# Patient Record
Sex: MALE | Race: AMERICAN INDIAN OR ALASKA NATIVE | HISPANIC OR LATINO | Employment: UNEMPLOYED | URBAN - METROPOLITAN AREA
[De-identification: names, ages, dates, MRNs, and addresses within clinical notes are randomized per-mention and may not be internally consistent; named-entity substitution may affect disease eponyms.]

---

## 2018-01-30 ENCOUNTER — APPOINTMENT (OUTPATIENT)
Dept: RADIOLOGY | Facility: MEDICAL CENTER | Age: 58
End: 2018-01-30
Attending: GENERAL PRACTICE
Payer: MEDICARE

## 2018-02-03 ENCOUNTER — HOSPITAL ENCOUNTER (OUTPATIENT)
Dept: RADIOLOGY | Facility: MEDICAL CENTER | Age: 58
End: 2018-02-03
Attending: GENERAL PRACTICE
Payer: MEDICARE

## 2018-02-03 DIAGNOSIS — M17.9 OSTEOARTHRITIS OF KNEE, UNSPECIFIED (CODE): ICD-10-CM

## 2018-02-03 PROCEDURE — 73721 MRI JNT OF LWR EXTRE W/O DYE: CPT | Mod: RT

## 2018-09-25 ENCOUNTER — HOSPITAL ENCOUNTER (INPATIENT)
Facility: MEDICAL CENTER | Age: 58
LOS: 5 days | DRG: 205 | End: 2018-10-01
Attending: EMERGENCY MEDICINE | Admitting: HOSPITALIST
Payer: MEDICARE

## 2018-09-25 DIAGNOSIS — R60.9 PERIPHERAL EDEMA: ICD-10-CM

## 2018-09-25 DIAGNOSIS — G47.33 OSA (OBSTRUCTIVE SLEEP APNEA): ICD-10-CM

## 2018-09-25 DIAGNOSIS — J96.01 ACUTE HYPOXEMIC RESPIRATORY FAILURE (HCC): ICD-10-CM

## 2018-09-25 DIAGNOSIS — R06.02 SHORTNESS OF BREATH: ICD-10-CM

## 2018-09-25 DIAGNOSIS — E66.01 MORBID OBESITY (HCC): ICD-10-CM

## 2018-09-25 PROCEDURE — 99291 CRITICAL CARE FIRST HOUR: CPT

## 2018-09-25 PROCEDURE — 94760 N-INVAS EAR/PLS OXIMETRY 1: CPT

## 2018-09-25 ASSESSMENT — PAIN DESCRIPTION - DESCRIPTORS: DESCRIPTORS: PRESSURE

## 2018-09-26 ENCOUNTER — APPOINTMENT (OUTPATIENT)
Dept: RADIOLOGY | Facility: MEDICAL CENTER | Age: 58
DRG: 205 | End: 2018-09-26
Attending: EMERGENCY MEDICINE
Payer: MEDICARE

## 2018-09-26 PROBLEM — J96.01 ACUTE HYPOXEMIC RESPIRATORY FAILURE (HCC): Status: ACTIVE | Noted: 2018-09-26

## 2018-09-26 PROBLEM — R60.9 PERIPHERAL EDEMA: Status: ACTIVE | Noted: 2018-09-26

## 2018-09-26 PROBLEM — E66.01 MORBID OBESITY (HCC): Status: ACTIVE | Noted: 2018-09-26

## 2018-09-26 PROBLEM — R06.02 SHORTNESS OF BREATH: Status: ACTIVE | Noted: 2018-09-26

## 2018-09-26 PROBLEM — E78.5 HLD (HYPERLIPIDEMIA): Status: ACTIVE | Noted: 2018-09-26

## 2018-09-26 PROBLEM — D64.9 ANEMIA: Status: ACTIVE | Noted: 2018-09-26

## 2018-09-26 LAB
ALBUMIN SERPL BCP-MCNC: 3.7 G/DL (ref 3.2–4.9)
ALBUMIN/GLOB SERPL: 0.9 G/DL
ALP SERPL-CCNC: 74 U/L (ref 30–99)
ALT SERPL-CCNC: 6 U/L (ref 2–50)
ANION GAP SERPL CALC-SCNC: 6 MMOL/L (ref 0–11.9)
AST SERPL-CCNC: 14 U/L (ref 12–45)
BASOPHILS # BLD AUTO: 0.9 % (ref 0–1.8)
BASOPHILS # BLD: 0.05 K/UL (ref 0–0.12)
BILIRUB SERPL-MCNC: 0.8 MG/DL (ref 0.1–1.5)
BNP SERPL-MCNC: 144 PG/ML (ref 0–100)
BUN SERPL-MCNC: 12 MG/DL (ref 8–22)
CALCIUM SERPL-MCNC: 9.2 MG/DL (ref 8.5–10.5)
CHLORIDE SERPL-SCNC: 99 MMOL/L (ref 96–112)
CO2 SERPL-SCNC: 32 MMOL/L (ref 20–33)
COMMENT 1642: NORMAL
CREAT SERPL-MCNC: 0.76 MG/DL (ref 0.5–1.4)
EOSINOPHIL # BLD AUTO: 0.36 K/UL (ref 0–0.51)
EOSINOPHIL NFR BLD: 6.2 % (ref 0–6.9)
ERYTHROCYTE [DISTWIDTH] IN BLOOD BY AUTOMATED COUNT: 52.9 FL (ref 35.9–50)
FERRITIN SERPL-MCNC: 4.2 NG/ML (ref 22–322)
FOLATE SERPL-MCNC: 9.4 NG/ML
GLOBULIN SER CALC-MCNC: 4 G/DL (ref 1.9–3.5)
GLUCOSE SERPL-MCNC: 116 MG/DL (ref 65–99)
HCT VFR BLD AUTO: 38.8 % (ref 42–52)
HGB BLD-MCNC: 10.2 G/DL (ref 14–18)
HGB RETIC QN AUTO: 17.2 PG/CELL (ref 29–35)
IMM GRANULOCYTES # BLD AUTO: 0.02 K/UL (ref 0–0.11)
IMM GRANULOCYTES NFR BLD AUTO: 0.3 % (ref 0–0.9)
IMM RETICS NFR: 33.2 % (ref 9.3–17.4)
IRON SATN MFR SERPL: 3 % (ref 15–55)
IRON SERPL-MCNC: 12 UG/DL (ref 50–180)
LIPASE SERPL-CCNC: 67 U/L (ref 11–82)
LYMPHOCYTES # BLD AUTO: 1.04 K/UL (ref 1–4.8)
LYMPHOCYTES NFR BLD: 17.9 % (ref 22–41)
MCH RBC QN AUTO: 19.1 PG (ref 27–33)
MCHC RBC AUTO-ENTMCNC: 26.3 G/DL (ref 33.7–35.3)
MCV RBC AUTO: 72.5 FL (ref 81.4–97.8)
MONOCYTES # BLD AUTO: 0.64 K/UL (ref 0–0.85)
MONOCYTES NFR BLD AUTO: 11 % (ref 0–13.4)
MORPHOLOGY BLD-IMP: NORMAL
NEUTROPHILS # BLD AUTO: 3.69 K/UL (ref 1.82–7.42)
NEUTROPHILS NFR BLD: 63.7 % (ref 44–72)
NRBC # BLD AUTO: 0.05 K/UL
NRBC BLD-RTO: 0.9 /100 WBC
PLATELET # BLD AUTO: 224 K/UL (ref 164–446)
PMV BLD AUTO: 10.1 FL (ref 9–12.9)
POTASSIUM SERPL-SCNC: 4.1 MMOL/L (ref 3.6–5.5)
PROT SERPL-MCNC: 7.7 G/DL (ref 6–8.2)
RBC # BLD AUTO: 5.35 M/UL (ref 4.7–6.1)
RETICS # AUTO: 0.09 M/UL (ref 0.04–0.06)
RETICS/RBC NFR: 1.9 % (ref 0.8–2.1)
SODIUM SERPL-SCNC: 137 MMOL/L (ref 135–145)
TIBC SERPL-MCNC: 406 UG/DL (ref 250–450)
TROPONIN I SERPL-MCNC: 0.02 NG/ML (ref 0–0.04)
TSH SERPL DL<=0.005 MIU/L-ACNC: 5.39 UIU/ML (ref 0.38–5.33)
VIT B12 SERPL-MCNC: 315 PG/ML (ref 211–911)
WBC # BLD AUTO: 5.8 K/UL (ref 4.8–10.8)

## 2018-09-26 PROCEDURE — 770020 HCHG ROOM/CARE - TELE (206)

## 2018-09-26 PROCEDURE — 80053 COMPREHEN METABOLIC PANEL: CPT

## 2018-09-26 PROCEDURE — 36415 COLL VENOUS BLD VENIPUNCTURE: CPT

## 2018-09-26 PROCEDURE — 700111 HCHG RX REV CODE 636 W/ 250 OVERRIDE (IP): Performed by: HOSPITALIST

## 2018-09-26 PROCEDURE — 84443 ASSAY THYROID STIM HORMONE: CPT

## 2018-09-26 PROCEDURE — 85025 COMPLETE CBC W/AUTO DIFF WBC: CPT

## 2018-09-26 PROCEDURE — 82746 ASSAY OF FOLIC ACID SERUM: CPT

## 2018-09-26 PROCEDURE — A9270 NON-COVERED ITEM OR SERVICE: HCPCS | Performed by: HOSPITALIST

## 2018-09-26 PROCEDURE — G8978 MOBILITY CURRENT STATUS: HCPCS | Mod: CJ

## 2018-09-26 PROCEDURE — 84484 ASSAY OF TROPONIN QUANT: CPT

## 2018-09-26 PROCEDURE — 83690 ASSAY OF LIPASE: CPT

## 2018-09-26 PROCEDURE — 99223 1ST HOSP IP/OBS HIGH 75: CPT | Mod: AI | Performed by: HOSPITALIST

## 2018-09-26 PROCEDURE — G8979 MOBILITY GOAL STATUS: HCPCS | Mod: CI

## 2018-09-26 PROCEDURE — 700102 HCHG RX REV CODE 250 W/ 637 OVERRIDE(OP): Performed by: HOSPITALIST

## 2018-09-26 PROCEDURE — 83550 IRON BINDING TEST: CPT

## 2018-09-26 PROCEDURE — 71046 X-RAY EXAM CHEST 2 VIEWS: CPT

## 2018-09-26 PROCEDURE — 82607 VITAMIN B-12: CPT

## 2018-09-26 PROCEDURE — 97161 PT EVAL LOW COMPLEX 20 MIN: CPT

## 2018-09-26 PROCEDURE — 83880 ASSAY OF NATRIURETIC PEPTIDE: CPT

## 2018-09-26 PROCEDURE — 83036 HEMOGLOBIN GLYCOSYLATED A1C: CPT

## 2018-09-26 PROCEDURE — 93005 ELECTROCARDIOGRAM TRACING: CPT | Performed by: EMERGENCY MEDICINE

## 2018-09-26 PROCEDURE — 94660 CPAP INITIATION&MGMT: CPT

## 2018-09-26 PROCEDURE — 83540 ASSAY OF IRON: CPT

## 2018-09-26 PROCEDURE — 85046 RETICYTE/HGB CONCENTRATE: CPT

## 2018-09-26 PROCEDURE — 82728 ASSAY OF FERRITIN: CPT

## 2018-09-26 RX ORDER — OXYCODONE HYDROCHLORIDE 10 MG/1
10 TABLET ORAL
Status: DISCONTINUED | OUTPATIENT
Start: 2018-09-26 | End: 2018-10-01 | Stop reason: HOSPADM

## 2018-09-26 RX ORDER — ATORVASTATIN CALCIUM 10 MG/1
10 TABLET, FILM COATED ORAL DAILY
Status: ON HOLD | COMMUNITY
End: 2019-04-24

## 2018-09-26 RX ORDER — FUROSEMIDE 10 MG/ML
20 INJECTION INTRAMUSCULAR; INTRAVENOUS
Status: DISCONTINUED | OUTPATIENT
Start: 2018-09-26 | End: 2018-09-30

## 2018-09-26 RX ORDER — PROMETHAZINE HYDROCHLORIDE 25 MG/1
12.5-25 TABLET ORAL EVERY 4 HOURS PRN
Status: DISCONTINUED | OUTPATIENT
Start: 2018-09-26 | End: 2018-10-01 | Stop reason: HOSPADM

## 2018-09-26 RX ORDER — OXYCODONE HYDROCHLORIDE 5 MG/1
5 TABLET ORAL
Status: DISCONTINUED | OUTPATIENT
Start: 2018-09-26 | End: 2018-10-01 | Stop reason: HOSPADM

## 2018-09-26 RX ORDER — PROMETHAZINE HYDROCHLORIDE 25 MG/1
12.5-25 SUPPOSITORY RECTAL EVERY 4 HOURS PRN
Status: DISCONTINUED | OUTPATIENT
Start: 2018-09-26 | End: 2018-10-01 | Stop reason: HOSPADM

## 2018-09-26 RX ORDER — ATORVASTATIN CALCIUM 10 MG/1
10 TABLET, FILM COATED ORAL NIGHTLY
Status: DISCONTINUED | OUTPATIENT
Start: 2018-09-26 | End: 2018-10-01 | Stop reason: HOSPADM

## 2018-09-26 RX ORDER — POLYETHYLENE GLYCOL 3350 17 G/17G
1 POWDER, FOR SOLUTION ORAL
Status: DISCONTINUED | OUTPATIENT
Start: 2018-09-26 | End: 2018-10-01 | Stop reason: HOSPADM

## 2018-09-26 RX ORDER — ONDANSETRON 4 MG/1
4 TABLET, ORALLY DISINTEGRATING ORAL EVERY 4 HOURS PRN
Status: DISCONTINUED | OUTPATIENT
Start: 2018-09-26 | End: 2018-10-01 | Stop reason: HOSPADM

## 2018-09-26 RX ORDER — HEPARIN SODIUM 5000 [USP'U]/ML
5000 INJECTION, SOLUTION INTRAVENOUS; SUBCUTANEOUS EVERY 8 HOURS
Status: DISCONTINUED | OUTPATIENT
Start: 2018-09-26 | End: 2018-10-01 | Stop reason: HOSPADM

## 2018-09-26 RX ORDER — ASPIRIN 81 MG/1
81 TABLET, CHEWABLE ORAL DAILY
Status: ON HOLD | COMMUNITY
End: 2019-04-24

## 2018-09-26 RX ORDER — AMOXICILLIN 250 MG
2 CAPSULE ORAL 2 TIMES DAILY
Status: DISCONTINUED | OUTPATIENT
Start: 2018-09-26 | End: 2018-10-01 | Stop reason: HOSPADM

## 2018-09-26 RX ORDER — BISACODYL 10 MG
10 SUPPOSITORY, RECTAL RECTAL
Status: DISCONTINUED | OUTPATIENT
Start: 2018-09-26 | End: 2018-10-01 | Stop reason: HOSPADM

## 2018-09-26 RX ORDER — ONDANSETRON 2 MG/ML
4 INJECTION INTRAMUSCULAR; INTRAVENOUS EVERY 4 HOURS PRN
Status: DISCONTINUED | OUTPATIENT
Start: 2018-09-26 | End: 2018-10-01 | Stop reason: HOSPADM

## 2018-09-26 RX ORDER — ASPIRIN 81 MG/1
81 TABLET, CHEWABLE ORAL DAILY
Status: DISCONTINUED | OUTPATIENT
Start: 2018-09-26 | End: 2018-10-01 | Stop reason: HOSPADM

## 2018-09-26 RX ADMIN — OXYCODONE HYDROCHLORIDE 5 MG: 5 TABLET ORAL at 05:08

## 2018-09-26 RX ADMIN — HEPARIN SODIUM 5000 UNITS: 5000 INJECTION, SOLUTION INTRAVENOUS; SUBCUTANEOUS at 14:15

## 2018-09-26 RX ADMIN — HEPARIN SODIUM 5000 UNITS: 5000 INJECTION, SOLUTION INTRAVENOUS; SUBCUTANEOUS at 05:06

## 2018-09-26 RX ADMIN — FUROSEMIDE 20 MG: 10 INJECTION, SOLUTION INTRAMUSCULAR; INTRAVENOUS at 05:07

## 2018-09-26 RX ADMIN — FUROSEMIDE 20 MG: 10 INJECTION, SOLUTION INTRAMUSCULAR; INTRAVENOUS at 16:32

## 2018-09-26 RX ADMIN — ATORVASTATIN CALCIUM 10 MG: 10 TABLET, FILM COATED ORAL at 21:36

## 2018-09-26 RX ADMIN — ATORVASTATIN CALCIUM 10 MG: 10 TABLET, FILM COATED ORAL at 05:06

## 2018-09-26 RX ADMIN — ASPIRIN 81 MG: 81 TABLET, CHEWABLE ORAL at 05:06

## 2018-09-26 RX ADMIN — HEPARIN SODIUM 5000 UNITS: 5000 INJECTION, SOLUTION INTRAVENOUS; SUBCUTANEOUS at 21:36

## 2018-09-26 ASSESSMENT — ENCOUNTER SYMPTOMS
DEPRESSION: 0
WEAKNESS: 0
SENSORY CHANGE: 0
FLANK PAIN: 0
DIZZINESS: 0
HEMOPTYSIS: 0
BLURRED VISION: 0
FOCAL WEAKNESS: 0
HEARTBURN: 0
SPEECH CHANGE: 0
PALPITATIONS: 0
HEADACHES: 0
DOUBLE VISION: 0
ABDOMINAL PAIN: 0
EYE DISCHARGE: 0
CHILLS: 0
VOMITING: 0
SHORTNESS OF BREATH: 1
SHORTNESS OF BREATH: 0
BRUISES/BLEEDS EASILY: 0
COUGH: 0
SORE THROAT: 0
FEVER: 0
DIARRHEA: 0
HALLUCINATIONS: 0
NAUSEA: 0
MYALGIAS: 0

## 2018-09-26 ASSESSMENT — PATIENT HEALTH QUESTIONNAIRE - PHQ9
SUM OF ALL RESPONSES TO PHQ9 QUESTIONS 1 AND 2: 2
7. TROUBLE CONCENTRATING ON THINGS, SUCH AS READING THE NEWSPAPER OR WATCHING TELEVISION: NOT AT ALL
5. POOR APPETITE OR OVEREATING: NOT AT ALL
1. LITTLE INTEREST OR PLEASURE IN DOING THINGS: MORE THAN HALF THE DAYS
9. THOUGHTS THAT YOU WOULD BE BETTER OFF DEAD, OR OF HURTING YOURSELF: NOT AT ALL
SUM OF ALL RESPONSES TO PHQ QUESTIONS 1-9: 10
6. FEELING BAD ABOUT YOURSELF - OR THAT YOU ARE A FAILURE OR HAVE LET YOURSELF OR YOUR FAMILY DOWN: NOT AL ALL
2. FEELING DOWN, DEPRESSED, IRRITABLE, OR HOPELESS: NOT AT ALL
4. FEELING TIRED OR HAVING LITTLE ENERGY: NEARLY EVERY DAY
8. MOVING OR SPEAKING SO SLOWLY THAT OTHER PEOPLE COULD HAVE NOTICED. OR THE OPPOSITE, BEING SO FIGETY OR RESTLESS THAT YOU HAVE BEEN MOVING AROUND A LOT MORE THAN USUAL: MORE THAN HALF THE DAYS
3. TROUBLE FALLING OR STAYING ASLEEP OR SLEEPING TOO MUCH: NEARLY EVERY DAY

## 2018-09-26 ASSESSMENT — COGNITIVE AND FUNCTIONAL STATUS - GENERAL
SUGGESTED CMS G CODE MODIFIER MOBILITY: CH
MOBILITY SCORE: 24

## 2018-09-26 ASSESSMENT — LIFESTYLE VARIABLES
EVER_SMOKED: NEVER
CONSUMPTION TOTAL: POSITIVE
ON A TYPICAL DAY WHEN YOU DRINK ALCOHOL HOW MANY DRINKS DO YOU HAVE: 2
TOTAL SCORE: 1
AVERAGE NUMBER OF DAYS PER WEEK YOU HAVE A DRINK CONTAINING ALCOHOL: 2
HAVE YOU EVER FELT YOU SHOULD CUT DOWN ON YOUR DRINKING: YES
ALCOHOL_USE: YES
HOW MANY TIMES IN THE PAST YEAR HAVE YOU HAD 5 OR MORE DRINKS IN A DAY: 6
SUBSTANCE_ABUSE: 0
HAVE PEOPLE ANNOYED YOU BY CRITICIZING YOUR DRINKING: NO
TOTAL SCORE: 1
EVER FELT BAD OR GUILTY ABOUT YOUR DRINKING: NO
TOTAL SCORE: 1
EVER HAD A DRINK FIRST THING IN THE MORNING TO STEADY YOUR NERVES TO GET RID OF A HANGOVER: NO
EVER_SMOKED: NEVER

## 2018-09-26 ASSESSMENT — COPD QUESTIONNAIRES
COPD SCREENING SCORE: 3
IN THE PAST 12 MONTHS DO YOU DO LESS THAN YOU USED TO BECAUSE OF YOUR BREATHING PROBLEMS: DISAGREE/UNSURE
DURING THE PAST 4 WEEKS HOW MUCH DID YOU FEEL SHORT OF BREATH: SOME OF THE TIME
COPD SCREENING SCORE: 3
DO YOU EVER COUGH UP ANY MUCUS OR PHLEGM?: NO/ONLY WITH OCCASIONAL COLDS OR INFECTIONS
DURING THE PAST 4 WEEKS HOW MUCH DID YOU FEEL SHORT OF BREATH: MOST  OR ALL OF THE TIME
DO YOU EVER COUGH UP ANY MUCUS OR PHLEGM?: YES, A FEW DAYS A WEEK OR MONTH
HAVE YOU SMOKED AT LEAST 100 CIGARETTES IN YOUR ENTIRE LIFE: NO/DON'T KNOW
HAVE YOU SMOKED AT LEAST 100 CIGARETTES IN YOUR ENTIRE LIFE: NO/DON'T KNOW

## 2018-09-26 ASSESSMENT — PAIN SCALES - GENERAL
PAINLEVEL_OUTOF10: 0
PAINLEVEL_OUTOF10: 7
PAINLEVEL_OUTOF10: 0
PAINLEVEL_OUTOF10: 7
PAINLEVEL_OUTOF10: 5
PAINLEVEL_OUTOF10: 2

## 2018-09-26 ASSESSMENT — GAIT ASSESSMENTS
DISTANCE (FEET): 200
GAIT LEVEL OF ASSIST: SUPERVISED

## 2018-09-26 NOTE — ED TRIAGE NOTES
Pt ambulatory from triage, steady gait. Pt states that he was at [facility] yesterday and had a CT completed at that time. Pt states that they sent him home with instructions for follow up appointment, but that he noted his symptoms worsened tonight so he decided to come back in.

## 2018-09-26 NOTE — ED NOTES
"Chief Complaint   Patient presents with   • Abdominal Swelling     Had become swollen and hard since Wednesday, as well as slightly painful. Swelling and hardness has moved down to scrotal region on Sunday.        /62   Pulse 76   Temp 36.4 °C (97.5 °F)   Resp 16   Ht 1.778 m (5' 10\")   Wt (!) 180.5 kg (397 lb 14.9 oz)   SpO2 95%   BMI 57.10 kg/m²     Starting Wednesday pt noticed abdomen getting swollen and harder, no changes in GI. On Sunday scrotum started becoming hard and swollen as well. On palpation lower abdomin is harder than upper, pt states that his legs feel edematous compared to normal.  Denies N/V/D.   "

## 2018-09-26 NOTE — ED PROVIDER NOTES
ED Provider Note    Scribed for Elizabeth Hawkins M.D. by Bruno Post. 9/26/2018, 12:36 AM.    Primary care provider: Elizabeth Faust M.D.  Means of arrival: Walk in  History obtained from: Patient  History limited by: None    CHIEF COMPLAINT  Chief Complaint   Patient presents with   • Abdominal Swelling     Had become swollen and hard since Wednesday, as well as slightly painful. Swelling and hardness has moved down to scrotal region on Sunday.        HPI  Rashi Shirley is a 58 y.o. male with a history of hyperlipidemia and borderline diabetes who presents to the Emergency Department for evaluation of abdominal swelling and hardness extending to the testes onset 1 day ago. The patient confirms leg swelling onset 1 week ago and shortness of breath upon walking. He reports that he is using oxygen while sleeping, but has not been restarted on a CPAP. He is able to urinate. The patient was seen at [facility] for abdominal swelling and he underwent a CT scan, which was found to be normal. He was advised to follow up with his PCP. He denies taking Lasix. He denies any cigarette use or history of asthma.    REVIEW OF SYSTEMS  Positives as above. Pertinent negatives include difficulty urinating   All other review of systems are negative     PAST MEDICAL HISTORY   has a past medical history of Diabetes (HCC); Hyperlipidemia; and Obesity.    SURGICAL HISTORY   has a past surgical history that includes other orthopedic surgery.    SOCIAL HISTORY  Social History   Substance Use Topics   • Smoking status: Never Smoker   • Smokeless tobacco: Never Used   • Alcohol use Yes      Comment: occasional      History   Drug Use No       FAMILY HISTORY  History reviewed. No pertinent family history.    CURRENT MEDICATIONS  Reviewed. See Encounter Summary.     ALLERGIES  No Known Allergies    PHYSICAL EXAM  VITAL SIGNS: /55   Pulse 84   Temp 36.4 °C (97.5 °F)   Resp 20   Ht 1.778 m (5'  "10\")   Wt (!) 180.5 kg (397 lb 14.9 oz)   SpO2 95%   BMI 57.10 kg/m²    Pulse ox interpretation: I interpret this pulse ox as normal.  Constitutional: Alert in no apparent distress.  HENT: Normocephalic, Atraumatic, MMM  Eyes: PERR. Conjunctiva normal, non-icteric.   Heart: Regular rate and rythm, no murmurs.    Lungs: Clear to auscultation bilaterally, shortness of breath from lying to sitting, diminished breath sounds at the bases and wheezes at the apices bilaterally, Patient should be on CPAP but has not been restarted on it in months  Abdomen: Non-tender, non-distended, normal bowel sounds, very obese male  EXT: 3+ pitting edema up to the umbilicus  Skin: Warm, Dry, No erythema, No rash.   Neurologic: Alert and oriented, Grossly non-focal.     DIFFERENTIAL DIAGNOSIS AND WORK UP PLAN    12:36 AM Patient seen and examined at bedside. The patient presents with abdominal swelling and the differential diagnosis includes but is not limited to CHF, acute renal abnormality, liver failure, fluid overload, hypoxia. Ordered for DX Chest, CBC, Lipase, Troponin, CMP, BNP, and EKG to evaluate.      DIAGNOSTIC STUDIES / PROCEDURES       LABS  CBC with an anemia without elevated white blood cell count, CMP within normal limits BNP elevated 144 thyroid function mildly elevated lipase troponin both negative  All labs were reviewed by me.    EKG  12- Lead EKG; interpreted by myself - Ross  Normal sinus rhythm with a rate of 78 bpm.   Normal axis.  Normal intervals.   No ST elevation or depression, T wave inversion int eh anterior leads, but no T waves consistent with Wellins  No widening of QRS complex   Good R wave progression   No diagnostic Q waves.   Clinical Impression: Sinus with T wave inversions consistent with anterior cardiac dysfunction.       RADIOLOGY  DX-CHEST-2 VIEWS   Final Result      Enlarged cardiac silhouette without evidence of acute disease.      EC-ECHOCARDIOGRAM COMPLETE W/O CONT    (Results Pending) " "    The radiologist's interpretation of all radiological studies have been reviewed by me.    COURSE & MEDICAL DECISION MAKING  Pertinent Labs & Imaging studies reviewed. (See chart for details)    1:16 AM The patient ambulated to the bathroom and stated 80-85% and was put on 3 L O2 when he got back.    2:14 AM Dr. Prieto, Hospitalist, consulted and agrees to admit the patient.    2:26 AM I reevaluated the patient at the bedside informed that he will be admitted likely secondary to his hypoxia with ambulation and increased worsening edema up to the abdomen at this time.  I highly suspect this is secondary to congestive heart failure in the setting of an enlarged cardiac silhouette and obstructive sleep apnea while not using CPAP as well as an obese male.  Likely has some fluid overload and will be requiring echocardiogram evaluation of his heart and medication modification including some Lasix.    /55   Pulse 73   Temp 36.4 °C (97.5 °F)   Resp (!) 46   Ht 1.778 m (5' 10\")   Wt (!) 180.5 kg (397 lb 14.9 oz)   SpO2 99%   BMI 57.10 kg/m²     DISPOSITION:  Patient will be admitted to Dr. Prieto, Hospitalist, in guarded condition.      FINAL IMPRESSION  1. Hypoxia  2. Bilateral lower extremity edema  3. Cardiomegaly      Bruno ALSTON (Scribe), am scribing for, and in the presence of, Elizabeth Hawkins M.D..    Electronically signed by: Bruno Post (Scribe), 9/26/2018    Elizabeth ALSTON M.D. personally performed the services described in this documentation, as scribed by Bruno Post in my presence, and it is both accurate and complete. C    The note accurately reflects work and decisions made by me.  Elizabeth Hawkins  9/26/2018  4:27 AM      This dictation has been created using voice recognition software and/or scribes. The accuracy of the dictation is limited by the abilities of the software and the expertise of the scribes. I expect there may be some errors of grammar and " possibly content. I made every attempt to manually correct the errors within my dictation. However, errors related to voice recognition software and/or scribes may still exist and should be interpreted within the appropriate context.

## 2018-09-26 NOTE — ASSESSMENT & PLAN NOTE
Suspect lymphedema but more likely with right heart failure with scrotal edema   Albumin 3.7  Monitor I/O's  Diuresis .

## 2018-09-26 NOTE — THERAPY
"Physical Therapy Evaluation completed.   Bed Mobility:  Supine to Sit: Supervised (HOB up and used trapeze)  Transfers: Sit to Stand: Supervised  Gait: Level Of Assist: Supervised with No Equipment Needed       Plan of Care: Will benefit from Physical Therapy 1 times per week  Discharge Recommendations: Equipment: No Equipment Needed   Pt is a 57 yo male, presenting with progressively worsening shortness of breath over the last several days and abdominal, scrotal and LE edema. Today, pt was able to tolerate ambulation x 200 feet using no AD, no balance or strength issues. Testing still underway for cardiac issues. PT will follow pt 1x per week once plan is clear. Pt should continue to walk in halls 2-3x per day with nsg supervision, pt does not need an assistive device. Pt has family assist at home as needed.     See \"Rehab Therapy-Acute\" Patient Summary Report for complete documentation.     "

## 2018-09-26 NOTE — PROGRESS NOTES
[facility] Hospitalist Progress Note    Date of Service: 2018    Chief Complaint  58 y.o. male admitted 2018 with abdominal pain    Interval Problem Update  Itchy abdomen  Leg, scrotal edema  NSR 70-80's  No drips.  Lasix  Afebrile  Regular diet  Standby assist  On 1L NC    We discussed weight loss need and need for treating his ABELARDO. He states he can obtain a new CPAP at the [facility].        Consultants/Specialty  None    Disposition  Tele overflow        Review of Systems   Constitutional: Negative for chills and fever.   HENT: Negative for congestion and sore throat.    Respiratory: Negative for cough and shortness of breath.    Cardiovascular: Positive for leg swelling. Negative for chest pain and palpitations.   Gastrointestinal: Negative for abdominal pain, diarrhea and nausea.   Genitourinary: Negative for dysuria.   Musculoskeletal: Positive for joint pain (chronic knee pain bilaterally).   Skin: Positive for itching. Negative for rash.   Neurological: Negative for dizziness and headaches.   Psychiatric/Behavioral: Negative for depression.      Physical Exam  Laboratory/Imaging   Hemodynamics  Temp (24hrs), Av.9 °C (98.4 °F), Min:36.4 °C (97.5 °F), Max:37.4 °C (99.4 °F)   Temperature: 37 °C (98.6 °F)  Pulse  Av.8  Min: 73  Max: 91 Heart Rate (Monitored): 74  Blood Pressure: (!) 167/65 (supine before OOB. Nsg updated, ok to get up. ), NIBP: 126/63      Respiratory      Respiration: 18, Pulse Oximetry: 93 %, O2 Daily Delivery Respiratory : Silicone Nasal Cannula     Work Of Breathing / Effort: Mild  RUL Breath Sounds: Clear, RML Breath Sounds: Diminished, RLL Breath Sounds: Diminished, ENRRIQUE Breath Sounds: Clear, LLL Breath Sounds: Diminished    Fluids    Intake/Output Summary (Last 24 hours) at 18 3248  Last data filed at 18 1200   Gross per 24 hour   Intake              700 ml   Output              100 ml   Net              600 ml       Nutrition  Orders Placed This Encounter    Procedures   • Diet Order Regular     Standing Status:   Standing     Number of Occurrences:   1     Order Specific Question:   Diet:     Answer:   Regular [1]     Physical Exam   Constitutional: He is oriented to person, place, and time. He appears well-nourished. No distress.   Morbidly obese   HENT:   Head: Normocephalic and atraumatic.   Nose: Nose normal.   Mouth/Throat: Oropharynx is clear and moist.   Eyes: Conjunctivae and EOM are normal. Right eye exhibits no discharge. Left eye exhibits no discharge.   Neck: No tracheal deviation present.   Cardiovascular: Normal rate, regular rhythm and intact distal pulses.    No murmur heard.  Pulses:       Radial pulses are 2+ on the right side, and 2+ on the left side.        Dorsalis pedis pulses are 2+ on the right side, and 2+ on the left side.   Pulmonary/Chest: No stridor. No respiratory distress.   Distant breath sounds   Abdominal: Soft. He exhibits no distension. There is no tenderness.   Genitourinary:   Genitourinary Comments: Scrotal edema   Musculoskeletal: He exhibits edema.   Neurological: He is alert and oriented to person, place, and time. No cranial nerve deficit.   Skin: Skin is warm. He is not diaphoretic.   Psychiatric: He has a normal mood and affect. His behavior is normal.   Vitals reviewed.      Recent Labs      09/26/18   0120   WBC  5.8   RBC  5.35   HEMOGLOBIN  10.2*   HEMATOCRIT  38.8*   MCV  72.5*   MCH  19.1*   MCHC  26.3*   RDW  52.9*   PLATELETCT  224   MPV  10.1     Recent Labs      09/26/18   0120   SODIUM  137   POTASSIUM  4.1   CHLORIDE  99   CO2  32   GLUCOSE  116*   BUN  12   CREATININE  0.76   CALCIUM  9.2         Recent Labs      09/26/18   0120   BNPBTYPENAT  144*              Assessment/Plan     * Shortness of breath   Assessment & Plan    Multifactorial etiology  Probable ABELARDO, right heart strain  Minimally elevated BNP  Discussed w/ patient the need for compliance with CPAP.  IV lasix and measure strict I/O's  check  echocardiogram         Morbid obesity (HCC)   Assessment & Plan    Discussed weight loss needed        Peripheral edema   Assessment & Plan    Could be component of lymphedema but more likely with right heart failure.  Await echocardiogram.  Albumin 3.7  Monitor I/O's          Anemia   Assessment & Plan    No signs of acute bleeding  Lab workup ordered        Acute hypoxemic respiratory failure (HCC)   Assessment & Plan    Supplemental oxygen  Wean as tolerates  Await echocardiogram results  IV Lasix        HLD (hyperlipidemia)   Assessment & Plan    Check lipid panel   On atorvastatin for ongoing active treatment          Quality-Core Measures   Reviewed items::  Radiology images reviewed, Labs reviewed and Medications reviewed  Briseno catheter::  No Briseno  DVT prophylaxis pharmacological::  Heparin

## 2018-09-26 NOTE — PROGRESS NOTES
Unable to attain accurate I/Os due to patient anatomy preventing his use of a urinal, and being unable to get urine into the toilet with consistency.

## 2018-09-26 NOTE — ASSESSMENT & PLAN NOTE
Due to OHS, cor pulmonale , ABELARDO  Diurese w lasix  Arrange for home o2 , unfortunately - [facility] home o2 no open on weekends. - Home o2 orders placed.

## 2018-09-26 NOTE — ASSESSMENT & PLAN NOTE
Multifactorial etiology- ABELARDO, obesity hypoventilation , cor pulmonale .   Echo with mildly elevated right sided pressure and LVH.  Minimally elevated BNP-- improved edema and dyspnea.   Change to oral lasix.   He will fu with [facility] to arrange for home CPAP  Continue o2 support. -- arrange portable o2.

## 2018-09-26 NOTE — PROGRESS NOTES
2 RN Skin Check Complete     Patient has significant edema across his body. However, his skin is intact

## 2018-09-26 NOTE — PROGRESS NOTES
Pt resting in bed watching TV. A+Ox4, denies pain other than slight itchiness on lower abdomen. Lotion provided.

## 2018-09-26 NOTE — PROGRESS NOTES
2 RN skin assessment:   Skin intact. Sacrum has no abnormalities. Lower abdomen pink and swollen but intact. Pt reports it is slightly itchy. Pannus folds pink but dry and intact. Lower legs swollen, right leg slightly dusky and more swollen than left. SCDs in place. Ears intact with no reddness noted. Missing multiple teeth but no discomfort or sores noted.

## 2018-09-26 NOTE — DIETARY
"Nutrition Services: High BMI    Pt is a 58 y.o. Male with Dx: Acute hypoxemic respiratory failure (HCC)    Ht: 70\", Wt: 175.7 kg, BMI= 55.58  Pt with BMI >40; formal weight loss counseling not appropriate in acute care setting.  Recommend outpatient services for weight management after D/c.     "

## 2018-09-26 NOTE — ED NOTES
Pt ambulated to restroom, steady gait. SPO2 81%-85% on RA through ambulation trial. Pt returned to bed, 3L O2 NC, 98%.

## 2018-09-26 NOTE — ASSESSMENT & PLAN NOTE
Iron deficient - No symptoms of bleed  Iron and vitamin C supplements.   Fu stool obt  Fu outpatient for evaluation of  Colonoscopy- discussed with patient plan of care.

## 2018-09-26 NOTE — H&P
Hospital Medicine History & Physical Note    Date of Service  9/26/2018    Primary Care Physician  Elizabeth Faust M.D.    Consultants  None    Code Status  full    Chief Complaint  Shortness of breath    History of Presenting Illness  58 y.o. male who presented 9/25/2018 with progressively worsening shortness of breath over the last several days.  Patient also has noticed abdominal swelling down to the scrotal region and down to his lower extremities.  His swelling started about a week ago and shortness of breath has worsened upon walking.  He has been using oxygen while sleeping not restarted his CPAP.  He has normal urination.  He has never taken Lasix in the past.  Denies any cigarette or history of asthma or COPD.  Symptoms are progressively worsening.  He is able to lie flat with but not sleep without oxygen.  Denies any chest pain any cough.    Review of Systems  Review of Systems   Constitutional: Negative for chills and fever.   HENT: Negative for congestion, hearing loss and tinnitus.    Eyes: Negative for blurred vision, double vision and discharge.   Respiratory: Positive for shortness of breath. Negative for cough and hemoptysis.    Cardiovascular: Positive for leg swelling. Negative for chest pain and palpitations.   Gastrointestinal: Negative for abdominal pain, heartburn, nausea and vomiting.   Genitourinary: Negative for dysuria and flank pain.   Musculoskeletal: Negative for joint pain and myalgias.   Skin: Negative for rash.   Neurological: Negative for dizziness, sensory change, speech change, focal weakness and weakness.   Endo/Heme/Allergies: Negative for environmental allergies. Does not bruise/bleed easily.   Psychiatric/Behavioral: Negative for depression, hallucinations and substance abuse.       Past Medical History   has a past medical history of Diabetes (HCC); Hyperlipidemia; and Obesity.    Surgical History   has a past surgical history that includes other orthopedic surgery.      Family History  Reviewed and noncontributory    Social History   reports that he has never smoked. He has never used smokeless tobacco. He reports that he drinks alcohol. He reports that he does not use drugs.    Allergies  No Known Allergies    Medications  Prior to Admission Medications   Prescriptions Last Dose Informant Patient Reported? Taking?   aspirin (ASA) 81 MG Chew Tab chewable tablet   Yes Yes   Sig: Take 81 mg by mouth every day.   atorvastatin (LIPITOR) 10 MG Tab   Yes Yes   Sig: Take 10 mg by mouth every evening.   ibuprofen (MOTRIN) 600 MG Tab   No No   Sig: Take 1 Tab by mouth every 6 hours as needed.   metFORMIN (GLUCOPHAGE) 500 MG Tab   Yes Yes   Sig: Take 500 mg by mouth 2 times a day, with meals.      Facility-Administered Medications: None       Physical Exam  Temp:  [36.4 °C (97.5 °F)] 36.4 °C (97.5 °F)  Pulse:  [76-84] 84  Resp:  [16-20] 20  BP: (126-148)/(55-62) 126/55    Physical Exam   Constitutional: He is oriented to person, place, and time. He appears well-developed and well-nourished.   HENT:   Head: Normocephalic and atraumatic.   Eyes: Pupils are equal, round, and reactive to light. Conjunctivae and EOM are normal.   Neck: Normal range of motion. Neck supple. No JVD present.   Cardiovascular: Normal rate, regular rhythm, normal heart sounds and intact distal pulses.    No murmur heard.  Pulmonary/Chest: Effort normal and breath sounds normal. No respiratory distress. He exhibits no tenderness.   Abdominal: Soft. Bowel sounds are normal. He exhibits distension. There is no tenderness.   Musculoskeletal: Normal range of motion. He exhibits edema.   Neurological: He is alert and oriented to person, place, and time. No cranial nerve deficit. He exhibits normal muscle tone.   Skin: Skin is warm and dry. No erythema.   Psychiatric: He has a normal mood and affect. His behavior is normal. Judgment and thought content normal.   Nursing note and vitals reviewed.      Laboratory:  Recent  Labs      09/26/18   0120   WBC  5.8   RBC  5.35   HEMOGLOBIN  10.2*   HEMATOCRIT  38.8*   MCV  72.5*   MCH  19.1*   MCHC  26.3*   RDW  52.9*   PLATELETCT  224   MPV  10.1     Recent Labs      09/26/18   0120   SODIUM  137   POTASSIUM  4.1   CHLORIDE  99   CO2  32   GLUCOSE  116*   BUN  12   CREATININE  0.76   CALCIUM  9.2     Recent Labs      09/26/18   0120   ALTSGPT  6   ASTSGOT  14   ALKPHOSPHAT  74   TBILIRUBIN  0.8   LIPASE  67   GLUCOSE  116*                 Recent Labs      09/26/18   0120   TROPONINI  0.02       Urinalysis:    No results found     Imaging:  DX-CHEST-2 VIEWS   Final Result      Enlarged cardiac silhouette without evidence of acute disease.      EC-ECHOCARDIOGRAM COMPLETE W/O CONT    (Results Pending)         Assessment/Plan:  I anticipate this patient will require at least two midnights for appropriate medical management, necessitating inpatient admission.    * Shortness of breath   Assessment & Plan    Needs compliance with CPAP   Will also diurese with IV lasix  Suspected heartfailure; check echocardiogram   BNP ordered         HLD (hyperlipidemia)   Assessment & Plan    Check lipid panel   Resume statin        Anemia   Assessment & Plan    No signs of acute bleeding  Lab workup ordered        Morbid obesity (HCC)   Assessment & Plan    Encouraged weight loss        Peripheral edema   Assessment & Plan    diffcult to assess clinically as patient morbid obesity   Starting on lasix for now   Monitor I/o   Check echocardiogram for heart failure         Acute hypoxemic respiratory failure (HCC)   Assessment & Plan    Failed 02 eval in the ED, desat with ambulation               VTE prophylaxis: heparin

## 2018-09-27 ENCOUNTER — APPOINTMENT (OUTPATIENT)
Dept: CARDIOLOGY | Facility: MEDICAL CENTER | Age: 58
DRG: 205 | End: 2018-09-27
Attending: HOSPITALIST
Payer: MEDICARE

## 2018-09-27 LAB
ALBUMIN SERPL BCP-MCNC: 3.3 G/DL (ref 3.2–4.9)
ALBUMIN/GLOB SERPL: 0.9 G/DL
ALP SERPL-CCNC: 62 U/L (ref 30–99)
ALT SERPL-CCNC: 6 U/L (ref 2–50)
ANION GAP SERPL CALC-SCNC: 4 MMOL/L (ref 0–11.9)
ANISOCYTOSIS BLD QL SMEAR: ABNORMAL
AST SERPL-CCNC: 11 U/L (ref 12–45)
BASOPHILS # BLD AUTO: 0.7 % (ref 0–1.8)
BASOPHILS # BLD: 0.04 K/UL (ref 0–0.12)
BILIRUB SERPL-MCNC: 0.7 MG/DL (ref 0.1–1.5)
BUN SERPL-MCNC: 12 MG/DL (ref 8–22)
CALCIUM SERPL-MCNC: 9 MG/DL (ref 8.5–10.5)
CHLORIDE SERPL-SCNC: 97 MMOL/L (ref 96–112)
CHOLEST SERPL-MCNC: 66 MG/DL (ref 100–199)
CO2 SERPL-SCNC: 39 MMOL/L (ref 20–33)
COMMENT 1642: NORMAL
CREAT SERPL-MCNC: 0.72 MG/DL (ref 0.5–1.4)
EOSINOPHIL # BLD AUTO: 0.37 K/UL (ref 0–0.51)
EOSINOPHIL NFR BLD: 6.3 % (ref 0–6.9)
ERYTHROCYTE [DISTWIDTH] IN BLOOD BY AUTOMATED COUNT: 52.9 FL (ref 35.9–50)
GLOBULIN SER CALC-MCNC: 3.7 G/DL (ref 1.9–3.5)
GLUCOSE SERPL-MCNC: 121 MG/DL (ref 65–99)
HCT VFR BLD AUTO: 36.2 % (ref 42–52)
HDLC SERPL-MCNC: 25 MG/DL
HGB BLD-MCNC: 9.5 G/DL (ref 14–18)
HYPOCHROMIA BLD QL SMEAR: ABNORMAL
IMM GRANULOCYTES # BLD AUTO: 0.01 K/UL (ref 0–0.11)
IMM GRANULOCYTES NFR BLD AUTO: 0.2 % (ref 0–0.9)
LDLC SERPL CALC-MCNC: 17 MG/DL
LV EJECT FRACT  99904: 65
LV EJECT FRACT MOD 2C 99903: 69.93
LV EJECT FRACT MOD 4C 99902: 76.93
LV EJECT FRACT MOD BP 99901: 76.11
LYMPHOCYTES # BLD AUTO: 0.85 K/UL (ref 1–4.8)
LYMPHOCYTES NFR BLD: 14.6 % (ref 22–41)
MCH RBC QN AUTO: 19.2 PG (ref 27–33)
MCHC RBC AUTO-ENTMCNC: 26.2 G/DL (ref 33.7–35.3)
MCV RBC AUTO: 73.3 FL (ref 81.4–97.8)
MICROCYTES BLD QL SMEAR: ABNORMAL
MONOCYTES # BLD AUTO: 0.65 K/UL (ref 0–0.85)
MONOCYTES NFR BLD AUTO: 11.1 % (ref 0–13.4)
MORPHOLOGY BLD-IMP: NORMAL
NEUTROPHILS # BLD AUTO: 3.91 K/UL (ref 1.82–7.42)
NEUTROPHILS NFR BLD: 67.1 % (ref 44–72)
NRBC # BLD AUTO: 0 K/UL
NRBC BLD-RTO: 0 /100 WBC
OVALOCYTES BLD QL SMEAR: NORMAL
PLATELET # BLD AUTO: 225 K/UL (ref 164–446)
PLATELET BLD QL SMEAR: NORMAL
PMV BLD AUTO: 10 FL (ref 9–12.9)
POIKILOCYTOSIS BLD QL SMEAR: NORMAL
POLYCHROMASIA BLD QL SMEAR: NORMAL
POTASSIUM SERPL-SCNC: 4.4 MMOL/L (ref 3.6–5.5)
PROT SERPL-MCNC: 7 G/DL (ref 6–8.2)
RBC # BLD AUTO: 4.94 M/UL (ref 4.7–6.1)
RBC BLD AUTO: PRESENT
SODIUM SERPL-SCNC: 140 MMOL/L (ref 135–145)
TARGETS BLD QL SMEAR: NORMAL
TRIGL SERPL-MCNC: 118 MG/DL (ref 0–149)
WBC # BLD AUTO: 5.8 K/UL (ref 4.8–10.8)

## 2018-09-27 PROCEDURE — 97165 OT EVAL LOW COMPLEX 30 MIN: CPT

## 2018-09-27 PROCEDURE — 700117 HCHG RX CONTRAST REV CODE 255: Performed by: HOSPITALIST

## 2018-09-27 PROCEDURE — G8987 SELF CARE CURRENT STATUS: HCPCS | Mod: CI

## 2018-09-27 PROCEDURE — 80053 COMPREHEN METABOLIC PANEL: CPT

## 2018-09-27 PROCEDURE — 85025 COMPLETE CBC W/AUTO DIFF WBC: CPT

## 2018-09-27 PROCEDURE — 93306 TTE W/DOPPLER COMPLETE: CPT

## 2018-09-27 PROCEDURE — 700111 HCHG RX REV CODE 636 W/ 250 OVERRIDE (IP): Performed by: HOSPITALIST

## 2018-09-27 PROCEDURE — G8988 SELF CARE GOAL STATUS: HCPCS | Mod: CI

## 2018-09-27 PROCEDURE — 700102 HCHG RX REV CODE 250 W/ 637 OVERRIDE(OP): Performed by: HOSPITALIST

## 2018-09-27 PROCEDURE — 770020 HCHG ROOM/CARE - TELE (206)

## 2018-09-27 PROCEDURE — 93306 TTE W/DOPPLER COMPLETE: CPT | Mod: 26 | Performed by: INTERNAL MEDICINE

## 2018-09-27 PROCEDURE — A9270 NON-COVERED ITEM OR SERVICE: HCPCS | Performed by: HOSPITALIST

## 2018-09-27 PROCEDURE — 94660 CPAP INITIATION&MGMT: CPT

## 2018-09-27 PROCEDURE — 80061 LIPID PANEL: CPT

## 2018-09-27 PROCEDURE — 99232 SBSQ HOSP IP/OBS MODERATE 35: CPT | Performed by: HOSPITALIST

## 2018-09-27 RX ADMIN — HEPARIN SODIUM 5000 UNITS: 5000 INJECTION, SOLUTION INTRAVENOUS; SUBCUTANEOUS at 16:02

## 2018-09-27 RX ADMIN — HUMAN ALBUMIN MICROSPHERES AND PERFLUTREN 3 ML: 10; .22 INJECTION, SOLUTION INTRAVENOUS at 12:10

## 2018-09-27 RX ADMIN — ASPIRIN 81 MG: 81 TABLET, CHEWABLE ORAL at 06:15

## 2018-09-27 RX ADMIN — HEPARIN SODIUM 5000 UNITS: 5000 INJECTION, SOLUTION INTRAVENOUS; SUBCUTANEOUS at 21:35

## 2018-09-27 RX ADMIN — FUROSEMIDE 20 MG: 10 INJECTION, SOLUTION INTRAMUSCULAR; INTRAVENOUS at 06:15

## 2018-09-27 RX ADMIN — Medication 1 TABLET: at 08:23

## 2018-09-27 RX ADMIN — FUROSEMIDE 20 MG: 10 INJECTION, SOLUTION INTRAMUSCULAR; INTRAVENOUS at 16:02

## 2018-09-27 RX ADMIN — ATORVASTATIN CALCIUM 10 MG: 10 TABLET, FILM COATED ORAL at 21:35

## 2018-09-27 RX ADMIN — HEPARIN SODIUM 5000 UNITS: 5000 INJECTION, SOLUTION INTRAVENOUS; SUBCUTANEOUS at 06:15

## 2018-09-27 ASSESSMENT — PAIN SCALES - GENERAL
PAINLEVEL_OUTOF10: 0

## 2018-09-27 ASSESSMENT — ENCOUNTER SYMPTOMS
HEADACHES: 1
ABDOMINAL PAIN: 0
CHILLS: 0
FEVER: 0
NAUSEA: 0
PALPITATIONS: 0
DEPRESSION: 0
DIZZINESS: 0
SHORTNESS OF BREATH: 0
COUGH: 0

## 2018-09-27 ASSESSMENT — COGNITIVE AND FUNCTIONAL STATUS - GENERAL
DAILY ACTIVITIY SCORE: 24
SUGGESTED CMS G CODE MODIFIER DAILY ACTIVITY: CH

## 2018-09-27 ASSESSMENT — ACTIVITIES OF DAILY LIVING (ADL): TOILETING: INDEPENDENT

## 2018-09-27 ASSESSMENT — PATIENT HEALTH QUESTIONNAIRE - PHQ9
SUM OF ALL RESPONSES TO PHQ9 QUESTIONS 1 AND 2: 0
1. LITTLE INTEREST OR PLEASURE IN DOING THINGS: NOT AT ALL
2. FEELING DOWN, DEPRESSED, IRRITABLE, OR HOPELESS: NOT AT ALL

## 2018-09-27 NOTE — CARE PLAN
Problem: Oxygenation:  Goal: Maintain adequate oxygenation dependent on patient condition  Pt was able to wear CPAP through the night comfortably without complications   AutoCPAP 5-20 pressures with 3 LPM inline

## 2018-09-27 NOTE — CARE PLAN
Problem: Safety  Goal: Free from accidental injury  Calls for assistance, ambulates safely independently, call light in reach. Bed locked and in low position    Problem: Respiratory:  Goal: Respiratory status will improve  One liter oxygen via nasal cannula in place. 97% spo2 but desats when napping. Appears to have sleep apnea. desats to 70's then back up to 90's while asleep.

## 2018-09-27 NOTE — PROGRESS NOTES
12 hour chart check    MS: SR 75-91, no ectopy noted.     No changes in skin prior to earlier shift skin assessment with NIKOLAY Boyd

## 2018-09-27 NOTE — THERAPY
"Occupational Therapy Evaluation completed.   Functional Status:  SPV level for BADLs in this setting   Plan of Care: Will remain available for DC needs only  Discharge Recommendations:  Equipment: No Equipment Needed. Post-acute therapy Currently anticipate no further skilled therapy needs once patient is discharged from the inpatient setting.    See \"Rehab Therapy-Acute\" Patient Summary Report for complete documentation.    Pt is a 57 y/o male who presents to acute 2/2 worsening SOB, abnormal swelling in scrotal region and down B LE's. PMH includes DM and obesity. Pt reports he lives in single story home w/ his daughter and grandson. Pt appears to be at functional baseline. Will remain available for DC needs only.     "

## 2018-09-27 NOTE — CARE PLAN
Problem: Knowledge Deficit  Goal: Patient/Significant other demonstrates understanding of disease process, treatment plan, medications and discharge instructions  Outcome: PROGRESSING AS EXPECTED  Pt educated on disease process, plan of care, medications, and CPAP.     Problem: Oxygenation/Respiratory Function  Goal: Patient will Achieve/Maintain Optimum Respiratory Rate/Effort  Outcome: PROGRESSING AS EXPECTED  O2 saturation monitored. Oxygen therapy in use. CPAP ordered for night time use.

## 2018-09-27 NOTE — PROGRESS NOTES
[facility]ist Progress Note    Date of Service: 2018    Chief Complaint  58 y.o. male admitted 2018 with abdominal pain    Interval Problem Update  Up by self  Mobilizes well  CPAP at night  Very little urinary output this a.m.  Patient alert with clear speech    Consultants/Specialty  None    Disposition  Tele overflow        Review of Systems   Constitutional: Negative for chills and fever.   HENT: Negative for congestion.    Respiratory: Negative for cough and shortness of breath.    Cardiovascular: Positive for leg swelling. Negative for chest pain and palpitations.   Gastrointestinal: Negative for abdominal pain and nausea.   Genitourinary: Negative for dysuria.   Musculoskeletal: Positive for joint pain (chronic knee pain bilaterally).   Neurological: Positive for headaches. Negative for dizziness.   Psychiatric/Behavioral: Negative for depression.      Physical Exam  Laboratory/Imaging   Hemodynamics  Temp (24hrs), Av.8 °C (98.2 °F), Min:36.3 °C (97.3 °F), Max:37.1 °C (98.8 °F)   Temperature: 37.1 °C (98.8 °F)  Pulse  Av.2  Min: 73  Max: 91 Heart Rate (Monitored): 75  Blood Pressure: 143/69, NIBP: 103/48      Respiratory      Respiration: 18, Pulse Oximetry: 95 %     Work Of Breathing / Effort: Mild  RUL Breath Sounds: Diminished, RML Breath Sounds: Diminished, RLL Breath Sounds: Diminished, ENRRIQUE Breath Sounds: Diminished, LLL Breath Sounds: Diminished    Fluids    Intake/Output Summary (Last 24 hours) at 18 1513  Last data filed at 18 1200   Gross per 24 hour   Intake              800 ml   Output                0 ml   Net              800 ml       Nutrition  Orders Placed This Encounter   Procedures   • Diet Order Regular     Standing Status:   Standing     Number of Occurrences:   1     Order Specific Question:   Diet:     Answer:   Regular [1]     Physical Exam   Constitutional: He is oriented to person, place, and time. He appears well-nourished. No distress.   Morbidly  obese   HENT:   Head: Normocephalic and atraumatic.   Nose: Nose normal.   Mouth/Throat: Oropharynx is clear and moist.   Eyes: Conjunctivae and EOM are normal. Right eye exhibits no discharge. Left eye exhibits no discharge.   Neck: No tracheal deviation present.   Cardiovascular: Normal rate, regular rhythm and intact distal pulses.    No murmur heard.  Pulses:       Radial pulses are 2+ on the right side, and 2+ on the left side.        Dorsalis pedis pulses are 2+ on the right side, and 2+ on the left side.   Pulmonary/Chest: No stridor. No respiratory distress.   Distant breath sounds   Abdominal: Soft. He exhibits no distension. There is no tenderness.   Genitourinary:   Genitourinary Comments: Scrotal edema   Musculoskeletal: He exhibits edema.   Neurological: He is alert and oriented to person, place, and time. No cranial nerve deficit.   Skin: Skin is warm. He is not diaphoretic.   Psychiatric: He has a normal mood and affect. His behavior is normal.   Vitals reviewed.      Recent Labs      09/26/18   0120  09/27/18   0630   WBC  5.8  5.8   RBC  5.35  4.94   HEMOGLOBIN  10.2*  9.5*   HEMATOCRIT  38.8*  36.2*   MCV  72.5*  73.3*   MCH  19.1*  19.2*   MCHC  26.3*  26.2*   RDW  52.9*  52.9*   PLATELETCT  224  225   MPV  10.1  10.0     Recent Labs      09/26/18   0120  09/27/18   0630   SODIUM  137  140   POTASSIUM  4.1  4.4   CHLORIDE  99  97   CO2  32  39*   GLUCOSE  116*  121*   BUN  12  12   CREATININE  0.76  0.72   CALCIUM  9.2  9.0         Recent Labs      09/26/18   0120   BNPBTYPENAT  144*     Recent Labs      09/27/18   0630   TRIGLYCERIDE  118   HDL  25*   LDL  17          Assessment/Plan     * Shortness of breath   Assessment & Plan    Multifactorial etiology  Probable ABELARDO, right heart strain  Minimally elevated BNP  Discussed w/ patient the need for compliance with CPAP.  IV lasix and measure strict I/O's  check echocardiogram         Morbid obesity (HCC)   Assessment & Plan    Discussed weight loss  needed        Peripheral edema   Assessment & Plan    Could be component of lymphedema but more likely with right heart failure.  Await echocardiogram.  Albumin 3.7  Monitor I/O's          Anemia   Assessment & Plan    No signs of acute bleeding  Iron deficiency  Iron and vitamin C ordered  Guaiac stools.  Needs outpatient colonoscopy        Acute hypoxemic respiratory failure (HCC)   Assessment & Plan    Supplemental oxygen  Wean as tolerates  Await echocardiogram results  IV Lasix        HLD (hyperlipidemia)   Assessment & Plan    Check lipid panel   On atorvastatin for ongoing active treatment          Quality-Core Measures   Reviewed items::  Radiology images reviewed, Labs reviewed and Medications reviewed  Briseno catheter::  No Briseno  DVT prophylaxis pharmacological::  Heparin

## 2018-09-28 LAB
ALBUMIN SERPL BCP-MCNC: 3.4 G/DL (ref 3.2–4.9)
ALBUMIN/GLOB SERPL: 0.9 G/DL
ALP SERPL-CCNC: 59 U/L (ref 30–99)
ALT SERPL-CCNC: 6 U/L (ref 2–50)
ANION GAP SERPL CALC-SCNC: 7 MMOL/L (ref 0–11.9)
AST SERPL-CCNC: 13 U/L (ref 12–45)
BILIRUB SERPL-MCNC: 0.7 MG/DL (ref 0.1–1.5)
BUN SERPL-MCNC: 10 MG/DL (ref 8–22)
CALCIUM SERPL-MCNC: 9 MG/DL (ref 8.5–10.5)
CHLORIDE SERPL-SCNC: 97 MMOL/L (ref 96–112)
CO2 SERPL-SCNC: 35 MMOL/L (ref 20–33)
CREAT SERPL-MCNC: 0.63 MG/DL (ref 0.5–1.4)
ERYTHROCYTE [DISTWIDTH] IN BLOOD BY AUTOMATED COUNT: 50.2 FL (ref 35.9–50)
EST. AVERAGE GLUCOSE BLD GHB EST-MCNC: 140 MG/DL
GLOBULIN SER CALC-MCNC: 3.6 G/DL (ref 1.9–3.5)
GLUCOSE SERPL-MCNC: 115 MG/DL (ref 65–99)
HBA1C MFR BLD: 6.5 % (ref 0–5.6)
HCT VFR BLD AUTO: 36.2 % (ref 42–52)
HGB BLD-MCNC: 9.7 G/DL (ref 14–18)
MCH RBC QN AUTO: 19.1 PG (ref 27–33)
MCHC RBC AUTO-ENTMCNC: 26.8 G/DL (ref 33.7–35.3)
MCV RBC AUTO: 71.4 FL (ref 81.4–97.8)
PLATELET # BLD AUTO: 227 K/UL (ref 164–446)
PMV BLD AUTO: 9.8 FL (ref 9–12.9)
POTASSIUM SERPL-SCNC: 3.8 MMOL/L (ref 3.6–5.5)
PROT SERPL-MCNC: 7 G/DL (ref 6–8.2)
RBC # BLD AUTO: 5.07 M/UL (ref 4.7–6.1)
SODIUM SERPL-SCNC: 139 MMOL/L (ref 135–145)
WBC # BLD AUTO: 5.9 K/UL (ref 4.8–10.8)

## 2018-09-28 PROCEDURE — 700102 HCHG RX REV CODE 250 W/ 637 OVERRIDE(OP): Performed by: HOSPITALIST

## 2018-09-28 PROCEDURE — A9270 NON-COVERED ITEM OR SERVICE: HCPCS | Performed by: HOSPITALIST

## 2018-09-28 PROCEDURE — 700111 HCHG RX REV CODE 636 W/ 250 OVERRIDE (IP): Performed by: HOSPITALIST

## 2018-09-28 PROCEDURE — 94660 CPAP INITIATION&MGMT: CPT

## 2018-09-28 PROCEDURE — 85027 COMPLETE CBC AUTOMATED: CPT

## 2018-09-28 PROCEDURE — 770006 HCHG ROOM/CARE - MED/SURG/GYN SEMI*

## 2018-09-28 PROCEDURE — 80053 COMPREHEN METABOLIC PANEL: CPT

## 2018-09-28 RX ADMIN — ASPIRIN 81 MG: 81 TABLET, CHEWABLE ORAL at 05:56

## 2018-09-28 RX ADMIN — FUROSEMIDE 20 MG: 10 INJECTION, SOLUTION INTRAMUSCULAR; INTRAVENOUS at 16:48

## 2018-09-28 RX ADMIN — SENNOSIDES AND DOCUSATE SODIUM 2 TABLET: 8.6; 5 TABLET ORAL at 05:56

## 2018-09-28 RX ADMIN — HEPARIN SODIUM 5000 UNITS: 5000 INJECTION, SOLUTION INTRAVENOUS; SUBCUTANEOUS at 20:08

## 2018-09-28 RX ADMIN — HEPARIN SODIUM 5000 UNITS: 5000 INJECTION, SOLUTION INTRAVENOUS; SUBCUTANEOUS at 14:50

## 2018-09-28 RX ADMIN — HEPARIN SODIUM 5000 UNITS: 5000 INJECTION, SOLUTION INTRAVENOUS; SUBCUTANEOUS at 05:57

## 2018-09-28 RX ADMIN — ATORVASTATIN CALCIUM 10 MG: 10 TABLET, FILM COATED ORAL at 20:08

## 2018-09-28 RX ADMIN — Medication 1 TABLET: at 05:56

## 2018-09-28 RX ADMIN — FUROSEMIDE 20 MG: 10 INJECTION, SOLUTION INTRAMUSCULAR; INTRAVENOUS at 05:57

## 2018-09-28 ASSESSMENT — ENCOUNTER SYMPTOMS
SHORTNESS OF BREATH: 0
FEVER: 0
NAUSEA: 0
DIZZINESS: 0
DEPRESSION: 0
CHILLS: 0
PALPITATIONS: 0
ABDOMINAL PAIN: 0
HEADACHES: 1
COUGH: 0

## 2018-09-28 ASSESSMENT — PAIN SCALES - GENERAL
PAINLEVEL_OUTOF10: 0

## 2018-09-28 NOTE — CARE PLAN
Problem: Respiratory:  Goal: Respiratory status will improve  Outcome: PROGRESSING AS EXPECTED  Patient SPO2 95% on 2L NC. No shortness of breath, clear lung sounds, maintains SPO2 with mobility. CPAP 3L at night, tolerated well.     Problem: Safety  Goal: Will remain free from injury  Outcome: PROGRESSING AS EXPECTED  Fall precautions in place, steady gait, up x self, no assistive devices, no falls this visit.

## 2018-09-28 NOTE — PROGRESS NOTES
[facility]ist Progress Note    Date of Service: 2018    Chief Complaint  58 y.o. male admitted 2018 with abdominal pain    Interval Problem Update  A+Ox4  Afebrile  Up to bathroom by self      Consultants/Specialty  None    Disposition  Tele overflow        Review of Systems   Constitutional: Negative for chills and fever.   HENT: Negative for congestion.    Respiratory: Negative for cough and shortness of breath.    Cardiovascular: Positive for leg swelling. Negative for chest pain and palpitations.   Gastrointestinal: Negative for abdominal pain and nausea.   Genitourinary: Negative for dysuria.   Musculoskeletal: Positive for joint pain (chronic knee pain bilaterally).   Neurological: Positive for headaches. Negative for dizziness.   Psychiatric/Behavioral: Negative for depression.      Physical Exam  Laboratory/Imaging   Hemodynamics  Temp (24hrs), Av.9 °C (98.5 °F), Min:36.7 °C (98 °F), Max:37.2 °C (98.9 °F)   Temperature: 36.7 °C (98 °F)  Pulse  Av.7  Min: 69  Max: 91 Heart Rate (Monitored): 75  Blood Pressure: 126/65, NIBP: 122/72      Respiratory      Respiration: 18, Pulse Oximetry: 91 %     Work Of Breathing / Effort: Mild  RUL Breath Sounds: Diminished, RML Breath Sounds: Diminished, RLL Breath Sounds: Diminished, ENRRIQUE Breath Sounds: Diminished, LLL Breath Sounds: Diminished    Fluids    Intake/Output Summary (Last 24 hours) at 18 0616  Last data filed at 18 0400   Gross per 24 hour   Intake             2030 ml   Output                0 ml   Net             2030 ml       Nutrition  Orders Placed This Encounter   Procedures   • Diet Order Regular     Standing Status:   Standing     Number of Occurrences:   1     Order Specific Question:   Diet:     Answer:   Regular [1]     Physical Exam   Constitutional: He is oriented to person, place, and time. He appears well-nourished. No distress.   Morbidly obese   HENT:   Head: Normocephalic and atraumatic.   Nose: Nose normal.    Mouth/Throat: Oropharynx is clear and moist.   Eyes: Conjunctivae and EOM are normal. Right eye exhibits no discharge. Left eye exhibits no discharge.   Neck: No tracheal deviation present.   Cardiovascular: Normal rate, regular rhythm and intact distal pulses.    No murmur heard.  Pulses:       Radial pulses are 2+ on the right side, and 2+ on the left side.        Dorsalis pedis pulses are 2+ on the right side, and 2+ on the left side.   Pulmonary/Chest: No stridor. No respiratory distress.   Distant breath sounds   Abdominal: Soft. He exhibits no distension. There is no tenderness.   Genitourinary:   Genitourinary Comments: Scrotal edema   Musculoskeletal: He exhibits edema.   Neurological: He is alert and oriented to person, place, and time. No cranial nerve deficit.   Skin: Skin is warm. He is not diaphoretic.   Psychiatric: He has a normal mood and affect. His behavior is normal.   Vitals reviewed.      Recent Labs      09/26/18   0120 09/27/18 0630 09/28/18   0340   WBC  5.8  5.8  5.9   RBC  5.35  4.94  5.07   HEMOGLOBIN  10.2*  9.5*  9.7*   HEMATOCRIT  38.8*  36.2*  36.2*   MCV  72.5*  73.3*  71.4*   MCH  19.1*  19.2*  19.1*   MCHC  26.3*  26.2*  26.8*   RDW  52.9*  52.9*  50.2*   PLATELETCT  224  225  227   MPV  10.1  10.0  9.8     Recent Labs      09/26/18   0120  09/27/18   0630  09/28/18   0340   SODIUM  137  140  139   POTASSIUM  4.1  4.4  3.8   CHLORIDE  99  97  97   CO2  32  39*  35*   GLUCOSE  116*  121*  115*   BUN  12  12  10   CREATININE  0.76  0.72  0.63   CALCIUM  9.2  9.0  9.0         Recent Labs      09/26/18   0120   BNPBTYPENAT  144*     Recent Labs      09/27/18   0630   TRIGLYCERIDE  118   HDL  25*   LDL  17          Assessment/Plan     * Shortness of breath   Assessment & Plan    Multifactorial etiology  Probable ABELARDO, right heart strain  Minimally elevated BNP  Discussed w/ patient the need for compliance with CPAP.  IV lasix and measure strict I/O's  check echocardiogram          Morbid obesity (HCC)   Assessment & Plan    Discussed weight loss needed        Peripheral edema   Assessment & Plan    Could be component of lymphedema but more likely with right heart failure.  Await echocardiogram.  Albumin 3.7  Monitor I/O's          Anemia   Assessment & Plan    No signs of acute bleeding  Iron deficiency  Iron and vitamin C ordered  Guaiac stools.  Needs outpatient colonoscopy        Acute hypoxemic respiratory failure (HCC)   Assessment & Plan    Supplemental oxygen  Wean as tolerates  Await echocardiogram results  IV Lasix        HLD (hyperlipidemia)   Assessment & Plan    Check lipid panel   On atorvastatin for ongoing active treatment          Quality-Core Measures   Reviewed items::  Radiology images reviewed, Labs reviewed and Medications reviewed  Briseno catheter::  No Briseno  DVT prophylaxis pharmacological::  Heparin

## 2018-09-28 NOTE — CARE PLAN
Problem: Skin Integrity  Goal: Risk for impaired skin integrity will decrease  Outcome: PROGRESSING SLOWER THAN EXPECTED  Orders received from MD for shower privileges. Skin intact    Problem: Safety  Goal: Free from accidental injury  Outcome: PROGRESSING AS EXPECTED  Patient educated on need to call for assistance. Patient calls appropriately and has remained free of injury

## 2018-09-28 NOTE — RESPIRATORY CARE
COPD EDUCATION by COPD CLINICAL EDUCATOR  9/28/2018 at 7:59 AM by Adelaide Gomez     Patient reviewed by COPD education team. Patient does not qualify for COPD program.

## 2018-09-29 PROCEDURE — 94660 CPAP INITIATION&MGMT: CPT

## 2018-09-29 PROCEDURE — 700111 HCHG RX REV CODE 636 W/ 250 OVERRIDE (IP): Performed by: HOSPITALIST

## 2018-09-29 PROCEDURE — 770006 HCHG ROOM/CARE - MED/SURG/GYN SEMI*

## 2018-09-29 PROCEDURE — 700102 HCHG RX REV CODE 250 W/ 637 OVERRIDE(OP): Performed by: HOSPITALIST

## 2018-09-29 PROCEDURE — 99232 SBSQ HOSP IP/OBS MODERATE 35: CPT | Performed by: HOSPITALIST

## 2018-09-29 PROCEDURE — A9270 NON-COVERED ITEM OR SERVICE: HCPCS | Performed by: HOSPITALIST

## 2018-09-29 RX ORDER — ECHINACEA PURPUREA EXTRACT 125 MG
2 TABLET ORAL
Status: DISCONTINUED | OUTPATIENT
Start: 2018-09-28 | End: 2018-10-01 | Stop reason: HOSPADM

## 2018-09-29 RX ADMIN — FUROSEMIDE 20 MG: 10 INJECTION, SOLUTION INTRAMUSCULAR; INTRAVENOUS at 15:48

## 2018-09-29 RX ADMIN — FUROSEMIDE 20 MG: 10 INJECTION, SOLUTION INTRAMUSCULAR; INTRAVENOUS at 04:53

## 2018-09-29 RX ADMIN — HEPARIN SODIUM 5000 UNITS: 5000 INJECTION, SOLUTION INTRAVENOUS; SUBCUTANEOUS at 04:53

## 2018-09-29 RX ADMIN — ASPIRIN 81 MG: 81 TABLET, CHEWABLE ORAL at 04:53

## 2018-09-29 RX ADMIN — HEPARIN SODIUM 5000 UNITS: 5000 INJECTION, SOLUTION INTRAVENOUS; SUBCUTANEOUS at 14:40

## 2018-09-29 RX ADMIN — ATORVASTATIN CALCIUM 10 MG: 10 TABLET, FILM COATED ORAL at 20:55

## 2018-09-29 RX ADMIN — Medication 1 TABLET: at 04:53

## 2018-09-29 RX ADMIN — SALINE NASAL SPRAY 2 SPRAY: 1.5 SOLUTION NASAL at 04:54

## 2018-09-29 RX ADMIN — HEPARIN SODIUM 5000 UNITS: 5000 INJECTION, SOLUTION INTRAVENOUS; SUBCUTANEOUS at 20:55

## 2018-09-29 ASSESSMENT — ENCOUNTER SYMPTOMS
TREMORS: 0
WHEEZING: 0
DIAPHORESIS: 0
SPEECH CHANGE: 0
FLANK PAIN: 0
NECK PAIN: 0
ABDOMINAL PAIN: 0
EYE DISCHARGE: 0
WEAKNESS: 0
HALLUCINATIONS: 0
FOCAL WEAKNESS: 0
FEVER: 0
VOMITING: 0
CHILLS: 0
PALPITATIONS: 0
COUGH: 0
DIARRHEA: 0
SENSORY CHANGE: 0
STRIDOR: 0
SHORTNESS OF BREATH: 1
BACK PAIN: 0
EYE REDNESS: 0

## 2018-09-29 ASSESSMENT — PAIN SCALES - GENERAL
PAINLEVEL_OUTOF10: 0
PAINLEVEL_OUTOF10: 0

## 2018-09-29 ASSESSMENT — LIFESTYLE VARIABLES: SUBSTANCE_ABUSE: 0

## 2018-09-29 NOTE — PROGRESS NOTES
2 RN skin check completed. Ears are red but blanching. BLE and scrotum is edematous. Skin is intact. All bony prominences checked.

## 2018-09-29 NOTE — DISCHARGE PLANNING
Anticipated Discharge Disposition: Home     Action: Discussed patient's needs during IDT rounds. Patient is currently on night/time O2 only.  At this time he is on 2 liters of O2 and may need it when d/c, will try to wean.  Talked to patient to find out who is his O2 provider.  Patient stated he doesn't know the name of provider, only that it is provided by [facility].    Barriers to Discharge: possible O2, and provider    Plan: Have weekend  follow up

## 2018-09-29 NOTE — CARE PLAN
Problem: Communication  Goal: The ability to communicate needs accurately and effectively will improve  Outcome: PROGRESSING AS EXPECTED  Educated patient on the importance of communicating needs and wants. Patient expressed understanding.    Problem: Safety  Goal: Will remain free from falls  Outcome: PROGRESSING AS EXPECTED  Educated patient on fall risk, patient expressed understanding.

## 2018-09-29 NOTE — CARE PLAN
Problem: Safety  Goal: Will remain free from injury  Outcome: PROGRESSING AS EXPECTED  Bed on lowest position, call light within reach, patient educated about use of call light and safety precautions.     Problem: Knowledge Deficit  Goal: Knowledge of disease process/condition, treatment plan, diagnostic tests, and medications will improve  Outcome: PROGRESSING AS EXPECTED  Educated patient on importance on wearing CPAP at night and will continue to attempt to wean patient off of oxygen.

## 2018-09-29 NOTE — PROGRESS NOTES
"Assumed care at 1900. Received report from RN. Patient is AOx4. Patient is sitting up in bed and appears calm and in no distress. Patient is on 3L NC O2. Baseline is 0L. Will continue to wean. Patient has CPAP at night. RT following. Patient is receiving IV lasix. Patient is upself to the bathroom. Swelling noted on BLE and scrotum. Assessment complete. Labs reviewed. Patient and RN discussed plan of care. Patient questions answered. Patient needs are met at this time. Bed in lowest and locked position. Call light is within reach. Hourly rounding in place. /41   Pulse 83   Temp 36.5 °C (97.7 °F)   Resp 20   Ht 1.778 m (5' 10\")   Wt (!) 175.7 kg (387 lb 5.6 oz)   SpO2 99%   BMI 55.58 kg/m²       "

## 2018-09-29 NOTE — PROGRESS NOTES
12 Hour Chart Check    MS: .16/.08/.34  Sinus Rhythm 70s to 80s.    Report called to Ping Boland RN.

## 2018-09-29 NOTE — PROGRESS NOTES
Rec'd report & assumed care of pt at 0700. Pt is A&Ox4, up self, resting comfortably in bed. R PIV patent, SL. Pt is on 2L of O2 nasal cannula.POC discussed & questions answered. Bed locked & in lowest position, non-skid socks in place, call light within reach, hourly rounding in place. Pt reports no further needs at this time, will continue to monitor.

## 2018-09-29 NOTE — PROGRESS NOTES
[facility] Hospitalist Progress Note    Date of Service: 2018    Chief Complaint  58 y.o. Male hx of morbid obesity , ABELARDO- off Cpap approx 5 years after he moved,  DM admitted 2018 with increased shortness of breath.     Interval Problem Update    Downgraded from ICU - using CPAP at night.  Diuresing with IV lasix.  Reports dyspnea slowly feeling better.  Generalized anasarca w scrotal edema.     Consultants/Specialty  None     Disposition  TBD        Review of Systems   Constitutional: Positive for malaise/fatigue. Negative for chills, diaphoresis and fever.   HENT: Negative for congestion.    Eyes: Negative for discharge and redness.   Respiratory: Positive for shortness of breath. Negative for cough, wheezing and stridor.    Cardiovascular: Negative for chest pain, palpitations and leg swelling.   Gastrointestinal: Negative for abdominal pain, diarrhea and vomiting.   Genitourinary: Negative for flank pain and hematuria.   Musculoskeletal: Negative for back pain, joint pain and neck pain.   Neurological: Negative for tremors, sensory change, speech change, focal weakness and weakness.   Psychiatric/Behavioral: Negative for hallucinations and substance abuse.      Physical Exam  Laboratory/Imaging   Hemodynamics  Temp (24hrs), Av.4 °C (97.6 °F), Min:36.4 °C (97.5 °F), Max:36.5 °C (97.7 °F)   Temperature: 36.5 °C (97.7 °F)  Pulse  Av.8  Min: 69  Max: 91 Heart Rate (Monitored): 83  Blood Pressure: 113/40, NIBP: 131/56      Respiratory      Respiration: 17, Pulse Oximetry: 97 %     Work Of Breathing / Effort: Mild  RUL Breath Sounds: Diminished, RML Breath Sounds: Diminished, RLL Breath Sounds: Diminished, ENRRIQUE Breath Sounds: Diminished, LLL Breath Sounds: Diminished    Fluids    Intake/Output Summary (Last 24 hours) at 18 1411  Last data filed at 18 0900   Gross per 24 hour   Intake              720 ml   Output                0 ml   Net              720 ml       Nutrition  Orders Placed  This Encounter   Procedures   • Diet Order Regular     Standing Status:   Standing     Number of Occurrences:   1     Order Specific Question:   Diet:     Answer:   Regular [1]     Physical Exam   Constitutional: He is oriented to person, place, and time. No distress.   Morbidly obese.    HENT:   Head: Normocephalic and atraumatic.   Nose: Nose normal.   Eyes: Conjunctivae and EOM are normal. No scleral icterus.   Neck: No JVD present.   Cardiovascular: Normal rate and regular rhythm.    No murmur heard.  Pulmonary/Chest: Effort normal. No stridor. No respiratory distress. He has no wheezes. He has no rales.   Abdominal: Soft. Bowel sounds are normal. He exhibits no distension. There is no tenderness.   Genitourinary:   Genitourinary Comments: 3+ scrotal edema.    Musculoskeletal: He exhibits edema (trace ankle edema ). He exhibits no tenderness.   Neurological: He is alert and oriented to person, place, and time. No cranial nerve deficit.   Skin: Skin is warm and dry. He is not diaphoretic. No pallor.   Psychiatric: He has a normal mood and affect. His behavior is normal.   Vitals reviewed.      Recent Labs      09/27/18   0630  09/28/18   0340   WBC  5.8  5.9   RBC  4.94  5.07   HEMOGLOBIN  9.5*  9.7*   HEMATOCRIT  36.2*  36.2*   MCV  73.3*  71.4*   MCH  19.2*  19.1*   MCHC  26.2*  26.8*   RDW  52.9*  50.2*   PLATELETCT  225  227   MPV  10.0  9.8     Recent Labs      09/27/18   0630  09/28/18   0340   SODIUM  140  139   POTASSIUM  4.4  3.8   CHLORIDE  97  97   CO2  39*  35*   GLUCOSE  121*  115*   BUN  12  10   CREATININE  0.72  0.63   CALCIUM  9.0  9.0             Recent Labs      09/27/18   0630   TRIGLYCERIDE  118   HDL  25*   LDL  17          Assessment/Plan     * Acute hypoxemic respiratory failure (HCC)   Assessment & Plan    Due to OHS, cor pulmonale , ABELARDO  Supplemental oxygen- wean o2 as abdi  IV lasix diuresis .        Shortness of breath   Assessment & Plan    Multifactorial etiology- ABELARDO, obesity  hypoventilation , cor pulmonale .   Echo with mildly elevated right sided pressure and LVH.  Minimally elevated BNP  IV lasix diuresis   Reports he will [facility] with [facility] to arrange for home CPAP  Continue o2 support.             Morbid obesity (HCC)   Assessment & Plan    Discussed weight loss needed        Peripheral edema   Assessment & Plan    Suspect lymphedema but more likely with right heart failure with scrotal edema   Albumin 3.7  Monitor I/O's  Diuresis .          Anemia   Assessment & Plan    Iron deficient - No symptoms of bleed  Iron and vitamin C supplements.   Fu stool obt  Fu outpatient for evaluation of  colonoscopy        HLD (hyperlipidemia)   Assessment & Plan    Check lipid panel   On atorvastatin for ongoing active treatment          Quality-Core Measures   Reviewed items::  Medications reviewed, Labs reviewed and Radiology images reviewed  Briseno catheter::  No Briseno  DVT prophylaxis pharmacological::  Heparin      Discussed with  plan of care-- will look into home o2 arrangements.

## 2018-09-30 PROBLEM — G47.33 OSA (OBSTRUCTIVE SLEEP APNEA): Status: ACTIVE | Noted: 2018-09-30

## 2018-09-30 LAB
ANION GAP SERPL CALC-SCNC: 8 MMOL/L (ref 0–11.9)
BUN SERPL-MCNC: 10 MG/DL (ref 8–22)
CALCIUM SERPL-MCNC: 8.8 MG/DL (ref 8.5–10.5)
CHLORIDE SERPL-SCNC: 97 MMOL/L (ref 96–112)
CO2 SERPL-SCNC: 32 MMOL/L (ref 20–33)
CREAT SERPL-MCNC: 0.73 MG/DL (ref 0.5–1.4)
GLUCOSE SERPL-MCNC: 115 MG/DL (ref 65–99)
POTASSIUM SERPL-SCNC: 3.6 MMOL/L (ref 3.6–5.5)
SODIUM SERPL-SCNC: 137 MMOL/L (ref 135–145)

## 2018-09-30 PROCEDURE — 770006 HCHG ROOM/CARE - MED/SURG/GYN SEMI*

## 2018-09-30 PROCEDURE — 700102 HCHG RX REV CODE 250 W/ 637 OVERRIDE(OP): Performed by: HOSPITALIST

## 2018-09-30 PROCEDURE — 99232 SBSQ HOSP IP/OBS MODERATE 35: CPT | Performed by: HOSPITALIST

## 2018-09-30 PROCEDURE — 36415 COLL VENOUS BLD VENIPUNCTURE: CPT

## 2018-09-30 PROCEDURE — A9270 NON-COVERED ITEM OR SERVICE: HCPCS | Performed by: HOSPITALIST

## 2018-09-30 PROCEDURE — 700111 HCHG RX REV CODE 636 W/ 250 OVERRIDE (IP): Performed by: HOSPITALIST

## 2018-09-30 PROCEDURE — 80048 BASIC METABOLIC PNL TOTAL CA: CPT

## 2018-09-30 RX ORDER — FUROSEMIDE 40 MG/1
40 TABLET ORAL
Status: DISCONTINUED | OUTPATIENT
Start: 2018-09-30 | End: 2018-10-01 | Stop reason: HOSPADM

## 2018-09-30 RX ORDER — ASCORBIC ACID 500 MG
500 TABLET ORAL DAILY
Qty: 30 TAB | Refills: 2 | Status: SHIPPED | OUTPATIENT
Start: 2018-09-30 | End: 2020-08-27

## 2018-09-30 RX ORDER — FUROSEMIDE 40 MG/1
40 TABLET ORAL DAILY
Qty: 14 TAB | Refills: 0 | Status: ON HOLD | OUTPATIENT
Start: 2018-09-30 | End: 2019-04-24

## 2018-09-30 RX ADMIN — HEPARIN SODIUM 5000 UNITS: 5000 INJECTION, SOLUTION INTRAVENOUS; SUBCUTANEOUS at 21:01

## 2018-09-30 RX ADMIN — HEPARIN SODIUM 5000 UNITS: 5000 INJECTION, SOLUTION INTRAVENOUS; SUBCUTANEOUS at 05:31

## 2018-09-30 RX ADMIN — SALINE NASAL SPRAY 2 SPRAY: 1.5 SOLUTION NASAL at 05:39

## 2018-09-30 RX ADMIN — FUROSEMIDE 20 MG: 10 INJECTION, SOLUTION INTRAMUSCULAR; INTRAVENOUS at 05:31

## 2018-09-30 RX ADMIN — Medication 1 TABLET: at 05:31

## 2018-09-30 RX ADMIN — SENNOSIDES AND DOCUSATE SODIUM 2 TABLET: 8.6; 5 TABLET ORAL at 17:29

## 2018-09-30 RX ADMIN — FUROSEMIDE 40 MG: 40 TABLET ORAL at 15:28

## 2018-09-30 RX ADMIN — HEPARIN SODIUM 5000 UNITS: 5000 INJECTION, SOLUTION INTRAVENOUS; SUBCUTANEOUS at 14:35

## 2018-09-30 RX ADMIN — ASPIRIN 81 MG: 81 TABLET, CHEWABLE ORAL at 05:31

## 2018-09-30 RX ADMIN — ATORVASTATIN CALCIUM 10 MG: 10 TABLET, FILM COATED ORAL at 21:01

## 2018-09-30 ASSESSMENT — ENCOUNTER SYMPTOMS
DIAPHORESIS: 0
DIARRHEA: 0
FEVER: 0
SHORTNESS OF BREATH: 1
VOMITING: 0
NAUSEA: 0
SPEECH CHANGE: 0
CHILLS: 0
FOCAL WEAKNESS: 0
NECK PAIN: 0
WHEEZING: 0
STRIDOR: 0
ABDOMINAL PAIN: 0
WEAKNESS: 0
COUGH: 0
BACK PAIN: 0

## 2018-09-30 ASSESSMENT — PAIN SCALES - GENERAL
PAINLEVEL_OUTOF10: 0

## 2018-09-30 NOTE — FACE TO FACE
Face to Face Note  -  Durable Medical Equipment    Abdiel Rees M.D. - NPI: [id number]  I certify that this patient is under my care and that they had a durable medical equipment(DME)face to face encounter by myself that meets the physician DME face-to-face encounter requirements with this patient on:    Date of encounter:   Patient:                    MRN:                       YOB: 2016  Rashi Shirley  [id number]  1960     The encounter with the patient was in whole, or in part, for the following medical condition, which is the primary reason for durable medical equipment:  Other - ABELARDO, COPD ( per previous  records )     I certify that, based on my findings, the following durable medical equipment is medically necessary:  Oxygen.    HOME O2 Saturation Measurements:(Values must be present for Home Oxygen orders)        With liters of O2: 2, O2 sat at rest with O2: 95  With Liters of O2: 2, O2 sat with amb with O2 : 91  Is the patient mobile?: Yes    My Clinical findings support the need for the above equipment due to:  Hypoxia    Supporting Symptoms: hypoxia, ABELARDO

## 2018-09-30 NOTE — DISCHARGE PLANNING
Anticipated Discharge Disposition: Home with oxygen     Action: LSW met with pt at bedside about increased oxygen need for daytime oxygen. Pt receives oxygen through [employer] #[phone] per the sticker on his concentrator. LSW ask Deirdre GARZA to check if this company is open today to deliver portable oxygen tanks.     Barriers to Discharge: Portable oxygen     Plan: LSW to monitor for acceptance of new order with [employer]

## 2018-09-30 NOTE — DISCHARGE PLANNING
Anticipated Discharge Disposition: Home with 24 hour oxygen    Action: LSW received a fax from [employer] requesting the MD complete their order form. MD signed and LSW faxed back to Deirdre GARZA.     Barriers to Discharge: None    Plan: LSW to monitor for response from [employer].

## 2018-09-30 NOTE — DISCHARGE PLANNING
Agency/Facility Name: [facility]  Spoke To: Kelvin  Outcome: Received call from on call provider Kelvin, states there office is closed today and they can not provide transport tanks.  He will have patient reviewed for 24-7 service on Monday.  Referral has been faxed to [facility]  at [phone] as per request.

## 2018-09-30 NOTE — PROGRESS NOTES
Assumed care of pt, received report from day shift RN, pt assessed.  Pt has no complaints of pain at this time.  Pt is A&O x4.  Pt not a fall risk, wearing treaded socks, bed locked and in lowest position, bed alarm not indicated.  Pt instructed to call for assistance as needed, pt verbalized understanding.  Call light, phone, and personal belongings within reach.

## 2018-09-30 NOTE — PROGRESS NOTES
[facility] Hospitalist Progress Note    Date of Service: 2018    Chief Complaint  58 y.o. Male hx of morbid obesity , ABELARDO- off Cpap approx 5 years after he moved,  DM admitted 2018 with increased shortness of breath.     Interval Problem Update    Breathing better, diuresing w IV lasix.  Hypoxic, desaturates on RA.  Sw assisting with home o2 arrangements.     Consultants/Specialty  None     Disposition  TBD        Review of Systems   Constitutional: Negative for chills, diaphoresis and fever.   HENT: Negative for congestion.    Respiratory: Positive for shortness of breath (mild w exertion ). Negative for cough, wheezing and stridor.    Cardiovascular: Negative for chest pain.   Gastrointestinal: Negative for abdominal pain, diarrhea, nausea and vomiting.   Musculoskeletal: Negative for back pain, joint pain and neck pain.   Neurological: Negative for speech change, focal weakness and weakness.      Physical Exam  Laboratory/Imaging   Hemodynamics  Temp (24hrs), Av.6 °C (97.9 °F), Min:36.3 °C (97.3 °F), Max:36.8 °C (98.3 °F)   Temperature: 36.7 °C (98.1 °F)  Pulse  Av.6  Min: 69  Max: 91    Blood Pressure: 101/56      Respiratory      Respiration: 18, Pulse Oximetry: 94 %     Work Of Breathing / Effort: Mild  RUL Breath Sounds: Diminished, RML Breath Sounds: Diminished, RLL Breath Sounds: Diminished, ENRRIQUE Breath Sounds: Diminished, LLL Breath Sounds: Diminished    Fluids    Intake/Output Summary (Last 24 hours) at 18 1451  Last data filed at 18 1439   Gross per 24 hour   Intake              480 ml   Output                0 ml   Net              480 ml       Nutrition  Orders Placed This Encounter   Procedures   • Diet Order Regular     Standing Status:   Standing     Number of Occurrences:   1     Order Specific Question:   Diet:     Answer:   Regular [1]     Physical Exam   Constitutional: He is oriented to person, place, and time. No distress.   Morbidly obese.    HENT:   Head:  Normocephalic and atraumatic.   Nose: Nose normal.   Eyes: EOM are normal. No scleral icterus.   Neck: No JVD present.   Cardiovascular: Normal rate and regular rhythm.    No murmur heard.  Pulmonary/Chest: No stridor. No respiratory distress. He has no wheezes. He has no rales.   Dim bs bases.    Abdominal: Soft. Bowel sounds are normal. He exhibits no distension. There is no tenderness.   Genitourinary:   Genitourinary Comments: 3+ scrotal edema.    Musculoskeletal: He exhibits no edema or tenderness.   Neurological: He is alert and oriented to person, place, and time. No cranial nerve deficit.   Skin: Skin is warm and dry. He is not diaphoretic. No pallor.   Vitals reviewed.      Recent Labs      09/28/18   0340   WBC  5.9   RBC  5.07   HEMOGLOBIN  9.7*   HEMATOCRIT  36.2*   MCV  71.4*   MCH  19.1*   MCHC  26.8*   RDW  50.2*   PLATELETCT  227   MPV  9.8     Recent Labs      09/28/18   0340  09/30/18   0233   SODIUM  139  137   POTASSIUM  3.8  3.6   CHLORIDE  97  97   CO2  35*  32   GLUCOSE  115*  115*   BUN  10  10   CREATININE  0.63  0.73   CALCIUM  9.0  8.8                      Assessment/Plan     * Acute hypoxemic respiratory failure (HCC)   Assessment & Plan    Due to OHS, cor pulmonale , ABELARDO  Diurese w lasix  Arrange for home o2 , unfortunately - [facility] home o2 no open on weekends. - Home o2 orders placed.        Shortness of breath   Assessment & Plan    Multifactorial etiology- ABELARDO, obesity hypoventilation , cor pulmonale .   Echo with mildly elevated right sided pressure and LVH.  Minimally elevated BNP-- improved edema and dyspnea.   Change to oral lasix.   He will fu with [facility] to arrange for home CPAP  Continue o2 support. -- arrange portable o2.            Morbid obesity (HCC)   Assessment & Plan    Discussed weight loss needed        Peripheral edema   Assessment & Plan    Suspect lymphedema but more likely with right heart failure with scrotal edema   Albumin 3.7  Monitor I/O's  Diuresis  .          Anemia   Assessment & Plan    Iron deficient - No symptoms of bleed  Iron and vitamin C supplements.   Fu stool obt  Fu outpatient for evaluation of  Colonoscopy- discussed with patient plan of care.         HLD (hyperlipidemia)   Assessment & Plan    Check lipid panel   On atorvastatin for ongoing active treatment          Quality-Core Measures   Reviewed items::  Medications reviewed, Labs reviewed and Radiology images reviewed  Briseno catheter::  No Briseno  DVT prophylaxis pharmacological::  Heparin      Discussed with Sw and daughter plan of care.

## 2018-10-01 VITALS
SYSTOLIC BLOOD PRESSURE: 104 MMHG | TEMPERATURE: 97.8 F | DIASTOLIC BLOOD PRESSURE: 49 MMHG | WEIGHT: 315 LBS | HEIGHT: 70 IN | OXYGEN SATURATION: 96 % | RESPIRATION RATE: 18 BRPM | HEART RATE: 72 BPM | BODY MASS INDEX: 45.1 KG/M2

## 2018-10-01 LAB
ANION GAP SERPL CALC-SCNC: 10 MMOL/L (ref 0–11.9)
BUN SERPL-MCNC: 13 MG/DL (ref 8–22)
CALCIUM SERPL-MCNC: 8.5 MG/DL (ref 8.5–10.5)
CHLORIDE SERPL-SCNC: 100 MMOL/L (ref 96–112)
CO2 SERPL-SCNC: 25 MMOL/L (ref 20–33)
CREAT SERPL-MCNC: 0.64 MG/DL (ref 0.5–1.4)
EKG IMPRESSION: NORMAL
GLUCOSE SERPL-MCNC: 107 MG/DL (ref 65–99)
POTASSIUM SERPL-SCNC: 4 MMOL/L (ref 3.6–5.5)
SODIUM SERPL-SCNC: 135 MMOL/L (ref 135–145)
T4 FREE SERPL-MCNC: 0.9 NG/DL (ref 0.53–1.43)

## 2018-10-01 PROCEDURE — 36415 COLL VENOUS BLD VENIPUNCTURE: CPT

## 2018-10-01 PROCEDURE — 700111 HCHG RX REV CODE 636 W/ 250 OVERRIDE (IP): Performed by: HOSPITALIST

## 2018-10-01 PROCEDURE — A9270 NON-COVERED ITEM OR SERVICE: HCPCS | Performed by: HOSPITALIST

## 2018-10-01 PROCEDURE — 700102 HCHG RX REV CODE 250 W/ 637 OVERRIDE(OP): Performed by: HOSPITALIST

## 2018-10-01 PROCEDURE — 94660 CPAP INITIATION&MGMT: CPT

## 2018-10-01 PROCEDURE — 84439 ASSAY OF FREE THYROXINE: CPT

## 2018-10-01 PROCEDURE — 99239 HOSP IP/OBS DSCHRG MGMT >30: CPT | Performed by: HOSPITALIST

## 2018-10-01 PROCEDURE — 80048 BASIC METABOLIC PNL TOTAL CA: CPT

## 2018-10-01 RX ADMIN — FUROSEMIDE 40 MG: 40 TABLET ORAL at 05:46

## 2018-10-01 RX ADMIN — Medication 1 TABLET: at 05:46

## 2018-10-01 RX ADMIN — ASPIRIN 81 MG: 81 TABLET, CHEWABLE ORAL at 05:46

## 2018-10-01 RX ADMIN — HEPARIN SODIUM 5000 UNITS: 5000 INJECTION, SOLUTION INTRAVENOUS; SUBCUTANEOUS at 05:47

## 2018-10-01 RX ADMIN — SALINE NASAL SPRAY 2 SPRAY: 1.5 SOLUTION NASAL at 05:46

## 2018-10-01 ASSESSMENT — PAIN SCALES - GENERAL: PAINLEVEL_OUTOF10: 0

## 2018-10-01 NOTE — DISCHARGE PLANNING
Agency/Facility Name: [facility]  Spoke To: Ned  Outcome: They are now requesting MD progress note stating the oxygen is being used to treat patients COPD.  SUMAN Esquivel has been advised.

## 2018-10-01 NOTE — CARE PLAN
Problem: Respiratory:  Goal: Respiratory status will improve  Outcome: PROGRESSING AS EXPECTED  Pt planned for discharge today. He was given a road test without oxygen and noted to require oxygen with ambulation. Pt has oxygen equipment at home but not a portable tank. His oxygen provider would not able to provide a replacement oxygen tank today. Discharge to be held over until tomorrow. Pt continues on nasal cannula at 2 lpm at rest and with ambulation. CPAP to be used at night.     Problem: Discharge Barriers/Planning  Goal: Patient's continuum of care needs will be met  Outcome: PROGRESSING AS EXPECTED  Patient's discharge held over until tomorrow due to not having portable oxygen to use at home.

## 2018-10-01 NOTE — DISCHARGE SUMMARY
Hospital Medicine Discharge Note     Admit Date:  9/25/2018       Discharge Date:   10/1/2018    Attending Physician:  Abdiel Rees M.D.      Diagnoses (includes active and resolved):     Principal Problem:    Acute hypoxemic respiratory failure (HCC) POA: Unknown  Active Problems:    Shortness of breath POA: Unknown    Peripheral edema POA: Unknown    Morbid obesity (HCC) POA: Unknown    HLD (hyperlipidemia) POA: Unknown    Anemia POA: Unknown    ABELARDO (obstructive sleep apnea) POA: Unknown      Hospital Summary (Brief Narrative):         58 y.o. Male hx of morbid obesity , ABELARDO- off Cpap approx 5 years after he moved,  DM admitted 9/25/2018 with increased shortness of breath.  He had signs of anasarca , leg edema and was treated with IV lasix.  He previously was on Cpap but loss the machine after moving approx 5 years ago.  Suspect mult factorial cause for his dyspnea and hypoxia including core pulmonale, ABELARDO, obesity hypoventilation. Echo revealed  mildly elevated right sided pressures.  With therapies, his symptoms markedly improved.  He stated he has no hx of COPD although medical supply records noted hx of this as reason for o2 dependence.  He had iron deficiency anemia without signs of bleeding.  hgb 9.7 Recommended for outpatient colonoscopy with [facility] follow up.  Patient on re evaluation was clinically stable, feeling markedly better  and discharged home after o2 arranged.      Consultants:        None     Imaging/ Testing:      EC-ECHOCARDIOGRAM COMPLETE W/ CONT   Final Result   No prior study is available for comparison.   Technically challenging study improved with contrast.  Left ventricular ejection fraction is visually estimated to be 65%.  Mild concentric left ventricular hypertrophy.  Estimated right ventricular systolic pressure  is 35 mmHg.  Dilated inferior vena cava with inspiratory collapse.  Trivial pericardial effusion.   DX-CHEST-2 VIEWS   Final Result      Enlarged cardiac silhouette  without evidence of acute disease.            Procedures:          None     Discharge Medications:             Medication List      START taking these medications      Instructions   ascorbic acid 500 MG tablet  Commonly known as:  VITAMIN C   Take 1 Tab by mouth every day.  Dose:  500 mg     ferrous sulfate-c-folic acid 105-500-0.8 MG Tbcr  Commonly known as:  FOLITAB 500   Take 1 Tab by mouth every day.  Dose:  1 Tab     furosemide 40 MG Tabs  Commonly known as:  LASIX   Take 1 Tab by mouth every day.  Dose:  40 mg        CONTINUE taking these medications      Instructions   aspirin 81 MG Chew chewable tablet  Commonly known as:  ASA   Take 81 mg by mouth every day.  Dose:  81 mg     atorvastatin 10 MG Tabs  Commonly known as:  LIPITOR   Take 10 mg by mouth every day.  Dose:  10 mg     metFORMIN 500 MG Tabs  Commonly known as:  GLUCOPHAGE   Take 500 mg by mouth 2 times a day, with meals.  Dose:  500 mg        STOP taking these medications    ibuprofen 600 MG Tabs  Commonly known as:  MOTRIN               Diet:       DIET ORDERS (Through next 24h)    Low fat            Activity:   As tolerated.      Code status:   Full code    Primary Care Provider:    Elizabeth Faust M.D.    Follow up appointment details :      .  Elizabeth Faust M.D.  [address] [facility] [address]  [phone]    In 1 week              Time spent on discharge day patient visit: 40  minutes    #################################################

## 2018-10-01 NOTE — PROGRESS NOTES
Discharge note: Patient discharged to home without services. Patient is discharging on 2L O2 via [place]. Patient will also have a CPAP at night. Patient's IV was removed. Reviewed AVS with patient and he has no further questions at this time. Patient is being transferred by a family member. Patient left the floor at 1300.

## 2018-10-01 NOTE — DISCHARGE PLANNING
Anticipated Discharge Disposition: home with oxygen.    Action: RN CM left voice message for Grisel with [employer], [phone], to discuss home oxygen,especially access to portable tank to get home.    Barriers to Discharge: Portable oxygen tank for ride home.    Plan: Pt. Previously on oxygen at  only, now 24/7. RN CM waiting for call back from pre-admit DME provider to discuss.

## 2018-10-01 NOTE — DISCHARGE INSTRUCTIONS
Discharge Instructions    Discharged to home by car with relative. Discharged via walking, hospital escort: Refused.  Special equipment needed: Oxygen    Be sure to schedule a follow-up appointment with your primary care doctor or any specialists as instructed.     Discharge Plan:   Diet Plan: Discussed  Activity Level: Discussed  Confirmed Follow up Appointment: Patient to Call and Schedule Appointment  Confirmed Symptoms Management: Discussed  Medication Reconciliation Updated: Yes  Pneumococcal Vaccine Administered/Refused: Not given - Patient refused pneumococcal vaccine  Influenza Vaccine Indication: Patient Refuses    I understand that a diet low in cholesterol, fat, and sodium is recommended for good health. Unless I have been given specific instructions below for another diet, I accept this instruction as my diet prescription.   Other diet: Regular    Special Instructions: None    · Is patient discharged on Warfarin / Coumadin?   No     Depression / Suicide Risk    As you are discharged from this [facility] facility, it is important to learn how to keep safe from harming yourself.    Recognize the warning signs:  · Abrupt changes in personality, positive or negative- including increase in energy   · Giving away possessions  · Change in eating patterns- significant weight changes-  positive or negative  · Change in sleeping patterns- unable to sleep or sleeping all the time   · Unwillingness or inability to communicate  · Depression  · Unusual sadness, discouragement and loneliness  · Talk of wanting to die  · Neglect of personal appearance   · Rebelliousness- reckless behavior  · Withdrawal from people/activities they love  · Confusion- inability to concentrate     If you or a loved one observes any of these behaviors or has concerns about self-harm, here's what you can do:  · Talk about it- your feelings and reasons for harming yourself  · Remove any means that you might use to hurt yourself (examples:  pills, rope, extension cords, firearm)  · Get professional help from the community (Mental Health, Substance Abuse, psychological counseling)  · Do not be alone:Call your Safe Contact- someone whom you trust who will be there for you.  · Call your local CRISIS HOTLINE 907-5311 or 354-146-3792  · Call your local Children's Mobile Crisis Response Team [place] (702) 388-5425 or www.Besstech  · Call the toll free National Suicide Prevention Hotlines   · National Suicide Prevention Lifeline 942-240-LAWD (9313)  · National Hope Line Network 800-SUICIDE (444-8532)

## 2018-10-01 NOTE — CARE PLAN
Problem: Infection  Goal: Will remain free from infection  Outcome: PROGRESSING AS EXPECTED  Pt showing no signs of new or worsening infection.    Problem: Venous Thromboembolism (VTW)/Deep Vein Thrombosis (DVT) Prevention:  Goal: Patient will participate in Venous Thrombosis (VTE)/Deep Vein Thrombosis (DVT)Prevention Measures  Outcome: PROGRESSING AS EXPECTED  Pt on heparin for pharmacologic prophylaxis.

## 2018-10-06 NOTE — DOCUMENTATION QUERY
"DOCUMENTATION QUERY    PROVIDERS: Please select “Cosign w/ note”to reply to query.    To better represent the severity of illness of your patient, please review the following information and exercise your independent professional judgment in responding to this query.     Patient presented with acute respiratory failure, anasarca and peripheral/scrotal edema. It is documented in the Progress Notes and followed through to your final progress note that the patient had \"Echo with mildly elevated right sided pressure and LVH. Minimally elevated BNP-- improved edema and dyspnea. Change to oral lasix\" and \"Suspect lymphedema but more likely with right heart failure with scrotal edema.\"    Discharge Summary does not mention right heart failure but does document, \" He had signs of anasarca , leg edema and was treated with IV lasix.  He previously was on Cpap but loss the machine after moving approx 5 years ago.  Suspect mult factorial cause for his dyspnea and hypoxia including core pulmonale, ABELARDO, obesity hypoventilation. Echo revealed  mildly elevated right sided pressures.  With therapies, his symptoms markedly improved.\"    Based on clinical findings, risk factors and treatment, can right heart failure be further clarified as    1. Ruled in  2. Ruled out  3. Other explanation of clinical presentation (please document)  4. Unable to determine    The medical record reflects the following:   Clinical Findings     Acute hypoxic respiratory failure   anasarca   scrotal/peripheral edema   OHS   Cor pulmonale   Treatment  IV Lasix changed to oral Lasix on disch, supplemental O2   Risk Factors     Location within medical record  History and Physical, Progress Notes, Labs, and Discharge Summary     Thank you,   Penny rBice          "

## 2019-01-04 ENCOUNTER — SLEEP CENTER VISIT (OUTPATIENT)
Dept: SLEEP MEDICINE | Facility: MEDICAL CENTER | Age: 59
End: 2019-01-04
Payer: MEDICARE

## 2019-01-04 VITALS
BODY MASS INDEX: 45.1 KG/M2 | OXYGEN SATURATION: 95 % | HEART RATE: 84 BPM | HEIGHT: 70 IN | SYSTOLIC BLOOD PRESSURE: 134 MMHG | DIASTOLIC BLOOD PRESSURE: 84 MMHG | RESPIRATION RATE: 15 BRPM | WEIGHT: 315 LBS

## 2019-01-04 DIAGNOSIS — I10 ESSENTIAL HYPERTENSION: ICD-10-CM

## 2019-01-04 DIAGNOSIS — E66.01 MORBID OBESITY WITH BMI OF 50.0-59.9, ADULT (HCC): ICD-10-CM

## 2019-01-04 DIAGNOSIS — E11.9 CONTROLLED TYPE 2 DIABETES MELLITUS WITHOUT COMPLICATION, WITHOUT LONG-TERM CURRENT USE OF INSULIN (HCC): ICD-10-CM

## 2019-01-04 DIAGNOSIS — J96.01 ACUTE HYPOXEMIC RESPIRATORY FAILURE (HCC): ICD-10-CM

## 2019-01-04 DIAGNOSIS — G47.33 OSA (OBSTRUCTIVE SLEEP APNEA): ICD-10-CM

## 2019-01-04 PROBLEM — E10.9 TYPE 1 DIABETES MELLITUS WITHOUT COMPLICATION (HCC): Status: ACTIVE | Noted: 2019-01-04

## 2019-01-04 PROCEDURE — 99204 OFFICE O/P NEW MOD 45 MIN: CPT | Performed by: FAMILY MEDICINE

## 2019-01-04 RX ORDER — LISINOPRIL 20 MG/1
40 TABLET ORAL DAILY
COMMUNITY
End: 2020-08-27

## 2019-01-04 NOTE — PROGRESS NOTES
"     [facility]  Consult Note     Date: 1/4/2019 / Time: 10:01 AM    Patient ID:   Name:             Rashi Shirley   YOB: 1960  Age:                 58 y.o.  male   MRN:               [id number]      Thank you for requesting a sleep medicine consultation on Rashi Shirley at the sleep center. He presents today with the chief complaints of snoring and witnessed apneas. He is referred by Dr. Faust for evaluation and treatment of sleep disorder breathing . He was diagnosed with ABELARDO about 12 years ago and when he lost weight he stopped using the CPAP about 7 years ago. He has chronic respiratory failure and currently on supplemental O2 at 2L/min.    HISTORY OF PRESENT ILLNESS:       At night,  Rashi Shirley goes to bed around 11 pm-12 am on weekdays and on the weekends. He gets out of bed at 7-8 am on weekdays and on the weekends.  He averages about 8 hrs of sleep on a good night and 6-7 hrs on a bad night. Pt has bad nights about 2-3 nights per week. He falls asleep within 2-3 minutes. He awakens couple times during the night due to bathroom use. It takes him few min to fall back asleep.    He is aware of snoring/witnessed apneas.  He  denies any symptoms of restless legs syndrome such as an \"urge to move\"  He  legs in the evening or bedtime. He  denies any symptoms of narcolepsy such as sleep paralysis or cataplexy, or any symptoms to suggest parasomnias such as sleep walking or acting out of dreams. He  has not used any medications for the sleep problem.    Overall,  He does finds his sleep refreshing. When He  wakes up in the morning, He  Does not feel tired. In terms of  excessive daytime sleepiness,  He complains of sleepiness while reading or watching TV. [place] sleepiness scale score is abnormal at  12/21.He  Does take regular naps. The naps are usually 20 min long.He drinks about 2-3 caffeinated beverages per day.      REVIEW OF SYSTEMS:       Constitutional: " Denies fevers, Denies weight changes  Eyes: Denies changes in vision, no eye pain  Ears/Nose/Throat/Mouth: Denies nasal congestion or sore throat   Cardiovascular: Denies chest pain or palpitations   Respiratory: Denies shortness of breath , Denies cough  Gastrointestinal/Hepatic: Denies abdominal pain, nausea, vomiting, diarrhea, constipation or GI bleeding   Genitourinary: Denies bladder dysfunction, dysuria or frequency  Musculoskeletal/Rheum: Denies  joint pain and swelling   Skin/Breast: Denies rash  Neurological: Denies headache, confusion, memory loss or focal weakness/parasthesias  Psychiatric: denies mood disorder     Comprehensive review of systems form is reviewed with the patient and is attached in the EMR.     PMH:  has a past medical history of Chickenpox; Diabetes (HCC); Divehi measles; Hyperlipidemia; Mumps; Obesity; Sleep apnea; and Tonsillitis.  MEDS:   Current Outpatient Prescriptions:   •  lisinopril (PRINIVIL) 20 MG Tab, Take 20 mg by mouth every day., Disp: , Rfl:   •  Hydrocodone-Acetaminophen 2.5-325 MG Tab, Take  by mouth., Disp: , Rfl:   •  ferrous sulfate-c-folic acid (FOLITAB 500) 105-500-0.8 MG Tab CR, Take 1 Tab by mouth every day., Disp: 30 Tab, Rfl: 2  •  ascorbic acid (VITAMIN C) 500 MG tablet, Take 1 Tab by mouth every day., Disp: 30 Tab, Rfl: 2  •  furosemide (LASIX) 40 MG Tab, Take 1 Tab by mouth every day., Disp: 14 Tab, Rfl: 0  •  metFORMIN (GLUCOPHAGE) 500 MG Tab, Take 500 mg by mouth 2 times a day, with meals., Disp: , Rfl:   •  aspirin (ASA) 81 MG Chew Tab chewable tablet, Take 81 mg by mouth every day., Disp: , Rfl:   •  atorvastatin (LIPITOR) 10 MG Tab, Take 10 mg by mouth every day., Disp: , Rfl:   ALLERGIES: No Known Allergies  SURGHX:   Past Surgical History:   Procedure Laterality Date   • OTHER ORTHOPEDIC SURGERY      bilateral knee surgery     SOCHX:  reports that he has never smoked. He has never used smokeless tobacco. He reports that he drinks alcohol. He reports  "that he does not use drugs..   FH:   Family History   Problem Relation Age of Onset   • Diabetes Mother    • Diabetes Father    • Sleep Apnea Neg Hx            Physical Exam:  Vitals/ General Appearance:   Weight/BMI: Body mass index is 51.08 kg/m².  Blood pressure 134/84, pulse 84, resp. rate 15, height 1.778 m (5' 10\"), weight (!) 161.5 kg (356 lb), SpO2 95 %.  Vitals:    01/04/19 0942   BP: 134/84   BP Location: Left arm   Patient Position: Sitting   BP Cuff Size: Adult   Pulse: 84   Resp: 15   SpO2: 95%   Weight: (!) 161.5 kg (356 lb)   Height: 1.778 m (5' 10\")       Pt. is alert and oriented to time, place and person. Cooperative and in no apparent distress.       1. Head: Atraumatic, normocephalic.   2. Ears: Normal tympanic membrane and no discharge  3. Nose: No inferior turbinate hypertophy, no septal deviation, no polyp.   4. Throat: Oropharynx appears crowded in that the palate is overhanging (Malam Carlota scale 4. Tonsils arenot enlarged, uvula is not large, pharynx not inflamed. Tongue is large. has intact dentition and oral hygiene is fair.  5. Neck: Supple. No thyromegaly  6. Chest: Trachea central  7. Lungs auscultation: B/L good air entry, vesicular breath sounds, no wheezing/ronchi sounds  8. Heart auscultation: 1st and 2nd heart sounds normal, regular rhythm. No murmur.  9.  Extremities:  3+ pedal edema up to knees.   Peripheral pulses felt.  10. Skin: No rash  11. NEUROLOGICAL EXAMINATION: On neurological exam, the patient was alert and oriented x3. speech was clear and fluent without dysarthria.  Cranial nerve exam II through XII was normal. Motor exam revealed normal bulk, tone and strength 5/5, which was symmetrical in the upper and lower extremities bilaterally.  Deep tendon reflexes are 2+ and symmetric throughout.    INVESTIGATIONS:       ASSESSMENT AND PLAN     1. He  has symptoms of Obstructive Sleep Apnea (ABELARDO). He  has excessive daytime sleepiness that interferes with activites of daily " living. He  risk factors for ABELARDO include obesity, thick neck, and crowded oropharynx.     The pathophysiology of ABELARDO and the increased risk of cardiovascular morbidity from untreated ABELARDO is discussed in detail with the patient. He  also has HTN, DM which can be worsened by her ABELARDO.     We have discussed diagnostic options including in-laboratory, attended polysomnography and home sleep testing. We have also discussed treatment options including airway pressurization, reconstructive otolaryngologic surgery, dental appliances and weight management.       Subsequently,treatment options will be discussed based on the diagnostic study. Meanwhile, He is urged to avoid supine sleep, weight gain and alcoholic beverages since all of these can worsen ABELARDO. He is cautioned against drowsy driving. If He feels sleepy while driving, He must pull over for a break/nap, rather than persist on the road, in the interest of He own safety and that of others on the road.    Plan  -  He  will be scheduled for an overnight PSG to assess sleep related  breathing disorder.   - continue supplemental O2 at 2L/min 24/7     2. DM: Stable. Last A1c was 6.5. He is currently on metformin. It is managed by PCP.    3.HTN: Stable and asymptomatic. He is currently on lisinopril and lasix. He is managed by PCP. Last ECHO was on 9/26/18 Left ventricular ejection fraction is visually estimated to be 65%.    4. Chronic respiratory failure likely cardiogenic in nature or due to obesity related hypoventilation. He is currently on supplemental O2 at 2L/min  24/7. SOB is worse with exertion.

## 2019-01-29 ENCOUNTER — SLEEP STUDY (OUTPATIENT)
Dept: SLEEP MEDICINE | Facility: MEDICAL CENTER | Age: 59
End: 2019-01-29
Attending: FAMILY MEDICINE
Payer: MEDICARE

## 2019-01-29 DIAGNOSIS — G47.33 OSA (OBSTRUCTIVE SLEEP APNEA): ICD-10-CM

## 2019-01-29 PROCEDURE — 95811 POLYSOM 6/>YRS CPAP 4/> PARM: CPT | Performed by: INTERNAL MEDICINE

## 2019-01-30 NOTE — PROCEDURES
The patient underwent a split night polysomnogram with a CPAP titration using the standard montage for measurement of paramaters of sleep, respiratory events, movement abnormalities, heart rate and rhythm.  A Microphone was used to monitior snoring.  Interpretation:  Study start time was 09:54:01 PM.  Total recording time was 2h 25.0m (145 minutes) with a total sleep time of 2h 16.0m (136 minutes) resulting in a sleep efficiency of 93.79%.  Sleep latency from the start fo the study was 00 minutes minutes and REM latency from sleep onset was 69 minutes minutes.  Respiratory:   There were 211 apneas in total consisting of 171 obstructive apneas, 0 mixed apneas, and 40 central apneas.  There were 20 hypopneas in total.  The apnea index was 93.09 per hour and the hypopnea index was 8.82 per hour.  The overall AHI was 101.9, with a REM AHI of 84.00, and a supine AHI of 113.18.  Limb Movements:  There were a total of 42 periodic leg movements, of which 17 were PLMS arousals.  This resulted in a PLMS index of 18.5 and a PLMS arousal index of 7.5  Oximetry:  The mean SaO2 was 71.0% for the diagnostic portion of the study, with a minimum SaO2 of 50.0%.    Treatment:  Interpretation:  Treatment recording time was 5h 34.5m (334 minutes) with a total sleep time of 4h 45.5m (285 minutes) resulting in a sleep efficiency of 85.4%.    Sleep latency from the start of treatment was 00 minutes minutes and REM latency from sleep onset was 1h 7.0m minutes.    The patient had 158 arousals in total for an arousal index of 33.2.  Respiratory:   There were 118 apneas in total consisting of  90 obstructive apneas, 28 central apneas, and 0 mixed apneas for an apnea index of 24.80.    The patient had 60 hypopneas in total, which resulted in a hypopnea index of 12.61.    The overall AHI was 37.41, with a REM AHI of 17.14, and a supine AHI of 16.92.     Limb Movements:  There were a total of 39 periodic leg movements, of which 8 were PLMS  arousals.  This resulted in a PLMS index of 8.2 and a PLMS arousal index of 1.7.  Oximetry:  The mean SaO2 during treatment was 82.0%, with a minimum oxygen saturation of 50.0%.  CPAP was tried from 6cm H2O to 18cm H2O.  Bilevel was tried from 21/17cm H2O to 22/18cm H2O.  Oxygen was added from 1-2LPM.    RECORDING TECHNIQUE:       After the scalp was prepared, gold plated electrodes were applied to the scalp according to the International 10-20 System. EEG (electroencephalogram) was continuously monitored from the O1-M2, O2-M1, C3-M2, C4-M1, F3-M2, and F4-M1.   EOGs (electrooculograms) were monitored by electrodes placed at the left and right outer canthi.  Chin EMG (electromyogram) was monitored by electrodes placed on the mentalis and sub-mentalis muscles.  Nasal and oral airflow were monitored using a triple port thermocouple as well as oronasal pressure transducer.  Respiratory effort was measured by inductive plethysmography technology employing abdominal and thoracic belts.  Blood oxygen saturation and pulse were monitored by pulse oximetry.  Heart rhythm was monitored by surface electrocardiogram.  Leg EMG was studied using surface electrodes placed on left and right anterior tibialis.  A microphone was used to monitor tracheal sounds and snoring.  Body position was monitored and documented by technician observation      SUMMARY:    This was an overnight diagnostic polysomnogram with a subsequent positive airway pressure titration, also known as a split- night study.  The patient chose to use a medium Simplus with a chinstrap mask with heated humidification.    During the diagnostic phase the total recording time was 145 minutes, the sleep period time was 144 minutes, and the total sleep time was 136 minutes.  The patient's sleep efficiency was 93.79 % which is normal.  The patient experienced 1 REM period(s).    The sleep stage durations revealed 8.5 minutes of wake after sleep onset (WASO), 22 minutes of N1  sleep, 104 minutes of N2 sleep, 0 minutes of N3 sleep, and 10 minutes of REM.    The latency to sleep was 0 minutes which is shortened.  The latency to REM was 69 minutes which is normal.  Severe sleep fragmentation occurred.  The arousal index was 77.  The Total Limb Movement (isolated) Index was 16.3, the Limb Movement with Arousal Index was 10.6, and the PLM Series Index was 18.5.    The patient experienced 20 hypopneas, 211 apneas and 0 RERAS.  The apnea hypopnea index was 101.9, the RDI was 101.9, the mean event duration was 21.9 seconds, and the longest event lasted 72.7  seconds.  The REM index was 6.2 and the supine index was 36.6.  The apnea hypopnea index represents very severe obstructive sleep apnea hypopnea syndrome.    The hortensia saturation during sleep was 50 % and the patient spent all but 0.3% % of the recording with saturations less than or equal to 90% despite the administration of supplemental oxygen.    During sleep, the minimum heart rate was 66 bpm, the mean heart rate was 82 bpm, and the maximum heart rate was 98 bpm.    Once the patient met the split-night protocol, the technician performed a positive airway pressure titration.  The technician initiated treatment with CPAP set at 6 cmH2O and increased the pressure to a maximum of 18 cmH2O. patient was also tried on bilevel at 21/17 and 22/18 cm H2O.  Neither CPAP nor BiPAP normalize the AHI and any tested pressure.    ASSESSMENT:    Severe obstructive sleep apnea hypopnea - .9  Severe nocturnal desaturation - hortensia saturation 50 % - saturations <90% for all but 0.3% of the diagnostic recording  Ineffective CPAP and bilevel titration        RECOMMENDATION:    Recommend the patient return for a dedicated full night positive airway pressure titration likely requiring supplemental bleeding oxygen therapy as well.

## 2019-02-07 NOTE — ADDENDUM NOTE
Encounter addended by: Sandra Peters R.N. on: 2/7/2019  8:30 AM<BR>    Actions taken: Flowsheet accepted

## 2019-02-27 ENCOUNTER — SLEEP CENTER VISIT (OUTPATIENT)
Dept: SLEEP MEDICINE | Facility: MEDICAL CENTER | Age: 59
End: 2019-02-27
Payer: MEDICARE

## 2019-02-27 VITALS
OXYGEN SATURATION: 95 % | RESPIRATION RATE: 18 BRPM | DIASTOLIC BLOOD PRESSURE: 74 MMHG | HEIGHT: 70 IN | WEIGHT: 315 LBS | BODY MASS INDEX: 45.1 KG/M2 | TEMPERATURE: 98.2 F | SYSTOLIC BLOOD PRESSURE: 132 MMHG | HEART RATE: 68 BPM

## 2019-02-27 DIAGNOSIS — G47.33 OSA (OBSTRUCTIVE SLEEP APNEA): ICD-10-CM

## 2019-02-27 DIAGNOSIS — E66.01 MORBID OBESITY WITH BMI OF 50.0-59.9, ADULT (HCC): ICD-10-CM

## 2019-02-27 PROCEDURE — 99214 OFFICE O/P EST MOD 30 MIN: CPT | Performed by: NURSE PRACTITIONER

## 2019-02-27 RX ORDER — FUROSEMIDE 20 MG/1
TABLET ORAL
Status: ON HOLD | COMMUNITY
Start: 2019-02-11 | End: 2019-04-24

## 2019-02-27 RX ORDER — HYDROCODONE BITARTRATE AND ACETAMINOPHEN 10; 325 MG/1; MG/1
.5-1 TABLET ORAL 3 TIMES DAILY
Status: ON HOLD | COMMUNITY
Start: 2019-02-12 | End: 2020-09-28

## 2019-02-27 RX ORDER — POTASSIUM CHLORIDE 750 MG/1
TABLET, FILM COATED, EXTENDED RELEASE ORAL
Status: ON HOLD | COMMUNITY
Start: 2019-02-05 | End: 2019-04-24

## 2019-02-28 ENCOUNTER — APPOINTMENT (OUTPATIENT)
Dept: SLEEP MEDICINE | Facility: MEDICAL CENTER | Age: 59
End: 2019-02-28
Payer: MEDICARE

## 2019-02-28 NOTE — PROGRESS NOTES
CC:  Here for f/u sleep issues as listed below    HPI:   Rashi presents today for follow up obstructive sleep apnea.  PMH of chronic respiratory failure, DM2, HTN. He has not been formally dx with lung disease although requires oxygen 24/7.  He was diagnosed with ABELARDO about 12 years ago, but when he lost weight he stopped using the CPAP about 2 years ago.     PSG from 1/2019 indicated an AHI of 101.9 and low oxygenation of 50%.  He was started on treatment with CPAP from 6 to 18 and BIPAP 21-22/17/18. Neither the CPAP nor BIPAP normalized the AHI or improved the oxygenation.     Reviewed results and treatment options including auto treatment versus in lab titration, dental appliance, UPPP surgery, and behavioral modifications.  Patient is amendable to titration study. They understand they may need a future sleep study if treatment is ineffective.     Patient is currently sleeping 7 hours per night with 3 nighttime awakenings. They have no trouble falling asleep.   They do feel refreshed in the morning and denies morning H/A. They feel tired throughout the day and naps 2x per week for appx 15 min long and knods off when sedentary.  Patient reports snoring, apnea events and paroxysmal nocturnal dyspnea events. They have never fallen asleep in conversation, at the wheel, or at work.  They deny sleepwalking/talking.       Patient Active Problem List    Diagnosis Date Noted   • Shortness of breath 09/26/2018     Priority: High   • Peripheral edema 09/26/2018     Priority: Medium   • Morbid obesity ([facility]) 09/26/2018     Priority: Medium   • HLD (hyperlipidemia) 09/26/2018     Priority: Low   • Morbid obesity with BMI of 50.0-59.9, adult ([facility]) 01/04/2019   • Essential hypertension 01/04/2019   • Diabetes type 2, controlled ([facility]) 01/04/2019   • ABELARDO (obstructive sleep apnea) 09/30/2018   • Acute hypoxemic respiratory failure ([facility]) 09/26/2018   • Anemia 09/26/2018       Past Medical History:   Diagnosis Date   • Chickenpox    •  Diabetes (HCC)     borderline   • Turkish measles    • Hyperlipidemia    • Mumps    • Obesity    • Sleep apnea    • Tonsillitis        Past Surgical History:   Procedure Laterality Date   • OTHER ORTHOPEDIC SURGERY      bilateral knee surgery       Family History   Problem Relation Age of Onset   • Diabetes Mother    • Diabetes Father    • Sleep Apnea Neg Hx        Social History     Social History   • Marital status: Unknown     Spouse name: N/A   • Number of children: N/A   • Years of education: N/A     Occupational History   • Not on file.     Social History Main Topics   • Smoking status: Never Smoker   • Smokeless tobacco: Never Used   • Alcohol use Yes      Comment: occasional   • Drug use: No   • Sexual activity: Not on file     Other Topics Concern   • Not on file     Social History Narrative   • No narrative on file       Current Outpatient Prescriptions   Medication Sig Dispense Refill   • HYDROcodone/acetaminophen (NORCO)  MG Tab      • KLOR-CON 10 10 MEQ tablet      • lisinopril (PRINIVIL) 20 MG Tab Take 20 mg by mouth every day.     • ferrous sulfate-c-folic acid (FOLITAB 500) 105-500-0.8 MG Tab CR Take 1 Tab by mouth every day. 30 Tab 2   • ascorbic acid (VITAMIN C) 500 MG tablet Take 1 Tab by mouth every day. 30 Tab 2   • furosemide (LASIX) 40 MG Tab Take 1 Tab by mouth every day. 14 Tab 0   • metFORMIN (GLUCOPHAGE) 500 MG Tab Take 500 mg by mouth 2 times a day, with meals.     • aspirin (ASA) 81 MG Chew Tab chewable tablet Take 81 mg by mouth every day.     • atorvastatin (LIPITOR) 10 MG Tab Take 10 mg by mouth every day.     • furosemide (LASIX) 20 MG Tab      • Hydrocodone-Acetaminophen 2.5-325 MG Tab Take  by mouth.       No current facility-administered medications for this visit.           Allergies: Patient has no known allergies.      ROS   Gen: Denies fever, chills, unintentional weight loss, fatigue  Resp:Denies Dyspnea  CV: Denies chest pain, chest tightness  Sleep:Denies morning  "headache, insomnia,   , Neuro: Denies frequent headaches, weakness, dizziness  See HPI.  All other systems reviewed and negative        Vital signs for this encounter:  Vitals:    02/27/19 1600 02/27/19 1622   Height:  1.778 m (5' 10\")   Weight:  (!) 161.5 kg (356 lb)   Weight % change since last entry.:  0 %   BP:  132/74   Pulse:  68   BMI (Calculated):  51.08   Resp:  18   Temp:  36.8 °C (98.2 °F)   TempSrc:  Temporal   O2 sat % on O2: 95 %    O2 Flow Rate (L/min): 2                    Physical Exam:   Gen:         Alert and oriented, No apparent distress.   Neck:        No Lymphadenopathy.  Lungs:     Clear to auscultation bilaterally.    CV:          Regular rate and rhythm. No murmurs, rubs or gallops.   Abd:         Soft non tender, non distended.            Ext:          No clubbing, cyanosis, edema.    Assessment   1. ABELARDO (obstructive sleep apnea)  Polysomnography Titration    OBESITY COUNSELING (No Charge): Patient identified as having weight management issue.  Appropriate orders and counseling given.   2. Morbid obesity with BMI of 50.0-59.9, adult (HCC)  OBESITY COUNSELING (No Charge): Patient identified as having weight management issue.  Appropriate orders and counseling given.       Patient is clinically stable and will proceed with following plan.     PLAN:   Patient Instructions   1) Continue 24/7 oxygen  2) Titration study now for potential IVAPS given morid obesity with minimal improvement with high pressures on BIPAP therapy. Most likely will require additional oxygen as well.   3) Declines sleeping aid  4) Vaccines: Up to date with Pneumovax 23  5) Return in about 4 weeks (around 3/27/2019) for if not sooner, follow up with GURJIT Lion, sleep study results, Compliance.          "

## 2019-03-01 NOTE — PATIENT INSTRUCTIONS
1) Continue 24/7 oxygen  2) Titration study now for potential IVAPS given morid obesity with minimal improvement with higher pressures on BIPAP therapy  3) Declines sleeping aid  4) Vaccines: Up to date with Pneumovax 23  5) Return in about 4 weeks (around 3/27/2019) for if not sooner, follow up with GURJIT Lion, sleep study results, Compliance.

## 2019-03-12 ENCOUNTER — APPOINTMENT (OUTPATIENT)
Dept: SLEEP MEDICINE | Facility: MEDICAL CENTER | Age: 59
End: 2019-03-12
Attending: NURSE PRACTITIONER
Payer: MEDICARE

## 2019-03-18 ENCOUNTER — SLEEP STUDY (OUTPATIENT)
Dept: SLEEP MEDICINE | Facility: MEDICAL CENTER | Age: 59
End: 2019-03-18
Attending: NURSE PRACTITIONER
Payer: MEDICARE

## 2019-03-18 DIAGNOSIS — G47.33 OSA (OBSTRUCTIVE SLEEP APNEA): ICD-10-CM

## 2019-03-18 PROCEDURE — 95811 POLYSOM 6/>YRS CPAP 4/> PARM: CPT | Performed by: INTERNAL MEDICINE

## 2019-03-19 NOTE — PROCEDURES
Clinical Comments:  The patient underwent a comprehensive polysomnogram using the standard montage for measurement of parameters of sleep, respiratory events, movement abnormalities, heart rate and rhythm. A microphone was used to monitor snoring.      ANALYSIS:  Testing began at 11:12:04 PM.  The total recording time was 405.0 minutes with a sleep period of 396.9 minutes and the total sleep time was 375.4 minutes with a sleep efficiency of 92.7%.  The sleep latency was 8.1 minutes, and REM latency was 4.0 minutes.  The patient experienced 52 arousals in total, for an arousal index of 8.3    RESPIRATORY: The patient had 8 apneas in total.  Of these, 0 were obstructive apneas, and 8 were central apneas.  This resulted in an apnea index (AI) of 1.3.  The patient had 101 hypopneas, for a hypopnea index of 16.1.  The overall AHI was 17.4, while the AHI during Stage R sleep was 25.2.  AHI while supine was 17.4.    OXIMETRY: Oxygen saturation monitoring showed a mean SpO2 of 84.1%, with a minimum oxygen saturation of 71.0%.  Oxygen saturations were less than or = 89% for 320.0 minutes of sleep time.    CARDIAC: The highest heart rate during the recording was 88.0 beats per minute.  The average heart rate during sleep was 69.2 bpm.    LIMB MOVEMENTS: There were a total of 0 PLMs during sleep, of which 0 were PLMs arousals.  This resulted in a PLMS index of 0.0.      Interpretation:    Mr. Shirley has a long history of severe sleep apnea hypopnea syndrome.  Split-night polysomnography in January 29, 2019 demonstrated an apnea hypopnea index of 101.9 events per hour with a lowest arterial oxygen saturation of 50% on room air.  There was no effective response to CPAP or BiPAP therapy in that study but mask fit was a significant issue.  This study was devoted to further titration of therapy.    There is reasonable continuity of sleep with a modest elevation in the arousal index.  Significant REM sleep time is included.   There are no scoreable periodic limb movements.    BiPAP was applied at 14-22/10-18 cm of water pressure.  With good mask fit there was an excellent response to BiPAP at the final pressure level with an apnea hypopnea index of 4.9 events per hour in a stage that included 20 minutes of REM sleep time and was done in the supine body position.  Despite optimal BiPAP with an apnea hypopnea index less of than 5 events per hour hypoxemia persisted with a mean arterial oxygen saturation of 87% on room air and a minimum saturation of 76%.  Supplemental oxygen was titrated up to 3 L/min with the optimal BiPAP and the mean arterial oxygen saturation [place] to 91% with a minimum of 86%.    Assessment:  This study demonstrates an excellent response to BiPAP therapy in a patient with previously demonstrated severe obstructive sleep apnea hypopnea.  There was persistent hypoxemia despite optimal BiPAP therapy, consistent with the patient's history of chronic hypoxemic respiratory failure and requiring the addition of supplemental oxygen.    Recommendations:  BiPAP at 22/18 cm of water pressure with oxygen at 4 L/min.  He did best with a Dreamware nasal mask using medium cushions and a large frame.

## 2019-03-22 ENCOUNTER — SLEEP CENTER VISIT (OUTPATIENT)
Dept: SLEEP MEDICINE | Facility: MEDICAL CENTER | Age: 59
End: 2019-03-22
Payer: MEDICARE

## 2019-03-22 VITALS
SYSTOLIC BLOOD PRESSURE: 138 MMHG | OXYGEN SATURATION: 90 % | WEIGHT: 315 LBS | HEIGHT: 70 IN | BODY MASS INDEX: 45.1 KG/M2 | DIASTOLIC BLOOD PRESSURE: 82 MMHG | HEART RATE: 84 BPM | RESPIRATION RATE: 16 BRPM

## 2019-03-22 DIAGNOSIS — G47.33 OBSTRUCTIVE SLEEP APNEA SYNDROME: ICD-10-CM

## 2019-03-22 DIAGNOSIS — J96.11 CHRONIC HYPOXEMIC RESPIRATORY FAILURE (HCC): ICD-10-CM

## 2019-03-22 DIAGNOSIS — I10 ESSENTIAL HYPERTENSION: ICD-10-CM

## 2019-03-22 DIAGNOSIS — G47.10 HYPERSOMNOLENCE: ICD-10-CM

## 2019-03-22 DIAGNOSIS — G47.34 NOCTURNAL HYPOXEMIA: ICD-10-CM

## 2019-03-22 PROCEDURE — 99214 OFFICE O/P EST MOD 30 MIN: CPT | Performed by: INTERNAL MEDICINE

## 2019-03-23 NOTE — PROGRESS NOTES
CC: Sleep apnea hypopnea syndrome.    HPI:   Mr. Shirley has chronic hypoxemic respiratory failure requiring continuous domiciliary oxygen and attributed to obesity hypoventilation.  He has a 12-year history of obstructive sleep apnea hypopnea syndrome.  Polysomnography on January 29, 2019 suggested an apnea hypopnea index of 101.9 events per hour with a lowest arterial oxygen saturation of 50% on room air.  In that split-night study there was no effective response to CPAP or BiPAP therapy in the pressure range is used.  He underwent a follow-up titration polysomnogram on March 18, 2019 and returns now to review those results.    He has a fairly regular sleep schedule with about 7 hours of sleep time nightly.  He remains tired during the day and naps on a regular basis.    The titration polysomnogram dated March 18, 2019 is reviewed with the patient.  It demonstrates reasonable continuity of sleep with significant REM sleep time.  There are no scoreable periodic limb movements.  BiPAP was applied at 14-22/10-18 cm water pressure.  He did well with a good mask fit and in the final pressure stage the apnea-hypopnea index was 4.9 events per hour with only scattered hypopnea episodes.  That stage and the titration included 20 minutes of REM sleep time and was done in the supine body position.  Arterial oxygen saturation remained low on optimal BiPAP with a mean saturation of 87% on room air at a minimum of 76%.  Oxygen was titrated up to 3 L/min with a mean saturation [place] to 91% with a minimum of 86%.        Patient Active Problem List    Diagnosis Date Noted   • Shortness of breath 09/26/2018     Priority: High   • Peripheral edema 09/26/2018     Priority: Medium   • Morbid obesity (HCC) 09/26/2018     Priority: Medium   • HLD (hyperlipidemia) 09/26/2018     Priority: Low   • Morbid obesity with BMI of 50.0-59.9, adult ([facility]) 01/04/2019   • Essential hypertension 01/04/2019   • Diabetes type 2, controlled ([facility])  "01/04/2019   • ABELARDO (obstructive sleep apnea) 09/30/2018   • Acute hypoxemic respiratory failure (HCC) 09/26/2018   • Anemia 09/26/2018       Past Medical History:   Diagnosis Date   • Chickenpox    • Diabetes (HCC)     borderline   • French measles    • Hyperlipidemia    • Mumps    • Obesity    • Sleep apnea    • Tonsillitis        Past Surgical History:   Procedure Laterality Date   • OTHER ORTHOPEDIC SURGERY      bilateral knee surgery       Family History   Problem Relation Age of Onset   • Diabetes Mother    • Diabetes Father    • Sleep Apnea Neg Hx        Social History     Social History   • Marital status: Unknown     Spouse name: N/A   • Number of children: N/A   • Years of education: N/A     Occupational History   • Not on file.     Social History Main Topics   • Smoking status: Never Smoker   • Smokeless tobacco: Never Used   • Alcohol use Yes      Comment: occasional   • Drug use: No   • Sexual activity: Not on file     Other Topics Concern   • Not on file     Social History Narrative   • No narrative on file       Current Outpatient Prescriptions   Medication Sig Dispense Refill   • furosemide (LASIX) 20 MG Tab      • HYDROcodone/acetaminophen (NORCO)  MG Tab      • KLOR-CON 10 10 MEQ tablet      • lisinopril (PRINIVIL) 20 MG Tab Take 20 mg by mouth every day.     • ferrous sulfate-c-folic acid (FOLITAB 500) 105-500-0.8 MG Tab CR Take 1 Tab by mouth every day. 30 Tab 2   • ascorbic acid (VITAMIN C) 500 MG tablet Take 1 Tab by mouth every day. 30 Tab 2   • furosemide (LASIX) 40 MG Tab Take 1 Tab by mouth every day. 14 Tab 0   • metFORMIN (GLUCOPHAGE) 500 MG Tab Take 500 mg by mouth 2 times a day, with meals.     • aspirin (ASA) 81 MG Chew Tab chewable tablet Take 81 mg by mouth every day.     • atorvastatin (LIPITOR) 10 MG Tab Take 10 mg by mouth every day.     • Hydrocodone-Acetaminophen 2.5-325 MG Tab Take  by mouth.       No current facility-administered medications for this visit.     \"CURRENT " "RX\"      Allergies: Patient has no known allergies.      ROS  Unchanged from prior.      Physical Exam:   /82 (BP Location: Left arm, Patient Position: Sitting, BP Cuff Size: Large adult)   Pulse 84   Resp 16   Ht 1.778 m (5' 10\")   Wt (!) 162.4 kg (358 lb)   SpO2 90% Comment: 2LPM  BMI 51.37 kg/m²    Head and neck examination demonstrates no mucosal lesion, purulent drainage or evident polyps. The pharynx is benign with a Mallampati III presentation. The neck is supple without thyromegaly. On chest examination there are symmetrical bilateral breath sounds without rales, wheezing or consolidation. On cardiac examination, the apical impulse and heart sounds are normal and the rhythm is regular. There is no murmur, gallop or rub and no jugular venous distention. The abdomen is soft with active bowel sounds and no palpable hepatosplenomegaly, mass, guarding or rebound. The extremities show no clubbing, cyanosis or edema and no signs of deep venous thrombosis. There is no warmth, redness, tenderness or palpable venous cord in the calves. The skin is clear, warm and dry. There is no unusual peripheral lymphadenopathy. Peripheral pulses are palpable in all 4 extremities. On neurologic examination, cranial nerve function is intact, motor tone is symmetrical, and the patient is alert, oriented and responsive.       Problems:  1. Obstructive sleep apnea syndrome  He has very severe obstructive sleep apnea hypopnea with an apnea hypopnea index of 101.9 events per hour and the lowest arterial oxygen saturation of 50% on room air.  In the recent titration polysomnogram he did well on high level BiPAP but had persisting hypoxemia at optimal BiPAP pressures with an apnea hypotony index less than 5 events per hour, requiring the addition of supplemental oxygen to optimal BiPAP.  Effective treatment is required and only to improve daytime alertness but also to reduce cardiac and neurologic risks associated with " untreated severe sleep disordered breathing.    2. Hypersomnolence  Related primarily to the sleep disordered breathing.  He also has a somewhat restricted nightly sleep time.    3. Nocturnal hypoxemia  Confirmed by polysomnography and requiring treatment with BiPAP and supplemental oxygen.    4. Chronic hypoxemic respiratory failure (HCC)  He remains on continuous domiciliary oxygen.    5. Essential hypertension  Reasonably well-controlled with a blood pressure of 138/82 mmHg today.    6. BMI 50.0-59.9, adult (HCC)  We discussed the role of weight management and treatment of sleep disordered breathing and the ways in which that might be accomplished.  - Height And Weight        Plan:   1.  Initiate BiPAP at 22/18 cmH2O pressure with oxygen at 4 L/min.  He does have an oxygen concentrator at home as he uses continuous domiciliary oxygen.  He did best with a Dreamware nasal mask using a large frame and medium cushions.    2.  We have talked about acclimating to the BiPAP with proper mask fit, effective humidification and consistent use.  We have also reviewed the principles of sleep hygiene with the goal of at least 7.5 hours of sleep nightly.    3.  Return visit here in about 3 months, bringing the data recording chip with him.  He may drop into the technician clinic at any time for assistance.    We appreciate the opportunity to assist in  Return in about 3 months (around 6/22/2019).

## 2019-03-29 ENCOUNTER — HOSPITAL ENCOUNTER (OUTPATIENT)
Facility: MEDICAL CENTER | Age: 59
End: 2019-03-29
Attending: INTERNAL MEDICINE | Admitting: INTERNAL MEDICINE
Payer: MEDICARE

## 2019-04-24 ENCOUNTER — ANESTHESIA EVENT (OUTPATIENT)
Dept: SURGERY | Facility: MEDICAL CENTER | Age: 59
End: 2019-04-24
Payer: MEDICARE

## 2019-04-24 ENCOUNTER — ANESTHESIA (OUTPATIENT)
Dept: SURGERY | Facility: MEDICAL CENTER | Age: 59
End: 2019-04-24
Payer: MEDICARE

## 2019-04-24 ENCOUNTER — HOSPITAL ENCOUNTER (OUTPATIENT)
Facility: MEDICAL CENTER | Age: 59
End: 2019-04-24
Attending: INTERNAL MEDICINE | Admitting: INTERNAL MEDICINE
Payer: MEDICARE

## 2019-04-24 VITALS
WEIGHT: 315 LBS | HEIGHT: 70 IN | BODY MASS INDEX: 45.1 KG/M2 | HEART RATE: 74 BPM | RESPIRATION RATE: 14 BRPM | OXYGEN SATURATION: 93 % | DIASTOLIC BLOOD PRESSURE: 94 MMHG | SYSTOLIC BLOOD PRESSURE: 142 MMHG | TEMPERATURE: 97.1 F

## 2019-04-24 LAB
ANION GAP SERPL CALC-SCNC: 8 MMOL/L (ref 0–11.9)
BUN SERPL-MCNC: 9 MG/DL (ref 8–22)
CALCIUM SERPL-MCNC: 8.8 MG/DL (ref 8.5–10.5)
CHLORIDE SERPL-SCNC: 100 MMOL/L (ref 96–112)
CO2 SERPL-SCNC: 29 MMOL/L (ref 20–33)
CREAT SERPL-MCNC: 0.57 MG/DL (ref 0.5–1.4)
EKG IMPRESSION: NORMAL
ERYTHROCYTE [DISTWIDTH] IN BLOOD BY AUTOMATED COUNT: 66.7 FL (ref 35.9–50)
GLUCOSE SERPL-MCNC: 115 MG/DL (ref 65–99)
HCT VFR BLD AUTO: 48.8 % (ref 42–52)
HGB BLD-MCNC: 14.7 G/DL (ref 14–18)
MCH RBC QN AUTO: 25.5 PG (ref 27–33)
MCHC RBC AUTO-ENTMCNC: 30.1 G/DL (ref 33.7–35.3)
MCV RBC AUTO: 84.7 FL (ref 81.4–97.8)
PATHOLOGY CONSULT NOTE: NORMAL
PLATELET # BLD AUTO: 194 K/UL (ref 164–446)
PMV BLD AUTO: 10.2 FL (ref 9–12.9)
POTASSIUM SERPL-SCNC: 4.1 MMOL/L (ref 3.6–5.5)
RBC # BLD AUTO: 5.76 M/UL (ref 4.7–6.1)
SODIUM SERPL-SCNC: 137 MMOL/L (ref 135–145)
WBC # BLD AUTO: 5.1 K/UL (ref 4.8–10.8)

## 2019-04-24 PROCEDURE — 700111 HCHG RX REV CODE 636 W/ 250 OVERRIDE (IP): Performed by: ANESTHESIOLOGY

## 2019-04-24 PROCEDURE — 85027 COMPLETE CBC AUTOMATED: CPT

## 2019-04-24 PROCEDURE — 160048 HCHG OR STATISTICAL LEVEL 1-5: Performed by: INTERNAL MEDICINE

## 2019-04-24 PROCEDURE — 160025 RECOVERY II MINUTES (STATS): Performed by: INTERNAL MEDICINE

## 2019-04-24 PROCEDURE — 160046 HCHG PACU - 1ST 60 MINS PHASE II: Performed by: INTERNAL MEDICINE

## 2019-04-24 PROCEDURE — 160002 HCHG RECOVERY MINUTES (STAT): Performed by: INTERNAL MEDICINE

## 2019-04-24 PROCEDURE — 160009 HCHG ANES TIME/MIN: Performed by: INTERNAL MEDICINE

## 2019-04-24 PROCEDURE — 88305 TISSUE EXAM BY PATHOLOGIST: CPT

## 2019-04-24 PROCEDURE — 88312 SPECIAL STAINS GROUP 1: CPT

## 2019-04-24 PROCEDURE — 93010 ELECTROCARDIOGRAM REPORT: CPT | Performed by: INTERNAL MEDICINE

## 2019-04-24 PROCEDURE — 700101 HCHG RX REV CODE 250: Performed by: ANESTHESIOLOGY

## 2019-04-24 PROCEDURE — 700105 HCHG RX REV CODE 258: Performed by: INTERNAL MEDICINE

## 2019-04-24 PROCEDURE — 93005 ELECTROCARDIOGRAM TRACING: CPT | Performed by: INTERNAL MEDICINE

## 2019-04-24 PROCEDURE — 80048 BASIC METABOLIC PNL TOTAL CA: CPT

## 2019-04-24 PROCEDURE — 160035 HCHG PACU - 1ST 60 MINS PHASE I: Performed by: INTERNAL MEDICINE

## 2019-04-24 PROCEDURE — 160041 HCHG SURGERY MINUTES - EA ADDL 1 MIN LEVEL 4: Performed by: INTERNAL MEDICINE

## 2019-04-24 PROCEDURE — 160029 HCHG SURGERY MINUTES - 1ST 30 MINS LEVEL 4: Performed by: INTERNAL MEDICINE

## 2019-04-24 PROCEDURE — 500066 HCHG BITE BLOCK, ECT: Performed by: INTERNAL MEDICINE

## 2019-04-24 RX ORDER — FUROSEMIDE 40 MG/1
40 TABLET ORAL DAILY
Status: ON HOLD | COMMUNITY
End: 2020-09-28

## 2019-04-24 RX ORDER — ATORVASTATIN CALCIUM 20 MG/1
20 TABLET, FILM COATED ORAL DAILY
Status: ON HOLD | COMMUNITY
End: 2022-11-08

## 2019-04-24 RX ORDER — POTASSIUM CHLORIDE 750 MG/1
10 TABLET, EXTENDED RELEASE ORAL DAILY
Status: ON HOLD | COMMUNITY
End: 2020-09-28

## 2019-04-24 RX ORDER — ONDANSETRON 2 MG/ML
4 INJECTION INTRAMUSCULAR; INTRAVENOUS
Status: DISCONTINUED | OUTPATIENT
Start: 2019-04-24 | End: 2019-04-24 | Stop reason: HOSPADM

## 2019-04-24 RX ORDER — SODIUM CHLORIDE, SODIUM LACTATE, POTASSIUM CHLORIDE, CALCIUM CHLORIDE 600; 310; 30; 20 MG/100ML; MG/100ML; MG/100ML; MG/100ML
INJECTION, SOLUTION INTRAVENOUS CONTINUOUS
Status: DISCONTINUED | OUTPATIENT
Start: 2019-04-24 | End: 2019-04-24 | Stop reason: HOSPADM

## 2019-04-24 RX ORDER — DIPHENHYDRAMINE HYDROCHLORIDE 50 MG/ML
12.5 INJECTION INTRAMUSCULAR; INTRAVENOUS
Status: DISCONTINUED | OUTPATIENT
Start: 2019-04-24 | End: 2019-04-24 | Stop reason: HOSPADM

## 2019-04-24 RX ORDER — FOLIC ACID 1 MG/1
1 TABLET ORAL DAILY
Status: ON HOLD | COMMUNITY
End: 2020-09-28

## 2019-04-24 RX ORDER — HALOPERIDOL 5 MG/ML
1 INJECTION INTRAMUSCULAR
Status: DISCONTINUED | OUTPATIENT
Start: 2019-04-24 | End: 2019-04-24 | Stop reason: HOSPADM

## 2019-04-24 RX ORDER — KETAMINE HYDROCHLORIDE 50 MG/ML
INJECTION, SOLUTION INTRAMUSCULAR; INTRAVENOUS PRN
Status: DISCONTINUED | OUTPATIENT
Start: 2019-04-24 | End: 2019-04-24 | Stop reason: SURG

## 2019-04-24 RX ORDER — TERBINAFINE HYDROCHLORIDE 250 MG/1
250 TABLET ORAL DAILY
COMMUNITY
End: 2020-08-27

## 2019-04-24 RX ORDER — MIDAZOLAM HYDROCHLORIDE 1 MG/ML
1 INJECTION INTRAMUSCULAR; INTRAVENOUS
Status: DISCONTINUED | OUTPATIENT
Start: 2019-04-24 | End: 2019-04-24 | Stop reason: HOSPADM

## 2019-04-24 RX ORDER — FERROUS SULFATE 325(65) MG
325 TABLET ORAL DAILY
Status: ON HOLD | COMMUNITY
End: 2020-09-28

## 2019-04-24 RX ADMIN — SODIUM CHLORIDE, POTASSIUM CHLORIDE, SODIUM LACTATE AND CALCIUM CHLORIDE: 600; 310; 30; 20 INJECTION, SOLUTION INTRAVENOUS at 07:56

## 2019-04-24 RX ADMIN — KETAMINE HYDROCHLORIDE 100 MG: 50 INJECTION, SOLUTION, CONCENTRATE INTRAMUSCULAR; INTRAVENOUS at 08:01

## 2019-04-24 RX ADMIN — MIDAZOLAM HYDROCHLORIDE 2 MG: 1 INJECTION, SOLUTION INTRAMUSCULAR; INTRAVENOUS at 08:01

## 2019-04-24 RX ADMIN — KETAMINE HYDROCHLORIDE 50 MG: 50 INJECTION, SOLUTION, CONCENTRATE INTRAMUSCULAR; INTRAVENOUS at 08:05

## 2019-04-24 RX ADMIN — LIDOCAINE HYDROCHLORIDE 100 MG: 20 INJECTION, SOLUTION INTRAVENOUS at 08:01

## 2019-04-24 RX ADMIN — SUCCINYLCHOLINE CHLORIDE 30 MG: 20 INJECTION, SOLUTION INTRAMUSCULAR; INTRAVENOUS at 08:08

## 2019-04-24 RX ADMIN — SODIUM CHLORIDE, POTASSIUM CHLORIDE, SODIUM LACTATE AND CALCIUM CHLORIDE: 600; 310; 30; 20 INJECTION, SOLUTION INTRAVENOUS at 07:24

## 2019-04-24 ASSESSMENT — PAIN SCALES - GENERAL: PAIN_LEVEL: 0

## 2019-04-24 NOTE — PROCEDURES
DATE OF SERVICE:  04/24/2019    PROCEDURE PERFORMED:  1.  Esophagogastroduodenoscopy with biopsy.  2.  Colonoscopy.    INSTRUMENTS UTILIZED:  1.  Olympus flexible forward viewing gastroscope.  2.  Olympus adult variable stiffness flexible forward viewing colonoscope.    INDICATIONS:  Patient with iron deficiency anemia.    CONSENT:  Full RBA discussion held prior to the procedure and a signed and   witnessed consent form placed on the chart.    SEDATION/ANESTHESIA:  Provided by Dr. Will.    TOLERANCE:  Good.    LAXATIVE PREPARATION:  MoviPrep.    QUALITY OF PREPARATION:  Fair, adequate.    PATHOLOGY SPECIMENS:  A.  Duodenal biopsy.  B.  Gastric biopsy.  C.  Esophageal biopsy.    COLONOSCOPE WITHDRAWAL TIME:  9 minutes.    PROCEDURAL DETAILS:  After adequate sedation, the Olympus flexible forward   viewing gastroscope was advanced per the oral route into the esophagus.  The   esophageal mucosa was carefully inspected and was unremarkable with the   gastroesophageal junction located 39 cm from the incisors.  There was a 1 cm   segment of salmon pink mucosa extending proximally consistent with possible   short segment Vazquez's esophagus versus an irregular Z line.  The instrument   was passed into the stomach, air was insufflated and the gastric mucosa was   inspected including a retroflexed view of the gastric cardia.  Retroflexion   was taken down and the instrument was passed through the patent pyloric   channel into the duodenum.  The first, second, and third portions of the   duodenum were unremarkable.  Biopsies were obtained from the duodenum to   evaluate for duodenitis/celiac sprue.  The instrument was pulled back into the   stomach.  There was a somewhat atrophic appearance to the gastric antrum with   a few scattered erosions.  There was a single ulceration located in the   lesser curvature in the antrum.  Biopsies were obtained from the margins of   the ulceration and the antrum and fundus.  The instrument  was pulled back into   the esophagus.  Biopsies were obtained from the short segment of salmon pink   mucosa consistent with possible Vazquez's esophagus.    Next, we conducted colonoscopy.  External anal exam was unremarkable.  Digital   rectal exam revealed normal resting anal sphincter tone and no palpable   rectal masses.  The instrument was advanced per the anal canal into the   rectum.  The instrument was carefully advanced to the cecum, which was   identified by the appendiceal orifice and ileocecal valve.  Photo   documentation was completed.  Upon withdrawal of the colonoscope, the mucosa   was carefully inspected.  There was adherent liquid stool, which was opaque   throughout the colon.  This was washed and removed to the best of my   abilities.  Lesions smaller than 5 mm could have been obscured.  In the   rectum, retroflexion was completed, which revealed grade II nonbleeding   internal hemorrhoids.    No immediate complications.    IMPRESSIONS AND FINDINGS:  1.  Gastric ulcerations -- clean based 6 mm gastric ulceration in the antrum.  2.  Gastritis -- suspected.  Biopsy is pending.  3.  Possible short segment Vazquez's esophagus in the distal esophagus.  4.  Iron deficiency -- possibly secondary to gastritis and peptic ulcer.    PLAN AND RECOMMENDATIONS:  1.  Observe for any adverse events from today's procedure.  2.  Await pathology results.  I will plan to contact the patient in 1-2 weeks.  3.  Patient to follow up in clinic with Steven Santa PA-C as planned.  4.  Plan to repeat colonoscopy in about 5 years with an extended laxative   preparation.       ____________________________________     MD BRIAN RAMIREZ / JR    DD:  04/24/2019 08:49:08  DT:  04/24/2019 09:10:45    D#:  [id number]  Job#:  [id number]    cc: LANETTE LLOYD MD, Steven Santa PA-C, Jeff Faust

## 2019-04-24 NOTE — PROGRESS NOTES
Med rec updated and complete  Allergies reviewed  Pt had a list of medications, went over list of medications and returned list of medications back to pt at bedside.  Pt reports no antibiotics in the last 30 days.

## 2019-04-24 NOTE — ANESTHESIA PROCEDURE NOTES
Airway  Date/Time: 4/24/2019 8:08 AM  Performed by: LANETTE LLOYD  Authorized by: LANETTE LLOYD     Location:  OR  Urgency:  Emergent  Consent Given By:  Patient  Patient Identity Confirmed by:  Verbally with patient  Indications for Airway Management:  Anesthesia and airway protection  Spontaneous Ventilation: absent    Sedation Level:  Deep  Preoxygenated: Yes    Final Airway Type:  Supraglottic airway  Final Supraglottic Airway:  Standard LMA  SGA Size:  4  Number of Attempts at Approach:  1  Ventilation Between Attempts:  BVM  Number of Other Approaches Attempted:  0   Converted from Mask airway to LMA.

## 2019-04-24 NOTE — ANESTHESIA PREPROCEDURE EVALUATION
Relevant Problems   (+) Acute hypoxemic respiratory failure (HCC)   (+) Anemia   (+) Diabetes type 2, controlled (HCC)   (+) Essential hypertension   (+) HLD (hyperlipidemia)   (+) Morbid obesity (HCC)   (+) Morbid obesity with BMI of 50.0-59.9, adult (HCC)   (+) ABELARDO (obstructive sleep apnea)   (+) Peripheral edema   (+) Shortness of breath       Physical Exam    Airway   Mallampati: IV  TM distance: <3 FB  Neck ROM: full       Cardiovascular   Rhythm: regular  Rate: normal     Dental - normal exam         Pulmonary   Breath sounds clear to auscultation     Abdominal   Abdomen: soft     Neurological              Anesthesia Plan    ASA 3   ASA physical status 3 criteria: morbid obesity - BMI greater than or equal to 40    Plan - MAC and general       Airway plan will be mask        Induction: intravenous          Informed Consent:    Anesthetic plan and risks discussed with patient.    Use of blood products discussed with: patient whom.

## 2019-04-24 NOTE — ANESTHESIA POSTPROCEDURE EVALUATION
Patient: Rashi Shirley    Procedure Summary     Date:  04/24/19 Room / Location:  [facility] OR 07 / SURGERY [facility]    Anesthesia Start:  0756 Anesthesia Stop:  0836    Procedures:       COLONOSCOPY (N/A Mouth)      GASTROSCOPY (N/A Anus) Diagnosis:  (gastric ulcers)    Surgeon:  Rashi Goldberg M.D. Responsible Provider:  Ernesto Will M.D.    Anesthesia Type:  MAC, general ASA Status:  3          Final Anesthesia Type: MAC, general  Last vitals  BP   Blood Pressure: 142/94    Temp   36.3 °C (97.4 °F)    Pulse   Pulse: 73   Resp   16    SpO2   95 %      Anesthesia Post Evaluation    Patient location during evaluation: bedside  Patient participation: complete - patient participated  Level of consciousness: anxious  Pain score: 0    Airway patency: patent  Anesthetic complications: no  Cardiovascular status: adequate  Respiratory status: acceptable  Hydration status: acceptable    PONV: none

## 2019-04-24 NOTE — DISCHARGE INSTRUCTIONS
ACTIVITY: Rest and take it easy for the first 24 hours.  A responsible adult is recommended to remain with you during that time.  It is normal to feel sleepy.  We encourage you to not do anything that requires balance, judgment or coordination.    MILD FLU-LIKE SYMPTOMS ARE NORMAL. YOU MAY EXPERIENCE GENERALIZED MUSCLE ACHES, THROAT IRRITATION, HEADACHE AND/OR SOME NAUSEA.    FOR 24 HOURS DO NOT:  Drive, operate machinery or run household appliances.  Drink beer or alcoholic beverages.   Make important decisions or sign legal documents.    SPECIAL INSTRUCTIONS:   Gastroscopy- You’ll likely feel drowsy after the test. A mild sore throat and mild gas and bloating are normal. Once home, follow any instructions you have been given. If you had sedation, do not drive, operate machinery, or make major decisions until the next day.    Colonoscopy- You may have gas right after the test. It can help to try to pass it to help prevent later bloating. Your healthcare provider may discuss the results with you right away. Or you may need to schedule a follow-up visit to talk about the results. After the test, you can go back to your normal eating and other activities. You may be tired from the sedation and need to rest for a few hours. Discuss your medications with your provider to understand if your medicines can be restarted right away or not.    DIET: To avoid nausea, slowly advance diet as tolerated, avoiding spicy or greasy foods for the first day.  Add more substantial food to your diet according to your physician's instructions. INCREASE FLUIDS AND FIBER TO AVOID CONSTIPATION.    SURGICAL DRESSING/BATHING: n/a    FOLLOW-UP APPOINTMENT:  A follow-up appointment should be arranged with your doctor; Dr. Goldberg [phone]  Dr. Goldberg will arrange for urea breath test and capsule endoscopy in his clinic*.    You should CALL YOUR PHYSICIAN if you develop:  Fever greater than 101 degrees F.  Pain not relieved by  medication, or persistent nausea or vomiting.  Excessive bleeding (blood soaking through dressing) or unexpected drainage from the wound.  Extreme redness or swelling around the incision site, drainage of pus or foul smelling drainage.  Inability to urinate or empty your bladder within 8 hours.  Problems with breathing or chest pain.    You should call 911 if you develop problems with breathing or chest pain.  If you are unable to contact your doctor or surgical center, you should go to the nearest emergency room or urgent care center.  Physician's telephone #: *Dr. Goldberg [phone]**    If any questions arise, call your doctor.  If your doctor is not available, please feel free to call the Surgical Center at [phone].  The Center is open Monday through Friday from 7AM to 7PM.  You can also call the [employer] HOTLINE open 24 hours/day, 7 days/week and speak to a nurse at [phone], or toll free at [phone].    A registered nurse may call you a few days after your surgery to see how you are doing after your procedure.    MEDICATIONS: Resume taking daily medication.  Take prescribed pain medication with food.  If no medication is prescribed, you may take non-aspirin pain medication if needed.  PAIN MEDICATION CAN BE VERY CONSTIPATING.  Take a stool softener or laxative such as senokot, pericolace, or milk of magnesia if needed.    If your physician has prescribed pain medication that includes Acetaminophen (Tylenol), do not take additional Acetaminophen (Tylenol) while taking the prescribed medication.    Depression / Suicide Risk    As you are discharged from this [facility] facility, it is important to learn how to keep safe from harming yourself.    Recognize the warning signs:  · Abrupt changes in personality, positive or negative- including increase in energy   · Giving away possessions  · Change in eating patterns- significant weight changes-  positive or negative  · Change in sleeping  patterns- unable to sleep or sleeping all the time   · Unwillingness or inability to communicate  · Depression  · Unusual sadness, discouragement and loneliness  · Talk of wanting to die  · Neglect of personal appearance   · Rebelliousness- reckless behavior  · Withdrawal from people/activities they love  · Confusion- inability to concentrate     If you or a loved one observes any of these behaviors or has concerns about self-harm, here's what you can do:  · Talk about it- your feelings and reasons for harming yourself  · Remove any means that you might use to hurt yourself (examples: pills, rope, extension cords, firearm)  · Get professional help from the community (Mental Health, Substance Abuse, psychological counseling)  · Do not be alone:Call your Safe Contact- someone whom you trust who will be there for you.  · Call your local CRISIS HOTLINE 110-8620 or 220-867-6950  · Call your local Children's Mobile Crisis Response Team [place] (423) 372-8112 or www.[employer]  · Call the toll free National Suicide Prevention Hotlines   · National Suicide Prevention Lifeline 863-078-QAHX (1251)  · National Hope Line Network 800-SUICIDE (380-7522)

## 2019-04-24 NOTE — ANESTHESIA TIME REPORT
Anesthesia Start and Stop Event Times     Date Time Event    4/24/2019 0756 Anesthesia Start     0836 Anesthesia Stop        Responsible Staff  04/24/19    Name Role Begin End    Ernesto Will M.D. Anesth 0756 0836        Preop Diagnosis (Free Text):  Pre-op Diagnosis     SCREENING FO COLONIC NEOPLASIA, IRON DEFICIENCY ANEMIA         Preop Diagnosis (Codes):  Diagnosis Information     Diagnosis Code(s):         Post op Diagnosis  GI bleed      Premium Reason  Non-Premium    Comments:

## 2019-04-24 NOTE — OR NURSING
Called and left a message for patient's sister Abiola to come pick patient up, proceeding to d/c. Report called to [facility]. Patient on baseline 2L O2. No complaints of pain or nausea. Patient dozing intermittently. Patient states all needs are currently met, ready to proceed to d/c.

## 2019-04-24 NOTE — OR SURGEON
Immediate Post OP Note    PreOp Diagnosis: iron def anemia    PostOp Diagnosis: iron def anemia    Procedure(s):  COLONOSCOPY - Wound Class: Clean Contaminated  GASTROSCOPY - Wound Class: Clean Contaminated    Surgeon(s):  Rashi Goldberg M.D.    Anesthesiologist/Type of Anesthesia:  Anesthesiologist: Ernesto Will M.D./MARVIN    Surgical Staff:  Circulator: Jerel Serna R.N.  Endoscopy Technician: Didi Stinson    Specimens removed if any:  ID Type Source Tests Collected by Time Destination   A :  Tissue Duodenum PATHOLOGY SPECIMEN Rashi Goldberg M.D. 4/24/2019  8:02 AM    B :  Tissue Gastric PATHOLOGY SPECIMEN Rashi Goldberg M.D. 4/24/2019  8:10 AM    C :  Tissue Esophagus PATHOLOGY SPECIMEN Rashi Goldberg M.D. 4/24/2019  8:10 AM        Estimated Blood Loss: < 5 cc    Findings: gastric ulcer, clean based.  Gastritis, suspected.  Possible short segment stroud's.  Colonoscopy with limited prep but normal.     Complications: no immediate.         4/24/2019 8:44 AM Rashi Goldberg M.D.

## 2019-04-24 NOTE — OR NURSING
Patient's sister Abiola called and provided with update. RN to call sister for ride when patient ready for d/c

## 2019-04-24 NOTE — ANESTHESIA QCDR
2019 Qualified Clinical Data Registry (for Quality Improvement)     Postoperative nausea/vomiting risk protocol (Adult = 18 yrs and Pediatric 3-17 yrs)- (430 and 463)  General inhalation anesthetic (NOT TIVA) with PONV risk factors: Yes  Provision of anti-emetic therapy with at least 2 different classes of agents: No   Patient DID NOT receive anti-emetic therapy and reason is documented in Medical Record: No       Multimodal Pain Management- (AQI59)  Patient undergoing Elective Surgery (i.e. Outpatient, or ASC, or Prescheduled Surgery prior to Hospital Admission): No  Use of Multimodal Pain Management, two or more drugs and/or interventions, NOT including systemic opioids: N/A  Exception: Documented allergy to multiple classes of analgesics: N/A    PACU assessment of acute postoperative pain prior to Anesthesia Care End- Applies to Patients Age = 18- (ABG7)  Initial PACU pain score is which of the following: < 7/10  Patient unable to report pain score: N/A    Post-anesthetic transfer of care checklist/protocol to PACU/ICU- (426 and 427)  Upon conclusion of case, patient transferred to which of the following locations: PACU/Non-ICU  Use of transfer checklist/protocol: Yes  Exclusion: Service Performed in Patient Hospital Room (and thus did not require transfer): N/A    PACU Reintubation- (AQI31)  General anesthesia requiring endotracheal intubation (ETT) along with subsequent extubation in OR or PACU: No  Required reintubation in the PACU: N/A  Extubation was a planned trial documented in the medical record prior to removal of the original airway device: N/A    Unplanned admission to ICU related to anesthesia service up through end of PACU care- (MD51)  Unplanned admission to ICU (not initially anticipated at anesthesia start time): No

## 2019-04-24 NOTE — OR NURSING
0924- Patient arrived in PACU II alert and oriented. Vital signs are within normal limits for the patient. Patient reports pain 0/10. Discussed discharge plan of care and patient expressed understanding. All needs met at this time.    0945- Provided patient with discharge education with sister, Abiola, at bedside. All questions answered.    0950- Patient feels ready to be discharged and meets discharge criteria set by Dr. Goldberg. Patient is going to get dressed.    1003- PIV removed and patient discharged home via wheelchair.

## 2019-06-18 ENCOUNTER — TELEPHONE (OUTPATIENT)
Dept: PULMONOLOGY | Facility: HOSPICE | Age: 59
End: 2019-06-18

## 2019-06-18 NOTE — TELEPHONE ENCOUNTER
pt has failed complaince per [facility] - see scanned in attached compliance DL showing only 1 day of use- please document reason pt failed, that pt wants to continue with treatment and have pt schedule titration study   once completed does not need results appt - just an order with records sent to [facility]  pt will need a new compliance follow up dated atleast 30 days after new study

## 2019-06-21 ENCOUNTER — APPOINTMENT (OUTPATIENT)
Dept: SLEEP MEDICINE | Facility: MEDICAL CENTER | Age: 59
End: 2019-06-21
Payer: MEDICARE

## 2019-06-24 ENCOUNTER — SLEEP CENTER VISIT (OUTPATIENT)
Dept: SLEEP MEDICINE | Facility: MEDICAL CENTER | Age: 59
End: 2019-06-24
Payer: MEDICARE

## 2019-06-24 VITALS
HEIGHT: 70 IN | WEIGHT: 315 LBS | RESPIRATION RATE: 18 BRPM | BODY MASS INDEX: 45.1 KG/M2 | HEART RATE: 86 BPM | DIASTOLIC BLOOD PRESSURE: 70 MMHG | SYSTOLIC BLOOD PRESSURE: 120 MMHG | OXYGEN SATURATION: 91 %

## 2019-06-24 DIAGNOSIS — G47.34 NOCTURNAL HYPOXEMIA: ICD-10-CM

## 2019-06-24 DIAGNOSIS — G47.10 HYPERSOMNOLENCE: ICD-10-CM

## 2019-06-24 DIAGNOSIS — I10 ESSENTIAL HYPERTENSION: ICD-10-CM

## 2019-06-24 DIAGNOSIS — G47.33 OBSTRUCTIVE SLEEP APNEA SYNDROME: ICD-10-CM

## 2019-06-24 DIAGNOSIS — J96.11 CHRONIC HYPOXEMIC RESPIRATORY FAILURE (HCC): ICD-10-CM

## 2019-06-24 PROCEDURE — 99214 OFFICE O/P EST MOD 30 MIN: CPT | Performed by: INTERNAL MEDICINE

## 2019-06-27 NOTE — PROGRESS NOTES
CC: Sleep apnea hypopnea syndrome.     HPI:   Mr. Shirley was last evaluated on March 22, 2009. He has chronic hypoxemic respiratory failure requiring continuous domiciliary oxygen and attributed to obesity hypoventilation.  He has a 12-year history of obstructive sleep apnea hypopnea syndrome.  Polysomnography on January 29, 2019 suggested an apnea hypopnea index of 101.9 events per hour with a lowest arterial oxygen saturation of 50% on room air.  In that split-night study there was no effective response to CPAP or BiPAP therapy in the pressure range is used.    A follow-up titration polysomnogram demonstrated a good response to BiPAP at 22/18 cm of water pressure with an apnea hypopnea index of 4.9 events per hour and a stage that included REM sleep and supine body position time.  BiPAP with a mean saturation of 87% on room air and a minimum of 76%.  Oxygen was titrated up to 3 L/min where the mean saturation [place] to 91% with a minimum of 86%.    BiPAP was prescribed a 22/18 cmH2O pressure with oxygen at 4 L/min and a Dreamware nasal mask using a large frame and medium cushions.  He did receive the machine but developed intolerable nasal stuffiness after 1 day of use and has not been able to use the BiPAP machine since that time.  He remains quite tired during the day.  We have talked about treatment for nasal congestion and mask refitting and he believes that he will be able to tolerate BiPAP.  He would like to resume therapy.     He has a fairly regular sleep schedule with about 7 hours of sleep time nightly.  He remains tired during the day and naps on a regular basis.           Patient Active Problem List    Diagnosis Date Noted   • Shortness of breath 09/26/2018     Priority: High   • Peripheral edema 09/26/2018     Priority: Medium   • Morbid obesity (HCC) 09/26/2018     Priority: Medium   • HLD (hyperlipidemia) 09/26/2018     Priority: Low   • Morbid obesity with BMI of 50.0-59.9, adult (HCC) 01/04/2019    • Essential hypertension 01/04/2019   • Diabetes type 2, controlled ([facility]) 01/04/2019   • ABELARDO (obstructive sleep apnea) 09/30/2018   • Acute hypoxemic respiratory failure ([facility]) 09/26/2018   • Anemia 09/26/2018       Past Medical History:   Diagnosis Date   • Chickenpox    • Diabetes ([facility])     borderline   • [place] measles    • Hyperlipidemia    • Mumps    • Obesity    • Sleep apnea    • Tonsillitis        Past Surgical History:   Procedure Laterality Date   • COLONOSCOPY N/A 4/24/2019    Procedure: COLONOSCOPY;  Surgeon: Rashi Goldberg M.D.;  Location: SURGERY [facility];  Service: Gastroenterology   • GASTROSCOPY-ENDO N/A 4/24/2019    Procedure: GASTROSCOPY;  Surgeon: Rashi Goldberg M.D.;  Location: SURGERY [facility];  Service: Gastroenterology   • OTHER ORTHOPEDIC SURGERY      bilateral knee surgery       Family History   Problem Relation Age of Onset   • Diabetes Mother    • Diabetes Father    • Sleep Apnea Neg Hx        Social History     Social History   • Marital status: Single     Spouse name: N/A   • Number of children: N/A   • Years of education: N/A     Occupational History   • Not on file.     Social History Main Topics   • Smoking status: Never Smoker   • Smokeless tobacco: Never Used   • Alcohol use Yes      Comment: occasional   • Drug use: No   • Sexual activity: Not on file     Other Topics Concern   • Not on file     Social History Narrative   • No narrative on file       Current Outpatient Prescriptions   Medication Sig Dispense Refill   • aspirin EC (ECOTRIN) 81 MG Tablet Delayed Response Take 81 mg by mouth every day.     • furosemide (LASIX) 20 MG Tab Take 20 mg by mouth every day.     • potassium chloride SA (K-DUR) 10 MEQ Tab CR Take 10 mEq by mouth every day.     • metFORMIN (GLUCOPHAGE) 500 MG Tab Take 500 mg by mouth 2 times a day, with meals.     • terbinafine (LAMISIL) 250 MG Tab Take 250 mg by mouth every day.     • ferrous sulfate 325 (65 Fe) MG tablet Take 325 mg by  "mouth every day.     • folic acid (FOLVITE) 1 MG Tab Take 1 mg by mouth every day.     • atorvastatin (LIPITOR) 20 MG Tab Take 20 mg by mouth every day.     • HYDROcodone/acetaminophen (NORCO)  MG Tab Take 0.5-1 Tabs by mouth 3 times a day.     • lisinopril (PRINIVIL) 20 MG Tab Take 20 mg by mouth every day.     • ascorbic acid (VITAMIN C) 500 MG tablet Take 1 Tab by mouth every day. 30 Tab 2     No current facility-administered medications for this visit.     \"CURRENT RX\"      Allergies: Patient has no known allergies.      ROS  Unchanged from prior.      Physical Exam:   /70 (BP Location: Right arm, Patient Position: Sitting, BP Cuff Size: Large adult)   Pulse 86   Resp 18   Ht 1.778 m (5' 10\")   Wt (!) 162.4 kg (358 lb)   SpO2 91% Comment: 2LPM  BMI 51.37 kg/m²    Head and neck examination demonstrates no mucosal lesion, purulent drainage or evident polyps. The pharynx is benign with a Mallampati III presentation. The neck is supple without thyromegaly. On chest examination there are symmetrical bilateral breath sounds without rales, wheezing or consolidation. On cardiac examination, the apical impulse and heart sounds are normal and the rhythm is regular. There is no murmur, gallop or rub and no jugular venous distention. The abdomen is soft with active bowel sounds and no palpable hepatosplenomegaly, mass, guarding or rebound. The extremities show no clubbing, cyanosis or edema and no signs of deep venous thrombosis. There is no warmth, redness, tenderness or palpable venous cord in the calves. The skin is clear, warm and dry. There is no unusual peripheral lymphadenopathy. Peripheral pulses are palpable in all 4 extremities. On neurologic examination, cranial nerve function is intact, motor tone is symmetrical, and the patient is alert, oriented and responsive.       Problems:  1. Obstructive sleep apnea syndrome  He has very severe obstructive sleep apnea hypopnea with an apnea hypopnea " index of 101.9 events per hour and a lowest arterial oxygen saturation of 50% on room air.  In the recent titration polysomnogram he did well on high level BiPAP but had persisting hypoxemia at optimal BiPAP pressures with an apnea hypopnea index less than 5 events per hour, requiring the addition of supplemental oxygen to optimal BiPAP.  Effective treatment is required and only to improve daytime alertness but also to reduce cardiac and neurologic risks associated with untreated severe sleep disordered breathing.  He developed intolerable nasal congestion with the Dreamware nasal mask but he and I both believe that he will be able to acclimate to CPAP with better mask fitting and treatment for nasal congestion.  He would like to resume treatment, particularly given his persisting daytime somnolence.    2. Hypersomnolence  Related primarily to the sleep disordered breathing.  He also has a somewhat restricted nightly sleep time.    3. Nocturnal hypoxemia  Confirmed by polysomnography and requiring treatment with BiPAP and supplemental oxygen.    4. Chronic hypoxemic respiratory failure (HCC)  He remains on continuous domiciliary oxygen.    5. Essential hypertension  Controlled with a blood pressure today of 120/70 mmHg.    6. BMI 50.0-59.9, adult (HCC)  We have again discussed the role of weight management and treatment of sleep disordered breathing and the ways in which that might be accomplished.  - Height And Weight      Plan:   1.  As he was not fully compliant with the BiPAP therapy I am told that repeat polysomnographic titration will be required.  A titration polysomnogram is ordered and it is quite likely that he will require supplemental oxygen in addition to optimal BiPAP.    2.  After the test resume BiPAP at 22/18 cmH2O pressure, with oxygen of 4 L/min, or as a new titration study suggests.  He will be refitted with a different mask on the night of that study.      3.  We have talked again about fully  acclimating to the BiPAP with proper mask fit, effective humidification and consistent use.  We have again reviewed the principles of sleep hygiene with the goal of at least 7.5 hours of sleep nightly.    4.  I suggested the use of a fluticasone nasal inhaler 2 sniffs in each side daily to reduce the nasal congestion.    5.  Return visit here in 3 months, bringing the data recording chip with him, or sooner if new problems develop.  I have emphasized that he may drop into the technician clinic at any time for assistance, particularly for mask issues.    We appreciate the opportunity to assist in his care.  Return in about 3 months (around 9/24/2019).

## 2019-07-10 ENCOUNTER — SLEEP STUDY (OUTPATIENT)
Dept: SLEEP MEDICINE | Facility: MEDICAL CENTER | Age: 59
End: 2019-07-10
Attending: INTERNAL MEDICINE
Payer: MEDICARE

## 2019-07-10 DIAGNOSIS — G47.34 NOCTURNAL HYPOXEMIA: ICD-10-CM

## 2019-07-10 DIAGNOSIS — G47.33 OBSTRUCTIVE SLEEP APNEA SYNDROME: ICD-10-CM

## 2019-07-10 DIAGNOSIS — J96.11 CHRONIC HYPOXEMIC RESPIRATORY FAILURE (HCC): ICD-10-CM

## 2019-07-10 PROCEDURE — 95811 POLYSOM 6/>YRS CPAP 4/> PARM: CPT | Performed by: INTERNAL MEDICINE

## 2019-07-11 NOTE — PROCEDURES
Comments:  The patient underwent a BIPAP titration using the standard montage for measurement of parameters of sleep, respiratory events, movement abnormalities, and heart rate and rhythm.    A microphone was used to monitor snoring.    Analysis:  Study start time was 10:06:11 PM.  Diagnostic recording time was 7h 1.5m with a total sleep time of 6h 18.0m resulting in a sleep efficiency of 89.68%%.    Sleep latency from the start of the study was 13 minutes and the latency from sleep to REM was 20 minutes.  In total,61  arousals were scored for an arousal index of 9.7.  Respiratory:  There were a total of 10 apneas consisting of 3 obstructive apneas, 0 mixed apneas, and 7 central apneas.  A total of 95 hypopneas were scored.  The apnea index was 1.59 per hour and the hypopnea index was 15.08 per hour resulting in an overall AHI of 16.67.  AHI during rem was 21.5 and AHI while supine was 17.22.  Oximetry:  There was a mean oxygen saturation of 82.0% with a minimum oxygen saturation of 60.0%.  Time spent with oxygen saturations below 89% was 371.4 minutes.  Cardiac:  The highest heart rate seen while awake was 91 BPM while the highest heart rate during sleep was 91 BPM with an average sleeping heart rate of 68 BPM.  Limb Movements:  There were a total of 46 PLMs during sleep, of which 12 were PLMS arousals.  This resulted in a PLMS index of 7.3 and a PLMS arousal index of 1.9.       Interpretation:    Mr. Shirley has a long history of sleep apnea hypopnea syndrome.  Previous polysomnography demonstrated an apnea hypopnea index of 101.9 events per hour with a lowest arterial oxygen saturation of 50% on room air.  He was subsequently titrated to BiPAP at 22/18 cmH2O where the apnea hypopnea index was 4.9 events per hour but he remained hypoxemic, requiring the addition of supplemental oxygen.  He was not fully adherent to the BiPAP therapy and at this study was designed to both re-titrate treatment and to requalify  for therapy.    There is mild fragmentation of sleep with increased wake after sleep onset time but almost 2 hours of REM sleep time is included.  There are periodic limb movements and the periodic limb movement with arousal index is 4.1 events per hour.    BiPAP was applied at 10-23/6-19 cm water pressure.  The higher pressures were used to address persisting hypopnea events.  Only rare central apneas were identified.  In the final pressure stage the apnea hypopnea index was 0 and that stage included 21 minutes of non-REM sleep but no REM sleep time.  The penultimate stage in the titration did include 11 minutes of REM sleep time with an apnea hypopnea index of 6 events per hour.  Those stages were done in the supine body position.  Hypoxemia persisted despite optimal BiPAP therapy.  In the final pressure stage, with an apnea hypopnea index of 0, the mean arterial oxygen saturation was 87% with a minimum of 77% on room air.  Supplemental oxygen at 2 L/min did not fully resolve the hypoxemia.    Assessment:  This study demonstrates a good response to BiPAP therapy in a patient with previously demonstrated very severe sleep apnea hypopnea.  There is persistent hypoxemia on optimal BiPAP therapy requiring the addition of supplemental oxygen.  The BiPAP pressure settings and oxygen requirement are similar to those seen in the most recent previous titration polysomnogram.    Recommendations:  BiPAP at 23/19 cm water pressure with oxygen bleed in at 4 L/min.  He did best with a large air fit F 20 full facemask.  Nocturnal oximetry on BiPAP and oxygen might be considered after the patient has acclimated to therapy to confirm preservation of arterial oxygen saturation on treatment.

## 2019-07-23 ENCOUNTER — TELEPHONE (OUTPATIENT)
Dept: PULMONOLOGY | Facility: HOSPICE | Age: 59
End: 2019-07-23

## 2019-07-23 DIAGNOSIS — G47.33 OSA (OBSTRUCTIVE SLEEP APNEA): ICD-10-CM

## 2019-09-19 ENCOUNTER — HOSPITAL ENCOUNTER (OUTPATIENT)
Dept: RADIOLOGY | Facility: MEDICAL CENTER | Age: 59
End: 2019-09-19
Attending: PHYSICIAN ASSISTANT
Payer: MEDICARE

## 2019-09-19 DIAGNOSIS — E61.1 LOW IRON: ICD-10-CM

## 2019-09-19 DIAGNOSIS — D50.9 IRON DEFICIENCY ANEMIA, UNSPECIFIED IRON DEFICIENCY ANEMIA TYPE: ICD-10-CM

## 2019-09-19 PROCEDURE — 74018 RADEX ABDOMEN 1 VIEW: CPT

## 2019-10-22 ENCOUNTER — SLEEP CENTER VISIT (OUTPATIENT)
Dept: SLEEP MEDICINE | Facility: MEDICAL CENTER | Age: 59
End: 2019-10-22
Payer: MEDICARE

## 2019-10-22 VITALS
HEART RATE: 71 BPM | OXYGEN SATURATION: 94 % | HEIGHT: 70 IN | DIASTOLIC BLOOD PRESSURE: 84 MMHG | SYSTOLIC BLOOD PRESSURE: 132 MMHG | WEIGHT: 315 LBS | BODY MASS INDEX: 45.1 KG/M2

## 2019-10-22 DIAGNOSIS — G47.33 OSA (OBSTRUCTIVE SLEEP APNEA): ICD-10-CM

## 2019-10-22 DIAGNOSIS — G47.36 SLEEP RELATED HYPOVENTILATION IN CONDITIONS CLASSIFIED ELSEWHERE: ICD-10-CM

## 2019-10-22 DIAGNOSIS — G47.33 OSA TREATED WITH BIPAP: ICD-10-CM

## 2019-10-22 PROCEDURE — 99213 OFFICE O/P EST LOW 20 MIN: CPT | Performed by: INTERNAL MEDICINE

## 2019-10-22 ASSESSMENT — ENCOUNTER SYMPTOMS
PSYCHIATRIC NEGATIVE: 1
GASTROINTESTINAL NEGATIVE: 1
CARDIOVASCULAR NEGATIVE: 1
SHORTNESS OF BREATH: 1
EYES NEGATIVE: 1
NEUROLOGICAL NEGATIVE: 1

## 2019-10-22 NOTE — PROGRESS NOTES
"Sleep Clinic follow up    Date of Service: 10/22/2019    Reason for follow up:  Follow-Up (ABELARDO. Last seen 06/24/19) and Results (SS Titration 07/10/19)      Problem List Items Addressed This Visit     ABELARDO (obstructive sleep apnea)      Severe sleep apnea .9  _x___ Discussed the pathophysiology of sleep disordered breathing including risks for hypertension, heart attack, and stroke   Also discussed treatment options including CPAP, an oral appliance, and surgical intervention.   __x___ Discussed weight control program with goal of achieving and maintaining ideal body weight.   __x___ Polysomnography was discussed and ordered BIPAP 23/19 4l/min bleedin oxygen  ___X       follow up 4-6 week sfater starting bIPAP  __x___Discussed weight control programs with goal of achieving and maintaining ideal body weight   __x___Discussed the diagnosis , management and pathophysiology ABELARDO   _x___Discussed the risks associated with driving and operating equipment while drowsy. The patient verbalized an understanding of this and agreed to take appropriate measures to eliminate these risks.   __x___Discussed the cardiovascular and neuropsychiatric risks of untreated ABELARDO; including but not limited to: HTN, DM, MI, ASCVD, CVA, CHF, traffic accidents   __x___ Discussed positive airway pressure (PAP) as the preferred therapy for severe ABELARDO;          Sleep related hypoventilation in conditions classified elsewhere     Not corrected with PAP therapy alone and needing 4l/min bleed in oxygen  Patient is on day time oxygen for his obesity hypoventilation syndrome           Other Visit Diagnoses     ABELARDO treated with BiPAP        Relevant Orders    DME BiPAP            History of Present Illness: Rashi Shirley is a 59 y.o. male    Last seen by Dr. Jang 6/24/19  \"chronic hypoxemic respiratory failure requiring continuous domiciliary oxygen and attributed to obesity hypoventilation.  He has a 12-year history of obstructive " "sleep apnea hypopnea syndrome.  Polysomnography on January 29, 2019 suggested an apnea hypopnea index of 101.9 events per hour with a lowest arterial oxygen saturation of 50% on room air.  Developed nasal congestions after starting BIPAP 22/18    7/10/19 titration study:    \"BiPAP at 23/19 cm water pressure with oxygen bleed in at 4 L/min.  He did best with a large air fit F 20 full facemask.\"    Review of Systems   Constitutional: Positive for malaise/fatigue.   HENT: Negative.    Eyes: Negative.    Respiratory: Positive for shortness of breath.    Cardiovascular: Negative.    Gastrointestinal: Negative.    Genitourinary: Negative.    Musculoskeletal: Positive for joint pain.   Skin: Negative.    Neurological: Negative.    Endo/Heme/Allergies: Negative.    Psychiatric/Behavioral: Negative.        Current Outpatient Medications on File Prior to Visit   Medication Sig Dispense Refill   • aspirin EC (ECOTRIN) 81 MG Tablet Delayed Response Take 81 mg by mouth every day.     • furosemide (LASIX) 20 MG Tab Take 20 mg by mouth every day.     • potassium chloride SA (K-DUR) 10 MEQ Tab CR Take 10 mEq by mouth every day.     • metFORMIN (GLUCOPHAGE) 500 MG Tab Take 500 mg by mouth 2 times a day, with meals.     • terbinafine (LAMISIL) 250 MG Tab Take 250 mg by mouth every day.     • ferrous sulfate 325 (65 Fe) MG tablet Take 325 mg by mouth every day.     • folic acid (FOLVITE) 1 MG Tab Take 1 mg by mouth every day.     • atorvastatin (LIPITOR) 20 MG Tab Take 20 mg by mouth every day.     • HYDROcodone/acetaminophen (NORCO)  MG Tab Take 0.5-1 Tabs by mouth 3 times a day.     • lisinopril (PRINIVIL) 20 MG Tab Take 20 mg by mouth every day.     • ascorbic acid (VITAMIN C) 500 MG tablet Take 1 Tab by mouth every day. 30 Tab 2     No current facility-administered medications on file prior to visit.        Social History     Tobacco Use   • Smoking status: Never Smoker   • Smokeless tobacco: Never Used   Substance Use " "Topics   • Alcohol use: Yes     Comment: occasional   • Drug use: No        Past Medical History:   Diagnosis Date   • Chickenpox    • Diabetes (HCC)     borderline   • Malay measles    • Hyperlipidemia    • Mumps    • Obesity    • Sleep apnea    • Tonsillitis        Past Surgical History:   Procedure Laterality Date   • COLONOSCOPY N/A 4/24/2019    Procedure: COLONOSCOPY;  Surgeon: Rashi Goldberg M.D.;  Location: SURGERY [facility];  Service: Gastroenterology   • GASTROSCOPY-ENDO N/A 4/24/2019    Procedure: GASTROSCOPY;  Surgeon: Rashi Goldberg M.D.;  Location: SURGERY [facility];  Service: Gastroenterology   • OTHER ORTHOPEDIC SURGERY      bilateral knee surgery       Allergies: Patient has no known allergies.    Family History   Problem Relation Age of Onset   • Diabetes Mother    • Diabetes Father    • Sleep Apnea Neg Hx        Vitals:    10/22/19 1025 10/22/19 1032   Height:  1.778 m (5' 10\")   Weight:  (!) 160.7 kg (354 lb 6 oz)   Weight % change since last entry.:  0 %   BP:  132/84   Pulse:  71   BMI (Calculated):  50.85   O2 sat % on O2: 94 %    O2 Flow Rate (L/min): 4        Physical Examination  Physical Exam   Constitutional: He is oriented to person, place, and time and well-developed, well-nourished, and in no distress. No distress.   HENT:   Head: Normocephalic and atraumatic.   Right Ear: External ear normal.   Left Ear: External ear normal.   Nose: Nose normal.   Mouth/Throat: Oropharynx is clear and moist. No oropharyngeal exudate.   Crowded oropharynx   Eyes: Pupils are equal, round, and reactive to light. Conjunctivae and EOM are normal.   Neck: Normal range of motion. Neck supple. No JVD present. No tracheal deviation present. No thyromegaly present.   Cardiovascular: Normal rate, regular rhythm, normal heart sounds and intact distal pulses. Exam reveals no gallop and no friction rub.   No murmur heard.  Pulmonary/Chest: Effort normal and breath sounds normal. No stridor. No " respiratory distress. He has no wheezes. He has no rales. He exhibits no tenderness.   Abdominal: Soft. Bowel sounds are normal. He exhibits no distension and no mass.   Musculoskeletal: Normal range of motion. He exhibits no edema or deformity.   Lymphadenopathy:     He has no cervical adenopathy.   Neurological: He is alert and oriented to person, place, and time. No cranial nerve deficit. He exhibits normal muscle tone. Gait normal. Coordination normal. GCS score is 15.   Skin: No rash noted. He is not diaphoretic.   Psychiatric: Mood, memory, affect and judgment normal.          Susan De La Garza MD MPH [facility]  [facility]

## 2019-10-22 NOTE — ASSESSMENT & PLAN NOTE
Severe sleep apnea .9  _x___ Discussed the pathophysiology of sleep disordered breathing including risks for hypertension, heart attack, and stroke   Also discussed treatment options including CPAP, an oral appliance, and surgical intervention.   __x___ Discussed weight control program with goal of achieving and maintaining ideal body weight.   __x___ Polysomnography was discussed and ordered BIPAP 23/19 4l/min bleedin oxygen  ___X       follow up 4-6 week sfater starting bIPAP  __x___Discussed weight control programs with goal of achieving and maintaining ideal body weight   __x___Discussed the diagnosis , management and pathophysiology ABELARDO   _x___Discussed the risks associated with driving and operating equipment while drowsy. The patient verbalized an understanding of this and agreed to take appropriate measures to eliminate these risks.   __x___Discussed the cardiovascular and neuropsychiatric risks of untreated ABELARDO; including but not limited to: HTN, DM, MI, ASCVD, CVA, CHF, traffic accidents   __x___ Discussed positive airway pressure (PAP) as the preferred therapy for severe ABELARDO;

## 2019-10-22 NOTE — ASSESSMENT & PLAN NOTE
Not corrected with PAP therapy alone and needing 4l/min bleed in oxygen  Patient is on day time oxygen for his obesity hypoventilation syndrome

## 2020-07-30 ENCOUNTER — TELEPHONE (OUTPATIENT)
Dept: CARDIOLOGY | Facility: MEDICAL CENTER | Age: 60
End: 2020-07-30

## 2020-07-30 NOTE — TELEPHONE ENCOUNTER
Spoke with patient about upcoming appointment with  8-4-2020. Patient stated he saw a cardiologist about 4 years ago and he thought they were within [facility]. No records in chart, asked if he possibly saw someone at [facility] or [facility]. Patient stated he normally always goes to [facility]. Patient will let us know if he remembers the name.

## 2020-08-04 ENCOUNTER — OFFICE VISIT (OUTPATIENT)
Dept: CARDIOLOGY | Facility: MEDICAL CENTER | Age: 60
End: 2020-08-04
Payer: MEDICARE

## 2020-08-04 VITALS
WEIGHT: 315 LBS | SYSTOLIC BLOOD PRESSURE: 130 MMHG | BODY MASS INDEX: 45.1 KG/M2 | HEART RATE: 78 BPM | OXYGEN SATURATION: 93 % | HEIGHT: 70 IN | DIASTOLIC BLOOD PRESSURE: 70 MMHG

## 2020-08-04 DIAGNOSIS — I50.9 CONGESTIVE HEART FAILURE, UNSPECIFIED HF CHRONICITY, UNSPECIFIED HEART FAILURE TYPE (HCC): ICD-10-CM

## 2020-08-04 DIAGNOSIS — R00.2 PALPITATIONS: ICD-10-CM

## 2020-08-04 LAB — EKG IMPRESSION: NORMAL

## 2020-08-04 PROCEDURE — 93000 ELECTROCARDIOGRAM COMPLETE: CPT | Performed by: INTERNAL MEDICINE

## 2020-08-04 PROCEDURE — 99204 OFFICE O/P NEW MOD 45 MIN: CPT | Performed by: INTERNAL MEDICINE

## 2020-08-04 ASSESSMENT — FIBROSIS 4 INDEX: FIB4 SCORE: 1.59

## 2020-08-04 NOTE — PROGRESS NOTES
[facility] Cardiology Clinic Note (New patient)     DOS: 8/4/2020    Referring physician: Dr Moulton    Chief complaint/Reason for consult: Swelling of the legs    HPI: 60-year-old man with a history of diabetes, hypertension, hyperlipidemia, morbid obesity, sleep apnea, on home oxygen, referred for evaluation of leg swelling.  He says he has seen a doctor before for heart issues.  He is on Lasix.  This was recently increased by his primary care doctor and he notices that his leg swelling has improved.  Prior to this, he noticed that he was increasing in weight with increased leg swelling over the past several months.  He otherwise denies shortness of breath, chest pain, palpitations, dizziness, presyncope, or syncope.    ROS (+ highlighted in bold):  Constitutional: Fevers/chills/fatigue/weightloss  HEENT: Blurry vision/eye pain/sore throat/hearing loss  Respiratory: Shortness of breath/cough  Cardiovascular: Chest pain/palpitations/edema/orthopnea/syncope  GI: Nausea/vomitting/diarrhea  MSK: Arthralgias/myagias/muscle weakness  Skin: Rash/sores  Neurological: Numbness/tremors/vertigo  Endocrine: Excessive thirst/polyuria/cold intolerance/heat intolerance  Psych: Depression/anxiety    Past Medical History:   Diagnosis Date   • Chickenpox    • Diabetes (HCC)     borderline   • Maori measles    • Hyperlipidemia    • Mumps    • Obesity    • Sleep apnea    • Tonsillitis        Past Surgical History:   Procedure Laterality Date   • COLONOSCOPY N/A 4/24/2019    Procedure: COLONOSCOPY;  Surgeon: Rashi Goldberg M.D.;  Location: [facility];  Service: Gastroenterology   • GASTROSCOPY-ENDO N/A 4/24/2019    Procedure: GASTROSCOPY;  Surgeon: Rashi Goldberg M.D.;  Location: [facility];  Service: Gastroenterology   • OTHER ORTHOPEDIC SURGERY      bilateral knee surgery       Social History     Socioeconomic History   • Marital status: Single     Spouse name: Not on file   • Number of children:  Not on file   • Years of education: Not on file   • Highest education level: Not on file   Occupational History   • Not on file   Social Needs   • Financial resource strain: Not on file   • Food insecurity     Worry: Not on file     Inability: Not on file   • Transportation needs     Medical: Not on file     Non-medical: Not on file   Tobacco Use   • Smoking status: Never Smoker   • Smokeless tobacco: Never Used   Substance and Sexual Activity   • Alcohol use: Yes     Comment: occasional   • Drug use: No   • Sexual activity: Not on file   Lifestyle   • Physical activity     Days per week: Not on file     Minutes per session: Not on file   • Stress: Not on file   Relationships   • Social connections     Talks on phone: Not on file     Gets together: Not on file     Attends Bahai service: Not on file     Active member of club or organization: Not on file     Attends meetings of clubs or organizations: Not on file     Relationship status: Not on file   • Intimate partner violence     Fear of current or ex partner: Not on file     Emotionally abused: Not on file     Physically abused: Not on file     Forced sexual activity: Not on file   Other Topics Concern   • Not on file   Social History Narrative   • Not on file       Family History   Problem Relation Age of Onset   • Diabetes Mother    • Diabetes Father    • Sleep Apnea Neg Hx        No Known Allergies    Current Outpatient Medications   Medication Sig Dispense Refill   • aspirin EC (ECOTRIN) 81 MG Tablet Delayed Response Take 81 mg by mouth every day.     • furosemide (LASIX) 20 MG Tab Take 40 mg by mouth every day.     • potassium chloride SA (K-DUR) 10 MEQ Tab CR Take 10 mEq by mouth every day.     • metFORMIN (GLUCOPHAGE) 500 MG Tab Take 500 mg by mouth 2 times a day, with meals.     • ferrous sulfate 325 (65 Fe) MG tablet Take 325 mg by mouth every day.     • folic acid (FOLVITE) 1 MG Tab Take 1 mg by mouth every day.     • atorvastatin (LIPITOR) 20 MG Tab  "Take 20 mg by mouth every day.     • HYDROcodone/acetaminophen (NORCO)  MG Tab Take 0.5-1 Tabs by mouth 3 times a day.     • lisinopril (PRINIVIL) 20 MG Tab Take 40 mg by mouth every day.     • ascorbic acid (VITAMIN C) 500 MG tablet Take 1 Tab by mouth every day. 30 Tab 2   • terbinafine (LAMISIL) 250 MG Tab Take 250 mg by mouth every day.       No current facility-administered medications for this visit.        Physical Exam:  Vitals:    08/04/20 1415   BP: 130/70   BP Location: Left arm   Patient Position: Sitting   BP Cuff Size: Large adult   Pulse: 78   SpO2: 93%   Weight: (!) 173.7 kg (383 lb)   Height: 1.778 m (5' 10\")     General appearance: NAD, conversant   Eyes: anicteric sclerae, moist conjunctivae; no lid-lag; PERRLA  HENT: Atraumatic; oropharynx clear with moist mucous membranes and no mucosal ulcerations; normal hard and soft palate  Neck: Trachea midline; FROM, supple, no thyromegaly or lymphadenopathy  Lungs: CTA, with normal respiratory effort and no intercostal retractions  CV: RRR, no MRGs, no JVD   Abdomen: Soft, non-tender; no masses or HSM  Extremities: No peripheral edema or extremity lymphadenopathy  Skin: Normal temperature, turgor and texture; no rash, ulcers or subcutaneous nodules  Psych: Appropriate affect, alert and oriented to person, place and time    Data:  Lipids:   Lab Results   Component Value Date/Time    CHOLSTRLTOT 66 (L) 09/27/2018 06:30 AM    TRIGLYCERIDE 118 09/27/2018 06:30 AM    HDL 25 (A) 09/27/2018 06:30 AM    LDL 17 09/27/2018 06:30 AM        BMP:  Lab Results   Component Value Date/Time    SODIUM 137 04/24/2019 0720    POTASSIUM 4.1 04/24/2019 0720    CHLORIDE 100 04/24/2019 0720    CO2 29 04/24/2019 0720    GLUCOSE 115 (H) 04/24/2019 0720    BUN 9 04/24/2019 0720    CREATININE 0.57 04/24/2019 0720    CALCIUM 8.8 04/24/2019 0720    ANION 8.0 04/24/2019 0720        TSH:   Lab Results   Component Value Date/Time    TSHULTRASEN 5.390 (H) 09/26/2018 0120      "   THYROXINE (T4):   No results found for: LELA     CBC:   Lab Results   Component Value Date/Time    WBC 5.1 04/24/2019 07:20 AM    RBC 5.76 04/24/2019 07:20 AM    HEMOGLOBIN 14.7 04/24/2019 07:20 AM    HEMATOCRIT 48.8 04/24/2019 07:20 AM    MCV 84.7 04/24/2019 07:20 AM    MCH 25.5 (L) 04/24/2019 07:20 AM    MCHC 30.1 (L) 04/24/2019 07:20 AM    RDW 66.7 (H) 04/24/2019 07:20 AM    PLATELETCT 194 04/24/2019 07:20 AM    MPV 10.2 04/24/2019 07:20 AM    NEUTSPOLYS 67.10 09/27/2018 06:30 AM    LYMPHOCYTES 14.60 (L) 09/27/2018 06:30 AM    MONOCYTES 11.10 09/27/2018 06:30 AM    EOSINOPHILS 6.30 09/27/2018 06:30 AM    BASOPHILS 0.70 09/27/2018 06:30 AM    IMMGRAN 0.20 09/27/2018 06:30 AM    NRBC 0.00 09/27/2018 06:30 AM    NEUTS 3.91 09/27/2018 06:30 AM    LYMPHS 0.85 (L) 09/27/2018 06:30 AM    MONOS 0.65 09/27/2018 06:30 AM    EOS 0.37 09/27/2018 06:30 AM    BASO 0.04 09/27/2018 06:30 AM    IMMGRANAB 0.01 09/27/2018 06:30 AM    NRBCAB 0.00 09/27/2018 06:30 AM        CBC w/o DIFF  Lab Results   Component Value Date/Time    WBC 5.1 04/24/2019 07:20 AM    RBC 5.76 04/24/2019 07:20 AM    HEMOGLOBIN 14.7 04/24/2019 07:20 AM    MCV 84.7 04/24/2019 07:20 AM    MCH 25.5 (L) 04/24/2019 07:20 AM    MCHC 30.1 (L) 04/24/2019 07:20 AM    RDW 66.7 (H) 04/24/2019 07:20 AM    MPV 10.2 04/24/2019 07:20 AM       Prior echo/stress results reviewed: Ejection fraction 65%    EKG interpreted by me: Sinus rhythm    Impression/Plan:  1. Congestive heart failure, unspecified HF chronicity, unspecified heart failure type (HCC)  EKG    EC-ECHOCARDIOGRAM COMPLETE W/O CONT   2. Palpitations  EKG     1.  Heart failure with preserved ejection fraction, acute on chronic  2.  Morbid obesity  3.  Lower extremity edema  4.  Hypertension    -Continue current hypertension management  -Prior preserved ejection fraction, worsening lower extremity edema, recheck echocardiogram to rule out new systolic dysfunction  -He says his lower extremity edema is  improving on increased dose of Lasix, continue this dose for now  -Referral to heart failure clinic for ongoing management, discharge from EP clinic    Gianfranco Perez MD  Cardiac Electrophysiology

## 2020-08-27 ENCOUNTER — HOSPITAL ENCOUNTER (INPATIENT)
Facility: MEDICAL CENTER | Age: 60
LOS: 32 days | DRG: 004 | End: 2020-09-28
Attending: EMERGENCY MEDICINE | Admitting: INTERNAL MEDICINE
Payer: MEDICARE

## 2020-08-27 ENCOUNTER — APPOINTMENT (OUTPATIENT)
Dept: RADIOLOGY | Facility: MEDICAL CENTER | Age: 60
DRG: 004 | End: 2020-08-27
Attending: INTERNAL MEDICINE
Payer: MEDICARE

## 2020-08-27 ENCOUNTER — APPOINTMENT (OUTPATIENT)
Dept: RADIOLOGY | Facility: MEDICAL CENTER | Age: 60
DRG: 004 | End: 2020-08-27
Attending: EMERGENCY MEDICINE
Payer: MEDICARE

## 2020-08-27 DIAGNOSIS — E66.01 MORBID OBESITY WITH BMI OF 50.0-59.9, ADULT (HCC): ICD-10-CM

## 2020-08-27 DIAGNOSIS — J96.01 ACUTE HYPOXEMIC RESPIRATORY FAILURE (HCC): ICD-10-CM

## 2020-08-27 DIAGNOSIS — U07.1 PNEUMONIA DUE TO SEVERE ACUTE RESPIRATORY SYNDROME CORONAVIRUS 2 (SARS-COV-2): ICD-10-CM

## 2020-08-27 DIAGNOSIS — G93.40 ENCEPHALOPATHY: ICD-10-CM

## 2020-08-27 DIAGNOSIS — J12.82 PNEUMONIA DUE TO COVID-19 VIRUS: ICD-10-CM

## 2020-08-27 DIAGNOSIS — R06.03 ACUTE RESPIRATORY DISTRESS: ICD-10-CM

## 2020-08-27 DIAGNOSIS — U07.1 PNEUMONIA DUE TO COVID-19 VIRUS: ICD-10-CM

## 2020-08-27 DIAGNOSIS — R09.02 HYPOXIA: ICD-10-CM

## 2020-08-27 DIAGNOSIS — R05.9 COUGH: ICD-10-CM

## 2020-08-27 DIAGNOSIS — J12.82 PNEUMONIA DUE TO SEVERE ACUTE RESPIRATORY SYNDROME CORONAVIRUS 2 (SARS-COV-2): ICD-10-CM

## 2020-08-27 DIAGNOSIS — J18.9 PNEUMONIA OF RIGHT MIDDLE LOBE DUE TO INFECTIOUS ORGANISM: ICD-10-CM

## 2020-08-27 DIAGNOSIS — G47.33 OSA (OBSTRUCTIVE SLEEP APNEA): ICD-10-CM

## 2020-08-27 DIAGNOSIS — E66.01 MORBID OBESITY (HCC): ICD-10-CM

## 2020-08-27 LAB
ALBUMIN SERPL BCP-MCNC: 3.1 G/DL (ref 3.2–4.9)
ALBUMIN/GLOB SERPL: 0.8 G/DL
ALP SERPL-CCNC: 89 U/L (ref 30–99)
ALT SERPL-CCNC: 17 U/L (ref 2–50)
ANION GAP SERPL CALC-SCNC: 13 MMOL/L (ref 7–16)
ANISOCYTOSIS BLD QL SMEAR: ABNORMAL
AST SERPL-CCNC: 23 U/L (ref 12–45)
BASOPHILS # BLD AUTO: 0.3 % (ref 0–1.8)
BASOPHILS # BLD: 0.02 K/UL (ref 0–0.12)
BILIRUB SERPL-MCNC: 1 MG/DL (ref 0.1–1.5)
BUN SERPL-MCNC: 17 MG/DL (ref 8–22)
CALCIUM SERPL-MCNC: 8.1 MG/DL (ref 8.5–10.5)
CHLORIDE SERPL-SCNC: 94 MMOL/L (ref 96–112)
CO2 SERPL-SCNC: 27 MMOL/L (ref 20–33)
COMMENT 1642: NORMAL
COVID ORDER STATUS COVID19: NORMAL
CREAT SERPL-MCNC: 0.61 MG/DL (ref 0.5–1.4)
D DIMER PPP IA.FEU-MCNC: 0.75 UG/ML (FEU) (ref 0–0.5)
EKG IMPRESSION: NORMAL
EOSINOPHIL # BLD AUTO: 0.02 K/UL (ref 0–0.51)
EOSINOPHIL NFR BLD: 0.3 % (ref 0–6.9)
ERYTHROCYTE [DISTWIDTH] IN BLOOD BY AUTOMATED COUNT: 66.2 FL (ref 35.9–50)
GLOBULIN SER CALC-MCNC: 3.8 G/DL (ref 1.9–3.5)
GLUCOSE SERPL-MCNC: 139 MG/DL (ref 65–99)
HCT VFR BLD AUTO: 63.5 % (ref 42–52)
HGB BLD-MCNC: 19.9 G/DL (ref 14–18)
IMM GRANULOCYTES # BLD AUTO: 0.08 K/UL (ref 0–0.11)
IMM GRANULOCYTES NFR BLD AUTO: 1.4 % (ref 0–0.9)
LACTATE BLD-SCNC: 1.7 MMOL/L (ref 0.5–2)
LYMPHOCYTES # BLD AUTO: 0.64 K/UL (ref 1–4.8)
LYMPHOCYTES NFR BLD: 10.9 % (ref 22–41)
MACROCYTES BLD QL SMEAR: ABNORMAL
MCH RBC QN AUTO: 31.2 PG (ref 27–33)
MCHC RBC AUTO-ENTMCNC: 30.7 G/DL (ref 33.7–35.3)
MCV RBC AUTO: 101.4 FL (ref 81.4–97.8)
MONOCYTES # BLD AUTO: 0.52 K/UL (ref 0–0.85)
MONOCYTES NFR BLD AUTO: 8.9 % (ref 0–13.4)
MORPHOLOGY BLD-IMP: NORMAL
NEUTROPHILS # BLD AUTO: 4.59 K/UL (ref 1.82–7.42)
NEUTROPHILS NFR BLD: 78.2 % (ref 44–72)
NRBC # BLD AUTO: 0.31 K/UL
NRBC BLD-RTO: 5.3 /100 WBC
NT-PROBNP SERPL IA-MCNC: 469 PG/ML (ref 0–125)
OVALOCYTES BLD QL SMEAR: NORMAL
PLATELET # BLD AUTO: 94 K/UL (ref 164–446)
PLATELET BLD QL SMEAR: NORMAL
PMV BLD AUTO: 10.5 FL (ref 9–12.9)
POIKILOCYTOSIS BLD QL SMEAR: NORMAL
POLYCHROMASIA BLD QL SMEAR: NORMAL
POTASSIUM SERPL-SCNC: 4.9 MMOL/L (ref 3.6–5.5)
PROT SERPL-MCNC: 6.9 G/DL (ref 6–8.2)
RBC # BLD AUTO: 6.42 M/UL (ref 4.7–6.1)
RBC BLD AUTO: PRESENT
SARS-COV-2 RNA RESP QL NAA+PROBE: DETECTED
SODIUM SERPL-SCNC: 134 MMOL/L (ref 135–145)
SPECIMEN SOURCE: ABNORMAL
WBC # BLD AUTO: 5.9 K/UL (ref 4.8–10.8)

## 2020-08-27 PROCEDURE — 96367 TX/PROPH/DG ADDL SEQ IV INF: CPT

## 2020-08-27 PROCEDURE — 94640 AIRWAY INHALATION TREATMENT: CPT

## 2020-08-27 PROCEDURE — 96365 THER/PROPH/DIAG IV INF INIT: CPT

## 2020-08-27 PROCEDURE — 93005 ELECTROCARDIOGRAM TRACING: CPT

## 2020-08-27 PROCEDURE — 700101 HCHG RX REV CODE 250: Performed by: EMERGENCY MEDICINE

## 2020-08-27 PROCEDURE — 86140 C-REACTIVE PROTEIN: CPT

## 2020-08-27 PROCEDURE — 85576 BLOOD PLATELET AGGREGATION: CPT | Mod: 91

## 2020-08-27 PROCEDURE — 99291 CRITICAL CARE FIRST HOUR: CPT

## 2020-08-27 PROCEDURE — 85730 THROMBOPLASTIN TIME PARTIAL: CPT

## 2020-08-27 PROCEDURE — 83880 ASSAY OF NATRIURETIC PEPTIDE: CPT

## 2020-08-27 PROCEDURE — 85025 COMPLETE CBC W/AUTO DIFF WBC: CPT

## 2020-08-27 PROCEDURE — 99292 CRITICAL CARE ADDL 30 MIN: CPT | Performed by: INTERNAL MEDICINE

## 2020-08-27 PROCEDURE — 85610 PROTHROMBIN TIME: CPT

## 2020-08-27 PROCEDURE — 700102 HCHG RX REV CODE 250 W/ 637 OVERRIDE(OP): Performed by: INTERNAL MEDICINE

## 2020-08-27 PROCEDURE — 87040 BLOOD CULTURE FOR BACTERIA: CPT | Mod: 91

## 2020-08-27 PROCEDURE — 700105 HCHG RX REV CODE 258: Performed by: INTERNAL MEDICINE

## 2020-08-27 PROCEDURE — 700111 HCHG RX REV CODE 636 W/ 250 OVERRIDE (IP): Performed by: EMERGENCY MEDICINE

## 2020-08-27 PROCEDURE — U0003 INFECTIOUS AGENT DETECTION BY NUCLEIC ACID (DNA OR RNA); SEVERE ACUTE RESPIRATORY SYNDROME CORONAVIRUS 2 (SARS-COV-2) (CORONAVIRUS DISEASE [COVID-19]), AMPLIFIED PROBE TECHNIQUE, MAKING USE OF HIGH THROUGHPUT TECHNOLOGIES AS DESCRIBED BY CMS-2020-01-R: HCPCS

## 2020-08-27 PROCEDURE — 71045 X-RAY EXAM CHEST 1 VIEW: CPT

## 2020-08-27 PROCEDURE — 84145 PROCALCITONIN (PCT): CPT

## 2020-08-27 PROCEDURE — 99291 CRITICAL CARE FIRST HOUR: CPT | Performed by: INTERNAL MEDICINE

## 2020-08-27 PROCEDURE — 85347 COAGULATION TIME ACTIVATED: CPT

## 2020-08-27 PROCEDURE — 80076 HEPATIC FUNCTION PANEL: CPT

## 2020-08-27 PROCEDURE — 82728 ASSAY OF FERRITIN: CPT

## 2020-08-27 PROCEDURE — 700117 HCHG RX CONTRAST REV CODE 255: Performed by: INTERNAL MEDICINE

## 2020-08-27 PROCEDURE — 83605 ASSAY OF LACTIC ACID: CPT

## 2020-08-27 PROCEDURE — 85379 FIBRIN DEGRADATION QUANT: CPT

## 2020-08-27 PROCEDURE — 96375 TX/PRO/DX INJ NEW DRUG ADDON: CPT

## 2020-08-27 PROCEDURE — 82550 ASSAY OF CK (CPK): CPT

## 2020-08-27 PROCEDURE — 71275 CT ANGIOGRAPHY CHEST: CPT

## 2020-08-27 PROCEDURE — C9803 HOPD COVID-19 SPEC COLLECT: HCPCS | Performed by: EMERGENCY MEDICINE

## 2020-08-27 PROCEDURE — 84484 ASSAY OF TROPONIN QUANT: CPT

## 2020-08-27 PROCEDURE — 85384 FIBRINOGEN ACTIVITY: CPT

## 2020-08-27 PROCEDURE — 770022 HCHG ROOM/CARE - ICU (200)

## 2020-08-27 PROCEDURE — 81001 URINALYSIS AUTO W/SCOPE: CPT

## 2020-08-27 PROCEDURE — 93005 ELECTROCARDIOGRAM TRACING: CPT | Performed by: EMERGENCY MEDICINE

## 2020-08-27 PROCEDURE — 700105 HCHG RX REV CODE 258: Performed by: EMERGENCY MEDICINE

## 2020-08-27 PROCEDURE — 80053 COMPREHEN METABOLIC PANEL: CPT

## 2020-08-27 RX ORDER — AZITHROMYCIN 500 MG/5ML
500 INJECTION, POWDER, LYOPHILIZED, FOR SOLUTION INTRAVENOUS ONCE
Status: COMPLETED | OUTPATIENT
Start: 2020-08-27 | End: 2020-08-27

## 2020-08-27 RX ORDER — SODIUM CHLORIDE, SODIUM LACTATE, POTASSIUM CHLORIDE, CALCIUM CHLORIDE 600; 310; 30; 20 MG/100ML; MG/100ML; MG/100ML; MG/100ML
INJECTION, SOLUTION INTRAVENOUS CONTINUOUS
Status: DISCONTINUED | OUTPATIENT
Start: 2020-08-27 | End: 2020-09-02

## 2020-08-27 RX ORDER — ONDANSETRON 2 MG/ML
4 INJECTION INTRAMUSCULAR; INTRAVENOUS EVERY 4 HOURS PRN
Status: DISCONTINUED | OUTPATIENT
Start: 2020-08-27 | End: 2020-09-26

## 2020-08-27 RX ORDER — LISINOPRIL 40 MG/1
40 TABLET ORAL DAILY
Status: ON HOLD | COMMUNITY
Start: 2020-07-07 | End: 2020-09-28

## 2020-08-27 RX ORDER — ASCORBIC ACID 500 MG
500 TABLET ORAL DAILY
Status: ON HOLD | COMMUNITY
End: 2020-09-28

## 2020-08-27 RX ORDER — PROMETHAZINE HYDROCHLORIDE 25 MG/1
12.5-25 SUPPOSITORY RECTAL EVERY 4 HOURS PRN
Status: DISCONTINUED | OUTPATIENT
Start: 2020-08-27 | End: 2020-09-26

## 2020-08-27 RX ORDER — DEXAMETHASONE SODIUM PHOSPHATE 4 MG/ML
6 INJECTION, SOLUTION INTRA-ARTICULAR; INTRALESIONAL; INTRAMUSCULAR; INTRAVENOUS; SOFT TISSUE DAILY
Status: COMPLETED | OUTPATIENT
Start: 2020-08-28 | End: 2020-09-05

## 2020-08-27 RX ORDER — ACETAMINOPHEN 325 MG/1
650 TABLET ORAL EVERY 6 HOURS PRN
Status: DISCONTINUED | OUTPATIENT
Start: 2020-08-27 | End: 2020-08-31

## 2020-08-27 RX ORDER — AMOXICILLIN 250 MG
2 CAPSULE ORAL 2 TIMES DAILY
Status: DISCONTINUED | OUTPATIENT
Start: 2020-08-27 | End: 2020-08-29

## 2020-08-27 RX ORDER — AZITHROMYCIN 250 MG/1
500 TABLET, FILM COATED ORAL DAILY
Status: COMPLETED | OUTPATIENT
Start: 2020-08-28 | End: 2020-08-29

## 2020-08-27 RX ORDER — SODIUM CHLORIDE, SODIUM LACTATE, POTASSIUM CHLORIDE, AND CALCIUM CHLORIDE .6; .31; .03; .02 G/100ML; G/100ML; G/100ML; G/100ML
30 INJECTION, SOLUTION INTRAVENOUS
Status: DISCONTINUED | OUTPATIENT
Start: 2020-08-27 | End: 2020-09-08

## 2020-08-27 RX ORDER — ONDANSETRON 4 MG/1
4 TABLET, ORALLY DISINTEGRATING ORAL EVERY 4 HOURS PRN
Status: DISCONTINUED | OUTPATIENT
Start: 2020-08-27 | End: 2020-09-01

## 2020-08-27 RX ORDER — METHYLPREDNISOLONE SODIUM SUCCINATE 40 MG/ML
40 INJECTION, POWDER, LYOPHILIZED, FOR SOLUTION INTRAMUSCULAR; INTRAVENOUS EVERY 12 HOURS
Status: DISCONTINUED | OUTPATIENT
Start: 2020-08-28 | End: 2020-08-27

## 2020-08-27 RX ORDER — LABETALOL HYDROCHLORIDE 5 MG/ML
10-20 INJECTION, SOLUTION INTRAVENOUS EVERY 4 HOURS PRN
Status: DISCONTINUED | OUTPATIENT
Start: 2020-08-27 | End: 2020-09-26

## 2020-08-27 RX ORDER — POLYETHYLENE GLYCOL 3350 17 G/17G
1 POWDER, FOR SOLUTION ORAL
Status: DISCONTINUED | OUTPATIENT
Start: 2020-08-27 | End: 2020-08-29

## 2020-08-27 RX ORDER — AMLODIPINE BESYLATE 10 MG/1
10 TABLET ORAL DAILY
Status: ON HOLD | COMMUNITY
Start: 2020-07-07 | End: 2022-11-08

## 2020-08-27 RX ORDER — ATORVASTATIN CALCIUM 20 MG/1
20 TABLET, FILM COATED ORAL DAILY
Status: DISCONTINUED | OUTPATIENT
Start: 2020-08-28 | End: 2020-09-01

## 2020-08-27 RX ORDER — AMLODIPINE BESYLATE 10 MG/1
10 TABLET ORAL DAILY
Status: DISCONTINUED | OUTPATIENT
Start: 2020-08-28 | End: 2020-08-31

## 2020-08-27 RX ORDER — PROCHLORPERAZINE EDISYLATE 5 MG/ML
5-10 INJECTION INTRAMUSCULAR; INTRAVENOUS EVERY 4 HOURS PRN
Status: DISCONTINUED | OUTPATIENT
Start: 2020-08-27 | End: 2020-09-26

## 2020-08-27 RX ORDER — HYDROCODONE BITARTRATE AND ACETAMINOPHEN 10; 325 MG/1; MG/1
1 TABLET ORAL EVERY 6 HOURS PRN
Status: DISCONTINUED | OUTPATIENT
Start: 2020-08-27 | End: 2020-08-31

## 2020-08-27 RX ORDER — DEXTROSE MONOHYDRATE 25 G/50ML
50 INJECTION, SOLUTION INTRAVENOUS
Status: DISCONTINUED | OUTPATIENT
Start: 2020-08-27 | End: 2020-09-08

## 2020-08-27 RX ORDER — BISACODYL 10 MG
10 SUPPOSITORY, RECTAL RECTAL
Status: DISCONTINUED | OUTPATIENT
Start: 2020-08-27 | End: 2020-08-29

## 2020-08-27 RX ORDER — PROMETHAZINE HYDROCHLORIDE 25 MG/1
12.5-25 TABLET ORAL EVERY 4 HOURS PRN
Status: DISCONTINUED | OUTPATIENT
Start: 2020-08-27 | End: 2020-08-31

## 2020-08-27 RX ORDER — IPRATROPIUM BROMIDE AND ALBUTEROL SULFATE 2.5; .5 MG/3ML; MG/3ML
3 SOLUTION RESPIRATORY (INHALATION) ONCE
Status: COMPLETED | OUTPATIENT
Start: 2020-08-27 | End: 2020-08-27

## 2020-08-27 RX ORDER — METHYLPREDNISOLONE SODIUM SUCCINATE 125 MG/2ML
125 INJECTION, POWDER, LYOPHILIZED, FOR SOLUTION INTRAMUSCULAR; INTRAVENOUS ONCE
Status: COMPLETED | OUTPATIENT
Start: 2020-08-27 | End: 2020-08-27

## 2020-08-27 RX ADMIN — AZITHROMYCIN FOR INJECTION INJECTION, POWDER, LYOPHILIZED, FOR SOLUTION 500 MG: 500 INJECTION INTRAVENOUS at 18:36

## 2020-08-27 RX ADMIN — SODIUM CHLORIDE, POTASSIUM CHLORIDE, SODIUM LACTATE AND CALCIUM CHLORIDE: 600; 310; 30; 20 INJECTION, SOLUTION INTRAVENOUS at 23:00

## 2020-08-27 RX ADMIN — CEFTRIAXONE SODIUM 2 G: 2 INJECTION, POWDER, FOR SOLUTION INTRAMUSCULAR; INTRAVENOUS at 17:08

## 2020-08-27 RX ADMIN — METHYLPREDNISOLONE SODIUM SUCCINATE 125 MG: 125 INJECTION, POWDER, FOR SOLUTION INTRAMUSCULAR; INTRAVENOUS at 16:59

## 2020-08-27 RX ADMIN — IPRATROPIUM BROMIDE AND ALBUTEROL SULFATE 3 ML: .5; 3 SOLUTION RESPIRATORY (INHALATION) at 16:49

## 2020-08-27 RX ADMIN — IOHEXOL 65 ML: 350 INJECTION, SOLUTION INTRAVENOUS at 18:24

## 2020-08-27 ASSESSMENT — ENCOUNTER SYMPTOMS
DIZZINESS: 0
HEADACHES: 0
COUGH: 1
VOMITING: 0
DIARRHEA: 0
CHILLS: 0
FEVER: 0
HEMOPTYSIS: 1
NAUSEA: 0
BLURRED VISION: 0
STRIDOR: 0
WHEEZING: 1
HEARTBURN: 0
SORE THROAT: 0
SHORTNESS OF BREATH: 1
BACK PAIN: 0
FALLS: 0
SPUTUM PRODUCTION: 1
NECK PAIN: 0

## 2020-08-27 ASSESSMENT — FIBROSIS 4 INDEX
FIB4 SCORE: 3.49
FIB4 SCORE: 1.59

## 2020-08-27 NOTE — ED TRIAGE NOTES
"Chief Complaint   Patient presents with   • Shortness of Breath     BIBA for above complaint. PER ems patient was 50% on 2 liters at home. Was given 1 duo neb and 1 albuterol treatment and placed on 6 liters. On arrival after movement patient was 61 % on 6 liters. Placed on NBM at 100% at this time.     Patient was seen at a clinic 2 days ago and diagnosed with upper respiratory track infected, given antibiotics, and covid tested. Patient sounds \"wet\" and has a cough.     /82   Pulse 100   Temp 37 °C (98.6 °F) (Temporal)   Resp 20   Ht 1.778 m (5' 10\")   Wt (!) 172.4 kg (380 lb)   SpO2 (!) 61%   BMI 54.52 kg/m²       "

## 2020-08-27 NOTE — LETTER
9/29/2020               Rashi Shirley   [address]  [facility] [address]        Dear Rashi (MR#[id number]),      This letter is to inform you that your COVID-19 test result is POSITIVE.  This means that the virus that causes COVID-19 was found in your sample.      A review of your test during your recent visit requires that we notify you of the following:    Your nasal swab was POSITIVE for the novel coronavirus (COVID-19).     The Health Department will be in contact with you soon.      You are encouraged to continue to quarantine and self-isolate according to the CDC guidance unless otherwise directed by the Health Department.  Per the CDC, you should continue to quarantine until: At least 3 days (72 hours) have passed since your fever resolved without the use of fever-reducing medications and you had improvement in your cough and shortness of breath.  You should also remain quarantined until at least 10 days have passed since your symptoms first appeared.    Once any symptoms have resolved, it may be possible to donate plasma to help others that are currently ill with COVID-19. To learn more and apply, please contact the  at [phone] or via e-mail at [email or web address].    For any further questions regarding COVID-19, please contact the [facility] at [phone].  Thank you for your cooperation in the matter.      Sincerely,      The [facility]                 98.7

## 2020-08-27 NOTE — FLOWSHEET NOTE
08/27/20 1651   Events/Summary/Plan   Events/Summary/Plan SVN given with filter in place   Vital Signs   Pulse 92   Respiration (!) 23   Pulse Oximetry 97 %   CO2 Monitoring   ETCO2 (mmHg) 0   Chest Exam   Work Of Breathing / Effort Moderate   Breath Sounds   RUL Breath Sounds Coarse Crackles   RML Breath Sounds Coarse Crackles   RLL Breath Sounds Coarse Crackles   ENRRIQUE Breath Sounds Coarse Crackles   LLL Breath Sounds Coarse Crackles   Oxygen   O2 (LPM) 15   O2 Delivery Device Oxymask

## 2020-08-27 NOTE — ED PROVIDER NOTES
ED Provider Note     Scribed for Mojgan Toro D.O. by Rox Montoya. 8/27/2020, 4:11 PM.     Primary care provider: Jeff Faust M.D.  Means of arrival: EMS         History obtained from: Patient  History limited by: None    CHIEF COMPLAINT  Chief Complaint   Patient presents with   • Shortness of Breath       HPI  Rashi Shirley is a 60 y.o. male who presents to the emergency Department via EMS for evaluation of acute shortness of breath onset yesterday. He endorses associated fever and productive cough. He states that he has yellow sputum production with blood present. Denies any associated chest pain. He does not have a history of asthma, COPD, or emphysema. He does not smoke. He has no history of MI or arrhythmia.  Patient is morbidly obese and does not get around that much.  He denies any calf pain.    REVIEW OF SYSTEMS  Pertinent positives include shortness of breath, fever, productive cough, and blood in sputum. Pertinent negatives include no chest pain.   See HPI for further details. All other systems are negative.    PAST MEDICAL HISTORY  Past Medical History:   Diagnosis Date   • Chickenpox    • Diabetes (HCC)     borderline   • Alban[place] measles    • Hyperlipidemia    • Mumps    • Obesity    • Sleep apnea    • Tonsillitis        FAMILY HISTORY  Family History   Problem Relation Age of Onset   • Diabetes Mother    • Diabetes Father    • Sleep Apnea Neg Hx        SOCIAL HISTORY  Social History     Tobacco Use   • Smoking status: Never Smoker   • Smokeless tobacco: Never Used   Substance Use Topics   • Alcohol use: Yes     Comment: occasional   • Drug use: No      Social History     Substance and Sexual Activity   Drug Use No       SURGICAL HISTORY  Past Surgical History:   Procedure Laterality Date   • COLONOSCOPY N/A 4/24/2019    Procedure: COLONOSCOPY;  Surgeon: Rashi Goldberg M.D.;  Location: SURGERY [facility];  Service: Gastroenterology   • GASTROSCOPY-ENDO N/A 4/24/2019     "Procedure: GASTROSCOPY;  Surgeon: Rashi Goldberg M.D.;  Location: SURGERY [facility];  Service: Gastroenterology   • OTHER ORTHOPEDIC SURGERY      bilateral knee surgery       CURRENT MEDICATIONS  No current facility-administered medications for this encounter.     Current Outpatient Medications:   •  aspirin EC (ECOTRIN) 81 MG Tablet Delayed Response, Take 81 mg by mouth every day., Disp: , Rfl:   •  furosemide (LASIX) 20 MG Tab, Take 40 mg by mouth every day., Disp: , Rfl:   •  potassium chloride SA (K-DUR) 10 MEQ Tab CR, Take 10 mEq by mouth every day., Disp: , Rfl:   •  metFORMIN (GLUCOPHAGE) 500 MG Tab, Take 500 mg by mouth 2 times a day, with meals., Disp: , Rfl:   •  terbinafine (LAMISIL) 250 MG Tab, Take 250 mg by mouth every day., Disp: , Rfl:   •  ferrous sulfate 325 (65 Fe) MG tablet, Take 325 mg by mouth every day., Disp: , Rfl:   •  folic acid (FOLVITE) 1 MG Tab, Take 1 mg by mouth every day., Disp: , Rfl:   •  atorvastatin (LIPITOR) 20 MG Tab, Take 20 mg by mouth every day., Disp: , Rfl:   •  HYDROcodone/acetaminophen (NORCO)  MG Tab, Take 0.5-1 Tabs by mouth 3 times a day., Disp: , Rfl:   •  lisinopril (PRINIVIL) 20 MG Tab, Take 40 mg by mouth every day., Disp: , Rfl:   •  ascorbic acid (VITAMIN C) 500 MG tablet, Take 1 Tab by mouth every day., Disp: 30 Tab, Rfl: 2    ALLERGIES  No Known Allergies    PHYSICAL EXAM  VITAL SIGNS: /82   Pulse 100   Temp 37 °C (98.6 °F) (Temporal)   Resp 20   Ht 1.778 m (5' 10\")   Wt (!) 172.4 kg (380 lb)   SpO2 (!) 61%   BMI 54.52 kg/m²     Constitutional: Patient is Morbidly obese, moderate respiratory distress, very ill-appearing.  Harsh voice but speaking in full sentences.   HENT: Normocephalic, atraumatic. Nose normal with no mucosal edema. Oropharynx moist without erythema or exudates.  Eyes: PERRL, EOMI, Conjunctiva without erythema or exudates.   Neck: Supple with  Normal range of motion in flexion, extension and lateral rotation. No " tenderness along the bony prominences or paraspinal muscles.  Lymphatic: No lymphadenopathy noted.   Cardiovascular: Normal heart rate and Regular rhythm. No murmur  Thorax & Lungs: Coarse breath sounds. Rhonchi in upper airways with expiratory wheezes.  No chest tenderness.  Abdomen: Obese abdomen. Bowel sounds normal in all four quadrants. Soft,nontender, no rebound , guarding, palpable masses.   Skin: Warm, Dry, No erythema, No rashes.  Does have chronic venous stasis dermatitis changes to his bilateral lower extremities.  Back: No cervical, thoracic, or lumbosacral tenderness. No CVA tenderness.   Extremities: Peripheral pulses 4/4, No tenderness, No cyanosis, 2+ pitting edema in bilateral lower extremities.   Musculoskeletal: Normal range of motion in all major joints. No tenderness to palpation or major deformities noted.   Neurologic: Alert & oriented x 3, Normal motor function, Normal sensory function, No lateralizing or focal deficits noted. DTR's 4/4 bilaterally.  Psychiatric: Affect normal, Judgment normal, Mood normal.       DIAGNOSTICS/PROCEDURES    LABS  Results for orders placed or performed during the hospital encounter of 08/27/20   CBC WITH DIFFERENTIAL   Result Value Ref Range    WBC 5.9 4.8 - 10.8 K/uL    RBC 6.42 (H) 4.70 - 6.10 M/uL    Hemoglobin 19.9 (H) 14.0 - 18.0 g/dL    Hematocrit 63.5 (H) 42.0 - 52.0 %    .4 (H) 81.4 - 97.8 fL    MCH 31.2 27.0 - 33.0 pg    MCHC 30.7 (L) 33.7 - 35.3 g/dL    RDW 66.2 (H) 35.9 - 50.0 fL    Platelet Count 94 (L) 164 - 446 K/uL    MPV 10.5 9.0 - 12.9 fL    Neutrophils-Polys 78.20 (H) 44.00 - 72.00 %    Lymphocytes 10.90 (L) 22.00 - 41.00 %    Monocytes 8.90 0.00 - 13.40 %    Eosinophils 0.30 0.00 - 6.90 %    Basophils 0.30 0.00 - 1.80 %    Immature Granulocytes 1.40 (H) 0.00 - 0.90 %    Nucleated RBC 5.30 /100 WBC    Neutrophils (Absolute) 4.59 1.82 - 7.42 K/uL    Lymphs (Absolute) 0.64 (L) 1.00 - 4.80 K/uL    Monos (Absolute) 0.52 0.00 - 0.85 K/uL     Eos (Absolute) 0.02 0.00 - 0.51 K/uL    Baso (Absolute) 0.02 0.00 - 0.12 K/uL    Immature Granulocytes (abs) 0.08 0.00 - 0.11 K/uL    NRBC (Absolute) 0.31 K/uL    Anisocytosis 1+     Macrocytosis 2+ (A)    COMP METABOLIC PANEL   Result Value Ref Range    Sodium 134 (L) 135 - 145 mmol/L    Potassium 4.9 3.6 - 5.5 mmol/L    Chloride 94 (L) 96 - 112 mmol/L    Co2 27 20 - 33 mmol/L    Anion Gap 13.0 7.0 - 16.0    Glucose 139 (H) 65 - 99 mg/dL    Bun 17 8 - 22 mg/dL    Creatinine 0.61 0.50 - 1.40 mg/dL    Calcium 8.1 (L) 8.5 - 10.5 mg/dL    AST(SGOT) 23 12 - 45 U/L    ALT(SGPT) 17 2 - 50 U/L    Alkaline Phosphatase 89 30 - 99 U/L    Total Bilirubin 1.0 0.1 - 1.5 mg/dL    Albumin 3.1 (L) 3.2 - 4.9 g/dL    Total Protein 6.9 6.0 - 8.2 g/dL    Globulin 3.8 (H) 1.9 - 3.5 g/dL    A-G Ratio 0.8 g/dL   Lactic acid (lactate)   Result Value Ref Range    Lactic Acid 1.7 0.5 - 2.0 mmol/L   proBrain Natriuretic Peptide, NT   Result Value Ref Range    NT-proBNP 469 (H) 0 - 125 pg/mL   D-DIMER   Result Value Ref Range    D-Dimer Screen 0.75 (H) 0.00 - 0.50 ug/mL (FEU)   COVID/SARS CoV-2 PCR    Specimen: Nasopharyngeal; Respirate   Result Value Ref Range    COVID Order Status Received    ESTIMATED GFR   Result Value Ref Range    GFR If African American >60 >60 mL/min/1.73 m 2    GFR If Non African American >60 >60 mL/min/1.73 m 2   SARS-CoV-2, PCR (In-House)   Result Value Ref Range    SARS-CoV-2 Source NP Swab     SARS-CoV-2 by PCR DETECTED (AA)    PERIPHERAL SMEAR REVIEW   Result Value Ref Range    Peripheral Smear Review see below    PLATELET ESTIMATE   Result Value Ref Range    Plt Estimation Decreased    MORPHOLOGY   Result Value Ref Range    RBC Morphology Present     Polychromia 1+     Poikilocytosis 1+     Ovalocytes 1+    DIFFERENTIAL COMMENT   Result Value Ref Range    Comments-Diff see below    EKG   Result Value Ref Range    Report       [facility].    Test Date:  2020-08-27  Pt Name:     JAELYN TAYLOR             Department: ER  MRN:        [id number]                      Room:       RD 02  Gender:     Male                         Technician: [id number]  :        1960                   Requested By:ER TRIAGE PROTOCOL  Order #:    [id number]                    Reading MD:    Measurements  Intervals                                Axis  Rate:       99                           P:          51  WI:         164                          QRS:        96  QRSD:       70                           T:          43  QT:         340  QTc:        437    Interpretive Statements  SINUS RHYTHM  BORDERLINE RIGHT AXIS DEVIATION  Compared to ECG 2020 14:19:54  No significant changes       Labs reviewed by me    EKG  Interpreted by me as shown above.    RADIOLOGY/PROCEDURES  CT-CTA CHEST PULMONARY ARTERY W/ RECONS   Final Result         1. No CT evidence of pulmonary embolism.      2. Extensive airspace opacities in bilateral lungs, right more the left, in keeping with severe multifocal pneumonia.         DX-CHEST-PORTABLE (1 VIEW)   Final Result         Extensive airspace opacity throughout the right lung, in keeping with severe multifocal lobar pneumonia.      DX-CHEST-PORTABLE (1 VIEW)    (Results Pending)   EC-ECHOCARDIOGRAM COMPLETE W/O CONT    (Results Pending)       Results and radiologist interpretation reviewed by me.     COURSE & MEDICAL DECISION MAKING  Pertinent Labs & Imaging studies reviewed. (See chart for details)    4:11 PM - Patient seen and evaluated at bedside. Ordered for EKG, DX-chest, lactate, UA, urine culture, blood culture, COVID/Sars, BNP, D-dimer, CBC with differential, and CMP to evaluate. Patient will be treated with Duoneb and Solu-Medrol for his symptoms. Differential diagnoses include, but are not limited to, PE, CHF, Pneumonia, COVID  Patient was given a DuoNeb treatment.  Is maintained on high flow oxygen to keep his sats in the mid 90s which required 12 L of oxygen.  Initially  he was kept on nasal cannula but he would not keep it on his face so we switched him to a Ventimask.  He seemed to be tolerating oxygen better.  His laboratories showed a normal white blood cell count with a stable H&H but his chest x-ray showed multilobar pneumonia with a large consolidation in the right middle lobe.  His BNP was 469 his d-dimer was elevated at 0.75 so CT chest with contrast was performed to rule out pulmonary embolus.  Patient's COVID swab came back positive.  EKG shows sinus rhythm at a rate of 99 bpm with no acute ST elevation or depression, no ventricular ectopy, no A-V dissociation or QT prolongation.  There are no old EKGs for comparison.  I contacted intensivist Dr. Mandujano to get the patient admitted.  I also gave him IV antibiotics to cover community-acquired pneumonia including Rocephin and Zithromax.    4:51 PM - Patient treated with Rocephin and Zithomax for treatment of his pneumonia.     5:02 PM - Paged Intensivist.    5:05 PM - I discussed the patient's case and the above findings with Dr. Rivera (Intensivist) who agrees to evaluate the patient for hospitalization.    5:15 PM - Nurse informed me that patient is at 96-97% on 15L on a non-rebreather mask.    6:23 PM - Nurse informed me that patient is positive for COVID, informed Dr. Rivera.    PPE Note: I verified that the patient was wearing a mask and I was wearing appropriate PPE every time I entered the room. The patient's mask was on the patient at all times during my encounter except for a brief view of the oropharynx.     Scribe remained outside the patient's room and did not have any contact with the patient for the duration of patient encounter.   Patient was transferred to the intensive care unit in critical condition.    DISPOSITION:  Patient will be hospitalized by Dr. Rivera in guarded condition.    FINAL IMPRESSION  1. Hypoxia    2. Acute respiratory distress    3. Cough    4. Pneumonia of right middle lobe due to  infectious organism    5. Morbid obesity (HCC)         I, Rox Mnotoya (Scribe), am scribing for, and in the presence of, Mojgan Toro D.O..    Electronically signed by: Rox Montoya (Scribe), 8/27/2020    IMojgan D.O. personally performed the services described in this documentation, as scribed by Rox Montoya in my presence, and it is both accurate and complete. C    The note accurately reflects work and decisions made by me.  Mojgan Toro D.O.  8/28/2020  2:07 AM

## 2020-08-28 ENCOUNTER — APPOINTMENT (OUTPATIENT)
Dept: RADIOLOGY | Facility: MEDICAL CENTER | Age: 60
DRG: 004 | End: 2020-08-28
Attending: INTERNAL MEDICINE
Payer: MEDICARE

## 2020-08-28 ENCOUNTER — APPOINTMENT (OUTPATIENT)
Dept: CARDIOLOGY | Facility: MEDICAL CENTER | Age: 60
DRG: 004 | End: 2020-08-28
Attending: INTERNAL MEDICINE
Payer: MEDICARE

## 2020-08-28 LAB
ALBUMIN SERPL BCP-MCNC: 3.2 G/DL (ref 3.2–4.9)
ALBUMIN SERPL BCP-MCNC: 3.5 G/DL (ref 3.2–4.9)
ALBUMIN/GLOB SERPL: 0.7 G/DL
ALP SERPL-CCNC: 90 U/L (ref 30–99)
ALP SERPL-CCNC: 96 U/L (ref 30–99)
ALT SERPL-CCNC: 13 U/L (ref 2–50)
ALT SERPL-CCNC: 18 U/L (ref 2–50)
ANION GAP SERPL CALC-SCNC: 8 MMOL/L (ref 7–16)
APPEARANCE UR: CLEAR
APTT PPP: 47.9 SEC (ref 24.7–36)
AST SERPL-CCNC: 16 U/L (ref 12–45)
AST SERPL-CCNC: 24 U/L (ref 12–45)
BACTERIA #/AREA URNS HPF: NEGATIVE /HPF
BASOPHILS # BLD AUTO: 0.3 % (ref 0–1.8)
BASOPHILS # BLD: 0.02 K/UL (ref 0–0.12)
BILIRUB CONJ SERPL-MCNC: 0.4 MG/DL (ref 0.1–0.5)
BILIRUB INDIRECT SERPL-MCNC: 0.4 MG/DL (ref 0–1)
BILIRUB SERPL-MCNC: 0.7 MG/DL (ref 0.1–1.5)
BILIRUB SERPL-MCNC: 0.8 MG/DL (ref 0.1–1.5)
BILIRUB UR QL STRIP.AUTO: ABNORMAL
BUN SERPL-MCNC: 17 MG/DL (ref 8–22)
CALCIUM SERPL-MCNC: 8.4 MG/DL (ref 8.5–10.5)
CFT BLD TEG: 10.9 MIN (ref 5–10)
CHLORIDE SERPL-SCNC: 95 MMOL/L (ref 96–112)
CK SERPL-CCNC: 74 U/L (ref 0–154)
CLOT ANGLE BLD TEG: 27.1 DEGREES (ref 53–72)
CLOT LYSIS 30M P MA LENFR BLD TEG: 0 % (ref 0–8)
CO2 SERPL-SCNC: 33 MMOL/L (ref 20–33)
COLOR UR: ABNORMAL
CREAT SERPL-MCNC: 0.66 MG/DL (ref 0.5–1.4)
CRP SERPL HS-MCNC: 16.85 MG/DL (ref 0–0.75)
CT.EXTRINSIC BLD ROTEM: 7.5 MIN (ref 1–3)
D DIMER PPP IA.FEU-MCNC: 0.8 UG/ML (FEU) (ref 0–0.5)
EOSINOPHIL # BLD AUTO: 0 K/UL (ref 0–0.51)
EOSINOPHIL NFR BLD: 0 % (ref 0–6.9)
EPI CELLS #/AREA URNS HPF: NEGATIVE /HPF
ERYTHROCYTE [DISTWIDTH] IN BLOOD BY AUTOMATED COUNT: 66.2 FL (ref 35.9–50)
FERRITIN SERPL-MCNC: 137 NG/ML (ref 22–322)
GLOBULIN SER CALC-MCNC: 4.3 G/DL (ref 1.9–3.5)
GLUCOSE BLD-MCNC: 120 MG/DL (ref 65–99)
GLUCOSE BLD-MCNC: 128 MG/DL (ref 65–99)
GLUCOSE BLD-MCNC: 170 MG/DL (ref 65–99)
GLUCOSE BLD-MCNC: 191 MG/DL (ref 65–99)
GLUCOSE SERPL-MCNC: 149 MG/DL (ref 65–99)
GLUCOSE UR STRIP.AUTO-MCNC: NEGATIVE MG/DL
HCT VFR BLD AUTO: 67.1 % (ref 42–52)
HGB BLD-MCNC: 20.6 G/DL (ref 14–18)
HYALINE CASTS #/AREA URNS LPF: ABNORMAL /LPF
IMM GRANULOCYTES # BLD AUTO: 0.09 K/UL (ref 0–0.11)
IMM GRANULOCYTES NFR BLD AUTO: 1.4 % (ref 0–0.9)
INR PPP: 1.18 (ref 0.87–1.13)
KETONES UR STRIP.AUTO-MCNC: NEGATIVE MG/DL
LACTATE BLD-SCNC: 2 MMOL/L (ref 0.5–2)
LEUKOCYTE ESTERASE UR QL STRIP.AUTO: NEGATIVE
LV EJECT FRACT  99904: 70
LV EJECT FRACT MOD 2C 99903: 66.88
LV EJECT FRACT MOD 4C 99902: 73.91
LV EJECT FRACT MOD BP 99901: 72.3
LYMPHOCYTES # BLD AUTO: 0.35 K/UL (ref 1–4.8)
LYMPHOCYTES NFR BLD: 5.5 % (ref 22–41)
MAGNESIUM SERPL-MCNC: 2.1 MG/DL (ref 1.5–2.5)
MCF BLD TEG: 49.1 MM (ref 50–70)
MCH RBC QN AUTO: 31.7 PG (ref 27–33)
MCHC RBC AUTO-ENTMCNC: 31 G/DL (ref 33.7–35.3)
MCV RBC AUTO: 102.2 FL (ref 81.4–97.8)
MICRO URNS: ABNORMAL
MONOCYTES # BLD AUTO: 0.34 K/UL (ref 0–0.85)
MONOCYTES NFR BLD AUTO: 5.4 % (ref 0–13.4)
NEUTROPHILS # BLD AUTO: 5.53 K/UL (ref 1.82–7.42)
NEUTROPHILS NFR BLD: 87.4 % (ref 44–72)
NITRITE UR QL STRIP.AUTO: NEGATIVE
NRBC # BLD AUTO: 0.31 K/UL
NRBC BLD-RTO: 4.9 /100 WBC
NT-PROBNP SERPL IA-MCNC: 551 PG/ML (ref 0–125)
PA AA BLD-ACNC: 68.2 %
PA ADP BLD-ACNC: 55.9 %
PH UR STRIP.AUTO: 5 [PH] (ref 5–8)
PHOSPHATE SERPL-MCNC: 3.9 MG/DL (ref 2.5–4.5)
PLATELET # BLD AUTO: 89 K/UL (ref 164–446)
PMV BLD AUTO: 10.8 FL (ref 9–12.9)
POTASSIUM SERPL-SCNC: 5.4 MMOL/L (ref 3.6–5.5)
PROCALCITONIN SERPL-MCNC: 0.08 NG/ML
PROT SERPL-MCNC: 7.5 G/DL (ref 6–8.2)
PROT SERPL-MCNC: 7.7 G/DL (ref 6–8.2)
PROT UR QL STRIP: 30 MG/DL
PROTHROMBIN TIME: 15.3 SEC (ref 12–14.6)
RBC # BLD AUTO: 6.5 M/UL (ref 4.7–6.1)
RBC # URNS HPF: ABNORMAL /HPF
RBC UR QL AUTO: ABNORMAL
SODIUM SERPL-SCNC: 136 MMOL/L (ref 135–145)
SP GR UR STRIP.AUTO: 1.04
TEG ALGORITHM TGALG: ABNORMAL
TROPONIN T SERPL-MCNC: 6 NG/L (ref 6–19)
UROBILINOGEN UR STRIP.AUTO-MCNC: 2 MG/DL
WBC # BLD AUTO: 6.3 K/UL (ref 4.8–10.8)
WBC #/AREA URNS HPF: ABNORMAL /HPF

## 2020-08-28 PROCEDURE — 99291 CRITICAL CARE FIRST HOUR: CPT | Performed by: INTERNAL MEDICINE

## 2020-08-28 PROCEDURE — 84100 ASSAY OF PHOSPHORUS: CPT

## 2020-08-28 PROCEDURE — 700117 HCHG RX CONTRAST REV CODE 255: Performed by: INTERNAL MEDICINE

## 2020-08-28 PROCEDURE — 700105 HCHG RX REV CODE 258: Performed by: INTERNAL MEDICINE

## 2020-08-28 PROCEDURE — 83735 ASSAY OF MAGNESIUM: CPT

## 2020-08-28 PROCEDURE — 700102 HCHG RX REV CODE 250 W/ 637 OVERRIDE(OP): Performed by: INTERNAL MEDICINE

## 2020-08-28 PROCEDURE — 700111 HCHG RX REV CODE 636 W/ 250 OVERRIDE (IP): Performed by: INTERNAL MEDICINE

## 2020-08-28 PROCEDURE — 94640 AIRWAY INHALATION TREATMENT: CPT

## 2020-08-28 PROCEDURE — A9270 NON-COVERED ITEM OR SERVICE: HCPCS | Performed by: INTERNAL MEDICINE

## 2020-08-28 PROCEDURE — 82962 GLUCOSE BLOOD TEST: CPT | Mod: 91

## 2020-08-28 PROCEDURE — 94760 N-INVAS EAR/PLS OXIMETRY 1: CPT

## 2020-08-28 PROCEDURE — 80053 COMPREHEN METABOLIC PANEL: CPT

## 2020-08-28 PROCEDURE — 770022 HCHG ROOM/CARE - ICU (200)

## 2020-08-28 PROCEDURE — 71045 X-RAY EXAM CHEST 1 VIEW: CPT

## 2020-08-28 PROCEDURE — 93306 TTE W/DOPPLER COMPLETE: CPT

## 2020-08-28 PROCEDURE — 93306 TTE W/DOPPLER COMPLETE: CPT | Mod: 26 | Performed by: INTERNAL MEDICINE

## 2020-08-28 PROCEDURE — 85025 COMPLETE CBC W/AUTO DIFF WBC: CPT

## 2020-08-28 PROCEDURE — 87040 BLOOD CULTURE FOR BACTERIA: CPT

## 2020-08-28 RX ORDER — BENZONATATE 100 MG/1
100 CAPSULE ORAL 3 TIMES DAILY PRN
Status: DISCONTINUED | OUTPATIENT
Start: 2020-08-28 | End: 2020-09-01

## 2020-08-28 RX ADMIN — SODIUM CHLORIDE, POTASSIUM CHLORIDE, SODIUM LACTATE AND CALCIUM CHLORIDE: 600; 310; 30; 20 INJECTION, SOLUTION INTRAVENOUS at 22:34

## 2020-08-28 RX ADMIN — DOCUSATE SODIUM 50 MG AND SENNOSIDES 8.6 MG 2 TABLET: 8.6; 5 TABLET, FILM COATED ORAL at 17:07

## 2020-08-28 RX ADMIN — BENZONATATE 100 MG: 100 CAPSULE ORAL at 23:18

## 2020-08-28 RX ADMIN — SODIUM CHLORIDE, POTASSIUM CHLORIDE, SODIUM LACTATE AND CALCIUM CHLORIDE: 600; 310; 30; 20 INJECTION, SOLUTION INTRAVENOUS at 11:15

## 2020-08-28 RX ADMIN — HYDROCODONE BITARTRATE AND ACETAMINOPHEN 1 TABLET: 10; 325 TABLET ORAL at 19:56

## 2020-08-28 RX ADMIN — HYDROCODONE BITARTRATE AND ACETAMINOPHEN 1 TABLET: 10; 325 TABLET ORAL at 06:57

## 2020-08-28 RX ADMIN — AZITHROMYCIN MONOHYDRATE 500 MG: 250 TABLET ORAL at 17:07

## 2020-08-28 RX ADMIN — DEXAMETHASONE SODIUM PHOSPHATE 6 MG: 4 INJECTION, SOLUTION INTRA-ARTICULAR; INTRALESIONAL; INTRAMUSCULAR; INTRAVENOUS; SOFT TISSUE at 06:40

## 2020-08-28 RX ADMIN — ASPIRIN 81 MG: 81 TABLET, COATED ORAL at 06:40

## 2020-08-28 RX ADMIN — ENOXAPARIN SODIUM 60 MG: 60 INJECTION SUBCUTANEOUS at 17:07

## 2020-08-28 RX ADMIN — AMLODIPINE BESYLATE 10 MG: 10 TABLET ORAL at 06:40

## 2020-08-28 RX ADMIN — INSULIN HUMAN 3 UNITS: 100 INJECTION, SOLUTION PARENTERAL at 00:21

## 2020-08-28 RX ADMIN — CEFTRIAXONE SODIUM 2 G: 2 INJECTION, POWDER, FOR SOLUTION INTRAMUSCULAR; INTRAVENOUS at 17:07

## 2020-08-28 RX ADMIN — ATORVASTATIN CALCIUM 20 MG: 20 TABLET, FILM COATED ORAL at 06:40

## 2020-08-28 RX ADMIN — ENOXAPARIN SODIUM 40 MG: 40 INJECTION SUBCUTANEOUS at 06:39

## 2020-08-28 RX ADMIN — HUMAN ALBUMIN MICROSPHERES AND PERFLUTREN 3 ML: 10; .22 INJECTION, SOLUTION INTRAVENOUS at 14:38

## 2020-08-28 ASSESSMENT — FIBROSIS 4 INDEX: FIB4 SCORE: 3.49

## 2020-08-28 NOTE — PROGRESS NOTES
Updated Dr. Crowe on SpO2 saturation between 83-97%. Per Dr. Crowe, as long as no changes in baseline mentation and lab values indicative of organ ischemia, ok for SpO2 sats to be in mid-80s

## 2020-08-28 NOTE — CARE PLAN
Problem: Oxygenation:  Goal: Maintain adequate oxygenation dependent on patient condition  Outcome: PROGRESSING AS EXPECTED       Respiratory Update    Treatment modality: HHFNC  Frequency: Q4    Pt tolerating current treatments well with no adverse reactions.

## 2020-08-28 NOTE — DIETARY
NUTRITION SERVICES: BMI - Pt with BMI >40 (=Body mass index is 55.9 kg/m².), morbid obesity. Weight loss counseling not appropriate in acute care setting. RECOMMEND - Referral to outpatient nutrition services for weight management after D/C.

## 2020-08-28 NOTE — PROGRESS NOTES
Call received from lab. Hematocrit critically high, Dr. Crowe updated. No new orders; LR to remain at 75 mL/hr.  Call received from lab again. Sputum sample opened in collection bag, new sample needs to be sent.

## 2020-08-28 NOTE — PROGRESS NOTES
Critical Care Progress Note    Date of admission  8/27/2020    Chief Complaint  60 y.o. male who presented 8/27/2020 with increasing dyspnea and hypoxia.  He has a history of morbid obesity, hyperlipidemia, diabetes and obstructive sleep apnea, noncompliant with treatment.  He uses oxygen 2 L via nasal cannula for chronic hypoxia.  Today he called EMS for increasing dyspnea over the past 24 hours.  He reports he is had increased cough and yellow sputum production with small amount of blood in his sputum over the past 24 hours.  On EMS arrival patient was profoundly hypoxic with a saturation of 50%.  He was placed on nonrebreather and brought to the ED.  He desaturates very quickly on nonrebreather and is in moderate respiratory distress.  Chest x-ray showed diffuse right-sided pneumonia/consolidation.  He was treated for community-acquired pneumonia with ceftriaxone and azithromycin as well as IV methylprednisolone.  He denies fevers or chills.  He he lives alone and tells me has been taking appropriate precautions when he goes out of the house wearing his mask and has no known exposures to COVID-19.  He is a nondrinker and non-smoker.     Screening SARS-CoV-2 PCR came back positive in ED.  He received 600 cc of crystalloid and the sepsis fluid bolus that had been ordered was discontinued.  He is not currently hypotensive and appears relatively euvolemic.  Lactic acid 1.7.  With the positive COVID-19 test I decreased IV fluid, ordered dexamethasone 6 mg daily x10 days, will admit to isolation ICU.  He will be placed on high flow nasal cannula/BiPAP with high risk for requiring intubation.  We will follow-up inflammatory markers and initiate full dose anticoagulation if appropriate.    Hospital Course            Interval Progress  Reviewed last 24 hour events:              - Remains on HFNC 40LPM, 90%              - Tm: 37.6              - HR: 80s              - SBP: 120-140s              - Neuro: Neuro intact               - GI: N.p.o.              - UOP: 1.2L              - Briseno: Yes              - Lines: PIV              - PPx: No GI, Lovenox              - CXR (personally reviewed):               - Antibiotic Day, C3, Azithromycin    Update: At approximately 1700 patient got up out of bed disconnect himself in the oxygen he quickly desaturated to the high 70s.  He was assisted back to bed and placed back on high flow nasal cannula and quickly recovered to the high 80s.    Review of Systems  Review of Systems   Unable to perform ROS: Medical condition        Vital Signs for last 24 hours   Temp:  [35.9 °C (96.6 °F)-37.7 °C (99.9 °F)] 36.3 °C (97.4 °F)  Pulse:  [] 101  Resp:  [17-45] 25  BP: (101-150)/(45-83) 142/73  SpO2:  [84 %-96 %] 84 %    Hemodynamic parameters for last 24 hours       Respiratory Information for the last 24 hours       Physical Exam   Physical Exam  Constitutional:       General: He is not in acute distress.     Appearance: He is ill-appearing.   HENT:      Mouth/Throat:      Mouth: Mucous membranes are moist.   Eyes:      Pupils: Pupils are equal, round, and reactive to light.   Neck:      Musculoskeletal: Neck supple.   Cardiovascular:      Rate and Rhythm: Regular rhythm.      Heart sounds: No murmur. No friction rub. No gallop.    Pulmonary:      Effort: Respiratory distress present.      Breath sounds: Rhonchi present.   Abdominal:      General: There is no distension.      Tenderness: There is no abdominal tenderness.      Comments: Obese    Musculoskeletal:      Right lower leg: Edema present.      Left lower leg: Edema present.   Skin:     General: Skin is warm and dry.   Neurological:      General: No focal deficit present.      Comments: Impulsive   Psychiatric:      Comments: Unable to assess         Medications  Current Facility-Administered Medications   Medication Dose Route Frequency Provider Last Rate Last Dose   • enoxaparin (LOVENOX) inj 60 mg  60 mg Subcutaneous Q12HRS Harjinder WEBB  NICHOLE Crowe   60 mg at 08/28/20 1707   • amLODIPine (NORVASC) tablet 10 mg  10 mg Oral DAILY Nii Rivera M.D.   10 mg at 08/28/20 0640   • aspirin EC (ECOTRIN) tablet 81 mg  81 mg Oral DAILY Nii Rivera M.D.   81 mg at 08/28/20 0640   • atorvastatin (LIPITOR) tablet 20 mg  20 mg Oral DAILY Nii Rivera M.D.   20 mg at 08/28/20 0640   • HYDROcodone/acetaminophen (NORCO)  MG per tablet 1 Tab  1 Tab Oral Q6HRS PRN Nii Rivera M.D.   1 Tab at 08/28/20 0657   • senna-docusate (PERICOLACE or SENOKOT S) 8.6-50 MG per tablet 2 Tab  2 Tab Oral BID Nii Rivera M.D.   2 Tab at 08/28/20 1707    And   • polyethylene glycol/lytes (MIRALAX) PACKET 1 Packet  1 Packet Oral QDAY PRN Nii Rivera M.D.        And   • magnesium hydroxide (MILK OF MAGNESIA) suspension 30 mL  30 mL Oral QDAY PRN Nii Rivera M.D.        And   • bisacodyl (DULCOLAX) suppository 10 mg  10 mg Rectal QDAY PRN Nii Rivera M.D.       • Respiratory Therapy Consult   Nebulization Continuous RT Nii Rivera M.D.       • lactated ringers infusion (BOLUS): BMI greater than 30  30 mL/kg (Ideal) Intravenous Once PRN Nii Rivera M.D.   Stopped at 08/27/20 1835   • lactated ringers infusion   Intravenous Continuous Nii Rivera M.D. 75 mL/hr at 08/28/20 1115     • acetaminophen (TYLENOL) tablet 650 mg  650 mg Oral Q6HRS PRN Nii Rivera M.D.       • cefTRIAXone (ROCEPHIN) 2 g in  mL IVPB  2 g Intravenous Q24HRS Nii Rivera M.D.   Stopped at 08/28/20 1737   • azithromycin (ZITHROMAX) tablet 500 mg  500 mg Oral DAILY Nii Rivera M.D.   500 mg at 08/28/20 1707   • labetalol (NORMODYNE/TRANDATE) injection 10-20 mg  10-20 mg Intravenous Q4HRS PRN Nii Rivera M.D.       • ondansetron (ZOFRAN) syringe/vial injection 4 mg  4 mg Intravenous Q4HRS PRN Nii Rivera M.D.       • ondansetron (ZOFRAN ODT) dispertab 4 mg  4 mg Oral Q4HRS PRN Nii Rivera M.D.       • promethazine (PHENERGAN) tablet 12.5-25 mg   12.5-25 mg Oral Q4HRS PRN Nii Rivera M.D.       • promethazine (PHENERGAN) suppository 12.5-25 mg  12.5-25 mg Rectal Q4HRS PRN Nii Rivera M.D.       • prochlorperazine (COMPAZINE) injection 5-10 mg  5-10 mg Intravenous Q4HRS PRN Nii Rivera M.D.       • insulin regular (HumuLIN R,NovoLIN R) injection  3-14 Units Subcutaneous Q6HRS Nii Rivera M.D.   Stopped at 08/28/20 0600    And   • glucose 4 g chewable tablet 16 g  16 g Oral Q15 MIN PRN Nii Rivera M.D.        And   • dextrose 50% (D50W) injection 50 mL  50 mL Intravenous Q15 MIN PRN Nii Rivera M.D.       • dexamethasone (DECADRON) injection 6 mg  6 mg Intravenous DAILY Nii Rivera M.D.   6 mg at 08/28/20 0640       Fluids    Intake/Output Summary (Last 24 hours) at 8/28/2020 1926  Last data filed at 8/28/2020 1800  Gross per 24 hour   Intake 1675 ml   Output 1735 ml   Net -60 ml       Laboratory      Recent Labs     08/27/20  2345   CPKTOTAL 74     Recent Labs     08/27/20  1606 08/28/20  0645   SODIUM 134* 136   POTASSIUM 4.9 5.4   CHLORIDE 94* 95*   CO2 27 33   BUN 17 17   CREATININE 0.61 0.66   MAGNESIUM  --  2.1   PHOSPHORUS  --  3.9   CALCIUM 8.1* 8.4*     Recent Labs     08/27/20  1606 08/27/20  2345 08/28/20  0645   ALTSGPT 17 18 13   ASTSGOT 23 24 16   ALKPHOSPHAT 89 96 90   TBILIRUBIN 1.0 0.8 0.7   DBILIRUBIN  --  0.4  --    GLUCOSE 139*  --  149*     Recent Labs     08/27/20  1606 08/27/20  2345 08/28/20  0645   WBC 5.9  --  6.3   NEUTSPOLYS 78.20*  --  87.40*   LYMPHOCYTES 10.90*  --  5.50*   MONOCYTES 8.90  --  5.40   EOSINOPHILS 0.30  --  0.00   BASOPHILS 0.30  --  0.30   ASTSGOT 23 24 16   ALTSGPT 17 18 13   ALKPHOSPHAT 89 96 90   TBILIRUBIN 1.0 0.8 0.7     Recent Labs     08/27/20  1606 08/27/20  2345 08/28/20  0645   RBC 6.42*  --  6.50*   HEMOGLOBIN 19.9*  --  20.6*   HEMATOCRIT 63.5*  --  67.1*   PLATELETCT 94*  --  89*   PROTHROMBTM  --  15.3*  --    APTT  --  47.9*  --    INR  --  1.18*  --    FERRITIN  --   137.0  --        Imaging  X-Ray:  I have personally reviewed the images and compared with prior images. and My impression is: Right IJ central venous catheter with the tip near the cavoatrial junction.  Dense right hemithorax opacities worse in the base.  Diffuse interstitial pattern on the left.  No pneumothorax cannot exclude underlying effusion.    Assessment/Plan  * Pneumonia due to COVID-19 virus  Assessment & Plan  Fluid resuscitation  Monitor respiratory status closely in ICU on HFNC  Delayed intubation as tolerated  May trial self proning  Dexamethasone 6 mg daily x10 days  Follow inflammatory markers  Follow clinical response, consider further therapy with Remdesivir, convalescent plasma as appropriate    Acute hypoxemic respiratory failure (HCC)- (present on admission)  Assessment & Plan  Secondary to COVID-19 pneumonia, +/- community-acquired/bacterial pneumonia  Multilobar dense consolidation of the right lung, no significant effusion  No evidence of pulmonary embolism  Transition to high flow nasal cannula, trial BiPAP as needed in ICU  High risk for requiring intubation but with COVID-19 infection will delay as long as possible  Empiric antibiotic therapy for community-acquired pneumonia with ceftriaxone azithromycin  Follow-up blood cultures x2 and sputum culture    Diabetes type 2, controlled ([facility])- (present on admission)  Assessment & Plan  Monitor glycemic control with insulin therapy    ABELARDO (obstructive sleep apnea)- (present on admission)  Assessment & Plan  Polysomnography on January 29, 2019 AHI = 101.9, SpO2 hortensia 50% on room air  BiPAP titrated to 23/19 cm  Noncompliant with CPAP/BiPAP  Use CPAP/BiPAP as needed in ICU, recommend close outpatient follow-up for BiPAP compliance check  He gets his usual care from the [facility] and his physician Dr. Faust recently left the practice by his report  Will need to establish with a new primary care physician    Essential hypertension-  (present on admission)  Assessment & Plan  Will continue on his home antihypertensives and follow blood pressure closely in ICU    Morbid obesity with BMI of 50.0-59.9, adult (HCC)- (present on admission)  Assessment & Plan  380 pounds, BMI 55  Increased risk for difficult depression    HLD (hyperlipidemia)- (present on admission)  Assessment & Plan  Continue statin       VTE:  Lovenox  Ulcer: H2 Antagonist  Lines: None    I have performed a physical exam and reviewed and updated ROS and Plan today (8/28/2020). In review of yesterday's note (8/27/2020), there are no changes except as documented above.       The patient remains critically ill.  I have assessed and reassessed the respiratory status and made adjustments based upon arterial blood gas analysis and airway mechanics.  I have assessed and reassessed the blood pressure, hemodynamics, cardiovascular status. This patient remains at high risk for worsening cardiopulmonary dysfunction and death without the above critical care interventions.      Discussed patient condition and risk of morbidity and/or mortality with RN, RT, Pharmacy and Charge nurse / hot rounds     Critical care time 35 minutes in directly providing and coordinating critical care and extensive data review.  No time overlap and excludes procedures.

## 2020-08-28 NOTE — ASSESSMENT & PLAN NOTE
S/P remdesivir from 8/30-9/3  S/P dexamethasone, 6 mg daily for 10 days  Continue full anticoagulation with Lovenox    Off all antibiotics per ID  Pulmonary status improving slowly  Gram neg lactose fermenting  on 9/26 trach aspirate

## 2020-08-28 NOTE — ASSESSMENT & PLAN NOTE
Intubated 8/29  All of the appropriate ventilator bundles are in place  Proning discontinued due to skin breakdown/injury  Continue vent support  Continue diuresis as tolerated  covid 19  On trach  Pending ltach  Continue t piece trial as tolerated

## 2020-08-28 NOTE — CONSULTS
Critical Care Consultation  (H&P)    Date of consult: 8/27/2020    Referring Physician  Mojgan Toro D.O.    Reason for Consultation  Respiratory distress, hypoxia, diffuse right-sided pneumonia, positive SARS-CoV-2 PCR    History of Presenting Illness  60 y.o. male who presented 8/27/2020 with increasing dyspnea and hypoxia.  He has a history of morbid obesity, hyperlipidemia, diabetes and obstructive sleep apnea, noncompliant with treatment.  He uses oxygen 2 L via nasal cannula for chronic hypoxia.  Today he called EMS for increasing dyspnea over the past 24 hours.  He reports he is had increased cough and yellow sputum production with small amount of blood in his sputum over the past 24 hours.  On EMS arrival patient was profoundly hypoxic with a saturation of 50%.  He was placed on nonrebreather and brought to the ED.  He desaturates very quickly on nonrebreather and is in moderate respiratory distress.  Chest x-ray showed diffuse right-sided pneumonia/consolidation.  He was treated for community-acquired pneumonia with ceftriaxone and azithromycin as well as IV methylprednisolone.  He denies fevers or chills.  He he lives alone and tells me has been taking appropriate precautions when he goes out of the house wearing his mask and has no known exposures to COVID-19.  He is a nondrinker and non-smoker.    Screening SARS-CoV-2 PCR came back positive in ED.  He received 600 cc of crystalloid and the sepsis fluid bolus that had been ordered was discontinued.  He is not currently hypotensive and appears relatively euvolemic.  Lactic acid 1.7.  With the positive COVID-19 test I decreased IV fluid, ordered dexamethasone 6 mg daily x10 days, will admit to isolation ICU.  He will be placed on high flow nasal cannula/BiPAP with high risk for requiring intubation.  We will follow-up inflammatory markers and initiate full dose anticoagulation if appropriate.    Code Status  Prior    Review of Systems  Review of Systems    Constitutional: Positive for malaise/fatigue. Negative for chills and fever.   HENT: Negative for sore throat.    Eyes: Negative for blurred vision.   Respiratory: Positive for cough, hemoptysis, sputum production, shortness of breath and wheezing. Negative for stridor.    Cardiovascular: Positive for leg swelling. Negative for chest pain.   Gastrointestinal: Negative for diarrhea, heartburn, nausea and vomiting.   Genitourinary: Negative for dysuria.   Musculoskeletal: Negative for back pain, falls and neck pain.   Neurological: Negative for dizziness and headaches.       Past Medical History   has a past medical history of Chickenpox, Diabetes (HCC), [place] measles, Hyperlipidemia, Mumps, Obesity, Sleep apnea, and Tonsillitis.    Surgical History   has a past surgical history that includes other orthopedic surgery; colonoscopy (N/A, 4/24/2019); and gastroscopy-endo (N/A, 4/24/2019).    Family History  family history includes Diabetes in his father and mother.    Social History   reports that he has never smoked. He has never used smokeless tobacco. He reports current alcohol use. He reports that he does not use drugs.    Medications  Home Medications    **Home medications have not yet been reviewed for this encounter**       Current Facility-Administered Medications   Medication Dose Route Frequency Provider Last Rate Last Dose   • cefTRIAXone (ROCEPHIN) 2 g in  mL IVPB  2 g Intravenous Once Mojgan Toro D.O. 200 mL/hr at 08/27/20 1708 2 g at 08/27/20 1708   • azithromycin (ZITHROMAX) 500 mg in D5W 250 mL IVPB premix  500 mg Intravenous Once Mojgan Toro D.O.         Current Outpatient Medications   Medication Sig Dispense Refill   • aspirin EC (ECOTRIN) 81 MG Tablet Delayed Response Take 81 mg by mouth every day.     • furosemide (LASIX) 20 MG Tab Take 40 mg by mouth every day.     • potassium chloride SA (K-DUR) 10 MEQ Tab CR Take 10 mEq by mouth every day.     • metFORMIN (GLUCOPHAGE) 500 MG Tab  Take 500 mg by mouth 2 times a day, with meals.     • terbinafine (LAMISIL) 250 MG Tab Take 250 mg by mouth every day.     • ferrous sulfate 325 (65 Fe) MG tablet Take 325 mg by mouth every day.     • folic acid (FOLVITE) 1 MG Tab Take 1 mg by mouth every day.     • atorvastatin (LIPITOR) 20 MG Tab Take 20 mg by mouth every day.     • HYDROcodone/acetaminophen (NORCO)  MG Tab Take 0.5-1 Tabs by mouth 3 times a day.     • lisinopril (PRINIVIL) 20 MG Tab Take 40 mg by mouth every day.     • ascorbic acid (VITAMIN C) 500 MG tablet Take 1 Tab by mouth every day. 30 Tab 2       Allergies  No Known Allergies    Vital Signs last 24 hours  Temp:  [37 °C (98.6 °F)-37.5 °C (99.5 °F)] 37.5 °C (99.5 °F)  Pulse:  [] 92  Resp:  [20-23] 23  BP: (134-152)/(77-82) 152/77  SpO2:  [61 %-97 %] 97 %    Physical Exam  Physical Exam  Vitals signs and nursing note reviewed.   Constitutional:       General: He is in acute distress.      Appearance: He is ill-appearing.      Comments: Moderate respiratory distress on nonrebreather mask   HENT:      Head: Normocephalic and atraumatic.      Mouth/Throat:      Mouth: Mucous membranes are moist.   Eyes:      Extraocular Movements: Extraocular movements intact.   Neck:      Musculoskeletal: Normal range of motion and neck supple. No neck rigidity.   Cardiovascular:      Rate and Rhythm: Regular rhythm. Tachycardia present.      Pulses: Normal pulses.      Heart sounds: No murmur.   Pulmonary:      Effort: Respiratory distress present.      Breath sounds: Wheezing present.      Comments: Diminished breath sounds throughout  Abdominal:      Tenderness: There is no abdominal tenderness. There is no guarding or rebound.      Comments: Obese, distended   Musculoskeletal:         General: Swelling present. No deformity.   Skin:     General: Skin is warm and dry.      Capillary Refill: Capillary refill takes less than 2 seconds.      Comments: Chronic hyperpigmentation of the lower  extremities consistent with stasis dermatitis   Neurological:      General: No focal deficit present.      Mental Status: He is alert and oriented to person, place, and time.      Cranial Nerves: No cranial nerve deficit.   Psychiatric:         Mood and Affect: Mood normal.         Fluids  No intake or output data in the 24 hours ending 20 1734    Laboratory  Recent Results (from the past 48 hour(s))   EKG    Collection Time: 20  3:50 PM   Result Value Ref Range    Report       [facility] Emergency Dept[facility]    Test Date:  2020  Pt Name:    JAELYN TAYLOR             Department: ER  MRN:        [id number]                      Room:       RD 02  Gender:     Male                         Technician: [id number]  :        [id number]                   Requested By:ER TRIAGE PROTOCOL  Order #:    [id number]                    Reading MD:    Measurements  Intervals                                Axis  Rate:       99                           P:          51  WI:         164                          QRS:        96  QRSD:       70                           T:          43  QT:         340  QTc:        437    Interpretive Statements  SINUS RHYTHM  BORDERLINE RIGHT AXIS DEVIATION  Compared to ECG 2020 14:19:54  No significant changes     COMP METABOLIC PANEL    Collection Time: 20  4:06 PM   Result Value Ref Range    Sodium 134 (L) 135 - 145 mmol/L    Potassium 4.9 3.6 - 5.5 mmol/L    Chloride 94 (L) 96 - 112 mmol/L    Co2 27 20 - 33 mmol/L    Anion Gap 13.0 7.0 - 16.0    Glucose 139 (H) 65 - 99 mg/dL    Bun 17 8 - 22 mg/dL    Creatinine 0.61 0.50 - 1.40 mg/dL    Calcium 8.1 (L) 8.5 - 10.5 mg/dL    AST(SGOT) 23 12 - 45 U/L    ALT(SGPT) 17 2 - 50 U/L    Alkaline Phosphatase 89 30 - 99 U/L    Total Bilirubin 1.0 0.1 - 1.5 mg/dL    Albumin 3.1 (L) 3.2 - 4.9 g/dL    Total Protein 6.9 6.0 - 8.2 g/dL    Globulin 3.8 (H) 1.9 - 3.5 g/dL    A-G Ratio 0.8 g/dL   Lactic acid (lactate)    Collection  Time: 08/27/20  4:06 PM   Result Value Ref Range    Lactic Acid 1.7 0.5 - 2.0 mmol/L   ESTIMATED GFR    Collection Time: 08/27/20  4:06 PM   Result Value Ref Range    GFR If African American >60 >60 mL/min/1.73 m 2    GFR If Non African American >60 >60 mL/min/1.73 m 2   COVID/SARS CoV-2 PCR    Collection Time: 08/27/20  5:10 PM    Specimen: Nasopharyngeal; Respirate   Result Value Ref Range    COVID Order Status Received    SARS-CoV-2, PCR (In-House)    Collection Time: 08/27/20  5:10 PM   Result Value Ref Range    SARS-CoV-2 Source NP Swab        Imaging  CT-CTA CHEST PULMONARY ARTERY W/ RECONS   Final Result         1. No CT evidence of pulmonary embolism.      2. Extensive airspace opacities in bilateral lungs, right more the left, in keeping with severe multifocal pneumonia.         DX-CHEST-PORTABLE (1 VIEW)   Final Result         Extensive airspace opacity throughout the right lung, in keeping with severe multifocal lobar pneumonia.          Assessment/Plan  * Pneumonia due to COVID-19 virus  Assessment & Plan  Decrease usual IV fluid resuscitation  Monitor respiratory status closely in ICU on high flow nasal cannula/BiPAP  Delayed intubation as tolerated  May trial self pronating  Dexamethasone 6 mg daily x10 days  Follow inflammatory markers, initiation of full dose anticoagulation if elevated  Follow clinical response, consider further therapy with Remdesivir, convalescent plasma as appropriate    Acute hypoxemic respiratory failure (HCC)- (present on admission)  Assessment & Plan  Secondary to COVID-19 pneumonia, +/- community-acquired/bacterial pneumonia  Multilobar dense consolidation of the right lung, no significant effusion  No evidence of pulmonary embolism  Transition to high flow nasal cannula, trial BiPAP as needed in ICU  High risk for requiring intubation but with COVID-19 infection will delay as long as possible  Empiric antibiotic therapy for community-acquired pneumonia with ceftriaxone  azithromycin  Blood cultures x2 and sputum culture    Diabetes type 2, controlled (HCC)- (present on admission)  Assessment & Plan  Glycemic control with insulin in ICU, may be exacerbated by glucocorticoids   Hold metformin  Glycohemoglobin    ABELARDO (obstructive sleep apnea)- (present on admission)  Assessment & Plan  Polysomnography on January 29, 2019 AHI = 101.9, SpO2 hortensia 50% on room air  BiPAP titrated to 23/19 cm  Noncompliant with CPAP/BiPAP  Use CPAP/BiPAP as needed in ICU, recommend close outpatient follow-up for BiPAP compliance check  He gets his usual care from the [facility] and his physician Dr. Faust recently left the practice by his report  Will need to establish with a new primary care physician    Essential hypertension- (present on admission)  Assessment & Plan  Will continue on his home antihypertensives and follow blood pressure closely in ICU    Morbid obesity with BMI of 50.0-59.9, adult (HCC)- (present on admission)  Assessment & Plan  380 pounds, BMI 55  Increased risk for difficult depression    HLD (hyperlipidemia)- (present on admission)  Assessment & Plan  Continue statin      Discussed patient condition and risk of morbidity and/or mortality with RN, RT, Pharmacy and ERP.    The patient remains critically ill.  Critical care time = 80 minutes in directly providing and coordinating critical care and extensive data review.  No time overlap and excludes procedures.

## 2020-08-29 ENCOUNTER — APPOINTMENT (OUTPATIENT)
Dept: RADIOLOGY | Facility: MEDICAL CENTER | Age: 60
DRG: 004 | End: 2020-08-29
Attending: INTERNAL MEDICINE
Payer: MEDICARE

## 2020-08-29 LAB
ACTION RANGE TRIGGERED IACRT: YES
ACTION RANGE TRIGGERED IACRT: YES
ALBUMIN SERPL BCP-MCNC: 2.8 G/DL (ref 3.2–4.9)
ALP SERPL-CCNC: 80 U/L (ref 30–99)
ALT SERPL-CCNC: 14 U/L (ref 2–50)
ANION GAP SERPL CALC-SCNC: 8 MMOL/L (ref 7–16)
APTT PPP: 47.6 SEC (ref 24.7–36)
AST SERPL-CCNC: 22 U/L (ref 12–45)
BASE EXCESS BLDA CALC-SCNC: 3 MMOL/L (ref -4–3)
BASE EXCESS BLDA CALC-SCNC: 5 MMOL/L (ref -4–3)
BASOPHILS # BLD AUTO: 0.3 % (ref 0–1.8)
BASOPHILS # BLD: 0.02 K/UL (ref 0–0.12)
BILIRUB CONJ SERPL-MCNC: 0.5 MG/DL (ref 0.1–0.5)
BILIRUB INDIRECT SERPL-MCNC: 0.4 MG/DL (ref 0–1)
BILIRUB SERPL-MCNC: 0.9 MG/DL (ref 0.1–1.5)
BODY TEMPERATURE: ABNORMAL DEGREES
BODY TEMPERATURE: ABNORMAL DEGREES
BUN SERPL-MCNC: 19 MG/DL (ref 8–22)
CALCIUM SERPL-MCNC: 8.3 MG/DL (ref 8.5–10.5)
CHLORIDE SERPL-SCNC: 96 MMOL/L (ref 96–112)
CK SERPL-CCNC: 65 U/L (ref 0–154)
CO2 BLDA-SCNC: 35 MMOL/L (ref 20–33)
CO2 BLDA-SCNC: 35 MMOL/L (ref 20–33)
CO2 SERPL-SCNC: 32 MMOL/L (ref 20–33)
CREAT SERPL-MCNC: 0.55 MG/DL (ref 0.5–1.4)
CRP SERPL HS-MCNC: 11.1 MG/DL (ref 0–0.75)
D DIMER PPP IA.FEU-MCNC: 1.4 UG/ML (FEU) (ref 0–0.5)
EOSINOPHIL # BLD AUTO: 0 K/UL (ref 0–0.51)
EOSINOPHIL NFR BLD: 0 % (ref 0–6.9)
ERYTHROCYTE [DISTWIDTH] IN BLOOD BY AUTOMATED COUNT: 64.3 FL (ref 35.9–50)
FERRITIN SERPL-MCNC: 145 NG/ML (ref 22–322)
FIBRINOGEN PPP-MCNC: 599 MG/DL (ref 215–460)
GLUCOSE BLD-MCNC: 111 MG/DL (ref 65–99)
GLUCOSE BLD-MCNC: 118 MG/DL (ref 65–99)
GLUCOSE BLD-MCNC: 134 MG/DL (ref 65–99)
GLUCOSE BLD-MCNC: 140 MG/DL (ref 65–99)
GLUCOSE BLD-MCNC: 151 MG/DL (ref 65–99)
GLUCOSE BLD-MCNC: 83 MG/DL (ref 65–99)
GLUCOSE SERPL-MCNC: 132 MG/DL (ref 65–99)
GRAM STN SPEC: NORMAL
HCO3 BLDA-SCNC: 32.9 MMOL/L (ref 17–25)
HCO3 BLDA-SCNC: 33.4 MMOL/L (ref 17–25)
HCT VFR BLD AUTO: 61.7 % (ref 42–52)
HGB BLD-MCNC: 19.3 G/DL (ref 14–18)
HOROWITZ INDEX BLDA+IHG-RTO: 43 MM[HG]
HOROWITZ INDEX BLDA+IHG-RTO: 62 MM[HG]
IMM GRANULOCYTES # BLD AUTO: 0.07 K/UL (ref 0–0.11)
IMM GRANULOCYTES NFR BLD AUTO: 1 % (ref 0–0.9)
INR PPP: 1.14 (ref 0.87–1.13)
INST. QUALIFIED PATIENT IIQPT: YES
INST. QUALIFIED PATIENT IIQPT: YES
LDH SERPL L TO P-CCNC: 327 U/L (ref 107–266)
LYMPHOCYTES # BLD AUTO: 0.23 K/UL (ref 1–4.8)
LYMPHOCYTES NFR BLD: 3.2 % (ref 22–41)
MAGNESIUM SERPL-MCNC: 2.1 MG/DL (ref 1.5–2.5)
MCH RBC QN AUTO: 31.2 PG (ref 27–33)
MCHC RBC AUTO-ENTMCNC: 31.3 G/DL (ref 33.7–35.3)
MCV RBC AUTO: 99.8 FL (ref 81.4–97.8)
MONOCYTES # BLD AUTO: 0.3 K/UL (ref 0–0.85)
MONOCYTES NFR BLD AUTO: 4.2 % (ref 0–13.4)
NEUTROPHILS # BLD AUTO: 6.5 K/UL (ref 1.82–7.42)
NEUTROPHILS NFR BLD: 91.3 % (ref 44–72)
NRBC # BLD AUTO: 0.26 K/UL
NRBC BLD-RTO: 3.7 /100 WBC
NT-PROBNP SERPL IA-MCNC: 291 PG/ML (ref 0–125)
O2/TOTAL GAS SETTING VFR VENT: 100 %
O2/TOTAL GAS SETTING VFR VENT: 100 %
PCO2 BLDA: 58.9 MMHG (ref 26–37)
PCO2 BLDA: 68.2 MMHG (ref 26–37)
PCO2 TEMP ADJ BLDA: 59.4 MMHG (ref 26–37)
PCO2 TEMP ADJ BLDA: 68.2 MMHG (ref 26–37)
PH BLDA: 7.3 [PH] (ref 7.4–7.5)
PH BLDA: 7.36 [PH] (ref 7.4–7.5)
PH TEMP ADJ BLDA: 7.3 [PH] (ref 7.4–7.5)
PH TEMP ADJ BLDA: 7.35 [PH] (ref 7.4–7.5)
PHOSPHATE SERPL-MCNC: 2.9 MG/DL (ref 2.5–4.5)
PLATELET # BLD AUTO: 83 K/UL (ref 164–446)
PMV BLD AUTO: 11.3 FL (ref 9–12.9)
PO2 BLDA: 43 MMHG (ref 64–87)
PO2 BLDA: 62 MMHG (ref 64–87)
PO2 TEMP ADJ BLDA: 43 MMHG (ref 64–87)
PO2 TEMP ADJ BLDA: 63 MMHG (ref 64–87)
POTASSIUM SERPL-SCNC: 5.5 MMOL/L (ref 3.6–5.5)
PROCALCITONIN SERPL-MCNC: 0.05 NG/ML
PROT SERPL-MCNC: 6.6 G/DL (ref 6–8.2)
PROTHROMBIN TIME: 15 SEC (ref 12–14.6)
RBC # BLD AUTO: 6.18 M/UL (ref 4.7–6.1)
SAO2 % BLDA: 71 % (ref 93–99)
SAO2 % BLDA: 90 % (ref 93–99)
SIGNIFICANT IND 70042: NORMAL
SITE SITE: NORMAL
SODIUM SERPL-SCNC: 136 MMOL/L (ref 135–145)
SOURCE SOURCE: NORMAL
SPECIMEN DRAWN FROM PATIENT: ABNORMAL
SPECIMEN DRAWN FROM PATIENT: ABNORMAL
TROPONIN T SERPL-MCNC: 8 NG/L (ref 6–19)
WBC # BLD AUTO: 7.1 K/UL (ref 4.8–10.8)

## 2020-08-29 PROCEDURE — 83735 ASSAY OF MAGNESIUM: CPT

## 2020-08-29 PROCEDURE — 85730 THROMBOPLASTIN TIME PARTIAL: CPT

## 2020-08-29 PROCEDURE — 92950 HEART/LUNG RESUSCITATION CPR: CPT

## 2020-08-29 PROCEDURE — A9270 NON-COVERED ITEM OR SERVICE: HCPCS | Performed by: INTERNAL MEDICINE

## 2020-08-29 PROCEDURE — 700111 HCHG RX REV CODE 636 W/ 250 OVERRIDE (IP): Performed by: INTERNAL MEDICINE

## 2020-08-29 PROCEDURE — 94002 VENT MGMT INPAT INIT DAY: CPT

## 2020-08-29 PROCEDURE — 80048 BASIC METABOLIC PNL TOTAL CA: CPT

## 2020-08-29 PROCEDURE — 770022 HCHG ROOM/CARE - ICU (200)

## 2020-08-29 PROCEDURE — 83615 LACTATE (LD) (LDH) ENZYME: CPT

## 2020-08-29 PROCEDURE — 700101 HCHG RX REV CODE 250: Performed by: INTERNAL MEDICINE

## 2020-08-29 PROCEDURE — 71045 X-RAY EXAM CHEST 1 VIEW: CPT

## 2020-08-29 PROCEDURE — 82728 ASSAY OF FERRITIN: CPT

## 2020-08-29 PROCEDURE — 99291 CRITICAL CARE FIRST HOUR: CPT | Mod: 25 | Performed by: INTERNAL MEDICINE

## 2020-08-29 PROCEDURE — 84484 ASSAY OF TROPONIN QUANT: CPT

## 2020-08-29 PROCEDURE — 700105 HCHG RX REV CODE 258: Performed by: INTERNAL MEDICINE

## 2020-08-29 PROCEDURE — 36556 INSERT NON-TUNNEL CV CATH: CPT | Mod: RT | Performed by: INTERNAL MEDICINE

## 2020-08-29 PROCEDURE — 700102 HCHG RX REV CODE 250 W/ 637 OVERRIDE(OP): Performed by: INTERNAL MEDICINE

## 2020-08-29 PROCEDURE — 82962 GLUCOSE BLOOD TEST: CPT

## 2020-08-29 PROCEDURE — 85610 PROTHROMBIN TIME: CPT

## 2020-08-29 PROCEDURE — 94770 HCHG CO2 EXPIRED GAS DETERMINATION: CPT

## 2020-08-29 PROCEDURE — 3E043XZ INTRODUCTION OF VASOPRESSOR INTO CENTRAL VEIN, PERCUTANEOUS APPROACH: ICD-10-PCS | Performed by: INTERNAL MEDICINE

## 2020-08-29 PROCEDURE — 83880 ASSAY OF NATRIURETIC PEPTIDE: CPT

## 2020-08-29 PROCEDURE — 85025 COMPLETE CBC W/AUTO DIFF WBC: CPT

## 2020-08-29 PROCEDURE — 36600 WITHDRAWAL OF ARTERIAL BLOOD: CPT

## 2020-08-29 PROCEDURE — 82803 BLOOD GASES ANY COMBINATION: CPT

## 2020-08-29 PROCEDURE — 84100 ASSAY OF PHOSPHORUS: CPT

## 2020-08-29 PROCEDURE — 36556 INSERT NON-TUNNEL CV CATH: CPT | Performed by: INTERNAL MEDICINE

## 2020-08-29 PROCEDURE — 85384 FIBRINOGEN ACTIVITY: CPT

## 2020-08-29 PROCEDURE — 87070 CULTURE OTHR SPECIMN AEROBIC: CPT

## 2020-08-29 PROCEDURE — 82550 ASSAY OF CK (CPK): CPT

## 2020-08-29 PROCEDURE — 700101 HCHG RX REV CODE 250

## 2020-08-29 PROCEDURE — 302214 HCHG STAT INTUBATION BOX: Performed by: INTERNAL MEDICINE

## 2020-08-29 PROCEDURE — 85379 FIBRIN DEGRADATION QUANT: CPT

## 2020-08-29 PROCEDURE — 84145 PROCALCITONIN (PCT): CPT

## 2020-08-29 PROCEDURE — 99292 CRITICAL CARE ADDL 30 MIN: CPT | Mod: 25 | Performed by: INTERNAL MEDICINE

## 2020-08-29 PROCEDURE — 700111 HCHG RX REV CODE 636 W/ 250 OVERRIDE (IP)

## 2020-08-29 PROCEDURE — 31500 INSERT EMERGENCY AIRWAY: CPT | Performed by: INTERNAL MEDICINE

## 2020-08-29 PROCEDURE — 94640 AIRWAY INHALATION TREATMENT: CPT

## 2020-08-29 PROCEDURE — 83520 IMMUNOASSAY QUANT NOS NONAB: CPT

## 2020-08-29 PROCEDURE — 80076 HEPATIC FUNCTION PANEL: CPT

## 2020-08-29 PROCEDURE — 5A1955Z RESPIRATORY VENTILATION, GREATER THAN 96 CONSECUTIVE HOURS: ICD-10-PCS | Performed by: INTERNAL MEDICINE

## 2020-08-29 PROCEDURE — 87205 SMEAR GRAM STAIN: CPT

## 2020-08-29 PROCEDURE — 0BH18EZ INSERTION OF ENDOTRACHEAL AIRWAY INTO TRACHEA, VIA NATURAL OR ARTIFICIAL OPENING ENDOSCOPIC: ICD-10-PCS | Performed by: INTERNAL MEDICINE

## 2020-08-29 PROCEDURE — 302978 HCHG BRONCHOSCOPY-DIAGNOSTIC

## 2020-08-29 PROCEDURE — 86140 C-REACTIVE PROTEIN: CPT

## 2020-08-29 PROCEDURE — 02HV33Z INSERTION OF INFUSION DEVICE INTO SUPERIOR VENA CAVA, PERCUTANEOUS APPROACH: ICD-10-PCS | Performed by: INTERNAL MEDICINE

## 2020-08-29 RX ORDER — BISACODYL 10 MG
10 SUPPOSITORY, RECTAL RECTAL
Status: DISCONTINUED | OUTPATIENT
Start: 2020-08-29 | End: 2020-09-26

## 2020-08-29 RX ORDER — FAMOTIDINE 20 MG/1
20 TABLET, FILM COATED ORAL EVERY 12 HOURS
Status: DISCONTINUED | OUTPATIENT
Start: 2020-08-29 | End: 2020-09-28

## 2020-08-29 RX ORDER — ROCURONIUM BROMIDE 10 MG/ML
INJECTION, SOLUTION INTRAVENOUS
Status: COMPLETED
Start: 2020-08-29 | End: 2020-08-29

## 2020-08-29 RX ORDER — ROCURONIUM BROMIDE 10 MG/ML
0.6 INJECTION, SOLUTION INTRAVENOUS ONCE
Status: COMPLETED | OUTPATIENT
Start: 2020-08-29 | End: 2020-08-29

## 2020-08-29 RX ORDER — ROCURONIUM BROMIDE 10 MG/ML
150 INJECTION, SOLUTION INTRAVENOUS ONCE
Status: COMPLETED | OUTPATIENT
Start: 2020-08-29 | End: 2020-08-29

## 2020-08-29 RX ORDER — AMOXICILLIN 250 MG
2 CAPSULE ORAL 2 TIMES DAILY
Status: DISCONTINUED | OUTPATIENT
Start: 2020-08-29 | End: 2020-09-26

## 2020-08-29 RX ORDER — IPRATROPIUM BROMIDE AND ALBUTEROL SULFATE 2.5; .5 MG/3ML; MG/3ML
3 SOLUTION RESPIRATORY (INHALATION)
Status: DISCONTINUED | OUTPATIENT
Start: 2020-08-29 | End: 2020-09-28 | Stop reason: HOSPADM

## 2020-08-29 RX ORDER — NOREPINEPHRINE BITARTRATE 0.03 MG/ML
0-30 INJECTION, SOLUTION INTRAVENOUS CONTINUOUS
Status: DISCONTINUED | OUTPATIENT
Start: 2020-08-29 | End: 2020-09-03

## 2020-08-29 RX ORDER — POLYETHYLENE GLYCOL 3350 17 G/17G
1 POWDER, FOR SOLUTION ORAL
Status: DISCONTINUED | OUTPATIENT
Start: 2020-08-29 | End: 2020-09-26

## 2020-08-29 RX ORDER — ROCURONIUM BROMIDE 10 MG/ML
0.6 INJECTION, SOLUTION INTRAVENOUS
Status: DISCONTINUED | OUTPATIENT
Start: 2020-08-29 | End: 2020-09-01

## 2020-08-29 RX ORDER — ETOMIDATE 2 MG/ML
10 INJECTION INTRAVENOUS ONCE
Status: COMPLETED | OUTPATIENT
Start: 2020-08-29 | End: 2020-08-29

## 2020-08-29 RX ADMIN — ROCURONIUM BROMIDE 4 MCG/KG/MIN: 10 SOLUTION INTRAVENOUS at 14:51

## 2020-08-29 RX ADMIN — INSULIN HUMAN 3 UNITS: 100 INJECTION, SOLUTION PARENTERAL at 13:50

## 2020-08-29 RX ADMIN — ROCURONIUM BROMIDE 150 MG: 10 INJECTION, SOLUTION INTRAVENOUS at 12:25

## 2020-08-29 RX ADMIN — DEXAMETHASONE SODIUM PHOSPHATE 6 MG: 4 INJECTION, SOLUTION INTRA-ARTICULAR; INTRALESIONAL; INTRAMUSCULAR; INTRAVENOUS; SOFT TISSUE at 05:35

## 2020-08-29 RX ADMIN — AMLODIPINE BESYLATE 10 MG: 10 TABLET ORAL at 05:34

## 2020-08-29 RX ADMIN — FAMOTIDINE 20 MG: 10 INJECTION INTRAVENOUS at 17:19

## 2020-08-29 RX ADMIN — ETOMIDATE 10 MG: 20 INJECTION, SOLUTION INTRAVENOUS at 12:24

## 2020-08-29 RX ADMIN — PROPOFOL 20 MCG/KG/MIN: 10 INJECTION, EMULSION INTRAVENOUS at 13:06

## 2020-08-29 RX ADMIN — ROCURONIUM BROMIDE 150 MG: 10 INJECTION, SOLUTION INTRAVENOUS at 13:30

## 2020-08-29 RX ADMIN — ASPIRIN 81 MG: 81 TABLET, COATED ORAL at 05:34

## 2020-08-29 RX ADMIN — MINERAL OIL, PETROLATUM 1 APPLICATION: 425; 573 OINTMENT OPHTHALMIC at 20:51

## 2020-08-29 RX ADMIN — PROPOFOL 20 MCG/KG/MIN: 10 INJECTION, EMULSION INTRAVENOUS at 21:05

## 2020-08-29 RX ADMIN — SODIUM CHLORIDE, POTASSIUM CHLORIDE, SODIUM LACTATE AND CALCIUM CHLORIDE: 600; 310; 30; 20 INJECTION, SOLUTION INTRAVENOUS at 13:46

## 2020-08-29 RX ADMIN — ONDANSETRON 4 MG: 2 INJECTION INTRAMUSCULAR; INTRAVENOUS at 05:52

## 2020-08-29 RX ADMIN — CEFTRIAXONE SODIUM 2 G: 2 INJECTION, POWDER, FOR SOLUTION INTRAMUSCULAR; INTRAVENOUS at 17:19

## 2020-08-29 RX ADMIN — PROPOFOL 20 MCG/KG/MIN: 10 INJECTION, EMULSION INTRAVENOUS at 18:06

## 2020-08-29 RX ADMIN — AZITHROMYCIN MONOHYDRATE 500 MG: 250 TABLET ORAL at 17:19

## 2020-08-29 RX ADMIN — ACETAMINOPHEN 650 MG: 325 TABLET, FILM COATED ORAL at 01:58

## 2020-08-29 RX ADMIN — MINERAL OIL, PETROLATUM 1 APPLICATION: 425; 573 OINTMENT OPHTHALMIC at 14:00

## 2020-08-29 RX ADMIN — ATORVASTATIN CALCIUM 20 MG: 20 TABLET, FILM COATED ORAL at 05:34

## 2020-08-29 RX ADMIN — FENTANYL CITRATE 50 MCG: 50 INJECTION INTRAMUSCULAR; INTRAVENOUS at 12:35

## 2020-08-29 RX ADMIN — HYDROCODONE BITARTRATE AND ACETAMINOPHEN 1 TABLET: 10; 325 TABLET ORAL at 01:58

## 2020-08-29 RX ADMIN — ENOXAPARIN SODIUM 60 MG: 60 INJECTION SUBCUTANEOUS at 05:35

## 2020-08-29 RX ADMIN — Medication 100 MCG/HR: at 13:09

## 2020-08-29 RX ADMIN — ROCURONIUM BROMIDE 6 MCG/KG/MIN: 10 SOLUTION INTRAVENOUS at 20:44

## 2020-08-29 RX ADMIN — DOCUSATE SODIUM 50 MG AND SENNOSIDES 8.6 MG 2 TABLET: 8.6; 5 TABLET, FILM COATED ORAL at 17:19

## 2020-08-29 RX ADMIN — ENOXAPARIN SODIUM 150 MG: 150 INJECTION SUBCUTANEOUS at 14:51

## 2020-08-29 RX ADMIN — ROCURONIUM BROMIDE 106.1 MG: 10 INJECTION, SOLUTION INTRAVENOUS at 20:27

## 2020-08-29 ASSESSMENT — COGNITIVE AND FUNCTIONAL STATUS - GENERAL
TOILETING: A LITTLE
MOVING FROM LYING ON BACK TO SITTING ON SIDE OF FLAT BED: A LITTLE
STANDING UP FROM CHAIR USING ARMS: A LITTLE
DRESSING REGULAR LOWER BODY CLOTHING: A LITTLE
MOBILITY SCORE: 13
EATING MEALS: A LOT
PERSONAL GROOMING: A LITTLE
TURNING FROM BACK TO SIDE WHILE IN FLAT BAD: A LOT
SUGGESTED CMS G CODE MODIFIER DAILY ACTIVITY: CK
SUGGESTED CMS G CODE MODIFIER MOBILITY: CL
CLIMB 3 TO 5 STEPS WITH RAILING: A LOT
MOVING TO AND FROM BED TO CHAIR: A LOT
WALKING IN HOSPITAL ROOM: TOTAL
DAILY ACTIVITIY SCORE: 17
DRESSING REGULAR UPPER BODY CLOTHING: A LITTLE
HELP NEEDED FOR BATHING: A LITTLE

## 2020-08-29 ASSESSMENT — LIFESTYLE VARIABLES
EVER_SMOKED: NEVER
TOTAL SCORE: 0
ON A TYPICAL DAY WHEN YOU DRINK ALCOHOL HOW MANY DRINKS DO YOU HAVE: 0
AVERAGE NUMBER OF DAYS PER WEEK YOU HAVE A DRINK CONTAINING ALCOHOL: 0
CONSUMPTION TOTAL: NEGATIVE
DOES PATIENT WANT TO STOP DRINKING: CANNOT ASSESS
TOTAL SCORE: 0
EVER FELT BAD OR GUILTY ABOUT YOUR DRINKING: NO
HOW MANY TIMES IN THE PAST YEAR HAVE YOU HAD 5 OR MORE DRINKS IN A DAY: 0
TOTAL SCORE: 0
HAVE YOU EVER FELT YOU SHOULD CUT DOWN ON YOUR DRINKING: NO
HAVE PEOPLE ANNOYED YOU BY CRITICIZING YOUR DRINKING: NO
EVER HAD A DRINK FIRST THING IN THE MORNING TO STEADY YOUR NERVES TO GET RID OF A HANGOVER: NO
ALCOHOL_USE: NO

## 2020-08-29 ASSESSMENT — PATIENT HEALTH QUESTIONNAIRE - PHQ9
2. FEELING DOWN, DEPRESSED, IRRITABLE, OR HOPELESS: NOT AT ALL
1. LITTLE INTEREST OR PLEASURE IN DOING THINGS: NOT AT ALL
SUM OF ALL RESPONSES TO PHQ9 QUESTIONS 1 AND 2: 0

## 2020-08-29 ASSESSMENT — FIBROSIS 4 INDEX: FIB4 SCORE: 3.83

## 2020-08-29 NOTE — PROGRESS NOTES
Consent obtained from daughter Cherry. Pt lined, intubated at 1245 and proned at 1345. Tube remained in place during proning process, sats increased to 90%. Regular heart rate and rhythm, blood pressures held. Levo order placed in case needed. MD almodovar ordered it was ok to start prop drip at 20 and fent at 100. Quan had been pushed during entire process and baseline TOF not obtained before paralytics started. No events during proning, daughter Cherry updated on status and all questions answered.

## 2020-08-29 NOTE — PROGRESS NOTES
MD Crowe notified of pt's decrease in mental status, only oriented to self now. Cannot stay awake during conversations, grunting and abdominal muscle usage present. RN spoke with daughter Cherry about situation and passed phone to MD Crowe, pt is full code and when told he may require mechanical ventilation he agreed.

## 2020-08-29 NOTE — PROGRESS NOTES
At 1030 daughter Cherry was able to come visit pt per end of life policy. PPE donned appropriately and stayed on through entire time in room. Daughter signed consents at bedside after visiting and spoke with Dr. Crowe, all questions answered. Stated we would update her with any change in status and how things are going.

## 2020-08-29 NOTE — PROGRESS NOTES
1830: This RN walked by patient room, patient not in bed, not wearing HFNC, not attached to EKG leads, pulse ox, or BP cuff, and IV tubing/Briseno tubing on ground.  This RN applied PPE and entered bathroom, where trail of blood and feces were leading. Pt found to be sitting on toilet with feces on ground and blood dripping down arm. This RN called for help; called for oxygen tank, non-rebreather, mobile pulse ox, and extra RNs and RTs.  Patient placed on 25L NRB, SpO2 initially 51%, up to 84% within less than a minute of oxygen administration.  Patient cleaned and walked back to bed. Patient back on HFNC, 50L/100% saturating at 93% within two minutes. MD outside of room observing during entire episode, ready to intervene if necessary.  Patient instructed not to get out of bed and re-educated on importance of using call light to get RN when needed.

## 2020-08-29 NOTE — PROGRESS NOTES
Critical Care Progress Note    Date of admission  8/27/2020    Chief Complaint  60 y.o. male who presented 8/27/2020 with increasing dyspnea and hypoxia.  He has a history of morbid obesity, hyperlipidemia, diabetes and obstructive sleep apnea, noncompliant with treatment.  He uses oxygen 2 L via nasal cannula for chronic hypoxia.  Today he called EMS for increasing dyspnea over the past 24 hours.  He reports he is had increased cough and yellow sputum production with small amount of blood in his sputum over the past 24 hours.  On EMS arrival patient was profoundly hypoxic with a saturation of 50%.  He was placed on nonrebreather and brought to the ED.  He desaturates very quickly on nonrebreather and is in moderate respiratory distress.  Chest x-ray showed diffuse right-sided pneumonia/consolidation.  He was treated for community-acquired pneumonia with ceftriaxone and azithromycin as well as IV methylprednisolone.  He denies fevers or chills.  He he lives alone and tells me has been taking appropriate precautions when he goes out of the house wearing his mask and has no known exposures to COVID-19.  He is a nondrinker and non-smoker.     Screening SARS-CoV-2 PCR came back positive in ED.  He received 600 cc of crystalloid and the sepsis fluid bolus that had been ordered was discontinued.  He is not currently hypotensive and appears relatively euvolemic.  Lactic acid 1.7.  With the positive COVID-19 test I decreased IV fluid, ordered dexamethasone 6 mg daily x10 days, will admit to isolation ICU.  He will be placed on high flow nasal cannula/BiPAP with high risk for requiring intubation.  We will follow-up inflammatory markers and initiate full dose anticoagulation if appropriate.    Hospital Course   8/29-worsening mental status, rapid shallow breathing and hypoxia required endotracheal intubation and prone positioning.        Interval Progress  Reviewed last 24 hour events:              - HFNC 60 L/min 100% FiO2     - RR 16-31              - Tm: 37.6              - HR: 80s              - SBP: 120-140s              - Neuro: Somnolent, oriented only to self.  Mumbled speech.              - GI: N.p.o.              - UOP: 1.2L              - Briseno: Yes              - Lines: PIV              - PPx: No GI, Lovenox              - CXR (personally reviewed):               - Antibiotic Day, C3, Azithromycin          Review of Systems  Review of Systems   Unable to perform ROS: Medical condition        Vital Signs for last 24 hours   Temp:  [36.2 °C (97.1 °F)-37.5 °C (99.5 °F)] 37.2 °C (99 °F)  Pulse:  [] 84  Resp:  [16-90] 34  BP: ()/(49-88) 92/49  SpO2:  [80 %-93 %] 91 %    Hemodynamic parameters for last 24 hours       Respiratory Information for the last 24 hours       Physical Exam   Physical Exam  Constitutional:       General: He is in acute distress.      Appearance: He is ill-appearing.   HENT:      Mouth/Throat:      Mouth: Mucous membranes are moist.   Eyes:      Pupils: Pupils are equal, round, and reactive to light.   Neck:      Musculoskeletal: Neck supple.   Cardiovascular:      Rate and Rhythm: Regular rhythm.      Heart sounds: No murmur. No friction rub. No gallop.    Pulmonary:      Effort: Respiratory distress present.      Breath sounds: Rhonchi present.      Comments: Rapid shallow breathing with grunting.  Abdominal:      General: There is no distension.      Tenderness: There is no abdominal tenderness.      Comments: Obese    Musculoskeletal:      Right lower leg: Edema present.      Left lower leg: Edema present.   Skin:     General: Skin is warm and dry.   Neurological:      General: No focal deficit present.      Comments: Somnolent opens eyes to voice mumbled speech.  Oriented only to his name.  Profoundly weak   Psychiatric:      Comments: Unable to assess         Medications  Current Facility-Administered Medications   Medication Dose Route Frequency Provider Last Rate Last Dose   • artificial  tears (EYE LUBRICANT) ophth ointment 1 Application  1 Application Both Eyes Q8HRS Harjinder Crowe M.D.       • propofol (DIPRIVAN) injection  0-80 mcg/kg/min Intravenous Continuous Harjinder Crowe M.D. 21.2 mL/hr at 08/29/20 1306 20 mcg/kg/min at 08/29/20 1306   • fentaNYL (SUBLIMAZE) 50 mcg/mL in 50mL (Continuous Infusion)   Intravenous Continuous Harjinder Crowe M.D. 2 mL/hr at 08/29/20 1309 100 mcg/hr at 08/29/20 1309   • rocuronium (ZEMURON) injection 106.1 mg  0.6 mg/kg Intravenous Once Harjinder Crowe M.D.       • rocuronium (ZEMURON) 250 mg in  mL Infusion  0-16 mcg/kg/min Intravenous Continuous Harjinder Crowe M.D.       • rocuronium (ZEMURON) injection 106.1 mg  0.6 mg/kg Intravenous Q2HRS PRN Harjinder Crowe M.D.       • ROCURONIUM BROMIDE 50 MG/5ML IV SOLN            • enoxaparin (LOVENOX) injection 150 mg  150 mg Subcutaneous Q12HRS Harjinder Crowe M.D.       • norepinephrine (Levophed) infusion 8 mg/250 mL (premix)  0-30 mcg/min Intravenous Continuous Harjinder Crowe M.D.       • rocuronium (ZEMURON) injection 150 mg  150 mg Intravenous Once Harjinder Crowe M.D.       • benzonatate (TESSALON) capsule 100 mg  100 mg Oral TID PRN José Watters M.D.   100 mg at 08/28/20 2318   • amLODIPine (NORVASC) tablet 10 mg  10 mg Oral DAILY Nii Rivera M.D.   10 mg at 08/29/20 0534   • aspirin EC (ECOTRIN) tablet 81 mg  81 mg Oral DAILY Nii Rivera M.D.   81 mg at 08/29/20 0534   • atorvastatin (LIPITOR) tablet 20 mg  20 mg Oral DAILY Nii Rivera M.D.   20 mg at 08/29/20 0534   • HYDROcodone/acetaminophen (NORCO)  MG per tablet 1 Tab  1 Tab Oral Q6HRS PRN Nii Rivera M.D.   1 Tab at 08/29/20 0158   • senna-docusate (PERICOLACE or SENOKOT S) 8.6-50 MG per tablet 2 Tab  2 Tab Oral BID Nii Rivera M.D.   Stopped at 08/29/20 0600    And   • polyethylene glycol/lytes (MIRALAX) PACKET 1 Packet  1 Packet Oral QDAY PRN Nii Rivera M.D.        And   • magnesium hydroxide (MILK OF  MAGNESIA) suspension 30 mL  30 mL Oral QDAY PRN Nii Rivera M.D.        And   • bisacodyl (DULCOLAX) suppository 10 mg  10 mg Rectal QDAY PRN Nii Rivera M.D.       • Respiratory Therapy Consult   Nebulization Continuous RT Nii Rivera M.D.       • lactated ringers infusion (BOLUS): BMI greater than 30  30 mL/kg (Ideal) Intravenous Once PRN Nii Rivera M.D.   Stopped at 08/27/20 1835   • lactated ringers infusion   Intravenous Continuous Nii Rivera M.D. 75 mL/hr at 08/29/20 1346     • acetaminophen (TYLENOL) tablet 650 mg  650 mg Oral Q6HRS PRN iNi Rivera M.D.   650 mg at 08/29/20 0158   • cefTRIAXone (ROCEPHIN) 2 g in  mL IVPB  2 g Intravenous Q24HRS Nii Rivera M.D.   Stopped at 08/28/20 1737   • azithromycin (ZITHROMAX) tablet 500 mg  500 mg Oral DAILY Nii Rivera M.D.   500 mg at 08/28/20 1707   • labetalol (NORMODYNE/TRANDATE) injection 10-20 mg  10-20 mg Intravenous Q4HRS PRN Nii Rivera M.D.       • ondansetron (ZOFRAN) syringe/vial injection 4 mg  4 mg Intravenous Q4HRS PRN Nii Rivera M.D.   4 mg at 08/29/20 0552   • ondansetron (ZOFRAN ODT) dispertab 4 mg  4 mg Oral Q4HRS PRN Nii Rivera M.D.       • promethazine (PHENERGAN) tablet 12.5-25 mg  12.5-25 mg Oral Q4HRS PRN Nii Rivera M.D.       • promethazine (PHENERGAN) suppository 12.5-25 mg  12.5-25 mg Rectal Q4HRS PRN Nii Rivera M.D.       • prochlorperazine (COMPAZINE) injection 5-10 mg  5-10 mg Intravenous Q4HRS PRN Nii Rivera M.D.       • insulin regular (HumuLIN R,NovoLIN R) injection  3-14 Units Subcutaneous Q6HRS Nii Rivera M.D.   3 Units at 08/29/20 1350    And   • glucose 4 g chewable tablet 16 g  16 g Oral Q15 MIN PRN Nii Rivera M.D.        And   • dextrose 50% (D50W) injection 50 mL  50 mL Intravenous Q15 MIN PRN Nii Rivera M.D.       • dexamethasone (DECADRON) injection 6 mg  6 mg Intravenous DAILY Nii Rivera M.D.   6 mg at 08/29/20 0517        Fluids    Intake/Output Summary (Last 24 hours) at 8/29/2020 1428  Last data filed at 8/29/2020 1400  Gross per 24 hour   Intake 1819.08 ml   Output 1880 ml   Net -60.92 ml       Laboratory  Recent Labs     08/29/20  1327   ISTATAPH 7.298*   ISTATAPCO2 68.2*   ISTATAPO2 43*   ISTATATCO2 35*   ECCRJCV2MNM 71*   ISTATARTHCO3 33.4*   ISTATARTBE 3   ISTATTEMP 37.0 C   ISTATFIO2 100   ISTATSPEC Arterial   ISTATAPHTC 7.298*   QNJSIWOH3KW 43*     Recent Labs     08/27/20  2345 08/29/20  0345   CPKTOTAL 74 65     Recent Labs     08/27/20  1606 08/28/20  0645 08/29/20  0345   SODIUM 134* 136 136   POTASSIUM 4.9 5.4 5.5   CHLORIDE 94* 95* 96   CO2 27 33 32   BUN 17 17 19   CREATININE 0.61 0.66 0.55   MAGNESIUM  --  2.1 2.1   PHOSPHORUS  --  3.9 2.9   CALCIUM 8.1* 8.4* 8.3*     Recent Labs     08/27/20  1606 08/27/20  2345 08/28/20  0645 08/29/20  0345   ALTSGPT 17 18 13 14   ASTSGOT 23 24 16 22   ALKPHOSPHAT 89 96 90 80   TBILIRUBIN 1.0 0.8 0.7 0.9   DBILIRUBIN  --  0.4  --  0.5   GLUCOSE 139*  --  149* 132*     Recent Labs     08/27/20  1606 08/27/20  2345 08/28/20  0645 08/29/20  0345   WBC 5.9  --  6.3 7.1   NEUTSPOLYS 78.20*  --  87.40* 91.30*   LYMPHOCYTES 10.90*  --  5.50* 3.20*   MONOCYTES 8.90  --  5.40 4.20   EOSINOPHILS 0.30  --  0.00 0.00   BASOPHILS 0.30  --  0.30 0.30   ASTSGOT 23 24 16 22   ALTSGPT 17 18 13 14   ALKPHOSPHAT 89 96 90 80   TBILIRUBIN 1.0 0.8 0.7 0.9     Recent Labs     08/27/20  1606 08/27/20  2345 08/28/20  0645 08/29/20  0345   RBC 6.42*  --  6.50* 6.18*   HEMOGLOBIN 19.9*  --  20.6* 19.3*   HEMATOCRIT 63.5*  --  67.1* 61.7*   PLATELETCT 94*  --  89* 83*   PROTHROMBTM  --  15.3*  --  15.0*   APTT  --  47.9*  --  47.6*   INR  --  1.18*  --  1.14*   FERRITIN  --  137.0  --  145.0       Imaging  X-Ray:  I have personally reviewed the images and compared with prior images. and My impression is: Endotracheal tube is been placed the tip 2 cm above the keira.  Right IJ central venous catheter  with the tip near the cavoatrial junction.  Dense right hemithorax opacities worse in the base.  Diffuse interstitial pattern on the left.  No pneumothorax cannot exclude underlying effusion.    Assessment/Plan  * Pneumonia due to COVID-19 virus  Assessment & Plan  Endotracheal intubation  Chemical paralysis x24 hours  Prone positioning.  Dexamethasone 6 mg daily x10 days  Follow inflammatory markers  Follow clinical response, consider further therapy with Remdesivir, convalescent plasma as appropriate    Acute hypoxemic respiratory failure (HCC)- (present on admission)  Assessment & Plan  Secondary to COVID-19 pneumonia, +/- community-acquired/bacterial pneumonia  Multilobar dense consolidation of the right lung, no significant effusion  No evidence of pulmonary embolism  Endotracheal intubation  Chemical paralysis x24 hours  Prone positioning  Lung protective ventilation strategies  Titrate ventilator prescription to optimize oxygenation, ventilation, and acid base balance.        Diabetes type 2, controlled (HCC)- (present on admission)  Assessment & Plan  Monitor glycemic control with insulin therapy    ABELARDO (obstructive sleep apnea)- (present on admission)  Assessment & Plan  Polysomnography on January 29, 2019 AHI = 101.9, SpO2 hortensia 50% on room air  He gets his usual care from the [facility] and his physician Dr. Faust recently left the practice by his report  Will need to establish with a new primary care physician    Essential hypertension- (present on admission)  Assessment & Plan  Will continue on his home antihypertensives and follow blood pressure closely in ICU    Morbid obesity with BMI of 50.0-59.9, adult (HCC)- (present on admission)  Assessment & Plan  380 pounds, BMI 55      HLD (hyperlipidemia)- (present on admission)  Assessment & Plan  Continue statin       VTE:  Lovenox therapeutic for elevated d-dimer  Ulcer: H2 Antagonist  Lines: None    I have performed a physical exam and  reviewed and updated ROS and Plan today (8/29/2020). In review of yesterday's note (8/28/2020), there are no changes except as documented above.       The patient remains critically ill.  I have assessed and reassessed the respiratory status and made ventilator adjustments based upon arterial blood gas analysis and airway mechanics.  I have assessed and reassessed the blood pressure, hemodynamics, cardiovascular status. This patient remains at high risk for worsening cardiopulmonary dysfunction and death without the above critical care interventions.      Discussed patient condition and risk of morbidity and/or mortality with RN, RT, Pharmacy and Charge nurse / hot rounds     Critical care time 100 minutes in directly providing and coordinating critical care and extensive data review.  No time overlap and excludes procedures.

## 2020-08-29 NOTE — CARE PLAN
Problem: Communication  Goal: The ability to communicate needs accurately and effectively will improve  Outcome: PROGRESSING AS EXPECTED     Problem: Safety  Goal: Will remain free from injury  Outcome: PROGRESSING AS EXPECTED  Goal: Will remain free from falls  Outcome: PROGRESSING AS EXPECTED     Problem: Infection  Goal: Will remain free from infection  Outcome: PROGRESSING AS EXPECTED     Problem: Venous Thromboembolism (VTW)/Deep Vein Thrombosis (DVT) Prevention:  Goal: Patient will participate in Venous Thrombosis (VTE)/Deep Vein Thrombosis (DVT)Prevention Measures  Outcome: PROGRESSING AS EXPECTED     Problem: Bowel/Gastric:  Goal: Normal bowel function is maintained or improved  Outcome: PROGRESSING AS EXPECTED  Goal: Will not experience complications related to bowel motility  Outcome: PROGRESSING AS EXPECTED     Problem: Knowledge Deficit  Goal: Knowledge of disease process/condition, treatment plan, diagnostic tests, and medications will improve  Outcome: PROGRESSING AS EXPECTED  Goal: Knowledge of the prescribed therapeutic regimen will improve  Outcome: PROGRESSING AS EXPECTED     Problem: Discharge Barriers/Planning  Goal: Patient's continuum of care needs will be met  Outcome: PROGRESSING AS EXPECTED     Problem: Fluid Volume:  Goal: Will maintain balanced intake and output  Outcome: PROGRESSING AS EXPECTED     Problem: Pain Management  Goal: Pain level will decrease to patient's comfort goal  Outcome: PROGRESSING AS EXPECTED     Problem: Skin Integrity  Goal: Risk for impaired skin integrity will decrease  Outcome: PROGRESSING AS EXPECTED     Problem: Respiratory:  Goal: Respiratory status will improve  Outcome: PROGRESSING SLOWER THAN EXPECTED

## 2020-08-29 NOTE — RESPIRATORY CARE
COPD EDUCATION by COPD CLINICAL EDUCATOR  8/29/2020 at 9:44 AM by Adelaide Gomez, RRT     Patient reviewed by COPD education team. Patient does not have a history or diagnosis of COPD and is a non-smoker, therefore does not qualify for the COPD program.

## 2020-08-29 NOTE — PROCEDURES
Intubation    Date/Time: 8/29/2020 2:30 PM  Performed by: Harjinder Crowe M.D.  Authorized by: Harjinder Crowe M.D.     Consent:     Consent obtained:  Written    Consent given by:  Healthcare agent    Risks discussed:  Hypoxia, death and brain injury    Alternatives discussed:  Delayed treatment  Pre-procedure details:     Patient status:  Altered mental status    Mallampati score:  II    Pretreatment meds: Etomidate.    Paralytics:  Rocuronium  Procedure details:     Preoxygenation: High flow nasal cannula.    CPR in progress: no      Intubation method:  Oral    Oral intubation technique:  Video-assisted    Laryngoscope type:  GlideScope    Laryngoscope blade:  Mac 3    Cormack-Lehane Classification:  Grade 1    Tube size (mm):  8.0    Number of attempts:  1    Ventilation between attempts: no      Cricoid pressure: no      Tube visualized through cords: yes    Placement assessment:     ETT to lip:  24 cm    Tube secured with:  Adhesive tape    Breath sounds:  Equal    Placement verification: chest rise, ETCO2 detector and fiberoptic scope      CXR findings:  ETT in proper place  Post-procedure details:     Patient tolerance of procedure:  Tolerated well, no immediate complications  Comments:      No desaturations or hypotension during this intubation.  There was a large amount of purulent secretions sitting at the level of the larynx, which were cleared via suction.

## 2020-08-29 NOTE — PROCEDURES
Central Line Insertion    Date/Time: 8/29/2020 2:32 PM  Performed by: Harjinder Crowe M.D.  Authorized by: Harjinder Crowe M.D.     Consent:     Consent obtained:  Written    Consent given by:  Healthcare agent    Risks discussed:  Arterial puncture, incorrect placement, infection and bleeding    Alternatives discussed:  Delayed treatment  Pre-procedure details:     Hand hygiene: Hand hygiene performed prior to insertion      Sterile barrier technique: All elements of maximal sterile technique followed      Skin preparation:  2% chlorhexidine  Sedation:     Sedation type:  None  Anesthesia:     Anesthesia method:  Local infiltration    Local anesthetic:  Lidocaine 1% w/o epi  Procedure details:     Patient position:  Trendelenburg    Procedural supplies:  Triple lumen    Catheter size:  7 Fr    Landmarks identified: yes      Ultrasound guidance: yes      Sterile ultrasound techniques: Sterile gel and sterile probe covers were used      Number of attempts:  1    Successful placement: yes    Post-procedure details:     Post-procedure:  Line sutured and dressing applied    Assessment:  Blood return through all ports and no pneumothorax on x-ray    Patient tolerance of procedure:  Tolerated well, no immediate complications  Comments:      Indications: Vascular access for vasopressors and hemodynamic monitoring

## 2020-08-30 ENCOUNTER — APPOINTMENT (OUTPATIENT)
Dept: RADIOLOGY | Facility: MEDICAL CENTER | Age: 60
DRG: 004 | End: 2020-08-30
Attending: INTERNAL MEDICINE
Payer: MEDICARE

## 2020-08-30 LAB
ACTION RANGE TRIGGERED IACRT: NO
ACTION RANGE TRIGGERED IACRT: YES
ACTION RANGE TRIGGERED IACRT: YES
ALBUMIN SERPL BCP-MCNC: 2.3 G/DL (ref 3.2–4.9)
ALP SERPL-CCNC: 60 U/L (ref 30–99)
ALT SERPL-CCNC: 12 U/L (ref 2–50)
ANION GAP SERPL CALC-SCNC: 10 MMOL/L (ref 7–16)
ANION GAP SERPL CALC-SCNC: 9 MMOL/L (ref 7–16)
AST SERPL-CCNC: 24 U/L (ref 12–45)
BASE EXCESS BLDA CALC-SCNC: 4 MMOL/L (ref -4–3)
BASE EXCESS BLDA CALC-SCNC: 5 MMOL/L (ref -4–3)
BASE EXCESS BLDA CALC-SCNC: 5 MMOL/L (ref -4–3)
BASOPHILS # BLD AUTO: 0.2 % (ref 0–1.8)
BASOPHILS # BLD: 0.01 K/UL (ref 0–0.12)
BILIRUB CONJ SERPL-MCNC: 0.6 MG/DL (ref 0.1–0.5)
BILIRUB INDIRECT SERPL-MCNC: 0.5 MG/DL (ref 0–1)
BILIRUB SERPL-MCNC: 1.1 MG/DL (ref 0.1–1.5)
BODY TEMPERATURE: ABNORMAL DEGREES
BUN SERPL-MCNC: 17 MG/DL (ref 8–22)
BUN SERPL-MCNC: 20 MG/DL (ref 8–22)
CALCIUM SERPL-MCNC: 8.1 MG/DL (ref 8.5–10.5)
CALCIUM SERPL-MCNC: 8.2 MG/DL (ref 8.5–10.5)
CHLORIDE SERPL-SCNC: 96 MMOL/L (ref 96–112)
CHLORIDE SERPL-SCNC: 98 MMOL/L (ref 96–112)
CO2 BLDA-SCNC: 31 MMOL/L (ref 20–33)
CO2 BLDA-SCNC: 35 MMOL/L (ref 20–33)
CO2 BLDA-SCNC: 36 MMOL/L (ref 20–33)
CO2 SERPL-SCNC: 31 MMOL/L (ref 20–33)
CO2 SERPL-SCNC: 31 MMOL/L (ref 20–33)
CREAT SERPL-MCNC: 0.55 MG/DL (ref 0.5–1.4)
CREAT SERPL-MCNC: 0.61 MG/DL (ref 0.5–1.4)
EOSINOPHIL # BLD AUTO: 0 K/UL (ref 0–0.51)
EOSINOPHIL NFR BLD: 0 % (ref 0–6.9)
ERYTHROCYTE [DISTWIDTH] IN BLOOD BY AUTOMATED COUNT: 63.6 FL (ref 35.9–50)
GLUCOSE BLD-MCNC: 109 MG/DL (ref 65–99)
GLUCOSE BLD-MCNC: 85 MG/DL (ref 65–99)
GLUCOSE SERPL-MCNC: 83 MG/DL (ref 65–99)
GLUCOSE SERPL-MCNC: 92 MG/DL (ref 65–99)
HCO3 BLDA-SCNC: 29.9 MMOL/L (ref 17–25)
HCO3 BLDA-SCNC: 33.1 MMOL/L (ref 17–25)
HCO3 BLDA-SCNC: 34.3 MMOL/L (ref 17–25)
HCT VFR BLD AUTO: 59.1 % (ref 42–52)
HGB BLD-MCNC: 18.4 G/DL (ref 14–18)
HOROWITZ INDEX BLDA+IHG-RTO: 111 MM[HG]
HOROWITZ INDEX BLDA+IHG-RTO: 59 MM[HG]
HOROWITZ INDEX BLDA+IHG-RTO: 62 MM[HG]
IMM GRANULOCYTES # BLD AUTO: 0.04 K/UL (ref 0–0.11)
IMM GRANULOCYTES NFR BLD AUTO: 0.8 % (ref 0–0.9)
INST. QUALIFIED PATIENT IIQPT: YES
LYMPHOCYTES # BLD AUTO: 0.34 K/UL (ref 1–4.8)
LYMPHOCYTES NFR BLD: 7.2 % (ref 22–41)
MAGNESIUM SERPL-MCNC: 2 MG/DL (ref 1.5–2.5)
MAGNESIUM SERPL-MCNC: 2.2 MG/DL (ref 1.5–2.5)
MCH RBC QN AUTO: 31.3 PG (ref 27–33)
MCHC RBC AUTO-ENTMCNC: 31.1 G/DL (ref 33.7–35.3)
MCV RBC AUTO: 100.7 FL (ref 81.4–97.8)
MONOCYTES # BLD AUTO: 0.2 K/UL (ref 0–0.85)
MONOCYTES NFR BLD AUTO: 4.2 % (ref 0–13.4)
NEUTROPHILS # BLD AUTO: 4.12 K/UL (ref 1.82–7.42)
NEUTROPHILS NFR BLD: 87.6 % (ref 44–72)
NRBC # BLD AUTO: 0.27 K/UL
NRBC BLD-RTO: 5.7 /100 WBC
O2/TOTAL GAS SETTING VFR VENT: 100 %
PCO2 BLDA: 43.6 MMHG (ref 26–37)
PCO2 BLDA: 57.5 MMHG (ref 26–37)
PCO2 BLDA: 72 MMHG (ref 26–37)
PCO2 TEMP ADJ BLDA: 43.6 MMHG (ref 26–37)
PCO2 TEMP ADJ BLDA: 59.5 MMHG (ref 26–37)
PCO2 TEMP ADJ BLDA: 74.5 MMHG (ref 26–37)
PH BLDA: 7.29 [PH] (ref 7.4–7.5)
PH BLDA: 7.37 [PH] (ref 7.4–7.5)
PH BLDA: 7.44 [PH] (ref 7.4–7.5)
PH TEMP ADJ BLDA: 7.28 [PH] (ref 7.4–7.5)
PH TEMP ADJ BLDA: 7.36 [PH] (ref 7.4–7.5)
PH TEMP ADJ BLDA: 7.44 [PH] (ref 7.4–7.5)
PHOSPHATE SERPL-MCNC: 1 MG/DL (ref 2.5–4.5)
PHOSPHATE SERPL-MCNC: 3.4 MG/DL (ref 2.5–4.5)
PHOSPHATE SERPL-MCNC: 4.3 MG/DL (ref 2.5–4.5)
PLATELET # BLD AUTO: 79 K/UL (ref 164–446)
PMV BLD AUTO: 11.6 FL (ref 9–12.9)
PO2 BLDA: 111 MMHG (ref 64–87)
PO2 BLDA: 59 MMHG (ref 64–87)
PO2 BLDA: 62 MMHG (ref 64–87)
PO2 TEMP ADJ BLDA: 116 MMHG (ref 64–87)
PO2 TEMP ADJ BLDA: 59 MMHG (ref 64–87)
PO2 TEMP ADJ BLDA: 65 MMHG (ref 64–87)
POTASSIUM SERPL-SCNC: 4.4 MMOL/L (ref 3.6–5.5)
POTASSIUM SERPL-SCNC: 4.9 MMOL/L (ref 3.6–5.5)
PROT SERPL-MCNC: 5.9 G/DL (ref 6–8.2)
RBC # BLD AUTO: 5.87 M/UL (ref 4.7–6.1)
SAO2 % BLDA: 90 % (ref 93–99)
SAO2 % BLDA: 91 % (ref 93–99)
SAO2 % BLDA: 97 % (ref 93–99)
SODIUM SERPL-SCNC: 136 MMOL/L (ref 135–145)
SODIUM SERPL-SCNC: 139 MMOL/L (ref 135–145)
SPECIMEN DRAWN FROM PATIENT: ABNORMAL
TRIGL SERPL-MCNC: 86 MG/DL (ref 0–149)
WBC # BLD AUTO: 4.7 K/UL (ref 4.8–10.8)

## 2020-08-30 PROCEDURE — 700101 HCHG RX REV CODE 250: Performed by: INTERNAL MEDICINE

## 2020-08-30 PROCEDURE — 700102 HCHG RX REV CODE 250 W/ 637 OVERRIDE(OP): Performed by: INTERNAL MEDICINE

## 2020-08-30 PROCEDURE — 700105 HCHG RX REV CODE 258: Performed by: INTERNAL MEDICINE

## 2020-08-30 PROCEDURE — 770022 HCHG ROOM/CARE - ICU (200)

## 2020-08-30 PROCEDURE — 82962 GLUCOSE BLOOD TEST: CPT

## 2020-08-30 PROCEDURE — 36600 WITHDRAWAL OF ARTERIAL BLOOD: CPT

## 2020-08-30 PROCEDURE — 94770 HCHG CO2 EXPIRED GAS DETERMINATION: CPT

## 2020-08-30 PROCEDURE — A9270 NON-COVERED ITEM OR SERVICE: HCPCS | Performed by: INTERNAL MEDICINE

## 2020-08-30 PROCEDURE — 99291 CRITICAL CARE FIRST HOUR: CPT | Performed by: INTERNAL MEDICINE

## 2020-08-30 PROCEDURE — 80076 HEPATIC FUNCTION PANEL: CPT

## 2020-08-30 PROCEDURE — 84478 ASSAY OF TRIGLYCERIDES: CPT

## 2020-08-30 PROCEDURE — 71045 X-RAY EXAM CHEST 1 VIEW: CPT

## 2020-08-30 PROCEDURE — 85025 COMPLETE CBC W/AUTO DIFF WBC: CPT

## 2020-08-30 PROCEDURE — 700111 HCHG RX REV CODE 636 W/ 250 OVERRIDE (IP): Performed by: INTERNAL MEDICINE

## 2020-08-30 PROCEDURE — 99292 CRITICAL CARE ADDL 30 MIN: CPT | Performed by: INTERNAL MEDICINE

## 2020-08-30 PROCEDURE — 84100 ASSAY OF PHOSPHORUS: CPT

## 2020-08-30 PROCEDURE — 80048 BASIC METABOLIC PNL TOTAL CA: CPT

## 2020-08-30 PROCEDURE — 83735 ASSAY OF MAGNESIUM: CPT

## 2020-08-30 PROCEDURE — 82803 BLOOD GASES ANY COMBINATION: CPT | Mod: 91

## 2020-08-30 PROCEDURE — 94003 VENT MGMT INPAT SUBQ DAY: CPT

## 2020-08-30 RX ORDER — FUROSEMIDE 10 MG/ML
20 INJECTION INTRAMUSCULAR; INTRAVENOUS ONCE
Status: COMPLETED | OUTPATIENT
Start: 2020-08-30 | End: 2020-08-30

## 2020-08-30 RX ADMIN — ACETAMINOPHEN 650 MG: 325 TABLET, FILM COATED ORAL at 04:21

## 2020-08-30 RX ADMIN — FUROSEMIDE 20 MG: 10 INJECTION, SOLUTION INTRAMUSCULAR; INTRAVENOUS at 11:48

## 2020-08-30 RX ADMIN — ROCURONIUM BROMIDE 6 MCG/KG/MIN: 10 SOLUTION INTRAVENOUS at 01:21

## 2020-08-30 RX ADMIN — MINERAL OIL, PETROLATUM 1 APPLICATION: 425; 573 OINTMENT OPHTHALMIC at 04:34

## 2020-08-30 RX ADMIN — ATORVASTATIN CALCIUM 20 MG: 20 TABLET, FILM COATED ORAL at 04:22

## 2020-08-30 RX ADMIN — SODIUM PHOSPHATE, MONOBASIC, MONOHYDRATE 30 MMOL: 276; 142 INJECTION, SOLUTION INTRAVENOUS at 04:28

## 2020-08-30 RX ADMIN — PROPOFOL 20 MCG/KG/MIN: 10 INJECTION, EMULSION INTRAVENOUS at 11:49

## 2020-08-30 RX ADMIN — CEFTRIAXONE SODIUM 2 G: 2 INJECTION, POWDER, FOR SOLUTION INTRAMUSCULAR; INTRAVENOUS at 17:39

## 2020-08-30 RX ADMIN — ROCURONIUM BROMIDE 106.1 MG: 10 INJECTION, SOLUTION INTRAVENOUS at 04:13

## 2020-08-30 RX ADMIN — PROPOFOL 20 MCG/KG/MIN: 10 INJECTION, EMULSION INTRAVENOUS at 08:18

## 2020-08-30 RX ADMIN — PROPOFOL 20 MCG/KG/MIN: 10 INJECTION, EMULSION INTRAVENOUS at 02:26

## 2020-08-30 RX ADMIN — ENOXAPARIN SODIUM 150 MG: 150 INJECTION SUBCUTANEOUS at 04:22

## 2020-08-30 RX ADMIN — ROCURONIUM BROMIDE 106.1 MG: 10 INJECTION, SOLUTION INTRAVENOUS at 00:34

## 2020-08-30 RX ADMIN — FAMOTIDINE 20 MG: 10 INJECTION INTRAVENOUS at 17:39

## 2020-08-30 RX ADMIN — DEXAMETHASONE SODIUM PHOSPHATE 6 MG: 4 INJECTION, SOLUTION INTRA-ARTICULAR; INTRALESIONAL; INTRAMUSCULAR; INTRAVENOUS; SOFT TISSUE at 04:20

## 2020-08-30 RX ADMIN — AMLODIPINE BESYLATE 10 MG: 10 TABLET ORAL at 04:21

## 2020-08-30 RX ADMIN — ASPIRIN 81 MG: 81 TABLET, COATED ORAL at 04:22

## 2020-08-30 RX ADMIN — Medication 50 MCG/HR: at 20:01

## 2020-08-30 RX ADMIN — ROCURONIUM BROMIDE 8 MCG/KG/MIN: 10 SOLUTION INTRAVENOUS at 14:25

## 2020-08-30 RX ADMIN — ROCURONIUM BROMIDE 8 MCG/KG/MIN: 10 SOLUTION INTRAVENOUS at 21:21

## 2020-08-30 RX ADMIN — FAMOTIDINE 20 MG: 10 INJECTION INTRAVENOUS at 04:21

## 2020-08-30 RX ADMIN — ROCURONIUM BROMIDE 8 MCG/KG/MIN: 10 SOLUTION INTRAVENOUS at 05:19

## 2020-08-30 RX ADMIN — MINERAL OIL, PETROLATUM 1 APPLICATION: 425; 573 OINTMENT OPHTHALMIC at 15:20

## 2020-08-30 RX ADMIN — SODIUM PHOSPHATE, MONOBASIC, MONOHYDRATE 15 MMOL: 276; 142 INJECTION, SOLUTION INTRAVENOUS at 09:03

## 2020-08-30 RX ADMIN — DOCUSATE SODIUM 50 MG AND SENNOSIDES 8.6 MG 2 TABLET: 8.6; 5 TABLET, FILM COATED ORAL at 04:21

## 2020-08-30 RX ADMIN — REMDESIVIR 200 MG: 5 INJECTION INTRAVENOUS at 17:39

## 2020-08-30 RX ADMIN — ROCURONIUM BROMIDE 8 MCG/KG/MIN: 10 SOLUTION INTRAVENOUS at 10:00

## 2020-08-30 RX ADMIN — SODIUM CHLORIDE, POTASSIUM CHLORIDE, SODIUM LACTATE AND CALCIUM CHLORIDE: 600; 310; 30; 20 INJECTION, SOLUTION INTRAVENOUS at 00:36

## 2020-08-30 RX ADMIN — ROCURONIUM BROMIDE 8 MCG/KG/MIN: 10 SOLUTION INTRAVENOUS at 18:19

## 2020-08-30 RX ADMIN — ENOXAPARIN SODIUM 150 MG: 150 INJECTION SUBCUTANEOUS at 17:39

## 2020-08-30 RX ADMIN — PROPOFOL 20 MCG/KG/MIN: 10 INJECTION, EMULSION INTRAVENOUS at 16:19

## 2020-08-30 RX ADMIN — MINERAL OIL, PETROLATUM 1 APPLICATION: 425; 573 OINTMENT OPHTHALMIC at 21:25

## 2020-08-30 RX ADMIN — PROPOFOL 20 MCG/KG/MIN: 10 INJECTION, EMULSION INTRAVENOUS at 19:49

## 2020-08-30 ASSESSMENT — FIBROSIS 4 INDEX
FIB4 SCORE: 4.32
FIB4 SCORE: 4.32

## 2020-08-30 NOTE — PROGRESS NOTES
Lab called with critical result of Phosphorus of 1 at 0330. Critical lab result read back to NIKOLAY Crowder.    Dr. Watters notified of critical lab result at 0345.  Critical lab result read back by Dr. Watters. Dr. Watters stated to notify pharmacy as the pt should be on a replacement protocol.

## 2020-08-30 NOTE — PROGRESS NOTES
REMDESIVIR    PRIOR TO administering remdesivir, I or the intensivist, Dr. Crowe informed the patient or parent/caregiver of the following information:   · Provided them the “Fact Sheet for Patients and Parents/Caregivers”   · Informed them of therapeutic alternatives to remdesivir and the risks and benefits of those alternatives   · Informed them that they have the option to accept or refuse remdesivir   · Informed them that remdesivir is not FDA approved, but that it is authorized for use under emergency by the FDA   · Informed them of the known risks and benefits of remdesivir and discussed the extent to which such risks and benefits are unknown   All information provided and communicated was consistent with the “Fact Sheet for Patients and Parents/Caregivers”.         Meghna Nesbitt MD  Infectious Diseases

## 2020-08-30 NOTE — PROGRESS NOTES
Critical Care Progress Note    Date of admission  8/27/2020    Chief Complaint  60 y.o. male who presented 8/27/2020 with increasing dyspnea and hypoxia.  He has a history of morbid obesity, hyperlipidemia, diabetes and obstructive sleep apnea, noncompliant with treatment.  He uses oxygen 2 L via nasal cannula for chronic hypoxia.  Today he called EMS for increasing dyspnea over the past 24 hours.  He reports he is had increased cough and yellow sputum production with small amount of blood in his sputum over the past 24 hours.  On EMS arrival patient was profoundly hypoxic with a saturation of 50%.  He was placed on nonrebreather and brought to the ED.  He desaturates very quickly on nonrebreather and is in moderate respiratory distress.  Chest x-ray showed diffuse right-sided pneumonia/consolidation.  He was treated for community-acquired pneumonia with ceftriaxone and azithromycin as well as IV methylprednisolone.  He denies fevers or chills.  He he lives alone and tells me has been taking appropriate precautions when he goes out of the house wearing his mask and has no known exposures to COVID-19.  He is a nondrinker and non-smoker.     Screening SARS-CoV-2 PCR came back positive in ED.  He received 600 cc of crystalloid and the sepsis fluid bolus that had been ordered was discontinued.  He is not currently hypotensive and appears relatively euvolemic.  Lactic acid 1.7.  With the positive COVID-19 test I decreased IV fluid, ordered dexamethasone 6 mg daily x10 days, will admit to isolation ICU.  He will be placed on high flow nasal cannula/BiPAP with high risk for requiring intubation.  We will follow-up inflammatory markers and initiate full dose anticoagulation if appropriate.    Hospital Course   8/29-worsening mental status, rapid shallow breathing and hypoxia required endotracheal intubation and prone positioning.  8/30-I spoke with Brittney, the patient's daughter and surrogate decision maker about  initiating ranges of year.  We discussed the risks benefits and alternatives.  All questions were answered.  She consented to initiating Remdesivir today.        Interval Progress  Reviewed last 24 hour events:              - Supine this morning at 0730   - Tm: 38.2              - HR: 80s              - SBP: 120-140s              - Neuro: Deeply sedated for chemical paralysis              - GI: N.p.o.              - UOP: 1.2L              - Briseno: Yes              - Lines: PIV              - PPx: No GI, Lovenox              - CXR (personally reviewed): Near complete opacification              - Antibiotic Day, C3, Azithromycin    Infusions:  Propofol  Rocuronium infusion  Total fluids 100ml/hr    ABG 7.44/44/59/30 (Ve-10.5L/min)    P/F ratio: 62-->59  Compliance:12 -->21  Optimal PEEP-19      Review of Systems  Review of Systems   Unable to perform ROS: Medical condition        Vital Signs for last 24 hours   Temp:  [37.7 °C (99.8 °F)-38.2 °C (100.7 °F)] 38.2 °C (100.7 °F)  Pulse:  [72-98] 85  Resp:  [23-29] 23  BP: ()/(33-72) 95/34  SpO2:  [82 %-95 %] 82 %    Hemodynamic parameters for last 24 hours       Respiratory Information for the last 24 hours  Vent Mode: PCMV  Rate (breaths/min): 24  Vt Target (mL): 440  PEEP/CPAP: 19  MAP: 25  Control VTE (exp VT): 402    Physical Exam   Physical Exam  Constitutional:       General: He is not in acute distress.     Appearance: He is ill-appearing.   HENT:      Mouth/Throat:      Mouth: Mucous membranes are moist.   Eyes:      Pupils: Pupils are equal, round, and reactive to light.   Neck:      Musculoskeletal: Neck supple.   Cardiovascular:      Rate and Rhythm: Regular rhythm.      Heart sounds: No murmur. No friction rub. No gallop.    Pulmonary:      Effort: No respiratory distress.      Breath sounds: No rhonchi.      Comments: Breath sounds are diminished throughout.  Abdominal:      General: There is no distension.      Tenderness: There is no abdominal  tenderness.      Comments: Obese    Musculoskeletal:      Right lower leg: No edema.      Left lower leg: No edema.   Skin:     General: Skin is warm and dry.   Neurological:      General: No focal deficit present.      Comments: Deeply sedated and chemically paralyzed.   Psychiatric:      Comments: Unable to assess         Medications  Current Facility-Administered Medications   Medication Dose Route Frequency Provider Last Rate Last Dose   • Pharmacy Consult Request Remdesivir Initiation (for COVID-19)   Other PHARMACY TO DOSE Harjinder Crowe M.D.       • artificial tears (EYE LUBRICANT) ophth ointment 1 Application  1 Application Both Eyes Q8HRS Harjinder Crowe M.D.   1 Application at 08/30/20 1520   • propofol (DIPRIVAN) injection  0-80 mcg/kg/min Intravenous Continuous Harjinder Crowe M.D. 21.2 mL/hr at 08/30/20 1149 20 mcg/kg/min at 08/30/20 1149   • fentaNYL (SUBLIMAZE) 50 mcg/mL in 50mL (Continuous Infusion)   Intravenous Continuous Harjinder Crowe M.D. 2 mL/hr at 08/29/20 1309 100 mcg/hr at 08/29/20 1309   • rocuronium (ZEMURON) 250 mg in  mL Infusion  0-16 mcg/kg/min Intravenous Continuous Harjinder Crowe M.D. 84.9 mL/hr at 08/30/20 1425 8 mcg/kg/min at 08/30/20 1425   • rocuronium (ZEMURON) injection 106.1 mg  0.6 mg/kg Intravenous Q2HRS PRN Harjinder Crowe M.D.   106.1 mg at 08/30/20 0413   • enoxaparin (LOVENOX) injection 150 mg  150 mg Subcutaneous Q12HRS Harjinder Crowe M.D.   150 mg at 08/30/20 0422   • norepinephrine (Levophed) infusion 8 mg/250 mL (premix)  0-30 mcg/min Intravenous Continuous Harjinder Crowe M.D.   Stopped at 08/29/20 1415   • Respiratory Therapy Consult   Nebulization Continuous RT Harjinder Crowe M.D.       • ipratropium-albuterol (DUONEB) nebulizer solution  3 mL Nebulization Q2HRS PRN (RT) Harjinder Crowe M.D.       • famotidine (PEPCID) tablet 20 mg  20 mg Enteral Tube Q12HRS Harjinder Crowe M.D.        Or   • famotidine (PEPCID) injection 20 mg  20 mg Intravenous Q12HRS  Harjinder Crowe M.D.   20 mg at 08/30/20 0421   • senna-docusate (PERICOLACE or SENOKOT S) 8.6-50 MG per tablet 2 Tab  2 Tab Enteral Tube BID Harjinder Crowe M.D.   2 Tab at 08/30/20 0421    And   • polyethylene glycol/lytes (MIRALAX) PACKET 1 Packet  1 Packet Enteral Tube QDAY PRN Harjinder Crowe M.D.        And   • magnesium hydroxide (MILK OF MAGNESIA) suspension 30 mL  30 mL Enteral Tube QDAY PRN Harjinder Crowe M.D.        And   • bisacodyl (DULCOLAX) suppository 10 mg  10 mg Rectal QDAY PRN Harjinder Crowe M.D.       • MD Alert...ICU Electrolyte Replacement per Pharmacy   Other PHARMACY TO DOSE Harjinder Crowe M.D.       • lidocaine (XYLOCAINE) 1 % injection 1-2 mL  1-2 mL Tracheal Tube Q30 MIN PRN Harjinder Crowe M.D.       • benzonatate (TESSALON) capsule 100 mg  100 mg Oral TID PRN José Watters M.D.   100 mg at 08/28/20 2318   • amLODIPine (NORVASC) tablet 10 mg  10 mg Oral DAILY Nii Rivera M.D.   10 mg at 08/30/20 0421   • aspirin EC (ECOTRIN) tablet 81 mg  81 mg Oral DAILY Nii Rivera M.D.   81 mg at 08/30/20 0422   • atorvastatin (LIPITOR) tablet 20 mg  20 mg Oral DAILY Nii Rivera M.D.   20 mg at 08/30/20 0422   • HYDROcodone/acetaminophen (NORCO)  MG per tablet 1 Tab  1 Tab Oral Q6HRS PRN Nii Rivera M.D.   1 Tab at 08/29/20 0158   • lactated ringers infusion (BOLUS): BMI greater than 30  30 mL/kg (Ideal) Intravenous Once PRN Nii Rivera M.D.   Stopped at 08/27/20 1835   • lactated ringers infusion   Intravenous Continuous Nii Rivera M.D.   Stopped at 08/30/20 1050   • acetaminophen (TYLENOL) tablet 650 mg  650 mg Oral Q6HRS PRN Nii Rivera M.D.   650 mg at 08/30/20 0421   • cefTRIAXone (ROCEPHIN) 2 g in  mL IVPB  2 g Intravenous Q24HRS Nii Rivera M.D.   Stopped at 08/29/20 5205   • labetalol (NORMODYNE/TRANDATE) injection 10-20 mg  10-20 mg Intravenous Q4HRS PRN Nii Rivera M.D.       • ondansetron (ZOFRAN) syringe/vial injection 4 mg  4 mg  Intravenous Q4HRS PRN Nii Rivera M.D.   4 mg at 08/29/20 0552   • ondansetron (ZOFRAN ODT) dispertab 4 mg  4 mg Oral Q4HRS PRN Nii Rivera M.D.       • promethazine (PHENERGAN) tablet 12.5-25 mg  12.5-25 mg Oral Q4HRS PRN Nii Rivera M.D.       • promethazine (PHENERGAN) suppository 12.5-25 mg  12.5-25 mg Rectal Q4HRS PRN Nii Rivera M.D.       • prochlorperazine (COMPAZINE) injection 5-10 mg  5-10 mg Intravenous Q4HRS PRN Nii Rivera M.D.       • insulin regular (HumuLIN R,NovoLIN R) injection  3-14 Units Subcutaneous Q6HRS Nii Rivera M.D.   Stopped at 08/29/20 1800    And   • glucose 4 g chewable tablet 16 g  16 g Oral Q15 MIN PRN Nii Rivera M.D.        And   • dextrose 50% (D50W) injection 50 mL  50 mL Intravenous Q15 MIN PRN Nii Rivera M.D.       • dexamethasone (DECADRON) injection 6 mg  6 mg Intravenous DAILY Nii Rivera M.D.   6 mg at 08/30/20 0420       Fluids    Intake/Output Summary (Last 24 hours) at 8/30/2020 1617  Last data filed at 8/30/2020 1530  Gross per 24 hour   Intake 4327.04 ml   Output 2905 ml   Net 1422.04 ml       Laboratory  Recent Labs     08/29/20  1550 08/30/20  0330 08/30/20  1440   ISTATAPH 7.355* 7.444 7.367*   ISTATAPCO2 58.9* 43.6* 57.5*   ISTATAPO2 62* 59* 62*   ISTATATCO2 35* 31 35*   [id number] 90* 91* 90*   ISTATARTHCO3 32.9* 29.9* 33.1*   ISTATARTBE 5* 5* 5*   ISTATTEMP 37.2 C 37.0 C 100.0 F   ISTATFIO2 100 100 100   ISTATSPEC Arterial Arterial Arterial   ISTATAPHTC 7.352* 7.444 7.356*   [id number] 63* 59* 65     Recent Labs     08/27/20  2345 08/29/20  0345   CPKTOTAL 74 65     Recent Labs     08/28/20  0645 08/29/20  0345 08/30/20  0236   SODIUM 136 136 136   POTASSIUM 5.4 5.5 4.9   CHLORIDE 95* 96 96   CO2 33 32 31   BUN 17 19 20   CREATININE 0.66 0.55 0.61   MAGNESIUM 2.1 2.1 2.2   PHOSPHORUS 3.9 2.9 1.0*   CALCIUM 8.4* 8.3* 8.2*     Recent Labs     08/27/20  2345 08/28/20  0645 08/29/20  0345 08/30/20  0236   ALTSGPT 18 13 14  --     ASTSGOT 24 16 22  --    ALKPHOSPHAT 96 90 80  --    TBILIRUBIN 0.8 0.7 0.9  --    DBILIRUBIN 0.4  --  0.5  --    GLUCOSE  --  149* 132* 83     Recent Labs     08/27/20 2345 08/28/20  0645 08/29/20 0345 08/30/20  0236   WBC  --  6.3 7.1 4.7*   NEUTSPOLYS  --  87.40* 91.30* 87.60*   LYMPHOCYTES  --  5.50* 3.20* 7.20*   MONOCYTES  --  5.40 4.20 4.20   EOSINOPHILS  --  0.00 0.00 0.00   BASOPHILS  --  0.30 0.30 0.20   ASTSGOT 24 16 22  --    ALTSGPT 18 13 14  --    ALKPHOSPHAT 96 90 80  --    TBILIRUBIN 0.8 0.7 0.9  --      Recent Labs     08/27/20 2345 08/28/20 0645 08/29/20 0345 08/30/20  0236   RBC  --  6.50* 6.18* 5.87   HEMOGLOBIN  --  20.6* 19.3* 18.4*   HEMATOCRIT  --  67.1* 61.7* 59.1*   PLATELETCT  --  89* 83* 79*   PROTHROMBTM 15.3*  --  15.0*  --    APTT 47.9*  --  47.6*  --    INR 1.18*  --  1.14*  --    FERRITIN 137.0  --  145.0  --        Imaging  X-Ray:  I have personally reviewed the images and compared with prior images. and My impression is: Endotracheal tube is been placed the tip 4 cm above the keira.  Right IJ central venous catheter with the tip near the cavoatrial junction.  Worsening opacification of both hemithorax. No pneumothorax cannot exclude underlying effusion.    Assessment/Plan  * Pneumonia due to COVID-19 virus  Assessment & Plan  Endotracheal intubation  Chemical paralysis x24 hours  Prone positioning.  Dexamethasone 6 mg daily x10 days  Follow inflammatory markers  Therapeutic Lovenox for d-dimer greater than 1  Add Remdesivir    Acute hypoxemic respiratory failure (HCC)- (present on admission)  Assessment & Plan  Secondary to COVID-19 pneumonia, +/- community-acquired/bacterial pneumonia  Multilobar dense consolidation of the right lung, no significant effusion  No evidence of pulmonary embolism  Endotracheal intubation  Chemical paralysis x24 hours  Prone positioning  Lung protective ventilation strategies  Titrate ventilator prescription to optimize oxygenation, ventilation,  and acid base balance.  He is not currently a candidate for ABC trials        Diabetes type 2, controlled (HCC)- (present on admission)  Assessment & Plan  Monitor glycemic control with insulin therapy    ABELARDO (obstructive sleep apnea)- (present on admission)  Assessment & Plan  Polysomnography on January 29, 2019 AHI = 101.9, SpO2 hortensia 50% on room air  He gets his usual care from the [facility] and his physician Dr. Faust recently left the practice by his report  Will need to establish with a new primary care physician    Essential hypertension- (present on admission)  Assessment & Plan  Will continue on his home antihypertensives and follow blood pressure closely in ICU    Morbid obesity with BMI of 50.0-59.9, adult (HCC)- (present on admission)  Assessment & Plan  380 pounds, BMI 55      HLD (hyperlipidemia)- (present on admission)  Assessment & Plan  Continue statin       VTE:  Lovenox therapeutic for elevated d-dimer  Ulcer: H2 Antagonist  Lines: None    I have performed a physical exam and reviewed and updated ROS and Plan today (8/30/2020). In review of yesterday's note (8/29/2020), there are no changes except as documented above.       The patient remains critically ill.  I have assessed and reassessed the respiratory status and made ventilator adjustments based upon arterial blood gas analysis and airway mechanics.  I have assessed and reassessed the blood pressure, hemodynamics, cardiovascular status. This patient remains at high risk for worsening cardiopulmonary dysfunction and death without the above critical care interventions.      Discussed patient condition and risk of morbidity and/or mortality with RN, RT, Pharmacy and Charge nurse / hot rounds     Critical care time 80 minutes in directly providing and coordinating critical care and extensive data review.  No time overlap and excludes procedures.

## 2020-08-30 NOTE — CARE PLAN
Ventilator Daily Summary    Vent Day # 1     Ventilator settings changed this shift: pt switched to PCV     Weaning trials: not at this time     Respiratory Procedures: proning - switch at 7:30am per Dr. Crowe    Plan: Continue current ventilator settings and wean mechanical ventilation as tolerated per physician orders.

## 2020-08-30 NOTE — PROGRESS NOTES
Critical Care Progress Note    Date of admission  8/27/2020    Chief Complaint  60 y.o. male who presented 8/27/2020 with increasing dyspnea and hypoxia.  He has a history of morbid obesity, hyperlipidemia, diabetes and obstructive sleep apnea, noncompliant with treatment.  He uses oxygen 2 L via nasal cannula for chronic hypoxia.  Today he called EMS for increasing dyspnea over the past 24 hours.  He reports he is had increased cough and yellow sputum production with small amount of blood in his sputum over the past 24 hours.  On EMS arrival patient was profoundly hypoxic with a saturation of 50%.  He was placed on nonrebreather and brought to the ED.  He desaturates very quickly on nonrebreather and is in moderate respiratory distress.  Chest x-ray showed diffuse right-sided pneumonia/consolidation.  He was treated for community-acquired pneumonia with ceftriaxone and azithromycin as well as IV methylprednisolone.  He denies fevers or chills.  He he lives alone and tells me has been taking appropriate precautions when he goes out of the house wearing his mask and has no known exposures to COVID-19.  He is a nondrinker and non-smoker.     Screening SARS-CoV-2 PCR came back positive in ED.  He received 600 cc of crystalloid and the sepsis fluid bolus that had been ordered was discontinued.  He is not currently hypotensive and appears relatively euvolemic.  Lactic acid 1.7.  With the positive COVID-19 test I decreased IV fluid, ordered dexamethasone 6 mg daily x10 days, will admit to isolation ICU.  He will be placed on high flow nasal cannula/BiPAP with high risk for requiring intubation.  We will follow-up inflammatory markers and initiate full dose anticoagulation if appropriate.    Hospital Course   8/29-worsening mental status, rapid shallow breathing and hypoxia required endotracheal intubation and prone positioning.  8/30-         Interval Progress  Reviewed last 24 hour events:              - Supine this  morning at 0730   - Tm: 38.2              - HR: 80s              - SBP: 120-140s              - Neuro: Deeply sedated for chemical paralysis              - GI: N.p.o.              - UOP: 1.2L              - Briseno: Yes              - Lines: PIV              - PPx: No GI, Lovenox              - CXR (personally reviewed): Near complete opacification              - Antibiotic Day, C3, Azithromycin    Infusions:  Propofol  Rocuronium infusion  Total fluids 100ml/hr    ABG 7.44/44/59/30 (Ve-10.5L/min)    P/F ratio: 62-->59  Compliance:12 -->21  Optimal PEEP-19      Review of Systems  Review of Systems   Unable to perform ROS: Medical condition        Vital Signs for last 24 hours   Temp:  [36.5 °C (97.7 °F)-38.2 °C (100.7 °F)] 38.2 °C (100.7 °F)  Pulse:  [72-98] 79  Resp:  [24-29] 24  BP: ()/(33-72) 110/55  SpO2:  [83 %-95 %] 91 %    Hemodynamic parameters for last 24 hours       Respiratory Information for the last 24 hours  Vent Mode: PCMV  Rate (breaths/min): 24  Vt Target (mL): 440  PEEP/CPAP: 19  MAP: 25  Control VTE (exp VT): 402    Physical Exam   Physical Exam  Constitutional:       General: He is not in acute distress.     Appearance: He is ill-appearing.   HENT:      Mouth/Throat:      Mouth: Mucous membranes are moist.   Eyes:      Pupils: Pupils are equal, round, and reactive to light.   Neck:      Musculoskeletal: Neck supple.   Cardiovascular:      Rate and Rhythm: Regular rhythm.      Heart sounds: No murmur. No friction rub. No gallop.    Pulmonary:      Effort: No respiratory distress.      Breath sounds: No rhonchi.      Comments: Breath sounds are diminished throughout.  Abdominal:      General: There is no distension.      Tenderness: There is no abdominal tenderness.      Comments: Obese    Musculoskeletal:      Right lower leg: No edema.      Left lower leg: No edema.   Skin:     General: Skin is warm and dry.   Neurological:      General: No focal deficit present.      Comments: Deeply sedated  and chemically paralyzed.   Psychiatric:      Comments: Unable to assess         Medications  Current Facility-Administered Medications   Medication Dose Route Frequency Provider Last Rate Last Dose   • Pharmacy Consult Request Remdesivir Initiation (for COVID-19)   Other PHARMACY TO DOSE Harjinder Crowe M.D.       • artificial tears (EYE LUBRICANT) ophth ointment 1 Application  1 Application Both Eyes Q8HRS Harjinder Crowe M.D.   1 Application at 08/30/20 0434   • propofol (DIPRIVAN) injection  0-80 mcg/kg/min Intravenous Continuous Harjinder Crowe M.D. 21.2 mL/hr at 08/30/20 1149 20 mcg/kg/min at 08/30/20 1149   • fentaNYL (SUBLIMAZE) 50 mcg/mL in 50mL (Continuous Infusion)   Intravenous Continuous Harjinder Crowe M.D. 2 mL/hr at 08/29/20 1309 100 mcg/hr at 08/29/20 1309   • rocuronium (ZEMURON) 250 mg in  mL Infusion  0-16 mcg/kg/min Intravenous Continuous Harjinder Crowe M.D. 84.9 mL/hr at 08/30/20 1425 8 mcg/kg/min at 08/30/20 1425   • rocuronium (ZEMURON) injection 106.1 mg  0.6 mg/kg Intravenous Q2HRS PRN Harjinder Crowe M.D.   106.1 mg at 08/30/20 0413   • enoxaparin (LOVENOX) injection 150 mg  150 mg Subcutaneous Q12HRS Harjinder Crowe M.D.   150 mg at 08/30/20 0422   • norepinephrine (Levophed) infusion 8 mg/250 mL (premix)  0-30 mcg/min Intravenous Continuous Harjinder Crowe M.D.   Stopped at 08/29/20 1415   • Respiratory Therapy Consult   Nebulization Continuous RT Harjinder Crowe M.D.       • ipratropium-albuterol (DUONEB) nebulizer solution  3 mL Nebulization Q2HRS PRN (RT) Harjinder Crowe M.D.       • famotidine (PEPCID) tablet 20 mg  20 mg Enteral Tube Q12HRS Harjinder Crowe M.D.        Or   • famotidine (PEPCID) injection 20 mg  20 mg Intravenous Q12HRS Harjinder Crowe M.D.   20 mg at 08/30/20 0421   • senna-docusate (PERICOLACE or SENOKOT S) 8.6-50 MG per tablet 2 Tab  2 Tab Enteral Tube BID Harjinder Crowe M.D.   2 Tab at 08/30/20 0421    And   • polyethylene glycol/lytes (MIRALAX) PACKET 1  Packet  1 Packet Enteral Tube QDAY PRN Harjinder Crowe M.D.        And   • magnesium hydroxide (MILK OF MAGNESIA) suspension 30 mL  30 mL Enteral Tube QDAY PRN Harjinder Crowe M.D.        And   • bisacodyl (DULCOLAX) suppository 10 mg  10 mg Rectal QDAY PRN Harjinder Crowe M.D.       • MD Alert...ICU Electrolyte Replacement per Pharmacy   Other PHARMACY TO DOSE Harjinder Crowe M.D.       • lidocaine (XYLOCAINE) 1 % injection 1-2 mL  1-2 mL Tracheal Tube Q30 MIN PRN Harjinder Crowe M.D.       • benzonatate (TESSALON) capsule 100 mg  100 mg Oral TID PRN José Watters M.D.   100 mg at 08/28/20 2318   • amLODIPine (NORVASC) tablet 10 mg  10 mg Oral DAILY Nii Rivera M.D.   10 mg at 08/30/20 0421   • aspirin EC (ECOTRIN) tablet 81 mg  81 mg Oral DAILY Nii Rivera M.D.   81 mg at 08/30/20 0422   • atorvastatin (LIPITOR) tablet 20 mg  20 mg Oral DAILY Nii Rivera M.D.   20 mg at 08/30/20 0422   • HYDROcodone/acetaminophen (NORCO)  MG per tablet 1 Tab  1 Tab Oral Q6HRS PRN Nii Rivera M.D.   1 Tab at 08/29/20 0158   • lactated ringers infusion (BOLUS): BMI greater than 30  30 mL/kg (Ideal) Intravenous Once PRN Nii Rivera M.D.   Stopped at 08/27/20 1835   • lactated ringers infusion   Intravenous Continuous Nii Rivera M.D.   Stopped at 08/30/20 1050   • acetaminophen (TYLENOL) tablet 650 mg  650 mg Oral Q6HRS PRN Nii Rivera M.D.   650 mg at 08/30/20 0421   • cefTRIAXone (ROCEPHIN) 2 g in  mL IVPB  2 g Intravenous Q24HRS Nii Rivera M.D.   Stopped at 08/29/20 1749   • labetalol (NORMODYNE/TRANDATE) injection 10-20 mg  10-20 mg Intravenous Q4HRS PRN Nii Rivera M.D.       • ondansetron (ZOFRAN) syringe/vial injection 4 mg  4 mg Intravenous Q4HRS PRN Nii Rivera M.D.   4 mg at 08/29/20 0552   • ondansetron (ZOFRAN ODT) dispertab 4 mg  4 mg Oral Q4HRS PRN Nii Rivera M.D.       • promethazine (PHENERGAN) tablet 12.5-25 mg  12.5-25 mg Oral Q4HRS PRN Nii Rivera  M.D.       • promethazine (PHENERGAN) suppository 12.5-25 mg  12.5-25 mg Rectal Q4HRS PRN Nii Rivera M.D.       • prochlorperazine (COMPAZINE) injection 5-10 mg  5-10 mg Intravenous Q4HRS PRN Nii Rivera M.D.       • insulin regular (HumuLIN R,NovoLIN R) injection  3-14 Units Subcutaneous Q6HRS Nii Rivera M.D.   Stopped at 08/29/20 1800    And   • glucose 4 g chewable tablet 16 g  16 g Oral Q15 MIN PRN Nii Rivera M.D.        And   • dextrose 50% (D50W) injection 50 mL  50 mL Intravenous Q15 MIN PRN Nii Rivera M.D.       • dexamethasone (DECADRON) injection 6 mg  6 mg Intravenous DAILY Nii Rivera M.D.   6 mg at 08/30/20 0420       Fluids    Intake/Output Summary (Last 24 hours) at 8/30/2020 1447  Last data filed at 8/30/2020 1200  Gross per 24 hour   Intake 4303.92 ml   Output 2480 ml   Net 1823.92 ml       Laboratory  Recent Labs     08/29/20  1327 08/29/20  1550 08/30/20  0330   ISTATAPH 7.298* 7.355* 7.444   ISTATAPCO2 68.2* 58.9* 43.6*   ISTATAPO2 43* 62* 59*   ISTATATCO2 35* 35* 31   [id number] 71* 90* 91*   ISTATARTHCO3 33.4* 32.9* 29.9*   ISTATARTBE 3 5* 5*   ISTATTEMP 37.0 C 37.2 C 37.0 C   ISTATFIO2 100 100 100   ISTATSPEC Arterial Arterial Arterial   ISTATAPHTC 7.298* 7.352* 7.444   [id number] 43* 63* 59*     Recent Labs     08/27/20  2345 08/29/20  0345   CPKTOTAL 74 65     Recent Labs     08/28/20  0645 08/29/20  0345 08/30/20  0236   SODIUM 136 136 136   POTASSIUM 5.4 5.5 4.9   CHLORIDE 95* 96 96   CO2 33 32 31   BUN 17 19 20   CREATININE 0.66 0.55 0.61   MAGNESIUM 2.1 2.1 2.2   PHOSPHORUS 3.9 2.9 1.0*   CALCIUM 8.4* 8.3* 8.2*     Recent Labs     08/27/20 2345 08/28/20 0645 08/29/20 0345 08/30/20  0236   ALTSGPT 18 13 14  --    ASTSGOT 24 16 22  --    ALKPHOSPHAT 96 90 80  --    TBILIRUBIN 0.8 0.7 0.9  --    DBILIRUBIN 0.4  --  0.5  --    GLUCOSE  --  149* 132* 83     Recent Labs     08/27/20 2345 08/28/20  0645 08/29/20  0345 08/30/20  0236   WBC  --  6.3 7.1 4.7*    NEUTSPOLYS  --  87.40* 91.30* 87.60*   LYMPHOCYTES  --  5.50* 3.20* 7.20*   MONOCYTES  --  5.40 4.20 4.20   EOSINOPHILS  --  0.00 0.00 0.00   BASOPHILS  --  0.30 0.30 0.20   ASTSGOT 24 16 22  --    ALTSGPT 18 13 14  --    ALKPHOSPHAT 96 90 80  --    TBILIRUBIN 0.8 0.7 0.9  --      Recent Labs     08/27/20  2345 08/28/20  0645 08/29/20  0345 08/30/20  0236   RBC  --  6.50* 6.18* 5.87   HEMOGLOBIN  --  20.6* 19.3* 18.4*   HEMATOCRIT  --  67.1* 61.7* 59.1*   PLATELETCT  --  89* 83* 79*   PROTHROMBTM 15.3*  --  15.0*  --    APTT 47.9*  --  47.6*  --    INR 1.18*  --  1.14*  --    FERRITIN 137.0  --  145.0  --        Imaging  X-Ray:  I have personally reviewed the images and compared with prior images. and My impression is: Endotracheal tube is been placed the tip 4 cm above the keira.  Right IJ central venous catheter with the tip near the cavoatrial junction.  Worsening opacification of both hemithorax. No pneumothorax cannot exclude underlying effusion.    Assessment/Plan  * Pneumonia due to COVID-19 virus  Assessment & Plan  Endotracheal intubation  Chemical paralysis x24 hours  Prone positioning.  Dexamethasone 6 mg daily x10 days  Follow inflammatory markers  Add Remdesivir    Acute hypoxemic respiratory failure (HCC)- (present on admission)  Assessment & Plan  Secondary to COVID-19 pneumonia, +/- community-acquired/bacterial pneumonia  Multilobar dense consolidation of the right lung, no significant effusion  No evidence of pulmonary embolism  Endotracheal intubation  Chemical paralysis x24 hours  Prone positioning  Lung protective ventilation strategies  Titrate ventilator prescription to optimize oxygenation, ventilation, and acid base balance.  He is not currently a candidate for ABC trials        Diabetes type 2, controlled (HCC)- (present on admission)  Assessment & Plan  Monitor glycemic control with insulin therapy    ABELARDO (obstructive sleep apnea)- (present on admission)  Assessment &  Plan  Polysomnography on January 29, 2019 AHI = 101.9, SpO2 hortensia 50% on room air  He gets his usual care from the [facility] and his physician Dr. Faust recently left the practice by his report  Will need to establish with a new primary care physician    Essential hypertension- (present on admission)  Assessment & Plan  Will continue on his home antihypertensives and follow blood pressure closely in ICU    Morbid obesity with BMI of 50.0-59.9, adult (HCC)- (present on admission)  Assessment & Plan  380 pounds, BMI 55      HLD (hyperlipidemia)- (present on admission)  Assessment & Plan  Continue statin       VTE:  Lovenox therapeutic for elevated d-dimer  Ulcer: H2 Antagonist  Lines: None    I have performed a physical exam and reviewed and updated ROS and Plan today (8/30/2020). In review of yesterday's note (8/29/2020), there are no changes except as documented above.       The patient remains critically ill.  I have assessed and reassessed the respiratory status and made ventilator adjustments based upon arterial blood gas analysis and airway mechanics.  I have assessed and reassessed the blood pressure, hemodynamics, cardiovascular status. This patient remains at high risk for worsening cardiopulmonary dysfunction and death without the above critical care interventions.      Discussed patient condition and risk of morbidity and/or mortality with RN, RT, Pharmacy and Charge nurse / hot rounds     Critical care time 80 minutes in directly providing and coordinating critical care and extensive data review.  No time overlap and excludes procedures.

## 2020-08-31 ENCOUNTER — APPOINTMENT (OUTPATIENT)
Dept: RADIOLOGY | Facility: MEDICAL CENTER | Age: 60
DRG: 004 | End: 2020-08-31
Attending: INTERNAL MEDICINE
Payer: MEDICARE

## 2020-08-31 LAB
ACTION RANGE TRIGGERED IACRT: NO
ACTION RANGE TRIGGERED IACRT: NO
ACTION RANGE TRIGGERED IACRT: YES
ACTION RANGE TRIGGERED IACRT: YES
ALBUMIN SERPL BCP-MCNC: 2.4 G/DL (ref 3.2–4.9)
ALBUMIN/GLOB SERPL: 0.6 G/DL
ALP SERPL-CCNC: 65 U/L (ref 30–99)
ALT SERPL-CCNC: 12 U/L (ref 2–50)
ANION GAP SERPL CALC-SCNC: 12 MMOL/L (ref 7–16)
APTT PPP: 44.3 SEC (ref 24.7–36)
AST SERPL-CCNC: 23 U/L (ref 12–45)
BACTERIA SPEC RESP CULT: NORMAL
BASE EXCESS BLDA CALC-SCNC: 3 MMOL/L (ref -4–3)
BASE EXCESS BLDA CALC-SCNC: 4 MMOL/L (ref -4–3)
BASE EXCESS BLDA CALC-SCNC: 4 MMOL/L (ref -4–3)
BASE EXCESS BLDA CALC-SCNC: 6 MMOL/L (ref -4–3)
BASOPHILS # BLD AUTO: 0.5 % (ref 0–1.8)
BASOPHILS # BLD: 0.02 K/UL (ref 0–0.12)
BILIRUB CONJ SERPL-MCNC: 0.6 MG/DL (ref 0.1–0.5)
BILIRUB INDIRECT SERPL-MCNC: 0.4 MG/DL (ref 0–1)
BILIRUB SERPL-MCNC: 1 MG/DL (ref 0.1–1.5)
BODY TEMPERATURE: ABNORMAL DEGREES
BUN SERPL-MCNC: 17 MG/DL (ref 8–22)
CALCIUM SERPL-MCNC: 8.4 MG/DL (ref 8.5–10.5)
CHLORIDE SERPL-SCNC: 99 MMOL/L (ref 96–112)
CK SERPL-CCNC: 142 U/L (ref 0–154)
CO2 BLDA-SCNC: 31 MMOL/L (ref 20–33)
CO2 BLDA-SCNC: 31 MMOL/L (ref 20–33)
CO2 BLDA-SCNC: 33 MMOL/L (ref 20–33)
CO2 BLDA-SCNC: 33 MMOL/L (ref 20–33)
CO2 SERPL-SCNC: 30 MMOL/L (ref 20–33)
CREAT SERPL-MCNC: 0.5 MG/DL (ref 0.5–1.4)
CRP SERPL HS-MCNC: 10.9 MG/DL (ref 0–0.75)
CRP SERPL HS-MCNC: 16.22 MG/DL (ref 0–0.75)
D DIMER PPP IA.FEU-MCNC: 1.93 UG/ML (FEU) (ref 0–0.5)
EOSINOPHIL # BLD AUTO: 0.01 K/UL (ref 0–0.51)
EOSINOPHIL NFR BLD: 0.2 % (ref 0–6.9)
ERYTHROCYTE [DISTWIDTH] IN BLOOD BY AUTOMATED COUNT: 63.3 FL (ref 35.9–50)
FERRITIN SERPL-MCNC: 307 NG/ML (ref 22–322)
FIBRINOGEN PPP-MCNC: 868 MG/DL (ref 215–460)
GLOBULIN SER CALC-MCNC: 4.3 G/DL (ref 1.9–3.5)
GLUCOSE BLD-MCNC: 82 MG/DL (ref 65–99)
GLUCOSE SERPL-MCNC: 94 MG/DL (ref 65–99)
GRAM STN SPEC: NORMAL
HCO3 BLDA-SCNC: 29.1 MMOL/L (ref 17–25)
HCO3 BLDA-SCNC: 29.2 MMOL/L (ref 17–25)
HCO3 BLDA-SCNC: 31.4 MMOL/L (ref 17–25)
HCO3 BLDA-SCNC: 31.9 MMOL/L (ref 17–25)
HCT VFR BLD AUTO: 64.3 % (ref 42–52)
HGB BLD-MCNC: 19.6 G/DL (ref 14–18)
HOROWITZ INDEX BLDA+IHG-RTO: 47 MM[HG]
HOROWITZ INDEX BLDA+IHG-RTO: 51 MM[HG]
HOROWITZ INDEX BLDA+IHG-RTO: 60 MM[HG]
IMM GRANULOCYTES # BLD AUTO: 0.03 K/UL (ref 0–0.11)
IMM GRANULOCYTES NFR BLD AUTO: 0.7 % (ref 0–0.9)
INR PPP: 1.06 (ref 0.87–1.13)
INST. QUALIFIED PATIENT IIQPT: YES
LDH SERPL L TO P-CCNC: 385 U/L (ref 107–266)
LYMPHOCYTES # BLD AUTO: 0.45 K/UL (ref 1–4.8)
LYMPHOCYTES NFR BLD: 10.7 % (ref 22–41)
MAGNESIUM SERPL-MCNC: 2 MG/DL (ref 1.5–2.5)
MCH RBC QN AUTO: 30.2 PG (ref 27–33)
MCHC RBC AUTO-ENTMCNC: 30.5 G/DL (ref 33.7–35.3)
MCV RBC AUTO: 98.9 FL (ref 81.4–97.8)
MONOCYTES # BLD AUTO: 0.34 K/UL (ref 0–0.85)
MONOCYTES NFR BLD AUTO: 8.1 % (ref 0–13.4)
NEUTROPHILS # BLD AUTO: 3.35 K/UL (ref 1.82–7.42)
NEUTROPHILS NFR BLD: 79.8 % (ref 44–72)
NRBC # BLD AUTO: 0.09 K/UL
NRBC BLD-RTO: 2.1 /100 WBC
NT-PROBNP SERPL IA-MCNC: 112 PG/ML (ref 0–125)
O2/TOTAL GAS SETTING VFR VENT: 100 %
PCO2 BLDA: 45.1 MMHG (ref 26–37)
PCO2 BLDA: 46.7 MMHG (ref 26–37)
PCO2 BLDA: 49.8 MMHG (ref 26–37)
PCO2 BLDA: 52.4 MMHG (ref 26–37)
PCO2 TEMP ADJ BLDA: 43.4 MMHG (ref 26–37)
PCO2 TEMP ADJ BLDA: 46.7 MMHG (ref 26–37)
PCO2 TEMP ADJ BLDA: 49.6 MMHG (ref 26–37)
PCO2 TEMP ADJ BLDA: 54.2 MMHG (ref 26–37)
PH BLDA: 7.38 [PH] (ref 7.4–7.5)
PH BLDA: 7.4 [PH] (ref 7.4–7.5)
PH BLDA: 7.41 [PH] (ref 7.4–7.5)
PH BLDA: 7.42 [PH] (ref 7.4–7.5)
PH TEMP ADJ BLDA: 7.37 [PH] (ref 7.4–7.5)
PH TEMP ADJ BLDA: 7.4 [PH] (ref 7.4–7.5)
PH TEMP ADJ BLDA: 7.42 [PH] (ref 7.4–7.5)
PH TEMP ADJ BLDA: 7.43 [PH] (ref 7.4–7.5)
PHOSPHATE SERPL-MCNC: 3.5 MG/DL (ref 2.5–4.5)
PLATELET # BLD AUTO: 88 K/UL (ref 164–446)
PMV BLD AUTO: 11.3 FL (ref 9–12.9)
PO2 BLDA: 47 MMHG (ref 64–87)
PO2 BLDA: 51 MMHG (ref 64–87)
PO2 BLDA: 60 MMHG (ref 64–87)
PO2 TEMP ADJ BLDA: 47 MMHG (ref 64–87)
PO2 TEMP ADJ BLDA: 54 MMHG (ref 64–87)
PO2 TEMP ADJ BLDA: 56 MMHG (ref 64–87)
POTASSIUM SERPL-SCNC: 4.3 MMOL/L (ref 3.6–5.5)
PREALB SERPL-MCNC: 5.6 MG/DL (ref 18–38)
PROCALCITONIN SERPL-MCNC: <0.05 NG/ML
PROT SERPL-MCNC: 6.7 G/DL (ref 6–8.2)
PROTHROMBIN TIME: 14.1 SEC (ref 12–14.6)
RBC # BLD AUTO: 6.5 M/UL (ref 4.7–6.1)
SAO2 % BLDA: 82 % (ref 93–99)
SAO2 % BLDA: 84 % (ref 93–99)
SAO2 % BLDA: 91 % (ref 93–99)
SIGNIFICANT IND 70042: NORMAL
SITE SITE: NORMAL
SODIUM SERPL-SCNC: 141 MMOL/L (ref 135–145)
SOURCE SOURCE: NORMAL
SPECIMEN DRAWN FROM PATIENT: ABNORMAL
TROPONIN T SERPL-MCNC: 8 NG/L (ref 6–19)
UFH PPP CHRO-ACNC: 0.9 U/ML
WBC # BLD AUTO: 4.2 K/UL (ref 4.8–10.8)

## 2020-08-31 PROCEDURE — 84100 ASSAY OF PHOSPHORUS: CPT

## 2020-08-31 PROCEDURE — 99292 CRITICAL CARE ADDL 30 MIN: CPT | Mod: 25 | Performed by: INTERNAL MEDICINE

## 2020-08-31 PROCEDURE — 86140 C-REACTIVE PROTEIN: CPT | Mod: 91

## 2020-08-31 PROCEDURE — 700105 HCHG RX REV CODE 258: Performed by: INTERNAL MEDICINE

## 2020-08-31 PROCEDURE — 85730 THROMBOPLASTIN TIME PARTIAL: CPT

## 2020-08-31 PROCEDURE — 03HY32Z INSERTION OF MONITORING DEVICE INTO UPPER ARTERY, PERCUTANEOUS APPROACH: ICD-10-PCS | Performed by: INTERNAL MEDICINE

## 2020-08-31 PROCEDURE — 36600 WITHDRAWAL OF ARTERIAL BLOOD: CPT

## 2020-08-31 PROCEDURE — 85384 FIBRINOGEN ACTIVITY: CPT

## 2020-08-31 PROCEDURE — 94770 HCHG CO2 EXPIRED GAS DETERMINATION: CPT

## 2020-08-31 PROCEDURE — 700102 HCHG RX REV CODE 250 W/ 637 OVERRIDE(OP): Performed by: INTERNAL MEDICINE

## 2020-08-31 PROCEDURE — 82550 ASSAY OF CK (CPK): CPT

## 2020-08-31 PROCEDURE — 84134 ASSAY OF PREALBUMIN: CPT

## 2020-08-31 PROCEDURE — 83880 ASSAY OF NATRIURETIC PEPTIDE: CPT

## 2020-08-31 PROCEDURE — 85520 HEPARIN ASSAY: CPT

## 2020-08-31 PROCEDURE — 770022 HCHG ROOM/CARE - ICU (200)

## 2020-08-31 PROCEDURE — 84484 ASSAY OF TROPONIN QUANT: CPT

## 2020-08-31 PROCEDURE — 85379 FIBRIN DEGRADATION QUANT: CPT

## 2020-08-31 PROCEDURE — 82803 BLOOD GASES ANY COMBINATION: CPT | Mod: 91

## 2020-08-31 PROCEDURE — 82248 BILIRUBIN DIRECT: CPT

## 2020-08-31 PROCEDURE — 83520 IMMUNOASSAY QUANT NOS NONAB: CPT

## 2020-08-31 PROCEDURE — 700101 HCHG RX REV CODE 250: Performed by: INTERNAL MEDICINE

## 2020-08-31 PROCEDURE — A9270 NON-COVERED ITEM OR SERVICE: HCPCS | Performed by: INTERNAL MEDICINE

## 2020-08-31 PROCEDURE — 83735 ASSAY OF MAGNESIUM: CPT

## 2020-08-31 PROCEDURE — 80053 COMPREHEN METABOLIC PANEL: CPT

## 2020-08-31 PROCEDURE — 37799 UNLISTED PX VASCULAR SURGERY: CPT

## 2020-08-31 PROCEDURE — 85610 PROTHROMBIN TIME: CPT

## 2020-08-31 PROCEDURE — 99291 CRITICAL CARE FIRST HOUR: CPT | Mod: 25 | Performed by: INTERNAL MEDICINE

## 2020-08-31 PROCEDURE — 82962 GLUCOSE BLOOD TEST: CPT

## 2020-08-31 PROCEDURE — 71045 X-RAY EXAM CHEST 1 VIEW: CPT

## 2020-08-31 PROCEDURE — 84145 PROCALCITONIN (PCT): CPT

## 2020-08-31 PROCEDURE — 94003 VENT MGMT INPAT SUBQ DAY: CPT

## 2020-08-31 PROCEDURE — 36620 INSERTION CATHETER ARTERY: CPT | Performed by: INTERNAL MEDICINE

## 2020-08-31 PROCEDURE — 700111 HCHG RX REV CODE 636 W/ 250 OVERRIDE (IP): Performed by: INTERNAL MEDICINE

## 2020-08-31 PROCEDURE — 85025 COMPLETE CBC W/AUTO DIFF WBC: CPT

## 2020-08-31 PROCEDURE — 83615 LACTATE (LD) (LDH) ENZYME: CPT

## 2020-08-31 PROCEDURE — 82728 ASSAY OF FERRITIN: CPT

## 2020-08-31 PROCEDURE — 302136 NUTRITION PUMP: Performed by: INTERNAL MEDICINE

## 2020-08-31 RX ORDER — SODIUM CHLORIDE 9 MG/ML
INJECTION, SOLUTION INTRAVENOUS
Status: ACTIVE
Start: 2020-08-31 | End: 2020-09-01

## 2020-08-31 RX ORDER — ASPIRIN 81 MG/1
81 TABLET, CHEWABLE ORAL DAILY
Status: DISCONTINUED | OUTPATIENT
Start: 2020-09-01 | End: 2020-09-28 | Stop reason: HOSPADM

## 2020-08-31 RX ORDER — PROMETHAZINE HYDROCHLORIDE 25 MG/1
12.5-25 TABLET ORAL EVERY 4 HOURS PRN
Status: DISCONTINUED | OUTPATIENT
Start: 2020-08-31 | End: 2020-09-26

## 2020-08-31 RX ORDER — AMLODIPINE BESYLATE 10 MG/1
10 TABLET ORAL DAILY
Status: DISCONTINUED | OUTPATIENT
Start: 2020-09-01 | End: 2020-09-07

## 2020-08-31 RX ORDER — ACETAMINOPHEN 325 MG/1
650 TABLET ORAL EVERY 6 HOURS PRN
Status: DISCONTINUED | OUTPATIENT
Start: 2020-08-31 | End: 2020-09-28 | Stop reason: HOSPADM

## 2020-08-31 RX ORDER — HYDROCODONE BITARTRATE AND ACETAMINOPHEN 10; 325 MG/1; MG/1
1 TABLET ORAL EVERY 6 HOURS PRN
Status: DISCONTINUED | OUTPATIENT
Start: 2020-08-31 | End: 2020-09-21

## 2020-08-31 RX ADMIN — PROPOFOL 50 MCG/KG/MIN: 10 INJECTION, EMULSION INTRAVENOUS at 21:51

## 2020-08-31 RX ADMIN — PROPOFOL 20 MCG/KG/MIN: 10 INJECTION, EMULSION INTRAVENOUS at 01:30

## 2020-08-31 RX ADMIN — ROCURONIUM BROMIDE 8 MCG/KG/MIN: 10 SOLUTION INTRAVENOUS at 06:41

## 2020-08-31 RX ADMIN — PROPOFOL 50 MCG/KG/MIN: 10 INJECTION, EMULSION INTRAVENOUS at 23:10

## 2020-08-31 RX ADMIN — DOCUSATE SODIUM 50 MG AND SENNOSIDES 8.6 MG 2 TABLET: 8.6; 5 TABLET, FILM COATED ORAL at 08:38

## 2020-08-31 RX ADMIN — MINERAL OIL, PETROLATUM 1 APPLICATION: 425; 573 OINTMENT OPHTHALMIC at 20:02

## 2020-08-31 RX ADMIN — DEXAMETHASONE SODIUM PHOSPHATE 6 MG: 4 INJECTION, SOLUTION INTRA-ARTICULAR; INTRALESIONAL; INTRAMUSCULAR; INTRAVENOUS; SOFT TISSUE at 05:06

## 2020-08-31 RX ADMIN — PROPOFOL 20 MCG/KG/MIN: 10 INJECTION, EMULSION INTRAVENOUS at 05:07

## 2020-08-31 RX ADMIN — MINERAL OIL, PETROLATUM 1 APPLICATION: 425; 573 OINTMENT OPHTHALMIC at 13:04

## 2020-08-31 RX ADMIN — CEFTRIAXONE SODIUM 2 G: 2 INJECTION, POWDER, FOR SOLUTION INTRAMUSCULAR; INTRAVENOUS at 16:50

## 2020-08-31 RX ADMIN — ROCURONIUM BROMIDE 6 MCG/KG/MIN: 10 SOLUTION INTRAVENOUS at 10:45

## 2020-08-31 RX ADMIN — FAMOTIDINE 20 MG: 10 INJECTION INTRAVENOUS at 16:50

## 2020-08-31 RX ADMIN — ATORVASTATIN CALCIUM 20 MG: 20 TABLET, FILM COATED ORAL at 08:38

## 2020-08-31 RX ADMIN — MINERAL OIL, PETROLATUM 1 APPLICATION: 425; 573 OINTMENT OPHTHALMIC at 05:07

## 2020-08-31 RX ADMIN — AMLODIPINE BESYLATE 10 MG: 10 TABLET ORAL at 08:39

## 2020-08-31 RX ADMIN — PROPOFOL 20 MCG/KG/MIN: 10 INJECTION, EMULSION INTRAVENOUS at 10:59

## 2020-08-31 RX ADMIN — FAMOTIDINE 20 MG: 10 INJECTION INTRAVENOUS at 05:06

## 2020-08-31 RX ADMIN — ENOXAPARIN SODIUM 150 MG: 150 INJECTION SUBCUTANEOUS at 16:55

## 2020-08-31 RX ADMIN — ROCURONIUM BROMIDE 8 MCG/KG/MIN: 10 SOLUTION INTRAVENOUS at 00:00

## 2020-08-31 RX ADMIN — ROCURONIUM BROMIDE 8 MCG/KG/MIN: 10 SOLUTION INTRAVENOUS at 03:22

## 2020-08-31 RX ADMIN — PROPOFOL 50 MCG/KG/MIN: 10 INJECTION, EMULSION INTRAVENOUS at 16:46

## 2020-08-31 RX ADMIN — PROPOFOL 50 MCG/KG/MIN: 10 INJECTION, EMULSION INTRAVENOUS at 18:17

## 2020-08-31 RX ADMIN — ENOXAPARIN SODIUM 150 MG: 150 INJECTION SUBCUTANEOUS at 05:05

## 2020-08-31 RX ADMIN — REMDESIVIR 100 MG: 5 INJECTION INTRAVENOUS at 18:00

## 2020-08-31 RX ADMIN — PROPOFOL 50 MCG/KG/MIN: 10 INJECTION, EMULSION INTRAVENOUS at 14:38

## 2020-08-31 RX ADMIN — PROPOFOL 50 MCG/KG/MIN: 10 INJECTION, EMULSION INTRAVENOUS at 19:59

## 2020-08-31 RX ADMIN — ASPIRIN 81 MG: 81 TABLET, COATED ORAL at 08:38

## 2020-08-31 ASSESSMENT — FIBROSIS 4 INDEX: FIB4 SCORE: 4.38

## 2020-08-31 NOTE — PROGRESS NOTES
Notified Dr. Gonda that pt is having increased ectopy; bigem and couplet PVC's. Orders received to check electrolytes, BMP, mag, phos. Also received an order to have RT go up on pt's respiratory rate on the ventilator to 28 due to pt's prior ABG results. Will continue to monitor pt.

## 2020-08-31 NOTE — THERAPY
PT consult received. Hold today per RN. Will initiate PT evaluation as medically appropriate    Vonnie Simpson, PT, DPT Phone: [phone]

## 2020-08-31 NOTE — THERAPY
Missed Therapy     Patient Name: Rashi Shirley  Age:  60 y.o., Sex:  male  Medical Record #: [id number]  Today's Date: 8/31/2020    Discussed missed therapy with nursing staff.       08/31/20 1538   Interdisciplinary Plan of Care Collaboration   IDT Collaboration with  Nursing   Collaboration Comments OT consult received and acknowledged. Hold today per RN. Will initiate OT evaluation as medically able/appropriate to participate.

## 2020-08-31 NOTE — CARE PLAN
Problem: Communication  Goal: The ability to communicate needs accurately and effectively will improve  Outcome: NOT MET  Note: Patient is intubated. RASS -5, no response on TOF. Patient on paralytic vacation per MD Rivera. TOF reestablished,attempting to wean sedation to assess neuro and patients baseline.   Problem: Skin Integrity  Goal: Risk for impaired skin integrity will decrease  Outcome: PROGRESSING AS EXPECTED   Patient being proned. Extra pillows and waffle cushion in use. Mepilex on kami prominences. Patients skin shows no sign of pressure injury. New small tear on right side of upper lip and right upper abdomen. No other concerns at this time. Will continue to monitor.

## 2020-08-31 NOTE — PROCEDURES
Arterial Catheter Procedure Note    Date: 8/31/2020    Procedure:  Arterial Catheter Insertion    Indication: COVID-19 pneumonia, respiratory failure on high ventilator support    Physician:  Dr. Nii Rivera MD    Consent:  Emergent    Procedure:  The patient is intubated and on full mechanical vent support with COVID-19 pneumonia, high ventilator support, prone protocol.  Arterial catheter is indicated for continuous invasive BP monitoring and arterial blood gas draws.  The right wrist was prepped and draped using sterile barrier precautions.  Using ultrasound guidance, a 20-gauge, 8 cm radial artery catheter was placed in the right radial artery on the first attempt without difficulty.  A good quality arterial waveform was noted on monitor.  The catheter ws sutured in place and a sterile dressing was applied.  No immediate complications.  EBL < 5 cc.

## 2020-08-31 NOTE — CARE PLAN
Ventilator Daily Summary    Vent Day # 2     Ventilator settings changed this shift: PC to 16, Peep to +19     Weaning trials: none at this time due to peep and fio2 requirements    Respiratory Procedures: proning 16/8     Plan: Continue current ventilator settings and wean mechanical ventilation as tolerated per physician orders.

## 2020-08-31 NOTE — PROGRESS NOTES
Critical Care Progress Note    Date of admission  8/27/2020    Chief Complaint  60 y.o. male who presented 8/27/2020 with increasing dyspnea and hypoxia.  He has a history of morbid obesity, hyperlipidemia, diabetes and obstructive sleep apnea, noncompliant with treatment.  He uses oxygen 2 L via nasal cannula for chronic hypoxia.  Today he called EMS for increasing dyspnea over the past 24 hours.  He reports he is had increased cough and yellow sputum production with small amount of blood in his sputum over the past 24 hours.  On EMS arrival patient was profoundly hypoxic with a saturation of 50%.  He was placed on nonrebreather and brought to the ED.  He desaturates very quickly on nonrebreather and is in moderate respiratory distress.  Chest x-ray showed diffuse right-sided pneumonia/consolidation.  He was treated for community-acquired pneumonia with ceftriaxone and azithromycin as well as IV methylprednisolone.  He denies fevers or chills.  He he lives alone and tells me has been taking appropriate precautions when he goes out of the house wearing his mask and has no known exposures to COVID-19.  He is a nondrinker and non-smoker.     Screening SARS-CoV-2 PCR came back positive in ED.  He received 600 cc of crystalloid and the sepsis fluid bolus that had been ordered was discontinued.  He is not currently hypotensive and appears relatively euvolemic.  Lactic acid 1.7.  With the positive COVID-19 test I decreased IV fluid, ordered dexamethasone 6 mg daily x10 days, will admit to isolation ICU.  He will be placed on high flow nasal cannula/BiPAP with high risk for requiring intubation.  We will follow-up inflammatory markers and initiate full dose anticoagulation if appropriate.    Hospital Course   8/29-worsening mental status, rapid shallow breathing and hypoxia required endotracheal intubation and prone positioning.  8/30-I spoke with Brittney, the patient's daughter and surrogate decision maker about  initiating Remdesivir.  We discussed the risks benefits and alternatives.  All questions were answered.  She consented to initiating Remdesivir today.  8/31 -art line placed, remains on PCV, transition off rocuronium drip as tolerated, propofol/fentanyl, continue prone protocol        Interval Progress  Reviewed last 24 hour events:  Vent day #3: PCV 28/PIP 16 - goal Vt  350 - 450/19/100%  ABG 7.28/72/111 P/F = 111  Prone ventilation protocol, remains  Sedated with propofol, rocuronium and chemically paralyzed on rocuronium infusion, TOF 0/4  CXR bilateral infiltrates, tubes/lines in appropriate position  SR 60 - 100  Bigem  OGT to  cc o/p  Place A-line today  Tx lovenox  Remdesiver day 2  Decadron  Fentanyl, propofol   Review of Systems  Review of Systems   Unable to perform ROS: Medical condition        Vital Signs for last 24 hours   Temp:  [36.7 °C (98.1 °F)-37.2 °C (98.9 °F)] 36.7 °C (98.1 °F)  Pulse:  [70-98] 79  Resp:  [14-30] 30  BP: ()/(33-94) 135/86  SpO2:  [82 %-100 %] 98 %    Hemodynamic parameters for last 24 hours       Respiratory Information for the last 24 hours  Vent Mode: PCMV  Rate (breaths/min): 28  Vt Target (mL): 440  PEEP/CPAP: 19  MAP: 27  Control VTE (exp VT): 324    Physical Exam   Physical Exam  Constitutional:       General: He is not in acute distress.     Appearance: He is ill-appearing.   HENT:      Mouth/Throat:      Mouth: Mucous membranes are moist.   Eyes:      Pupils: Pupils are equal, round, and reactive to light.   Neck:      Musculoskeletal: Neck supple.   Cardiovascular:      Rate and Rhythm: Regular rhythm.      Heart sounds: No murmur. No friction rub. No gallop.    Pulmonary:      Effort: No respiratory distress.      Breath sounds: No rhonchi.      Comments: Breath sounds are diminished throughout.  Abdominal:      General: There is no distension.      Tenderness: There is no abdominal tenderness.      Comments: Obese    Musculoskeletal:      Right lower leg:  No edema.      Left lower leg: No edema.   Skin:     General: Skin is warm and dry.   Neurological:      General: No focal deficit present.      Comments: Deeply sedated and chemically paralyzed.   Psychiatric:      Comments: Unable to assess         Medications  Current Facility-Administered Medications   Medication Dose Route Frequency Provider Last Rate Last Dose   • remdesivir 100 mg in  mL IVPB  100 mg Intravenous DAILY AT 1800 Meghna Nesbitt M.D.       • artificial tears (EYE LUBRICANT) ophth ointment 1 Application  1 Application Both Eyes Q8HRS Harjinder Crowe M.D.   1 Application at 08/31/20 0507   • propofol (DIPRIVAN) injection  0-80 mcg/kg/min Intravenous Continuous Harjinder Crowe M.D. 21.2 mL/hr at 08/31/20 0507 20 mcg/kg/min at 08/31/20 0507   • fentaNYL (SUBLIMAZE) 50 mcg/mL in 50mL (Continuous Infusion)   Intravenous Continuous Harjinder Crowe M.D. 1 mL/hr at 08/30/20 2001 50 mcg/hr at 08/30/20 2001   • rocuronium (ZEMURON) 250 mg in  mL Infusion  0-16 mcg/kg/min Intravenous Continuous Harjinder Crowe M.D. 84.9 mL/hr at 08/31/20 0641 8 mcg/kg/min at 08/31/20 0641   • rocuronium (ZEMURON) injection 106.1 mg  0.6 mg/kg Intravenous Q2HRS PRN Harjinder Crowe M.D.   106.1 mg at 08/30/20 0413   • enoxaparin (LOVENOX) injection 150 mg  150 mg Subcutaneous Q12HRS Harjinder Crowe M.D.   150 mg at 08/31/20 0505   • norepinephrine (Levophed) infusion 8 mg/250 mL (premix)  0-30 mcg/min Intravenous Continuous Harjinder Crowe M.D.   Stopped at 08/29/20 1415   • Respiratory Therapy Consult   Nebulization Continuous RT Harjinder Crowe M.D.       • ipratropium-albuterol (DUONEB) nebulizer solution  3 mL Nebulization Q2HRS PRN (RT) Harjinedr Crowe M.D.       • famotidine (PEPCID) tablet 20 mg  20 mg Enteral Tube Q12HRS Harjinder Crowe M.D.        Or   • famotidine (PEPCID) injection 20 mg  20 mg Intravenous Q12HRS Harjinder Crowe M.D.   20 mg at 08/31/20 0506   • senna-docusate (PERICOLACE or SENOKOT  S) 8.6-50 MG per tablet 2 Tab  2 Tab Enteral Tube BID Harjinder Crowe M.D.   Stopped at 08/30/20 1800    And   • polyethylene glycol/lytes (MIRALAX) PACKET 1 Packet  1 Packet Enteral Tube QDAY PRN Harjinder Crowe M.D.        And   • magnesium hydroxide (MILK OF MAGNESIA) suspension 30 mL  30 mL Enteral Tube QDAY PRN Harjinder Crowe M.D.        And   • bisacodyl (DULCOLAX) suppository 10 mg  10 mg Rectal QDAY PRN Harjinder Crowe M.D.       • MD Alert...ICU Electrolyte Replacement per Pharmacy   Other PHARMACY TO DOSE Harjinder Crowe M.D.       • lidocaine (XYLOCAINE) 1 % injection 1-2 mL  1-2 mL Tracheal Tube Q30 MIN PRN Harjinder Crowe M.D.       • benzonatate (TESSALON) capsule 100 mg  100 mg Oral TID PRN José Watters M.D.   100 mg at 08/28/20 2318   • amLODIPine (NORVASC) tablet 10 mg  10 mg Oral DAILY Nii Rivera M.D.   10 mg at 08/30/20 0421   • aspirin EC (ECOTRIN) tablet 81 mg  81 mg Oral DAILY Nii Rivera M.D.   81 mg at 08/30/20 0422   • atorvastatin (LIPITOR) tablet 20 mg  20 mg Oral DAILY Nii Rivera M.D.   20 mg at 08/30/20 0422   • HYDROcodone/acetaminophen (NORCO)  MG per tablet 1 Tab  1 Tab Oral Q6HRS PRN Nii Rivera M.D.   1 Tab at 08/29/20 0158   • lactated ringers infusion (BOLUS): BMI greater than 30  30 mL/kg (Ideal) Intravenous Once PRN Nii Rivera M.D.   Stopped at 08/27/20 1835   • lactated ringers infusion   Intravenous Continuous Nii Rivera M.D.   Stopped at 08/30/20 1050   • acetaminophen (TYLENOL) tablet 650 mg  650 mg Oral Q6HRS PRN Nii Rivera M.D.   650 mg at 08/30/20 0421   • cefTRIAXone (ROCEPHIN) 2 g in  mL IVPB  2 g Intravenous Q24HRS Nii Rivera M.D.   Stopped at 08/30/20 1823   • labetalol (NORMODYNE/TRANDATE) injection 10-20 mg  10-20 mg Intravenous Q4HRS PRN Nii Rivera M.D.       • ondansetron (ZOFRAN) syringe/vial injection 4 mg  4 mg Intravenous Q4HRS PRN Nii Rivera M.D.   4 mg at 08/29/20 0552   • ondansetron (ZOFRAN  ODT) dispertab 4 mg  4 mg Oral Q4HRS PRN Nii Rivera M.D.       • promethazine (PHENERGAN) tablet 12.5-25 mg  12.5-25 mg Oral Q4HRS PRN Nii Rivera M.D.       • promethazine (PHENERGAN) suppository 12.5-25 mg  12.5-25 mg Rectal Q4HRS PRN Nii Rivera M.D.       • prochlorperazine (COMPAZINE) injection 5-10 mg  5-10 mg Intravenous Q4HRS PRN Nii Rivera M.D.       • insulin regular (HumuLIN R,NovoLIN R) injection  3-14 Units Subcutaneous Q6HRS Nii Rivera M.D.   Stopped at 08/29/20 1800    And   • glucose 4 g chewable tablet 16 g  16 g Oral Q15 MIN PRN Nii Rivera M.D.        And   • dextrose 50% (D50W) injection 50 mL  50 mL Intravenous Q15 MIN PRN Nii Rivera M.D.       • dexamethasone (DECADRON) injection 6 mg  6 mg Intravenous DAILY Nii Rivera M.D.   6 mg at 08/31/20 0506       Fluids    Intake/Output Summary (Last 24 hours) at 8/31/2020 0806  Last data filed at 8/31/2020 0600  Gross per 24 hour   Intake 3954.01 ml   Output 2805 ml   Net 1149.01 ml       Laboratory  Recent Labs     08/30/20  0330 08/30/20  1440 08/30/20  1647   ISTATAPH 7.444 7.367* 7.285*   ISTATAPCO2 43.6* 57.5* 72.0*   ISTATAPO2 59* 62* 111*   ISTATATCO2 31 35* 36*   AKWPJBF9RUM 91* 90* 97   ISTATARTHCO3 29.9* 33.1* 34.3*   ISTATARTBE 5* 5* 4*   ISTATTEMP 37.0 C 100.0 F 100.0 F   ISTATFIO2 100 100 100   ISTATSPEC Arterial Arterial Arterial   ISTATAPHTC 7.444 7.356* 7.275*   OCCQHHUK4CS 59* 65 116*     Recent Labs     08/29/20  0345 08/31/20  0406   CPKTOTAL 65 142     Recent Labs     08/30/20  0236 08/30/20  1523 08/30/20  2212 08/31/20  0406   SODIUM 136  --  139 141   POTASSIUM 4.9  --  4.4 4.3   CHLORIDE 96  --  98 99   CO2 31  --  31 30   BUN 20  --  17 17   CREATININE 0.61  --  0.55 0.50   MAGNESIUM 2.2  --  2.0 2.0   PHOSPHORUS 1.0* 4.3 3.4 3.5   CALCIUM 8.2*  --  8.1* 8.4*     Recent Labs     08/29/20  0345 08/30/20  0236 08/30/20  1523 08/30/20  2212 08/31/20  0406   ALTSGPT 14  --  12  --  12   ASTSGOT  22  --  24  --  23   ALKPHOSPHAT 80  --  60  --  65   TBILIRUBIN 0.9  --  1.1  --  1.0   DBILIRUBIN 0.5  --  0.6*  --  0.6*   GLUCOSE 132* 83  --  92 94     Recent Labs     08/29/20  0345 08/30/20  0236 08/30/20  1523 08/31/20  0406   WBC 7.1 4.7*  --  4.2*   NEUTSPOLYS 91.30* 87.60*  --  79.80*   LYMPHOCYTES 3.20* 7.20*  --  10.70*   MONOCYTES 4.20 4.20  --  8.10   EOSINOPHILS 0.00 0.00  --  0.20   BASOPHILS 0.30 0.20  --  0.50   ASTSGOT 22  --  24 23   ALTSGPT 14  --  12 12   ALKPHOSPHAT 80  --  60 65   TBILIRUBIN 0.9  --  1.1 1.0     Recent Labs     08/29/20 0345 08/30/20  0236 08/31/20  0406   RBC 6.18* 5.87 6.50*   HEMOGLOBIN 19.3* 18.4* 19.6*   HEMATOCRIT 61.7* 59.1* 64.3*   PLATELETCT 83* 79* 88*   PROTHROMBTM 15.0*  --  14.1   APTT 47.6*  --  44.3*   INR 1.14*  --  1.06   FERRITIN 145.0  --  307.0       Imaging  X-Ray:  I have personally reviewed the images and compared with prior images.    Assessment/Plan  * Pneumonia due to COVID-19 virus  Assessment & Plan  Endotracheal intubation  Chemical paralysis x24 hours  Prone positioning.  Dexamethasone 6 mg daily x10 days  Follow inflammatory markers  Therapeutic Lovenox for d-dimer greater than 1  Add Remdesivir    Acute hypoxemic respiratory failure (HCC)- (present on admission)  Assessment & Plan  Secondary to COVID-19 pneumonia, +/- community-acquired/bacterial pneumonia  Multilobar dense consolidation of the right lung, no significant effusion  No evidence of pulmonary embolism  Endotracheal intubation  Chemical paralysis x24 hours  Prone positioning  Lung protective ventilation strategies  Titrate ventilator prescription to optimize oxygenation, ventilation, and acid base balance.  He is not currently a candidate for ABC trials        Diabetes type 2, controlled (HCC)- (present on admission)  Assessment & Plan  Monitor glycemic control with insulin therapy    ABELARDO (obstructive sleep apnea)- (present on admission)  Assessment & Plan  Polysomnography on  January 29, 2019 AHI = 101.9, SpO2 hortensia 50% on room air  He gets his usual care from the [facility] and his physician Dr. Faust recently left the practice by his report  Will need to establish with a new primary care physician    Essential hypertension- (present on admission)  Assessment & Plan  Will continue on his home antihypertensives and follow blood pressure closely in ICU    Morbid obesity with BMI of 50.0-59.9, adult (HCC)- (present on admission)  Assessment & Plan  380 pounds, BMI 55      HLD (hyperlipidemia)- (present on admission)  Assessment & Plan  Continue statin     Updated plan:  Ventilator adjusted today, continue PCV, target tidal volume 4-6 cc/kg range  I assisted with prone and supine positioning today, continue prone protocol  Hold rocuronium infusion and titrate up propofol and fentanyl infusions and only resume chemical paralysis as needed  Initiate trophic enteral nutrition      VTE:  Lovenox therapeutic for elevated d-dimer  Ulcer: H2 Antagonist  Lines: None    I have performed a physical exam and reviewed and updated ROS and Plan today (8/31/2020). In review of yesterday's note (8/30/2020), there are no changes except as documented above.     Discussed patient condition and risk of morbidity and/or mortality with RN, RT, Pharmacy and Charge nurse / hot rounds     The patient remains critically ill.  Critical care time = 110 minutes in directly providing and coordinating critical care and extensive data review.  No time overlap and excludes procedures.

## 2020-09-01 ENCOUNTER — APPOINTMENT (OUTPATIENT)
Dept: RADIOLOGY | Facility: MEDICAL CENTER | Age: 60
DRG: 004 | End: 2020-09-01
Attending: INTERNAL MEDICINE
Payer: MEDICARE

## 2020-09-01 LAB
ACTION RANGE TRIGGERED IACRT: NO
ACTION RANGE TRIGGERED IACRT: NO
ALBUMIN SERPL BCP-MCNC: 2.5 G/DL (ref 3.2–4.9)
ALBUMIN/GLOB SERPL: 0.6 G/DL
ALP SERPL-CCNC: 63 U/L (ref 30–99)
ALT SERPL-CCNC: 14 U/L (ref 2–50)
ANION GAP SERPL CALC-SCNC: 12 MMOL/L (ref 7–16)
AST SERPL-CCNC: 26 U/L (ref 12–45)
BACTERIA BLD CULT: NORMAL
BACTERIA BLD CULT: NORMAL
BASE EXCESS BLDA CALC-SCNC: 3 MMOL/L (ref -4–3)
BASE EXCESS BLDA CALC-SCNC: 3 MMOL/L (ref -4–3)
BASOPHILS # BLD AUTO: 1 % (ref 0–1.8)
BASOPHILS # BLD: 0.04 K/UL (ref 0–0.12)
BILIRUB SERPL-MCNC: 0.9 MG/DL (ref 0.1–1.5)
BODY TEMPERATURE: ABNORMAL DEGREES
BODY TEMPERATURE: ABNORMAL DEGREES
BUN SERPL-MCNC: 22 MG/DL (ref 8–22)
CALCIUM SERPL-MCNC: 8.3 MG/DL (ref 8.5–10.5)
CHLORIDE SERPL-SCNC: 98 MMOL/L (ref 96–112)
CO2 BLDA-SCNC: 28 MMOL/L (ref 20–33)
CO2 BLDA-SCNC: 31 MMOL/L (ref 20–33)
CO2 SERPL-SCNC: 29 MMOL/L (ref 20–33)
CREAT SERPL-MCNC: 0.5 MG/DL (ref 0.5–1.4)
CRP SERPL HS-MCNC: 8.75 MG/DL (ref 0–0.75)
EOSINOPHIL # BLD AUTO: 0 K/UL (ref 0–0.51)
EOSINOPHIL NFR BLD: 0 % (ref 0–6.9)
ERYTHROCYTE [DISTWIDTH] IN BLOOD BY AUTOMATED COUNT: 60.4 FL (ref 35.9–50)
GLOBULIN SER CALC-MCNC: 4 G/DL (ref 1.9–3.5)
GLUCOSE BLD-MCNC: 119 MG/DL (ref 65–99)
GLUCOSE BLD-MCNC: 121 MG/DL (ref 65–99)
GLUCOSE BLD-MCNC: 133 MG/DL (ref 65–99)
GLUCOSE BLD-MCNC: 90 MG/DL (ref 65–99)
GLUCOSE SERPL-MCNC: 91 MG/DL (ref 65–99)
HCO3 BLDA-SCNC: 27 MMOL/L (ref 17–25)
HCO3 BLDA-SCNC: 29.6 MMOL/L (ref 17–25)
HCT VFR BLD AUTO: 61.4 % (ref 42–52)
HGB BLD-MCNC: 19.5 G/DL (ref 14–18)
HOROWITZ INDEX BLDA+IHG-RTO: 143 MM[HG]
HOROWITZ INDEX BLDA+IHG-RTO: 96 MM[HG]
IL6 SERPL-MCNC: 47.8 PG/ML
INST. QUALIFIED PATIENT IIQPT: YES
INST. QUALIFIED PATIENT IIQPT: YES
LYMPHOCYTES # BLD AUTO: 0.41 K/UL (ref 1–4.8)
LYMPHOCYTES NFR BLD: 10 % (ref 22–41)
MAGNESIUM SERPL-MCNC: 2.2 MG/DL (ref 1.5–2.5)
MANUAL DIFF BLD: ABNORMAL
MCH RBC QN AUTO: 30.8 PG (ref 27–33)
MCHC RBC AUTO-ENTMCNC: 31.8 G/DL (ref 33.7–35.3)
MCV RBC AUTO: 96.8 FL (ref 81.4–97.8)
MONOCYTES # BLD AUTO: 0.45 K/UL (ref 0–0.85)
MONOCYTES NFR BLD AUTO: 11 % (ref 0–13.4)
MORPHOLOGY BLD-IMP: NORMAL
NEUTROPHILS # BLD AUTO: 3.2 K/UL (ref 1.82–7.42)
NEUTROPHILS NFR BLD: 67 % (ref 44–72)
NEUTS BAND NFR BLD MANUAL: 11 % (ref 0–10)
NRBC # BLD AUTO: 0.08 K/UL
NRBC BLD-RTO: 2 /100 WBC
O2/TOTAL GAS SETTING VFR VENT: 70 %
O2/TOTAL GAS SETTING VFR VENT: 90 %
PCO2 BLDA: 37 MMHG (ref 26–37)
PCO2 BLDA: 48.9 MMHG (ref 26–37)
PH BLDA: 7.39 [PH] (ref 7.4–7.5)
PH BLDA: 7.47 [PH] (ref 7.4–7.5)
PHOSPHATE SERPL-MCNC: 2.9 MG/DL (ref 2.5–4.5)
PLATELET # BLD AUTO: 108 K/UL (ref 164–446)
PMV BLD AUTO: 11.9 FL (ref 9–12.9)
PO2 BLDA: 100 MMHG (ref 64–87)
PO2 BLDA: 86 MMHG (ref 64–87)
POTASSIUM SERPL-SCNC: 4.3 MMOL/L (ref 3.6–5.5)
PREALB SERPL-MCNC: 8 MG/DL (ref 18–38)
PROT SERPL-MCNC: 6.5 G/DL (ref 6–8.2)
RBC # BLD AUTO: 6.34 M/UL (ref 4.7–6.1)
SAO2 % BLDA: 96 % (ref 93–99)
SAO2 % BLDA: 98 % (ref 93–99)
SIGNIFICANT IND 70042: NORMAL
SIGNIFICANT IND 70042: NORMAL
SITE SITE: NORMAL
SITE SITE: NORMAL
SODIUM SERPL-SCNC: 139 MMOL/L (ref 135–145)
SOURCE SOURCE: NORMAL
SOURCE SOURCE: NORMAL
SPECIMEN DRAWN FROM PATIENT: ABNORMAL
SPECIMEN DRAWN FROM PATIENT: ABNORMAL
TRIGL SERPL-MCNC: 178 MG/DL (ref 0–149)
WBC # BLD AUTO: 4.1 K/UL (ref 4.8–10.8)

## 2020-09-01 PROCEDURE — 84478 ASSAY OF TRIGLYCERIDES: CPT

## 2020-09-01 PROCEDURE — 86140 C-REACTIVE PROTEIN: CPT

## 2020-09-01 PROCEDURE — 700105 HCHG RX REV CODE 258

## 2020-09-01 PROCEDURE — 84134 ASSAY OF PREALBUMIN: CPT

## 2020-09-01 PROCEDURE — 99292 CRITICAL CARE ADDL 30 MIN: CPT | Mod: 25 | Performed by: INTERNAL MEDICINE

## 2020-09-01 PROCEDURE — 80053 COMPREHEN METABOLIC PANEL: CPT

## 2020-09-01 PROCEDURE — 700102 HCHG RX REV CODE 250 W/ 637 OVERRIDE(OP): Performed by: INTERNAL MEDICINE

## 2020-09-01 PROCEDURE — 82803 BLOOD GASES ANY COMBINATION: CPT

## 2020-09-01 PROCEDURE — 99291 CRITICAL CARE FIRST HOUR: CPT | Mod: 25 | Performed by: INTERNAL MEDICINE

## 2020-09-01 PROCEDURE — 85007 BL SMEAR W/DIFF WBC COUNT: CPT

## 2020-09-01 PROCEDURE — 82962 GLUCOSE BLOOD TEST: CPT | Mod: 91

## 2020-09-01 PROCEDURE — 94770 HCHG CO2 EXPIRED GAS DETERMINATION: CPT

## 2020-09-01 PROCEDURE — 85027 COMPLETE CBC AUTOMATED: CPT

## 2020-09-01 PROCEDURE — 770022 HCHG ROOM/CARE - ICU (200)

## 2020-09-01 PROCEDURE — 83735 ASSAY OF MAGNESIUM: CPT

## 2020-09-01 PROCEDURE — 700111 HCHG RX REV CODE 636 W/ 250 OVERRIDE (IP): Performed by: INTERNAL MEDICINE

## 2020-09-01 PROCEDURE — 71045 X-RAY EXAM CHEST 1 VIEW: CPT

## 2020-09-01 PROCEDURE — 94003 VENT MGMT INPAT SUBQ DAY: CPT

## 2020-09-01 PROCEDURE — 84100 ASSAY OF PHOSPHORUS: CPT

## 2020-09-01 PROCEDURE — A9270 NON-COVERED ITEM OR SERVICE: HCPCS | Performed by: INTERNAL MEDICINE

## 2020-09-01 PROCEDURE — 36620 INSERTION CATHETER ARTERY: CPT

## 2020-09-01 PROCEDURE — 37799 UNLISTED PX VASCULAR SURGERY: CPT

## 2020-09-01 PROCEDURE — 700101 HCHG RX REV CODE 250: Performed by: INTERNAL MEDICINE

## 2020-09-01 PROCEDURE — 700105 HCHG RX REV CODE 258: Performed by: INTERNAL MEDICINE

## 2020-09-01 RX ORDER — SODIUM CHLORIDE 9 MG/ML
INJECTION, SOLUTION INTRAVENOUS
Status: COMPLETED
Start: 2020-09-01 | End: 2020-09-01

## 2020-09-01 RX ORDER — ONDANSETRON 4 MG/1
4 TABLET, ORALLY DISINTEGRATING ORAL EVERY 4 HOURS PRN
Status: DISCONTINUED | OUTPATIENT
Start: 2020-09-01 | End: 2020-09-26

## 2020-09-01 RX ORDER — ATORVASTATIN CALCIUM 20 MG/1
20 TABLET, FILM COATED ORAL DAILY
Status: DISCONTINUED | OUTPATIENT
Start: 2020-09-01 | End: 2020-09-22

## 2020-09-01 RX ADMIN — DOCUSATE SODIUM 50 MG AND SENNOSIDES 8.6 MG 2 TABLET: 8.6; 5 TABLET, FILM COATED ORAL at 21:00

## 2020-09-01 RX ADMIN — PROPOFOL 70 MCG/KG/MIN: 10 INJECTION, EMULSION INTRAVENOUS at 21:43

## 2020-09-01 RX ADMIN — PROPOFOL 40 MCG/KG/MIN: 10 INJECTION, EMULSION INTRAVENOUS at 15:19

## 2020-09-01 RX ADMIN — ATORVASTATIN CALCIUM 20 MG: 20 TABLET, FILM COATED ORAL at 12:44

## 2020-09-01 RX ADMIN — PROPOFOL 50 MCG/KG/MIN: 10 INJECTION, EMULSION INTRAVENOUS at 10:21

## 2020-09-01 RX ADMIN — PROPOFOL 60 MCG/KG/MIN: 10 INJECTION, EMULSION INTRAVENOUS at 17:36

## 2020-09-01 RX ADMIN — ASPIRIN 81 MG 81 MG: 81 TABLET ORAL at 12:44

## 2020-09-01 RX ADMIN — PROPOFOL 50 MCG/KG/MIN: 10 INJECTION, EMULSION INTRAVENOUS at 06:24

## 2020-09-01 RX ADMIN — PROPOFOL 50 MCG/KG/MIN: 10 INJECTION, EMULSION INTRAVENOUS at 19:05

## 2020-09-01 RX ADMIN — PROPOFOL 70 MCG/KG/MIN: 10 INJECTION, EMULSION INTRAVENOUS at 23:13

## 2020-09-01 RX ADMIN — FAMOTIDINE 20 MG: 10 INJECTION INTRAVENOUS at 17:30

## 2020-09-01 RX ADMIN — PROPOFOL 50 MCG/KG/MIN: 10 INJECTION, EMULSION INTRAVENOUS at 02:53

## 2020-09-01 RX ADMIN — SODIUM CHLORIDE: 9 INJECTION, SOLUTION INTRAVENOUS at 19:15

## 2020-09-01 RX ADMIN — REMDESIVIR 100 MG: 5 INJECTION INTRAVENOUS at 19:14

## 2020-09-01 RX ADMIN — PROPOFOL 50 MCG/KG/MIN: 10 INJECTION, EMULSION INTRAVENOUS at 01:36

## 2020-09-01 RX ADMIN — PROPOFOL 50 MCG/KG/MIN: 10 INJECTION, EMULSION INTRAVENOUS at 13:57

## 2020-09-01 RX ADMIN — PROPOFOL 50 MCG/KG/MIN: 10 INJECTION, EMULSION INTRAVENOUS at 11:37

## 2020-09-01 RX ADMIN — ENOXAPARIN SODIUM 150 MG: 150 INJECTION SUBCUTANEOUS at 17:30

## 2020-09-01 RX ADMIN — DOCUSATE SODIUM 50 MG AND SENNOSIDES 8.6 MG 2 TABLET: 8.6; 5 TABLET, FILM COATED ORAL at 12:44

## 2020-09-01 RX ADMIN — ENOXAPARIN SODIUM 150 MG: 150 INJECTION SUBCUTANEOUS at 04:40

## 2020-09-01 RX ADMIN — PROPOFOL 60 MCG/KG/MIN: 10 INJECTION, EMULSION INTRAVENOUS at 20:45

## 2020-09-01 RX ADMIN — Medication 100 MCG/HR: at 06:44

## 2020-09-01 RX ADMIN — FAMOTIDINE 20 MG: 10 INJECTION INTRAVENOUS at 04:40

## 2020-09-01 RX ADMIN — MINERAL OIL, PETROLATUM 1 APPLICATION: 425; 573 OINTMENT OPHTHALMIC at 04:45

## 2020-09-01 RX ADMIN — DEXAMETHASONE SODIUM PHOSPHATE 6 MG: 4 INJECTION, SOLUTION INTRA-ARTICULAR; INTRALESIONAL; INTRAMUSCULAR; INTRAVENOUS; SOFT TISSUE at 04:40

## 2020-09-01 RX ADMIN — PROPOFOL 50 MCG/KG/MIN: 10 INJECTION, EMULSION INTRAVENOUS at 04:37

## 2020-09-01 RX ADMIN — PROPOFOL 50 MCG/KG/MIN: 10 INJECTION, EMULSION INTRAVENOUS at 08:26

## 2020-09-01 RX ADMIN — AMLODIPINE BESYLATE 10 MG: 10 TABLET ORAL at 12:44

## 2020-09-01 ASSESSMENT — FIBROSIS 4 INDEX: FIB4 SCORE: 3.73

## 2020-09-01 NOTE — RESPIRATORY CARE
Turned pt to supine position.  RT at MD SHAKIR in room.  Secured tube with holister @ 24.  Bilateral BS equally audible with stethoscope. Sats in low 90's, adequate tidal volume.  Plan is to prone pt at 1700.

## 2020-09-01 NOTE — PROGRESS NOTES
Cortrak Placement    Tube Team verified patient name and medical record number prior to tube placement.  Cortrak tube (55 inches, 10 Chinese) placed at 65 cm in right nare.  Per Cortrak picture, tube appears to be in the stomach.  Nursing Instructions: Awaiting KUB to confirm placement before use for medications or feeding. Once placement confirmed, flush tube with 30 ml of water, and then remove and save stylet, in patient medication drawer.

## 2020-09-01 NOTE — CARE PLAN
Ventilator Daily Summary    Vent Day #3  PC:  16  28 RR  +19  100%    Ventilator settings changed this shift: None    Weaning trials: None    Respiratory Procedures: Pt turned to supine position w/MD at bedside    Plan: Continue current ventilator settings and wean mechanical ventilation as tolerated per physician orders.

## 2020-09-01 NOTE — DIETARY
"Nutrition Support Assessment:  Day 5 of admit.  Rashi Shirley is a 60 y.o. male with admitting DX of Pneumonia, Sepsis (HCC)    Current problem list:  1. Pt admitted 8/27, +COVID-19  2. Pt was intubated 8/29   3. H/o morbid obesity, borderline DM, HLD, ABELARDO    Assessment:  Estimated Nutritional Needs based on:   Height: 177.8 cm (5' 10\")  Weight: (!) 177.9 kg (392 lb 3.2 oz)  Ideal Body Weight: 75.3 kg (166 lb)  Percent Ideal Body Weight: 236.3  Body mass index is 56.27 kg/m²., BMI classification: Class III/extreme/morbid obesity    Calculation/Equation: REE per MSJ = 2608 kcal/day; RMR per PSU (vent L/min 13.7, T max/24 hours 36.9) = 2876 kcal/day (65-70% = 8578-4234 kcal/day)  Total Calories / day: 1657 - 1883 (Calories / kg IBW: 22 - 25)  Total Grams Protein / day: 188 (Grams Protein / kg IBW: 2.5)     Evaluation:   1. Pt is intubated and unable to take PO diet. Vent day 4. Prone position noted during Rounds.   2. Gastric Cortrak placed yesterday evening. RN working to get tube functional. Plan to initiate trickle TF per current order for TF @ 10 ml/hr.   3. Estimated needs are based on critical care obesity guidelines (BMI >30).  4. Nutrition support recommendations will also be modified per COVID-19 guidelines.   5. Labs reviewed.   6. Meds include decadron, insulin, LR, electrolyte replacement, propofol @ 50 mcg/kg/min (53 ml/hr = 1399 kcal/day), and bowel regimen.  7. BM 8/28  8. Very High Protein (and low-CHO) TF formula is indicated. Will not be able to meet estimated protein needs while on high-dose propofol (otherwsie, will be overfeeding calories).     Malnutrition Risk: None identified (no mention of poor PO intake/weight loss PTA).     Recommendations/Plan:  1. Trickle Peptamen Intense VHP @ 10 ml/hr.   2. If/when OK per MD to advance TF, would advise slow advancement by 10 ml Q shift to the following goal rates:  With propofol: 60 ml/hr, which will provide 1440 kcal (+Kcal from propofol), 132 " grams protein/kg (1.8 grams/kg IBW, 0.7 grams/kg), and 1210 ml free water per day.  Without propofol: 85 ml/hr, which will provide 2040 kcal, 188 grams protein, and 1714 ml free water per day.  3. Fluids per MD.  4. Monitor triglycerides.     RD following.

## 2020-09-01 NOTE — CARE PLAN
Problem: Respiratory:  Goal: Respiratory status will improve  Outcome: PROGRESSING AS EXPECTED   Patient oxygenating appropriately. Scant sputum through in line suctioning. Patient decreasing Fio2 needs. Will continue to monitor. Patient to prone again later this afternoon   Problem: Skin Integrity  Goal: Risk for impaired skin integrity will decrease  Outcome: PROGRESSING AS EXPECTED   Patient on waffle cushion, pillows for support, mepilex on bony prominences. New skin tear related to pronation. Pictures in the chart, mepitel placed. Will continue to assess skin integrity.

## 2020-09-01 NOTE — PROGRESS NOTES
Critical Care Progress Note    Date of admission  8/27/2020    Chief Complaint  60 y.o. male who presented 8/27/2020 with increasing dyspnea and hypoxia.  He has a history of morbid obesity, hyperlipidemia, diabetes and obstructive sleep apnea, noncompliant with treatment.  He uses oxygen 2 L via nasal cannula for chronic hypoxia.  Today he called EMS for increasing dyspnea over the past 24 hours.  He reports he is had increased cough and yellow sputum production with small amount of blood in his sputum over the past 24 hours.  On EMS arrival patient was profoundly hypoxic with a saturation of 50%.  He was placed on nonrebreather and brought to the ED.  He desaturates very quickly on nonrebreather and is in moderate respiratory distress.  Chest x-ray showed diffuse right-sided pneumonia/consolidation.  He was treated for community-acquired pneumonia with ceftriaxone and azithromycin as well as IV methylprednisolone.  He denies fevers or chills.  He he lives alone and tells me has been taking appropriate precautions when he goes out of the house wearing his mask and has no known exposures to COVID-19.  He is a nondrinker and non-smoker.     Screening SARS-CoV-2 PCR came back positive in ED.  He received 600 cc of crystalloid and the sepsis fluid bolus that had been ordered was discontinued.  He is not currently hypotensive and appears relatively euvolemic.  Lactic acid 1.7.  With the positive COVID-19 test I decreased IV fluid, ordered dexamethasone 6 mg daily x10 days, will admit to isolation ICU.  He will be placed on high flow nasal cannula/BiPAP with high risk for requiring intubation.  We will follow-up inflammatory markers and initiate full dose anticoagulation if appropriate.    Hospital Course   8/29-worsening mental status, rapid shallow breathing and hypoxia required endotracheal intubation and prone positioning.  8/30-I spoke with Brittney, the patient's daughter and surrogate decision maker about  initiating Remdesivir.  We discussed the risks benefits and alternatives.  All questions were answered.  She consented to initiating Remdesivir today.  8/31 -art line placed, remains on PCV, transition off rocuronium drip as tolerated, propofol/fentanyl, continue prone protocol  9/1 - remains on pressure control ventilation, PEEP 19, improved P/F ratio with prone ventilation, off rocuronium, starting trophic tube feed, full dose anticoagulation with Lovenox, day 3/5 Remdesivir, day 5 dexamethasone        Interval Progress  Reviewed last 24 hour events:  SR 60 - 80  No pressors  Tm = 98.4  I/O = 2.0/1.9 (+119, net +1.6L)  Start TF today trophic  Prop/fent gtts  Vent day 4  CMV 14 PIP /19 PEEP- VT 70%  7.47/37/100; P/F 60 supine -> 143 prone -> 96 supine  Full lovenox  pepcid  3/5 Remdisivir  BAL NRF  Remains off Quan    Review of Systems  Review of Systems   Unable to perform ROS: Medical condition        Vital Signs for last 24 hours   Temp:  [36.3 °C (97.3 °F)-36.9 °C (98.4 °F)] 36.3 °C (97.3 °F)  Pulse:  [59-81] 61  Resp:  [19-28] 28  BP: ()/(35-68) 100/49  SpO2:  [83 %-100 %] 97 %    Hemodynamic parameters for last 24 hours       Respiratory Information for the last 24 hours  Vent Mode: PCMV  Rate (breaths/min): 28  PEEP/CPAP: 19  MAP: 24  Control VTE (exp VT): 483    Physical Exam   Physical Exam  Constitutional:       General: He is not in acute distress.     Appearance: He is ill-appearing.   HENT:      Mouth/Throat:      Mouth: Mucous membranes are moist.   Eyes:      Pupils: Pupils are equal, round, and reactive to light.   Neck:      Musculoskeletal: Neck supple.   Cardiovascular:      Rate and Rhythm: Regular rhythm.      Heart sounds: No murmur. No friction rub. No gallop.    Pulmonary:      Effort: No respiratory distress.      Breath sounds: No rhonchi.      Comments: Breath sounds are diminished throughout.  Abdominal:      General: There is no distension.      Tenderness: There is no abdominal  tenderness.      Comments: Obese    Musculoskeletal:      Right lower leg: No edema.      Left lower leg: No edema.   Skin:     General: Skin is warm and dry.   Neurological:      General: No focal deficit present.      Comments: Deeply sedated and chemically paralyzed.   Psychiatric:      Comments: Unable to assess         Medications  Current Facility-Administered Medications   Medication Dose Route Frequency Provider Last Rate Last Dose   • atorvastatin (LIPITOR) tablet 20 mg  20 mg Enteral Tube DAILY Nii Rivera M.D.       • ondansetron (ZOFRAN ODT) dispertab 4 mg  4 mg Enteral Tube Q4HRS PRN Nii Rivera M.D.       • Pharmacy Consult: Enteral tube insertion - review meds/change route/product selection  1 Each Other PHARMACY TO DOSE Nii Rivera M.D.       • amLODIPine (NORVASC) tablet 10 mg  10 mg Enteral Tube DAILY Nii Rivera M.D.       • aspirin (ASA) chewable tab 81 mg  81 mg Enteral Tube DAILY Nii Rivera M.D.       • acetaminophen (TYLENOL) tablet 650 mg  650 mg Enteral Tube Q6HRS PRN Nii Rivera M.D.       • HYDROcodone/acetaminophen (NORCO)  MG per tablet 1 Tab  1 Tab Enteral Tube Q6HRS PRN Nii Rivera M.D.       • promethazine (PHENERGAN) tablet 12.5-25 mg  12.5-25 mg Enteral Tube Q4HRS PRN Nii Rivera M.D.       • remdesivir 100 mg in  mL IVPB  100 mg Intravenous DAILY AT 1800 Meghna Nesbitt M.D.   100 mg at 08/31/20 1800   • artificial tears (EYE LUBRICANT) ophth ointment 1 Application  1 Application Both Eyes Q8HRS Harjinder Crowe M.D.   1 Application at 09/01/20 0445   • propofol (DIPRIVAN) injection  0-80 mcg/kg/min Intravenous Continuous Harjinder Crowe M.D. 53 mL/hr at 09/01/20 1021 50 mcg/kg/min at 09/01/20 1021   • fentaNYL (SUBLIMAZE) 50 mcg/mL in 50mL (Continuous Infusion)   Intravenous Continuous Harjinder Crowe M.D. 2 mL/hr at 09/01/20 0644 100 mcg/hr at 09/01/20 0644   • rocuronium (ZEMURON) 250 mg in  mL Infusion  0-16 mcg/kg/min  Intravenous Continuous Harjinder Crowe M.D.   Stopped at 08/31/20 1058   • rocuronium (ZEMURON) injection 106.1 mg  0.6 mg/kg Intravenous Q2HRS PRN Harjinder Crowe M.D.   106.1 mg at 08/30/20 0413   • enoxaparin (LOVENOX) injection 150 mg  150 mg Subcutaneous Q12HRS Harjinder Crowe M.D.   150 mg at 09/01/20 0440   • norepinephrine (Levophed) infusion 8 mg/250 mL (premix)  0-30 mcg/min Intravenous Continuous Harjinder Crowe M.D.   Stopped at 08/29/20 1415   • Respiratory Therapy Consult   Nebulization Continuous RT Harjinder Crowe M.D.       • ipratropium-albuterol (DUONEB) nebulizer solution  3 mL Nebulization Q2HRS PRN (RT) Harjinder Crowe M.D.       • famotidine (PEPCID) tablet 20 mg  20 mg Enteral Tube Q12HRS Harjinder Crowe M.D.        Or   • famotidine (PEPCID) injection 20 mg  20 mg Intravenous Q12HRS Harjinder Crowe M.D.   20 mg at 09/01/20 0440   • senna-docusate (PERICOLACE or SENOKOT S) 8.6-50 MG per tablet 2 Tab  2 Tab Enteral Tube BID Harjinder Crowe M.D.   Stopped at 08/31/20 1800    And   • polyethylene glycol/lytes (MIRALAX) PACKET 1 Packet  1 Packet Enteral Tube QDAY PRN Harjinder Crowe M.D.        And   • magnesium hydroxide (MILK OF MAGNESIA) suspension 30 mL  30 mL Enteral Tube QDAY PRN Harjinder Crowe M.D.        And   • bisacodyl (DULCOLAX) suppository 10 mg  10 mg Rectal QDAY PRN Harjinder Crowe M.D.       • MD Alert...ICU Electrolyte Replacement per Pharmacy   Other PHARMACY TO DOSE Harjinder Crowe M.D.       • lidocaine (XYLOCAINE) 1 % injection 1-2 mL  1-2 mL Tracheal Tube Q30 MIN PRN Harjinder Crowe M.D.       • benzonatate (TESSALON) capsule 100 mg  100 mg Oral TID PRN José Watters M.D.   100 mg at 08/28/20 2318   • lactated ringers infusion (BOLUS): BMI greater than 30  30 mL/kg (Ideal) Intravenous Once PRN Nii Rivera M.D.   Stopped at 08/27/20 1835   • lactated ringers infusion   Intravenous Continuous Nii Rivera M.D.   Stopped at 08/30/20 1050   • labetalol  (NORMODYNE/TRANDATE) injection 10-20 mg  10-20 mg Intravenous Q4HRS PRN Nii Rivera M.D.       • ondansetron (ZOFRAN) syringe/vial injection 4 mg  4 mg Intravenous Q4HRS PRN Nii Rivera M.D.   4 mg at 08/29/20 0552   • promethazine (PHENERGAN) suppository 12.5-25 mg  12.5-25 mg Rectal Q4HRS PRN Nii Rivera M.D.       • prochlorperazine (COMPAZINE) injection 5-10 mg  5-10 mg Intravenous Q4HRS PRN Nii Rivera M.D.       • insulin regular (HumuLIN R,NovoLIN R) injection  3-14 Units Subcutaneous Q6HRS Nii Rivera M.D.   Stopped at 08/29/20 1800    And   • dextrose 50% (D50W) injection 50 mL  50 mL Intravenous Q15 MIN PRN Nii Rivera M.D.       • dexamethasone (DECADRON) injection 6 mg  6 mg Intravenous DAILY Nii Rivera M.D.   6 mg at 09/01/20 0440       Fluids    Intake/Output Summary (Last 24 hours) at 9/1/2020 1051  Last data filed at 9/1/2020 1000  Gross per 24 hour   Intake 2208.79 ml   Output 1600 ml   Net 608.79 ml       Laboratory  Recent Labs     08/30/20  1440 08/30/20  1647 08/31/20  0302 08/31/20  1539 09/01/20  0312   ISTATAPH 7.367* 7.285* 7.414 7.420 7.472   ISTATAPCO2 57.5* 72.0* 49.8* 45.1* 37.0   ISTATAPO2 62* 111*  --  60* 100*   ISTATATCO2 35* 36* 33 31 28   FJDNTMC9VQI 90* 97  --  91* 98   ISTATARTHCO3 33.1* 34.3* 31.9* 29.2* 27.0*   ISTATARTBE 5* 4* 6* 4* 3   ISTATTEMP 100.0 F 100.0 F 98.4 F 97.0 F see below   ISTATFIO2 100 100 100 100 70   ISTATSPEC Arterial Arterial Arterial Arterial Arterial   ISTATAPHTC 7.356* 7.275* 7.416 7.433  --    [id number] 65 116*  --  56*  --      Recent Labs     08/31/20 0406   CPKTOTAL 142     Recent Labs     08/30/20 2212 08/31/20 0406 09/01/20  0303   SODIUM 139 141 139   POTASSIUM 4.4 4.3 4.3   CHLORIDE 98 99 98   CO2 31 30 29   BUN 17 17 22   CREATININE 0.55 0.50 0.50   MAGNESIUM 2.0 2.0 2.2   PHOSPHORUS 3.4 3.5 2.9   CALCIUM 8.1* 8.4* 8.3*     Recent Labs     08/30/20  1523 08/30/20 2212 08/31/20 0406 08/31/20 1954  09/01/20  0303   ALTSGPT 12  --  12  --  14   ASTSGOT 24  --  23  --  26   ALKPHOSPHAT 60  --  65  --  63   TBILIRUBIN 1.1  --  1.0  --  0.9   DBILIRUBIN 0.6*  --  0.6*  --   --    PREALBUMIN  --   --   --  5.6* 8.0*   GLUCOSE  --  92 94  --  91     Recent Labs     08/30/20  0236 08/30/20  1523 08/31/20  0406 09/01/20  0303   WBC 4.7*  --  4.2* 4.1*   NEUTSPOLYS 87.60*  --  79.80* 67.00   LYMPHOCYTES 7.20*  --  10.70* 10.00*   MONOCYTES 4.20  --  8.10 11.00   EOSINOPHILS 0.00  --  0.20 0.00   BASOPHILS 0.20  --  0.50 1.00   ASTSGOT  --  24 23 26   ALTSGPT  --  12 12 14   ALKPHOSPHAT  --  60 65 63   TBILIRUBIN  --  1.1 1.0 0.9     Recent Labs     08/30/20  0236 08/31/20  0406 09/01/20  0303   RBC 5.87 6.50* 6.34*   HEMOGLOBIN 18.4* 19.6* 19.5*   HEMATOCRIT 59.1* 64.3* 61.4*   PLATELETCT 79* 88* 108*   PROTHROMBTM  --  14.1  --    APTT  --  44.3*  --    INR  --  1.06  --    FERRITIN  --  307.0  --        Imaging  X-Ray:  I have personally reviewed the images and compared with prior images.    Assessment/Plan  * Pneumonia due to COVID-19 virus  Assessment & Plan  Endotracheal intubation  Chemical paralysis x24 hours  Prone positioning.  Dexamethasone 6 mg daily x10 days  Follow inflammatory markers  Therapeutic Lovenox for d-dimer greater than 1  Add Remdesivir    Acute hypoxemic respiratory failure (HCC)- (present on admission)  Assessment & Plan  Secondary to COVID-19 pneumonia, +/- community-acquired/bacterial pneumonia  Multilobar dense consolidation of the right lung, no significant effusion  No evidence of pulmonary embolism  Endotracheal intubation  Chemical paralysis x24 hours  Prone positioning  Lung protective ventilation strategies  Titrate ventilator prescription to optimize oxygenation, ventilation, and acid base balance.  He is not currently a candidate for ABC trials        Diabetes type 2, controlled (HCC)- (present on admission)  Assessment & Plan  Monitor glycemic control with insulin therapy    ABELARDO  (obstructive sleep apnea)- (present on admission)  Assessment & Plan  Polysomnography on January 29, 2019 AHI = 101.9, SpO2 hortensia 50% on room air  He gets his usual care from the [facility] and his physician Dr. Faust recently left the practice by his report  Will need to establish with a new primary care physician    Essential hypertension- (present on admission)  Assessment & Plan  Will continue on his home antihypertensives and follow blood pressure closely in ICU    Morbid obesity with BMI of 50.0-59.9, adult (HCC)- (present on admission)  Assessment & Plan  380 pounds, BMI 55      HLD (hyperlipidemia)- (present on admission)  Assessment & Plan  Continue statin     Updated plan:  Improved P/F ratio with prone ventilation, continue PCV with target tidal volume 4-6 cc/kg range  Remains on higher PEEP, start to slowly titrate down as tolerated  Tolerating discontinuation of rocuronium, continue titrated sedation with propofol and fentanyl  Trophic enteral nutrition initiated and tolerated so far    VTE:  Lovenox therapeutic for elevated d-dimer  Ulcer: H2 Antagonist  Lines: None    I have performed a physical exam and reviewed and updated ROS and Plan today (9/1/2020). In review of yesterday's note (8/31/2020), there are no changes except as documented above.     Discussed patient condition and risk of morbidity and/or mortality with RN, RT, Pharmacy and Charge nurse / hot rounds     The patient remains critically ill.  He remains a very high risk for further vital organ deterioration and sudden death.  I examined and reexamined the patient throughout the day and assisted with multiple prone ventilation turned.  Critical care time = 140 minutes in directly providing and coordinating critical care and extensive data review.  No time overlap and excludes procedures.

## 2020-09-01 NOTE — THERAPY
PT consult received. Pt was intubated following inital PT orders. Pt currently on full vent support and is not appropriate for PT evaluation at this time. Current PT orders will be DC'ed. Please REORDER PT when pt is medically stable and able to participate in assessment of function. Thanks    Vonnie Simpson, PT, DPT Phone [phone]

## 2020-09-02 ENCOUNTER — APPOINTMENT (OUTPATIENT)
Dept: RADIOLOGY | Facility: MEDICAL CENTER | Age: 60
DRG: 004 | End: 2020-09-02
Attending: INTERNAL MEDICINE
Payer: MEDICARE

## 2020-09-02 LAB
ACTION RANGE TRIGGERED IACRT: NO
ALBUMIN SERPL BCP-MCNC: 2.1 G/DL (ref 3.2–4.9)
ALBUMIN/GLOB SERPL: 0.6 G/DL
ALP SERPL-CCNC: 72 U/L (ref 30–99)
ALT SERPL-CCNC: 15 U/L (ref 2–50)
ANION GAP SERPL CALC-SCNC: 13 MMOL/L (ref 7–16)
ANISOCYTOSIS BLD QL SMEAR: ABNORMAL
APTT PPP: 45.1 SEC (ref 24.7–36)
AST SERPL-CCNC: 28 U/L (ref 12–45)
BACTERIA BLD CULT: NORMAL
BACTERIA BLD CULT: NORMAL
BASE EXCESS BLDA CALC-SCNC: 1 MMOL/L (ref -4–3)
BASOPHILS # BLD AUTO: 0.9 % (ref 0–1.8)
BASOPHILS # BLD: 0.04 K/UL (ref 0–0.12)
BILIRUB CONJ SERPL-MCNC: 0.5 MG/DL (ref 0.1–0.5)
BILIRUB INDIRECT SERPL-MCNC: 0.5 MG/DL (ref 0–1)
BILIRUB SERPL-MCNC: 1 MG/DL (ref 0.1–1.5)
BODY TEMPERATURE: ABNORMAL DEGREES
BUN SERPL-MCNC: 24 MG/DL (ref 8–22)
CALCIUM SERPL-MCNC: 7.9 MG/DL (ref 8.5–10.5)
CHLORIDE SERPL-SCNC: 97 MMOL/L (ref 96–112)
CK SERPL-CCNC: 58 U/L (ref 0–154)
CO2 BLDA-SCNC: 26 MMOL/L (ref 20–33)
CO2 SERPL-SCNC: 28 MMOL/L (ref 20–33)
CREAT SERPL-MCNC: 0.43 MG/DL (ref 0.5–1.4)
CRP SERPL HS-MCNC: 3.6 MG/DL (ref 0–0.75)
D DIMER PPP IA.FEU-MCNC: 0.52 UG/ML (FEU) (ref 0–0.5)
EOSINOPHIL # BLD AUTO: 0.04 K/UL (ref 0–0.51)
EOSINOPHIL NFR BLD: 0.9 % (ref 0–6.9)
ERYTHROCYTE [DISTWIDTH] IN BLOOD BY AUTOMATED COUNT: 61.3 FL (ref 35.9–50)
FERRITIN SERPL-MCNC: 221 NG/ML (ref 22–322)
FIBRINOGEN PPP-MCNC: 634 MG/DL (ref 215–460)
GLOBULIN SER CALC-MCNC: 3.8 G/DL (ref 1.9–3.5)
GLUCOSE BLD-MCNC: 124 MG/DL (ref 65–99)
GLUCOSE BLD-MCNC: 126 MG/DL (ref 65–99)
GLUCOSE BLD-MCNC: 130 MG/DL (ref 65–99)
GLUCOSE BLD-MCNC: 149 MG/DL (ref 65–99)
GLUCOSE BLD-MCNC: 164 MG/DL (ref 65–99)
GLUCOSE SERPL-MCNC: 97 MG/DL (ref 65–99)
HCO3 BLDA-SCNC: 25 MMOL/L (ref 17–25)
HCT VFR BLD AUTO: 59.9 % (ref 42–52)
HGB BLD-MCNC: 18.6 G/DL (ref 14–18)
HOROWITZ INDEX BLDA+IHG-RTO: 135 MM[HG]
INR PPP: 1.12 (ref 0.87–1.13)
INST. QUALIFIED PATIENT IIQPT: YES
LDH SERPL L TO P-CCNC: 348 U/L (ref 107–266)
LYMPHOCYTES # BLD AUTO: 0.86 K/UL (ref 1–4.8)
LYMPHOCYTES NFR BLD: 22.1 % (ref 22–41)
MACROCYTES BLD QL SMEAR: ABNORMAL
MAGNESIUM SERPL-MCNC: 2.3 MG/DL (ref 1.5–2.5)
MANUAL DIFF BLD: NORMAL
MCH RBC QN AUTO: 30.5 PG (ref 27–33)
MCHC RBC AUTO-ENTMCNC: 31.1 G/DL (ref 33.7–35.3)
MCV RBC AUTO: 98.4 FL (ref 81.4–97.8)
MONOCYTES # BLD AUTO: 0.34 K/UL (ref 0–0.85)
MONOCYTES NFR BLD AUTO: 8.8 % (ref 0–13.4)
MORPHOLOGY BLD-IMP: NORMAL
NEUTROPHILS # BLD AUTO: 2.62 K/UL (ref 1.82–7.42)
NEUTROPHILS NFR BLD: 67.3 % (ref 44–72)
NRBC # BLD AUTO: 0.06 K/UL
NRBC BLD-RTO: 1.5 /100 WBC
NT-PROBNP SERPL IA-MCNC: 58 PG/ML (ref 0–125)
O2/TOTAL GAS SETTING VFR VENT: 60 %
PCO2 BLDA: 37.1 MMHG (ref 26–37)
PCO2 TEMP ADJ BLDA: 37.3 MMHG (ref 26–37)
PH BLDA: 7.44 [PH] (ref 7.4–7.5)
PH TEMP ADJ BLDA: 7.43 [PH] (ref 7.4–7.5)
PLATELET # BLD AUTO: 121 K/UL (ref 164–446)
PLATELET BLD QL SMEAR: NORMAL
PMV BLD AUTO: 11.4 FL (ref 9–12.9)
PO2 BLDA: 81 MMHG (ref 64–87)
PO2 TEMP ADJ BLDA: 82 MMHG (ref 64–87)
POTASSIUM SERPL-SCNC: 4 MMOL/L (ref 3.6–5.5)
PROT SERPL-MCNC: 5.9 G/DL (ref 6–8.2)
PROTHROMBIN TIME: 14.8 SEC (ref 12–14.6)
RBC # BLD AUTO: 6.09 M/UL (ref 4.7–6.1)
RBC BLD AUTO: PRESENT
SAO2 % BLDA: 96 % (ref 93–99)
SIGNIFICANT IND 70042: NORMAL
SIGNIFICANT IND 70042: NORMAL
SITE SITE: NORMAL
SITE SITE: NORMAL
SMUDGE CELLS BLD QL SMEAR: NORMAL
SODIUM SERPL-SCNC: 138 MMOL/L (ref 135–145)
SOURCE SOURCE: NORMAL
SOURCE SOURCE: NORMAL
SPECIMEN DRAWN FROM PATIENT: ABNORMAL
TROPONIN T SERPL-MCNC: 7 NG/L (ref 6–19)
WBC # BLD AUTO: 3.9 K/UL (ref 4.8–10.8)

## 2020-09-02 PROCEDURE — 82248 BILIRUBIN DIRECT: CPT

## 2020-09-02 PROCEDURE — 85730 THROMBOPLASTIN TIME PARTIAL: CPT

## 2020-09-02 PROCEDURE — 85610 PROTHROMBIN TIME: CPT

## 2020-09-02 PROCEDURE — 94003 VENT MGMT INPAT SUBQ DAY: CPT

## 2020-09-02 PROCEDURE — 82550 ASSAY OF CK (CPK): CPT

## 2020-09-02 PROCEDURE — 770022 HCHG ROOM/CARE - ICU (200)

## 2020-09-02 PROCEDURE — 99291 CRITICAL CARE FIRST HOUR: CPT | Mod: 25 | Performed by: INTERNAL MEDICINE

## 2020-09-02 PROCEDURE — 86140 C-REACTIVE PROTEIN: CPT

## 2020-09-02 PROCEDURE — 82962 GLUCOSE BLOOD TEST: CPT | Mod: 91

## 2020-09-02 PROCEDURE — 84484 ASSAY OF TROPONIN QUANT: CPT

## 2020-09-02 PROCEDURE — 83520 IMMUNOASSAY QUANT NOS NONAB: CPT

## 2020-09-02 PROCEDURE — 83880 ASSAY OF NATRIURETIC PEPTIDE: CPT

## 2020-09-02 PROCEDURE — 700111 HCHG RX REV CODE 636 W/ 250 OVERRIDE (IP): Performed by: INTERNAL MEDICINE

## 2020-09-02 PROCEDURE — 71045 X-RAY EXAM CHEST 1 VIEW: CPT

## 2020-09-02 PROCEDURE — 700105 HCHG RX REV CODE 258: Performed by: INTERNAL MEDICINE

## 2020-09-02 PROCEDURE — 99292 CRITICAL CARE ADDL 30 MIN: CPT | Mod: 25 | Performed by: INTERNAL MEDICINE

## 2020-09-02 PROCEDURE — 85384 FIBRINOGEN ACTIVITY: CPT

## 2020-09-02 PROCEDURE — 700102 HCHG RX REV CODE 250 W/ 637 OVERRIDE(OP): Performed by: INTERNAL MEDICINE

## 2020-09-02 PROCEDURE — 700101 HCHG RX REV CODE 250: Performed by: INTERNAL MEDICINE

## 2020-09-02 PROCEDURE — A9270 NON-COVERED ITEM OR SERVICE: HCPCS | Performed by: INTERNAL MEDICINE

## 2020-09-02 PROCEDURE — 83615 LACTATE (LD) (LDH) ENZYME: CPT

## 2020-09-02 PROCEDURE — 37799 UNLISTED PX VASCULAR SURGERY: CPT

## 2020-09-02 PROCEDURE — 85007 BL SMEAR W/DIFF WBC COUNT: CPT

## 2020-09-02 PROCEDURE — 83735 ASSAY OF MAGNESIUM: CPT

## 2020-09-02 PROCEDURE — 94770 HCHG CO2 EXPIRED GAS DETERMINATION: CPT

## 2020-09-02 PROCEDURE — 82803 BLOOD GASES ANY COMBINATION: CPT

## 2020-09-02 PROCEDURE — 80053 COMPREHEN METABOLIC PANEL: CPT

## 2020-09-02 PROCEDURE — 85027 COMPLETE CBC AUTOMATED: CPT

## 2020-09-02 PROCEDURE — 82728 ASSAY OF FERRITIN: CPT

## 2020-09-02 PROCEDURE — 85379 FIBRIN DEGRADATION QUANT: CPT

## 2020-09-02 RX ORDER — AMOXICILLIN 250 MG
2 CAPSULE ORAL ONCE
Status: COMPLETED | OUTPATIENT
Start: 2020-09-02 | End: 2020-09-02

## 2020-09-02 RX ORDER — AMLODIPINE BESYLATE 10 MG/1
10 TABLET ORAL ONCE
Status: COMPLETED | OUTPATIENT
Start: 2020-09-02 | End: 2020-09-02

## 2020-09-02 RX ORDER — ASPIRIN 81 MG/1
81 TABLET, CHEWABLE ORAL ONCE
Status: COMPLETED | OUTPATIENT
Start: 2020-09-02 | End: 2020-09-02

## 2020-09-02 RX ORDER — ATORVASTATIN CALCIUM 20 MG/1
20 TABLET, FILM COATED ORAL ONCE
Status: COMPLETED | OUTPATIENT
Start: 2020-09-02 | End: 2020-09-02

## 2020-09-02 RX ADMIN — PROPOFOL 70 MCG/KG/MIN: 10 INJECTION, EMULSION INTRAVENOUS at 11:54

## 2020-09-02 RX ADMIN — DOCUSATE SODIUM 50 MG AND SENNOSIDES 8.6 MG 2 TABLET: 8.6; 5 TABLET, FILM COATED ORAL at 17:29

## 2020-09-02 RX ADMIN — Medication 200 MCG/HR: at 16:06

## 2020-09-02 RX ADMIN — PROPOFOL 70 MCG/KG/MIN: 10 INJECTION, EMULSION INTRAVENOUS at 04:00

## 2020-09-02 RX ADMIN — PROPOFOL 70 MCG/KG/MIN: 10 INJECTION, EMULSION INTRAVENOUS at 18:44

## 2020-09-02 RX ADMIN — DEXAMETHASONE SODIUM PHOSPHATE 6 MG: 4 INJECTION, SOLUTION INTRA-ARTICULAR; INTRALESIONAL; INTRAMUSCULAR; INTRAVENOUS; SOFT TISSUE at 06:35

## 2020-09-02 RX ADMIN — PROPOFOL 70 MCG/KG/MIN: 10 INJECTION, EMULSION INTRAVENOUS at 08:29

## 2020-09-02 RX ADMIN — PROPOFOL 70 MCG/KG/MIN: 10 INJECTION, EMULSION INTRAVENOUS at 00:25

## 2020-09-02 RX ADMIN — PROPOFOL 70 MCG/KG/MIN: 10 INJECTION, EMULSION INTRAVENOUS at 23:36

## 2020-09-02 RX ADMIN — ENOXAPARIN SODIUM 150 MG: 150 INJECTION SUBCUTANEOUS at 06:35

## 2020-09-02 RX ADMIN — PROPOFOL 70 MCG/KG/MIN: 10 INJECTION, EMULSION INTRAVENOUS at 21:58

## 2020-09-02 RX ADMIN — ENOXAPARIN SODIUM 150 MG: 150 INJECTION SUBCUTANEOUS at 17:29

## 2020-09-02 RX ADMIN — FAMOTIDINE 20 MG: 10 INJECTION INTRAVENOUS at 06:36

## 2020-09-02 RX ADMIN — DOCUSATE SODIUM 50 MG AND SENNOSIDES 8.6 MG 2 TABLET: 8.6; 5 TABLET, FILM COATED ORAL at 08:44

## 2020-09-02 RX ADMIN — PROPOFOL 70 MCG/KG/MIN: 10 INJECTION, EMULSION INTRAVENOUS at 10:23

## 2020-09-02 RX ADMIN — PROPOFOL 70 MCG/KG/MIN: 10 INJECTION, EMULSION INTRAVENOUS at 05:22

## 2020-09-02 RX ADMIN — AMLODIPINE BESYLATE 10 MG: 10 TABLET ORAL at 08:44

## 2020-09-02 RX ADMIN — REMDESIVIR 100 MG: 5 INJECTION INTRAVENOUS at 17:29

## 2020-09-02 RX ADMIN — PROPOFOL 70 MCG/KG/MIN: 10 INJECTION, EMULSION INTRAVENOUS at 17:30

## 2020-09-02 RX ADMIN — PROPOFOL 70 MCG/KG/MIN: 10 INJECTION, EMULSION INTRAVENOUS at 01:34

## 2020-09-02 RX ADMIN — FAMOTIDINE 20 MG: 20 TABLET, FILM COATED ORAL at 17:29

## 2020-09-02 RX ADMIN — ATORVASTATIN CALCIUM 20 MG: 20 TABLET, FILM COATED ORAL at 08:44

## 2020-09-02 RX ADMIN — Medication 2500 MCG: at 02:38

## 2020-09-02 RX ADMIN — PROPOFOL 70 MCG/KG/MIN: 10 INJECTION, EMULSION INTRAVENOUS at 13:19

## 2020-09-02 RX ADMIN — ASPIRIN 81 MG: 81 TABLET, CHEWABLE ORAL at 08:44

## 2020-09-02 RX ADMIN — PROPOFOL 70 MCG/KG/MIN: 10 INJECTION, EMULSION INTRAVENOUS at 16:07

## 2020-09-02 RX ADMIN — PROPOFOL 70 MCG/KG/MIN: 10 INJECTION, EMULSION INTRAVENOUS at 19:37

## 2020-09-02 RX ADMIN — PROPOFOL 70 MCG/KG/MIN: 10 INJECTION, EMULSION INTRAVENOUS at 02:31

## 2020-09-02 RX ADMIN — PROPOFOL 70 MCG/KG/MIN: 10 INJECTION, EMULSION INTRAVENOUS at 21:10

## 2020-09-02 RX ADMIN — INSULIN HUMAN 3 UNITS: 100 INJECTION, SOLUTION PARENTERAL at 13:19

## 2020-09-02 RX ADMIN — PROPOFOL 70 MCG/KG/MIN: 10 INJECTION, EMULSION INTRAVENOUS at 14:50

## 2020-09-02 NOTE — CARE PLAN
Problem: Ventilation Defect:  Goal: Ability to achieve and maintain unassisted ventilation or tolerate decreased levels of ventilator support  Outcome: NOT MET      Ventilator Daily Summary    Vent Day #4    Ventilator settings changed this shift:decreased PEEP to 18    Weaning trials:none    Respiratory Procedures:supine at 1227, prone at 1705    Plan: Continue current ventilator settings and wean mechanical ventilation as tolerated per physician orders.

## 2020-09-02 NOTE — CARE PLAN
Problem: Venous Thromboembolism (VTW)/Deep Vein Thrombosis (DVT) Prevention:  Goal: Patient will participate in Venous Thrombosis (VTE)/Deep Vein Thrombosis (DVT)Prevention Measures  Outcome: PROGRESSING AS EXPECTED  Note: BID Lovenox. SCDs in place. Passive ROM Q2hrs     Problem: Respiratory:  Goal: Respiratory status will improve  Outcome: PROGRESSING AS EXPECTED  Note: PCMV settings. Proned 16hrs/Supine 8hrs. Titrate vent settings as tolerated per MD and RT

## 2020-09-02 NOTE — PROGRESS NOTES
Critical Care Progress Note    Date of admission  8/27/2020    Chief Complaint  60 y.o. male who presented 8/27/2020 with increasing dyspnea and hypoxia.  He has a history of morbid obesity, hyperlipidemia, diabetes and obstructive sleep apnea, noncompliant with treatment.  He uses oxygen 2 L via nasal cannula for chronic hypoxia.  Today he called EMS for increasing dyspnea over the past 24 hours.  He reports he is had increased cough and yellow sputum production with small amount of blood in his sputum over the past 24 hours.  On EMS arrival patient was profoundly hypoxic with a saturation of 50%.  He was placed on nonrebreather and brought to the ED.  He desaturates very quickly on nonrebreather and is in moderate respiratory distress.  Chest x-ray showed diffuse right-sided pneumonia/consolidation.  He was treated for community-acquired pneumonia with ceftriaxone and azithromycin as well as IV methylprednisolone.  He denies fevers or chills.  He he lives alone and tells me has been taking appropriate precautions when he goes out of the house wearing his mask and has no known exposures to COVID-19.  He is a nondrinker and non-smoker.     Screening SARS-CoV-2 PCR came back positive in ED.  He received 600 cc of crystalloid and the sepsis fluid bolus that had been ordered was discontinued.  He is not currently hypotensive and appears relatively euvolemic.  Lactic acid 1.7.  With the positive COVID-19 test I decreased IV fluid, ordered dexamethasone 6 mg daily x10 days, will admit to isolation ICU.  He will be placed on high flow nasal cannula/BiPAP with high risk for requiring intubation.  We will follow-up inflammatory markers and initiate full dose anticoagulation if appropriate.    Hospital Course   8/29-worsening mental status, rapid shallow breathing and hypoxia required endotracheal intubation and prone positioning.  8/30-I spoke with Brittney, the patient's daughter and surrogate decision maker about  initiating Remdesivir.  We discussed the risks benefits and alternatives.  All questions were answered.  She consented to initiating Remdesivir today.  8/31 -art line placed, remains on PCV, transition off rocuronium drip as tolerated, propofol/fentanyl, continue prone protocol  9/1 - remains on pressure control ventilation, PEEP 19, improved P/F ratio with prone ventilation, off rocuronium, starting trophic tube feed, full dose anticoagulation with Lovenox, day 3/5 Remdesivir, day 5 dexamethasone        Interval Progress  Reviewed last 24 hour events:  Arouses, follows, prop/fent, remains off rocuronium and synchronous with vent  SR/ST 90 - 100  AFeb, Tm = 98.2  abdi Trickle TF - slowly advance  Vent day 5, PCV: rate 28, PIP/PEEP 16/18, 60%  ABG noted; P/F improved prone position   CXR rotated  Full lovenox  Remdesivir day 4/5  Allyssa DENNIS        Review of Systems  Review of Systems   Unable to perform ROS: Intubated        Vital Signs for last 24 hours   Temp:  [36.3 °C (97.4 °F)-36.8 °C (98.2 °F)] 36.7 °C (98.1 °F)  Pulse:  [50-68] 51  Resp:  [23-28] 28  BP: ()/(32-51) 89/47  SpO2:  [93 %-100 %] 95 %    Hemodynamic parameters for last 24 hours       Respiratory Information for the last 24 hours  Vent Mode: PCMV  Rate (breaths/min): 28  PEEP/CPAP: 18  MAP: 22  Control VTE (exp VT): 335    Physical Exam   Physical Exam  Constitutional:       General: He is not in acute distress.     Appearance: He is ill-appearing.   HENT:      Mouth/Throat:      Mouth: Mucous membranes are moist.   Eyes:      Pupils: Pupils are equal, round, and reactive to light.   Neck:      Musculoskeletal: Neck supple.   Cardiovascular:      Rate and Rhythm: Regular rhythm.      Heart sounds: No murmur. No friction rub. No gallop.    Pulmonary:      Effort: No respiratory distress.      Breath sounds: No rhonchi.      Comments: Breath sounds are diminished throughout.  Abdominal:      General: There is no distension.      Tenderness: There  is no abdominal tenderness.      Comments: Obese    Musculoskeletal:      Right lower leg: No edema.      Left lower leg: No edema.   Skin:     General: Skin is warm and dry.   Neurological:      General: No focal deficit present.      Comments: Sedated with propofol and fentanyl, does arouse and follow commands with sedation titration.  Remains off rocuronium.  Weak extremities but does move   Psychiatric:      Comments: Unable to assess         Medications  Current Facility-Administered Medications   Medication Dose Route Frequency Provider Last Rate Last Dose   • atorvastatin (LIPITOR) tablet 20 mg  20 mg Enteral Tube DAILY Nii Rviera M.D.   Stopped at 09/02/20 0815   • ondansetron (ZOFRAN ODT) dispertab 4 mg  4 mg Enteral Tube Q4HRS PRN Nii Rivera M.D.       • Pharmacy Consult: Enteral tube insertion - review meds/change route/product selection  1 Each Other PHARMACY TO DOSE Nii Rivera M.D.       • amLODIPine (NORVASC) tablet 10 mg  10 mg Enteral Tube DAILY Nii Rivera M.D.   Stopped at 09/02/20 0815   • aspirin (ASA) chewable tab 81 mg  81 mg Enteral Tube DAILY Nii Rivera M.D.   Stopped at 09/02/20 0815   • acetaminophen (TYLENOL) tablet 650 mg  650 mg Enteral Tube Q6HRS PRN Nii Rivera M.D.       • HYDROcodone/acetaminophen (NORCO)  MG per tablet 1 Tab  1 Tab Enteral Tube Q6HRS PRN Nii Rivera M.D.       • promethazine (PHENERGAN) tablet 12.5-25 mg  12.5-25 mg Enteral Tube Q4HRS PRN Nii Rivera M.D.       • remdesivir 100 mg in  mL IVPB  100 mg Intravenous DAILY AT 1800 Meghna Nesbitt M.D.   100 mg at 09/01/20 1914   • propofol (DIPRIVAN) injection  0-80 mcg/kg/min Intravenous Continuous Harjinder Crowe M.D. 74.3 mL/hr at 09/02/20 0829 70 mcg/kg/min at 09/02/20 0829   • fentaNYL (SUBLIMAZE) 50 mcg/mL in 50mL (Continuous Infusion)   Intravenous Continuous Harjinder Crowe M.D. 4 mL/hr at 09/01/20 2125 2,500 mcg at 09/02/20 0238   • enoxaparin (LOVENOX)  injection 150 mg  150 mg Subcutaneous Q12HRS Harjinder Crowe M.D.   150 mg at 09/02/20 0635   • norepinephrine (Levophed) infusion 8 mg/250 mL (premix)  0-30 mcg/min Intravenous Continuous Harjinder Crowe M.D.   Stopped at 08/29/20 1415   • Respiratory Therapy Consult   Nebulization Continuous RT Harjinder Crowe M.D.       • ipratropium-albuterol (DUONEB) nebulizer solution  3 mL Nebulization Q2HRS PRN (RT) Harjinder Crowe M.D.       • famotidine (PEPCID) tablet 20 mg  20 mg Enteral Tube Q12HRS Harjinder Crowe M.D.        Or   • famotidine (PEPCID) injection 20 mg  20 mg Intravenous Q12HRS Harjinder Crowe M.D.   20 mg at 09/02/20 0636   • senna-docusate (PERICOLACE or SENOKOT S) 8.6-50 MG per tablet 2 Tab  2 Tab Enteral Tube BID Harjinder Crowe M.D.   Stopped at 09/02/20 0815    And   • polyethylene glycol/lytes (MIRALAX) PACKET 1 Packet  1 Packet Enteral Tube QDAY PRN Harjinder Crowe M.D.        And   • magnesium hydroxide (MILK OF MAGNESIA) suspension 30 mL  30 mL Enteral Tube QDAY PRN Harjinder Crowe M.D.        And   • bisacodyl (DULCOLAX) suppository 10 mg  10 mg Rectal QDAY PRN Harjinder Crowe M.D.       • MD Alert...ICU Electrolyte Replacement per Pharmacy   Other PHARMACY TO DOSE Harjinder Crowe M.D.       • lidocaine (XYLOCAINE) 1 % injection 1-2 mL  1-2 mL Tracheal Tube Q30 MIN PRN Harjinder Crowe M.D.       • lactated ringers infusion (BOLUS): BMI greater than 30  30 mL/kg (Ideal) Intravenous Once PRN Nii Rivera M.D.   Stopped at 08/27/20 1835   • lactated ringers infusion   Intravenous Continuous Nii Rivera M.D.   Stopped at 08/30/20 1050   • labetalol (NORMODYNE/TRANDATE) injection 10-20 mg  10-20 mg Intravenous Q4HRS PRN Nii Rivera M.D.       • ondansetron (ZOFRAN) syringe/vial injection 4 mg  4 mg Intravenous Q4HRS PRN Nii Rivera M.D.   4 mg at 08/29/20 0552   • promethazine (PHENERGAN) suppository 12.5-25 mg  12.5-25 mg Rectal Q4HRS PRN Nii Rivera M.D.       •  prochlorperazine (COMPAZINE) injection 5-10 mg  5-10 mg Intravenous Q4HRS PRN Nii Rivera M.D.       • insulin regular (HumuLIN R,NovoLIN R) injection  3-14 Units Subcutaneous Q6HRS Nii Rivera M.D.   Stopped at 08/29/20 1800    And   • dextrose 50% (D50W) injection 50 mL  50 mL Intravenous Q15 MIN PRN Nii Rivera M.D.       • dexamethasone (DECADRON) injection 6 mg  6 mg Intravenous DAILY Nii Rivera M.D.   6 mg at 09/02/20 0635       Fluids    Intake/Output Summary (Last 24 hours) at 9/2/2020 0936  Last data filed at 9/2/2020 0600  Gross per 24 hour   Intake 1517.31 ml   Output 1600 ml   Net -82.69 ml       Laboratory  Recent Labs     08/30/20  1647 08/31/20  0302 08/31/20  1539 09/01/20  0312 09/01/20  1447 09/02/20  0417   ISTATAPH 7.285* 7.414 7.420 7.472 7.390* 7.437   ISTATAPCO2 72.0* 49.8* 45.1* 37.0 48.9* 37.1*   ISTATAPO2 111*  --  60* 100* 86 81   ISTATATCO2 36* 33 31 28 31 26   [id number] 97  --  91* 98 96 96   ISTATARTHCO3 34.3* 31.9* 29.2* 27.0* 29.6* 25.0   ISTATARTBE 4* 6* 4* 3 3 1   ISTATTEMP 100.0 F 98.4 F 97.0 F see below see below 98.8 F   ISTATFIO2 100 100 100 70 90 60   ISTATSPEC Arterial Arterial Arterial Arterial Arterial Arterial   ISTATAPHTC 7.275* 7.416 7.433  --   --  7.435   EXHBHPNF8NG 116*  --  56*  --   --  82     Recent Labs     08/31/20  0406 09/02/20  0348   CPKTOTAL 142 58     Recent Labs     08/30/20  2212 08/31/20  0406 09/01/20  0303 09/02/20  0348   SODIUM 139 141 139 138   POTASSIUM 4.4 4.3 4.3 4.0   CHLORIDE 98 99 98 97   CO2 31 30 29 28   BUN 17 17 22 24*   CREATININE 0.55 0.50 0.50 0.43*   MAGNESIUM 2.0 2.0 2.2 2.3   PHOSPHORUS 3.4 3.5 2.9  --    CALCIUM 8.1* 8.4* 8.3* 7.9*     Recent Labs     08/30/20  1523  08/31/20  0406 08/31/20  1954 09/01/20  0303 09/02/20  0348   ALTSGPT 12  --  12  --  14 15   ASTSGOT 24  --  23  --  26 28   ALKPHOSPHAT 60  --  65  --  63 72   TBILIRUBIN 1.1  --  1.0  --  0.9 1.0   DBILIRUBIN 0.6*  --  0.6*  --   --  0.5    PREALBUMIN  --   --   --  5.6* 8.0*  --    GLUCOSE  --    < > 94  --  91 97    < > = values in this interval not displayed.     Recent Labs     08/31/20 0406 09/01/20 0303 09/02/20 0348   WBC 4.2* 4.1* 3.9*   NEUTSPOLYS 79.80* 67.00 67.30   LYMPHOCYTES 10.70* 10.00* 22.10   MONOCYTES 8.10 11.00 8.80   EOSINOPHILS 0.20 0.00 0.90   BASOPHILS 0.50 1.00 0.90   ASTSGOT 23 26 28   ALTSGPT 12 14 15   ALKPHOSPHAT 65 63 72   TBILIRUBIN 1.0 0.9 1.0     Recent Labs     08/31/20 0406 09/01/20 0303 09/02/20 0348   RBC 6.50* 6.34* 6.09   HEMOGLOBIN 19.6* 19.5* 18.6*   HEMATOCRIT 64.3* 61.4* 59.9*   PLATELETCT 88* 108* 121*   PROTHROMBTM 14.1  --  14.8*   APTT 44.3*  --  45.1*   INR 1.06  --  1.12   FERRITIN 307.0  --  221.0       Imaging  X-Ray:  I have personally reviewed the images and compared with prior images.    Assessment/Plan  * Pneumonia due to COVID-19 virus  Assessment & Plan  Intubated 8/29    - Prone positioning.  - Dexamethasone 6 mg daily x10 days  - Therapeutic Lovenox for d-dimer greater than 1  - Remdesivir  - Follow inflammatory markers  - Empiric course of C3/azithro completed    Acute hypoxemic respiratory failure (HCC)- (present on admission)  Assessment & Plan  Secondary to COVID-19 pneumonia, +/- community-acquired/bacterial pneumonia  Multilobar dense consolidation of the right lung, no significant effusion  No evidence of pulmonary embolism  Chemical paralysis ~ 48 hours; now d/c'd and passive on vent with sedation  Prone positioning with improved P/F  Lung protective ventilation strategies        Diabetes type 2, controlled ([facility])- (present on admission)  Assessment & Plan  Monitor glycemic control with insulin therapy    ABELARDO (obstructive sleep apnea)- (present on admission)  Assessment & Plan  Polysomnography on January 29, 2019 AHI = 101.9, SpO2 hortensia 50% on room air  He gets his usual care from the [facility] and his physician Dr. Faust recently left the practice by his  report  Will need to establish with a new primary care physician    Essential hypertension- (present on admission)  Assessment & Plan  Will continue on his home antihypertensives and follow blood pressure closely in ICU    Morbid obesity with BMI of 50.0-59.9, adult (HCC)- (present on admission)  Assessment & Plan  380 pounds, BMI 55    HLD (hyperlipidemia)- (present on admission)  Assessment & Plan  Continue statin     Updated plan:  I will continue prone ventilation, I will continue PCV with target tidal volume 4-6 cc/kg range  I will titrate down PIP/PEEP as tolerated  Tolerating discontinuation of rocuronium, continue titrated sedation with propofol and fentanyl  Advance enteral nutrition slowly  Ongoing critical care management as above.    VTE:  Lovenox therapeutic for elevated d-dimer  Ulcer: H2 Antagonist  Lines: None    I have performed a physical exam and reviewed and updated ROS and Plan today (9/2/2020). In review of yesterday's note (9/1/2020), there are no changes except as documented above.     Discussed patient condition and risk of morbidity and/or mortality with RN, RT, Pharmacy and Charge nurse / hot rounds     The patient remains critically ill.  The patient remains at very high risk for further vital organ deterioration and sudden death.  I examined and reexamined the patient throughout the day and assisted with multiple prone ventilation turned.   Critical care time = 110 minutes in directly providing and coordinating critical care and extensive data review.  No time overlap and excludes procedures.

## 2020-09-02 NOTE — THERAPY
Missed Therapy     Patient Name: Rashi Shirley  Age:  60 y.o., Sex:  male  Medical Record #: [id number]  Today's Date: 9/2/2020 09/02/20 0805   Interdisciplinary Plan of Care Collaboration   IDT Collaboration with  Nursing   Collaboration Comments OT consult received and acknowledged. Pt was intubated following inital OT orders. Pt currently on full vent support and is not appropriate for OT evaluation at this time. Current OT orders will be DC'ed. Please REORDER OT when pt is medically stable and able to participate.

## 2020-09-02 NOTE — PROGRESS NOTES
Triage officer ICU transfer note    Patient has been assigned to Dr Martines who will evaluate the patient today

## 2020-09-02 NOTE — RESPIRATORY CARE
Ventilator Daily Summary    Vent Day #5    Ventilator settings changed this shift:PC to 14    Weaning trials: No    Respiratory Procedures: None    Plan: Continue current ventilator settings and wean mechanical ventilation as tolerated per physician orders.

## 2020-09-02 NOTE — RESPIRATORY CARE
Proned pt, 2 RT's at \[facility]\"" to re-tape tube.  MD at bedside.  Taped at 24, bilateral BS audible with stethoscope.  PT sats in mid 90's, tidal volume high 400's (Dr. Mandujano increased PIP to 25 for turn).

## 2020-09-03 ENCOUNTER — APPOINTMENT (OUTPATIENT)
Dept: RADIOLOGY | Facility: MEDICAL CENTER | Age: 60
DRG: 004 | End: 2020-09-03
Attending: INTERNAL MEDICINE
Payer: MEDICARE

## 2020-09-03 LAB
ACTION RANGE TRIGGERED IACRT: NO
ACTION RANGE TRIGGERED IACRT: NO
ALBUMIN SERPL BCP-MCNC: 2.1 G/DL (ref 3.2–4.9)
ALBUMIN/GLOB SERPL: 0.5 G/DL
ALP SERPL-CCNC: 119 U/L (ref 30–99)
ALT SERPL-CCNC: 35 U/L (ref 2–50)
ANION GAP SERPL CALC-SCNC: 11 MMOL/L (ref 7–16)
ANISOCYTOSIS BLD QL SMEAR: ABNORMAL
AST SERPL-CCNC: 51 U/L (ref 12–45)
BASE EXCESS BLDA CALC-SCNC: 0 MMOL/L (ref -4–3)
BASE EXCESS BLDA CALC-SCNC: 2 MMOL/L (ref -4–3)
BASOPHILS # BLD AUTO: 0.9 % (ref 0–1.8)
BASOPHILS # BLD: 0.03 K/UL (ref 0–0.12)
BILIRUB SERPL-MCNC: 1.2 MG/DL (ref 0.1–1.5)
BODY TEMPERATURE: ABNORMAL DEGREES
BODY TEMPERATURE: ABNORMAL DEGREES
BUN SERPL-MCNC: 22 MG/DL (ref 8–22)
CALCIUM SERPL-MCNC: 7.9 MG/DL (ref 8.5–10.5)
CHLORIDE SERPL-SCNC: 99 MMOL/L (ref 96–112)
CO2 BLDA-SCNC: 27 MMOL/L (ref 20–33)
CO2 BLDA-SCNC: 29 MMOL/L (ref 20–33)
CO2 SERPL-SCNC: 28 MMOL/L (ref 20–33)
CREAT SERPL-MCNC: 0.39 MG/DL (ref 0.5–1.4)
EOSINOPHIL # BLD AUTO: 0.1 K/UL (ref 0–0.51)
EOSINOPHIL NFR BLD: 2.7 % (ref 0–6.9)
ERYTHROCYTE [DISTWIDTH] IN BLOOD BY AUTOMATED COUNT: 62.9 FL (ref 35.9–50)
GLOBULIN SER CALC-MCNC: 4 G/DL (ref 1.9–3.5)
GLUCOSE BLD-MCNC: 111 MG/DL (ref 65–99)
GLUCOSE BLD-MCNC: 138 MG/DL (ref 65–99)
GLUCOSE BLD-MCNC: 182 MG/DL (ref 65–99)
GLUCOSE SERPL-MCNC: 114 MG/DL (ref 65–99)
HCO3 BLDA-SCNC: 25.3 MMOL/L (ref 17–25)
HCO3 BLDA-SCNC: 27.7 MMOL/L (ref 17–25)
HCT VFR BLD AUTO: 59.7 % (ref 42–52)
HGB BLD-MCNC: 18.5 G/DL (ref 14–18)
HOROWITZ INDEX BLDA+IHG-RTO: 82 MM[HG]
HOROWITZ INDEX BLDA+IHG-RTO: 89 MM[HG]
INST. QUALIFIED PATIENT IIQPT: YES
INST. QUALIFIED PATIENT IIQPT: YES
LYMPHOCYTES # BLD AUTO: 0.76 K/UL (ref 1–4.8)
LYMPHOCYTES NFR BLD: 21.2 % (ref 22–41)
MACROCYTES BLD QL SMEAR: ABNORMAL
MANUAL DIFF BLD: NORMAL
MCH RBC QN AUTO: 30.6 PG (ref 27–33)
MCHC RBC AUTO-ENTMCNC: 31 G/DL (ref 33.7–35.3)
MCV RBC AUTO: 98.8 FL (ref 81.4–97.8)
MONOCYTES # BLD AUTO: 0.51 K/UL (ref 0–0.85)
MONOCYTES NFR BLD AUTO: 14.2 % (ref 0–13.4)
MORPHOLOGY BLD-IMP: NORMAL
NEUTROPHILS # BLD AUTO: 2.2 K/UL (ref 1.82–7.42)
NEUTROPHILS NFR BLD: 61.1 % (ref 44–72)
NRBC # BLD AUTO: 0.03 K/UL
NRBC BLD-RTO: 0.8 /100 WBC
O2/TOTAL GAS SETTING VFR VENT: 100 %
O2/TOTAL GAS SETTING VFR VENT: 90 %
PCO2 BLDA: 43.9 MMHG (ref 26–37)
PCO2 BLDA: 46.9 MMHG (ref 26–37)
PCO2 TEMP ADJ BLDA: 42 MMHG (ref 26–37)
PCO2 TEMP ADJ BLDA: 47.1 MMHG (ref 26–37)
PH BLDA: 7.37 [PH] (ref 7.4–7.5)
PH BLDA: 7.38 [PH] (ref 7.4–7.5)
PH TEMP ADJ BLDA: 7.38 [PH] (ref 7.4–7.5)
PH TEMP ADJ BLDA: 7.38 [PH] (ref 7.4–7.5)
PLATELET # BLD AUTO: 132 K/UL (ref 164–446)
PLATELET BLD QL SMEAR: NORMAL
PMV BLD AUTO: 11.7 FL (ref 9–12.9)
PO2 BLDA: 74 MMHG (ref 64–87)
PO2 BLDA: 89 MMHG (ref 64–87)
PO2 TEMP ADJ BLDA: 69 MMHG (ref 64–87)
PO2 TEMP ADJ BLDA: 90 MMHG (ref 64–87)
POTASSIUM SERPL-SCNC: 4.2 MMOL/L (ref 3.6–5.5)
PROT SERPL-MCNC: 6.1 G/DL (ref 6–8.2)
RBC # BLD AUTO: 6.04 M/UL (ref 4.7–6.1)
RBC BLD AUTO: PRESENT
SAO2 % BLDA: 94 % (ref 93–99)
SAO2 % BLDA: 97 % (ref 93–99)
SMUDGE CELLS BLD QL SMEAR: NORMAL
SODIUM SERPL-SCNC: 138 MMOL/L (ref 135–145)
SPECIMEN DRAWN FROM PATIENT: ABNORMAL
SPECIMEN DRAWN FROM PATIENT: ABNORMAL
WBC # BLD AUTO: 3.6 K/UL (ref 4.8–10.8)

## 2020-09-03 PROCEDURE — 700102 HCHG RX REV CODE 250 W/ 637 OVERRIDE(OP): Performed by: INTERNAL MEDICINE

## 2020-09-03 PROCEDURE — 700101 HCHG RX REV CODE 250: Performed by: INTERNAL MEDICINE

## 2020-09-03 PROCEDURE — 82803 BLOOD GASES ANY COMBINATION: CPT | Mod: 91

## 2020-09-03 PROCEDURE — 82962 GLUCOSE BLOOD TEST: CPT | Mod: 91

## 2020-09-03 PROCEDURE — 770022 HCHG ROOM/CARE - ICU (200)

## 2020-09-03 PROCEDURE — 37799 UNLISTED PX VASCULAR SURGERY: CPT

## 2020-09-03 PROCEDURE — A9270 NON-COVERED ITEM OR SERVICE: HCPCS | Performed by: INTERNAL MEDICINE

## 2020-09-03 PROCEDURE — 700105 HCHG RX REV CODE 258: Performed by: INTERNAL MEDICINE

## 2020-09-03 PROCEDURE — 99292 CRITICAL CARE ADDL 30 MIN: CPT | Performed by: INTERNAL MEDICINE

## 2020-09-03 PROCEDURE — 85027 COMPLETE CBC AUTOMATED: CPT

## 2020-09-03 PROCEDURE — 94003 VENT MGMT INPAT SUBQ DAY: CPT

## 2020-09-03 PROCEDURE — 99291 CRITICAL CARE FIRST HOUR: CPT | Performed by: INTERNAL MEDICINE

## 2020-09-03 PROCEDURE — 700111 HCHG RX REV CODE 636 W/ 250 OVERRIDE (IP): Performed by: INTERNAL MEDICINE

## 2020-09-03 PROCEDURE — 71045 X-RAY EXAM CHEST 1 VIEW: CPT

## 2020-09-03 PROCEDURE — 80053 COMPREHEN METABOLIC PANEL: CPT

## 2020-09-03 PROCEDURE — 85007 BL SMEAR W/DIFF WBC COUNT: CPT

## 2020-09-03 PROCEDURE — 94770 HCHG CO2 EXPIRED GAS DETERMINATION: CPT

## 2020-09-03 PROCEDURE — 307059 PAD,EAR PROTECTOR: Performed by: INTERNAL MEDICINE

## 2020-09-03 RX ORDER — FUROSEMIDE 10 MG/ML
20 INJECTION INTRAMUSCULAR; INTRAVENOUS
Status: DISCONTINUED | OUTPATIENT
Start: 2020-09-03 | End: 2020-09-04

## 2020-09-03 RX ADMIN — PROPOFOL 55 MCG/KG/MIN: 10 INJECTION, EMULSION INTRAVENOUS at 13:10

## 2020-09-03 RX ADMIN — PROPOFOL 70 MCG/KG/MIN: 10 INJECTION, EMULSION INTRAVENOUS at 02:45

## 2020-09-03 RX ADMIN — PROPOFOL 55 MCG/KG/MIN: 10 INJECTION, EMULSION INTRAVENOUS at 18:31

## 2020-09-03 RX ADMIN — FUROSEMIDE 20 MG: 10 INJECTION, SOLUTION INTRAMUSCULAR; INTRAVENOUS at 16:52

## 2020-09-03 RX ADMIN — FUROSEMIDE 20 MG: 10 INJECTION, SOLUTION INTRAMUSCULAR; INTRAVENOUS at 12:07

## 2020-09-03 RX ADMIN — ASPIRIN 81 MG 81 MG: 81 TABLET ORAL at 06:00

## 2020-09-03 RX ADMIN — ENOXAPARIN SODIUM 150 MG: 150 INJECTION SUBCUTANEOUS at 16:52

## 2020-09-03 RX ADMIN — REMDESIVIR 100 MG: 5 INJECTION INTRAVENOUS at 16:51

## 2020-09-03 RX ADMIN — PROPOFOL 70 MCG/KG/MIN: 10 INJECTION, EMULSION INTRAVENOUS at 07:36

## 2020-09-03 RX ADMIN — PROPOFOL 55 MCG/KG/MIN: 10 INJECTION, EMULSION INTRAVENOUS at 22:11

## 2020-09-03 RX ADMIN — PROPOFOL 70 MCG/KG/MIN: 10 INJECTION, EMULSION INTRAVENOUS at 04:58

## 2020-09-03 RX ADMIN — INSULIN HUMAN 3 UNITS: 100 INJECTION, SOLUTION PARENTERAL at 12:17

## 2020-09-03 RX ADMIN — PROPOFOL 70 MCG/KG/MIN: 10 INJECTION, EMULSION INTRAVENOUS at 03:39

## 2020-09-03 RX ADMIN — PROPOFOL 55 MCG/KG/MIN: 10 INJECTION, EMULSION INTRAVENOUS at 14:54

## 2020-09-03 RX ADMIN — PROPOFOL 60 MCG/KG/MIN: 10 INJECTION, EMULSION INTRAVENOUS at 09:18

## 2020-09-03 RX ADMIN — PROPOFOL 55 MCG/KG/MIN: 10 INJECTION, EMULSION INTRAVENOUS at 11:06

## 2020-09-03 RX ADMIN — DEXAMETHASONE SODIUM PHOSPHATE 6 MG: 4 INJECTION, SOLUTION INTRA-ARTICULAR; INTRALESIONAL; INTRAMUSCULAR; INTRAVENOUS; SOFT TISSUE at 06:00

## 2020-09-03 RX ADMIN — PROPOFOL 55 MCG/KG/MIN: 10 INJECTION, EMULSION INTRAVENOUS at 23:42

## 2020-09-03 RX ADMIN — ENOXAPARIN SODIUM 150 MG: 150 INJECTION SUBCUTANEOUS at 06:00

## 2020-09-03 RX ADMIN — POTASSIUM BICARBONATE 25 MEQ: 978 TABLET, EFFERVESCENT ORAL at 16:52

## 2020-09-03 RX ADMIN — DOCUSATE SODIUM 50 MG AND SENNOSIDES 8.6 MG 2 TABLET: 8.6; 5 TABLET, FILM COATED ORAL at 16:51

## 2020-09-03 RX ADMIN — ATORVASTATIN CALCIUM 20 MG: 20 TABLET, FILM COATED ORAL at 06:00

## 2020-09-03 RX ADMIN — FAMOTIDINE 20 MG: 10 INJECTION INTRAVENOUS at 06:00

## 2020-09-03 RX ADMIN — Medication 200 MCG/HR: at 14:50

## 2020-09-03 RX ADMIN — AMLODIPINE BESYLATE 10 MG: 10 TABLET ORAL at 06:00

## 2020-09-03 RX ADMIN — PROPOFOL 55 MCG/KG/MIN: 10 INJECTION, EMULSION INTRAVENOUS at 20:22

## 2020-09-03 RX ADMIN — Medication 200 MCG/HR: at 08:48

## 2020-09-03 RX ADMIN — POTASSIUM BICARBONATE 25 MEQ: 978 TABLET, EFFERVESCENT ORAL at 12:07

## 2020-09-03 RX ADMIN — FAMOTIDINE 20 MG: 20 TABLET, FILM COATED ORAL at 16:51

## 2020-09-03 RX ADMIN — DOCUSATE SODIUM 50 MG AND SENNOSIDES 8.6 MG 2 TABLET: 8.6; 5 TABLET, FILM COATED ORAL at 06:00

## 2020-09-03 RX ADMIN — PROPOFOL 55 MCG/KG/MIN: 10 INJECTION, EMULSION INTRAVENOUS at 16:53

## 2020-09-03 ASSESSMENT — FIBROSIS 4 INDEX: FIB4 SCORE: 3.79

## 2020-09-03 NOTE — DISCHARGE PLANNING
Care Transition Team Assessment  NOK dtrAmy, @ [phone].  Pt's dtr provided the information acquired in this assessment. The face sheet information was verified.    Pt is A/O and fully independent w/ I/ADLs normally.    Pt has PCP and pharmacy at [facility] ([facility] He has O2 at home w/ [employer] (through [facility] Pt lives in his home @ address  [address] [place] [address], [place] [address]  His dtr and 7yo gson are his roommate. His sister had recently stayed w/ them to provide CG for him when they though he was just common cold sick.    Brittney reports none of the household has been tested for Covid. It appears they may not be considering doing this either. Lsw f/u w/ DERIAN Valdes regarding this concern. Lsw unable to contact Infection Prevention. Lsw emailed DERIAN w/ request to please contact Infection Prevention@ [facility] to see what f/u available for this concern.        Information Source  Orientation : Unable to Assess  Information Given By: Relative  Informant's Name: daughter, Amy  Who is responsible for making decisions for patient? : Patient    Readmission Evaluation  Is this a readmission?: No    Elopement Risk  Legal Hold: No  Ambulatory or Self Mobile in Wheelchair: No-Not an Elopement Risk  Elopement Risk: Not at Risk for Elopement    Interdisciplinary Discharge Planning  Primary Care Physician: [facility]  Lives with - Patient's Self Care Capacity: (pt lives w/ dtr, 7yo gson, and sister currently visiting)  Patient or legal guardian wants to designate a caregiver: No  Support Systems: Family Member(s)  Housing / Facility: 1 Story House  Do You Take your Prescribed Medications Regularly: Yes(dtr states pt has lost of meds and on o2)  Mobility Issues: No  Prior Services: (o2, [employer], from [facility]  Assistance Needed: Unknown at this Time  Durable Medical Equipment: Home Oxygen  DME Provider / Phone: [employer]    Discharge Preparedness  What are your discharge supports?: (lives  w/ dtr, 7yo gson, and sister was cg prior to admssn)  Prior Functional Level: Ambulatory, Drives Self, Independent with Activities of Daily Living, Independent with Medication Management    Functional Assesment  Prior Functional Level: Ambulatory, Drives Self, Independent with Activities of Daily Living, Independent with Medication Management    Finances  Financial Barriers to Discharge: (pt receives SSD (Soc Sec Disability))  Prescription Coverage: (uses pharmacy at [facility])                   Domestic Abuse  Have you ever been the victim of abuse or violence?: No  Physical Abuse or Sexual Abuse: No  Verbal Abuse or Emotional Abuse: No  Possible Abuse/Neglect Reported to:: Not Applicable    Psychological Assessment  History of Substance Abuse: (no disorder but drinks on occasion  )  History of Psychiatric Problems: No    Discharge Risks or Barriers  Discharge risks or barriers?: (are the family members in his house positive for covid????)  Patient risk factors: (see above, safety of d/c home and reinfection.....)    Anticipated Discharge Information  Discharge Address: home: [address] [place] [id number]

## 2020-09-03 NOTE — PROGRESS NOTES
Critical Care Progress Note    Date of admission  8/27/2020    Chief Complaint  60 y.o. male who presented 8/27/2020 with increasing dyspnea and hypoxia.  He has a history of morbid obesity, hyperlipidemia, diabetes and obstructive sleep apnea, noncompliant with treatment.  He uses oxygen 2 L via nasal cannula for chronic hypoxia.  Today he called EMS for increasing dyspnea over the past 24 hours.  He reports he is had increased cough and yellow sputum production with small amount of blood in his sputum over the past 24 hours.  On EMS arrival patient was profoundly hypoxic with a saturation of 50%.  He was placed on nonrebreather and brought to the ED.  He desaturates very quickly on nonrebreather and is in moderate respiratory distress.  Chest x-ray showed diffuse right-sided pneumonia/consolidation.  He was treated for community-acquired pneumonia with ceftriaxone and azithromycin as well as IV methylprednisolone.  He denies fevers or chills.  He he lives alone and tells me has been taking appropriate precautions when he goes out of the house wearing his mask and has no known exposures to COVID-19.  He is a nondrinker and non-smoker.     Screening SARS-CoV-2 PCR came back positive in ED.  He received 600 cc of crystalloid and the sepsis fluid bolus that had been ordered was discontinued.  He is not currently hypotensive and appears relatively euvolemic.  Lactic acid 1.7.  With the positive COVID-19 test I decreased IV fluid, ordered dexamethasone 6 mg daily x10 days, will admit to isolation ICU.  He will be placed on high flow nasal cannula/BiPAP with high risk for requiring intubation.  We will follow-up inflammatory markers and initiate full dose anticoagulation if appropriate.    Hospital Course   8/29-worsening mental status, rapid shallow breathing and hypoxia required endotracheal intubation and prone positioning.  8/30-I spoke with Brittney, the patient's daughter and surrogate decision maker about  initiating Remdesivir.  We discussed the risks benefits and alternatives.  All questions were answered.  She consented to initiating Remdesivir today.  8/31 -art line placed, remains on PCV, transition off rocuronium drip as tolerated, propofol/fentanyl, continue prone protocol  9/1 - remains on pressure control ventilation, PEEP 19, improved P/F ratio with prone ventilation, off rocuronium, starting trophic tube feed, full dose anticoagulation with Lovenox, day 3/5 Remdesivir, day 5 dexamethasone        Interval Progress  Reviewed last 24 hour events:  S/r, SB  Afebrile  TF at 30  I/O = 2.4/1.6 net +2.9L  Start lasix  Vent day 6; PCV 28, 18 -22/14 Vt ~ 400  CXR worse  Day 5/5 remdisivier      Review of Systems  Review of Systems   Unable to perform ROS: Intubated        Vital Signs for last 24 hours   Temp:  [35.6 °C (96.1 °F)-36.4 °C (97.5 °F)] 36.4 °C (97.5 °F)  Pulse:  [40-57] 53  Resp:  [20-28] 28  BP: ()/(23-55) 117/55  SpO2:  [91 %-100 %] 97 %    Hemodynamic parameters for last 24 hours       Respiratory Information for the last 24 hours  Vent Mode: PCMV  Rate (breaths/min): 28  PEEP/CPAP: 14  MAP: 20  Control VTE (exp VT): 425    Physical Exam   Physical Exam  Constitutional:       General: He is not in acute distress.     Appearance: He is ill-appearing.   HENT:      Mouth/Throat:      Mouth: Mucous membranes are moist.   Eyes:      Pupils: Pupils are equal, round, and reactive to light.   Neck:      Musculoskeletal: Neck supple.   Cardiovascular:      Rate and Rhythm: Regular rhythm.      Heart sounds: No murmur. No friction rub. No gallop.    Pulmonary:      Effort: No respiratory distress.      Breath sounds: No rhonchi.      Comments: Breath sounds are diminished throughout.  Abdominal:      General: There is no distension.      Tenderness: There is no abdominal tenderness.      Comments: Obese    Musculoskeletal:      Right lower leg: No edema.      Left lower leg: No edema.   Skin:     General:  Skin is warm and dry.   Neurological:      General: No focal deficit present.      Comments: Sedated with propofol and fentanyl, does arouse and follow commands with sedation titration.  Remains off rocuronium.  Weak extremities but does move   Psychiatric:      Comments: Unable to assess         Medications  Current Facility-Administered Medications   Medication Dose Route Frequency Provider Last Rate Last Dose   • atorvastatin (LIPITOR) tablet 20 mg  20 mg Enteral Tube DAILY Nii Rivera M.D.   20 mg at 09/03/20 0600   • ondansetron (ZOFRAN ODT) dispertab 4 mg  4 mg Enteral Tube Q4HRS PRN Nii Rivera M.D.       • Pharmacy Consult: Enteral tube insertion - review meds/change route/product selection  1 Each Other PHARMACY TO DOSE Nii Rivera M.D.       • amLODIPine (NORVASC) tablet 10 mg  10 mg Enteral Tube DAILY Nii Rivera M.D.   10 mg at 09/03/20 0600   • aspirin (ASA) chewable tab 81 mg  81 mg Enteral Tube DAILY Nii Rivera M.D.   81 mg at 09/03/20 0600   • acetaminophen (TYLENOL) tablet 650 mg  650 mg Enteral Tube Q6HRS PRN Nii Rivera M.D.       • HYDROcodone/acetaminophen (NORCO)  MG per tablet 1 Tab  1 Tab Enteral Tube Q6HRS PRN Nii Rivera M.D.       • promethazine (PHENERGAN) tablet 12.5-25 mg  12.5-25 mg Enteral Tube Q4HRS PRN Nii Rivera M.D.       • remdesivir 100 mg in  mL IVPB  100 mg Intravenous DAILY AT 1800 Meghna Nesbitt M.D.   100 mg at 09/02/20 1729   • propofol (DIPRIVAN) injection  0-80 mcg/kg/min Intravenous Continuous Harjinder Crowe M.D. 63.6 mL/hr at 09/03/20 0737 60 mcg/kg/min at 09/03/20 0737   • fentaNYL (SUBLIMAZE) 50 mcg/mL in 50mL (Continuous Infusion)   Intravenous Continuous Harjinder Crowe M.D. 4 mL/hr at 09/02/20 1606 200 mcg/hr at 09/02/20 1606   • enoxaparin (LOVENOX) injection 150 mg  150 mg Subcutaneous Q12HRS Harjinder Crowe M.D.   150 mg at 09/03/20 0600   • Respiratory Therapy Consult   Nebulization Continuous RT Harjinder WEBB  NICHOLE Crowe       • ipratropium-albuterol (DUONEB) nebulizer solution  3 mL Nebulization Q2HRS PRN (RT) Harjinder Crowe M.D.       • famotidine (PEPCID) tablet 20 mg  20 mg Enteral Tube Q12HRS Harjinder Crowe M.D.   20 mg at 09/02/20 1729    Or   • famotidine (PEPCID) injection 20 mg  20 mg Intravenous Q12HRS Harjinder Crowe M.D.   20 mg at 09/03/20 0600   • senna-docusate (PERICOLACE or SENOKOT S) 8.6-50 MG per tablet 2 Tab  2 Tab Enteral Tube BID Harjinder Crowe M.D.   2 Tab at 09/03/20 0600    And   • polyethylene glycol/lytes (MIRALAX) PACKET 1 Packet  1 Packet Enteral Tube QDAY PRN Harjinder Crowe M.D.        And   • magnesium hydroxide (MILK OF MAGNESIA) suspension 30 mL  30 mL Enteral Tube QDAY PRN Harjinder Crowe M.D.        And   • bisacodyl (DULCOLAX) suppository 10 mg  10 mg Rectal QDAY PRN Harjinder Crowe M.D.       • MD Alert...ICU Electrolyte Replacement per Pharmacy   Other PHARMACY TO DOSE Harjinder Crowe M.D.       • lidocaine (XYLOCAINE) 1 % injection 1-2 mL  1-2 mL Tracheal Tube Q30 MIN PRN Harjinder Crowe M.D.       • lactated ringers infusion (BOLUS): BMI greater than 30  30 mL/kg (Ideal) Intravenous Once PRN Nii Rivera M.D.   Stopped at 08/27/20 1835   • labetalol (NORMODYNE/TRANDATE) injection 10-20 mg  10-20 mg Intravenous Q4HRS PRN Nii Rivera M.D.       • ondansetron (ZOFRAN) syringe/vial injection 4 mg  4 mg Intravenous Q4HRS PRN Nii Rivera M.D.   4 mg at 08/29/20 0532   • promethazine (PHENERGAN) suppository 12.5-25 mg  12.5-25 mg Rectal Q4HRS PRN Nii Rivera M.D.       • prochlorperazine (COMPAZINE) injection 5-10 mg  5-10 mg Intravenous Q4HRS PRN Nii Rivera M.D.       • insulin regular (HumuLIN R,NovoLIN R) injection  3-14 Units Subcutaneous Q6HRS Nii Rivera M.D.   Stopped at 09/02/20 1800    And   • dextrose 50% (D50W) injection 50 mL  50 mL Intravenous Q15 MIN PRN Nii Rivera M.D.       • dexamethasone (DECADRON) injection 6 mg  6 mg Intravenous DAILY  Nii Rivera M.D.   6 mg at 09/03/20 0600       Fluids    Intake/Output Summary (Last 24 hours) at 9/3/2020 0750  Last data filed at 9/3/2020 0600  Gross per 24 hour   Intake 2472.24 ml   Output 1680 ml   Net 792.24 ml       Laboratory  Recent Labs     08/31/20  1539  09/01/20  1447 09/02/20  0417 09/03/20  0341   ISTATAPH 7.420   < > 7.390* 7.437 7.379*   ISTATAPCO2 45.1*   < > 48.9* 37.1* 46.9*   ISTATAPO2 60*   < > 86 81 89*   ISTATATCO2 31   < > 31 26 29   FPIRCUI9AGD 91*   < > 96 96 97   ISTATARTHCO3 29.2*   < > 29.6* 25.0 27.7*   ISTATARTBE 4*   < > 3 1 2   ISTATTEMP 97.0 F   < > see below 98.8 F 37.1 C   ISTATFIO2 100   < > 90 60 100   ISTATSPEC Arterial   < > Arterial Arterial Arterial   ISTATAPHTC 7.433  --   --  7.435 7.377*   XPDBFDRY7JX 56*  --   --  82 90*    < > = values in this interval not displayed.     Recent Labs     09/02/20 0348   CPKTOTAL 58     Recent Labs     09/01/20 0303 09/02/20 0348 09/03/20  0325   SODIUM 139 138 138   POTASSIUM 4.3 4.0 4.2   CHLORIDE 98 97 99   CO2 29 28 28   BUN 22 24* 22   CREATININE 0.50 0.43* 0.39*   MAGNESIUM 2.2 2.3  --    PHOSPHORUS 2.9  --   --    CALCIUM 8.3* 7.9* 7.9*     Recent Labs     08/31/20  1954 09/01/20 0303 09/02/20 0348 09/03/20  0325   ALTSGPT  --  14 15 35   ASTSGOT  --  26 28 51*   ALKPHOSPHAT  --  63 72 119*   TBILIRUBIN  --  0.9 1.0 1.2   DBILIRUBIN  --   --  0.5  --    PREALBUMIN 5.6* 8.0*  --   --    GLUCOSE  --  91 97 114*     Recent Labs     09/01/20  0303 09/02/20  0348 09/03/20  0325   WBC 4.1* 3.9* 3.6*   NEUTSPOLYS 67.00 67.30 61.10   LYMPHOCYTES 10.00* 22.10 21.20*   MONOCYTES 11.00 8.80 14.20*   EOSINOPHILS 0.00 0.90 2.70   BASOPHILS 1.00 0.90 0.90   ASTSGOT 26 28 51*   ALTSGPT 14 15 35   ALKPHOSPHAT 63 72 119*   TBILIRUBIN 0.9 1.0 1.2     Recent Labs     09/01/20  0303 09/02/20  0348 09/03/20  0325   RBC 6.34* 6.09 6.04   HEMOGLOBIN 19.5* 18.6* 18.5*   HEMATOCRIT 61.4* 59.9* 59.7*   PLATELETCT 108* 121* 132*   PROTHROMBTM  --   14.8*  --    APTT  --  45.1*  --    INR  --  1.12  --    FERRITIN  --  221.0  --        Imaging  X-Ray:  I have personally reviewed the images and compared with prior images.    Assessment/Plan  * Pneumonia due to COVID-19 virus  Assessment & Plan  Intubated 8/29    - Prone positioning.  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 Faust recently left the practice by his report  Will need to establish with a new primary care physician    Essential hypertension- (present on admission)  Assessment & Plan  Will continue on his home antihypertensives and follow blood pressure closely in ICU    Morbid obesity with BMI of 50.0-59.9, adult (HCC)- (present on admission)  Assessment & Plan  380 pounds, BMI 55    HLD (hyperlipidemia)- (present on admission)  Assessment & Plan  Continue statin     Updated plan:  Hold prone ventilation as deriving less benefit and having skin breakdown  Continue full ventilator support, wean down PIP and PEEP as tolerated  Completing remdesivir today  Adding forced diuresis, maintain net  fluid balance  Continue ongoing critical care management as above      VTE:  Lovenox therapeutic for elevated d-dimer  Ulcer: H2 Antagonist  Lines: None    I have performed a physical exam and reviewed and updated ROS and Plan today (9/3/2020). In review of yesterday's note (9/2/2020), there are no changes except as documented above.     Discussed patient condition and risk of morbidity and/or mortality with RN, RT, Pharmacy and Charge nurse / hot rounds     The patient remains critically ill.  The patient remains at very high risk for further vital organ deterioration and sudden death.  I examined and reexamined the patient throughout the day and assisted turn from prone ventilation to supine today.   Critical care time = 85 minutes in directly providing and coordinating critical care and extensive data review.  No time overlap and excludes procedures.

## 2020-09-03 NOTE — CARE PLAN
Adult Ventilation Update    Total Vent Days: 5    Patient Lines/Drains/Airways Status      Active Airway       Name: Placement date: Placement time: Site: Days:    Airway ETT Oral 8.0 @ 25  08/29/20   1226   Oral  4                  PCMV 28 Pc22 +14 100%    Pt proned.       Plateau Pressure: 34 (09/01/20 1827)  Static Compliance (ml / cm H2O): 37 (09/02/20 1520)    Patient failed trials because of Barriers to Wean: FiO2 >60% or PEEP >10 CM H2O (09/02/20 1520)

## 2020-09-03 NOTE — DOCUMENTATION QUERY
[facility]                                                                       Query Response Note      PATIENT:               JAELYN TAYLOR  ACCT #:                  [id number]  MRN:                     [id number]  :                      1960  ADMIT DATE:       2020 3:50 PM  DISCH DATE:          RESPONDING  PROVIDER #:        [id number]           QUERY TEXT:    Altered mental status is documented in the Medical Record.  Can a more specific diagnosis be provided?    NOTE:  If the appropriate response is not listed below, please respond with a new note.    The patient's Clinical Indicators include:  Per Progress Notes:  worsening mental status, rapid shallow breathing and hypoxia required endotracheal intubation   Risk Factors: acute respiratory failure w/ hypoxia  Treatment: mechanical ventilation, supplemental 02  Options provided:   -- Acute metabolic encephalopathy   -- Acute toxic encephalopathy   -- Acute encephalopathy due to other medical condition, (please specify other medical condition)   -- Delirium due to known physiological condition, (please specify the known physiological condition)   -- Delirium of unknown etiology   -- Findings of no clinical significance   -- Unable to determine      Query created by: Natalie Betancur on 2020 11:32 AM    RESPONSE TEXT:    Acute metabolic encephalopathy          Electronically signed by:  LUCILLE LÓPEZ MD 9/3/2020 2:57 PM

## 2020-09-03 NOTE — CARE PLAN
Problem: Respiratory:  Goal: Respiratory status will improve  Outcome: PROGRESSING SLOWER THAN EXPECTED  Note: Remains intubated day 7. Pressure control. Prone 16hrs/supine 8hrs. Titrate per RT and MD. Daily SATs.      Problem: Skin Integrity  Goal: Risk for impaired skin integrity will decrease  Outcome: PROGRESSING SLOWER THAN EXPECTED  Note: New pressure ulcer to nose and bilateral knees. Wound consulted. Waffle in use. Q2hr turns when supine. Barrier cream, interdry and moisturizer available. Offload all pressure points.

## 2020-09-03 NOTE — RESPIRATORY CARE
Ventilator Daily Summary     Vent Day #6     Ventilator settings changed this shift:Change PC to 20     Weaning trials: No     Respiratory Procedures: None     Plan: Continue current ventilator settings and wean mechanical ventilation as tolerated per physician orders.

## 2020-09-04 ENCOUNTER — HOSPITAL ENCOUNTER (OUTPATIENT)
Dept: RADIOLOGY | Facility: MEDICAL CENTER | Age: 60
End: 2020-09-04
Attending: INTERNAL MEDICINE
Payer: MEDICARE

## 2020-09-04 LAB
ACTION RANGE TRIGGERED IACRT: NO
ACTION RANGE TRIGGERED IACRT: YES
ALBUMIN SERPL BCP-MCNC: 2.5 G/DL (ref 3.2–4.9)
ALBUMIN/GLOB SERPL: 0.6 G/DL
ALP SERPL-CCNC: 125 U/L (ref 30–99)
ALT SERPL-CCNC: 42 U/L (ref 2–50)
ANION GAP SERPL CALC-SCNC: 12 MMOL/L (ref 7–16)
APTT PPP: 43.4 SEC (ref 24.7–36)
AST SERPL-CCNC: 56 U/L (ref 12–45)
BASE EXCESS BLDA CALC-SCNC: 1 MMOL/L (ref -4–3)
BASE EXCESS BLDA CALC-SCNC: 2 MMOL/L (ref -4–3)
BASOPHILS # BLD AUTO: 0.2 % (ref 0–1.8)
BASOPHILS # BLD: 0.01 K/UL (ref 0–0.12)
BILIRUB CONJ SERPL-MCNC: 0.6 MG/DL (ref 0.1–0.5)
BILIRUB INDIRECT SERPL-MCNC: 0.4 MG/DL (ref 0–1)
BILIRUB SERPL-MCNC: 1 MG/DL (ref 0.1–1.5)
BODY TEMPERATURE: ABNORMAL DEGREES
BODY TEMPERATURE: ABNORMAL DEGREES
BUN SERPL-MCNC: 30 MG/DL (ref 8–22)
CALCIUM SERPL-MCNC: 8.1 MG/DL (ref 8.5–10.5)
CHLORIDE SERPL-SCNC: 96 MMOL/L (ref 96–112)
CK SERPL-CCNC: 59 U/L (ref 0–154)
CO2 BLDA-SCNC: 24 MMOL/L (ref 20–33)
CO2 BLDA-SCNC: 38 MMOL/L (ref 20–33)
CO2 SERPL-SCNC: 26 MMOL/L (ref 20–33)
CREAT SERPL-MCNC: 0.65 MG/DL (ref 0.5–1.4)
CRP SERPL HS-MCNC: 1.21 MG/DL (ref 0–0.75)
D DIMER PPP IA.FEU-MCNC: 0.53 UG/ML (FEU) (ref 0–0.5)
EOSINOPHIL # BLD AUTO: 0.06 K/UL (ref 0–0.51)
EOSINOPHIL NFR BLD: 1.2 % (ref 0–6.9)
ERYTHROCYTE [DISTWIDTH] IN BLOOD BY AUTOMATED COUNT: 64.2 FL (ref 35.9–50)
FERRITIN SERPL-MCNC: 237 NG/ML (ref 22–322)
FIBRINOGEN PPP-MCNC: 576 MG/DL (ref 215–460)
GLOBULIN SER CALC-MCNC: 4.2 G/DL (ref 1.9–3.5)
GLUCOSE BLD-MCNC: 106 MG/DL (ref 65–99)
GLUCOSE BLD-MCNC: 125 MG/DL (ref 65–99)
GLUCOSE BLD-MCNC: 143 MG/DL (ref 65–99)
GLUCOSE BLD-MCNC: 98 MG/DL (ref 65–99)
GLUCOSE SERPL-MCNC: 105 MG/DL (ref 65–99)
HCO3 BLDA-SCNC: 23.3 MMOL/L (ref 17–25)
HCO3 BLDA-SCNC: 34.7 MMOL/L (ref 17–25)
HCT VFR BLD AUTO: 61 % (ref 42–52)
HGB BLD-MCNC: 18.3 G/DL (ref 14–18)
HOROWITZ INDEX BLDA+IHG-RTO: 61 MM[HG]
HOROWITZ INDEX BLDA+IHG-RTO: 81 MM[HG]
IL6 SERPL-MCNC: 11.6 PG/ML
IL6 SERPL-MCNC: 13.4 PG/ML
IMM GRANULOCYTES # BLD AUTO: 0.07 K/UL (ref 0–0.11)
IMM GRANULOCYTES NFR BLD AUTO: 1.4 % (ref 0–0.9)
INR PPP: 0.99 (ref 0.87–1.13)
INST. QUALIFIED PATIENT IIQPT: YES
INST. QUALIFIED PATIENT IIQPT: YES
LDH SERPL L TO P-CCNC: 314 U/L (ref 107–266)
LYMPHOCYTES # BLD AUTO: 0.83 K/UL (ref 1–4.8)
LYMPHOCYTES NFR BLD: 16.8 % (ref 22–41)
MAGNESIUM SERPL-MCNC: 2.4 MG/DL (ref 1.5–2.5)
MCH RBC QN AUTO: 30.3 PG (ref 27–33)
MCHC RBC AUTO-ENTMCNC: 30 G/DL (ref 33.7–35.3)
MCV RBC AUTO: 101.2 FL (ref 81.4–97.8)
MONOCYTES # BLD AUTO: 0.64 K/UL (ref 0–0.85)
MONOCYTES NFR BLD AUTO: 12.9 % (ref 0–13.4)
NEUTROPHILS # BLD AUTO: 3.34 K/UL (ref 1.82–7.42)
NEUTROPHILS NFR BLD: 67.5 % (ref 44–72)
NRBC # BLD AUTO: 0 K/UL
NRBC BLD-RTO: 0 /100 WBC
NT-PROBNP SERPL IA-MCNC: 162 PG/ML (ref 0–125)
O2/TOTAL GAS SETTING VFR VENT: 100 %
O2/TOTAL GAS SETTING VFR VENT: 100 %
PCO2 BLDA: 27.5 MMHG (ref 26–37)
PCO2 BLDA: 95.2 MMHG (ref 26–37)
PCO2 TEMP ADJ BLDA: 26.4 MMHG (ref 26–37)
PCO2 TEMP ADJ BLDA: 93.8 MMHG (ref 26–37)
PH BLDA: 7.17 [PH] (ref 7.4–7.5)
PH BLDA: 7.54 [PH] (ref 7.4–7.5)
PH TEMP ADJ BLDA: 7.17 [PH] (ref 7.4–7.5)
PH TEMP ADJ BLDA: 7.55 [PH] (ref 7.4–7.5)
PLATELET # BLD AUTO: 153 K/UL (ref 164–446)
PMV BLD AUTO: 11.2 FL (ref 9–12.9)
PO2 BLDA: 61 MMHG (ref 64–87)
PO2 BLDA: 81 MMHG (ref 64–87)
PO2 TEMP ADJ BLDA: 57 MMHG (ref 64–87)
PO2 TEMP ADJ BLDA: 79 MMHG (ref 64–87)
POTASSIUM SERPL-SCNC: 4.6 MMOL/L (ref 3.6–5.5)
PROT SERPL-MCNC: 6.7 G/DL (ref 6–8.2)
PROTHROMBIN TIME: 13.4 SEC (ref 12–14.6)
RBC # BLD AUTO: 6.03 M/UL (ref 4.7–6.1)
SAO2 % BLDA: 91 % (ref 93–99)
SAO2 % BLDA: 94 % (ref 93–99)
SODIUM SERPL-SCNC: 134 MMOL/L (ref 135–145)
SPECIMEN DRAWN FROM PATIENT: ABNORMAL
SPECIMEN DRAWN FROM PATIENT: ABNORMAL
TROPONIN T SERPL-MCNC: 8 NG/L (ref 6–19)
WBC # BLD AUTO: 5 K/UL (ref 4.8–10.8)

## 2020-09-04 PROCEDURE — 770022 HCHG ROOM/CARE - ICU (200)

## 2020-09-04 PROCEDURE — A9270 NON-COVERED ITEM OR SERVICE: HCPCS | Performed by: INTERNAL MEDICINE

## 2020-09-04 PROCEDURE — 83520 IMMUNOASSAY QUANT NOS NONAB: CPT

## 2020-09-04 PROCEDURE — 82248 BILIRUBIN DIRECT: CPT

## 2020-09-04 PROCEDURE — 37799 UNLISTED PX VASCULAR SURGERY: CPT

## 2020-09-04 PROCEDURE — 83735 ASSAY OF MAGNESIUM: CPT

## 2020-09-04 PROCEDURE — 700111 HCHG RX REV CODE 636 W/ 250 OVERRIDE (IP): Performed by: INTERNAL MEDICINE

## 2020-09-04 PROCEDURE — 85730 THROMBOPLASTIN TIME PARTIAL: CPT

## 2020-09-04 PROCEDURE — 85610 PROTHROMBIN TIME: CPT

## 2020-09-04 PROCEDURE — 700102 HCHG RX REV CODE 250 W/ 637 OVERRIDE(OP): Performed by: INTERNAL MEDICINE

## 2020-09-04 PROCEDURE — 94770 HCHG CO2 EXPIRED GAS DETERMINATION: CPT

## 2020-09-04 PROCEDURE — 80053 COMPREHEN METABOLIC PANEL: CPT

## 2020-09-04 PROCEDURE — 83615 LACTATE (LD) (LDH) ENZYME: CPT

## 2020-09-04 PROCEDURE — 82550 ASSAY OF CK (CPK): CPT

## 2020-09-04 PROCEDURE — 85384 FIBRINOGEN ACTIVITY: CPT

## 2020-09-04 PROCEDURE — 700101 HCHG RX REV CODE 250: Performed by: INTERNAL MEDICINE

## 2020-09-04 PROCEDURE — 94003 VENT MGMT INPAT SUBQ DAY: CPT

## 2020-09-04 PROCEDURE — 83880 ASSAY OF NATRIURETIC PEPTIDE: CPT

## 2020-09-04 PROCEDURE — 82728 ASSAY OF FERRITIN: CPT

## 2020-09-04 PROCEDURE — 85379 FIBRIN DEGRADATION QUANT: CPT

## 2020-09-04 PROCEDURE — 99291 CRITICAL CARE FIRST HOUR: CPT | Performed by: INTERNAL MEDICINE

## 2020-09-04 PROCEDURE — 71045 X-RAY EXAM CHEST 1 VIEW: CPT

## 2020-09-04 PROCEDURE — 84484 ASSAY OF TROPONIN QUANT: CPT

## 2020-09-04 PROCEDURE — 86140 C-REACTIVE PROTEIN: CPT

## 2020-09-04 PROCEDURE — 85025 COMPLETE CBC W/AUTO DIFF WBC: CPT

## 2020-09-04 PROCEDURE — 82962 GLUCOSE BLOOD TEST: CPT | Mod: 91

## 2020-09-04 PROCEDURE — 82803 BLOOD GASES ANY COMBINATION: CPT | Mod: 91

## 2020-09-04 RX ORDER — FUROSEMIDE 10 MG/ML
20 INJECTION INTRAMUSCULAR; INTRAVENOUS EVERY 8 HOURS
Status: DISCONTINUED | OUTPATIENT
Start: 2020-09-04 | End: 2020-09-07

## 2020-09-04 RX ADMIN — FUROSEMIDE 20 MG: 10 INJECTION, SOLUTION INTRAMUSCULAR; INTRAVENOUS at 05:25

## 2020-09-04 RX ADMIN — DOCUSATE SODIUM 50 MG AND SENNOSIDES 8.6 MG 2 TABLET: 8.6; 5 TABLET, FILM COATED ORAL at 16:32

## 2020-09-04 RX ADMIN — Medication 200 MCG/HR: at 13:03

## 2020-09-04 RX ADMIN — ASPIRIN 81 MG 81 MG: 81 TABLET ORAL at 05:24

## 2020-09-04 RX ADMIN — PROPOFOL 55 MCG/KG/MIN: 10 INJECTION, EMULSION INTRAVENOUS at 02:54

## 2020-09-04 RX ADMIN — FAMOTIDINE 20 MG: 20 TABLET, FILM COATED ORAL at 05:24

## 2020-09-04 RX ADMIN — POTASSIUM BICARBONATE 25 MEQ: 978 TABLET, EFFERVESCENT ORAL at 16:32

## 2020-09-04 RX ADMIN — FUROSEMIDE 20 MG: 10 INJECTION, SOLUTION INTRAMUSCULAR; INTRAVENOUS at 21:01

## 2020-09-04 RX ADMIN — POLYETHYLENE GLYCOL 3350 1 PACKET: 17 POWDER, FOR SOLUTION ORAL at 16:32

## 2020-09-04 RX ADMIN — Medication 200 MCG/HR: at 00:13

## 2020-09-04 RX ADMIN — PROPOFOL 40 MCG/KG/MIN: 10 INJECTION, EMULSION INTRAVENOUS at 13:05

## 2020-09-04 RX ADMIN — FUROSEMIDE 20 MG: 10 INJECTION, SOLUTION INTRAMUSCULAR; INTRAVENOUS at 14:19

## 2020-09-04 RX ADMIN — PROPOFOL 55 MCG/KG/MIN: 10 INJECTION, EMULSION INTRAVENOUS at 01:17

## 2020-09-04 RX ADMIN — FAMOTIDINE 20 MG: 20 TABLET, FILM COATED ORAL at 16:32

## 2020-09-04 RX ADMIN — PROPOFOL 30 MCG/KG/MIN: 10 INJECTION, EMULSION INTRAVENOUS at 20:21

## 2020-09-04 RX ADMIN — POTASSIUM BICARBONATE 25 MEQ: 978 TABLET, EFFERVESCENT ORAL at 05:25

## 2020-09-04 RX ADMIN — PROPOFOL 55 MCG/KG/MIN: 10 INJECTION, EMULSION INTRAVENOUS at 08:39

## 2020-09-04 RX ADMIN — PROPOFOL 55 MCG/KG/MIN: 10 INJECTION, EMULSION INTRAVENOUS at 04:09

## 2020-09-04 RX ADMIN — DEXAMETHASONE SODIUM PHOSPHATE 6 MG: 4 INJECTION, SOLUTION INTRA-ARTICULAR; INTRALESIONAL; INTRAMUSCULAR; INTRAVENOUS; SOFT TISSUE at 05:24

## 2020-09-04 RX ADMIN — PROPOFOL 50 MCG/KG/MIN: 10 INJECTION, EMULSION INTRAVENOUS at 10:08

## 2020-09-04 RX ADMIN — ENOXAPARIN SODIUM 150 MG: 150 INJECTION SUBCUTANEOUS at 16:39

## 2020-09-04 RX ADMIN — AMLODIPINE BESYLATE 10 MG: 10 TABLET ORAL at 05:24

## 2020-09-04 RX ADMIN — ENOXAPARIN SODIUM 150 MG: 150 INJECTION SUBCUTANEOUS at 06:34

## 2020-09-04 RX ADMIN — PROPOFOL 30 MCG/KG/MIN: 10 INJECTION, EMULSION INTRAVENOUS at 22:58

## 2020-09-04 RX ADMIN — PROPOFOL 20 MCG/KG/MIN: 10 INJECTION, EMULSION INTRAVENOUS at 16:57

## 2020-09-04 RX ADMIN — DOCUSATE SODIUM 50 MG AND SENNOSIDES 8.6 MG 2 TABLET: 8.6; 5 TABLET, FILM COATED ORAL at 05:24

## 2020-09-04 RX ADMIN — PROPOFOL 55 MCG/KG/MIN: 10 INJECTION, EMULSION INTRAVENOUS at 06:18

## 2020-09-04 RX ADMIN — ATORVASTATIN CALCIUM 20 MG: 20 TABLET, FILM COATED ORAL at 05:24

## 2020-09-04 ASSESSMENT — FIBROSIS 4 INDEX: FIB4 SCORE: 3.28

## 2020-09-04 NOTE — PROGRESS NOTES
1850: Report received from NIKOLAY Oseguera. Patient care assumed.  2000: Complete assessment done per flow sheet, see flow sheet for further details.   2130: Patients daughter called, updated on patients condition. All questions answered at this time.   2230: Care plan and education addressed.  0000: Complete reassessment done per flow sheet, no acute changes noted from previous assessment, see flow sheet for further details.   0400: Complete reassessment done per flow sheet, no acute changes noted from previous assessment. See flow sheet for further details. Complete bath and bed linen change done. When doing bath patient desated into 70's, patient was bagged back up by RT and placed back on the vent. SpO2 recovered and remains in the 90's.   0645: Report given to NIKOLAY Jones.   Laura Ulloa RN.

## 2020-09-04 NOTE — PROGRESS NOTES
Critical Care Progress Note    Date of admission  8/27/2020    Chief Complaint  60 y.o. male who presented 8/27/2020 with increasing dyspnea and hypoxia.  He has a history of morbid obesity, hyperlipidemia, diabetes and obstructive sleep apnea, noncompliant with treatment.  He uses oxygen 2 L via nasal cannula for chronic hypoxia.  Today he called EMS for increasing dyspnea over the past 24 hours.  He reports he is had increased cough and yellow sputum production with small amount of blood in his sputum over the past 24 hours.  On EMS arrival patient was profoundly hypoxic with a saturation of 50%.  He was placed on nonrebreather and brought to the ED.  He desaturates very quickly on nonrebreather and is in moderate respiratory distress.  Chest x-ray showed diffuse right-sided pneumonia/consolidation.  He was treated for community-acquired pneumonia with ceftriaxone and azithromycin as well as IV methylprednisolone.  He denies fevers or chills.  He he lives alone and tells me has been taking appropriate precautions when he goes out of the house wearing his mask and has no known exposures to COVID-19.  He is a nondrinker and non-smoker.     Screening SARS-CoV-2 PCR came back positive in ED.  He received 600 cc of crystalloid and the sepsis fluid bolus that had been ordered was discontinued.  He is not currently hypotensive and appears relatively euvolemic.  Lactic acid 1.7.  With the positive COVID-19 test I decreased IV fluid, ordered dexamethasone 6 mg daily x10 days, will admit to isolation ICU.  He will be placed on high flow nasal cannula/BiPAP with high risk for requiring intubation.  We will follow-up inflammatory markers and initiate full dose anticoagulation if appropriate.    Hospital Course   8/29-worsening mental status, rapid shallow breathing and hypoxia required endotracheal intubation and prone positioning.  8/30-I spoke with Brittney, the patient's daughter and surrogate decision maker about  initiating Remdesivir.  We discussed the risks benefits and alternatives.  All questions were answered.  She consented to initiating Remdesivir today.  8/31 -art line placed, remains on PCV, transition off rocuronium drip as tolerated, propofol/fentanyl, continue prone protocol  9/1 - remains on pressure control ventilation, PEEP 19, improved P/F ratio with prone ventilation, off rocuronium, starting trophic tube feed, full dose anticoagulation with Lovenox, day 3/5 Remdesivir, day 5 dexamethasone        Interval Progress  Reviewed last 24 hour events:  Arouses, follows, moves all ext  PERRL  fent 200, prop 50  SR/SB    - 130  3+ edmea  I/O = 2.5/2.7  Increase lasix to q 8  Afebrile, Tm =  abdi TF at 30  Pepcid; full lovenox  BAL 8/29, NRF  No abx  Day 9/10 dexamethasone  Vent day 7: 28, 26/14 Vt 350 - 450  7.17/93/79 - repeat P    Review of Systems  Review of Systems   Unable to perform ROS: Intubated        Vital Signs for last 24 hours   Temp:  [35.8 °C (96.4 °F)-36.9 °C (98.4 °F)] 36.2 °C (97.1 °F)  Pulse:  [41-75] 56  Resp:  [20-29] 28  BP: ()/(31-61) 79/40  SpO2:  [86 %-97 %] 94 %    Hemodynamic parameters for last 24 hours       Respiratory Information for the last 24 hours  Vent Mode: PCMV  Rate (breaths/min): 28  PEEP/CPAP: 14  MAP: 24  Control VTE (exp VT): 426    Physical Exam   Physical Exam  Constitutional:       General: He is not in acute distress.     Appearance: He is ill-appearing.   HENT:      Mouth/Throat:      Mouth: Mucous membranes are moist.   Eyes:      Pupils: Pupils are equal, round, and reactive to light.   Neck:      Musculoskeletal: Neck supple.   Cardiovascular:      Rate and Rhythm: Regular rhythm.      Heart sounds: No murmur. No friction rub. No gallop.    Pulmonary:      Effort: No respiratory distress.      Breath sounds: No rhonchi.      Comments: Breath sounds are diminished throughout.  Abdominal:      General: There is no distension.      Tenderness: There is no  abdominal tenderness.      Comments: Obese    Musculoskeletal:      Right lower leg: No edema.      Left lower leg: No edema.   Skin:     General: Skin is warm and dry.   Neurological:      General: No focal deficit present.      Comments: Sedated with propofol and fentanyl, does arouse and follow commands with sedation titration.  Remains off rocuronium.  Weak extremities but does move   Psychiatric:      Comments: Unable to assess         Medications  Current Facility-Administered Medications   Medication Dose Route Frequency Provider Last Rate Last Dose   • furosemide (LASIX) injection 20 mg  20 mg Intravenous BID DIURETIC Nii Rivera M.D.   20 mg at 09/04/20 0525   • potassium bicarbonate (KLYTE) effervescent tablet 25 mEq  25 mEq Enteral Tube BID Nii Rivera M.D.   25 mEq at 09/04/20 0525   • atorvastatin (LIPITOR) tablet 20 mg  20 mg Enteral Tube DAILY Nii Rivera M.D.   20 mg at 09/04/20 0524   • ondansetron (ZOFRAN ODT) dispertab 4 mg  4 mg Enteral Tube Q4HRS PRN Nii Rivera M.D.       • Pharmacy Consult: Enteral tube insertion - review meds/change route/product selection  1 Each Other PHARMACY TO DOSE Nii Rivera M.D.       • amLODIPine (NORVASC) tablet 10 mg  10 mg Enteral Tube DAILY Nii Rivera M.D.   10 mg at 09/04/20 0524   • aspirin (ASA) chewable tab 81 mg  81 mg Enteral Tube DAILY Nii Rivera M.D.   81 mg at 09/04/20 0524   • acetaminophen (TYLENOL) tablet 650 mg  650 mg Enteral Tube Q6HRS PRN Nii Rivera M.D.       • HYDROcodone/acetaminophen (NORCO)  MG per tablet 1 Tab  1 Tab Enteral Tube Q6HRS PRN Nii Rivera M.D.       • promethazine (PHENERGAN) tablet 12.5-25 mg  12.5-25 mg Enteral Tube Q4HRS PRN Nii Rivera M.D.       • propofol (DIPRIVAN) injection  0-80 mcg/kg/min Intravenous Continuous Harjinder Crowe M.D. 58.3 mL/hr at 09/04/20 0839 55 mcg/kg/min at 09/04/20 0839   • fentaNYL (SUBLIMAZE) 50 mcg/mL in 50mL (Continuous Infusion)   Intravenous  Continuous Harjinder Crowe M.D. 4 mL/hr at 09/04/20 0830 200 mcg/hr at 09/04/20 0830   • enoxaparin (LOVENOX) injection 150 mg  150 mg Subcutaneous Q12HRS Harjinder Crowe M.D.   150 mg at 09/04/20 0634   • Respiratory Therapy Consult   Nebulization Continuous RT Harjinder Crowe M.D.       • ipratropium-albuterol (DUONEB) nebulizer solution  3 mL Nebulization Q2HRS PRN (RT) Harjinder Crowe M.D.       • famotidine (PEPCID) tablet 20 mg  20 mg Enteral Tube Q12HRS Harjinder Crowe M.D.   20 mg at 09/04/20 0524    Or   • famotidine (PEPCID) injection 20 mg  20 mg Intravenous Q12HRS Harjinder Crowe M.D.   20 mg at 09/03/20 0600   • senna-docusate (PERICOLACE or SENOKOT S) 8.6-50 MG per tablet 2 Tab  2 Tab Enteral Tube BID Harjinder Crowe M.D.   2 Tab at 09/04/20 0524    And   • polyethylene glycol/lytes (MIRALAX) PACKET 1 Packet  1 Packet Enteral Tube QDAY PRN Harjinder Crowe M.D.        And   • magnesium hydroxide (MILK OF MAGNESIA) suspension 30 mL  30 mL Enteral Tube QDAY PRN Harjinder Crowe M.D.        And   • bisacodyl (DULCOLAX) suppository 10 mg  10 mg Rectal QDAY PRN Harjinder Crowe M.D.       • MD Alert...ICU Electrolyte Replacement per Pharmacy   Other PHARMACY TO DOSE Harjinder Crowe M.D.       • lidocaine (XYLOCAINE) 1 % injection 1-2 mL  1-2 mL Tracheal Tube Q30 MIN PRN Harjinder Crowe M.D.       • lactated ringers infusion (BOLUS): BMI greater than 30  30 mL/kg (Ideal) Intravenous Once PRN Nii Rivera M.D.   Stopped at 08/27/20 6120   • labetalol (NORMODYNE/TRANDATE) injection 10-20 mg  10-20 mg Intravenous Q4HRS PRN Nii Rivera M.D.       • ondansetron (ZOFRAN) syringe/vial injection 4 mg  4 mg Intravenous Q4HRS PRN Nii Rivera M.D.   4 mg at 08/29/20 0552   • promethazine (PHENERGAN) suppository 12.5-25 mg  12.5-25 mg Rectal Q4HRS PRJOHNNA Rivera M.D.       • prochlorperazine (COMPAZINE) injection 5-10 mg  5-10 mg Intravenous Q4HRS PRJOHNNA Rivera M.D.       • insulin regular (HumuLIN  R,NovoLIN R) injection  3-14 Units Subcutaneous Q6HRS Nii Rivera M.D.   Stopped at 09/03/20 1800    And   • dextrose 50% (D50W) injection 50 mL  50 mL Intravenous Q15 MIN PRN Nii Rivera M.D.       • dexamethasone (DECADRON) injection 6 mg  6 mg Intravenous DAILY Nii Rivera M.D.   6 mg at 09/04/20 0524       Fluids    Intake/Output Summary (Last 24 hours) at 9/4/2020 1100  Last data filed at 9/4/2020 0800  Gross per 24 hour   Intake 2196.13 ml   Output 2750 ml   Net -553.87 ml       Laboratory  Recent Labs     09/03/20  0341 09/03/20  1425 09/04/20  0514   ISTATAPH 7.379* 7.368* 7.169*   ISTATAPCO2 46.9* 43.9* 95.2*   ISTATAPO2 89* 74 81   ISTATATCO2 29 27 38*   GYXHNJE5MGN 97 94 91*   ISTATARTHCO3 27.7* 25.3* 34.7*   ISTATARTBE 2 0 1   ISTATTEMP 37.1 C 96.8 F 98.0 F   ISTATFIO2 100 90 100   ISTATSPEC Arterial Arterial Arterial   ISTATAPHTC 7.377* 7.383* 7.174*   [id number] 90* 69 79     Recent Labs     09/02/20 0348 09/04/20  0444   CPKTOTAL 58 59     Recent Labs     09/02/20 0348 09/03/20 0325 09/04/20  0444   SODIUM 138 138 134*   POTASSIUM 4.0 4.2 4.6   CHLORIDE 97 99 96   CO2 28 28 26   BUN 24* 22 30*   CREATININE 0.43* 0.39* 0.65   MAGNESIUM 2.3  --  2.4   CALCIUM 7.9* 7.9* 8.1*     Recent Labs     09/02/20 0348 09/03/20  0325 09/04/20  0444   ALTSGPT 15 35 42   ASTSGOT 28 51* 56*   ALKPHOSPHAT 72 119* 125*   TBILIRUBIN 1.0 1.2 1.0   DBILIRUBIN 0.5  --  0.6*   GLUCOSE 97 114* 105*     Recent Labs     09/02/20 0348 09/03/20  0325 09/04/20  0444   WBC 3.9* 3.6* 5.0   NEUTSPOLYS 67.30 61.10 67.50   LYMPHOCYTES 22.10 21.20* 16.80*   MONOCYTES 8.80 14.20* 12.90   EOSINOPHILS 0.90 2.70 1.20   BASOPHILS 0.90 0.90 0.20   ASTSGOT 28 51* 56*   ALTSGPT 15 35 42   ALKPHOSPHAT 72 119* 125*   TBILIRUBIN 1.0 1.2 1.0     Recent Labs     09/02/20  0348 09/03/20  0325 09/04/20  0444   RBC 6.09 6.04 6.03   HEMOGLOBIN 18.6* 18.5* 18.3*   HEMATOCRIT 59.9* 59.7* 61.0*   PLATELETCT 121* 132* 153*   PROTHROMBTM  14.8*  --  13.4   APTT 45.1*  --  43.4*   INR 1.12  --  0.99   FERRITIN 221.0  --  237.0       Imaging  X-Ray:  I have personally reviewed the images and compared with prior images.    Assessment/Plan  * Pneumonia due to COVID-19 virus  Assessment & Plan  Intubated 8/29    - Prone positioning.  - Dexamethasone 6 mg daily x10 days  - Therapeutic Lovenox for d-dimer greater than 1  - Remdesivir  - Follow inflammatory markers  - Empiric course of C3/azithro completed    Acute hypoxemic respiratory failure (HCC)- (present on admission)  Assessment & Plan  Secondary to COVID-19 pneumonia, +/- community-acquired/bacterial pneumonia  Multilobar dense consolidation of the right lung, no significant effusion  No evidence of pulmonary embolism  Chemical paralysis ~ 48 hours; now d/c'd and passive on vent with sedation  Prone positioning with improved P/F  Lung protective ventilation strategies        Diabetes type 2, controlled (HCC)- (present on admission)  Assessment & Plan  Monitor glycemic control with insulin therapy    ABELARDO (obstructive sleep apnea)- (present on admission)  Assessment & Plan  Polysomnography on January 29, 2019 AHI = 101.9, SpO2 hortensia 50% on room air  He gets his usual care from the [facility] and his physician Dr. Faust recently left the practice by his report  Will need to establish with a new primary care physician    Essential hypertension- (present on admission)  Assessment & Plan  Will continue on his home antihypertensives and follow blood pressure closely in ICU    Morbid obesity with BMI of 50.0-59.9, adult (HCC)- (present on admission)  Assessment & Plan  380 pounds, BMI 55    HLD (hyperlipidemia)- (present on admission)  Assessment & Plan  Continue statin     Updated plan:  Continue full ventilator support, titrating downward inspiratory pressures, and PCP  Some benefit from prone ventilation earlier but skin breakdown and difficult prone position with morbid obesity  Continue  supine ventilation  Increase Lasix to 3 times daily today, maintain net negative balance  Completed remdesivir 9/3, dexamethasone  Tolerating enteral nutrition  Ongoing critical care as above      VTE:  Lovenox therapeutic for elevated d-dimer  Ulcer: H2 Antagonist  Lines: None    I have performed a physical exam and reviewed and updated ROS and Plan today (9/4/2020). In review of yesterday's note (9/3/2020), there are no changes except as documented above.     Discussed patient condition and risk of morbidity and/or mortality with RN, RT, Pharmacy and Charge nurse / hot rounds     The patient remains critically ill.  The patient remains at very high risk for further vital organ deterioration and sudden death.  I examined and reexamined the patient throughout the day and assisted turn from prone ventilation to supine today.   Critical care time = 39 minutes in directly providing and coordinating critical care and extensive data review.  No time overlap and excludes procedures.

## 2020-09-04 NOTE — WOUND TEAM
[facility]  Inpatient Services  Initial Wound and Skin Care Evaluation    Admission Date: 8/27/2020     Last order of IP CONSULT TO WOUND CARE was found on 9/3/2020 from Hospital Encounter on 8/27/2020       HPI, PMH, SH: Reviewed    Unit where seen by Wound Team: T630/00     WOUND CONSULT/FOLLOW UP RELATED TO:  Nose, knees     Self Report / Pain Level:  On vent, occasional grimace with site care    OBJECTIVE:  Pt on ICU bed. Protective mepilex to bilateral knees,shoulders. Cortrak in place, anchor fast in place. COVID +    WOUND TYPE, LOCATION, CHARACTERISTICS (Pressure Injuries: location, stage, POA or date identified)    Wound 09/03/20 Skin Tear Nose (Active)   Wound Image   09/03/20 1728   Site Assessment Pink;Red    Periwound Assessment Clean    Margins Attached edges    Closure Secondary intention    Drainage Amount Scant    Drainage Description Serous    Treatments Offloading;Site care    Wound Cleansing Approved Wound Cleanser    Periwound Protectant Not Applicable    Dressing Cleansing/Solutions Not Applicable    Dressing Options Collagen Dressing    Dressing Changed New    Dressing Status Intact    Dressing Change/Treatment Frequency Every 72 hrs, and As Needed    NEXT Dressing Change/Treatment Date 09/06/20    NEXT Weekly Photo (Inpatient Only) 09/10/20    Non-staged Wound Description Partial thickness    Wound Length (cm) 3 cm    Wound Width (cm) 1.2 cm    Wound Depth (cm) 0 cm    Wound Surface Area (cm^2) 3.6 cm^2    Wound Volume (cm^3) 0 cm^3    Tunneling (cm) 0 cm    Shape irregular    Exposed Structures None        Wound 09/03/20 Pressure Injury Knee Bilateral (Active)       09/03/20 1728   Site Assessment Purple    Periwound Assessment Edema    Margins Defined edges    Closure None    Drainage Amount None    Treatments Offloading    Wound Cleansing Not Applicable    Periwound Protectant Not Applicable    Dressing Cleansing/Solutions Not Applicable    Dressing Options Mepilex     Dressing Changed Observed    Dressing Status Intact    Dressing Change/Treatment Frequency Every 72 hrs, and As Needed    NEXT Dressing Change/Treatment Date 09/06/20    NEXT Weekly Photo (Inpatient Only) 09/10/20    Pressure Injury Stage DTPI    Wound Length (cm) 1.3 cm    Wound Width (cm) 2.5 cm    Wound Surface Area (cm^2) 3.25 cm^2    Shape irregular    Exposed Structures None            Wound 09/03/20 Pressure Injury septum (Active)   Wound Image     09/03/20 1728   Site Assessment Purple    Periwound Assessment Clean    Margins Defined edges    Closure None    Drainage Amount None    Treatments Offloading    Wound Cleansing Approved Wound Cleanser    Periwound Protectant Not Applicable    Dressing Cleansing/Solutions Not Applicable    Dressing Options Open to Air    NEXT Weekly Photo (Inpatient Only) 09/10/20    Pressure Injury Stage DTPI    Wound Length (cm) 0.4 cm    Wound Width (cm) 0.3 cm    Wound Surface Area (cm^2) 0.12 cm^2    Tunneling (cm) 0 cm    Shape circular    Wound Odor Other (Comments)    Exposed Structures None         Vascular:    MONIK:   No results found.      Lab Values:    Lab Results   Component Value Date/Time    WBC 3.6 (L) 09/03/2020 03:25 AM    RBC 6.04 09/03/2020 03:25 AM    HEMOGLOBIN 18.5 (H) 09/03/2020 03:25 AM    HEMATOCRIT 59.7 (H) 09/03/2020 03:25 AM    CREACTPROT 3.60 (H) 09/02/2020 03:48 AM    HBA1C 6.5 (H) 09/26/2018 01:20 AM          Culture:   NA  Culture Results show:  No results found for this or any previous visit (from the past 720 hour(s)).      INTERVENTIONS BY WOUND TEAM:  Donned appropriate PPE. Bilateral knees assessed and mepilex reapplied. Facial injuries assessed. Septal wound left JAYSON and nose wound dressed with joseph, mepitel and small segment of mepilex. Primary RN at bedside.    Interdisciplinary consultation: Patient, Oumar LINK.    EVALUATION: Per primary RN, pt currently undergoing proning for 16 hrs. Developed pressure injuries to septum and bilateral  knees. Mepilex placed by primary RN to offload pressure off knees. Given pt's body habitus and current dx, he is at risk for further skin breakdown. Wound team will continue to follow for weekly assessment.    Elizabeth applied to nose to absorb destructive components of wound exudate and create an optimal environment for cellular growth. Nursing to reinforce outer dressing or replace dressing per order if completely dislodged.     Goals: Steady decrease in wound area and depth weekly.    NURSING PLAN OF CARE ORDERS (X):    Dressing changes: See Dressing Care orders: x  Skin care: See Skin Care orders: x  Rectal tube care: See Rectal Tube Care orders:   Other orders:    RSKIN:   CURRENTLY IN PLACE (X), APPLIED THIS VISIT (A), ORDERED (O):   Q shift Noe:  x  Q shift pressure point assessments:  x  Pressure redistribution mattress  x          Low Airloss x ICU bed         Bariatric VITALIY         Bariatric foam           Heel float boots   O  Heel Silicone dressing   x     Float Heels off Bed with Pillows               Barrier wipes x   Barrier Cream         Barrier paste          Sacral silicone dressing  O       Silicone O2 tubing         Anchorfast  x       Cannula fixation Device (Tender )          Gray Foam Ear protectors   O        Trach with Optifoam split foam                 Waffle cushion        Waffle Overlay    x     Rectal tube or BMS    Purwick/Condom Cath  xfoley        Antifungal tx      Interdry          Reposition q 2 hours  x      Up to chair        Ambulate      PT/OT        Dietician        Diabetes Education      PO     TF x    TPN     NPO   # days   Other   X celso pillow     WOUND TEAM PLAN OF CARE   Dressing changes by wound team:          Follow up 1-2 times weekly:     X nursing to perform skin/dsg care; WT following weekly.          Follow up 3 times weekly:                NPWT change 3 times weekly:     Follow up as needed:       Other (explain):     Anticipated discharge plans: TBD  LTACH:         SNF/Rehab:                  Home Care:           Outpatient Wound Center:            Self Care:

## 2020-09-04 NOTE — CARE PLAN
Problem: Safety  Goal: Will remain free from injury  Note: Bed in low position.      Problem: Respiratory:  Goal: Respiratory status will improve  Note: VAP Protocols in use. Head of bed 30 degrees. Oral care and suctioning being performed.      Problem: Safety - Medical Restraint  Goal: Remains free of injury from restraints (Restraint for Interference with Medical Device)  Description: INTERVENTIONS:  1. Determine that other, less restrictive measures have been tried or would not be effective before applying the restraint  2. Evaluate the patient's condition at the time of restraint application  3. Inform patient/family regarding the reason for restraint  4. Q2H: Monitor safety, psychosocial status, comfort, nutrition and hydration  Note: Restraint checks done every 2 hours. See flow sheet for further details.

## 2020-09-04 NOTE — RESPIRATORY CARE
Ventilator Daily Summary     Vent Day #7     Ventilator settings changed this shift: 28/PC20/+14 100%    PC increased to 20 post ABG per Dr. Rodriguez    Weaning trials: No     Respiratory Procedures: None     Plan: Continue current ventilator settings and wean mechanical ventilation as tolerated per physician orders.

## 2020-09-04 NOTE — CARE PLAN
Problem: Communication  Goal: The ability to communicate needs accurately and effectively will improve  Outcome: PROGRESSING AS EXPECTED  Intervention: Educate patient and significant other/support system about the plan of care, procedures, treatments, medications and allow for questions  Note: Off sedation, pt is alert and able to follow commands. Pt reoriented x4. Will continue to reorient as needed.      Problem: Safety - Medical Restraint  Goal: Remains free of injury from restraints (Restraint for Interference with Medical Device)  Description: INTERVENTIONS:  1. Determine that other, less restrictive measures have been tried or would not be effective before applying the restraint  2. Evaluate the patient's condition at the time of restraint application  3. Inform patient/family regarding the reason for restraint  4. Q2H: Monitor safety, psychosocial status, comfort, nutrition and hydration  Outcome: PROGRESSING AS EXPECTED  Note: Pt educated on purpose of soft wrist restraints and benefit versus risk associated with it. Will continue to educate.

## 2020-09-04 NOTE — CARE PLAN
Problem: Nutritional:  Goal: Nutrition support tolerated and meeting greater than 85% of estimated needs  Outcome: PROGRESSING AS EXPECTED     TF @ 30 ml/hr; goal rate with propofol 60 ml/hr.  Propofol is contributing 1539 kcal/day.    RD following.

## 2020-09-05 ENCOUNTER — APPOINTMENT (OUTPATIENT)
Dept: RADIOLOGY | Facility: MEDICAL CENTER | Age: 60
DRG: 004 | End: 2020-09-05
Attending: INTERNAL MEDICINE
Payer: MEDICARE

## 2020-09-05 LAB
ACTION RANGE TRIGGERED IACRT: NO
ANION GAP SERPL CALC-SCNC: 9 MMOL/L (ref 7–16)
BASE EXCESS BLDA CALC-SCNC: 2 MMOL/L (ref -4–3)
BASOPHILS # BLD AUTO: 0.2 % (ref 0–1.8)
BASOPHILS # BLD: 0.01 K/UL (ref 0–0.12)
BODY TEMPERATURE: ABNORMAL DEGREES
BUN SERPL-MCNC: 26 MG/DL (ref 8–22)
CALCIUM SERPL-MCNC: 8.2 MG/DL (ref 8.5–10.5)
CHLORIDE SERPL-SCNC: 99 MMOL/L (ref 96–112)
CO2 BLDA-SCNC: 28 MMOL/L (ref 20–33)
CO2 SERPL-SCNC: 29 MMOL/L (ref 20–33)
CREAT SERPL-MCNC: 0.44 MG/DL (ref 0.5–1.4)
EOSINOPHIL # BLD AUTO: 0.09 K/UL (ref 0–0.51)
EOSINOPHIL NFR BLD: 1.5 % (ref 0–6.9)
ERYTHROCYTE [DISTWIDTH] IN BLOOD BY AUTOMATED COUNT: 59.5 FL (ref 35.9–50)
GLUCOSE BLD-MCNC: 103 MG/DL (ref 65–99)
GLUCOSE BLD-MCNC: 137 MG/DL (ref 65–99)
GLUCOSE BLD-MCNC: 194 MG/DL (ref 65–99)
GLUCOSE BLD-MCNC: 91 MG/DL (ref 65–99)
GLUCOSE SERPL-MCNC: 109 MG/DL (ref 65–99)
HCO3 BLDA-SCNC: 26.7 MMOL/L (ref 17–25)
HCT VFR BLD AUTO: 55.3 % (ref 42–52)
HGB BLD-MCNC: 17.2 G/DL (ref 14–18)
HOROWITZ INDEX BLDA+IHG-RTO: 77 MM[HG]
IL6 SERPL-MCNC: 11.4 PG/ML
IMM GRANULOCYTES # BLD AUTO: 0.05 K/UL (ref 0–0.11)
IMM GRANULOCYTES NFR BLD AUTO: 0.8 % (ref 0–0.9)
INST. QUALIFIED PATIENT IIQPT: YES
LYMPHOCYTES # BLD AUTO: 0.76 K/UL (ref 1–4.8)
LYMPHOCYTES NFR BLD: 12.6 % (ref 22–41)
MCH RBC QN AUTO: 30.3 PG (ref 27–33)
MCHC RBC AUTO-ENTMCNC: 31.1 G/DL (ref 33.7–35.3)
MCV RBC AUTO: 97.4 FL (ref 81.4–97.8)
MONOCYTES # BLD AUTO: 0.64 K/UL (ref 0–0.85)
MONOCYTES NFR BLD AUTO: 10.6 % (ref 0–13.4)
NEUTROPHILS # BLD AUTO: 4.48 K/UL (ref 1.82–7.42)
NEUTROPHILS NFR BLD: 74.3 % (ref 44–72)
NRBC # BLD AUTO: 0 K/UL
NRBC BLD-RTO: 0 /100 WBC
O2/TOTAL GAS SETTING VFR VENT: 90 %
PCO2 BLDA: 42 MMHG (ref 26–37)
PCO2 TEMP ADJ BLDA: 43 MMHG (ref 26–37)
PH BLDA: 7.41 [PH] (ref 7.4–7.5)
PH TEMP ADJ BLDA: 7.4 [PH] (ref 7.4–7.5)
PLATELET # BLD AUTO: 163 K/UL (ref 164–446)
PMV BLD AUTO: 11.5 FL (ref 9–12.9)
PO2 BLDA: 69 MMHG (ref 64–87)
PO2 TEMP ADJ BLDA: 72 MMHG (ref 64–87)
POTASSIUM SERPL-SCNC: 4 MMOL/L (ref 3.6–5.5)
RBC # BLD AUTO: 5.68 M/UL (ref 4.7–6.1)
SAO2 % BLDA: 94 % (ref 93–99)
SODIUM SERPL-SCNC: 137 MMOL/L (ref 135–145)
SPECIMEN DRAWN FROM PATIENT: ABNORMAL
WBC # BLD AUTO: 6 K/UL (ref 4.8–10.8)

## 2020-09-05 PROCEDURE — 770022 HCHG ROOM/CARE - ICU (200)

## 2020-09-05 PROCEDURE — 82962 GLUCOSE BLOOD TEST: CPT | Mod: 91

## 2020-09-05 PROCEDURE — 82803 BLOOD GASES ANY COMBINATION: CPT

## 2020-09-05 PROCEDURE — 80048 BASIC METABOLIC PNL TOTAL CA: CPT

## 2020-09-05 PROCEDURE — 94770 HCHG CO2 EXPIRED GAS DETERMINATION: CPT

## 2020-09-05 PROCEDURE — 85025 COMPLETE CBC W/AUTO DIFF WBC: CPT

## 2020-09-05 PROCEDURE — 700102 HCHG RX REV CODE 250 W/ 637 OVERRIDE(OP): Performed by: INTERNAL MEDICINE

## 2020-09-05 PROCEDURE — 99291 CRITICAL CARE FIRST HOUR: CPT | Performed by: INTERNAL MEDICINE

## 2020-09-05 PROCEDURE — A9270 NON-COVERED ITEM OR SERVICE: HCPCS | Performed by: INTERNAL MEDICINE

## 2020-09-05 PROCEDURE — 37799 UNLISTED PX VASCULAR SURGERY: CPT

## 2020-09-05 PROCEDURE — 71045 X-RAY EXAM CHEST 1 VIEW: CPT

## 2020-09-05 PROCEDURE — 700111 HCHG RX REV CODE 636 W/ 250 OVERRIDE (IP): Performed by: INTERNAL MEDICINE

## 2020-09-05 PROCEDURE — 94003 VENT MGMT INPAT SUBQ DAY: CPT

## 2020-09-05 RX ADMIN — DEXAMETHASONE SODIUM PHOSPHATE 6 MG: 4 INJECTION, SOLUTION INTRA-ARTICULAR; INTRALESIONAL; INTRAMUSCULAR; INTRAVENOUS; SOFT TISSUE at 05:46

## 2020-09-05 RX ADMIN — FUROSEMIDE 20 MG: 10 INJECTION, SOLUTION INTRAMUSCULAR; INTRAVENOUS at 21:19

## 2020-09-05 RX ADMIN — PROPOFOL 30 MCG/KG/MIN: 10 INJECTION, EMULSION INTRAVENOUS at 22:24

## 2020-09-05 RX ADMIN — PROPOFOL 30 MCG/KG/MIN: 10 INJECTION, EMULSION INTRAVENOUS at 02:27

## 2020-09-05 RX ADMIN — PROPOFOL 35 MCG/KG/MIN: 10 INJECTION, EMULSION INTRAVENOUS at 08:15

## 2020-09-05 RX ADMIN — Medication 200 MCG/HR: at 02:51

## 2020-09-05 RX ADMIN — INSULIN HUMAN 3 UNITS: 100 INJECTION, SOLUTION PARENTERAL at 11:51

## 2020-09-05 RX ADMIN — ENOXAPARIN SODIUM 150 MG: 150 INJECTION SUBCUTANEOUS at 05:45

## 2020-09-05 RX ADMIN — PROPOFOL 30 MCG/KG/MIN: 10 INJECTION, EMULSION INTRAVENOUS at 18:59

## 2020-09-05 RX ADMIN — POTASSIUM BICARBONATE 25 MEQ: 978 TABLET, EFFERVESCENT ORAL at 17:15

## 2020-09-05 RX ADMIN — FUROSEMIDE 20 MG: 10 INJECTION, SOLUTION INTRAMUSCULAR; INTRAVENOUS at 05:46

## 2020-09-05 RX ADMIN — PROPOFOL 35 MCG/KG/MIN: 10 INJECTION, EMULSION INTRAVENOUS at 13:59

## 2020-09-05 RX ADMIN — PROPOFOL 30 MCG/KG/MIN: 10 INJECTION, EMULSION INTRAVENOUS at 06:43

## 2020-09-05 RX ADMIN — Medication 30 MCG: at 21:21

## 2020-09-05 RX ADMIN — DOCUSATE SODIUM 50 MG AND SENNOSIDES 8.6 MG 2 TABLET: 8.6; 5 TABLET, FILM COATED ORAL at 17:15

## 2020-09-05 RX ADMIN — FAMOTIDINE 20 MG: 20 TABLET, FILM COATED ORAL at 05:45

## 2020-09-05 RX ADMIN — PROPOFOL 35 MCG/KG/MIN: 10 INJECTION, EMULSION INTRAVENOUS at 11:13

## 2020-09-05 RX ADMIN — POTASSIUM BICARBONATE 25 MEQ: 978 TABLET, EFFERVESCENT ORAL at 05:46

## 2020-09-05 RX ADMIN — FUROSEMIDE 20 MG: 10 INJECTION, SOLUTION INTRAMUSCULAR; INTRAVENOUS at 13:59

## 2020-09-05 RX ADMIN — PROPOFOL 30 MCG/KG/MIN: 10 INJECTION, EMULSION INTRAVENOUS at 16:04

## 2020-09-05 RX ADMIN — ASPIRIN 81 MG 81 MG: 81 TABLET ORAL at 05:46

## 2020-09-05 RX ADMIN — DOCUSATE SODIUM 50 MG AND SENNOSIDES 8.6 MG 2 TABLET: 8.6; 5 TABLET, FILM COATED ORAL at 05:46

## 2020-09-05 RX ADMIN — AMLODIPINE BESYLATE 10 MG: 10 TABLET ORAL at 05:46

## 2020-09-05 RX ADMIN — ENOXAPARIN SODIUM 150 MG: 150 INJECTION SUBCUTANEOUS at 17:15

## 2020-09-05 RX ADMIN — ATORVASTATIN CALCIUM 20 MG: 20 TABLET, FILM COATED ORAL at 05:46

## 2020-09-05 RX ADMIN — FAMOTIDINE 20 MG: 20 TABLET, FILM COATED ORAL at 17:14

## 2020-09-05 NOTE — PROGRESS NOTES
Critical Care Progress Note    Date of admission  8/27/2020    Chief Complaint  60 y.o. male who presented 8/27/2020 with increasing dyspnea and hypoxia.  He has a history of morbid obesity, hyperlipidemia, diabetes and obstructive sleep apnea, noncompliant with treatment.  He uses oxygen 2 L via nasal cannula for chronic hypoxia.  Today he called EMS for increasing dyspnea over the past 24 hours.  He reports he is had increased cough and yellow sputum production with small amount of blood in his sputum over the past 24 hours.  On EMS arrival patient was profoundly hypoxic with a saturation of 50%.  He was placed on nonrebreather and brought to the ED.  He desaturates very quickly on nonrebreather and is in moderate respiratory distress.  Chest x-ray showed diffuse right-sided pneumonia/consolidation.  He was treated for community-acquired pneumonia with ceftriaxone and azithromycin as well as IV methylprednisolone.  He denies fevers or chills.  He he lives alone and tells me has been taking appropriate precautions when he goes out of the house wearing his mask and has no known exposures to COVID-19.  He is a nondrinker and non-smoker.     Screening SARS-CoV-2 PCR came back positive in ED.  He received 600 cc of crystalloid and the sepsis fluid bolus that had been ordered was discontinued.  He is not currently hypotensive and appears relatively euvolemic.  Lactic acid 1.7.  With the positive COVID-19 test I decreased IV fluid, ordered dexamethasone 6 mg daily x10 days, will admit to isolation ICU.  He will be placed on high flow nasal cannula/BiPAP with high risk for requiring intubation.  We will follow-up inflammatory markers and initiate full dose anticoagulation if appropriate.    Hospital Course   8/29-worsening mental status, rapid shallow breathing and hypoxia required endotracheal intubation and prone positioning.  8/30-I spoke with Brittney, the patient's daughter and surrogate decision maker about  initiating Remdesivir.  We discussed the risks benefits and alternatives.  All questions were answered.  She consented to initiating Remdesivir today.  8/31 -art line placed, remains on PCV, transition off rocuronium drip as tolerated, propofol/fentanyl, continue prone protocol  9/1 - remains on pressure control ventilation, PEEP 19, improved P/F ratio with prone ventilation, off rocuronium, starting trophic tube feed, full dose anticoagulation with Lovenox, day 3/5 Remdesivir, day 5 dexamethasone  9/4 -stopped prone ventilation with some skin breakdown, difficult prone protocol with morbid obesity, full ventilator support, advancing nutrition  9/5 -Ongoing diuresis with 3 times daily Lasix, advancing tube feeds to goal, remains on supine ventilation now        Interval Progress  Reviewed last 24 hour events:  Tm = 98.8  I/O = 1.7/4.5  Cr 0.44, Na 137  Vent day 8: PCV 28, IP 22, PEEP 14, 90%, PF 77  7.4/43/72  CXR: Reviewed  Follows with SAT; mod weak t/o; prop/fent  SR/SB  abdi TF  - advance to goal  pepcid  Full dose lovenox    Review of Systems  Review of Systems   Unable to perform ROS: Intubated        Vital Signs for last 24 hours   Temp:  [36.8 °C (98.2 °F)-37.1 °C (98.8 °F)] 37.1 °C (98.8 °F)  Pulse:  [46-90] 74  Resp:  [19-31] 28  BP: ()/(25-50) 85/30  SpO2:  [92 %-98 %] 96 %    Hemodynamic parameters for last 24 hours       Respiratory Information for the last 24 hours  Vent Mode: PCMV  Rate (breaths/min): 28  PEEP/CPAP: 14  MAP: 23  Control VTE (exp VT): 445    Physical Exam   Physical Exam  Constitutional:       General: He is not in acute distress.     Appearance: He is ill-appearing.   HENT:      Mouth/Throat:      Mouth: Mucous membranes are moist.   Eyes:      Pupils: Pupils are equal, round, and reactive to light.   Neck:      Musculoskeletal: Neck supple.   Cardiovascular:      Rate and Rhythm: Regular rhythm.      Heart sounds: No murmur. No friction rub. No gallop.    Pulmonary:      Effort:  No respiratory distress.      Breath sounds: No rhonchi.      Comments: Breath sounds are diminished throughout.  Abdominal:      General: There is no distension.      Tenderness: There is no abdominal tenderness.      Comments: Obese    Musculoskeletal:      Right lower leg: No edema.      Left lower leg: No edema.   Skin:     General: Skin is warm and dry.   Neurological:      General: No focal deficit present.      Comments: Sedated with propofol and fentanyl, does arouse and follow commands with sedation titration.  Remains off rocuronium.  Weak extremities but does move   Psychiatric:      Comments: Unable to assess         Medications  Current Facility-Administered Medications   Medication Dose Route Frequency Provider Last Rate Last Dose   • furosemide (LASIX) injection 20 mg  20 mg Intravenous Q8HRS Nii Rivera M.D.   20 mg at 09/05/20 0546   • potassium bicarbonate (KLYTE) effervescent tablet 25 mEq  25 mEq Enteral Tube BID Nii Rievra M.D.   25 mEq at 09/05/20 0546   • atorvastatin (LIPITOR) tablet 20 mg  20 mg Enteral Tube DAILY Nii Rivera M.D.   20 mg at 09/05/20 0546   • ondansetron (ZOFRAN ODT) dispertab 4 mg  4 mg Enteral Tube Q4HRS PRN Nii Rivera M.D.       • Pharmacy Consult: Enteral tube insertion - review meds/change route/product selection  1 Each Other PHARMACY TO DOSE Nii Rivera M.D.       • amLODIPine (NORVASC) tablet 10 mg  10 mg Enteral Tube DAILY Nii Rivera M.D.   10 mg at 09/05/20 0546   • aspirin (ASA) chewable tab 81 mg  81 mg Enteral Tube DAILY Nii Rivera M.D.   81 mg at 09/05/20 0546   • acetaminophen (TYLENOL) tablet 650 mg  650 mg Enteral Tube Q6HRS PRN Nii Rivera M.D.       • HYDROcodone/acetaminophen (NORCO)  MG per tablet 1 Tab  1 Tab Enteral Tube Q6HRS PRN Nii Rivera M.D.       • promethazine (PHENERGAN) tablet 12.5-25 mg  12.5-25 mg Enteral Tube Q4HRS PRN Nii Rivera M.D.       • propofol (DIPRIVAN) injection  0-80 mcg/kg/min  Intravenous Continuous Harjinder Crowe M.D. 37.1 mL/hr at 09/05/20 0740 35 mcg/kg/min at 09/05/20 0740   • fentaNYL (SUBLIMAZE) 50 mcg/mL in 50mL (Continuous Infusion)   Intravenous Continuous Harjinder Crowe M.D. 4 mL/hr at 09/05/20 0251 200 mcg/hr at 09/05/20 0251   • enoxaparin (LOVENOX) injection 150 mg  150 mg Subcutaneous Q12HRS Harjinder Crowe M.D.   150 mg at 09/05/20 0545   • Respiratory Therapy Consult   Nebulization Continuous RT Harjinder Crowe M.D.       • ipratropium-albuterol (DUONEB) nebulizer solution  3 mL Nebulization Q2HRS PRN (RT) Harjinder Crowe M.D.       • famotidine (PEPCID) tablet 20 mg  20 mg Enteral Tube Q12HRS Harjinder Crowe M.D.   20 mg at 09/05/20 0545    Or   • famotidine (PEPCID) injection 20 mg  20 mg Intravenous Q12HRS Harjinder Crowe M.D.   20 mg at 09/03/20 0600   • senna-docusate (PERICOLACE or SENOKOT S) 8.6-50 MG per tablet 2 Tab  2 Tab Enteral Tube BID Harjinder Crowe M.D.   2 Tab at 09/05/20 0546    And   • polyethylene glycol/lytes (MIRALAX) PACKET 1 Packet  1 Packet Enteral Tube QDAY PRN Harjinder Crowe M.D.   1 Packet at 09/04/20 1632    And   • magnesium hydroxide (MILK OF MAGNESIA) suspension 30 mL  30 mL Enteral Tube QDAY PRN Harjinder Crowe M.D.        And   • bisacodyl (DULCOLAX) suppository 10 mg  10 mg Rectal QDAY PRN Harjinder Crowe M.D.       • MD Alert...ICU Electrolyte Replacement per Pharmacy   Other PHARMACY TO DOSE Harjinder Crowe M.D.       • lidocaine (XYLOCAINE) 1 % injection 1-2 mL  1-2 mL Tracheal Tube Q30 MIN PRN Harjinder Crowe M.D.       • lactated ringers infusion (BOLUS): BMI greater than 30  30 mL/kg (Ideal) Intravenous Once PRN Nii Rivera M.D.   Stopped at 08/27/20 0095   • labetalol (NORMODYNE/TRANDATE) injection 10-20 mg  10-20 mg Intravenous Q4HRS PRN Nii Rivera M.D.       • ondansetron (ZOFRAN) syringe/vial injection 4 mg  4 mg Intravenous Q4HRS PRN Nii Rivera M.D.   4 mg at 08/29/20 0552   • promethazine (PHENERGAN)  suppository 12.5-25 mg  12.5-25 mg Rectal Q4HRS PRN Nii Rivera M.D.       • prochlorperazine (COMPAZINE) injection 5-10 mg  5-10 mg Intravenous Q4HRS PRN Nii Rivera M.D.       • insulin regular (HumuLIN R,NovoLIN R) injection  3-14 Units Subcutaneous Q6HRS Nii Rivera M.D.   Stopped at 09/03/20 1800    And   • dextrose 50% (D50W) injection 50 mL  50 mL Intravenous Q15 MIN PRN Nii Rivera M.D.           Fluids    Intake/Output Summary (Last 24 hours) at 9/5/2020 0840  Last data filed at 9/5/2020 0600  Gross per 24 hour   Intake 1534.02 ml   Output 4125 ml   Net -2590.98 ml       Laboratory  Recent Labs     09/04/20  0514 09/04/20  1212 09/05/20  0437   ISTATAPH 7.169* 7.536* 7.412   ISTATAPCO2 95.2* 27.5 42.0*   ISTATAPO2 81 61* 69   ISTATATCO2 38* 24 28   ZMXEDMY3BAH 91* 94 94   ISTATARTHCO3 34.7* 23.3 26.7*   ISTATARTBE 1 2 2   ISTATTEMP 98.0 F 97.0 F 37.5 C   ISTATFIO2 100 100 90   ISTATSPEC Arterial Arterial Arterial   ISTATAPHTC 7.174* 7.550* 7.404   DODMFYWT6BE 79 57* 72     Recent Labs     09/04/20 0444   CPKTOTAL 59     Recent Labs     09/03/20 0325 09/04/20 0444 09/05/20  0524   SODIUM 138 134* 137   POTASSIUM 4.2 4.6 4.0   CHLORIDE 99 96 99   CO2 28 26 29   BUN 22 30* 26*   CREATININE 0.39* 0.65 0.44*   MAGNESIUM  --  2.4  --    CALCIUM 7.9* 8.1* 8.2*     Recent Labs     09/03/20 0325 09/04/20 0444 09/05/20  0524   ALTSGPT 35 42  --    ASTSGOT 51* 56*  --    ALKPHOSPHAT 119* 125*  --    TBILIRUBIN 1.2 1.0  --    DBILIRUBIN  --  0.6*  --    GLUCOSE 114* 105* 109*     Recent Labs     09/03/20 0325 09/04/20 0444 09/05/20  0524   WBC 3.6* 5.0 6.0   NEUTSPOLYS 61.10 67.50 74.30*   LYMPHOCYTES 21.20* 16.80* 12.60*   MONOCYTES 14.20* 12.90 10.60   EOSINOPHILS 2.70 1.20 1.50   BASOPHILS 0.90 0.20 0.20   ASTSGOT 51* 56*  --    ALTSGPT 35 42  --    ALKPHOSPHAT 119* 125*  --    TBILIRUBIN 1.2 1.0  --      Recent Labs     09/03/20 0325 09/04/20 0444 09/05/20  0524   RBC 6.04 6.03 5.68    HEMOGLOBIN 18.5* 18.3* 17.2   HEMATOCRIT 59.7* 61.0* 55.3*   PLATELETCT 132* 153* 163*   PROTHROMBTM  --  13.4  --    APTT  --  43.4*  --    INR  --  0.99  --    FERRITIN  --  237.0  --        Imaging  X-Ray:  I have personally reviewed the images and compared with prior images.    Assessment/Plan  * Pneumonia due to COVID-19 virus  Assessment & Plan  Intubated 8/29    - Prone positioning.  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 Faust recently left the practice by his report  Will need to establish with a new primary care physician    Essential hypertension- (present on admission)  Assessment & Plan  Will continue on his home antihypertensives and follow blood pressure closely in ICU    Morbid obesity with BMI of 50.0-59.9, adult (HCC)- (present on admission)  Assessment & Plan  380 pounds, BMI 55    HLD (hyperlipidemia)- (present on admission)  Assessment & Plan  Continue statin     Updated plan:  Continue full ventilator support  Titrate PEEP, and straight pressure and FiO2 as tolerated  Now to plan  ventilation, as previously noted  Advance tube feed  Ongoing critical care management as above      VTE:  Lovenox therapeutic for elevated d-dimer  Ulcer: H2 Antagonist  Lines: None    I have performed a physical exam and reviewed and updated ROS and Plan today (9/5/2020). In review of yesterday's note (9/4/2020), there are no changes except as documented above.     Discussed patient condition and risk of morbidity and/or mortality with RN, RT, Pharmacy and Charge nurse / hot rounds     The patient remains critically ill.   Critical care time = 41 minutes in directly providing and coordinating critical care and extensive data review.  No time overlap and excludes procedures.

## 2020-09-05 NOTE — CARE PLAN
Problem: Venous Thromboembolism (VTW)/Deep Vein Thrombosis (DVT) Prevention:  Goal: Patient will participate in Venous Thrombosis (VTE)/Deep Vein Thrombosis (DVT)Prevention Measures  Outcome: PROGRESSING AS EXPECTED  Flowsheets  Taken 9/5/2020 1200  Mechanical Prophylaxis: SCDs, Sequential Compression Device  Taken 9/5/2020 0800  Pharmacologic Prophylaxis Used: LMWH: Enoxaparin(Lovenox)     Problem: Fluid Volume:  Goal: Will maintain balanced intake and output  Outcome: PROGRESSING AS EXPECTED  Intervention: Monitor, educate, and encourage compliance with therapeutic intake of liquids  Note: Strict I&O  Briseno catheter in place.

## 2020-09-05 NOTE — RESPIRATORY CARE
Ventilator Daily Summary     Vent Day #8     Ventilator settings changed this shift: 28/PC22/+14 90%     Weaning trials: No     Respiratory Procedures: None     Plan: Continue current ventilator settings and wean mechanical ventilation as tolerated per physician orders

## 2020-09-05 NOTE — CARE PLAN
Problem: Communication  Goal: The ability to communicate needs accurately and effectively will improve  Outcome: PROGRESSING AS EXPECTED     Problem: Safety  Goal: Will remain free from injury  9/4/2020 2300 by Laura Ulloa R.N.  Outcome: PROGRESSING AS EXPECTED  Note: Bed alarm in use, bed in low position, call bell within reach.   9/4/2020 2240 by Laura Ulloa R.N.  Outcome: PROGRESSING AS EXPECTED  Goal: Will remain free from falls  Outcome: PROGRESSING AS EXPECTED     Problem: Infection  Goal: Will remain free from infection  Outcome: PROGRESSING AS EXPECTED     Problem: Venous Thromboembolism (VTW)/Deep Vein Thrombosis (DVT) Prevention:  Goal: Patient will participate in Venous Thrombosis (VTE)/Deep Vein Thrombosis (DVT)Prevention Measures  Outcome: PROGRESSING AS EXPECTED     Problem: Bowel/Gastric:  Goal: Normal bowel function is maintained or improved  Outcome: PROGRESSING AS EXPECTED  Goal: Will not experience complications related to bowel motility  Outcome: PROGRESSING AS EXPECTED     Problem: Knowledge Deficit  Goal: Knowledge of disease process/condition, treatment plan, diagnostic tests, and medications will improve  Outcome: PROGRESSING AS EXPECTED  Goal: Knowledge of the prescribed therapeutic regimen will improve  Outcome: PROGRESSING AS EXPECTED     Problem: Discharge Barriers/Planning  Goal: Patient's continuum of care needs will be met  Outcome: PROGRESSING AS EXPECTED     Problem: Fluid Volume:  Goal: Will maintain balanced intake and output  9/4/2020 2300 by Laura Ulloa R.N.  Outcome: PROGRESSING AS EXPECTED  Note: Increased use in diuretics.   9/4/2020 2240 by Laura Ulloa R.N.  Outcome: PROGRESSING AS EXPECTED     Problem: Pain Management  Goal: Pain level will decrease to patient's comfort goal  Outcome: PROGRESSING AS EXPECTED  Note: Monitoring patients pain every 4 hours and as needed, see MAR for infusing medications      Problem: Safety - Medical Restraint  Goal: Remains free  of injury from restraints (Restraint for Interference with Medical Device)  Description: INTERVENTIONS:  1. Determine that other, less restrictive measures have been tried or would not be effective before applying the restraint  2. Evaluate the patient's condition at the time of restraint application  3. Inform patient/family regarding the reason for restraint  4. Q2H: Monitor safety, psychosocial status, comfort, nutrition and hydration  Outcome: PROGRESSING AS EXPECTED  Goal: Free from restraint(s) (Restraint for Interference with Medical Device)  Description: INTERVENTIONS:  1. ONCE/SHIFT or MINIMUM Q12H: Assess and document the continuing need for restraints  2. Q24H: Continued use of restraint requires LIP to perform face to face examination and written order  3. Identify and implement measures to help patient regain control  Outcome: PROGRESSING AS EXPECTED     Problem: Psychosocial Needs:  Goal: Level of anxiety will decrease  Outcome: PROGRESSING AS EXPECTED

## 2020-09-06 ENCOUNTER — APPOINTMENT (OUTPATIENT)
Dept: RADIOLOGY | Facility: MEDICAL CENTER | Age: 60
DRG: 004 | End: 2020-09-06
Attending: INTERNAL MEDICINE
Payer: MEDICARE

## 2020-09-06 LAB
ACTION RANGE TRIGGERED IACRT: YES
ANION GAP SERPL CALC-SCNC: 10 MMOL/L (ref 7–16)
BASE EXCESS BLDA CALC-SCNC: 5 MMOL/L (ref -4–3)
BASOPHILS # BLD AUTO: 0.3 % (ref 0–1.8)
BASOPHILS # BLD: 0.02 K/UL (ref 0–0.12)
BODY TEMPERATURE: ABNORMAL DEGREES
BUN SERPL-MCNC: 24 MG/DL (ref 8–22)
CALCIUM SERPL-MCNC: 8.4 MG/DL (ref 8.5–10.5)
CHLORIDE SERPL-SCNC: 97 MMOL/L (ref 96–112)
CO2 BLDA-SCNC: 33 MMOL/L (ref 20–33)
CO2 SERPL-SCNC: 28 MMOL/L (ref 20–33)
CREAT SERPL-MCNC: 0.41 MG/DL (ref 0.5–1.4)
EOSINOPHIL # BLD AUTO: 0.14 K/UL (ref 0–0.51)
EOSINOPHIL NFR BLD: 1.9 % (ref 0–6.9)
ERYTHROCYTE [DISTWIDTH] IN BLOOD BY AUTOMATED COUNT: 58.9 FL (ref 35.9–50)
GLUCOSE BLD-MCNC: 116 MG/DL (ref 65–99)
GLUCOSE BLD-MCNC: 147 MG/DL (ref 65–99)
GLUCOSE BLD-MCNC: 157 MG/DL (ref 65–99)
GLUCOSE BLD-MCNC: 91 MG/DL (ref 65–99)
GLUCOSE SERPL-MCNC: 135 MG/DL (ref 65–99)
HCO3 BLDA-SCNC: 31 MMOL/L (ref 17–25)
HCT VFR BLD AUTO: 53.3 % (ref 42–52)
HGB BLD-MCNC: 16.9 G/DL (ref 14–18)
HOROWITZ INDEX BLDA+IHG-RTO: 77 MM[HG]
IMM GRANULOCYTES # BLD AUTO: 0.06 K/UL (ref 0–0.11)
IMM GRANULOCYTES NFR BLD AUTO: 0.8 % (ref 0–0.9)
INST. QUALIFIED PATIENT IIQPT: YES
LYMPHOCYTES # BLD AUTO: 0.73 K/UL (ref 1–4.8)
LYMPHOCYTES NFR BLD: 9.7 % (ref 22–41)
MCH RBC QN AUTO: 30.8 PG (ref 27–33)
MCHC RBC AUTO-ENTMCNC: 31.7 G/DL (ref 33.7–35.3)
MCV RBC AUTO: 97.1 FL (ref 81.4–97.8)
MONOCYTES # BLD AUTO: 0.94 K/UL (ref 0–0.85)
MONOCYTES NFR BLD AUTO: 12.5 % (ref 0–13.4)
NEUTROPHILS # BLD AUTO: 5.65 K/UL (ref 1.82–7.42)
NEUTROPHILS NFR BLD: 74.8 % (ref 44–72)
NRBC # BLD AUTO: 0 K/UL
NRBC BLD-RTO: 0 /100 WBC
O2/TOTAL GAS SETTING VFR VENT: 90 %
PCO2 BLDA: 50.6 MMHG (ref 26–37)
PCO2 TEMP ADJ BLDA: 52.1 MMHG (ref 26–37)
PH BLDA: 7.4 [PH] (ref 7.4–7.5)
PH TEMP ADJ BLDA: 7.39 [PH] (ref 7.4–7.5)
PLATELET # BLD AUTO: 205 K/UL (ref 164–446)
PMV BLD AUTO: 11.6 FL (ref 9–12.9)
PO2 BLDA: 69 MMHG (ref 64–87)
PO2 TEMP ADJ BLDA: 72 MMHG (ref 64–87)
POTASSIUM SERPL-SCNC: 4 MMOL/L (ref 3.6–5.5)
RBC # BLD AUTO: 5.49 M/UL (ref 4.7–6.1)
SAO2 % BLDA: 93 % (ref 93–99)
SODIUM SERPL-SCNC: 135 MMOL/L (ref 135–145)
SPECIMEN DRAWN FROM PATIENT: ABNORMAL
WBC # BLD AUTO: 7.5 K/UL (ref 4.8–10.8)

## 2020-09-06 PROCEDURE — 770022 HCHG ROOM/CARE - ICU (200)

## 2020-09-06 PROCEDURE — 94770 HCHG CO2 EXPIRED GAS DETERMINATION: CPT

## 2020-09-06 PROCEDURE — 700111 HCHG RX REV CODE 636 W/ 250 OVERRIDE (IP): Performed by: INTERNAL MEDICINE

## 2020-09-06 PROCEDURE — A9270 NON-COVERED ITEM OR SERVICE: HCPCS | Performed by: INTERNAL MEDICINE

## 2020-09-06 PROCEDURE — 700102 HCHG RX REV CODE 250 W/ 637 OVERRIDE(OP): Performed by: INTERNAL MEDICINE

## 2020-09-06 PROCEDURE — 82962 GLUCOSE BLOOD TEST: CPT | Mod: 91

## 2020-09-06 PROCEDURE — 700101 HCHG RX REV CODE 250: Performed by: INTERNAL MEDICINE

## 2020-09-06 PROCEDURE — 71045 X-RAY EXAM CHEST 1 VIEW: CPT

## 2020-09-06 PROCEDURE — 85025 COMPLETE CBC W/AUTO DIFF WBC: CPT

## 2020-09-06 PROCEDURE — 82803 BLOOD GASES ANY COMBINATION: CPT | Mod: 91

## 2020-09-06 PROCEDURE — 94003 VENT MGMT INPAT SUBQ DAY: CPT

## 2020-09-06 PROCEDURE — 99291 CRITICAL CARE FIRST HOUR: CPT | Performed by: INTERNAL MEDICINE

## 2020-09-06 PROCEDURE — 37799 UNLISTED PX VASCULAR SURGERY: CPT

## 2020-09-06 PROCEDURE — 80048 BASIC METABOLIC PNL TOTAL CA: CPT

## 2020-09-06 RX ADMIN — POLYETHYLENE GLYCOL 3350 1 PACKET: 17 POWDER, FOR SOLUTION ORAL at 22:08

## 2020-09-06 RX ADMIN — Medication 150 MCG/HR: at 13:34

## 2020-09-06 RX ADMIN — FUROSEMIDE 20 MG: 10 INJECTION, SOLUTION INTRAMUSCULAR; INTRAVENOUS at 14:43

## 2020-09-06 RX ADMIN — PROPOFOL 30 MCG/KG/MIN: 10 INJECTION, EMULSION INTRAVENOUS at 10:04

## 2020-09-06 RX ADMIN — POTASSIUM BICARBONATE 25 MEQ: 978 TABLET, EFFERVESCENT ORAL at 06:08

## 2020-09-06 RX ADMIN — ASPIRIN 81 MG 81 MG: 81 TABLET ORAL at 06:09

## 2020-09-06 RX ADMIN — ACETAMINOPHEN 650 MG: 325 TABLET, FILM COATED ORAL at 16:20

## 2020-09-06 RX ADMIN — INSULIN HUMAN 3 UNITS: 100 INJECTION, SOLUTION PARENTERAL at 12:04

## 2020-09-06 RX ADMIN — PROPOFOL 30 MCG/KG/MIN: 10 INJECTION, EMULSION INTRAVENOUS at 18:48

## 2020-09-06 RX ADMIN — FAMOTIDINE 20 MG: 20 TABLET, FILM COATED ORAL at 06:08

## 2020-09-06 RX ADMIN — PROPOFOL 30 MCG/KG/MIN: 10 INJECTION, EMULSION INTRAVENOUS at 13:17

## 2020-09-06 RX ADMIN — PROPOFOL 35 MCG/KG/MIN: 10 INJECTION, EMULSION INTRAVENOUS at 00:56

## 2020-09-06 RX ADMIN — FAMOTIDINE 20 MG: 20 TABLET, FILM COATED ORAL at 17:35

## 2020-09-06 RX ADMIN — DOCUSATE SODIUM 50 MG AND SENNOSIDES 8.6 MG 2 TABLET: 8.6; 5 TABLET, FILM COATED ORAL at 06:08

## 2020-09-06 RX ADMIN — PROPOFOL 30 MCG/KG/MIN: 10 INJECTION, EMULSION INTRAVENOUS at 22:08

## 2020-09-06 RX ADMIN — ENOXAPARIN SODIUM 150 MG: 150 INJECTION SUBCUTANEOUS at 17:35

## 2020-09-06 RX ADMIN — FUROSEMIDE 20 MG: 10 INJECTION, SOLUTION INTRAMUSCULAR; INTRAVENOUS at 06:08

## 2020-09-06 RX ADMIN — FUROSEMIDE 20 MG: 10 INJECTION, SOLUTION INTRAMUSCULAR; INTRAVENOUS at 22:08

## 2020-09-06 RX ADMIN — ENOXAPARIN SODIUM 150 MG: 150 INJECTION SUBCUTANEOUS at 06:08

## 2020-09-06 RX ADMIN — PROPOFOL 35 MCG/KG/MIN: 10 INJECTION, EMULSION INTRAVENOUS at 03:39

## 2020-09-06 RX ADMIN — POTASSIUM BICARBONATE 25 MEQ: 978 TABLET, EFFERVESCENT ORAL at 17:35

## 2020-09-06 RX ADMIN — PROPOFOL 30 MCG/KG/MIN: 10 INJECTION, EMULSION INTRAVENOUS at 06:56

## 2020-09-06 RX ADMIN — AMLODIPINE BESYLATE 10 MG: 10 TABLET ORAL at 06:08

## 2020-09-06 RX ADMIN — PROPOFOL 30 MCG/KG/MIN: 10 INJECTION, EMULSION INTRAVENOUS at 16:19

## 2020-09-06 RX ADMIN — DOCUSATE SODIUM 50 MG AND SENNOSIDES 8.6 MG 2 TABLET: 8.6; 5 TABLET, FILM COATED ORAL at 17:35

## 2020-09-06 RX ADMIN — ATORVASTATIN CALCIUM 20 MG: 20 TABLET, FILM COATED ORAL at 06:08

## 2020-09-06 ASSESSMENT — PAIN DESCRIPTION - PAIN TYPE
TYPE: ACUTE PAIN

## 2020-09-06 ASSESSMENT — FIBROSIS 4 INDEX: FIB4 SCORE: 3.07

## 2020-09-06 NOTE — CARE PLAN
Problem: Safety  Goal: Will remain free from injury  Outcome: PROGRESSING SLOWER THAN EXPECTED  Note: Routine rounding for pt safety. Bed locked in lowest position and needed items within reach.     Problem: Venous Thromboembolism (VTW)/Deep Vein Thrombosis (DVT) Prevention:  Goal: Patient will participate in Venous Thrombosis (VTE)/Deep Vein Thrombosis (DVT)Prevention Measures  Outcome: PROGRESSING AS EXPECTED  Flowsheets (Taken 9/5/2020 2000)  Pharmacologic Prophylaxis Used: LMWH: Enoxaparin(Lovenox)  Note: SCDs

## 2020-09-06 NOTE — CARE PLAN
Adult Ventilation Update    Total Vent Days: 9    Patient Lines/Drains/Airways Status      Active Airway       Name: Placement date: Placement time: Site: Days:    Airway ETT Oral 8.0 @ 25  08/29/20   1226   Oral  4                  PCMV 28 Pc22 +14 90%    Pt proned.       Plateau Pressure: 32  Static Compliance (ml / cm H2O): 44.2    Patient failed trials because of Barriers to Wean: FiO2 >60% or PEEP >10 CM H2O

## 2020-09-06 NOTE — CARE PLAN
Adult Ventilation Update    Total Vent Days: 9    PC  RR 28 PC 22 PEEP +14 90%  VT target 350-450    SBTs held at this time for high peep.

## 2020-09-06 NOTE — PROGRESS NOTES
Critical Care Progress Note    Date of admission  8/27/2020    Chief Complaint  60 y.o. male who presented 8/27/2020 with increasing dyspnea and hypoxia.  He has a history of morbid obesity, hyperlipidemia, diabetes and obstructive sleep apnea, noncompliant with treatment.  He uses oxygen 2 L via nasal cannula for chronic hypoxia.  Today he called EMS for increasing dyspnea over the past 24 hours.  He reports he is had increased cough and yellow sputum production with small amount of blood in his sputum over the past 24 hours.  On EMS arrival patient was profoundly hypoxic with a saturation of 50%.  He was placed on nonrebreather and brought to the ED.  He desaturates very quickly on nonrebreather and is in moderate respiratory distress.  Chest x-ray showed diffuse right-sided pneumonia/consolidation.  He was treated for community-acquired pneumonia with ceftriaxone and azithromycin as well as IV methylprednisolone.  He denies fevers or chills.  He he lives alone and tells me has been taking appropriate precautions when he goes out of the house wearing his mask and has no known exposures to COVID-19.  He is a nondrinker and non-smoker.     Screening SARS-CoV-2 PCR came back positive in ED.  He received 600 cc of crystalloid and the sepsis fluid bolus that had been ordered was discontinued.  He is not currently hypotensive and appears relatively euvolemic.  Lactic acid 1.7.  With the positive COVID-19 test I decreased IV fluid, ordered dexamethasone 6 mg daily x10 days, will admit to isolation ICU.  He will be placed on high flow nasal cannula/BiPAP with high risk for requiring intubation.  We will follow-up inflammatory markers and initiate full dose anticoagulation if appropriate.    Hospital Course   8/29-worsening mental status, rapid shallow breathing and hypoxia required endotracheal intubation and prone positioning.  8/30-I spoke with Brittney, the patient's daughter and surrogate decision maker about  initiating Remdesivir.  We discussed the risks benefits and alternatives.  All questions were answered.  She consented to initiating Remdesivir today.   -art line placed, remains on PCV, transition off rocuronium drip as tolerated, propofol/fentanyl, continue prone protocol   - remains on pressure control ventilation, PEEP 19, improved P/F ratio with prone ventilation, off rocuronium, starting trophic tube feed, full dose anticoagulation with Lovenox, day 3/ Remdesivir, day 5 dexamethasone   - stopped prone ventilation with some skin breakdown, difficult prone protocol with morbid obesity, full ventilator support, advancing nutrition   - Ongoing diuresis with 3 times daily Lasix, advancing tube feeds to goal, remains on supine ventilation now   - PCV, weaning IP and PEEP, remains supine, ongoing aggressive diuresis, abdi TF, lighten sedation as abdi        Interval Progress  Reviewed last 24 hour events:  Tm = 100.2  I/O = 670/4.5 -3.7, net -3.8  Lasix 20 TID  AB.39/52/72, P/F 77  PCV: day 9, 90%  CXR: improving   Fent/prop  Arouses, follows  SR   - 140  abdi TF at goal  Bowel protocol  Full dose lovenox,   pepcid      Review of Systems  Review of Systems   Unable to perform ROS: Intubated        Vital Signs for last 24 hours   Temp:  [37.2 °C (99 °F)-37.9 °C (100.2 °F)] 37.9 °C (100.2 °F)  Pulse:  [] 99  Resp:  [0-28] 15  BP: ()/(26-51) 110/46  SpO2:  [91 %-98 %] 95 %    Hemodynamic parameters for last 24 hours       Respiratory Information for the last 24 hours  Vent Mode: PCMV  Rate (breaths/min): 28  PEEP/CPAP: 14  MAP: 23  Control VTE (exp VT): 350    Physical Exam   Physical Exam  Constitutional:       General: He is not in acute distress.     Appearance: He is ill-appearing.   HENT:      Mouth/Throat:      Mouth: Mucous membranes are moist.   Eyes:      Pupils: Pupils are equal, round, and reactive to light.   Neck:      Musculoskeletal: Neck supple.   Cardiovascular:       Rate and Rhythm: Regular rhythm.      Heart sounds: No murmur. No friction rub. No gallop.    Pulmonary:      Effort: No respiratory distress.      Breath sounds: No rhonchi.      Comments: Breath sounds are diminished throughout.  Abdominal:      General: There is no distension.      Tenderness: There is no abdominal tenderness.      Comments: Obese    Musculoskeletal:      Right lower leg: No edema.      Left lower leg: No edema.   Skin:     General: Skin is warm and dry.   Neurological:      General: No focal deficit present.      Comments: Sedated with propofol and fentanyl, does arouse and follow commands with sedation titration.  Remains off rocuronium.  Weak extremities but does move   Psychiatric:      Comments: Unable to assess         Medications  Current Facility-Administered Medications   Medication Dose Route Frequency Provider Last Rate Last Dose   • furosemide (LASIX) injection 20 mg  20 mg Intravenous Q8HRS Nii Rivera M.D.   20 mg at 09/06/20 0608   • potassium bicarbonate (KLYTE) effervescent tablet 25 mEq  25 mEq Enteral Tube BID Nii Rivera M.D.   25 mEq at 09/06/20 0608   • atorvastatin (LIPITOR) tablet 20 mg  20 mg Enteral Tube DAILY Nii Rivera M.D.   20 mg at 09/06/20 0608   • ondansetron (ZOFRAN ODT) dispertab 4 mg  4 mg Enteral Tube Q4HRS PRN Nii Rivera M.D.       • Pharmacy Consult: Enteral tube insertion - review meds/change route/product selection  1 Each Other PHARMACY TO DOSE Nii Rivera M.D.       • amLODIPine (NORVASC) tablet 10 mg  10 mg Enteral Tube DAILY Nii Rivera M.D.   10 mg at 09/06/20 0608   • aspirin (ASA) chewable tab 81 mg  81 mg Enteral Tube DAILY Nii Rivera M.D.   81 mg at 09/06/20 0609   • acetaminophen (TYLENOL) tablet 650 mg  650 mg Enteral Tube Q6HRS PRN Nii Rivera M.D.       • HYDROcodone/acetaminophen (NORCO)  MG per tablet 1 Tab  1 Tab Enteral Tube Q6HRS PRN Nii Rivera M.D.       • promethazine (PHENERGAN) tablet  12.5-25 mg  12.5-25 mg Enteral Tube Q4HRS PRN Nii Rivera M.D.       • propofol (DIPRIVAN) injection  0-80 mcg/kg/min Intravenous Continuous Harjinder Crowe M.D. 31.8 mL/hr at 09/06/20 0815 30 mcg/kg/min at 09/06/20 0815   • fentaNYL (SUBLIMAZE) 50 mcg/mL in 50mL (Continuous Infusion)   Intravenous Continuous Harjinder Crowe M.D. 3 mL/hr at 09/06/20 0815 150 mcg/hr at 09/06/20 0815   • enoxaparin (LOVENOX) injection 150 mg  150 mg Subcutaneous Q12HRS Harjinder Crowe M.D.   150 mg at 09/06/20 0608   • Respiratory Therapy Consult   Nebulization Continuous RT Harjinder Crowe M.D.       • ipratropium-albuterol (DUONEB) nebulizer solution  3 mL Nebulization Q2HRS PRN (RT) Harjinder Crowe M.D.       • famotidine (PEPCID) tablet 20 mg  20 mg Enteral Tube Q12HRS Nii Rivera M.D.   20 mg at 09/06/20 0608   • senna-docusate (PERICOLACE or SENOKOT S) 8.6-50 MG per tablet 2 Tab  2 Tab Enteral Tube BID Harjinder Crowe M.D.   2 Tab at 09/06/20 0608    And   • polyethylene glycol/lytes (MIRALAX) PACKET 1 Packet  1 Packet Enteral Tube QDAY PRN Harjinder Crowe M.D.   1 Packet at 09/04/20 1632    And   • magnesium hydroxide (MILK OF MAGNESIA) suspension 30 mL  30 mL Enteral Tube QDAY PRN Harjinder Crowe M.D.        And   • bisacodyl (DULCOLAX) suppository 10 mg  10 mg Rectal QDAY PRN Harjinder Crowe M.D.       • MD Alert...ICU Electrolyte Replacement per Pharmacy   Other PHARMACY TO DOSE Harjinder Crowe M.D.       • lidocaine (XYLOCAINE) 1 % injection 1-2 mL  1-2 mL Tracheal Tube Q30 MIN PRN Harjinder Crowe M.D.       • lactated ringers infusion (BOLUS): BMI greater than 30  30 mL/kg (Ideal) Intravenous Once PRN Nii Rivera M.D.   Stopped at 08/27/20 1835   • labetalol (NORMODYNE/TRANDATE) injection 10-20 mg  10-20 mg Intravenous Q4HRS PRN Nii Rivera M.D.       • ondansetron (ZOFRAN) syringe/vial injection 4 mg  4 mg Intravenous Q4HRS PRN Nii Rivera M.D.   4 mg at 08/29/20 0552   • promethazine (PHENERGAN)  suppository 12.5-25 mg  12.5-25 mg Rectal Q4HRS PRN Nii Rivera M.D.       • prochlorperazine (COMPAZINE) injection 5-10 mg  5-10 mg Intravenous Q4HRS PRN Nii Rivera M.D.       • insulin regular (HumuLIN R,NovoLIN R) injection  3-14 Units Subcutaneous Q6HRS Nii Rivera M.D.   Stopped at 09/05/20 1800    And   • dextrose 50% (D50W) injection 50 mL  50 mL Intravenous Q15 MIN PRN Nii Rivera M.D.           Fluids    Intake/Output Summary (Last 24 hours) at 9/6/2020 0939  Last data filed at 9/6/2020 0800  Gross per 24 hour   Intake 740 ml   Output 3900 ml   Net -3160 ml       Laboratory  Recent Labs     09/04/20  1212 09/05/20  0437 09/06/20  0305   ISTATAPH 7.536* 7.412 7.396*   ISTATAPCO2 27.5 42.0* 50.6*   ISTATAPO2 61* 69 69   ISTATATCO2 24 28 33   TCWMOWK7YTC 94 94 93   ISTATARTHCO3 23.3 26.7* 31.0*   ISTATARTBE 2 2 5*   ISTATTEMP 97.0 F 37.5 C 99.8 F   ISTATFIO2 100 90 90   ISTATSPEC Arterial Arterial Arterial   ISTATAPHTC 7.550* 7.404 7.386*   ATMIVAVP6OO 57* 72 72     Recent Labs     09/04/20 0444   CPKTOTAL 59     Recent Labs     09/04/20 0444 09/05/20 0524 09/06/20  0605   SODIUM 134* 137 135   POTASSIUM 4.6 4.0 4.0   CHLORIDE 96 99 97   CO2 26 29 28   BUN 30* 26* 24*   CREATININE 0.65 0.44* 0.41*   MAGNESIUM 2.4  --   --    CALCIUM 8.1* 8.2* 8.4*     Recent Labs     09/04/20 0444 09/05/20  0524 09/06/20  0605   ALTSGPT 42  --   --    ASTSGOT 56*  --   --    ALKPHOSPHAT 125*  --   --    TBILIRUBIN 1.0  --   --    DBILIRUBIN 0.6*  --   --    GLUCOSE 105* 109* 135*     Recent Labs     09/04/20 0444 09/05/20 0524 09/06/20  0605   WBC 5.0 6.0 7.5   NEUTSPOLYS 67.50 74.30* 74.80*   LYMPHOCYTES 16.80* 12.60* 9.70*   MONOCYTES 12.90 10.60 12.50   EOSINOPHILS 1.20 1.50 1.90   BASOPHILS 0.20 0.20 0.30   ASTSGOT 56*  --   --    ALTSGPT 42  --   --    ALKPHOSPHAT 125*  --   --    TBILIRUBIN 1.0  --   --      Recent Labs     09/04/20 0444 09/05/20 0524 09/06/20  0605   RBC 6.03 5.68 5.49    HEMOGLOBIN 18.3* 17.2 16.9   HEMATOCRIT 61.0* 55.3* 53.3*   PLATELETCT 153* 163* 205   PROTHROMBTM 13.4  --   --    APTT 43.4*  --   --    INR 0.99  --   --    FERRITIN 237.0  --   --        Imaging  X-Ray:  I have personally reviewed the images and compared with prior images.    Assessment/Plan  * Pneumonia due to COVID-19 virus  Assessment & Plan  Intubated 8/29    - Prone positioning.  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 Faust recently left the practice by his report  Will need to establish with a new primary care physician    Essential hypertension- (present on admission)  Assessment & Plan  Will continue on his home antihypertensives and follow blood pressure closely in ICU    Morbid obesity with BMI of 50.0-59.9, adult (HCC)- (present on admission)  Assessment & Plan  380 pounds, BMI 55    HLD (hyperlipidemia)- (present on admission)  Assessment & Plan  Continue statin     Updated plan:  Titrate down IP and PEEP and FiO2 as tolerated  Continue supine ventilation now as above  Ongoing diuresis;  follow vol status, lytes closely  Advanced to goal TF  Ongoing critical care management as above      VTE:  Lovenox therapeutic for elevated d-dimer  Ulcer: H2 Antagonist  Lines: None    I have performed a physical exam and reviewed and updated ROS and Plan today (9/6/2020). In review of yesterday's note (9/5/2020), there are no changes except as documented above.     Discussed patient condition and risk of morbidity and/or mortality with RN, RT, Pharmacy and Charge nurse / hot rounds     The patient remains critically ill.   Critical care time = 34 minutes in directly providing and coordinating critical care and extensive data review.  No time overlap and excludes procedures.

## 2020-09-07 ENCOUNTER — APPOINTMENT (OUTPATIENT)
Dept: RADIOLOGY | Facility: MEDICAL CENTER | Age: 60
DRG: 004 | End: 2020-09-07
Attending: INTERNAL MEDICINE
Payer: MEDICARE

## 2020-09-07 PROBLEM — I10 ESSENTIAL HYPERTENSION: Status: RESOLVED | Noted: 2019-01-04 | Resolved: 2020-09-07

## 2020-09-07 LAB
ACTION RANGE TRIGGERED IACRT: YES
ACTION RANGE TRIGGERED IACRT: YES
ALBUMIN SERPL BCP-MCNC: 2.5 G/DL (ref 3.2–4.9)
ALBUMIN/GLOB SERPL: 0.6 G/DL
ALP SERPL-CCNC: 81 U/L (ref 30–99)
ALT SERPL-CCNC: 27 U/L (ref 2–50)
ANION GAP SERPL CALC-SCNC: 10 MMOL/L (ref 7–16)
AST SERPL-CCNC: 23 U/L (ref 12–45)
BASE EXCESS BLDA CALC-SCNC: 1 MMOL/L (ref -4–3)
BASE EXCESS BLDA CALC-SCNC: 3 MMOL/L (ref -4–3)
BASOPHILS # BLD AUTO: 0.4 % (ref 0–1.8)
BASOPHILS # BLD: 0.04 K/UL (ref 0–0.12)
BILIRUB SERPL-MCNC: 1.2 MG/DL (ref 0.1–1.5)
BODY TEMPERATURE: ABNORMAL DEGREES
BODY TEMPERATURE: ABNORMAL DEGREES
BUN SERPL-MCNC: 26 MG/DL (ref 8–22)
CALCIUM SERPL-MCNC: 8.6 MG/DL (ref 8.5–10.5)
CHLORIDE SERPL-SCNC: 97 MMOL/L (ref 96–112)
CO2 BLDA-SCNC: 35 MMOL/L (ref 20–33)
CO2 BLDA-SCNC: 35 MMOL/L (ref 20–33)
CO2 SERPL-SCNC: 30 MMOL/L (ref 20–33)
CREAT SERPL-MCNC: 0.51 MG/DL (ref 0.5–1.4)
EOSINOPHIL # BLD AUTO: 0.29 K/UL (ref 0–0.51)
EOSINOPHIL NFR BLD: 3 % (ref 0–6.9)
ERYTHROCYTE [DISTWIDTH] IN BLOOD BY AUTOMATED COUNT: 62.2 FL (ref 35.9–50)
GLOBULIN SER CALC-MCNC: 4.3 G/DL (ref 1.9–3.5)
GLUCOSE BLD-MCNC: 111 MG/DL (ref 65–99)
GLUCOSE BLD-MCNC: 126 MG/DL (ref 65–99)
GLUCOSE BLD-MCNC: 126 MG/DL (ref 65–99)
GLUCOSE BLD-MCNC: 154 MG/DL (ref 65–99)
GLUCOSE SERPL-MCNC: 134 MG/DL (ref 65–99)
HCO3 BLDA-SCNC: 32.6 MMOL/L (ref 17–25)
HCO3 BLDA-SCNC: 33 MMOL/L (ref 17–25)
HCT VFR BLD AUTO: 52.9 % (ref 42–52)
HGB BLD-MCNC: 15.7 G/DL (ref 14–18)
HOROWITZ INDEX BLDA+IHG-RTO: 83 MM[HG]
HOROWITZ INDEX BLDA+IHG-RTO: 93 MM[HG]
IMM GRANULOCYTES # BLD AUTO: 0.06 K/UL (ref 0–0.11)
IMM GRANULOCYTES NFR BLD AUTO: 0.6 % (ref 0–0.9)
INST. QUALIFIED PATIENT IIQPT: YES
INST. QUALIFIED PATIENT IIQPT: YES
LYMPHOCYTES # BLD AUTO: 0.56 K/UL (ref 1–4.8)
LYMPHOCYTES NFR BLD: 5.7 % (ref 22–41)
MCH RBC QN AUTO: 30.3 PG (ref 27–33)
MCHC RBC AUTO-ENTMCNC: 29.7 G/DL (ref 33.7–35.3)
MCV RBC AUTO: 102.1 FL (ref 81.4–97.8)
MONOCYTES # BLD AUTO: 1.28 K/UL (ref 0–0.85)
MONOCYTES NFR BLD AUTO: 13 % (ref 0–13.4)
NEUTROPHILS # BLD AUTO: 7.59 K/UL (ref 1.82–7.42)
NEUTROPHILS NFR BLD: 77.3 % (ref 44–72)
NRBC # BLD AUTO: 0 K/UL
NRBC BLD-RTO: 0 /100 WBC
O2/TOTAL GAS SETTING VFR VENT: 90 %
O2/TOTAL GAS SETTING VFR VENT: 90 %
PCO2 BLDA: 70.7 MMHG (ref 26–37)
PCO2 BLDA: 79.1 MMHG (ref 26–37)
PCO2 TEMP ADJ BLDA: 75.2 MMHG (ref 26–37)
PCO2 TEMP ADJ BLDA: 83.6 MMHG (ref 26–37)
PH BLDA: 7.22 [PH] (ref 7.4–7.5)
PH BLDA: 7.28 [PH] (ref 7.4–7.5)
PH TEMP ADJ BLDA: 7.21 [PH] (ref 7.4–7.5)
PH TEMP ADJ BLDA: 7.26 [PH] (ref 7.4–7.5)
PLATELET # BLD AUTO: 231 K/UL (ref 164–446)
PMV BLD AUTO: 11.2 FL (ref 9–12.9)
PO2 BLDA: 75 MMHG (ref 64–87)
PO2 BLDA: 84 MMHG (ref 64–87)
PO2 TEMP ADJ BLDA: 82 MMHG (ref 64–87)
PO2 TEMP ADJ BLDA: 91 MMHG (ref 64–87)
POTASSIUM SERPL-SCNC: 4.9 MMOL/L (ref 3.6–5.5)
PREALB SERPL-MCNC: 12.7 MG/DL (ref 18–38)
PROT SERPL-MCNC: 6.8 G/DL (ref 6–8.2)
RBC # BLD AUTO: 5.18 M/UL (ref 4.7–6.1)
SAO2 % BLDA: 92 % (ref 93–99)
SAO2 % BLDA: 93 % (ref 93–99)
SODIUM SERPL-SCNC: 137 MMOL/L (ref 135–145)
SPECIMEN DRAWN FROM PATIENT: ABNORMAL
SPECIMEN DRAWN FROM PATIENT: ABNORMAL
TRIGL SERPL-MCNC: 177 MG/DL (ref 0–149)
WBC # BLD AUTO: 9.8 K/UL (ref 4.8–10.8)

## 2020-09-07 PROCEDURE — 700102 HCHG RX REV CODE 250 W/ 637 OVERRIDE(OP): Performed by: INTERNAL MEDICINE

## 2020-09-07 PROCEDURE — 700101 HCHG RX REV CODE 250: Performed by: INTERNAL MEDICINE

## 2020-09-07 PROCEDURE — A9270 NON-COVERED ITEM OR SERVICE: HCPCS | Performed by: INTERNAL MEDICINE

## 2020-09-07 PROCEDURE — 37799 UNLISTED PX VASCULAR SURGERY: CPT

## 2020-09-07 PROCEDURE — 94003 VENT MGMT INPAT SUBQ DAY: CPT

## 2020-09-07 PROCEDURE — 94770 HCHG CO2 EXPIRED GAS DETERMINATION: CPT

## 2020-09-07 PROCEDURE — 770022 HCHG ROOM/CARE - ICU (200)

## 2020-09-07 PROCEDURE — 80053 COMPREHEN METABOLIC PANEL: CPT

## 2020-09-07 PROCEDURE — 700105 HCHG RX REV CODE 258: Performed by: INTERNAL MEDICINE

## 2020-09-07 PROCEDURE — 99291 CRITICAL CARE FIRST HOUR: CPT | Performed by: INTERNAL MEDICINE

## 2020-09-07 PROCEDURE — 700111 HCHG RX REV CODE 636 W/ 250 OVERRIDE (IP): Performed by: INTERNAL MEDICINE

## 2020-09-07 PROCEDURE — 84134 ASSAY OF PREALBUMIN: CPT

## 2020-09-07 PROCEDURE — 71045 X-RAY EXAM CHEST 1 VIEW: CPT

## 2020-09-07 PROCEDURE — 85025 COMPLETE CBC W/AUTO DIFF WBC: CPT

## 2020-09-07 PROCEDURE — 84478 ASSAY OF TRIGLYCERIDES: CPT

## 2020-09-07 PROCEDURE — 82803 BLOOD GASES ANY COMBINATION: CPT

## 2020-09-07 PROCEDURE — 82962 GLUCOSE BLOOD TEST: CPT

## 2020-09-07 RX ORDER — HYDROMORPHONE HYDROCHLORIDE 1 MG/ML
0.75 INJECTION, SOLUTION INTRAMUSCULAR; INTRAVENOUS; SUBCUTANEOUS
Status: DISCONTINUED | OUTPATIENT
Start: 2020-09-07 | End: 2020-09-21

## 2020-09-07 RX ORDER — HYDROMORPHONE HYDROCHLORIDE 2 MG/ML
1.5 INJECTION, SOLUTION INTRAMUSCULAR; INTRAVENOUS; SUBCUTANEOUS
Status: DISCONTINUED | OUTPATIENT
Start: 2020-09-07 | End: 2020-09-21

## 2020-09-07 RX ORDER — NOREPINEPHRINE BITARTRATE 0.03 MG/ML
0-30 INJECTION, SOLUTION INTRAVENOUS CONTINUOUS
Status: DISCONTINUED | OUTPATIENT
Start: 2020-09-07 | End: 2020-09-17

## 2020-09-07 RX ORDER — FUROSEMIDE 10 MG/ML
20 INJECTION INTRAMUSCULAR; INTRAVENOUS
Status: DISCONTINUED | OUTPATIENT
Start: 2020-09-08 | End: 2020-09-09

## 2020-09-07 RX ADMIN — PROPOFOL 30 MCG/KG/MIN: 10 INJECTION, EMULSION INTRAVENOUS at 21:19

## 2020-09-07 RX ADMIN — ASPIRIN 81 MG 81 MG: 81 TABLET ORAL at 05:49

## 2020-09-07 RX ADMIN — PROPOFOL 40 MCG/KG/MIN: 10 INJECTION, EMULSION INTRAVENOUS at 00:55

## 2020-09-07 RX ADMIN — ATORVASTATIN CALCIUM 20 MG: 20 TABLET, FILM COATED ORAL at 05:49

## 2020-09-07 RX ADMIN — PROPOFOL 30 MCG/KG/MIN: 10 INJECTION, EMULSION INTRAVENOUS at 11:29

## 2020-09-07 RX ADMIN — DOCUSATE SODIUM 50 MG AND SENNOSIDES 8.6 MG 2 TABLET: 8.6; 5 TABLET, FILM COATED ORAL at 17:59

## 2020-09-07 RX ADMIN — ENOXAPARIN SODIUM 150 MG: 150 INJECTION SUBCUTANEOUS at 05:49

## 2020-09-07 RX ADMIN — PROPOFOL 30 MCG/KG/MIN: 10 INJECTION, EMULSION INTRAVENOUS at 14:23

## 2020-09-07 RX ADMIN — Medication 1.5 MG/HR: at 11:54

## 2020-09-07 RX ADMIN — INSULIN HUMAN 3 UNITS: 100 INJECTION, SOLUTION PARENTERAL at 11:31

## 2020-09-07 RX ADMIN — PROPOFOL 45 MCG/KG/MIN: 10 INJECTION, EMULSION INTRAVENOUS at 05:46

## 2020-09-07 RX ADMIN — Medication 1.5 MG/HR: at 15:52

## 2020-09-07 RX ADMIN — ENOXAPARIN SODIUM 150 MG: 150 INJECTION SUBCUTANEOUS at 17:59

## 2020-09-07 RX ADMIN — NOREPINEPHRINE BITARTRATE 5 MCG/MIN: 1 INJECTION, SOLUTION, CONCENTRATE INTRAVENOUS at 08:45

## 2020-09-07 RX ADMIN — Medication 1.5 MG: at 23:23

## 2020-09-07 RX ADMIN — PROPOFOL 30 MCG/KG/MIN: 10 INJECTION, EMULSION INTRAVENOUS at 17:47

## 2020-09-07 RX ADMIN — DOCUSATE SODIUM 50 MG AND SENNOSIDES 8.6 MG 2 TABLET: 8.6; 5 TABLET, FILM COATED ORAL at 06:58

## 2020-09-07 RX ADMIN — FUROSEMIDE 20 MG: 10 INJECTION, SOLUTION INTRAMUSCULAR; INTRAVENOUS at 05:49

## 2020-09-07 RX ADMIN — FAMOTIDINE 20 MG: 20 TABLET, FILM COATED ORAL at 05:49

## 2020-09-07 RX ADMIN — AMLODIPINE BESYLATE 10 MG: 10 TABLET ORAL at 05:49

## 2020-09-07 RX ADMIN — PROPOFOL 50 MCG/KG/MIN: 10 INJECTION, EMULSION INTRAVENOUS at 07:17

## 2020-09-07 RX ADMIN — PROPOFOL 40 MCG/KG/MIN: 10 INJECTION, EMULSION INTRAVENOUS at 03:27

## 2020-09-07 RX ADMIN — ACETAMINOPHEN 650 MG: 325 TABLET, FILM COATED ORAL at 21:19

## 2020-09-07 RX ADMIN — FAMOTIDINE 20 MG: 20 TABLET, FILM COATED ORAL at 17:59

## 2020-09-07 RX ADMIN — Medication 150 MCG/HR: at 01:30

## 2020-09-07 RX ADMIN — ACETAMINOPHEN 650 MG: 325 TABLET, FILM COATED ORAL at 01:11

## 2020-09-07 ASSESSMENT — PAIN DESCRIPTION - PAIN TYPE
TYPE: ACUTE PAIN
TYPE: ACUTE PAIN;CHRONIC PAIN

## 2020-09-07 ASSESSMENT — FIBROSIS 4 INDEX: FIB4 SCORE: 1.11

## 2020-09-07 NOTE — CARE PLAN
Problem: Ventilation Defect:  Goal: Ability to achieve and maintain unassisted ventilation or tolerate decreased levels of ventilator support  Outcome: DISCHARGED-GOAL NOT MET      Ventilator Daily Summary    Vent Day #  9  Ventilator settings changed this shift:  none  Weaning trials:  No   PEEP 14 FiO2 90%  Respiratory Procedures:  none  Plan: Continue current ventilator settings and wean mechanical ventilation as tolerated per physician orders.   PCV   RR 28 PC 22 14 90%

## 2020-09-07 NOTE — CARE PLAN
Problem: Venous Thromboembolism (VTW)/Deep Vein Thrombosis (DVT) Prevention:  Goal: Patient will participate in Venous Thrombosis (VTE)/Deep Vein Thrombosis (DVT)Prevention Measures  Outcome: PROGRESSING AS EXPECTED  Flowsheets  Taken 9/6/2020 1600  Mechanical Prophylaxis: SCDs, Sequential Compression Device  Taken 9/6/2020 0800  Pharmacologic Prophylaxis Used: LMWH: Enoxaparin(Lovenox)     Problem: Pain Management  Goal: Pain level will decrease to patient's comfort goal  Outcome: PROGRESSING AS EXPECTED  Note: Q2hr pain assessments using CPOT scale.   Pain medication to be administered as necessary and as prescribed per the MAR.  Pt currently has fentanyl gtt infusing. Frequent turns and repositioning for comfort and skin integrity.

## 2020-09-07 NOTE — PROGRESS NOTES
Critical Care Progress Note    Date of admission  8/27/2020    Chief Complaint  60 y.o. male who presented 8/27/2020 with increasing dyspnea and hypoxia.  He has a history of morbid obesity, hyperlipidemia, diabetes and obstructive sleep apnea, noncompliant with treatment.  He uses oxygen 2 L via nasal cannula for chronic hypoxia.  Today he called EMS for increasing dyspnea over the past 24 hours.  He reports he is had increased cough and yellow sputum production with small amount of blood in his sputum over the past 24 hours.  On EMS arrival patient was profoundly hypoxic with a saturation of 50%.  He was placed on nonrebreather and brought to the ED.  He desaturates very quickly on nonrebreather and is in moderate respiratory distress.  Chest x-ray showed diffuse right-sided pneumonia/consolidation.  He was treated for community-acquired pneumonia with ceftriaxone and azithromycin as well as IV methylprednisolone.  He denies fevers or chills.  He he lives alone and tells me has been taking appropriate precautions when he goes out of the house wearing his mask and has no known exposures to COVID-19.  He is a nondrinker and non-smoker.     Screening SARS-CoV-2 PCR came back positive in ED.  He received 600 cc of crystalloid and the sepsis fluid bolus that had been ordered was discontinued.  He is not currently hypotensive and appears relatively euvolemic.  Lactic acid 1.7.  With the positive COVID-19 test I decreased IV fluid, ordered dexamethasone 6 mg daily x10 days, will admit to isolation ICU.  He will be placed on high flow nasal cannula/BiPAP with high risk for requiring intubation.  We will follow-up inflammatory markers and initiate full dose anticoagulation if appropriate.    Hospital Course   8/29-worsening mental status, rapid shallow breathing and hypoxia required endotracheal intubation and prone positioning.  8/30-I spoke with Brittney, the patient's daughter and surrogate decision maker about  initiating Remdesivir.  We discussed the risks benefits and alternatives.  All questions were answered.  She consented to initiating Remdesivir today.  8/31 -art line placed, remains on PCV, transition off rocuronium drip as tolerated, propofol/fentanyl, continue prone protocol  9/1 - remains on pressure control ventilation, PEEP 19, improved P/F ratio with prone ventilation, off rocuronium, starting trophic tube feed, full dose anticoagulation with Lovenox, day 3/5 Remdesivir, day 5 dexamethasone  9/4 - stopped prone ventilation with some skin breakdown, difficult prone protocol with morbid obesity, full ventilator support, advancing nutrition  9/5 - Ongoing diuresis with 3 times daily Lasix, advancing tube feeds to goal, remains on supine ventilation now  9/6 - PCV, weaning IP and PEEP, remains supine, ongoing aggressive diuresis, abdi TF, lighten sedation as abdi  9/7 Switch to VC, switched from fentanyl to Dilaudid with improved ventilator synchrony, decrease Lasix to daily; stopped amlodipine as he is now on norepinephrine to achieve a map greater than 65          Interval Progress  Reviewed last 24 hour events:  Ventilator asynchrony  Worsened ventilation  Continues to have good urine output      Review of Systems  Review of Systems   Unable to perform ROS: Intubated        Vital Signs for last 24 hours   Temp:  [36.7 °C (98.1 °F)-38.2 °C (100.7 °F)] 37.5 °C (99.5 °F)  Pulse:  [] 87  Resp:  [8-33] 31  BP: ()/(27-53) 93/35  SpO2:  [93 %-98 %] 93 %    Hemodynamic parameters for last 24 hours       Respiratory Information for the last 24 hours  Vent Mode: APVCMV  Rate (breaths/min): 32  Vt Target (mL): 400  PEEP/CPAP: 16  MAP: 16  Control VTE (exp VT): 404    Physical Exam   Physical Exam  Constitutional:       General: He is not in acute distress.     Appearance: He is ill-appearing.   HENT:      Mouth/Throat:      Mouth: Mucous membranes are moist.   Eyes:      Pupils: Pupils are equal, round, and  reactive to light.   Neck:      Musculoskeletal: Neck supple.   Cardiovascular:      Rate and Rhythm: Regular rhythm.      Heart sounds: No murmur. No friction rub. No gallop.    Pulmonary:      Effort: No respiratory distress.      Breath sounds: No rhonchi.      Comments: Breath sounds are diminished throughout.  Abdominal:      General: There is no distension.      Tenderness: There is no abdominal tenderness.      Comments: Obese    Musculoskeletal:      Right lower leg: No edema.      Left lower leg: No edema.   Skin:     General: Skin is warm and dry.   Neurological:      General: No focal deficit present.      Comments: Sedated with propofol and fentanyl, does arouse and follow commands with sedation titration.  Remains off rocuronium.  Weak extremities but does move   Psychiatric:      Comments: Unable to assess         Medications  Current Facility-Administered Medications   Medication Dose Route Frequency Provider Last Rate Last Dose   • [START ON 9/8/2020] furosemide (LASIX) injection 20 mg  20 mg Intravenous Q DAY Jorge Sanchez M.D.       • norepinephrine (Levophed) infusion 8 mg/250 mL (premix)  0-30 mcg/min Intravenous Continuous Jorge Sanchez M.D. 13.1 mL/hr at 09/07/20 1615 7 mcg/min at 09/07/20 1615   • HYDROmorphone (DILAUDID) 0.2 mg/mL in 50mL NS (Continuous Infusion)   Intravenous Continuous Jorge Sanchez M.D. 7.5 mL/hr at 09/07/20 1552 1.5 mg/hr at 09/07/20 1552   • HYDROmorphone pf (DILAUDID) injection 0.75 mg  0.75 mg Intravenous Q30 MIN PRN Jorge Sanchez M.D.        And   • HYDROmorphone (DILAUDID) injection 1.5 mg  1.5 mg Intravenous Q30 MIN PRN Jorge Sanchez M.D.       • atorvastatin (LIPITOR) tablet 20 mg  20 mg Enteral Tube DAILY Nii Rivera M.D.   20 mg at 09/07/20 0549   • ondansetron (ZOFRAN ODT) dispertab 4 mg  4 mg Enteral Tube Q4HRS PRN Nii Rivera M.D.       • Pharmacy Consult: Enteral tube insertion - review meds/change route/product selection  1 Each  Other PHARMACY TO DOSE Nii Rivera M.D.       • aspirin (ASA) chewable tab 81 mg  81 mg Enteral Tube DAILY Nii Rivera M.D.   81 mg at 09/07/20 0549   • acetaminophen (TYLENOL) tablet 650 mg  650 mg Enteral Tube Q6HRS PRN Nii Rivera M.D.   650 mg at 09/07/20 0111   • HYDROcodone/acetaminophen (NORCO)  MG per tablet 1 Tab  1 Tab Enteral Tube Q6HRS PRN Nii Rivera M.D.       • promethazine (PHENERGAN) tablet 12.5-25 mg  12.5-25 mg Enteral Tube Q4HRS PRN Nii Rivera M.D.       • propofol (DIPRIVAN) injection  0-80 mcg/kg/min Intravenous Continuous Harjinder Crowe M.D. 31.8 mL/hr at 09/07/20 1423 30 mcg/kg/min at 09/07/20 1423   • enoxaparin (LOVENOX) injection 150 mg  150 mg Subcutaneous Q12HRS Harjinder Crowe M.D.   150 mg at 09/07/20 0549   • Respiratory Therapy Consult   Nebulization Continuous RT Harjinder Crowe M.D.       • ipratropium-albuterol (DUONEB) nebulizer solution  3 mL Nebulization Q2HRS PRN (RT) Harjinder Crowe M.D.       • famotidine (PEPCID) tablet 20 mg  20 mg Enteral Tube Q12HRS Nii Rivera M.D.   20 mg at 09/07/20 0549   • senna-docusate (PERICOLACE or SENOKOT S) 8.6-50 MG per tablet 2 Tab  2 Tab Enteral Tube BID Harjinder Crowe M.D.   2 Tab at 09/07/20 0658    And   • polyethylene glycol/lytes (MIRALAX) PACKET 1 Packet  1 Packet Enteral Tube QDAY PRN Harjinder Crowe M.D.   1 Packet at 09/06/20 2208    And   • magnesium hydroxide (MILK OF MAGNESIA) suspension 30 mL  30 mL Enteral Tube QDAY PRN Harjinder Crowe M.D.        And   • bisacodyl (DULCOLAX) suppository 10 mg  10 mg Rectal QDAY PRN Harjinder Crowe M.D.       • MD Alert...ICU Electrolyte Replacement per Pharmacy   Other PHARMACY TO DOSE Harjinder Crowe M.D.       • lidocaine (XYLOCAINE) 1 % injection 1-2 mL  1-2 mL Tracheal Tube Q30 MIN PRN Harjinder rCowe M.D.       • lactated ringers infusion (BOLUS): BMI greater than 30  30 mL/kg (Ideal) Intravenous Once PRN Nii Rivera M.D.   Stopped at 08/27/20 4317    • labetalol (NORMODYNE/TRANDATE) injection 10-20 mg  10-20 mg Intravenous Q4HRS PRN Nii Rivera M.D.       • ondansetron (ZOFRAN) syringe/vial injection 4 mg  4 mg Intravenous Q4HRS PRN Nii Rivera M.D.   4 mg at 08/29/20 0552   • promethazine (PHENERGAN) suppository 12.5-25 mg  12.5-25 mg Rectal Q4HRS PRN Nii Rivera M.D.       • prochlorperazine (COMPAZINE) injection 5-10 mg  5-10 mg Intravenous Q4HRS PRN Nii Rivera M.D.       • insulin regular (HumuLIN R,NovoLIN R) injection  3-14 Units Subcutaneous Q6HRS Nii Rivera M.D.   3 Units at 09/07/20 1131    And   • dextrose 50% (D50W) injection 50 mL  50 mL Intravenous Q15 MIN PRN Nii Rivera M.D.           Fluids    Intake/Output Summary (Last 24 hours) at 9/7/2020 1716  Last data filed at 9/7/2020 1400  Gross per 24 hour   Intake 1895.99 ml   Output 2400 ml   Net -504.01 ml       Laboratory  Recent Labs     09/06/20  0305 09/06/20  2333 09/07/20  0311   ISTATAPH 7.396* 7.223* 7.276*   ISTATAPCO2 50.6* 79.1* 70.7*   ISTATAPO2 69 84 75   ISTATATCO2 33 35* 35*   OOEPLED2INY 93 93 92*   ISTATARTHCO3 31.0* 32.6* 33.0*   ISTATARTBE 5* 1 3   ISTATTEMP 99.8 F 100.9 F 101.1 F   ISTATFIO2 90 90 90   ISTATSPEC Arterial Arterial Arterial   ISTATAPHTC 7.386* 7.205* 7.257*   HNVXMZID4SK 72 91* 82         Recent Labs     09/05/20  0524 09/06/20  0605 09/07/20  0420   SODIUM 137 135 137   POTASSIUM 4.0 4.0 4.9   CHLORIDE 99 97 97   CO2 29 28 30   BUN 26* 24* 26*   CREATININE 0.44* 0.41* 0.51   CALCIUM 8.2* 8.4* 8.6     Recent Labs     09/05/20 0524 09/06/20 0605 09/07/20 0420 09/07/20  1550   ALTSGPT  --   --  27  --    ASTSGOT  --   --  23  --    ALKPHOSPHAT  --   --  81  --    TBILIRUBIN  --   --  1.2  --    PREALBUMIN  --   --   --  12.7*   GLUCOSE 109* 135* 134*  --      Recent Labs     09/05/20 0524 09/06/20 0605 09/07/20  0420   WBC 6.0 7.5 9.8   NEUTSPOLYS 74.30* 74.80* 77.30*   LYMPHOCYTES 12.60* 9.70* 5.70*   MONOCYTES 10.60 12.50 13.00    EOSINOPHILS 1.50 1.90 3.00   BASOPHILS 0.20 0.30 0.40   ASTSGOT  --   --  23   ALTSGPT  --   --  27   ALKPHOSPHAT  --   --  81   TBILIRUBIN  --   --  1.2     Recent Labs     09/05/20  0524 09/06/20  0605 09/07/20  0420   RBC 5.68 5.49 5.18   HEMOGLOBIN 17.2 16.9 15.7   HEMATOCRIT 55.3* 53.3* 52.9*   PLATELETCT 163* 205 231       Imaging  X-Ray:  I have personally reviewed the images and compared with prior images.    Assessment/Plan  * Pneumonia due to COVID-19 virus  Assessment & Plan  Intubated 8/29    - Prone positioning.  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 Faust recently left the practice by his report  Will need to establish with a new primary care physician    Morbid obesity with BMI of 50.0-59.9, adult (HCC)- (present on admission)  Assessment & Plan  380 pounds, BMI 55    HLD (hyperlipidemia)- (present on admission)  Assessment & Plan  Continue statin     Updated plan:  Ongoing lung protective strategies for persistent ARDS secondary to Covid  Advanced to goal TF  Ongoing critical care  management as above      VTE:  Lovenox therapeutic for elevated d-dimer  Ulcer: H2 Antagonist  Lines: None    I have performed a physical exam and reviewed and updated ROS and Plan today (9/7/2020). In review of yesterday's note (9/6/2020), there are no changes except as documented above.     Discussed patient condition and risk of morbidity and/or mortality with RN, RT, Pharmacy and Charge nurse / hot rounds     The patient remains critically ill.   Critical care time = 37 minutes in directly providing and coordinating critical care and extensive data review.  No time overlap and excludes procedures.

## 2020-09-07 NOTE — PROGRESS NOTES
Dr. Sanchez updated on patients decline in neuro status compared to previous RNs assessments. Orders to switch fentanyl gtt to dilaudid gtt. Also updated MD on patient's right side of neck where R-IJ CVC in place looks larger than left side. MD to assess.

## 2020-09-07 NOTE — PROGRESS NOTES
"Patient's daughter, Cherry, called requesting update. Updated her on patient's status. Cherry asked \"what is going to happen\" and \"are his lungs going to get better\". This RN educated that at this time we aren't able to know if lungs will improve or not. Cherry re-asked the same question again, this RN reinforced the same answer. Patient's daughter abruptly hung up on this RN.   "

## 2020-09-08 ENCOUNTER — APPOINTMENT (OUTPATIENT)
Dept: RADIOLOGY | Facility: MEDICAL CENTER | Age: 60
DRG: 004 | End: 2020-09-08
Attending: INTERNAL MEDICINE
Payer: MEDICARE

## 2020-09-08 LAB
ACTION RANGE TRIGGERED IACRT: YES
ANION GAP SERPL CALC-SCNC: 10 MMOL/L (ref 7–16)
BASE EXCESS BLDA CALC-SCNC: 4 MMOL/L (ref -4–3)
BASOPHILS # BLD AUTO: 0.5 % (ref 0–1.8)
BASOPHILS # BLD: 0.04 K/UL (ref 0–0.12)
BODY TEMPERATURE: ABNORMAL DEGREES
BUN SERPL-MCNC: 32 MG/DL (ref 8–22)
CALCIUM SERPL-MCNC: 8.5 MG/DL (ref 8.5–10.5)
CHLORIDE SERPL-SCNC: 99 MMOL/L (ref 96–112)
CO2 BLDA-SCNC: 31 MMOL/L (ref 20–33)
CO2 SERPL-SCNC: 27 MMOL/L (ref 20–33)
CREAT SERPL-MCNC: 0.41 MG/DL (ref 0.5–1.4)
CRP SERPL HS-MCNC: 23.36 MG/DL (ref 0–0.75)
EOSINOPHIL # BLD AUTO: 0.07 K/UL (ref 0–0.51)
EOSINOPHIL NFR BLD: 0.8 % (ref 0–6.9)
ERYTHROCYTE [DISTWIDTH] IN BLOOD BY AUTOMATED COUNT: 57.6 FL (ref 35.9–50)
GLUCOSE BLD-MCNC: 136 MG/DL (ref 65–99)
GLUCOSE BLD-MCNC: 144 MG/DL (ref 65–99)
GLUCOSE SERPL-MCNC: 152 MG/DL (ref 65–99)
HCO3 BLDA-SCNC: 29.9 MMOL/L (ref 17–25)
HCT VFR BLD AUTO: 45.2 % (ref 42–52)
HGB BLD-MCNC: 14 G/DL (ref 14–18)
HOROWITZ INDEX BLDA+IHG-RTO: 60 MM[HG]
IMM GRANULOCYTES # BLD AUTO: 0.05 K/UL (ref 0–0.11)
IMM GRANULOCYTES NFR BLD AUTO: 0.6 % (ref 0–0.9)
INST. QUALIFIED PATIENT IIQPT: YES
LYMPHOCYTES # BLD AUTO: 0.69 K/UL (ref 1–4.8)
LYMPHOCYTES NFR BLD: 8.3 % (ref 22–41)
MCH RBC QN AUTO: 30.5 PG (ref 27–33)
MCHC RBC AUTO-ENTMCNC: 31 G/DL (ref 33.7–35.3)
MCV RBC AUTO: 98.5 FL (ref 81.4–97.8)
MONOCYTES # BLD AUTO: 0.96 K/UL (ref 0–0.85)
MONOCYTES NFR BLD AUTO: 11.6 % (ref 0–13.4)
NEUTROPHILS # BLD AUTO: 6.46 K/UL (ref 1.82–7.42)
NEUTROPHILS NFR BLD: 78.2 % (ref 44–72)
NRBC # BLD AUTO: 0 K/UL
NRBC BLD-RTO: 0 /100 WBC
O2/TOTAL GAS SETTING VFR VENT: 90 %
PCO2 BLDA: 50.8 MMHG (ref 26–37)
PCO2 TEMP ADJ BLDA: 52.8 MMHG (ref 26–37)
PH BLDA: 7.38 [PH] (ref 7.4–7.5)
PH TEMP ADJ BLDA: 7.37 [PH] (ref 7.4–7.5)
PLATELET # BLD AUTO: 257 K/UL (ref 164–446)
PMV BLD AUTO: 11.4 FL (ref 9–12.9)
PO2 BLDA: 54 MMHG (ref 64–87)
PO2 TEMP ADJ BLDA: 57 MMHG (ref 64–87)
POTASSIUM SERPL-SCNC: 4.5 MMOL/L (ref 3.6–5.5)
RBC # BLD AUTO: 4.59 M/UL (ref 4.7–6.1)
SAO2 % BLDA: 86 % (ref 93–99)
SODIUM SERPL-SCNC: 136 MMOL/L (ref 135–145)
SPECIMEN DRAWN FROM PATIENT: ABNORMAL
WBC # BLD AUTO: 8.3 K/UL (ref 4.8–10.8)

## 2020-09-08 PROCEDURE — 94770 HCHG CO2 EXPIRED GAS DETERMINATION: CPT

## 2020-09-08 PROCEDURE — 700101 HCHG RX REV CODE 250: Performed by: INTERNAL MEDICINE

## 2020-09-08 PROCEDURE — 37799 UNLISTED PX VASCULAR SURGERY: CPT

## 2020-09-08 PROCEDURE — 82803 BLOOD GASES ANY COMBINATION: CPT

## 2020-09-08 PROCEDURE — A9270 NON-COVERED ITEM OR SERVICE: HCPCS | Performed by: INTERNAL MEDICINE

## 2020-09-08 PROCEDURE — 71045 X-RAY EXAM CHEST 1 VIEW: CPT

## 2020-09-08 PROCEDURE — 80048 BASIC METABOLIC PNL TOTAL CA: CPT

## 2020-09-08 PROCEDURE — 700102 HCHG RX REV CODE 250 W/ 637 OVERRIDE(OP): Performed by: INTERNAL MEDICINE

## 2020-09-08 PROCEDURE — 85025 COMPLETE CBC W/AUTO DIFF WBC: CPT

## 2020-09-08 PROCEDURE — 700111 HCHG RX REV CODE 636 W/ 250 OVERRIDE (IP): Performed by: INTERNAL MEDICINE

## 2020-09-08 PROCEDURE — 94003 VENT MGMT INPAT SUBQ DAY: CPT

## 2020-09-08 PROCEDURE — 770022 HCHG ROOM/CARE - ICU (200)

## 2020-09-08 PROCEDURE — 86140 C-REACTIVE PROTEIN: CPT

## 2020-09-08 PROCEDURE — 700105 HCHG RX REV CODE 258: Performed by: INTERNAL MEDICINE

## 2020-09-08 PROCEDURE — 82962 GLUCOSE BLOOD TEST: CPT

## 2020-09-08 PROCEDURE — 99291 CRITICAL CARE FIRST HOUR: CPT | Performed by: INTERNAL MEDICINE

## 2020-09-08 RX ORDER — SODIUM CHLORIDE 9 MG/ML
INJECTION, SOLUTION INTRAVENOUS
Status: ACTIVE
Start: 2020-09-08 | End: 2020-09-08

## 2020-09-08 RX ADMIN — PROPOFOL 30 MCG/KG/MIN: 10 INJECTION, EMULSION INTRAVENOUS at 16:10

## 2020-09-08 RX ADMIN — ASPIRIN 81 MG 81 MG: 81 TABLET ORAL at 05:35

## 2020-09-08 RX ADMIN — PROPOFOL 30 MCG/KG/MIN: 10 INJECTION, EMULSION INTRAVENOUS at 22:37

## 2020-09-08 RX ADMIN — ACETAMINOPHEN 650 MG: 325 TABLET, FILM COATED ORAL at 05:35

## 2020-09-08 RX ADMIN — PROPOFOL 30 MCG/KG/MIN: 10 INJECTION, EMULSION INTRAVENOUS at 19:35

## 2020-09-08 RX ADMIN — PROPOFOL 20 MCG/KG/MIN: 10 INJECTION, EMULSION INTRAVENOUS at 07:13

## 2020-09-08 RX ADMIN — Medication 1.5 MG/HR: at 15:29

## 2020-09-08 RX ADMIN — PROPOFOL 30 MCG/KG/MIN: 10 INJECTION, EMULSION INTRAVENOUS at 13:20

## 2020-09-08 RX ADMIN — DOCUSATE SODIUM 50 MG AND SENNOSIDES 8.6 MG 2 TABLET: 8.6; 5 TABLET, FILM COATED ORAL at 05:35

## 2020-09-08 RX ADMIN — FUROSEMIDE 20 MG: 10 INJECTION, SOLUTION INTRAMUSCULAR; INTRAVENOUS at 05:35

## 2020-09-08 RX ADMIN — FAMOTIDINE 20 MG: 20 TABLET, FILM COATED ORAL at 17:33

## 2020-09-08 RX ADMIN — ATORVASTATIN CALCIUM 20 MG: 20 TABLET, FILM COATED ORAL at 05:35

## 2020-09-08 RX ADMIN — Medication 1.5 MG/HR: at 23:20

## 2020-09-08 RX ADMIN — FAMOTIDINE 20 MG: 20 TABLET, FILM COATED ORAL at 05:35

## 2020-09-08 RX ADMIN — ENOXAPARIN SODIUM 150 MG: 150 INJECTION SUBCUTANEOUS at 05:35

## 2020-09-08 RX ADMIN — ENOXAPARIN SODIUM 150 MG: 150 INJECTION SUBCUTANEOUS at 17:33

## 2020-09-08 RX ADMIN — PROPOFOL 25 MCG/KG/MIN: 10 INJECTION, EMULSION INTRAVENOUS at 04:14

## 2020-09-08 RX ADMIN — PROPOFOL 30 MCG/KG/MIN: 10 INJECTION, EMULSION INTRAVENOUS at 10:27

## 2020-09-08 RX ADMIN — Medication 1.5 MG/HR: at 04:10

## 2020-09-08 RX ADMIN — BISACODYL 10 MG: 10 SUPPOSITORY RECTAL at 07:13

## 2020-09-08 RX ADMIN — Medication 1.5 MG/HR: at 10:17

## 2020-09-08 RX ADMIN — ACETAMINOPHEN 650 MG: 325 TABLET, FILM COATED ORAL at 17:33

## 2020-09-08 RX ADMIN — DOCUSATE SODIUM 50 MG AND SENNOSIDES 8.6 MG 2 TABLET: 8.6; 5 TABLET, FILM COATED ORAL at 17:33

## 2020-09-08 RX ADMIN — PROPOFOL 30 MCG/KG/MIN: 10 INJECTION, EMULSION INTRAVENOUS at 00:24

## 2020-09-08 RX ADMIN — NOREPINEPHRINE BITARTRATE 2 MCG/MIN: 1 INJECTION, SOLUTION, CONCENTRATE INTRAVENOUS at 10:26

## 2020-09-08 RX ADMIN — ACETAMINOPHEN 650 MG: 325 TABLET, FILM COATED ORAL at 23:55

## 2020-09-08 ASSESSMENT — FIBROSIS 4 INDEX: FIB4 SCORE: 1

## 2020-09-08 NOTE — CARE PLAN
Ventilator Daily Summary    Vent Day #  10     8.0 at 24  Ventilator settings changed this shift:  FiO2 increased to 90  Weaning trials:  no  Respiratory Procedures:  no  Plan: Continue current ventilator settings and wean mechanical ventilation as tolerated per physician orders.   [phone]

## 2020-09-08 NOTE — DOCUMENTATION QUERY
[facility]                                                                       Query Response Note      PATIENT:               JAELYN TAYLOR  ACCT #:                  [id number]  MRN:                     [id number]  :                      1960  ADMIT DATE:       2020 3:50 PM  DISCH DATE:          RESPONDING  PROVIDER #:        [id number]           QUERY TEXT:    The following pressure injuries are documented in the Wound Team Eval:     1) bilateral knee deep tissue pressure injuries  2) deep tissue pressure injury septum     Please specify if you:    NOTE:  If an appropriate response is not listed below, please respond with a new note.      The patient's Clinical Indicators include:  Per  Wound Team Eval: Pressure injury knee bilateral, DTPI.  Pressure injury septum, DTPI.    Per Epic Wound Flowsheet:  Pressure injury knee and septum, POA: No  Risk Factors: prone positioning, morbid obesity, DM, htn   Treatment: wound team eval, mepilex, reposition q 2 hours  Options provided:   -- Agree with Wound Care RN assessment   -- Disagree with Wound Care RN assessment   -- Unable to determine      Query created by: Natalie Betancur on 2020 7:19 AM    RESPONSE TEXT:    Agree with Wound Care RN assessment          Electronically signed by:  CHAR ROMERO MD 2020 2:26 PM

## 2020-09-08 NOTE — CARE PLAN
Problem: Ventilation Defect:  Goal: Ability to achieve and maintain unassisted ventilation or tolerate decreased levels of ventilator support  Outcome: PROGRESSING AS EXPECTED       Ventilator Daily Summary    Vent Day # 11    Ventilator settings changed this shift: none    Weaning trials: none due to peep and fio2 requirements.     Respiratory Procedures: none     Plan: Continue current ventilator settings and wean mechanical ventilation as tolerated per physician orders.

## 2020-09-08 NOTE — PROGRESS NOTES
Critical Care Progress Note    Date of admission  8/27/2020    Chief Complaint  60 y.o. male who presented 8/27/2020 with increasing dyspnea and hypoxia.  He has a history of morbid obesity, hyperlipidemia, diabetes and obstructive sleep apnea, noncompliant with treatment.  He uses oxygen 2 L via nasal cannula for chronic hypoxia.  Today he called EMS for increasing dyspnea over the past 24 hours.  He reports he is had increased cough and yellow sputum production with small amount of blood in his sputum over the past 24 hours.  On EMS arrival patient was profoundly hypoxic with a saturation of 50%.  He was placed on nonrebreather and brought to the ED.  He desaturates very quickly on nonrebreather and is in moderate respiratory distress.  Chest x-ray showed diffuse right-sided pneumonia/consolidation.  He was treated for community-acquired pneumonia with ceftriaxone and azithromycin as well as IV methylprednisolone.  He denies fevers or chills.  He he lives alone and tells me has been taking appropriate precautions when he goes out of the house wearing his mask and has no known exposures to COVID-19.  He is a nondrinker and non-smoker.     Screening SARS-CoV-2 PCR came back positive in ED.  He received 600 cc of crystalloid and the sepsis fluid bolus that had been ordered was discontinued.  He is not currently hypotensive and appears relatively euvolemic.  Lactic acid 1.7.  With the positive COVID-19 test I decreased IV fluid, ordered dexamethasone 6 mg daily x10 days, will admit to isolation ICU.  He will be placed on high flow nasal cannula/BiPAP with high risk for requiring intubation.  We will follow-up inflammatory markers and initiate full dose anticoagulation if appropriate.    Hospital Course   8/29-worsening mental status, rapid shallow breathing and hypoxia required endotracheal intubation and prone positioning.  8/30-I spoke with Brittney, the patient's daughter and surrogate decision maker about  initiating Remdesivir.  We discussed the risks benefits and alternatives.  All questions were answered.  She consented to initiating Remdesivir today.  8/31 -art line placed, remains on PCV, transition off rocuronium drip as tolerated, propofol/fentanyl, continue prone protocol  9/1 - remains on pressure control ventilation, PEEP 19, improved P/F ratio with prone ventilation, off rocuronium, starting trophic tube feed, full dose anticoagulation with Lovenox, day 3/5 Remdesivir, day 5 dexamethasone  9/4 - stopped prone ventilation with some skin breakdown, difficult prone protocol with morbid obesity, full ventilator support, advancing nutrition  9/5 - Ongoing diuresis with 3 times daily Lasix, advancing tube feeds to goal, remains on supine ventilation now  9/6 - PCV, weaning IP and PEEP, remains supine, ongoing aggressive diuresis, abdi TF, lighten sedation as abdi  9/7 Switch to VC, switched from fentanyl to Dilaudid with improved ventilator synchrony, decrease Lasix to daily; stopped amlodipine as he is now on norepinephrine to achieve a map greater than 65  9/8 continue full vent support          Interval Progress  Reviewed last 24 hour events:  Ventilator synchrony  Stable ventilation  Continues to have good urine output      Review of Systems  Review of Systems   Unable to perform ROS: Intubated        Vital Signs for last 24 hours   Pulse:  [76-93] 87  Resp:  [12-33] 32  BP: ()/(26-38) 82/27  SpO2:  [87 %-94 %] 90 %    Hemodynamic parameters for last 24 hours       Respiratory Information for the last 24 hours  Vent Mode: APVCMV  Rate (breaths/min): 32  Vt Target (mL): 400  PEEP/CPAP: 16  MAP: 23  Control VTE (exp VT): 403    Physical Exam   Physical Exam  Constitutional:       General: He is not in acute distress.     Appearance: He is ill-appearing.   HENT:      Mouth/Throat:      Mouth: Mucous membranes are moist.   Eyes:      Pupils: Pupils are equal, round, and reactive to light.   Neck:       Musculoskeletal: Neck supple.   Cardiovascular:      Rate and Rhythm: Regular rhythm.      Heart sounds: No murmur. No friction rub. No gallop.    Pulmonary:      Effort: No respiratory distress.      Breath sounds: No rhonchi.      Comments: Breath sounds are diminished throughout.  Abdominal:      General: There is no distension.      Tenderness: There is no abdominal tenderness.      Comments: Obese    Musculoskeletal:      Right lower leg: No edema.      Left lower leg: No edema.   Skin:     General: Skin is warm and dry.   Neurological:      General: No focal deficit present.      Comments: Sedated with propofol and fentanyl, does arouse and follow commands with sedation titration.  Remains off rocuronium.  Weak extremities but does move   Psychiatric:      Comments: Unable to assess         Medications  Current Facility-Administered Medications   Medication Dose Route Frequency Provider Last Rate Last Dose   • SODIUM CHLORIDE 0.9 % IV SOLN            • furosemide (LASIX) injection 20 mg  20 mg Intravenous Q DAY Jorge Sanchez M.D.   20 mg at 09/08/20 0535   • norepinephrine (Levophed) infusion 8 mg/250 mL (premix)  0-30 mcg/min Intravenous Continuous Jorge Sanchez M.D.   Stopped at 09/08/20 1321   • HYDROmorphone (DILAUDID) 0.2 mg/mL in 50mL NS (Continuous Infusion)   Intravenous Continuous Jorge Sanchez M.D. 7.5 mL/hr at 09/08/20 1529 1.5 mg/hr at 09/08/20 1529   • HYDROmorphone pf (DILAUDID) injection 0.75 mg  0.75 mg Intravenous Q30 MIN PRN Jorge Sanchez M.D.        And   • HYDROmorphone (DILAUDID) injection 1.5 mg  1.5 mg Intravenous Q30 MIN PRN Jorge Sanchez M.D.       • atorvastatin (LIPITOR) tablet 20 mg  20 mg Enteral Tube DAILY Nii Rivera M.D.   20 mg at 09/08/20 0535   • ondansetron (ZOFRAN ODT) dispertab 4 mg  4 mg Enteral Tube Q4HRS PRN Nii Rivera M.D.       • Pharmacy Consult: Enteral tube insertion - review meds/change route/product selection  1 Each Other PHARMACY TO  DOSE Nii Rivera M.D.       • aspirin (ASA) chewable tab 81 mg  81 mg Enteral Tube DAILY Nii Rivera M.D.   81 mg at 09/08/20 0535   • acetaminophen (TYLENOL) tablet 650 mg  650 mg Enteral Tube Q6HRS PRN Nii Rivera M.D.   650 mg at 09/08/20 0535   • HYDROcodone/acetaminophen (NORCO)  MG per tablet 1 Tab  1 Tab Enteral Tube Q6HRS PRN Nii Rivera M.D.       • promethazine (PHENERGAN) tablet 12.5-25 mg  12.5-25 mg Enteral Tube Q4HRS PRN Nii Rivera M.D.       • propofol (DIPRIVAN) injection  0-80 mcg/kg/min Intravenous Continuous Harjinder Crowe M.D. 31.8 mL/hr at 09/08/20 1320 30 mcg/kg/min at 09/08/20 1320   • enoxaparin (LOVENOX) injection 150 mg  150 mg Subcutaneous Q12HRS Harjinder Crowe M.D.   150 mg at 09/08/20 0535   • Respiratory Therapy Consult   Nebulization Continuous RT Harjinder Crowe M.D.       • ipratropium-albuterol (DUONEB) nebulizer solution  3 mL Nebulization Q2HRS PRN (RT) Harjinder Crowe M.D.       • famotidine (PEPCID) tablet 20 mg  20 mg Enteral Tube Q12HRS Nii Rivera M.D.   20 mg at 09/08/20 0535   • senna-docusate (PERICOLACE or SENOKOT S) 8.6-50 MG per tablet 2 Tab  2 Tab Enteral Tube BID Harjinder Crowe M.D.   2 Tab at 09/08/20 0535    And   • polyethylene glycol/lytes (MIRALAX) PACKET 1 Packet  1 Packet Enteral Tube QDAY PRN Harjinder Crowe M.D.   1 Packet at 09/06/20 2208    And   • magnesium hydroxide (MILK OF MAGNESIA) suspension 30 mL  30 mL Enteral Tube QDAY PRN Harjinder Crowe M.D.        And   • bisacodyl (DULCOLAX) suppository 10 mg  10 mg Rectal QDAY PRN Harjinder Crowe M.D.   10 mg at 09/08/20 0713   • MD Alert...ICU Electrolyte Replacement per Pharmacy   Other PHARMACY TO DOSE Harjinder Crowe M.D.       • lidocaine (XYLOCAINE) 1 % injection 1-2 mL  1-2 mL Tracheal Tube Q30 MIN PRN Harjinder Crowe M.D.       • labetalol (NORMODYNE/TRANDATE) injection 10-20 mg  10-20 mg Intravenous Q4HRS PRN Nii Rivera M.D.       • ondansetron (ZOFRAN)  syringe/vial injection 4 mg  4 mg Intravenous Q4HRS PRN Nii Rivera M.D.   4 mg at 08/29/20 0552   • promethazine (PHENERGAN) suppository 12.5-25 mg  12.5-25 mg Rectal Q4HRS PRN Nii Rivera M.D.       • prochlorperazine (COMPAZINE) injection 5-10 mg  5-10 mg Intravenous Q4HRS PRN Nii Rivera M.D.           Fluids    Intake/Output Summary (Last 24 hours) at 9/8/2020 1547  Last data filed at 9/8/2020 1400  Gross per 24 hour   Intake 2476.06 ml   Output 2430 ml   Net 46.06 ml       Laboratory  Recent Labs     09/06/20  2333 09/07/20  0311 09/08/20  0241   ISTATAPH 7.223* 7.276* 7.378*   ISTATAPCO2 79.1* 70.7* 50.8*   ISTATAPO2 84 75 54*   ISTATATCO2 35* 35* 31   TOUYJUL5CCL 93 92* 86*   ISTATARTHCO3 32.6* 33.0* 29.9*   ISTATARTBE 1 3 4*   ISTATTEMP 100.9 F 101.1 F 100.2 F   ISTATFIO2 90 90 90   ISTATSPEC Arterial Arterial Arterial   ISTATAPHTC 7.205* 7.257* 7.365*   [id number] 91* 82 57*         Recent Labs     09/06/20 0605 09/07/20  0420 09/08/20  0539   SODIUM 135 137 136   POTASSIUM 4.0 4.9 4.5   CHLORIDE 97 97 99   CO2 28 30 27   BUN 24* 26* 32*   CREATININE 0.41* 0.51 0.41*   CALCIUM 8.4* 8.6 8.5     Recent Labs     09/06/20  0605 09/07/20  0420 09/07/20  1550 09/08/20  0539   ALTSGPT  --  27  --   --    ASTSGOT  --  23  --   --    ALKPHOSPHAT  --  81  --   --    TBILIRUBIN  --  1.2  --   --    PREALBUMIN  --   --  12.7*  --    GLUCOSE 135* 134*  --  152*     Recent Labs     09/06/20 0605 09/07/20  0420 09/08/20  0539   WBC 7.5 9.8 8.3   NEUTSPOLYS 74.80* 77.30* 78.20*   LYMPHOCYTES 9.70* 5.70* 8.30*   MONOCYTES 12.50 13.00 11.60   EOSINOPHILS 1.90 3.00 0.80   BASOPHILS 0.30 0.40 0.50   ASTSGOT  --  23  --    ALTSGPT  --  27  --    ALKPHOSPHAT  --  81  --    TBILIRUBIN  --  1.2  --      Recent Labs     09/06/20  0605 09/07/20  0420 09/08/20  0539   RBC 5.49 5.18 4.59*   HEMOGLOBIN 16.9 15.7 14.0   HEMATOCRIT 53.3* 52.9* 45.2   PLATELETCT 205 231 257       Imaging  X-Ray:  I have personally reviewed  the images and compared with prior images.    Assessment/Plan  * Pneumonia due to COVID-19 virus  Assessment & Plan  Intubated 8/29    - Prone positioning.  - Dexamethasone 6 mg daily x10 days  - Therapeutic Lovenox for d-dimer greater than 1  - Remdesivir  - Follow inflammatory markers  - Empiric course of C3/azithro completed    Acute hypoxemic respiratory failure (HCC)- (present on admission)  Assessment & Plan  Secondary to COVID-19 pneumonia, +/- community-acquired/bacterial pneumonia  Multilobar dense consolidation of the right lung, no significant effusion  No evidence of pulmonary embolism  Chemical paralysis ~ 48 hours; now d/c'd and passive on vent with sedation  Prone positioning with improved P/F  Lung protective ventilation strategies        Diabetes type 2, controlled (HCC)- (present on admission)  Assessment & Plan  Monitor glycemic control with insulin therapy    ABELARDO (obstructive sleep apnea)- (present on admission)  Assessment & Plan  Polysomnography on January 29, 2019 AHI = 101.9, SpO2 hortensia 50% on room air  He gets his usual care from the [facility] and his physician Dr. Faust recently left the practice by his report  Will need to establish with a new primary care physician    Morbid obesity with BMI of 50.0-59.9, adult (HCC)- (present on admission)  Assessment & Plan  380 pounds, BMI 55    HLD (hyperlipidemia)- (present on admission)  Assessment & Plan  Continue statin         VTE:  Lovenox therapeutic for elevated d-dimer  Ulcer: H2 Antagonist  Lines: None    I have performed a physical exam and reviewed and updated ROS and Plan today (9/8/2020). In review of yesterday's note (9/7/2020), there are no changes except as documented above.     Discussed patient condition and risk of morbidity and/or mortality with RN, RT, Pharmacy and Charge nurse / hot rounds     The patient remains critically ill.   Critical care time = 40 minutes in directly providing and coordinating critical care  and extensive data review.  No time overlap and excludes procedures.

## 2020-09-08 NOTE — DIETARY
Nutrition support weekly update:  Day 12 of admit.  Rashi Shirley is a 60 y.o. male with admitting DX of Pneumonia, Sepsis (HCC).    Tube feeding initiated on 9/1.  Current TF via gastric Cortrak: Peptamen Intense VHP @ 60 ml/hr (with propofol), providing 1440 kcal (+Kcal from propofol), 132 grams protein/kg (1.8 grams/kg IBW, 0.8 grams/kg), and 1210 ml free water per day.     Assessment:  Weight 170 kg with BMI 53.78 (9/7)  Weight down 7.6 kg in 11 days. -5.4 L fluid per I/O.  IBW 75.45 kg    Estimated nutritional needs:  REE per MSJ = 2529 kcal/day  RMR per [facility] (vent L/min 12.8, T max/24 hours 38.2) = 2989 kcal/day (65-70% = 2515-3359 kcal/day)  22-25 kcal/kg IBW = 0654-3964 kcal/day  2.5 grams protein/kg IBW = 189 grams protein/day    Evaluation:   1. Pt remains intubated, vent day 10.  2. TF @ goal with propofol. Propofol @ 30 mcg/kg/min = 31.8 ml/hr = 840 kcal/day.  3. Labs reviewed. Pre-albumin improving but CRP worse. Triglycerides 177.  4. Meds include lasix, electrolyte replacement, levophed @ 2 mcg/min, propofol, and bowel regimen.  5. Current feeding regimen remains appropriate.    Malnutrition risk: None identified.    Recommendations/Plan:  1. Continue TF formula and rate with propofol.  2. When propofol D/c'd, increase goal rate to 85 ml/hr to provide 2040 kcal, 188 grams protein, and 1210 ml free water per day.  3. Fluids per MD. ANDERSON following.

## 2020-09-09 ENCOUNTER — APPOINTMENT (OUTPATIENT)
Dept: RADIOLOGY | Facility: MEDICAL CENTER | Age: 60
DRG: 004 | End: 2020-09-09
Attending: INTERNAL MEDICINE
Payer: MEDICARE

## 2020-09-09 LAB
ACTION RANGE TRIGGERED IACRT: YES
ANION GAP SERPL CALC-SCNC: 7 MMOL/L (ref 7–16)
BASE EXCESS BLDA CALC-SCNC: 2 MMOL/L (ref -4–3)
BASOPHILS # BLD AUTO: 0.6 % (ref 0–1.8)
BASOPHILS # BLD: 0.05 K/UL (ref 0–0.12)
BODY TEMPERATURE: ABNORMAL DEGREES
BUN SERPL-MCNC: 28 MG/DL (ref 8–22)
CALCIUM SERPL-MCNC: 8.8 MG/DL (ref 8.5–10.5)
CHLORIDE SERPL-SCNC: 97 MMOL/L (ref 96–112)
CO2 BLDA-SCNC: 31 MMOL/L (ref 20–33)
CO2 SERPL-SCNC: 29 MMOL/L (ref 20–33)
CREAT SERPL-MCNC: 0.43 MG/DL (ref 0.5–1.4)
EOSINOPHIL # BLD AUTO: 0.07 K/UL (ref 0–0.51)
EOSINOPHIL NFR BLD: 0.9 % (ref 0–6.9)
ERYTHROCYTE [DISTWIDTH] IN BLOOD BY AUTOMATED COUNT: 58.8 FL (ref 35.9–50)
GLUCOSE SERPL-MCNC: 131 MG/DL (ref 65–99)
HCO3 BLDA-SCNC: 29.2 MMOL/L (ref 17–25)
HCT VFR BLD AUTO: 47.1 % (ref 42–52)
HGB BLD-MCNC: 14.5 G/DL (ref 14–18)
HOROWITZ INDEX BLDA+IHG-RTO: 70 MM[HG]
IMM GRANULOCYTES # BLD AUTO: 0.04 K/UL (ref 0–0.11)
IMM GRANULOCYTES NFR BLD AUTO: 0.5 % (ref 0–0.9)
INST. QUALIFIED PATIENT IIQPT: YES
LYMPHOCYTES # BLD AUTO: 0.62 K/UL (ref 1–4.8)
LYMPHOCYTES NFR BLD: 7.9 % (ref 22–41)
MCH RBC QN AUTO: 30.8 PG (ref 27–33)
MCHC RBC AUTO-ENTMCNC: 30.8 G/DL (ref 33.7–35.3)
MCV RBC AUTO: 100 FL (ref 81.4–97.8)
MONOCYTES # BLD AUTO: 1.07 K/UL (ref 0–0.85)
MONOCYTES NFR BLD AUTO: 13.6 % (ref 0–13.4)
NEUTROPHILS # BLD AUTO: 6.02 K/UL (ref 1.82–7.42)
NEUTROPHILS NFR BLD: 76.5 % (ref 44–72)
NRBC # BLD AUTO: 0 K/UL
NRBC BLD-RTO: 0 /100 WBC
O2/TOTAL GAS SETTING VFR VENT: 90 %
PCO2 BLDA: 52.2 MMHG (ref 26–37)
PCO2 TEMP ADJ BLDA: 56 MMHG (ref 26–37)
PH BLDA: 7.36 [PH] (ref 7.4–7.5)
PH TEMP ADJ BLDA: 7.33 [PH] (ref 7.4–7.5)
PLATELET # BLD AUTO: 270 K/UL (ref 164–446)
PMV BLD AUTO: 10.9 FL (ref 9–12.9)
PO2 BLDA: 63 MMHG (ref 64–87)
PO2 TEMP ADJ BLDA: 70 MMHG (ref 64–87)
POTASSIUM SERPL-SCNC: 4.7 MMOL/L (ref 3.6–5.5)
RBC # BLD AUTO: 4.71 M/UL (ref 4.7–6.1)
SAO2 % BLDA: 90 % (ref 93–99)
SODIUM SERPL-SCNC: 133 MMOL/L (ref 135–145)
SPECIMEN DRAWN FROM PATIENT: ABNORMAL
WBC # BLD AUTO: 7.9 K/UL (ref 4.8–10.8)

## 2020-09-09 PROCEDURE — 700102 HCHG RX REV CODE 250 W/ 637 OVERRIDE(OP): Performed by: INTERNAL MEDICINE

## 2020-09-09 PROCEDURE — A9270 NON-COVERED ITEM OR SERVICE: HCPCS | Performed by: INTERNAL MEDICINE

## 2020-09-09 PROCEDURE — 99291 CRITICAL CARE FIRST HOUR: CPT | Performed by: INTERNAL MEDICINE

## 2020-09-09 PROCEDURE — 85025 COMPLETE CBC W/AUTO DIFF WBC: CPT

## 2020-09-09 PROCEDURE — 80048 BASIC METABOLIC PNL TOTAL CA: CPT

## 2020-09-09 PROCEDURE — 82803 BLOOD GASES ANY COMBINATION: CPT

## 2020-09-09 PROCEDURE — 71045 X-RAY EXAM CHEST 1 VIEW: CPT

## 2020-09-09 PROCEDURE — 94770 HCHG CO2 EXPIRED GAS DETERMINATION: CPT

## 2020-09-09 PROCEDURE — 700111 HCHG RX REV CODE 636 W/ 250 OVERRIDE (IP): Performed by: INTERNAL MEDICINE

## 2020-09-09 PROCEDURE — 37799 UNLISTED PX VASCULAR SURGERY: CPT

## 2020-09-09 PROCEDURE — 770022 HCHG ROOM/CARE - ICU (200)

## 2020-09-09 PROCEDURE — 94003 VENT MGMT INPAT SUBQ DAY: CPT

## 2020-09-09 RX ADMIN — DOCUSATE SODIUM 50 MG AND SENNOSIDES 8.6 MG 2 TABLET: 8.6; 5 TABLET, FILM COATED ORAL at 05:01

## 2020-09-09 RX ADMIN — ENOXAPARIN SODIUM 150 MG: 150 INJECTION SUBCUTANEOUS at 05:01

## 2020-09-09 RX ADMIN — PROPOFOL 30 MCG/KG/MIN: 10 INJECTION, EMULSION INTRAVENOUS at 10:38

## 2020-09-09 RX ADMIN — Medication 1.5 MG/HR: at 05:53

## 2020-09-09 RX ADMIN — ATORVASTATIN CALCIUM 20 MG: 20 TABLET, FILM COATED ORAL at 05:01

## 2020-09-09 RX ADMIN — PROPOFOL 30 MCG/KG/MIN: 10 INJECTION, EMULSION INTRAVENOUS at 13:43

## 2020-09-09 RX ADMIN — Medication 2 MG/HR: at 21:36

## 2020-09-09 RX ADMIN — PROPOFOL 30 MCG/KG/MIN: 10 INJECTION, EMULSION INTRAVENOUS at 19:31

## 2020-09-09 RX ADMIN — ENOXAPARIN SODIUM 150 MG: 150 INJECTION SUBCUTANEOUS at 16:57

## 2020-09-09 RX ADMIN — Medication 1.5 MG/HR: at 11:01

## 2020-09-09 RX ADMIN — Medication 2 MG/HR: at 16:22

## 2020-09-09 RX ADMIN — FAMOTIDINE 20 MG: 20 TABLET, FILM COATED ORAL at 05:01

## 2020-09-09 RX ADMIN — FUROSEMIDE 20 MG: 10 INJECTION, SOLUTION INTRAMUSCULAR; INTRAVENOUS at 05:00

## 2020-09-09 RX ADMIN — PROPOFOL 30 MCG/KG/MIN: 10 INJECTION, EMULSION INTRAVENOUS at 05:01

## 2020-09-09 RX ADMIN — ASPIRIN 81 MG 81 MG: 81 TABLET ORAL at 05:01

## 2020-09-09 RX ADMIN — MAGNESIUM CITRATE 296 ML: 1.75 LIQUID ORAL at 11:46

## 2020-09-09 RX ADMIN — PROPOFOL 35 MCG/KG/MIN: 10 INJECTION, EMULSION INTRAVENOUS at 01:44

## 2020-09-09 RX ADMIN — ACETAMINOPHEN 650 MG: 325 TABLET, FILM COATED ORAL at 13:46

## 2020-09-09 RX ADMIN — DOCUSATE SODIUM 50 MG AND SENNOSIDES 8.6 MG 2 TABLET: 8.6; 5 TABLET, FILM COATED ORAL at 16:58

## 2020-09-09 RX ADMIN — ACETAMINOPHEN 650 MG: 325 TABLET, FILM COATED ORAL at 06:18

## 2020-09-09 RX ADMIN — PROPOFOL 30 MCG/KG/MIN: 10 INJECTION, EMULSION INTRAVENOUS at 07:45

## 2020-09-09 RX ADMIN — FAMOTIDINE 20 MG: 20 TABLET, FILM COATED ORAL at 16:58

## 2020-09-09 RX ADMIN — PROPOFOL 30 MCG/KG/MIN: 10 INJECTION, EMULSION INTRAVENOUS at 16:50

## 2020-09-09 RX ADMIN — PROPOFOL 30 MCG/KG/MIN: 10 INJECTION, EMULSION INTRAVENOUS at 22:47

## 2020-09-09 NOTE — CARE PLAN
Ventilator Daily Summary    Vent Day #  11     8.0 at 24  Ventilator settings changed this shift:  no  Weaning trials:  no  Respiratory Procedures:  no  Plan: Continue current ventilator settings and wean mechanical ventilation as tolerated per physician orders.   [phone]

## 2020-09-09 NOTE — PROGRESS NOTES
Dr. Sanchez updated on patient's intermittent grimacing to deep palpation of RUQ abdomen and lack of bowel movement for 8 days despite escalating bowel protocol. Orders for mag citrate.

## 2020-09-09 NOTE — CONSULTS
"Reason for PC Consult: Advance Care Planning    Consulted by: Dr. Sanchez    Assessment:  General:   60 y.o. male who presented 8/27/2020 with increasing dyspnea and hypoxia. When EMS arrived pt was saturating at 50%. Chest xray showed pna and pt was COVID positive. On 8/29 pt required intubation and was placed in prone position. Currently on CIC with vent support.     Dyspnea: Yes- Intubated  Last BM: 09/01/20  Pain: Yes  Depression: Unable to determine  Dementia: No    Spiritual:  Is Quaker or spirituality important for coping with this illness? Yes  Has a  or spiritual provider visit been requested? No    Palliative Performance Scale: 10%    Advance Directive: None  DPOA: No  POLST: No    Code Status: Full-discussed at this encounter    Social: Pt lives at home with his dtr Amy and his grandson who the pt raised while Amy was in [employer]. Pt is not . Pt's other son Manuel is currently in [employer]. Amy speaks to Manuel often and keeps him updated about pt's condition. Pt also has brother named Ean that lives in [place].     Outcome:  Introduced self and role of Palliative Care to pt's dtr Amy over the phone.  Assessed understanding of current medical status, overall health picture, and options for future care. Dtr states that she is aware the pt is \"not doing good.\" She states that the patient is on \"high sedation\" and \"a ventilator.\" Amy is aware that the pt has COVID. Per dtr she lives with the patient. The patient is normally independent in all ADLs. He is able to drive and walk without assistive devices. Dtr states that the patient has been admitted to the hospital one other time for \"swelling in his legs and fluid on his heart.\" PC RN provides education about pt's tenuous respiratory status as it relates to COVID. Dtr verbalizes understanding.     Explored pt's values, beliefs, and preferences in order to identify GOC. PC RN explores pt's previously expressed " healthcare wishes. Dtr states that due to the fact that he is young she has never had any discussions regarding wishes. PC RN queries what dtr believes pt would want if her were unable to be liberated from vent. Dtr states that she does not know the pt's wishes and is uncertain if she would be comfortable making a decision regarding withdrawal of care. Dtr is hopeful that pt will recover from this event.     Advance directive has not been completed in the past. Amy is aware that she and Manuel are NOK and therefore make pt's decisions when he is unable. Discussed code status in detail including mechanical ventilation and what resuscitation looks like. After thorough discussion of benefit vs burden, dtr decides that the pt would want CPR attempted. Pt will remain full code.     Spiritual visit declined. Amy would like to Zoom with pt tomorrow at 9am. MARY CARMEN Menard is setting up Zoom meeting and will update Amy.     Active listening, reflection, reminiscing, validation & normalization, and empathic support utilized throughout this encounter.  All questions answered.  PC contact information given.         Updated: MARY CARMEN Menard and Dr. Sanchez    Plan: continue full treatment and full code. PC RN will follow and support. Zoom with dtr and MARY CARMEN LINK tomorrow at 9am.       Thank you for allowing Palliative Care to participate in this patient's care. Please feel free to call [phone] with any questions or concerns.

## 2020-09-09 NOTE — CARE PLAN
Problem: Safety  Goal: Will remain free from injury  Outcome: PROGRESSING SLOWER THAN EXPECTED  Note: Fall protocol in place, bed in low position, side rails up, bed alarm in use.      Problem: Bowel/Gastric:  Goal: Normal bowel function is maintained or improved  Outcome: PROGRESSING SLOWER THAN EXPECTED  Note: Bowel regimen in use, following protocol. MD aware of last BM.      Problem: Safety - Medical Restraint  Goal: Remains free of injury from restraints (Restraint for Interference with Medical Device)  Description: INTERVENTIONS:  1. Determine that other, less restrictive measures have been tried or would not be effective before applying the restraint  2. Evaluate the patient's condition at the time of restraint application  3. Inform patient/family regarding the reason for restraint  4. Q2H: Monitor safety, psychosocial status, comfort, nutrition and hydration  Outcome: PROGRESSING SLOWER THAN EXPECTED  Note: Restraints being checked and reassessed.

## 2020-09-09 NOTE — PROGRESS NOTES
1845: Bedside report received from NIKOLAY Menard. Patient care assumed.  Patient resting comfortably at this time. ETT to vent, BP stable, Will continue to monitor   2000: Complete assessment done per flow sheet, see flow sheet for further details.  2045: Notified RT regarding SpO2 reading 87-89%.   2155: Care plan and education addressed.  2300: RT called regarding oxygen saturations in high 80's via monitor. Inquired regarding increase FiO2 on ventilator.   2325: RT at bedside increased FiO2 to 100% via ETT at this time  2330: Dr. Gibson paged regarding increase in oxygenation via vent for update. Orders to be put in regarding SpO2 target. Also updated on patients temperature.  2355:Tylenol given see MAR for details.   0000: Complete assessment done per flow sheet, see flow sheet for further details. No acute changes noted from previous assessment. See MAR for details regarding drips and titrations.   0144: See MAR for sedation titration  0200: Complete bath and bed linen change done, see flow sheet for details.  0237: See MAR for sedation titration  0400: Complete reassessment done per flow sheet, see flow sheet for further details. No acute changes noted from previous assessment. No titrations done at this time.   0517: AM blood work obtained per order.   0600: Patient remains stable at this time, SpO2 has improved 89-91% on 90% FiO2 via ETT. No new titrations with drips- See MAR for details.   0618: Tylenol given for fever- see MAR for details.  0645: Bedside report given to eleanor day shift RN. All questions answered. Patient care assumed by day shift at this time.   0650:Levophed restarted- see MAR for details.

## 2020-09-10 ENCOUNTER — APPOINTMENT (OUTPATIENT)
Dept: RADIOLOGY | Facility: MEDICAL CENTER | Age: 60
DRG: 004 | End: 2020-09-10
Attending: INTERNAL MEDICINE
Payer: MEDICARE

## 2020-09-10 PROBLEM — G47.33 OSA (OBSTRUCTIVE SLEEP APNEA): Status: RESOLVED | Noted: 2018-09-30 | Resolved: 2020-09-10

## 2020-09-10 LAB
ACTION RANGE TRIGGERED IACRT: YES
ANION GAP SERPL CALC-SCNC: 10 MMOL/L (ref 7–16)
APPEARANCE UR: CLEAR
BACTERIA #/AREA URNS HPF: NEGATIVE /HPF
BASE EXCESS BLDA CALC-SCNC: 3 MMOL/L (ref -4–3)
BASOPHILS # BLD AUTO: 0.4 % (ref 0–1.8)
BASOPHILS # BLD: 0.03 K/UL (ref 0–0.12)
BILIRUB UR QL STRIP.AUTO: ABNORMAL
BLOOD CULTURE HOLD CXBCH: NORMAL
BLOOD CULTURE HOLD CXBCH: NORMAL
BODY TEMPERATURE: ABNORMAL DEGREES
BUN SERPL-MCNC: 32 MG/DL (ref 8–22)
CALCIUM SERPL-MCNC: 8.9 MG/DL (ref 8.5–10.5)
CHLORIDE SERPL-SCNC: 100 MMOL/L (ref 96–112)
CO2 BLDA-SCNC: 31 MMOL/L (ref 20–33)
CO2 SERPL-SCNC: 28 MMOL/L (ref 20–33)
COLOR UR: ABNORMAL
CREAT SERPL-MCNC: 0.49 MG/DL (ref 0.5–1.4)
EOSINOPHIL # BLD AUTO: 0.04 K/UL (ref 0–0.51)
EOSINOPHIL NFR BLD: 0.5 % (ref 0–6.9)
EPI CELLS #/AREA URNS HPF: NEGATIVE /HPF
ERYTHROCYTE [DISTWIDTH] IN BLOOD BY AUTOMATED COUNT: 58.7 FL (ref 35.9–50)
GLUCOSE SERPL-MCNC: 134 MG/DL (ref 65–99)
GLUCOSE UR STRIP.AUTO-MCNC: NEGATIVE MG/DL
HCO3 BLDA-SCNC: 29.7 MMOL/L (ref 17–25)
HCT VFR BLD AUTO: 46.8 % (ref 42–52)
HGB BLD-MCNC: 14.3 G/DL (ref 14–18)
HOROWITZ INDEX BLDA+IHG-RTO: 72 MM[HG]
HYALINE CASTS #/AREA URNS LPF: ABNORMAL /LPF
IMM GRANULOCYTES # BLD AUTO: 0.05 K/UL (ref 0–0.11)
IMM GRANULOCYTES NFR BLD AUTO: 0.6 % (ref 0–0.9)
INST. QUALIFIED PATIENT IIQPT: YES
KETONES UR STRIP.AUTO-MCNC: NEGATIVE MG/DL
LEUKOCYTE ESTERASE UR QL STRIP.AUTO: ABNORMAL
LYMPHOCYTES # BLD AUTO: 0.55 K/UL (ref 1–4.8)
LYMPHOCYTES NFR BLD: 6.9 % (ref 22–41)
MCH RBC QN AUTO: 30.6 PG (ref 27–33)
MCHC RBC AUTO-ENTMCNC: 30.6 G/DL (ref 33.7–35.3)
MCV RBC AUTO: 100 FL (ref 81.4–97.8)
MICRO URNS: ABNORMAL
MONOCYTES # BLD AUTO: 1.01 K/UL (ref 0–0.85)
MONOCYTES NFR BLD AUTO: 12.6 % (ref 0–13.4)
NEUTROPHILS # BLD AUTO: 6.31 K/UL (ref 1.82–7.42)
NEUTROPHILS NFR BLD: 79 % (ref 44–72)
NITRITE UR QL STRIP.AUTO: POSITIVE
NRBC # BLD AUTO: 0 K/UL
NRBC BLD-RTO: 0 /100 WBC
O2/TOTAL GAS SETTING VFR VENT: 85 %
PCO2 BLDA: 54.8 MMHG (ref 26–37)
PCO2 TEMP ADJ BLDA: 59.3 MMHG (ref 26–37)
PH BLDA: 7.34 [PH] (ref 7.4–7.5)
PH TEMP ADJ BLDA: 7.32 [PH] (ref 7.4–7.5)
PH UR STRIP.AUTO: 5 [PH] (ref 5–8)
PLATELET # BLD AUTO: 240 K/UL (ref 164–446)
PMV BLD AUTO: 10.5 FL (ref 9–12.9)
PO2 BLDA: 61 MMHG (ref 64–87)
PO2 TEMP ADJ BLDA: 69 MMHG (ref 64–87)
POTASSIUM SERPL-SCNC: 5.1 MMOL/L (ref 3.6–5.5)
PROT UR QL STRIP: NEGATIVE MG/DL
RBC # BLD AUTO: 4.68 M/UL (ref 4.7–6.1)
RBC # URNS HPF: ABNORMAL /HPF
RBC UR QL AUTO: NEGATIVE
SAO2 % BLDA: 89 % (ref 93–99)
SODIUM SERPL-SCNC: 138 MMOL/L (ref 135–145)
SP GR UR STRIP.AUTO: 1.03
SPECIMEN DRAWN FROM PATIENT: ABNORMAL
TRIGL SERPL-MCNC: 218 MG/DL (ref 0–149)
UROBILINOGEN UR STRIP.AUTO-MCNC: 1 MG/DL
WBC # BLD AUTO: 8 K/UL (ref 4.8–10.8)
WBC #/AREA URNS HPF: ABNORMAL /HPF

## 2020-09-10 PROCEDURE — 82803 BLOOD GASES ANY COMBINATION: CPT

## 2020-09-10 PROCEDURE — 85025 COMPLETE CBC W/AUTO DIFF WBC: CPT

## 2020-09-10 PROCEDURE — A9270 NON-COVERED ITEM OR SERVICE: HCPCS | Performed by: INTERNAL MEDICINE

## 2020-09-10 PROCEDURE — 700102 HCHG RX REV CODE 250 W/ 637 OVERRIDE(OP): Performed by: INTERNAL MEDICINE

## 2020-09-10 PROCEDURE — 99291 CRITICAL CARE FIRST HOUR: CPT | Performed by: INTERNAL MEDICINE

## 2020-09-10 PROCEDURE — 94003 VENT MGMT INPAT SUBQ DAY: CPT

## 2020-09-10 PROCEDURE — 700111 HCHG RX REV CODE 636 W/ 250 OVERRIDE (IP): Performed by: INTERNAL MEDICINE

## 2020-09-10 PROCEDURE — 80048 BASIC METABOLIC PNL TOTAL CA: CPT

## 2020-09-10 PROCEDURE — 770022 HCHG ROOM/CARE - ICU (200)

## 2020-09-10 PROCEDURE — 87040 BLOOD CULTURE FOR BACTERIA: CPT

## 2020-09-10 PROCEDURE — 94770 HCHG CO2 EXPIRED GAS DETERMINATION: CPT

## 2020-09-10 PROCEDURE — 84478 ASSAY OF TRIGLYCERIDES: CPT

## 2020-09-10 PROCEDURE — 71045 X-RAY EXAM CHEST 1 VIEW: CPT

## 2020-09-10 PROCEDURE — 700105 HCHG RX REV CODE 258: Performed by: INTERNAL MEDICINE

## 2020-09-10 PROCEDURE — 81001 URINALYSIS AUTO W/SCOPE: CPT

## 2020-09-10 RX ADMIN — ATORVASTATIN CALCIUM 20 MG: 20 TABLET, FILM COATED ORAL at 04:48

## 2020-09-10 RX ADMIN — PROPOFOL 30 MCG/KG/MIN: 10 INJECTION, EMULSION INTRAVENOUS at 04:45

## 2020-09-10 RX ADMIN — Medication 2 MG/HR: at 23:38

## 2020-09-10 RX ADMIN — ACETAMINOPHEN 650 MG: 325 TABLET, FILM COATED ORAL at 17:44

## 2020-09-10 RX ADMIN — FAMOTIDINE 20 MG: 20 TABLET, FILM COATED ORAL at 04:47

## 2020-09-10 RX ADMIN — ACETAMINOPHEN 650 MG: 325 TABLET, FILM COATED ORAL at 04:46

## 2020-09-10 RX ADMIN — PROPOFOL 30 MCG/KG/MIN: 10 INJECTION, EMULSION INTRAVENOUS at 19:29

## 2020-09-10 RX ADMIN — PROPOFOL 30 MCG/KG/MIN: 10 INJECTION, EMULSION INTRAVENOUS at 16:19

## 2020-09-10 RX ADMIN — PROPOFOL 30 MCG/KG/MIN: 10 INJECTION, EMULSION INTRAVENOUS at 13:18

## 2020-09-10 RX ADMIN — Medication 10 MG: at 07:46

## 2020-09-10 RX ADMIN — PROPOFOL 30 MCG/KG/MIN: 10 INJECTION, EMULSION INTRAVENOUS at 07:44

## 2020-09-10 RX ADMIN — PROPOFOL 30 MCG/KG/MIN: 10 INJECTION, EMULSION INTRAVENOUS at 10:47

## 2020-09-10 RX ADMIN — Medication 2 MG/HR: at 02:55

## 2020-09-10 RX ADMIN — ENOXAPARIN SODIUM 150 MG: 150 INJECTION SUBCUTANEOUS at 17:44

## 2020-09-10 RX ADMIN — ASPIRIN 81 MG 81 MG: 81 TABLET ORAL at 04:48

## 2020-09-10 RX ADMIN — Medication 2 MG/HR: at 12:34

## 2020-09-10 RX ADMIN — ACETAMINOPHEN 650 MG: 325 TABLET, FILM COATED ORAL at 10:53

## 2020-09-10 RX ADMIN — PROPOFOL 20 MCG/KG/MIN: 10 INJECTION, EMULSION INTRAVENOUS at 23:37

## 2020-09-10 RX ADMIN — FAMOTIDINE 20 MG: 20 TABLET, FILM COATED ORAL at 17:44

## 2020-09-10 RX ADMIN — DOCUSATE SODIUM 50 MG AND SENNOSIDES 8.6 MG 2 TABLET: 8.6; 5 TABLET, FILM COATED ORAL at 04:48

## 2020-09-10 RX ADMIN — ENOXAPARIN SODIUM 150 MG: 150 INJECTION SUBCUTANEOUS at 04:48

## 2020-09-10 RX ADMIN — Medication 10 MG: at 17:45

## 2020-09-10 RX ADMIN — PROPOFOL 30 MCG/KG/MIN: 10 INJECTION, EMULSION INTRAVENOUS at 01:26

## 2020-09-10 NOTE — CARE PLAN
Ventilator Daily Summary    Vent Day #  12                    8.0 at 24   Ventilator settings changed this shift:  no  Weaning trials:  no  Respiratory Procedures:  no  Plan: Continue current ventilator settings and wean mechanical ventilation as tolerated per physician orders.   [phone]

## 2020-09-10 NOTE — CARE PLAN
Ventilator Daily Summary    Vent Day # 12     Ventilator settings changed this shift: none     Weaning trials: none at this time due to fio2 requirements    Respiratory Procedures: none at this time     Plan: Continue current ventilator settings and wean mechanical ventilation as tolerated per physician orders.

## 2020-09-10 NOTE — PROGRESS NOTES
Critical Care Progress Note    Date of admission  8/27/2020    Chief Complaint  60 y.o. male who presented 8/27/2020 with increasing dyspnea and hypoxia.  He has a history of morbid obesity, hyperlipidemia, diabetes and obstructive sleep apnea, noncompliant with treatment.  He uses oxygen 2 L via nasal cannula for chronic hypoxia.  Today he called EMS for increasing dyspnea over the past 24 hours.  He reports he is had increased cough and yellow sputum production with small amount of blood in his sputum over the past 24 hours.  On EMS arrival patient was profoundly hypoxic with a saturation of 50%.  He was placed on nonrebreather and brought to the ED.  He desaturates very quickly on nonrebreather and is in moderate respiratory distress.  Chest x-ray showed diffuse right-sided pneumonia/consolidation.  He was treated for community-acquired pneumonia with ceftriaxone and azithromycin as well as IV methylprednisolone.  He denies fevers or chills.  He he lives alone and tells me has been taking appropriate precautions when he goes out of the house wearing his mask and has no known exposures to COVID-19.  He is a nondrinker and non-smoker.     Screening SARS-CoV-2 PCR came back positive in ED.  He received 600 cc of crystalloid and the sepsis fluid bolus that had been ordered was discontinued.  He is not currently hypotensive and appears relatively euvolemic.  Lactic acid 1.7.  With the positive COVID-19 test I decreased IV fluid, ordered dexamethasone 6 mg daily x10 days, will admit to isolation ICU.  He will be placed on high flow nasal cannula/BiPAP with high risk for requiring intubation.  We will follow-up inflammatory markers and initiate full dose anticoagulation if appropriate.    Hospital Course   8/29-worsening mental status, rapid shallow breathing and hypoxia required endotracheal intubation and prone positioning.  8/30-I spoke with Brittney, the patient's daughter and surrogate decision maker about  initiating Remdesivir.  We discussed the risks benefits and alternatives.  All questions were answered.  She consented to initiating Remdesivir today.  8/31 -art line placed, remains on PCV, transition off rocuronium drip as tolerated, propofol/fentanyl, continue prone protocol  9/1 - remains on pressure control ventilation, PEEP 19, improved P/F ratio with prone ventilation, off rocuronium, starting trophic tube feed, full dose anticoagulation with Lovenox, day 3/5 Remdesivir, day 5 dexamethasone  9/4 - stopped prone ventilation with some skin breakdown, difficult prone protocol with morbid obesity, full ventilator support, advancing nutrition  9/5 - Ongoing diuresis with 3 times daily Lasix, advancing tube feeds to goal, remains on supine ventilation now  9/6 - PCV, weaning IP and PEEP, remains supine, ongoing aggressive diuresis, abdi TF, lighten sedation as abdi  9/7 Switch to VC, switched from fentanyl to Dilaudid with improved ventilator synchrony, decrease Lasix to daily; stopped amlodipine as he is now on norepinephrine to achieve a map greater than 65  9/8 continue full vent support, compliance gradually improving          Interval Progress  Reviewed last 24 hour events:  Ventilator synchrony on sedation  Continues to have good urine output  SR/ST    Review of Systems  Review of Systems   Unable to perform ROS: Intubated        Vital Signs for last 24 hours   Temp:  [38.1 °C (100.6 °F)] 38.1 °C (100.6 °F)  Pulse:  [] 89  Resp:  [23-34] 32  BP: (53-82)/(21-41) 71/21  SpO2:  [83 %-93 %] 90 %    Hemodynamic parameters for last 24 hours       Respiratory Information for the last 24 hours  Vent Mode: APVCMV  Rate (breaths/min): 32  Vt Target (mL): 400  PEEP/CPAP: 16  MAP: 22  Control VTE (exp VT): 404    Physical Exam   Physical Exam  Constitutional:       General: He is not in acute distress.     Appearance: He is ill-appearing.   HENT:      Mouth/Throat:      Mouth: Mucous membranes are moist.   Eyes:       Pupils: Pupils are equal, round, and reactive to light.   Neck:      Musculoskeletal: Neck supple.   Cardiovascular:      Rate and Rhythm: Regular rhythm.      Heart sounds: No murmur. No friction rub. No gallop.    Pulmonary:      Effort: No respiratory distress.      Breath sounds: No rhonchi.      Comments: Breath sounds are diminished throughout.  Abdominal:      General: There is no distension.      Tenderness: There is no abdominal tenderness.      Comments: Obese    Musculoskeletal:      Right lower leg: No edema.      Left lower leg: No edema.   Skin:     General: Skin is warm and dry.   Neurological:      General: No focal deficit present.      Comments: Sedated with propofol and fentanyl, does arouse and follow commands with sedation titration.  Remains off rocuronium.  Weak extremities but does move   Psychiatric:      Comments: Unable to assess         Medications  Current Facility-Administered Medications   Medication Dose Route Frequency Provider Last Rate Last Dose   • norepinephrine (Levophed) infusion 8 mg/250 mL (premix)  0-30 mcg/min Intravenous Continuous Jorge Sanchez M.D.   Stopped at 09/09/20 1156   • HYDROmorphone (DILAUDID) 0.2 mg/mL in 50mL NS (Continuous Infusion)   Intravenous Continuous Jorge Sanchez M.D. 10 mL/hr at 09/09/20 1622 2 mg/hr at 09/09/20 1622   • HYDROmorphone pf (DILAUDID) injection 0.75 mg  0.75 mg Intravenous Q30 MIN PRN Jorge Sanchez M.D.        And   • HYDROmorphone (DILAUDID) injection 1.5 mg  1.5 mg Intravenous Q30 MIN PRN Jorge Sanchez M.D.       • atorvastatin (LIPITOR) tablet 20 mg  20 mg Enteral Tube DAILY Nii Rivera M.D.   20 mg at 09/09/20 0501   • ondansetron (ZOFRAN ODT) dispertab 4 mg  4 mg Enteral Tube Q4HRS PRN Nii Rivera M.D.       • Pharmacy Consult: Enteral tube insertion - review meds/change route/product selection  1 Each Other PHARMACY TO DOSE Nii Rivera M.D.       • aspirin (ASA) chewable tab 81 mg  81 mg Enteral Tube  DAILY Nii Rivera M.D.   81 mg at 09/09/20 0501   • acetaminophen (TYLENOL) tablet 650 mg  650 mg Enteral Tube Q6HRS PRN Nii Rivera M.D.   650 mg at 09/09/20 1346   • HYDROcodone/acetaminophen (NORCO)  MG per tablet 1 Tab  1 Tab Enteral Tube Q6HRS PRN Nii Rivera M.D.       • promethazine (PHENERGAN) tablet 12.5-25 mg  12.5-25 mg Enteral Tube Q4HRS PRN Nii Rivera M.D.       • propofol (DIPRIVAN) injection  0-80 mcg/kg/min Intravenous Continuous Harjinder Crowe M.D. 31.8 mL/hr at 09/09/20 1650 30 mcg/kg/min at 09/09/20 1650   • enoxaparin (LOVENOX) injection 150 mg  150 mg Subcutaneous Q12HRS Harjinder Crowe M.D.   150 mg at 09/09/20 1657   • Respiratory Therapy Consult   Nebulization Continuous RT Harjinder Crowe M.D.       • ipratropium-albuterol (DUONEB) nebulizer solution  3 mL Nebulization Q2HRS PRN (RT) Harjinder Crowe M.D.       • famotidine (PEPCID) tablet 20 mg  20 mg Enteral Tube Q12HRS Nii Rivera M.D.   20 mg at 09/09/20 1658   • senna-docusate (PERICOLACE or SENOKOT S) 8.6-50 MG per tablet 2 Tab  2 Tab Enteral Tube BID Harjinder Crowe M.D.   2 Tab at 09/09/20 1658    And   • polyethylene glycol/lytes (MIRALAX) PACKET 1 Packet  1 Packet Enteral Tube QDAY PRN Harjinder Crowe M.D.   1 Packet at 09/06/20 2208    And   • magnesium hydroxide (MILK OF MAGNESIA) suspension 30 mL  30 mL Enteral Tube QDAY PRN Harjinder Crowe M.D.        And   • bisacodyl (DULCOLAX) suppository 10 mg  10 mg Rectal QDAY PRN Harjinder Crowe M.D.   10 mg at 09/08/20 0713   • MD Alert...ICU Electrolyte Replacement per Pharmacy   Other PHARMACY TO DOSE Harjinder Crowe M.D.       • lidocaine (XYLOCAINE) 1 % injection 1-2 mL  1-2 mL Tracheal Tube Q30 MIN PRN Harjinder Crowe M.D.       • labetalol (NORMODYNE/TRANDATE) injection 10-20 mg  10-20 mg Intravenous Q4HRS PRN Nii Rivera M.D.       • ondansetron (ZOFRAN) syringe/vial injection 4 mg  4 mg Intravenous Q4HRS PRN Nii Rivera M.D.   4 mg at 08/29/20  0552   • promethazine (PHENERGAN) suppository 12.5-25 mg  12.5-25 mg Rectal Q4HRS PRN Nii Rivera M.D.       • prochlorperazine (COMPAZINE) injection 5-10 mg  5-10 mg Intravenous Q4HRS PRN Nii Rivera M.D.           Fluids    Intake/Output Summary (Last 24 hours) at 9/9/2020 1732  Last data filed at 9/9/2020 1400  Gross per 24 hour   Intake 2608.75 ml   Output 2005 ml   Net 603.75 ml       Laboratory  Recent Labs     09/07/20  0311 09/08/20  0241 09/09/20  0432   ISTATAPH 7.276* 7.378* 7.355*   ISTATAPCO2 70.7* 50.8* 52.2*   ISTATAPO2 75 54* 63*   ISTATATCO2 35* 31 31   BEDYDUZ8IZX 92* 86* 90*   ISTATARTHCO3 33.0* 29.9* 29.2*   ISTATARTBE 3 4* 2   ISTATTEMP 101.1 F 100.2 F 38.6 C   ISTATFIO2 90 90 90   ISTATSPEC Arterial Arterial Arterial   ISTATAPHTC 7.257* 7.365* 7.332*   KVDOBLMS2VK 82 57* 70         Recent Labs     09/07/20 0420 09/08/20 0539 09/09/20  0517   SODIUM 137 136 133*   POTASSIUM 4.9 4.5 4.7   CHLORIDE 97 99 97   CO2 30 27 29   BUN 26* 32* 28*   CREATININE 0.51 0.41* 0.43*   CALCIUM 8.6 8.5 8.8     Recent Labs     09/07/20 0420 09/07/20  1550 09/08/20  0539 09/09/20  0517   ALTSGPT 27  --   --   --    ASTSGOT 23  --   --   --    ALKPHOSPHAT 81  --   --   --    TBILIRUBIN 1.2  --   --   --    PREALBUMIN  --  12.7*  --   --    GLUCOSE 134*  --  152* 131*     Recent Labs     09/07/20 0420 09/08/20  0539 09/09/20  0517   WBC 9.8 8.3 7.9   NEUTSPOLYS 77.30* 78.20* 76.50*   LYMPHOCYTES 5.70* 8.30* 7.90*   MONOCYTES 13.00 11.60 13.60*   EOSINOPHILS 3.00 0.80 0.90   BASOPHILS 0.40 0.50 0.60   ASTSGOT 23  --   --    ALTSGPT 27  --   --    ALKPHOSPHAT 81  --   --    TBILIRUBIN 1.2  --   --      Recent Labs     09/07/20  0420 09/08/20  0539 09/09/20  0517   RBC 5.18 4.59* 4.71   HEMOGLOBIN 15.7 14.0 14.5   HEMATOCRIT 52.9* 45.2 47.1   PLATELETCT 231 257 270       Imaging  X-Ray:  I have personally reviewed the images and compared with prior images.    Assessment/Plan  * Pneumonia due to COVID-19  virus  Assessment & Plan  Intubated 8/29    - Prone positioning.  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 Faust recently left the practice by his report  Will need to establish with a new primary care physician    Morbid obesity with BMI of 50.0-59.9, adult (HCC)- (present on admission)  Assessment & Plan  380 pounds, BMI 55    HLD (hyperlipidemia)- (present on admission)  Assessment & Plan  Continue statin         VTE:  Lovenox therapeutic for elevated d-dimer  Ulcer: H2 Antagonist  Lines: None     Lung protective ventilation strategies  Titrate ventilator prescription to optimize oxygenation, ventilation, and acid base balance.      I have performed a physical exam and reviewed and updated ROS and Plan today (9/9/2020). In review of yesterday's note (9/8/2020), there are no changes except as documented above.     Discussed patient condition and risk of morbidity and/or mortality with RN, RT, Pharmacy and Charge nurse / hot rounds     The patient remains critically ill.   Critical care time = 42  minutes in directly providing and coordinating critical care and extensive data review.  No time overlap and excludes procedures.

## 2020-09-10 NOTE — WOUND TEAM
[facility]  Inpatient Services  Wound and Skin Care progress note    Admission Date: 8/27/2020     Last order of IP CONSULT TO WOUND CARE was found on 9/3/2020 from Hospital Encounter on 8/27/2020     HPI, PMH, SH: Reviewed    Unit where seen by Wound Team: T630/00     WOUND CONSULT/FOLLOW UP RELATED TO:  Septum, bilateral knees     Self Report / Pain Level:  On vent, no s/sx of pain.        OBJECTIVE:  Patient on ICU bed. Supine. Knees and septum all open to air and offloaded from pressure. Cortrak in place, with anchorfast. Q2h turns in place.     WOUND TYPE, LOCATION, CHARACTERISTICS (Pressure Injuries: location, stage, POA or date identified)      Wound 09/03/20 Pressure Injury Knee Bilateral (Active)   Wound Image       Site Assessment Dry;Intact;Red;Purple;Brown    Periwound Assessment Clean;Dry;Intact    Margins Attached edges;Undefined edges    Closure Open to air    Drainage Amount None    Drainage Description RUBY    Treatments Offloading    Wound Cleansing Normal Saline Irrigation    Periwound Protectant Not Applicable    Dressing Cleansing/Solutions Not Applicable    Dressing Options Open to Air    Dressing Changed Observed    Dressing Status Intact    Dressing Change/Treatment Frequency Every 72 hrs, and As Needed    NEXT Dressing Change/Treatment Date 09/06/20    NEXT Weekly Photo (Inpatient Only) 09/17/20    Pressure Injury Stage DTPI    Wound Length (cm) 1.3 cm    Wound Width (cm) 2.5 cm    Wound Surface Area (cm^2) 3.25 cm^2    Shape irregular    Exposed Structures None    Number of days: 7       Wound 09/03/20 Pressure Injury septum (Active)   Wound Image      Site Assessment Purple;Red    Periwound Assessment Clean    Margins Defined edges    Closure None    Drainage Amount None    Drainage Description Serosanguineous    Treatments Site care    Wound Cleansing Approved Wound Cleanser    Periwound Protectant Not Applicable    Dressing Cleansing/Solutions Not Applicable    Dressing  Options Open to Air    NEXT Weekly Photo (Inpatient Only) 09/10/20    Pressure Injury Stage DTPI    Wound Length (cm) 0.4 cm    Wound Width (cm) 0.3 cm    Wound Surface Area (cm^2) 0.12 cm^2    Tunneling (cm) 0 cm    Shape circular    Wound Odor Other (Comments)    Exposed Structures None    Number of days: 7        Vascular:    MONIK:   No results found.    Lab Values:    Lab Results   Component Value Date/Time    WBC 8.0 09/10/2020 05:17 AM    RBC 4.68 (L) 09/10/2020 05:17 AM    HEMOGLOBIN 14.3 09/10/2020 05:17 AM    HEMATOCRIT 46.8 09/10/2020 05:17 AM    CREACTPROT 23.36 (H) 09/08/2020 05:39 AM    HBA1C 6.5 (H) 09/26/2018 01:20 AM        Culture Results show:  No results found for this or any previous visit (from the past 720 hour(s)).    INTERVENTIONS BY WOUND TEAM:  Patient and chart reviewed. In to see bilateral knees and septum. BIlateral knees open to air and offloaded from any pressure. Picture and measurement taken, kept open to air. Patient septum open to air and offloaded from pressure, picture and measurement taken, kept open to air. Bedside RN updated. Orders already placed.     Interdisciplinary consultation: Patient, Bedside RN    EVALUATION / RATIONALE FOR TREATMENT: Patient was being proned for multiple hours a day, he developed pressure injuries to septum and bilateral knees. Knees to continue to be offloaded, if patient being proned, needs mepilex in place to offload, otherwise okay to be open to air. Septum to be offloaded and open to air. Patient is at risk for further skin breakdown. Wound team will continue to follow weekly. Orders previously placed.      Goals: Steady decrease in wound area and depth weekly.    NURSING PLAN OF CARE ORDERS (X):    Dressing changes: See Dressing Care orders: X  Skin care: See Skin Care orders: X  Rectal tube care: See Rectal Tube Care orders:   Other orders:    WOUND TEAM PLAN OF CARE:   Dressing changes by wound team:                   Follow up 3 times weekly:                 NPWT change 3 times weekly:     Follow up 1-2 times weekly: X nursing to perform skin/dsg care, WT following weekly.      Follow up Bi-Monthly:                   Follow up as needed:       Other (explain):     Anticipated discharge plans: TBD  LTACH:        SNF/Rehab:                  Home Health Care:           Outpatient Wound Center:            Self Care:

## 2020-09-10 NOTE — PROGRESS NOTES
Critical Care Progress Note    Date of admission  8/27/2020    Chief Complaint  60 y.o. male who presented 8/27/2020 with increasing dyspnea and hypoxia.  He has a history of morbid obesity, hyperlipidemia, diabetes and obstructive sleep apnea, noncompliant with treatment.  He uses oxygen 2 L via nasal cannula for chronic hypoxia.  Today he called EMS for increasing dyspnea over the past 24 hours.  He reports he is had increased cough and yellow sputum production with small amount of blood in his sputum over the past 24 hours.  On EMS arrival patient was profoundly hypoxic with a saturation of 50%.  He was placed on nonrebreather and brought to the ED.  He desaturates very quickly on nonrebreather and is in moderate respiratory distress.  Chest x-ray showed diffuse right-sided pneumonia/consolidation.  He was treated for community-acquired pneumonia with ceftriaxone and azithromycin as well as IV methylprednisolone.  He denies fevers or chills.  He he lives alone and tells me has been taking appropriate precautions when he goes out of the house wearing his mask and has no known exposures to COVID-19.  He is a nondrinker and non-smoker.     Screening SARS-CoV-2 PCR came back positive in ED.  He received 600 cc of crystalloid and the sepsis fluid bolus that had been ordered was discontinued.  He is not currently hypotensive and appears relatively euvolemic.  Lactic acid 1.7.  With the positive COVID-19 test I decreased IV fluid, ordered dexamethasone 6 mg daily x10 days, will admit to isolation ICU.  He will be placed on high flow nasal cannula/BiPAP with high risk for requiring intubation.  We will follow-up inflammatory markers and initiate full dose anticoagulation if appropriate.    Hospital Course   8/29-worsening mental status, rapid shallow breathing and hypoxia required endotracheal intubation and prone positioning.  8/30-I spoke with Brittney, the patient's daughter and surrogate decision maker about  initiating Remdesivir.  We discussed the risks benefits and alternatives.  All questions were answered.  She consented to initiating Remdesivir today.  8/31 -art line placed, remains on PCV, transition off rocuronium drip as tolerated, propofol/fentanyl, continue prone protocol  9/1 - remains on pressure control ventilation, PEEP 19, improved P/F ratio with prone ventilation, off rocuronium, starting trophic tube feed, full dose anticoagulation with Lovenox, day 3/5 Remdesivir, day 5 dexamethasone  9/4 - stopped prone ventilation with some skin breakdown, difficult prone protocol with morbid obesity, full ventilator support, advancing nutrition  9/5 - Ongoing diuresis with 3 times daily Lasix, advancing tube feeds to goal, remains on supine ventilation now  9/6 - PCV, weaning IP and PEEP, remains supine, ongoing aggressive diuresis, abdi TF, lighten sedation as abdi  9/7 Switch to VC, switched from fentanyl to Dilaudid with improved ventilator synchrony, decrease Lasix to daily; stopped amlodipine as he is now on norepinephrine to achieve a map greater than 65  9/8 continue full vent support, compliance gradually improving  9/10 Febrile: urine, blood, sputum cultures, continue full ventilator support          Interval Progress  Reviewed last 24 hour events:  Ventilator synchrony on sedation  Continues to have good urine output  SR/ST    Review of Systems  Review of Systems   Unable to perform ROS: Intubated        Vital Signs for last 24 hours   Temp:  [38.6 °C (101.5 °F)] 38.6 °C (101.5 °F)  Pulse:  [] 86  Resp:  [29-33] 32  BP: (64-71)/(21-25) 70/25  SpO2:  [84 %-96 %] 93 %    Hemodynamic parameters for last 24 hours       Respiratory Information for the last 24 hours  Vent Mode: APVCMV  Rate (breaths/min): 32  Vt Target (mL): 400  PEEP/CPAP: 16  MAP: 20  Control VTE (exp VT): 405    Physical Exam   Physical Exam  Constitutional:       General: He is not in acute distress.     Appearance: He is ill-appearing.    HENT:      Mouth/Throat:      Mouth: Mucous membranes are moist.   Eyes:      Pupils: Pupils are equal, round, and reactive to light.   Neck:      Musculoskeletal: Neck supple.   Cardiovascular:      Rate and Rhythm: Regular rhythm.      Heart sounds: No murmur. No friction rub. No gallop.    Pulmonary:      Effort: No respiratory distress.      Breath sounds: No rhonchi.      Comments: Breath sounds are diminished throughout.  Abdominal:      General: There is no distension.      Tenderness: There is no abdominal tenderness.      Comments: Obese    Musculoskeletal:      Right lower leg: No edema.      Left lower leg: No edema.   Skin:     General: Skin is warm and dry.   Neurological:      General: No focal deficit present.      Comments: Sedated with propofol and fentanyl, does arouse and follow commands with sedation titration.  Remains off rocuronium.  Weak extremities but does move   Psychiatric:      Comments: Unable to assess         Medications  Current Facility-Administered Medications   Medication Dose Route Frequency Provider Last Rate Last Dose   • norepinephrine (Levophed) infusion 8 mg/250 mL (premix)  0-30 mcg/min Intravenous Continuous Jorge Sanchez M.D. 1.9 mL/hr at 09/10/20 1450 1 mcg/min at 09/10/20 1450   • HYDROmorphone (DILAUDID) 0.2 mg/mL in 50mL NS (Continuous Infusion)   Intravenous Continuous Jorge Sanchez M.D. 10 mL/hr at 09/10/20 1234 2 mg/hr at 09/10/20 1234   • HYDROmorphone pf (DILAUDID) injection 0.75 mg  0.75 mg Intravenous Q30 MIN PRN Jorge Sanchez M.D.        And   • HYDROmorphone (DILAUDID) injection 1.5 mg  1.5 mg Intravenous Q30 MIN PRN Jorge Sanchez M.D.       • atorvastatin (LIPITOR) tablet 20 mg  20 mg Enteral Tube DAILY Nii Rivera M.D.   20 mg at 09/10/20 0448   • ondansetron (ZOFRAN ODT) dispertab 4 mg  4 mg Enteral Tube Q4HRS PRN Nii Rivera M.D.       • Pharmacy Consult: Enteral tube insertion - review meds/change route/product selection  1 Each  Other PHARMACY TO DOSE Nii Rivera M.D.       • aspirin (ASA) chewable tab 81 mg  81 mg Enteral Tube DAILY Nii Rivera M.D.   81 mg at 09/10/20 0448   • acetaminophen (TYLENOL) tablet 650 mg  650 mg Enteral Tube Q6HRS PRN Nii Rivera M.D.   650 mg at 09/10/20 1053   • HYDROcodone/acetaminophen (NORCO)  MG per tablet 1 Tab  1 Tab Enteral Tube Q6HRS PRN Nii Rivera M.D.       • promethazine (PHENERGAN) tablet 12.5-25 mg  12.5-25 mg Enteral Tube Q4HRS PRN Nii Rivera M.D.       • propofol (DIPRIVAN) injection  0-80 mcg/kg/min Intravenous Continuous Harjinder Crowe M.D. 31.8 mL/hr at 09/10/20 1619 30 mcg/kg/min at 09/10/20 1619   • enoxaparin (LOVENOX) injection 150 mg  150 mg Subcutaneous Q12HRS Harjinder Crowe M.D.   150 mg at 09/10/20 0448   • Respiratory Therapy Consult   Nebulization Continuous RT Harjinder Crowe M.D.       • ipratropium-albuterol (DUONEB) nebulizer solution  3 mL Nebulization Q2HRS PRN (RT) Harjinder Crowe M.D.       • famotidine (PEPCID) tablet 20 mg  20 mg Enteral Tube Q12HRS Nii Rivera M.D.   20 mg at 09/10/20 0447   • senna-docusate (PERICOLACE or SENOKOT S) 8.6-50 MG per tablet 2 Tab  2 Tab Enteral Tube BID Harjinder Crowe M.D.   2 Tab at 09/10/20 0448    And   • polyethylene glycol/lytes (MIRALAX) PACKET 1 Packet  1 Packet Enteral Tube QDAY PRN Harjinder Crowe M.D.   1 Packet at 09/06/20 2208    And   • magnesium hydroxide (MILK OF MAGNESIA) suspension 30 mL  30 mL Enteral Tube QDAY PRN Harjinder Crowe M.D.        And   • bisacodyl (DULCOLAX) suppository 10 mg  10 mg Rectal QDAY PRN Harjinder Crowe M.D.   10 mg at 09/08/20 0713   • MD Alert...ICU Electrolyte Replacement per Pharmacy   Other PHARMACY TO DOSE Harjinder Crowe M.D.       • lidocaine (XYLOCAINE) 1 % injection 1-2 mL  1-2 mL Tracheal Tube Q30 MIN PRN Harjinder Crowe M.D.       • labetalol (NORMODYNE/TRANDATE) injection 10-20 mg  10-20 mg Intravenous Q4HRS PRN Nii Rivera M.D.       • ondansetron  (ZOFRAN) syringe/vial injection 4 mg  4 mg Intravenous Q4HRS PRN Nii Rivera M.D.   4 mg at 08/29/20 0552   • promethazine (PHENERGAN) suppository 12.5-25 mg  12.5-25 mg Rectal Q4HRS PRN Nii Rivera M.D.       • prochlorperazine (COMPAZINE) injection 5-10 mg  5-10 mg Intravenous Q4HRS PRN Nii Rivera M.D.           Fluids    Intake/Output Summary (Last 24 hours) at 9/10/2020 1732  Last data filed at 9/10/2020 1619  Gross per 24 hour   Intake 1805.2 ml   Output 1620 ml   Net 185.2 ml       Laboratory  Recent Labs     09/08/20  0241 09/09/20  0432 09/10/20  0502   ISTATAPH 7.378* 7.355* 7.342*   ISTATAPCO2 50.8* 52.2* 54.8*   ISTATAPO2 54* 63* 61*   ISTATATCO2 31 31 31   SIAFLLO1QNE 86* 90* 89*   ISTATARTHCO3 29.9* 29.2* 29.7*   ISTATARTBE 4* 2 3   ISTATTEMP 100.2 F 38.6 C 38.8 C   ISTATFIO2 90 90 85   ISTATSPEC Arterial Arterial Arterial   ISTATAPHTC 7.365* 7.332* 7.316*   ZDYDOXPR4LL 57* 70 69         Recent Labs     09/08/20  0539 09/09/20  0517 09/10/20  0517   SODIUM 136 133* 138   POTASSIUM 4.5 4.7 5.1   CHLORIDE 99 97 100   CO2 27 29 28   BUN 32* 28* 32*   CREATININE 0.41* 0.43* 0.49*   CALCIUM 8.5 8.8 8.9     Recent Labs     09/08/20  0539 09/09/20  0517 09/10/20  0517   GLUCOSE 152* 131* 134*     Recent Labs     09/08/20  0539 09/09/20  0517 09/10/20  0517   WBC 8.3 7.9 8.0   NEUTSPOLYS 78.20* 76.50* 79.00*   LYMPHOCYTES 8.30* 7.90* 6.90*   MONOCYTES 11.60 13.60* 12.60   EOSINOPHILS 0.80 0.90 0.50   BASOPHILS 0.50 0.60 0.40     Recent Labs     09/08/20  0539 09/09/20  0517 09/10/20  0517   RBC 4.59* 4.71 4.68*   HEMOGLOBIN 14.0 14.5 14.3   HEMATOCRIT 45.2 47.1 46.8   PLATELETCT 257 270 240       Imaging  X-Ray:  I have personally reviewed the images and compared with prior images.    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; now d/c'd and passive on vent with sedation  No longer proning 2/2 skin breakdown/injury  Lung protective ventilation strategies        Diabetes type 2, controlled (HCC)- (present on admission)  Assessment & Plan  Monitor glycemic control with insulin therapy    Morbid obesity with BMI of 50.0-59.9, adult (HCC)- (present on admission)  Assessment & Plan  380 pounds, BMI 55    HLD (hyperlipidemia)- (present on admission)  Assessment & Plan  Continue statin         VTE:  Lovenox therapeutic for elevated d-dimer  Ulcer: H2 Antagonist  Lines: None     Lung protective ventilation strategies  Titrate ventilator prescription to optimize oxygenation, ventilation, and acid base balance.      I have performed a physical exam and reviewed and updated ROS and Plan today (9/10/2020). In review of yesterday's note (9/9/2020), there are no changes except as documented above.     Discussed patient condition and risk of morbidity and/or mortality with RN, RT, Pharmacy and Charge nurse / hot rounds     The patient remains critically ill.   Critical care time = 32 minutes in directly providing and coordinating critical care and extensive data review.  No time overlap and excludes procedures.

## 2020-09-10 NOTE — PROGRESS NOTES
1845: Bedside report received from NIKOLAY Menard. Patient appears to be sleeping. Cardiac monitor- SR, ETT to vent, patient care assumed by myself at this time, Will continue to monitor patient.   2000: Complete assessment done per flow sheet, see flow sheet for further details. See MAR for infusing medications. ADL's performed- see flow sheet for details.  2200: ADL's performed- see flow sheet for details.   2213: Care plan and education addressed.   0000: Complete reassessment done per flow sheet, see flow sheet for further details, no acute changes noted from previous assessment. Cardiac monitor- SR. ETT to vent at 85% FiO2.  ADL's performed- see flow sheet for further details.   0200: ADL's performed  0300: Patient had a BM, complete bath and bed linen change done at this time.   0400: Complete reassessment done per flow sheet, see assessment for further details. No acute changes noted from previous assessment. See MAR for infusing medication details. No titrations done at this time. ADL's performed.   0517: AM blood work obtained per MD orders  0600: ADL's performed, see flow sheet for details. No titrations done with drips at this time, see MAR for details.  0645: Bedside report given to oncleny day shift RNNitin. All questions answered. Patient care assumed by day shift nurse at this time.

## 2020-09-10 NOTE — CARE PLAN
Problem: Ventilation Defect:  Goal: Ability to achieve and maintain unassisted ventilation or tolerate decreased levels of ventilator support  Outcome: PROGRESSING AS EXPECTED      Ventilator Daily Summary    Vent Day #12    Ventilator settings changed this shift: Fio2 to 85%    Weaning trials: none     Respiratory Procedures: none     Plan: Continue current ventilator settings and wean mechanical ventilation as tolerated per physician orders.

## 2020-09-10 NOTE — CARE PLAN
Problem: Pain Management  Goal: Pain level will decrease to patient's comfort goal  Outcome: PROGRESSING AS EXPECTED  Note: Monitoring and assessing pain every 2 hours, pain medication drip in use at this time.      Problem: Safety - Medical Restraint  Goal: Remains free of injury from restraints (Restraint for Interference with Medical Device)  Description: INTERVENTIONS:  1. Determine that other, less restrictive measures have been tried or would not be effective before applying the restraint  2. Evaluate the patient's condition at the time of restraint application  3. Inform patient/family regarding the reason for restraint  4. Q2H: Monitor safety, psychosocial status, comfort, nutrition and hydration  Outcome: PROGRESSING AS EXPECTED  Note: Assessing and monitoring restraints every 2 hours, see flow sheet for details.      Problem: Bowel/Gastric:  Goal: Normal bowel function is maintained or improved  Outcome: PROGRESSING SLOWER THAN EXPECTED  Note: Progressive bowel regimen in use, monitoring abdomen and bowel sounds closely.

## 2020-09-11 ENCOUNTER — APPOINTMENT (OUTPATIENT)
Dept: RADIOLOGY | Facility: MEDICAL CENTER | Age: 60
DRG: 004 | End: 2020-09-11
Attending: INTERNAL MEDICINE
Payer: MEDICARE

## 2020-09-11 PROBLEM — E78.5 HLD (HYPERLIPIDEMIA): Status: RESOLVED | Noted: 2018-09-26 | Resolved: 2020-09-11

## 2020-09-11 PROBLEM — A41.9 SEPTIC SHOCK DUE TO UNDETERMINED ORGANISM (HCC): Status: ACTIVE | Noted: 2020-09-11

## 2020-09-11 PROBLEM — E66.01 MORBID OBESITY WITH BMI OF 50.0-59.9, ADULT (HCC): Status: RESOLVED | Noted: 2019-01-04 | Resolved: 2020-09-11

## 2020-09-11 PROBLEM — R65.21 SEPTIC SHOCK DUE TO UNDETERMINED ORGANISM (HCC): Status: ACTIVE | Noted: 2020-09-11

## 2020-09-11 LAB
ACTION RANGE TRIGGERED IACRT: YES
ANION GAP SERPL CALC-SCNC: 11 MMOL/L (ref 7–16)
BASE EXCESS BLDA CALC-SCNC: 2 MMOL/L (ref -4–3)
BASOPHILS # BLD AUTO: 0.4 % (ref 0–1.8)
BASOPHILS # BLD: 0.03 K/UL (ref 0–0.12)
BODY TEMPERATURE: ABNORMAL DEGREES
BUN SERPL-MCNC: 52 MG/DL (ref 8–22)
CALCIUM SERPL-MCNC: 8.5 MG/DL (ref 8.5–10.5)
CHLORIDE SERPL-SCNC: 99 MMOL/L (ref 96–112)
CO2 BLDA-SCNC: 31 MMOL/L (ref 20–33)
CO2 SERPL-SCNC: 26 MMOL/L (ref 20–33)
CREAT SERPL-MCNC: 0.61 MG/DL (ref 0.5–1.4)
EOSINOPHIL # BLD AUTO: 0.09 K/UL (ref 0–0.51)
EOSINOPHIL NFR BLD: 1.2 % (ref 0–6.9)
ERYTHROCYTE [DISTWIDTH] IN BLOOD BY AUTOMATED COUNT: 57.3 FL (ref 35.9–50)
GLUCOSE SERPL-MCNC: 125 MG/DL (ref 65–99)
HCO3 BLDA-SCNC: 29.1 MMOL/L (ref 17–25)
HCT VFR BLD AUTO: 42.6 % (ref 42–52)
HGB BLD-MCNC: 13.1 G/DL (ref 14–18)
HOROWITZ INDEX BLDA+IHG-RTO: 62 MM[HG]
IMM GRANULOCYTES # BLD AUTO: 0.09 K/UL (ref 0–0.11)
IMM GRANULOCYTES NFR BLD AUTO: 1.2 % (ref 0–0.9)
INST. QUALIFIED PATIENT IIQPT: YES
LYMPHOCYTES # BLD AUTO: 0.6 K/UL (ref 1–4.8)
LYMPHOCYTES NFR BLD: 8.1 % (ref 22–41)
MAGNESIUM SERPL-MCNC: 2.8 MG/DL (ref 1.5–2.5)
MCH RBC QN AUTO: 30.6 PG (ref 27–33)
MCHC RBC AUTO-ENTMCNC: 30.8 G/DL (ref 33.7–35.3)
MCV RBC AUTO: 99.5 FL (ref 81.4–97.8)
MONOCYTES # BLD AUTO: 0.96 K/UL (ref 0–0.85)
MONOCYTES NFR BLD AUTO: 13 % (ref 0–13.4)
NEUTROPHILS # BLD AUTO: 5.64 K/UL (ref 1.82–7.42)
NEUTROPHILS NFR BLD: 76.1 % (ref 44–72)
NRBC # BLD AUTO: 0 K/UL
NRBC BLD-RTO: 0 /100 WBC
O2/TOTAL GAS SETTING VFR VENT: 100 %
PCO2 BLDA: 58.1 MMHG (ref 26–37)
PCO2 TEMP ADJ BLDA: 62 MMHG (ref 26–37)
PH BLDA: 7.31 [PH] (ref 7.4–7.5)
PH TEMP ADJ BLDA: 7.29 [PH] (ref 7.4–7.5)
PHOSPHATE SERPL-MCNC: 4.2 MG/DL (ref 2.5–4.5)
PLATELET # BLD AUTO: 263 K/UL (ref 164–446)
PMV BLD AUTO: 10.5 FL (ref 9–12.9)
PO2 BLDA: 62 MMHG (ref 64–87)
PO2 TEMP ADJ BLDA: 68 MMHG (ref 64–87)
POTASSIUM SERPL-SCNC: 5.1 MMOL/L (ref 3.6–5.5)
RBC # BLD AUTO: 4.28 M/UL (ref 4.7–6.1)
SAO2 % BLDA: 88 % (ref 93–99)
SCCMEC + MECA PNL NOSE NAA+PROBE: NEGATIVE
SCCMEC + MECA PNL NOSE NAA+PROBE: NEGATIVE
SODIUM SERPL-SCNC: 136 MMOL/L (ref 135–145)
SPECIMEN DRAWN FROM PATIENT: ABNORMAL
WBC # BLD AUTO: 7.4 K/UL (ref 4.8–10.8)

## 2020-09-11 PROCEDURE — 94003 VENT MGMT INPAT SUBQ DAY: CPT

## 2020-09-11 PROCEDURE — 99291 CRITICAL CARE FIRST HOUR: CPT | Performed by: INTERNAL MEDICINE

## 2020-09-11 PROCEDURE — A9270 NON-COVERED ITEM OR SERVICE: HCPCS | Performed by: INTERNAL MEDICINE

## 2020-09-11 PROCEDURE — 770022 HCHG ROOM/CARE - ICU (200)

## 2020-09-11 PROCEDURE — 83735 ASSAY OF MAGNESIUM: CPT

## 2020-09-11 PROCEDURE — 84100 ASSAY OF PHOSPHORUS: CPT

## 2020-09-11 PROCEDURE — 87640 STAPH A DNA AMP PROBE: CPT

## 2020-09-11 PROCEDURE — 87186 SC STD MICRODIL/AGAR DIL: CPT

## 2020-09-11 PROCEDURE — 700111 HCHG RX REV CODE 636 W/ 250 OVERRIDE (IP): Performed by: INTERNAL MEDICINE

## 2020-09-11 PROCEDURE — 85025 COMPLETE CBC W/AUTO DIFF WBC: CPT

## 2020-09-11 PROCEDURE — 700105 HCHG RX REV CODE 258

## 2020-09-11 PROCEDURE — 80048 BASIC METABOLIC PNL TOTAL CA: CPT

## 2020-09-11 PROCEDURE — 87086 URINE CULTURE/COLONY COUNT: CPT

## 2020-09-11 PROCEDURE — 82803 BLOOD GASES ANY COMBINATION: CPT

## 2020-09-11 PROCEDURE — 700102 HCHG RX REV CODE 250 W/ 637 OVERRIDE(OP): Performed by: INTERNAL MEDICINE

## 2020-09-11 PROCEDURE — 37799 UNLISTED PX VASCULAR SURGERY: CPT

## 2020-09-11 PROCEDURE — 87070 CULTURE OTHR SPECIMN AEROBIC: CPT

## 2020-09-11 PROCEDURE — 87641 MR-STAPH DNA AMP PROBE: CPT

## 2020-09-11 PROCEDURE — 87077 CULTURE AEROBIC IDENTIFY: CPT

## 2020-09-11 PROCEDURE — 94770 HCHG CO2 EXPIRED GAS DETERMINATION: CPT

## 2020-09-11 PROCEDURE — 71045 X-RAY EXAM CHEST 1 VIEW: CPT

## 2020-09-11 PROCEDURE — 700105 HCHG RX REV CODE 258: Performed by: INTERNAL MEDICINE

## 2020-09-11 RX ORDER — SODIUM CHLORIDE 9 MG/ML
INJECTION, SOLUTION INTRAVENOUS
Status: ACTIVE
Start: 2020-09-11 | End: 2020-09-12

## 2020-09-11 RX ORDER — SODIUM CHLORIDE 9 MG/ML
INJECTION, SOLUTION INTRAVENOUS
Status: COMPLETED
Start: 2020-09-11 | End: 2020-09-11

## 2020-09-11 RX ORDER — SODIUM CHLORIDE, SODIUM LACTATE, POTASSIUM CHLORIDE, AND CALCIUM CHLORIDE .6; .31; .03; .02 G/100ML; G/100ML; G/100ML; G/100ML
500 INJECTION, SOLUTION INTRAVENOUS ONCE
Status: COMPLETED | OUTPATIENT
Start: 2020-09-11 | End: 2020-09-11

## 2020-09-11 RX ORDER — SODIUM CHLORIDE, SODIUM LACTATE, POTASSIUM CHLORIDE, CALCIUM CHLORIDE 600; 310; 30; 20 MG/100ML; MG/100ML; MG/100ML; MG/100ML
INJECTION, SOLUTION INTRAVENOUS
Status: ACTIVE
Start: 2020-09-11 | End: 2020-09-12

## 2020-09-11 RX ADMIN — ASPIRIN 81 MG 81 MG: 81 TABLET ORAL at 05:42

## 2020-09-11 RX ADMIN — ATORVASTATIN CALCIUM 20 MG: 20 TABLET, FILM COATED ORAL at 05:42

## 2020-09-11 RX ADMIN — ENOXAPARIN SODIUM 150 MG: 150 INJECTION SUBCUTANEOUS at 17:36

## 2020-09-11 RX ADMIN — PROPOFOL 35 MCG/KG/MIN: 10 INJECTION, EMULSION INTRAVENOUS at 11:47

## 2020-09-11 RX ADMIN — Medication 2 MG/HR: at 03:41

## 2020-09-11 RX ADMIN — SODIUM CHLORIDE, POTASSIUM CHLORIDE, SODIUM LACTATE AND CALCIUM CHLORIDE 500 ML: 600; 310; 30; 20 INJECTION, SOLUTION INTRAVENOUS at 16:00

## 2020-09-11 RX ADMIN — FAMOTIDINE 20 MG: 20 TABLET, FILM COATED ORAL at 17:35

## 2020-09-11 RX ADMIN — Medication 2 MG: at 09:23

## 2020-09-11 RX ADMIN — ACETAMINOPHEN 650 MG: 325 TABLET, FILM COATED ORAL at 05:42

## 2020-09-11 RX ADMIN — PROPOFOL 35 MCG/KG/MIN: 10 INJECTION, EMULSION INTRAVENOUS at 21:16

## 2020-09-11 RX ADMIN — PIPERACILLIN AND TAZOBACTAM 3.38 G: 3; .375 INJECTION, POWDER, LYOPHILIZED, FOR SOLUTION INTRAVENOUS; PARENTERAL at 11:47

## 2020-09-11 RX ADMIN — ACETAMINOPHEN 650 MG: 325 TABLET, FILM COATED ORAL at 17:35

## 2020-09-11 RX ADMIN — ENOXAPARIN SODIUM 150 MG: 150 INJECTION SUBCUTANEOUS at 05:42

## 2020-09-11 RX ADMIN — Medication 10 MG: at 16:29

## 2020-09-11 RX ADMIN — PROPOFOL 35 MCG/KG/MIN: 10 INJECTION, EMULSION INTRAVENOUS at 15:14

## 2020-09-11 RX ADMIN — FAMOTIDINE 20 MG: 20 TABLET, FILM COATED ORAL at 05:42

## 2020-09-11 RX ADMIN — PROPOFOL 40 MCG/KG/MIN: 10 INJECTION, EMULSION INTRAVENOUS at 06:29

## 2020-09-11 RX ADMIN — DOCUSATE SODIUM 50 MG AND SENNOSIDES 8.6 MG 2 TABLET: 8.6; 5 TABLET, FILM COATED ORAL at 17:35

## 2020-09-11 RX ADMIN — PROPOFOL 40 MCG/KG/MIN: 10 INJECTION, EMULSION INTRAVENOUS at 04:18

## 2020-09-11 RX ADMIN — PIPERACILLIN AND TAZOBACTAM 3.38 G: 3; .375 INJECTION, POWDER, LYOPHILIZED, FOR SOLUTION INTRAVENOUS; PARENTERAL at 08:12

## 2020-09-11 RX ADMIN — PROPOFOL 35 MCG/KG/MIN: 10 INJECTION, EMULSION INTRAVENOUS at 17:36

## 2020-09-11 RX ADMIN — Medication 10 MG: at 22:55

## 2020-09-11 RX ADMIN — PROPOFOL 40 MCG/KG/MIN: 10 INJECTION, EMULSION INTRAVENOUS at 01:45

## 2020-09-11 RX ADMIN — SODIUM CHLORIDE: 9 INJECTION, SOLUTION INTRAVENOUS at 08:45

## 2020-09-11 RX ADMIN — PROPOFOL 35 MCG/KG/MIN: 10 INJECTION, EMULSION INTRAVENOUS at 09:23

## 2020-09-11 RX ADMIN — PIPERACILLIN AND TAZOBACTAM 3.38 G: 3; .375 INJECTION, POWDER, LYOPHILIZED, FOR SOLUTION INTRAVENOUS; PARENTERAL at 20:48

## 2020-09-11 ASSESSMENT — FIBROSIS 4 INDEX: FIB4 SCORE: 0.98

## 2020-09-11 NOTE — CARE PLAN
Problem: Communication  Goal: The ability to communicate needs accurately and effectively will improve  Outcome: PROGRESSING AS EXPECTED     Problem: Safety  Goal: Will remain free from injury  Outcome: PROGRESSING AS EXPECTED  Goal: Will remain free from falls  Outcome: PROGRESSING AS EXPECTED     Problem: Infection  Goal: Will remain free from infection  Outcome: PROGRESSING AS EXPECTED     Problem: Venous Thromboembolism (VTW)/Deep Vein Thrombosis (DVT) Prevention:  Goal: Patient will participate in Venous Thrombosis (VTE)/Deep Vein Thrombosis (DVT)Prevention Measures  Outcome: PROGRESSING AS EXPECTED     Problem: Bowel/Gastric:  Goal: Normal bowel function is maintained or improved  Outcome: PROGRESSING AS EXPECTED  Goal: Will not experience complications related to bowel motility  Outcome: PROGRESSING AS EXPECTED     Problem: Knowledge Deficit  Goal: Knowledge of disease process/condition, treatment plan, diagnostic tests, and medications will improve  Outcome: PROGRESSING AS EXPECTED  Goal: Knowledge of the prescribed therapeutic regimen will improve  Outcome: PROGRESSING AS EXPECTED     Problem: Discharge Barriers/Planning  Goal: Patient's continuum of care needs will be met  Outcome: PROGRESSING AS EXPECTED     Problem: Fluid Volume:  Goal: Will maintain balanced intake and output  Outcome: PROGRESSING AS EXPECTED     Problem: Respiratory:  Goal: Respiratory status will improve  Outcome: PROGRESSING AS EXPECTED     Problem: Pain Management  Goal: Pain level will decrease to patient's comfort goal  Outcome: PROGRESSING AS EXPECTED     Problem: Skin Integrity  Goal: Risk for impaired skin integrity will decrease  Outcome: PROGRESSING AS EXPECTED     Problem: Safety - Medical Restraint  Goal: Remains free of injury from restraints (Restraint for Interference with Medical Device)  Description: INTERVENTIONS:  1. Determine that other, less restrictive measures have been tried or would not be effective before  applying the restraint  2. Evaluate the patient's condition at the time of restraint application  3. Inform patient/family regarding the reason for restraint  4. Q2H: Monitor safety, psychosocial status, comfort, nutrition and hydration  Outcome: PROGRESSING AS EXPECTED  Goal: Free from restraint(s) (Restraint for Interference with Medical Device)  Description: INTERVENTIONS:  1. ONCE/SHIFT or MINIMUM Q12H: Assess and document the continuing need for restraints  2. Q24H: Continued use of restraint requires LIP to perform face to face examination and written order  3. Identify and implement measures to help patient regain control  Outcome: PROGRESSING AS EXPECTED     Problem: Psychosocial Needs:  Goal: Level of anxiety will decrease  Outcome: PROGRESSING AS EXPECTED

## 2020-09-11 NOTE — CARE PLAN
Problem: Ventilation Defect:  Goal: Ability to achieve and maintain unassisted ventilation or tolerate decreased levels of ventilator support  Outcome: NOT MET   Vent day 13  32/400/+16, 100%

## 2020-09-11 NOTE — PROGRESS NOTES
Critical Care Progress Note    Date of admission  8/27/2020    Chief Complaint  60 y.o. male who presented 8/27/2020 with increasing dyspnea and hypoxia.  He has a history of morbid obesity, hyperlipidemia, diabetes and obstructive sleep apnea, noncompliant with treatment.  He uses oxygen 2 L via nasal cannula for chronic hypoxia.  Today he called EMS for increasing dyspnea over the past 24 hours.  He reports he is had increased cough and yellow sputum production with small amount of blood in his sputum over the past 24 hours.  On EMS arrival patient was profoundly hypoxic with a saturation of 50%.  He was placed on nonrebreather and brought to the ED.  He desaturates very quickly on nonrebreather and is in moderate respiratory distress.  Chest x-ray showed diffuse right-sided pneumonia/consolidation.  He was treated for community-acquired pneumonia with ceftriaxone and azithromycin as well as IV methylprednisolone.  He denies fevers or chills.  He he lives alone and tells me has been taking appropriate precautions when he goes out of the house wearing his mask and has no known exposures to COVID-19.  He is a nondrinker and non-smoker.     Screening SARS-CoV-2 PCR came back positive in ED.  He received 600 cc of crystalloid and the sepsis fluid bolus that had been ordered was discontinued.  He is not currently hypotensive and appears relatively euvolemic.  Lactic acid 1.7.  With the positive COVID-19 test I decreased IV fluid, ordered dexamethasone 6 mg daily x10 days, will admit to isolation ICU.  He will be placed on high flow nasal cannula/BiPAP with high risk for requiring intubation.  We will follow-up inflammatory markers and initiate full dose anticoagulation if appropriate.    Hospital Course   8/29-worsening mental status, rapid shallow breathing and hypoxia required endotracheal intubation and prone positioning.  8/30-I spoke with Brittney, the patient's daughter and surrogate decision maker about  initiating Remdesivir.  We discussed the risks benefits and alternatives.  All questions were answered.  She consented to initiating Remdesivir today.  8/31 -art line placed, remains on PCV, transition off rocuronium drip as tolerated, propofol/fentanyl, continue prone protocol  9/1 - remains on pressure control ventilation, PEEP 19, improved P/F ratio with prone ventilation, off rocuronium, starting trophic tube feed, full dose anticoagulation with Lovenox, day 3/5 Remdesivir, day 5 dexamethasone  9/4 - stopped prone ventilation with some skin breakdown, difficult prone protocol with morbid obesity, full ventilator support, advancing nutrition  9/5 - Ongoing diuresis with 3 times daily Lasix, advancing tube feeds to goal, remains on supine ventilation now  9/6 - PCV, weaning IP and PEEP, remains supine, ongoing aggressive diuresis, abdi TF, lighten sedation as abdi  9/7 Switch to VC, switched from fentanyl to Dilaudid with improved ventilator synchrony, decrease Lasix to daily; stopped amlodipine as he is now on norepinephrine to achieve a map greater than 65  9/8 continue full vent support, compliance gradually improving  9/10 Febrile: urine, blood, sputum cultures, continue full ventilator support  9/11 febrile, increased NE, urine culture, MRSA nare, then begin zosyn        Interval Progress  Reviewed last 24 hour events:  Ventilator synchrony on sedation  Continues to have good urine output  SR/ST  Febrile, urinalysis concerning for infection - start zosyn  Titrating NE for MAP > 65    Review of Systems  Review of Systems   Unable to perform ROS: Intubated        Vital Signs for last 24 hours   Temp:  [38.6 °C (101.5 °F)] 38.6 °C (101.5 °F)  Pulse:  [] 82  Resp:  [0-32] 32  BP: ()/(32-42) 94/35  SpO2:  [84 %-94 %] 89 %    Hemodynamic parameters for last 24 hours       Respiratory Information for the last 24 hours  Vent Mode: APVCMV  Rate (breaths/min): 32  Vt Target (mL): 400  PEEP/CPAP: 16  MAP:  21  Control VTE (exp VT): 402    Physical Exam   Physical Exam  Constitutional:       General: He is not in acute distress.     Appearance: He is ill-appearing.   HENT:      Mouth/Throat:      Mouth: Mucous membranes are moist.   Eyes:      Pupils: Pupils are equal, round, and reactive to light.   Neck:      Musculoskeletal: Neck supple.   Cardiovascular:      Rate and Rhythm: Regular rhythm.      Heart sounds: No murmur. No friction rub. No gallop.    Pulmonary:      Effort: No respiratory distress.      Breath sounds: No rhonchi.      Comments: Breath sounds are diminished throughout.  Abdominal:      General: There is no distension.      Tenderness: There is no abdominal tenderness.      Comments: Obese    Musculoskeletal:      Right lower leg: No edema.      Left lower leg: No edema.   Skin:     General: Skin is warm and dry.   Neurological:      General: No focal deficit present.      Comments: Sedated with propofol and dilaudid, localizes, does not follow. Able to move all 4 ext   Psychiatric:      Comments: Unable to assess         Medications  Current Facility-Administered Medications   Medication Dose Route Frequency Provider Last Rate Last Dose   • piperacillin-tazobactam (ZOSYN) 3.375 g in  mL IVPB  3.375 g Intravenous Q8HRS Jorge Sanchez M.D. 25 mL/hr at 09/11/20 1147 3.375 g at 09/11/20 1147   • lactated ringer BOLUS infusion  500 mL Intravenous Once Jorge Sanchez M.D.       • norepinephrine (Levophed) infusion 8 mg/250 mL (premix)  0-30 mcg/min Intravenous Continuous Jorge Sanchez M.D. 9.4 mL/hr at 09/11/20 1200 5 mcg/min at 09/11/20 1200   • HYDROmorphone (DILAUDID) 0.2 mg/mL in 50mL NS (Continuous Infusion)   Intravenous Continuous Jorge Sanchez M.D. 10 mL/hr at 09/11/20 0341 2 mg at 09/11/20 0923   • HYDROmorphone pf (DILAUDID) injection 0.75 mg  0.75 mg Intravenous Q30 MIN PRN Jorge Sanchez M.D.        And   • HYDROmorphone (DILAUDID) injection 1.5 mg  1.5 mg Intravenous  Q30 MIN PRN Jorge Sanchez M.D.       • atorvastatin (LIPITOR) tablet 20 mg  20 mg Enteral Tube DAILY Nii Rivera M.D.   20 mg at 09/11/20 0542   • ondansetron (ZOFRAN ODT) dispertab 4 mg  4 mg Enteral Tube Q4HRS PRN Nii Rivera M.D.       • Pharmacy Consult: Enteral tube insertion - review meds/change route/product selection  1 Each Other PHARMACY TO DOSE Nii Rivera M.D.       • aspirin (ASA) chewable tab 81 mg  81 mg Enteral Tube DAILY Nii Rivera M.D.   81 mg at 09/11/20 0542   • acetaminophen (TYLENOL) tablet 650 mg  650 mg Enteral Tube Q6HRS PRN Nii Rivera M.D.   650 mg at 09/11/20 0542   • HYDROcodone/acetaminophen (NORCO)  MG per tablet 1 Tab  1 Tab Enteral Tube Q6HRS PRN Nii Rivera M.D.       • promethazine (PHENERGAN) tablet 12.5-25 mg  12.5-25 mg Enteral Tube Q4HRS PRN Nii Rivera M.D.       • propofol (DIPRIVAN) injection  0-80 mcg/kg/min Intravenous Continuous Harjinder Crowe M.D. 37.1 mL/hr at 09/11/20 1147 35 mcg/kg/min at 09/11/20 1147   • enoxaparin (LOVENOX) injection 150 mg  150 mg Subcutaneous Q12HRS Harjinder Crowe M.D.   150 mg at 09/11/20 0542   • Respiratory Therapy Consult   Nebulization Continuous RT Harjinder Crowe M.D.       • ipratropium-albuterol (DUONEB) nebulizer solution  3 mL Nebulization Q2HRS PRN (RT) Harjinder Crowe M.D.       • famotidine (PEPCID) tablet 20 mg  20 mg Enteral Tube Q12HRS Nii Rivera M.D.   20 mg at 09/11/20 0542   • senna-docusate (PERICOLACE or SENOKOT S) 8.6-50 MG per tablet 2 Tab  2 Tab Enteral Tube BID Harjinder Crowe M.D.   Stopped at 09/10/20 1800    And   • polyethylene glycol/lytes (MIRALAX) PACKET 1 Packet  1 Packet Enteral Tube QDAY PRN Harjinder Crowe M.D.   1 Packet at 09/06/20 2208    And   • magnesium hydroxide (MILK OF MAGNESIA) suspension 30 mL  30 mL Enteral Tube QDAY PRN Harjinder Crowe M.D.        And   • bisacodyl (DULCOLAX) suppository 10 mg  10 mg Rectal QDAY PRN Harjinder Crowe M.D.   10 mg at  09/08/20 0713   • MD Alert...ICU Electrolyte Replacement per Pharmacy   Other PHARMACY TO DOSE Harjinder Crowe M.D.       • lidocaine (XYLOCAINE) 1 % injection 1-2 mL  1-2 mL Tracheal Tube Q30 MIN PRN Harijnder Crowe M.D.       • labetalol (NORMODYNE/TRANDATE) injection 10-20 mg  10-20 mg Intravenous Q4HRS PRN Nii Rivera M.D.       • ondansetron (ZOFRAN) syringe/vial injection 4 mg  4 mg Intravenous Q4HRS PRN Nii Rivera M.D.   4 mg at 08/29/20 0552   • promethazine (PHENERGAN) suppository 12.5-25 mg  12.5-25 mg Rectal Q4HRS PRN Nii Rivera M.D.       • prochlorperazine (COMPAZINE) injection 5-10 mg  5-10 mg Intravenous Q4HRS PRN Nii Rivera M.D.           Fluids    Intake/Output Summary (Last 24 hours) at 9/11/2020 1341  Last data filed at 9/11/2020 1200  Gross per 24 hour   Intake 2709.25 ml   Output 1570 ml   Net 1139.25 ml       Laboratory  Recent Labs     09/09/20  0432 09/10/20  0502 09/11/20  0158   ISTATAPH 7.355* 7.342* 7.308*   ISTATAPCO2 52.2* 54.8* 58.1*   ISTATAPO2 63* 61* 62*   ISTATATCO2 31 31 31   [id number] 90* 89* 88*   ISTATARTHCO3 29.2* 29.7* 29.1*   ISTATARTBE 2 3 2   ISTATTEMP 38.6 C 38.8 C 38.5 C   ISTATFIO2 90 85 100   ISTATSPEC Arterial Arterial Arterial   ISTATAPHTC 7.332* 7.316* 7.287*   [id number] 70 69 68         Recent Labs     09/09/20  0517 09/10/20  0517 09/11/20  0545   SODIUM 133* 138 136   POTASSIUM 4.7 5.1 5.1   CHLORIDE 97 100 99   CO2 29 28 26   BUN 28* 32* 52*   CREATININE 0.43* 0.49* 0.61   MAGNESIUM  --   --  2.8*   PHOSPHORUS  --   --  4.2   CALCIUM 8.8 8.9 8.5     Recent Labs     09/09/20  0517 09/10/20  0517 09/11/20  0545   GLUCOSE 131* 134* 125*     Recent Labs     09/09/20  0517 09/10/20  0517 09/11/20  0545   WBC 7.9 8.0 7.4   NEUTSPOLYS 76.50* 79.00* 76.10*   LYMPHOCYTES 7.90* 6.90* 8.10*   MONOCYTES 13.60* 12.60 13.00   EOSINOPHILS 0.90 0.50 1.20   BASOPHILS 0.60 0.40 0.40     Recent Labs     09/09/20  0517 09/10/20  0517 09/11/20  0545   RBC 4.71  4.68* 4.28*   HEMOGLOBIN 14.5 14.3 13.1*   HEMATOCRIT 47.1 46.8 42.6   PLATELETCT 270 240 263       Imaging  X-Ray:  I have personally reviewed the images and compared with prior images.    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; now d/c'd and passive on vent with sedation  No longer proning 2/2 skin breakdown/injury  Lung protective ventilation strategies  Ventilator bundle  Not a candidate for SBT        Diabetes type 2, controlled ([facility])- (present on admission)  Assessment & Plan  Monitor glycemic control with insulin therapy    Septic shock due to undetermined organism ([facility])  Assessment & Plan  This is Sepsis Not present on admission  SIRS criteria identified on my evaluation include: Fever, with temperature greater than 101 deg F and Tachycardia, with heart rate greater than 90 BPM  Source is likely urine  Sepsis protocol initiated - zosyn  Fluid resuscitation adjusted from per protocol - 500 cc LR  IV antibiotics as appropriate for source of sepsis  While organ dysfunction may be noted elsewhere in this problem list or in the chart, degree of organ dysfunction does not meet CMS criteria for severe sepsis    The patient has remained febrile, the urine is concerning for infection-this is been sent for culture.    Chest x-ray could also be demonstrating pneumonia  Empiric Zosyn started 9/11  Titrating NE to MAP > 65         VTE:  Lovenox therapeutic for elevated d-dimer  Ulcer: H2 Antagonist  Lines: None     Lung protective ventilation strategies  Titrate ventilator prescription to optimize  oxygenation, ventilation, and acid base balance.      I have performed a physical exam and reviewed and updated ROS and Plan today (9/11/2020). In review of yesterday's note (9/10/2020), there are no changes except as documented above.     Discussed patient condition and risk of morbidity and/or mortality with RN, RT, Pharmacy and Charge nurse / hot rounds     The patient remains critically ill.   Critical care time = 43 minutes in directly providing and coordinating critical care and extensive data review.  No time overlap and excludes procedures.

## 2020-09-12 ENCOUNTER — APPOINTMENT (OUTPATIENT)
Dept: RADIOLOGY | Facility: MEDICAL CENTER | Age: 60
DRG: 004 | End: 2020-09-12
Attending: INTERNAL MEDICINE
Payer: MEDICARE

## 2020-09-12 LAB
ACTION RANGE TRIGGERED IACRT: YES
ACTION RANGE TRIGGERED IACRT: YES
ANION GAP SERPL CALC-SCNC: 8 MMOL/L (ref 7–16)
BASE EXCESS BLDA CALC-SCNC: 0 MMOL/L (ref -4–3)
BASE EXCESS BLDA CALC-SCNC: 0 MMOL/L (ref -4–3)
BASOPHILS # BLD AUTO: 0.2 % (ref 0–1.8)
BASOPHILS # BLD: 0.02 K/UL (ref 0–0.12)
BODY TEMPERATURE: ABNORMAL DEGREES
BODY TEMPERATURE: ABNORMAL DEGREES
BUN SERPL-MCNC: 54 MG/DL (ref 8–22)
CALCIUM SERPL-MCNC: 8.5 MG/DL (ref 8.5–10.5)
CHLORIDE SERPL-SCNC: 101 MMOL/L (ref 96–112)
CO2 BLDA-SCNC: 30 MMOL/L (ref 20–33)
CO2 BLDA-SCNC: 30 MMOL/L (ref 20–33)
CO2 SERPL-SCNC: 27 MMOL/L (ref 20–33)
CREAT SERPL-MCNC: 0.5 MG/DL (ref 0.5–1.4)
EOSINOPHIL # BLD AUTO: 0.06 K/UL (ref 0–0.51)
EOSINOPHIL NFR BLD: 0.7 % (ref 0–6.9)
ERYTHROCYTE [DISTWIDTH] IN BLOOD BY AUTOMATED COUNT: 57.8 FL (ref 35.9–50)
GLUCOSE SERPL-MCNC: 146 MG/DL (ref 65–99)
HCO3 BLDA-SCNC: 27.9 MMOL/L (ref 17–25)
HCO3 BLDA-SCNC: 28.3 MMOL/L (ref 17–25)
HCT VFR BLD AUTO: 43.1 % (ref 42–52)
HGB BLD-MCNC: 13.1 G/DL (ref 14–18)
HOROWITZ INDEX BLDA+IHG-RTO: 83 MM[HG]
HOROWITZ INDEX BLDA+IHG-RTO: 86 MM[HG]
IMM GRANULOCYTES # BLD AUTO: 0.08 K/UL (ref 0–0.11)
IMM GRANULOCYTES NFR BLD AUTO: 0.9 % (ref 0–0.9)
INST. QUALIFIED PATIENT IIQPT: YES
INST. QUALIFIED PATIENT IIQPT: YES
LYMPHOCYTES # BLD AUTO: 0.58 K/UL (ref 1–4.8)
LYMPHOCYTES NFR BLD: 6.8 % (ref 22–41)
MCH RBC QN AUTO: 30.5 PG (ref 27–33)
MCHC RBC AUTO-ENTMCNC: 30.4 G/DL (ref 33.7–35.3)
MCV RBC AUTO: 100.2 FL (ref 81.4–97.8)
MONOCYTES # BLD AUTO: 0.93 K/UL (ref 0–0.85)
MONOCYTES NFR BLD AUTO: 10.9 % (ref 0–13.4)
NEUTROPHILS # BLD AUTO: 6.86 K/UL (ref 1.82–7.42)
NEUTROPHILS NFR BLD: 80.5 % (ref 44–72)
NRBC # BLD AUTO: 0 K/UL
NRBC BLD-RTO: 0 /100 WBC
O2/TOTAL GAS SETTING VFR VENT: 100 %
O2/TOTAL GAS SETTING VFR VENT: 100 %
PCO2 BLDA: 59.3 MMHG (ref 26–37)
PCO2 BLDA: 63.5 MMHG (ref 26–37)
PCO2 TEMP ADJ BLDA: 62.8 MMHG (ref 26–37)
PCO2 TEMP ADJ BLDA: 65.5 MMHG (ref 26–37)
PH BLDA: 7.26 [PH] (ref 7.4–7.5)
PH BLDA: 7.28 [PH] (ref 7.4–7.5)
PH TEMP ADJ BLDA: 7.25 [PH] (ref 7.4–7.5)
PH TEMP ADJ BLDA: 7.26 [PH] (ref 7.4–7.5)
PLATELET # BLD AUTO: 241 K/UL (ref 164–446)
PMV BLD AUTO: 10.6 FL (ref 9–12.9)
PO2 BLDA: 83 MMHG (ref 64–87)
PO2 BLDA: 86 MMHG (ref 64–87)
PO2 TEMP ADJ BLDA: 87 MMHG (ref 64–87)
PO2 TEMP ADJ BLDA: 94 MMHG (ref 64–87)
POTASSIUM SERPL-SCNC: 5.1 MMOL/L (ref 3.6–5.5)
RBC # BLD AUTO: 4.3 M/UL (ref 4.7–6.1)
SAO2 % BLDA: 94 % (ref 93–99)
SAO2 % BLDA: 95 % (ref 93–99)
SODIUM SERPL-SCNC: 136 MMOL/L (ref 135–145)
SPECIMEN DRAWN FROM PATIENT: ABNORMAL
SPECIMEN DRAWN FROM PATIENT: ABNORMAL
WBC # BLD AUTO: 8.5 K/UL (ref 4.8–10.8)

## 2020-09-12 PROCEDURE — 37799 UNLISTED PX VASCULAR SURGERY: CPT

## 2020-09-12 PROCEDURE — 770022 HCHG ROOM/CARE - ICU (200)

## 2020-09-12 PROCEDURE — 700111 HCHG RX REV CODE 636 W/ 250 OVERRIDE (IP): Performed by: INTERNAL MEDICINE

## 2020-09-12 PROCEDURE — 82803 BLOOD GASES ANY COMBINATION: CPT

## 2020-09-12 PROCEDURE — 71045 X-RAY EXAM CHEST 1 VIEW: CPT

## 2020-09-12 PROCEDURE — 700105 HCHG RX REV CODE 258: Performed by: INTERNAL MEDICINE

## 2020-09-12 PROCEDURE — 700102 HCHG RX REV CODE 250 W/ 637 OVERRIDE(OP): Performed by: INTERNAL MEDICINE

## 2020-09-12 PROCEDURE — 700111 HCHG RX REV CODE 636 W/ 250 OVERRIDE (IP): Mod: JG | Performed by: INTERNAL MEDICINE

## 2020-09-12 PROCEDURE — A9270 NON-COVERED ITEM OR SERVICE: HCPCS | Performed by: INTERNAL MEDICINE

## 2020-09-12 PROCEDURE — 85025 COMPLETE CBC W/AUTO DIFF WBC: CPT

## 2020-09-12 PROCEDURE — 80048 BASIC METABOLIC PNL TOTAL CA: CPT

## 2020-09-12 PROCEDURE — P9045 ALBUMIN (HUMAN), 5%, 250 ML: HCPCS | Mod: JG | Performed by: INTERNAL MEDICINE

## 2020-09-12 PROCEDURE — 94003 VENT MGMT INPAT SUBQ DAY: CPT

## 2020-09-12 PROCEDURE — 94770 HCHG CO2 EXPIRED GAS DETERMINATION: CPT

## 2020-09-12 PROCEDURE — 700101 HCHG RX REV CODE 250: Performed by: INTERNAL MEDICINE

## 2020-09-12 PROCEDURE — 99291 CRITICAL CARE FIRST HOUR: CPT | Performed by: INTERNAL MEDICINE

## 2020-09-12 RX ORDER — SODIUM CHLORIDE 9 MG/ML
INJECTION, SOLUTION INTRAVENOUS
Status: ACTIVE
Start: 2020-09-12 | End: 2020-09-12

## 2020-09-12 RX ORDER — ALBUMIN, HUMAN INJ 5% 5 %
12.5 SOLUTION INTRAVENOUS ONCE
Status: COMPLETED | OUTPATIENT
Start: 2020-09-12 | End: 2020-09-12

## 2020-09-12 RX ADMIN — Medication 10 MG: at 02:00

## 2020-09-12 RX ADMIN — PIPERACILLIN AND TAZOBACTAM 3.38 G: 3; .375 INJECTION, POWDER, LYOPHILIZED, FOR SOLUTION INTRAVENOUS; PARENTERAL at 20:34

## 2020-09-12 RX ADMIN — Medication 10 MG: at 22:34

## 2020-09-12 RX ADMIN — ASPIRIN 81 MG 81 MG: 81 TABLET ORAL at 05:17

## 2020-09-12 RX ADMIN — PIPERACILLIN AND TAZOBACTAM 3.38 G: 3; .375 INJECTION, POWDER, LYOPHILIZED, FOR SOLUTION INTRAVENOUS; PARENTERAL at 05:16

## 2020-09-12 RX ADMIN — DOCUSATE SODIUM 50 MG AND SENNOSIDES 8.6 MG 2 TABLET: 8.6; 5 TABLET, FILM COATED ORAL at 05:16

## 2020-09-12 RX ADMIN — Medication 1.5 MG/HR: at 15:34

## 2020-09-12 RX ADMIN — DOCUSATE SODIUM 50 MG AND SENNOSIDES 8.6 MG 2 TABLET: 8.6; 5 TABLET, FILM COATED ORAL at 17:16

## 2020-09-12 RX ADMIN — PROPOFOL 20 MCG/KG/MIN: 10 INJECTION, EMULSION INTRAVENOUS at 16:23

## 2020-09-12 RX ADMIN — NOREPINEPHRINE BITARTRATE 7 MCG/MIN: 1 INJECTION, SOLUTION, CONCENTRATE INTRAVENOUS at 04:40

## 2020-09-12 RX ADMIN — Medication 2 MG/HR: at 08:14

## 2020-09-12 RX ADMIN — ATORVASTATIN CALCIUM 20 MG: 20 TABLET, FILM COATED ORAL at 05:17

## 2020-09-12 RX ADMIN — PROPOFOL 25 MCG/KG/MIN: 10 INJECTION, EMULSION INTRAVENOUS at 08:13

## 2020-09-12 RX ADMIN — PROPOFOL 35 MCG/KG/MIN: 10 INJECTION, EMULSION INTRAVENOUS at 05:35

## 2020-09-12 RX ADMIN — FAMOTIDINE 20 MG: 20 TABLET, FILM COATED ORAL at 05:17

## 2020-09-12 RX ADMIN — FAMOTIDINE 20 MG: 20 TABLET, FILM COATED ORAL at 17:16

## 2020-09-12 RX ADMIN — POLYETHYLENE GLYCOL 3350 1 PACKET: 17 POWDER, FOR SOLUTION ORAL at 17:16

## 2020-09-12 RX ADMIN — ENOXAPARIN SODIUM 150 MG: 150 INJECTION SUBCUTANEOUS at 17:16

## 2020-09-12 RX ADMIN — PROPOFOL 35 MCG/KG/MIN: 10 INJECTION, EMULSION INTRAVENOUS at 00:13

## 2020-09-12 RX ADMIN — PROPOFOL 20 MCG/KG/MIN: 10 INJECTION, EMULSION INTRAVENOUS at 21:42

## 2020-09-12 RX ADMIN — PIPERACILLIN AND TAZOBACTAM 3.38 G: 3; .375 INJECTION, POWDER, LYOPHILIZED, FOR SOLUTION INTRAVENOUS; PARENTERAL at 12:15

## 2020-09-12 RX ADMIN — PROPOFOL 20 MCG/KG/MIN: 10 INJECTION, EMULSION INTRAVENOUS at 12:08

## 2020-09-12 RX ADMIN — ENOXAPARIN SODIUM 150 MG: 150 INJECTION SUBCUTANEOUS at 05:34

## 2020-09-12 RX ADMIN — ALBUMIN (HUMAN) 12.5 G: 2.5 SOLUTION INTRAVENOUS at 05:16

## 2020-09-12 RX ADMIN — PROPOFOL 35 MCG/KG/MIN: 10 INJECTION, EMULSION INTRAVENOUS at 03:15

## 2020-09-12 ASSESSMENT — PAIN DESCRIPTION - PAIN TYPE: TYPE: ACUTE PAIN

## 2020-09-12 ASSESSMENT — FIBROSIS 4 INDEX: FIB4 SCORE: 1.07

## 2020-09-12 NOTE — PROGRESS NOTES
Late entry:   pt SBP 90's-low 100's after titrating levo up. Pt drops  pressures 70's-80's while suctioning and when CXR was performed. MD notified at 0337. Orders to give 5% albumin x1.

## 2020-09-12 NOTE — PROGRESS NOTES
Critical Care Progress Note    Date of admission  8/27/2020    Chief Complaint  60 y.o. male who presented 8/27/2020 with increasing dyspnea and hypoxia.  He has a history of morbid obesity, hyperlipidemia, diabetes and obstructive sleep apnea, noncompliant with treatment.  He uses oxygen 2 L via nasal cannula for chronic hypoxia.  Today he called EMS for increasing dyspnea over the past 24 hours.  He reports he is had increased cough and yellow sputum production with small amount of blood in his sputum over the past 24 hours.  On EMS arrival patient was profoundly hypoxic with a saturation of 50%.  He was placed on nonrebreather and brought to the ED.  He desaturates very quickly on nonrebreather and is in moderate respiratory distress.  Chest x-ray showed diffuse right-sided pneumonia/consolidation.  He was treated for community-acquired pneumonia with ceftriaxone and azithromycin as well as IV methylprednisolone.  He denies fevers or chills.  He he lives alone and tells me has been taking appropriate precautions when he goes out of the house wearing his mask and has no known exposures to COVID-19.  He is a nondrinker and non-smoker.     Screening SARS-CoV-2 PCR came back positive in ED.  He received 600 cc of crystalloid and the sepsis fluid bolus that had been ordered was discontinued.  He is not currently hypotensive and appears relatively euvolemic.  Lactic acid 1.7.  With the positive COVID-19 test I decreased IV fluid, ordered dexamethasone 6 mg daily x10 days, will admit to isolation ICU.  He will be placed on high flow nasal cannula/BiPAP with high risk for requiring intubation.  We will follow-up inflammatory markers and initiate full dose anticoagulation if appropriate.    Hospital Course   8/29-worsening mental status, rapid shallow breathing and hypoxia required endotracheal intubation and prone positioning.  8/30-I spoke with Brittney, the patient's daughter and surrogate decision maker about  initiating Remdesivir.  We discussed the risks benefits and alternatives.  All questions were answered.  She consented to initiating Remdesivir today.  8/31 -art line placed, remains on PCV, transition off rocuronium drip as tolerated, propofol/fentanyl, continue prone protocol  9/1 - remains on pressure control ventilation, PEEP 19, improved P/F ratio with prone ventilation, off rocuronium, starting trophic tube feed, full dose anticoagulation with Lovenox, day 3/5 Remdesivir, day 5 dexamethasone  9/4 - stopped prone ventilation with some skin breakdown, difficult prone protocol with morbid obesity, full ventilator support, advancing nutrition  9/5 - Ongoing diuresis with 3 times daily Lasix, advancing tube feeds to goal, remains on supine ventilation now  9/6 - PCV, weaning IP and PEEP, remains supine, ongoing aggressive diuresis, abdi TF, lighten sedation as abdi  9/7 Switch to VC, switched from fentanyl to Dilaudid with improved ventilator synchrony, decrease Lasix to daily; stopped amlodipine as he is now on norepinephrine to achieve a map greater than 65  9/8 continue full vent support, compliance gradually improving  9/10 Febrile: urine, blood, sputum cultures, continue full ventilator support  9/11 febrile, increased NE, urine culture, MRSA nare, then begin zosyn  9/12 UA + for enterococcus; resp culture + Non-lactose fermenting GNR  - currently on zosyn; will await sensitivities         Interval Progress  Reviewed last 24 hour events:  Ventilator synchrony on sedation  Continues to have good urine output  SR/ST  ID - zosyn  Titrating NE for MAP > 65    Review of Systems  Review of Systems   Unable to perform ROS: Intubated        Vital Signs for last 24 hours   Temp:  [37.4 °C (99.3 °F)-37.6 °C (99.7 °F)] 37.6 °C (99.7 °F)  Pulse:  [] 92  Resp:  [13-38] 37  BP: ()/(28-43) 96/33  SpO2:  [86 %-98 %] 92 %    Hemodynamic parameters for last 24 hours       Respiratory Information for the last 24  hours  Vent Mode: APVCMV  Rate (breaths/min): 34  Vt Target (mL): 370  PEEP/CPAP: 16  MAP: 23  Control VTE (exp VT): 398    Physical Exam   Physical Exam  Constitutional:       General: He is not in acute distress.     Appearance: He is ill-appearing.   HENT:      Mouth/Throat:      Mouth: Mucous membranes are moist.   Eyes:      Pupils: Pupils are equal, round, and reactive to light.   Neck:      Musculoskeletal: Neck supple.   Cardiovascular:      Rate and Rhythm: Regular rhythm.      Heart sounds: No murmur. No friction rub. No gallop.    Pulmonary:      Effort: No respiratory distress.      Breath sounds: No rhonchi.      Comments: Breath sounds are diminished throughout.  Abdominal:      General: There is no distension.      Tenderness: There is no abdominal tenderness.      Comments: Obese    Musculoskeletal:      Right lower leg: No edema.      Left lower leg: No edema.   Skin:     General: Skin is warm and dry.   Neurological:      General: No focal deficit present.      Comments: Sedated with propofol and dilaudid, localizes, does not follow. Able to move all 4 ext   Psychiatric:      Comments: Unable to assess         Medications  Current Facility-Administered Medications   Medication Dose Route Frequency Provider Last Rate Last Dose   • piperacillin-tazobactam (ZOSYN) 3.375 g in  mL IVPB  3.375 g Intravenous Q8HRS Jorge Sanchez M.D. 25 mL/hr at 09/12/20 1215 3.375 g at 09/12/20 1215   • norepinephrine (Levophed) infusion 8 mg/250 mL (premix)  0-30 mcg/min Intravenous Continuous Jorge Sanchez M.D. 7.5 mL/hr at 09/12/20 0911 4 mcg/min at 09/12/20 0911   • HYDROmorphone (DILAUDID) 0.2 mg/mL in 50mL NS (Continuous Infusion)   Intravenous Continuous Jorge Sanchez M.D. 7.5 mL/hr at 09/12/20 1249 1.5 mg/hr at 09/12/20 1249   • HYDROmorphone pf (DILAUDID) injection 0.75 mg  0.75 mg Intravenous Q30 MIN PRN Jorge Sanchez M.D.        And   • HYDROmorphone (DILAUDID) injection 1.5 mg  1.5 mg  Intravenous Q30 MIN PRN Jorge Sanchez M.D.       • atorvastatin (LIPITOR) tablet 20 mg  20 mg Enteral Tube DAILY Nii Rivera M.D.   20 mg at 09/12/20 0517   • ondansetron (ZOFRAN ODT) dispertab 4 mg  4 mg Enteral Tube Q4HRS PRN Nii Rivera M.D.       • Pharmacy Consult: Enteral tube insertion - review meds/change route/product selection  1 Each Other PHARMACY TO DOSE Nii Rivera M.D.       • aspirin (ASA) chewable tab 81 mg  81 mg Enteral Tube DAILY Nii Rivera M.D.   81 mg at 09/12/20 0517   • acetaminophen (TYLENOL) tablet 650 mg  650 mg Enteral Tube Q6HRS PRN Nii Rivera M.D.   650 mg at 09/11/20 1735   • HYDROcodone/acetaminophen (NORCO)  MG per tablet 1 Tab  1 Tab Enteral Tube Q6HRS PRN Nii Rivera M.D.       • promethazine (PHENERGAN) tablet 12.5-25 mg  12.5-25 mg Enteral Tube Q4HRS PRN Nii Rivera M.D.       • propofol (DIPRIVAN) injection  0-80 mcg/kg/min Intravenous Continuous Harjinder Crowe M.D. 21.2 mL/hr at 09/12/20 1208 20 mcg/kg/min at 09/12/20 1208   • enoxaparin (LOVENOX) injection 150 mg  150 mg Subcutaneous Q12HRS Harjinder Crowe M.D.   150 mg at 09/12/20 0534   • Respiratory Therapy Consult   Nebulization Continuous RT Harjinder Crowe M.D.       • ipratropium-albuterol (DUONEB) nebulizer solution  3 mL Nebulization Q2HRS PRN (RT) Harjinder Crowe M.D.       • famotidine (PEPCID) tablet 20 mg  20 mg Enteral Tube Q12HRS Nii Rivera M.D.   20 mg at 09/12/20 0517   • senna-docusate (PERICOLACE or SENOKOT S) 8.6-50 MG per tablet 2 Tab  2 Tab Enteral Tube BID Harjinder Crowe M.D.   2 Tab at 09/12/20 0516    And   • polyethylene glycol/lytes (MIRALAX) PACKET 1 Packet  1 Packet Enteral Tube QDAY PRN Harjinder Crowe M.D.   1 Packet at 09/06/20 2208    And   • magnesium hydroxide (MILK OF MAGNESIA) suspension 30 mL  30 mL Enteral Tube QDAY PRN Harjinder Crowe M.D.        And   • bisacodyl (DULCOLAX) suppository 10 mg  10 mg Rectal QDAY PRN Harjinder Crowe M.D.   10  mg at 09/08/20 0713   • MD Alert...ICU Electrolyte Replacement per Pharmacy   Other PHARMACY TO DOSE Harjinder Crowe M.D.       • lidocaine (XYLOCAINE) 1 % injection 1-2 mL  1-2 mL Tracheal Tube Q30 MIN PRN Harjinder Crowe M.D.       • labetalol (NORMODYNE/TRANDATE) injection 10-20 mg  10-20 mg Intravenous Q4HRS PRN Nii Rivera M.D.       • ondansetron (ZOFRAN) syringe/vial injection 4 mg  4 mg Intravenous Q4HRS PRN Nii Rivera M.D.   4 mg at 08/29/20 0552   • promethazine (PHENERGAN) suppository 12.5-25 mg  12.5-25 mg Rectal Q4HRS PRN Nii Rivera M.D.       • prochlorperazine (COMPAZINE) injection 5-10 mg  5-10 mg Intravenous Q4HRS PRN Nii Rivera M.D.           Fluids    Intake/Output Summary (Last 24 hours) at 9/12/2020 1446  Last data filed at 9/12/2020 1400  Gross per 24 hour   Intake 3925.45 ml   Output 2395 ml   Net 1530.45 ml       Laboratory  Recent Labs     09/11/20  0158 09/12/20  0138 09/12/20  0451   ISTATAPH 7.308* 7.280* 7.257*   ISTATAPCO2 58.1* 59.3* 63.5*   ISTATAPO2 62* 86 83   ISTATATCO2 31 30 30   [id number] 88* 95 94   ISTATARTHCO3 29.1* 27.9* 28.3*   ISTATARTBE 2 0 0   ISTATTEMP 38.5 C 38.3 C 37.7 C   ISTATFIO2 100 100 100   ISTATSPEC Arterial Arterial Arterial   ISTATAPHTC 7.287* 7.262* 7.248*   [id number] 68 94* 87         Recent Labs     09/10/20  0517 09/11/20  0545 09/12/20  0305   SODIUM 138 136 136   POTASSIUM 5.1 5.1 5.1   CHLORIDE 100 99 101   CO2 28 26 27   BUN 32* 52* 54*   CREATININE 0.49* 0.61 0.50   MAGNESIUM  --  2.8*  --    PHOSPHORUS  --  4.2  --    CALCIUM 8.9 8.5 8.5     Recent Labs     09/10/20  0517 09/11/20  0545 09/12/20  0305   GLUCOSE 134* 125* 146*     Recent Labs     09/10/20  0517 09/11/20  0545 09/12/20  0305   WBC 8.0 7.4 8.5   NEUTSPOLYS 79.00* 76.10* 80.50*   LYMPHOCYTES 6.90* 8.10* 6.80*   MONOCYTES 12.60 13.00 10.90   EOSINOPHILS 0.50 1.20 0.70   BASOPHILS 0.40 0.40 0.20     Recent Labs     09/10/20  0517 09/11/20  0545 09/12/20  0305   RBC  4.68* 4.28* 4.30*   HEMOGLOBIN 14.3 13.1* 13.1*   HEMATOCRIT 46.8 42.6 43.1   PLATELETCT 240 263 241       Imaging  X-Ray:  I have personally reviewed the images and compared with prior images.    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; now d/c'd and passive on vent with sedation  No longer proning 2/2 skin breakdown/injury  Lung protective ventilation strategies  Ventilator bundle  PEEP 16, FiO2 % - not a candidate for SBT        Diabetes type 2, controlled ([facility])- (present on admission)  Assessment & Plan  Monitor glycemic control with insulin therapy    Septic shock due to undetermined organism ([facility])  Assessment & Plan  This is Sepsis Not present on admission  SIRS criteria identified on my evaluation include: Fever, with temperature greater than 101 deg F and Tachycardia, with heart rate greater than 90 BPM  Source is likely urine  Sepsis protocol initiated - zosyn  Fluid resuscitation adjusted from per protocol - 500 cc LR  IV antibiotics as appropriate for source of sepsis  While organ dysfunction may be noted elsewhere in this problem list or in the chart, degree of organ dysfunction does not meet CMS criteria for severe sepsis    The patient has remained febrile, the urine is concerning for infection-this is been sent for culture.    Enterococcus in UA; non-lactose fermenting GNR respiratory culture  Empiric Zosyn started 9/11  Titrating NE to MAP > 65         VTE:  Lovenox therapeutic for elevated d-dimer  Ulcer: H2 Antagonist  Lines: None     Lung protective ventilation  strategies  Titrate ventilator prescription to optimize oxygenation, ventilation, and acid base balance.      I have performed a physical exam and reviewed and updated ROS and Plan today (9/12/2020). In review of yesterday's note (9/11/2020), there are no changes except as documented above.     Discussed patient condition and risk of morbidity and/or mortality with RN, RT, Pharmacy and Charge nurse / hot rounds     The patient remains critically ill.   Critical care time = 38 minutes in directly providing and coordinating critical care and extensive data review.  No time overlap and excludes procedures.

## 2020-09-12 NOTE — CARE PLAN
Problem: Bowel/Gastric:  Goal: Normal bowel function is maintained or improved  Outcome: PROGRESSING SLOWER THAN EXPECTED  Last BM 9/10  Normoactive bowel sounds  Will advance bowel protocol today     Problem: Skin Integrity  Goal: Risk for impaired skin integrity will decrease  Outcome: PROGRESSING SLOWER THAN EXPECTED  Pt has multiple pressure injuries  Wound protocols in place  Q2  hour turns in place  Waffle cushion in use  Pillows in use to float heels and elbows   Reposition patient and medical equipment Q2 hours

## 2020-09-12 NOTE — CARE PLAN
Problem: Ventilation Defect:  Goal: Ability to achieve and maintain unassisted ventilation or tolerate decreased levels of ventilator support  Outcome: NOT MET   Vent day 14  34/370/+16, 100%  SBT held due to O2 demands, PEEP > 10

## 2020-09-12 NOTE — PROGRESS NOTES
Received report and assumed pt care. Pt is intubated and sedated. Isolation precautions in place for covid +. Pt is on levo for BP support. All lines and gtts verified. Bilateral soft wrist restraints in use. No needs at this time.

## 2020-09-13 ENCOUNTER — APPOINTMENT (OUTPATIENT)
Dept: RADIOLOGY | Facility: MEDICAL CENTER | Age: 60
DRG: 004 | End: 2020-09-13
Attending: INTERNAL MEDICINE
Payer: MEDICARE

## 2020-09-13 LAB
ACTION RANGE TRIGGERED IACRT: YES
ANION GAP SERPL CALC-SCNC: 7 MMOL/L (ref 7–16)
BACTERIA SPEC RESP CULT: ABNORMAL
BACTERIA SPEC RESP CULT: ABNORMAL
BACTERIA UR CULT: ABNORMAL
BACTERIA UR CULT: ABNORMAL
BASE EXCESS BLDA CALC-SCNC: 1 MMOL/L (ref -4–3)
BASOPHILS # BLD AUTO: 0.3 % (ref 0–1.8)
BASOPHILS # BLD: 0.02 K/UL (ref 0–0.12)
BODY TEMPERATURE: ABNORMAL DEGREES
BUN SERPL-MCNC: 55 MG/DL (ref 8–22)
CALCIUM SERPL-MCNC: 8.8 MG/DL (ref 8.5–10.5)
CHLORIDE SERPL-SCNC: 99 MMOL/L (ref 96–112)
CO2 BLDA-SCNC: 32 MMOL/L (ref 20–33)
CO2 SERPL-SCNC: 28 MMOL/L (ref 20–33)
CREAT SERPL-MCNC: 0.46 MG/DL (ref 0.5–1.4)
EOSINOPHIL # BLD AUTO: 0.16 K/UL (ref 0–0.51)
EOSINOPHIL NFR BLD: 2.4 % (ref 0–6.9)
ERYTHROCYTE [DISTWIDTH] IN BLOOD BY AUTOMATED COUNT: 59.8 FL (ref 35.9–50)
GLUCOSE SERPL-MCNC: 141 MG/DL (ref 65–99)
HCO3 BLDA-SCNC: 29.7 MMOL/L (ref 17–25)
HCT VFR BLD AUTO: 42.5 % (ref 42–52)
HGB BLD-MCNC: 12.8 G/DL (ref 14–18)
HOROWITZ INDEX BLDA+IHG-RTO: 110 MM[HG]
IMM GRANULOCYTES # BLD AUTO: 0.09 K/UL (ref 0–0.11)
IMM GRANULOCYTES NFR BLD AUTO: 1.4 % (ref 0–0.9)
INST. QUALIFIED PATIENT IIQPT: YES
LYMPHOCYTES # BLD AUTO: 0.48 K/UL (ref 1–4.8)
LYMPHOCYTES NFR BLD: 7.3 % (ref 22–41)
MCH RBC QN AUTO: 31.1 PG (ref 27–33)
MCHC RBC AUTO-ENTMCNC: 30.1 G/DL (ref 33.7–35.3)
MCV RBC AUTO: 103.4 FL (ref 81.4–97.8)
MONOCYTES # BLD AUTO: 0.69 K/UL (ref 0–0.85)
MONOCYTES NFR BLD AUTO: 10.5 % (ref 0–13.4)
NEUTROPHILS # BLD AUTO: 5.15 K/UL (ref 1.82–7.42)
NEUTROPHILS NFR BLD: 78.1 % (ref 44–72)
NRBC # BLD AUTO: 0 K/UL
NRBC BLD-RTO: 0 /100 WBC
O2/TOTAL GAS SETTING VFR VENT: 90 %
PCO2 BLDA: 63.7 MMHG (ref 26–37)
PCO2 TEMP ADJ BLDA: 66.2 MMHG (ref 26–37)
PH BLDA: 7.28 [PH] (ref 7.4–7.5)
PH TEMP ADJ BLDA: 7.26 [PH] (ref 7.4–7.5)
PLATELET # BLD AUTO: 215 K/UL (ref 164–446)
PMV BLD AUTO: 10.8 FL (ref 9–12.9)
PO2 BLDA: 99 MMHG (ref 64–87)
PO2 TEMP ADJ BLDA: 104 MMHG (ref 64–87)
POTASSIUM SERPL-SCNC: 5.1 MMOL/L (ref 3.6–5.5)
RBC # BLD AUTO: 4.11 M/UL (ref 4.7–6.1)
SAO2 % BLDA: 96 % (ref 93–99)
SIGNIFICANT IND 70042: ABNORMAL
SIGNIFICANT IND 70042: ABNORMAL
SITE SITE: ABNORMAL
SITE SITE: ABNORMAL
SODIUM SERPL-SCNC: 134 MMOL/L (ref 135–145)
SOURCE SOURCE: ABNORMAL
SOURCE SOURCE: ABNORMAL
SPECIMEN DRAWN FROM PATIENT: ABNORMAL
TRIGL SERPL-MCNC: 266 MG/DL (ref 0–149)
WBC # BLD AUTO: 6.6 K/UL (ref 4.8–10.8)

## 2020-09-13 PROCEDURE — 700105 HCHG RX REV CODE 258: Performed by: INTERNAL MEDICINE

## 2020-09-13 PROCEDURE — 700102 HCHG RX REV CODE 250 W/ 637 OVERRIDE(OP): Performed by: INTERNAL MEDICINE

## 2020-09-13 PROCEDURE — 37799 UNLISTED PX VASCULAR SURGERY: CPT

## 2020-09-13 PROCEDURE — A9270 NON-COVERED ITEM OR SERVICE: HCPCS | Performed by: INTERNAL MEDICINE

## 2020-09-13 PROCEDURE — 84478 ASSAY OF TRIGLYCERIDES: CPT

## 2020-09-13 PROCEDURE — 770022 HCHG ROOM/CARE - ICU (200)

## 2020-09-13 PROCEDURE — 700111 HCHG RX REV CODE 636 W/ 250 OVERRIDE (IP): Performed by: INTERNAL MEDICINE

## 2020-09-13 PROCEDURE — 94770 HCHG CO2 EXPIRED GAS DETERMINATION: CPT

## 2020-09-13 PROCEDURE — 99291 CRITICAL CARE FIRST HOUR: CPT | Performed by: INTERNAL MEDICINE

## 2020-09-13 PROCEDURE — 85025 COMPLETE CBC W/AUTO DIFF WBC: CPT

## 2020-09-13 PROCEDURE — 71045 X-RAY EXAM CHEST 1 VIEW: CPT

## 2020-09-13 PROCEDURE — 94003 VENT MGMT INPAT SUBQ DAY: CPT

## 2020-09-13 PROCEDURE — 80048 BASIC METABOLIC PNL TOTAL CA: CPT

## 2020-09-13 PROCEDURE — 82803 BLOOD GASES ANY COMBINATION: CPT

## 2020-09-13 RX ADMIN — Medication 1 MG/HR: at 16:52

## 2020-09-13 RX ADMIN — PROPOFOL 0 MCG/KG/MIN: 10 INJECTION, EMULSION INTRAVENOUS at 04:36

## 2020-09-13 RX ADMIN — PIPERACILLIN AND TAZOBACTAM 4.5 G: 4; .5 INJECTION, POWDER, LYOPHILIZED, FOR SOLUTION INTRAVENOUS; PARENTERAL at 21:00

## 2020-09-13 RX ADMIN — DOCUSATE SODIUM 50 MG AND SENNOSIDES 8.6 MG 2 TABLET: 8.6; 5 TABLET, FILM COATED ORAL at 06:08

## 2020-09-13 RX ADMIN — Medication 1.5 MG/HR: at 07:15

## 2020-09-13 RX ADMIN — PIPERACILLIN AND TAZOBACTAM 4.5 G: 4; .5 INJECTION, POWDER, LYOPHILIZED, FOR SOLUTION INTRAVENOUS; PARENTERAL at 12:35

## 2020-09-13 RX ADMIN — ENOXAPARIN SODIUM 150 MG: 150 INJECTION SUBCUTANEOUS at 16:41

## 2020-09-13 RX ADMIN — MAGNESIUM HYDROXIDE 30 ML: 400 SUSPENSION ORAL at 16:41

## 2020-09-13 RX ADMIN — PIPERACILLIN AND TAZOBACTAM 3.38 G: 3; .375 INJECTION, POWDER, LYOPHILIZED, FOR SOLUTION INTRAVENOUS; PARENTERAL at 06:09

## 2020-09-13 RX ADMIN — PROPOFOL 20 MCG/KG/MIN: 10 INJECTION, EMULSION INTRAVENOUS at 11:50

## 2020-09-13 RX ADMIN — DOCUSATE SODIUM 50 MG AND SENNOSIDES 8.6 MG 2 TABLET: 8.6; 5 TABLET, FILM COATED ORAL at 16:41

## 2020-09-13 RX ADMIN — ASPIRIN 81 MG 81 MG: 81 TABLET ORAL at 06:08

## 2020-09-13 RX ADMIN — ATORVASTATIN CALCIUM 20 MG: 20 TABLET, FILM COATED ORAL at 06:08

## 2020-09-13 RX ADMIN — FAMOTIDINE 20 MG: 20 TABLET, FILM COATED ORAL at 16:44

## 2020-09-13 RX ADMIN — PROPOFOL 20 MCG/KG/MIN: 10 INJECTION, EMULSION INTRAVENOUS at 22:25

## 2020-09-13 RX ADMIN — FAMOTIDINE 20 MG: 20 TABLET, FILM COATED ORAL at 06:08

## 2020-09-13 RX ADMIN — PROPOFOL 20 MCG/KG/MIN: 10 INJECTION, EMULSION INTRAVENOUS at 16:41

## 2020-09-13 RX ADMIN — ENOXAPARIN SODIUM 150 MG: 150 INJECTION SUBCUTANEOUS at 06:09

## 2020-09-13 RX ADMIN — PROPOFOL 20 MCG/KG/MIN: 10 INJECTION, EMULSION INTRAVENOUS at 07:27

## 2020-09-13 ASSESSMENT — PAIN DESCRIPTION - PAIN TYPE
TYPE: ACUTE PAIN

## 2020-09-13 NOTE — CARE PLAN
Problem: Discharge Barriers/Planning  Goal: Patient's continuum of care needs will be met  Outcome: PROGRESSING AS EXPECTED     Problem: Fluid Volume:  Goal: Will maintain balanced intake and output  Outcome: PROGRESSING AS EXPECTED     Problem: Pain Management  Goal: Pain level will decrease to patient's comfort goal  Outcome: PROGRESSING AS EXPECTED     Problem: Safety - Medical Restraint  Goal: Remains free of injury from restraints (Restraint for Interference with Medical Device)  Description: INTERVENTIONS:  1. Determine that other, less restrictive measures have been tried or would not be effective before applying the restraint  2. Evaluate the patient's condition at the time of restraint application  3. Inform patient/family regarding the reason for restraint  4. Q2H: Monitor safety, psychosocial status, comfort, nutrition and hydration  Outcome: PROGRESSING AS EXPECTED     Problem: Safety - Medical Restraint  Goal: Free from restraint(s) (Restraint for Interference with Medical Device)  Description: INTERVENTIONS:  1. ONCE/SHIFT or MINIMUM Q12H: Assess and document the continuing need for restraints  2. Q24H: Continued use of restraint requires LIP to perform face to face examination and written order  3. Identify and implement measures to help patient regain control  Outcome: PROGRESSING AS EXPECTED

## 2020-09-13 NOTE — CARE PLAN
Problem: Ventilation Defect:  Goal: Ability to achieve and maintain unassisted ventilation or tolerate decreased levels of ventilator support  Outcome: NOT MET   Vent day 15  34/370/+16, 85%  SBT held

## 2020-09-13 NOTE — PROGRESS NOTES
Received report and assumed pt care. Pt is intubated and sedated. Pt opens eyes to voice however does not follow commands. Levo currently paused, VSS at this time.

## 2020-09-13 NOTE — PROGRESS NOTES
Critical Care Progress Note    Date of admission  8/27/2020    Chief Complaint  60 y.o. male who presented 8/27/2020 with increasing dyspnea and hypoxia.  He has a history of morbid obesity, hyperlipidemia, diabetes and obstructive sleep apnea, noncompliant with treatment.  He uses oxygen 2 L via nasal cannula for chronic hypoxia.  Today he called EMS for increasing dyspnea over the past 24 hours.  He reports he is had increased cough and yellow sputum production with small amount of blood in his sputum over the past 24 hours.  On EMS arrival patient was profoundly hypoxic with a saturation of 50%.  He was placed on nonrebreather and brought to the ED.  He desaturates very quickly on nonrebreather and is in moderate respiratory distress.  Chest x-ray showed diffuse right-sided pneumonia/consolidation.  He was treated for community-acquired pneumonia with ceftriaxone and azithromycin as well as IV methylprednisolone.  He denies fevers or chills.  He he lives alone and tells me has been taking appropriate precautions when he goes out of the house wearing his mask and has no known exposures to COVID-19.  He is a nondrinker and non-smoker.     Screening SARS-CoV-2 PCR came back positive in ED.  He received 600 cc of crystalloid and the sepsis fluid bolus that had been ordered was discontinued.  He is not currently hypotensive and appears relatively euvolemic.  Lactic acid 1.7.  With the positive COVID-19 test I decreased IV fluid, ordered dexamethasone 6 mg daily x10 days, will admit to isolation ICU.  He will be placed on high flow nasal cannula/BiPAP with high risk for requiring intubation.  We will follow-up inflammatory markers and initiate full dose anticoagulation if appropriate.    Hospital Course   8/29-worsening mental status, rapid shallow breathing and hypoxia required endotracheal intubation and prone positioning.  8/30-I spoke with Brittney, the patient's daughter and surrogate decision maker about  initiating Remdesivir.  We discussed the risks benefits and alternatives.  All questions were answered.  She consented to initiating Remdesivir today.  8/31 -art line placed, remains on PCV, transition off rocuronium drip as tolerated, propofol/fentanyl, continue prone protocol  9/1 - remains on pressure control ventilation, PEEP 19, improved P/F ratio with prone ventilation, off rocuronium, starting trophic tube feed, full dose anticoagulation with Lovenox, day 3/5 Remdesivir, day 5 dexamethasone  9/4 - stopped prone ventilation with some skin breakdown, difficult prone protocol with morbid obesity, full ventilator support, advancing nutrition  9/5 - Ongoing diuresis with 3 times daily Lasix, advancing tube feeds to goal, remains on supine ventilation now  9/6 - PCV, weaning IP and PEEP, remains supine, ongoing aggressive diuresis, abdi TF, lighten sedation as abdi  9/7 Switch to VC, switched from fentanyl to Dilaudid with improved ventilator synchrony, decrease Lasix to daily; stopped amlodipine as he is now on norepinephrine to achieve a map greater than 65  9/8 continue full vent support, compliance gradually improving  9/10 Febrile: urine, blood, sputum cultures, continue full ventilator support  9/11 febrile, increased NE, urine culture, MRSA nare, then begin zosyn  9/12 UA + for enterococcus; resp culture + Non-lactose fermenting GNR  - currently on zosyn; will await sensitivities   9/13 continues on PEEP 16, on Zosyn for Serratia and enterococcus        Interval Progress  Reviewed last 24 hour events:  Ventilator synchrony on sedation  Urine output 2.5 L  SR/ST  ID - zosyn  Titrating NE for MAP > 65    Review of Systems  Review of Systems   Unable to perform ROS: Intubated        Vital Signs for last 24 hours   Temp:  [38 °C (100.4 °F)] 38 °C (100.4 °F)  Pulse:  [] 103  Resp:  [17-38] 27  BP: ()/(32-50) 131/50  SpO2:  [80 %-100 %] 93 %    Hemodynamic parameters for last 24 hours       Respiratory  Information for the last 24 hours  Vent Mode: APVCMV  Rate (breaths/min): 34  Vt Target (mL): 370  PEEP/CPAP: 16  MAP: 20  Control VTE (exp VT): 380    Physical Exam   Physical Exam  Constitutional:       General: He is not in acute distress.     Appearance: He is ill-appearing.   HENT:      Mouth/Throat:      Mouth: Mucous membranes are moist.   Eyes:      Pupils: Pupils are equal, round, and reactive to light.   Neck:      Musculoskeletal: Neck supple.   Cardiovascular:      Rate and Rhythm: Regular rhythm.      Heart sounds: No murmur. No friction rub. No gallop.    Pulmonary:      Effort: No respiratory distress.      Breath sounds: No rhonchi.      Comments: Breath sounds are diminished throughout.  Abdominal:      General: There is no distension.      Tenderness: There is no abdominal tenderness.      Comments: Obese    Musculoskeletal:      Right lower leg: No edema.      Left lower leg: No edema.   Skin:     General: Skin is warm and dry.   Neurological:      General: No focal deficit present.      Comments: Sedated with propofol and dilaudid, localizes, does not follow. Able to move all 4 ext   Psychiatric:      Comments: Unable to assess         Medications  Current Facility-Administered Medications   Medication Dose Route Frequency Provider Last Rate Last Dose   • piperacillin-tazobactam (ZOSYN) 4.5 g in  mL IVPB  4.5 g Intravenous Q8HRS Jorge Sanchez M.D.   Stopped at 09/13/20 1635   • norepinephrine (Levophed) infusion 8 mg/250 mL (premix)  0-30 mcg/min Intravenous Continuous Jorge Sanchez M.D.   Stopped at 09/13/20 1613   • HYDROmorphone (DILAUDID) 0.2 mg/mL in 50mL NS (Continuous Infusion)   Intravenous Continuous Jorge Sanchez M.D. 5 mL/hr at 09/13/20 1652 1 mg/hr at 09/13/20 1652   • HYDROmorphone pf (DILAUDID) injection 0.75 mg  0.75 mg Intravenous Q30 MIN PRN Jorge Sanchez M.D.        And   • HYDROmorphone (DILAUDID) injection 1.5 mg  1.5 mg Intravenous Q30 MIN PRN Jorge WEBB  NICHOLE Sanchez       • atorvastatin (LIPITOR) tablet 20 mg  20 mg Enteral Tube DAILY Nii Rivera M.D.   20 mg at 09/13/20 0608   • ondansetron (ZOFRAN ODT) dispertab 4 mg  4 mg Enteral Tube Q4HRS PRN Nii Rivera M.D.       • Pharmacy Consult: Enteral tube insertion - review meds/change route/product selection  1 Each Other PHARMACY TO DOSE Nii Rivera M.D.       • aspirin (ASA) chewable tab 81 mg  81 mg Enteral Tube DAILY Nii Rivera M.D.   81 mg at 09/13/20 0608   • acetaminophen (TYLENOL) tablet 650 mg  650 mg Enteral Tube Q6HRS PRN Nii Rivera M.D.   650 mg at 09/11/20 1735   • HYDROcodone/acetaminophen (NORCO)  MG per tablet 1 Tab  1 Tab Enteral Tube Q6HRS PRN Nii Rivera M.D.       • promethazine (PHENERGAN) tablet 12.5-25 mg  12.5-25 mg Enteral Tube Q4HRS PRN Nii Rivera M.D.       • propofol (DIPRIVAN) injection  0-80 mcg/kg/min Intravenous Continuous Harjinder Crowe M.D. 21.2 mL/hr at 09/13/20 1641 20 mcg/kg/min at 09/13/20 1641   • enoxaparin (LOVENOX) injection 150 mg  150 mg Subcutaneous Q12HRS Harjinder Crowe M.D.   150 mg at 09/13/20 1641   • Respiratory Therapy Consult   Nebulization Continuous RT Harjinder Crowe M.D.       • ipratropium-albuterol (DUONEB) nebulizer solution  3 mL Nebulization Q2HRS PRN (RT) Harjinder Crowe M.D.       • famotidine (PEPCID) tablet 20 mg  20 mg Enteral Tube Q12HRS Nii Rivera M.D.   20 mg at 09/13/20 1644   • senna-docusate (PERICOLACE or SENOKOT S) 8.6-50 MG per tablet 2 Tab  2 Tab Enteral Tube BID Harjinder Crowe M.D.   2 Tab at 09/13/20 1641    And   • polyethylene glycol/lytes (MIRALAX) PACKET 1 Packet  1 Packet Enteral Tube QDAY PRN Harjinder Crowe M.D.   1 Packet at 09/12/20 1716    And   • magnesium hydroxide (MILK OF MAGNESIA) suspension 30 mL  30 mL Enteral Tube QDAY PRN Harjinder Crowe M.D.   30 mL at 09/13/20 1641    And   • bisacodyl (DULCOLAX) suppository 10 mg  10 mg Rectal QDAY PRN Harjinder Crowe M.D.   10 mg at  09/08/20 0713   • MD Alert...ICU Electrolyte Replacement per Pharmacy   Other PHARMACY TO DOSE Harjinder Crowe M.D.       • lidocaine (XYLOCAINE) 1 % injection 1-2 mL  1-2 mL Tracheal Tube Q30 MIN PRN Harjinder Crowe M.D.       • labetalol (NORMODYNE/TRANDATE) injection 10-20 mg  10-20 mg Intravenous Q4HRS PRN Nii Rivera M.D.       • ondansetron (ZOFRAN) syringe/vial injection 4 mg  4 mg Intravenous Q4HRS PRN Nii Rivera M.D.   4 mg at 08/29/20 0552   • promethazine (PHENERGAN) suppository 12.5-25 mg  12.5-25 mg Rectal Q4HRS PRN Nii Rivera M.D.       • prochlorperazine (COMPAZINE) injection 5-10 mg  5-10 mg Intravenous Q4HRS PRN Nii Rivera M.D.           Fluids    Intake/Output Summary (Last 24 hours) at 9/13/2020 1823  Last data filed at 9/13/2020 1800  Gross per 24 hour   Intake 2217.89 ml   Output 2420 ml   Net -202.11 ml       Laboratory  Recent Labs     09/12/20  0138 09/12/20  0451 09/13/20  0253   ISTATAPH 7.280* 7.257* 7.278*   ISTATAPCO2 59.3* 63.5* 63.7*   ISTATAPO2 86 83 99*   ISTATATCO2 30 30 32   [id number] 95 94 96   ISTATARTHCO3 27.9* 28.3* 29.7*   ISTATARTBE 0 0 1   ISTATTEMP 38.3 C 37.7 C 37.9 C   ISTATFIO2 100 100 90   ISTATSPEC Arterial Arterial Arterial   ISTATAPHTC 7.262* 7.248* 7.265*   [id number] 94* 87 104*         Recent Labs     09/11/20  0545 09/12/20  0305 09/13/20  0440   SODIUM 136 136 134*   POTASSIUM 5.1 5.1 5.1   CHLORIDE 99 101 99   CO2 26 27 28   BUN 52* 54* 55*   CREATININE 0.61 0.50 0.46*   MAGNESIUM 2.8*  --   --    PHOSPHORUS 4.2  --   --    CALCIUM 8.5 8.5 8.8     Recent Labs     09/11/20 0545 09/12/20 0305 09/13/20 0440   GLUCOSE 125* 146* 141*     Recent Labs     09/11/20 0545 09/12/20 0305 09/13/20 0440   WBC 7.4 8.5 6.6   NEUTSPOLYS 76.10* 80.50* 78.10*   LYMPHOCYTES 8.10* 6.80* 7.30*   MONOCYTES 13.00 10.90 10.50   EOSINOPHILS 1.20 0.70 2.40   BASOPHILS 0.40 0.20 0.30     Recent Labs     09/11/20 0545 09/12/20 0305 09/13/20 0440   RBC 4.28*  4.30* 4.11*   HEMOGLOBIN 13.1* 13.1* 12.8*   HEMATOCRIT 42.6 43.1 42.5   PLATELETCT 263 241 215       Imaging  X-Ray:  I have personally reviewed the images and compared with prior images.    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; now d/c'd and passive on vent with sedation  No longer proning 2/2 skin breakdown/injury  Lung protective ventilation strategies  Titrate to an SaO2 88-94%  Attempts at uptitrating PEEP have resulted in hypotension and unacceptable plateau pressures  PEEP 16, FiO2 % - not a candidate for SBT        Diabetes type 2, controlled ([facility])- (present on admission)  Assessment & Plan  Monitor glycemic control with insulin therapy    Septic shock due to undetermined organism ([facility])  Assessment & Plan  This is Sepsis Not present on admission  SIRS criteria identified on my evaluation include: Fever, with temperature greater than 101 deg F and Tachycardia, with heart rate greater than 90 BPM  Source is likely urine  Sepsis protocol initiated - zosyn  Fluid resuscitation adjusted from per protocol - 500 cc LR  IV antibiotics as appropriate for source of sepsis  While organ dysfunction may be noted elsewhere in this problem list or in the chart, degree of organ dysfunction does not meet CMS criteria for severe sepsis    Enterococcus D from urine culture, await sensitivities  Serratia in respiratory culture, not overly resistant   Empiric Zosyn started 9/11  Titrating NE to MAP > 65         VTE:  Lovenox therapeutic for elevated d-dimer  Ulcer: H2 Antagonist  Lines: None      Lung protective ventilation strategies  Titrate ventilator prescription to optimize oxygenation, ventilation, and acid base balance.      I have performed a physical exam and reviewed and updated ROS and Plan today (9/13/2020). In review of yesterday's note (9/12/2020), there are no changes except as documented above.     Discussed patient condition and risk of morbidity and/or mortality with RN, RT, Pharmacy and Charge nurse / hot rounds     The patient remains critically ill.   Critical care time = 32 minutes in directly providing and coordinating critical care and extensive data review.  No time overlap and excludes procedures.

## 2020-09-13 NOTE — CARE PLAN
Problem: Infection  Goal: Will remain free from infection  Outcome: PROGRESSING SLOWER THAN EXPECTED  WBC within normal range  Pt on ABX for positive respiratory and urine cultures  + fevers    Problem: Bowel/Gastric:  Goal: Normal bowel function is maintained or improved  Outcome: PROGRESSING SLOWER THAN EXPECTED  Last BM 9/10, advancing bowel protocol

## 2020-09-14 ENCOUNTER — APPOINTMENT (OUTPATIENT)
Dept: RADIOLOGY | Facility: MEDICAL CENTER | Age: 60
DRG: 004 | End: 2020-09-14
Attending: INTERNAL MEDICINE
Payer: MEDICARE

## 2020-09-14 PROBLEM — J18.9 PNEUMONIA: Status: ACTIVE | Noted: 2020-09-14

## 2020-09-14 PROBLEM — I27.20 PULMONARY HYPERTENSION (HCC): Status: ACTIVE | Noted: 2020-09-14

## 2020-09-14 LAB
ACTION RANGE TRIGGERED IACRT: YES
ALBUMIN SERPL BCP-MCNC: 2.2 G/DL (ref 3.2–4.9)
ALBUMIN/GLOB SERPL: 0.5 G/DL
ALP SERPL-CCNC: 70 U/L (ref 30–99)
ALT SERPL-CCNC: 19 U/L (ref 2–50)
ANION GAP SERPL CALC-SCNC: 6 MMOL/L (ref 7–16)
ANISOCYTOSIS BLD QL SMEAR: ABNORMAL
AST SERPL-CCNC: 29 U/L (ref 12–45)
BASE EXCESS BLDA CALC-SCNC: 2 MMOL/L (ref -4–3)
BASOPHILS # BLD AUTO: 0.6 % (ref 0–1.8)
BASOPHILS # BLD: 0.04 K/UL (ref 0–0.12)
BILIRUB SERPL-MCNC: 1.1 MG/DL (ref 0.1–1.5)
BODY TEMPERATURE: ABNORMAL DEGREES
BUN SERPL-MCNC: 50 MG/DL (ref 8–22)
CALCIUM SERPL-MCNC: 9.1 MG/DL (ref 8.5–10.5)
CHLORIDE SERPL-SCNC: 106 MMOL/L (ref 96–112)
CO2 BLDA-SCNC: 33 MMOL/L (ref 20–33)
CO2 SERPL-SCNC: 29 MMOL/L (ref 20–33)
COMMENT 1642: NORMAL
CREAT SERPL-MCNC: 0.37 MG/DL (ref 0.5–1.4)
CRP SERPL HS-MCNC: 13.29 MG/DL (ref 0–0.75)
EOSINOPHIL # BLD AUTO: 0.15 K/UL (ref 0–0.51)
EOSINOPHIL NFR BLD: 2.2 % (ref 0–6.9)
ERYTHROCYTE [DISTWIDTH] IN BLOOD BY AUTOMATED COUNT: 59.8 FL (ref 35.9–50)
GLOBULIN SER CALC-MCNC: 4.6 G/DL (ref 1.9–3.5)
GLUCOSE SERPL-MCNC: 128 MG/DL (ref 65–99)
HCO3 BLDA-SCNC: 30.9 MMOL/L (ref 17–25)
HCT VFR BLD AUTO: 43.5 % (ref 42–52)
HGB BLD-MCNC: 12.6 G/DL (ref 14–18)
HOROWITZ INDEX BLDA+IHG-RTO: 84 MM[HG]
HYPOCHROMIA BLD QL SMEAR: ABNORMAL
IMM GRANULOCYTES # BLD AUTO: 0.07 K/UL (ref 0–0.11)
IMM GRANULOCYTES NFR BLD AUTO: 1 % (ref 0–0.9)
INST. QUALIFIED PATIENT IIQPT: YES
LYMPHOCYTES # BLD AUTO: 0.64 K/UL (ref 1–4.8)
LYMPHOCYTES NFR BLD: 9.3 % (ref 22–41)
MACROCYTES BLD QL SMEAR: ABNORMAL
MCH RBC QN AUTO: 30.4 PG (ref 27–33)
MCHC RBC AUTO-ENTMCNC: 29 G/DL (ref 33.7–35.3)
MCV RBC AUTO: 104.8 FL (ref 81.4–97.8)
MONOCYTES # BLD AUTO: 0.62 K/UL (ref 0–0.85)
MONOCYTES NFR BLD AUTO: 9 % (ref 0–13.4)
MORPHOLOGY BLD-IMP: NORMAL
NEUTROPHILS # BLD AUTO: 5.37 K/UL (ref 1.82–7.42)
NEUTROPHILS NFR BLD: 77.9 % (ref 44–72)
NRBC # BLD AUTO: 0 K/UL
NRBC BLD-RTO: 0 /100 WBC
O2/TOTAL GAS SETTING VFR VENT: 100 %
PCO2 BLDA: 69 MMHG (ref 26–37)
PCO2 TEMP ADJ BLDA: 71.5 MMHG (ref 26–37)
PH BLDA: 7.26 [PH] (ref 7.4–7.5)
PH TEMP ADJ BLDA: 7.25 [PH] (ref 7.4–7.5)
PLATELET # BLD AUTO: 205 K/UL (ref 164–446)
PMV BLD AUTO: 10.9 FL (ref 9–12.9)
PO2 BLDA: 84 MMHG (ref 64–87)
PO2 TEMP ADJ BLDA: 88 MMHG (ref 64–87)
POLYCHROMASIA BLD QL SMEAR: NORMAL
POTASSIUM SERPL-SCNC: 5.5 MMOL/L (ref 3.6–5.5)
PREALB SERPL-MCNC: 7 MG/DL (ref 18–38)
PROT SERPL-MCNC: 6.8 G/DL (ref 6–8.2)
RBC # BLD AUTO: 4.15 M/UL (ref 4.7–6.1)
RBC BLD AUTO: PRESENT
SAO2 % BLDA: 94 % (ref 93–99)
SODIUM SERPL-SCNC: 141 MMOL/L (ref 135–145)
SPECIMEN DRAWN FROM PATIENT: ABNORMAL
WBC # BLD AUTO: 6.9 K/UL (ref 4.8–10.8)

## 2020-09-14 PROCEDURE — 94003 VENT MGMT INPAT SUBQ DAY: CPT

## 2020-09-14 PROCEDURE — 700102 HCHG RX REV CODE 250 W/ 637 OVERRIDE(OP): Performed by: INTERNAL MEDICINE

## 2020-09-14 PROCEDURE — A9270 NON-COVERED ITEM OR SERVICE: HCPCS | Performed by: INTERNAL MEDICINE

## 2020-09-14 PROCEDURE — 80053 COMPREHEN METABOLIC PANEL: CPT

## 2020-09-14 PROCEDURE — 700111 HCHG RX REV CODE 636 W/ 250 OVERRIDE (IP): Performed by: INTERNAL MEDICINE

## 2020-09-14 PROCEDURE — 770022 HCHG ROOM/CARE - ICU (200)

## 2020-09-14 PROCEDURE — 99292 CRITICAL CARE ADDL 30 MIN: CPT | Performed by: INTERNAL MEDICINE

## 2020-09-14 PROCEDURE — 700105 HCHG RX REV CODE 258: Performed by: INTERNAL MEDICINE

## 2020-09-14 PROCEDURE — 86140 C-REACTIVE PROTEIN: CPT

## 2020-09-14 PROCEDURE — 99291 CRITICAL CARE FIRST HOUR: CPT | Performed by: INTERNAL MEDICINE

## 2020-09-14 PROCEDURE — 71045 X-RAY EXAM CHEST 1 VIEW: CPT

## 2020-09-14 PROCEDURE — 82803 BLOOD GASES ANY COMBINATION: CPT

## 2020-09-14 PROCEDURE — 85025 COMPLETE CBC W/AUTO DIFF WBC: CPT

## 2020-09-14 PROCEDURE — 37799 UNLISTED PX VASCULAR SURGERY: CPT

## 2020-09-14 PROCEDURE — 84134 ASSAY OF PREALBUMIN: CPT

## 2020-09-14 PROCEDURE — 94770 HCHG CO2 EXPIRED GAS DETERMINATION: CPT

## 2020-09-14 RX ADMIN — BISACODYL 10 MG: 10 SUPPOSITORY RECTAL at 16:03

## 2020-09-14 RX ADMIN — ENOXAPARIN SODIUM 150 MG: 150 INJECTION SUBCUTANEOUS at 06:51

## 2020-09-14 RX ADMIN — PROPOFOL 20 MCG/KG/MIN: 10 INJECTION, EMULSION INTRAVENOUS at 16:02

## 2020-09-14 RX ADMIN — Medication 10 MG: at 16:14

## 2020-09-14 RX ADMIN — PIPERACILLIN AND TAZOBACTAM 4.5 G: 4; .5 INJECTION, POWDER, LYOPHILIZED, FOR SOLUTION INTRAVENOUS; PARENTERAL at 12:11

## 2020-09-14 RX ADMIN — DOCUSATE SODIUM 50 MG AND SENNOSIDES 8.6 MG 2 TABLET: 8.6; 5 TABLET, FILM COATED ORAL at 16:03

## 2020-09-14 RX ADMIN — FAMOTIDINE 20 MG: 20 TABLET, FILM COATED ORAL at 16:03

## 2020-09-14 RX ADMIN — DOCUSATE SODIUM 50 MG AND SENNOSIDES 8.6 MG 2 TABLET: 8.6; 5 TABLET, FILM COATED ORAL at 06:24

## 2020-09-14 RX ADMIN — FAMOTIDINE 20 MG: 20 TABLET, FILM COATED ORAL at 06:24

## 2020-09-14 RX ADMIN — PIPERACILLIN AND TAZOBACTAM 4.5 G: 4; .5 INJECTION, POWDER, LYOPHILIZED, FOR SOLUTION INTRAVENOUS; PARENTERAL at 06:24

## 2020-09-14 RX ADMIN — ASPIRIN 81 MG 81 MG: 81 TABLET ORAL at 06:24

## 2020-09-14 RX ADMIN — PROPOFOL 20 MCG/KG/MIN: 10 INJECTION, EMULSION INTRAVENOUS at 11:01

## 2020-09-14 RX ADMIN — CEFTRIAXONE SODIUM 2 G: 2 INJECTION, POWDER, FOR SOLUTION INTRAMUSCULAR; INTRAVENOUS at 18:44

## 2020-09-14 RX ADMIN — ATORVASTATIN CALCIUM 20 MG: 20 TABLET, FILM COATED ORAL at 06:24

## 2020-09-14 RX ADMIN — ACETAMINOPHEN 650 MG: 325 TABLET, FILM COATED ORAL at 21:15

## 2020-09-14 RX ADMIN — ENOXAPARIN SODIUM 150 MG: 150 INJECTION SUBCUTANEOUS at 19:45

## 2020-09-14 RX ADMIN — PROPOFOL 20 MCG/KG/MIN: 10 INJECTION, EMULSION INTRAVENOUS at 06:25

## 2020-09-14 RX ADMIN — Medication 1 MG: at 04:40

## 2020-09-14 RX ADMIN — PROPOFOL 10 MCG/KG/MIN: 10 INJECTION, EMULSION INTRAVENOUS at 20:44

## 2020-09-14 RX ADMIN — PROPOFOL 20 MCG/KG/MIN: 10 INJECTION, EMULSION INTRAVENOUS at 02:13

## 2020-09-14 ASSESSMENT — PAIN DESCRIPTION - PAIN TYPE
TYPE: ACUTE PAIN

## 2020-09-14 ASSESSMENT — FIBROSIS 4 INDEX: FIB4 SCORE: 1.19

## 2020-09-14 NOTE — PROGRESS NOTES
Critical Care Progress Note    Date of admission  8/27/2020    Chief Complaint  60 y.o. male admitted 8/27/2020 with respiratory failure and pneumonia due to SARS-CoV-2.    Hospital Course     8/29-worsening mental status, rapid shallow breathing and hypoxia required endotracheal intubation and prone positioning.  8/30-I spoke with Brittney, the patient's daughter and surrogate decision maker about initiating Remdesivir.  We discussed the risks benefits and alternatives.  All questions were answered.  She consented to initiating Remdesivir today.  8/31 -art line placed, remains on PCV, transition off rocuronium drip as tolerated, propofol/fentanyl, continue prone protocol  9/1 - remains on pressure control ventilation, PEEP 19, improved P/F ratio with prone ventilation, off rocuronium, starting trophic tube feed, full dose anticoagulation with Lovenox, day 3/5 Remdesivir, day 5 dexamethasone  9/4 - stopped prone ventilation with some skin breakdown, difficult prone protocol with morbid obesity, full ventilator support, advancing nutrition  9/5 - Ongoing diuresis with 3 times daily Lasix, advancing tube feeds to goal, remains on supine ventilation now  9/6 - PCV, weaning IP and PEEP, remains supine, ongoing aggressive diuresis, abdi TF, lighten sedation as abdi  9/7 Switch to VC, switched from fentanyl to Dilaudid with improved ventilator synchrony, decrease Lasix to daily; stopped amlodipine as he is now on norepinephrine to achieve a map greater than 65  9/8 continue full vent support, compliance gradually improving  9/10 Febrile: urine, blood, sputum cultures, continue full ventilator support  9/11 febrile, increased NE, urine culture, MRSA nare, then begin zosyn  9/12 UA + for enterococcus; resp culture + Non-lactose fermenting GNR  - currently on zosyn; will await sensitivities   9/13 continues on PEEP 16, on Zosyn for Serratia and enterococcus  9/14 -    change Zosyn to Rocephin.  Continue full anticoagulation.   Continue vent support.  Off norepinephrine.      Interval Problem Update  Reviewed last 24 hour events:      Dilaudid 1  Prop 20  SR-ST  SBP   NE now off  100.0  TF at 60 (goal)  UOP OK - 1600 mL last night  Vent 16  PEEP 16   100%  Zosyn day 4  Full dose Lovenox      Review of Systems  Review of Systems   Unable to perform ROS: Acuity of condition        Vital Signs for last 24 hours   Pulse:  [] 100  Resp:  [0-41] 31  BP: ()/(33-57) 105/57  SpO2:  [92 %-98 %] 93 %    Hemodynamic parameters for last 24 hours       Respiratory Information for the last 24 hours  Vent Mode: APVCMV  Rate (breaths/min): 34  Vt Target (mL): 370  PEEP/CPAP: 16  MAP: 23  Control VTE (exp VT): 345    Physical Exam   Physical Exam  Constitutional:       Appearance: He is not diaphoretic.      Comments: On ventilator   HENT:      Head: Normocephalic and atraumatic.      Right Ear: External ear normal.      Left Ear: External ear normal.      Nose: Nose normal.      Mouth/Throat:      Mouth: Mucous membranes are moist.      Pharynx: Oropharynx is clear.   Eyes:      Conjunctiva/sclera: Conjunctivae normal.      Pupils: Pupils are equal, round, and reactive to light.   Neck:      Musculoskeletal: Normal range of motion and neck supple.   Cardiovascular:      Pulses: Normal pulses.      Heart sounds: No murmur. No gallop.       Comments: Sinus rhythm  Pulmonary:      Breath sounds: Rales (Scattered coarse crackles) present. No wheezing.   Abdominal:      General: Bowel sounds are normal. There is no distension.      Palpations: Abdomen is soft.      Tenderness: There is no abdominal tenderness. There is no guarding.      Comments: Tolerating enteral tube feedings   Musculoskeletal: Normal range of motion.      Right lower leg: Edema present.      Left lower leg: Edema present.      Comments: No clubbing or cyanosis   Skin:     General: Skin is warm and dry.      Capillary Refill: Capillary refill takes less than 2 seconds.    Neurological:      Comments: Sedated         Medications  Current Facility-Administered Medications   Medication Dose Route Frequency Provider Last Rate Last Dose   • cefTRIAXone (ROCEPHIN) 2 g in  mL IVPB  2 g Intravenous Q24HRS Aguilar Marquez M.D.       • norepinephrine (Levophed) infusion 8 mg/250 mL (premix)  0-30 mcg/min Intravenous Continuous Aguilar Marquez M.D.   Stopped at 09/13/20 1613   • HYDROmorphone (DILAUDID) 0.2 mg/mL in 50mL NS (Continuous Infusion)   Intravenous Continuous Jorge Sanchez M.D. 5 mL/hr at 09/14/20 0728 10 mg at 09/14/20 1614   • HYDROmorphone pf (DILAUDID) injection 0.75 mg  0.75 mg Intravenous Q30 MIN PRN Jorge Sanchez M.D.        And   • HYDROmorphone (DILAUDID) injection 1.5 mg  1.5 mg Intravenous Q30 MIN PRN Jorge Sanchez M.D.       • atorvastatin (LIPITOR) tablet 20 mg  20 mg Enteral Tube DAILY Nii Rivera M.D.   20 mg at 09/14/20 0624   • ondansetron (ZOFRAN ODT) dispertab 4 mg  4 mg Enteral Tube Q4HRS PRN Nii Rivera M.D.       • Pharmacy Consult: Enteral tube insertion - review meds/change route/product selection  1 Each Other PHARMACY TO DOSE Nii Rivera M.D.       • aspirin (ASA) chewable tab 81 mg  81 mg Enteral Tube DAILY Nii Rivera M.D.   81 mg at 09/14/20 0624   • acetaminophen (TYLENOL) tablet 650 mg  650 mg Enteral Tube Q6HRS PRN Nii Rivera M.D.   650 mg at 09/11/20 1735   • HYDROcodone/acetaminophen (NORCO)  MG per tablet 1 Tab  1 Tab Enteral Tube Q6HRS PRN Nii Rivera M.D.       • promethazine (PHENERGAN) tablet 12.5-25 mg  12.5-25 mg Enteral Tube Q4HRS PRN Nii Rivera M.D.       • propofol (DIPRIVAN) injection  0-80 mcg/kg/min Intravenous Continuous Harjinder Crowe M.D. 21.2 mL/hr at 09/14/20 1602 20 mcg/kg/min at 09/14/20 1602   • enoxaparin (LOVENOX) injection 150 mg  150 mg Subcutaneous Q12HRS Harjinder Crowe M.D.   150 mg at 09/14/20 0651   • Respiratory Therapy Consult   Nebulization  Continuous RT Harjinder Crowe M.D.       • ipratropium-albuterol (DUONEB) nebulizer solution  3 mL Nebulization Q2HRS PRN (RT) Harjinder Crowe M.D.       • famotidine (PEPCID) tablet 20 mg  20 mg Enteral Tube Q12HRS Nii Rivera M.D.   20 mg at 09/14/20 1603   • senna-docusate (PERICOLACE or SENOKOT S) 8.6-50 MG per tablet 2 Tab  2 Tab Enteral Tube BID Harjinder Crowe M.D.   2 Tab at 09/14/20 1603    And   • polyethylene glycol/lytes (MIRALAX) PACKET 1 Packet  1 Packet Enteral Tube QDAY PRN Harjinder Crowe M.D.   1 Packet at 09/12/20 1716    And   • magnesium hydroxide (MILK OF MAGNESIA) suspension 30 mL  30 mL Enteral Tube QDAY PRN Harjinder Crowe M.D.   30 mL at 09/13/20 1641    And   • bisacodyl (DULCOLAX) suppository 10 mg  10 mg Rectal QDAY PRN Harjinder Crowe M.D.   10 mg at 09/14/20 1603   • MD Alert...ICU Electrolyte Replacement per Pharmacy   Other PHARMACY TO DOSE Harjinder Crowe M.D.       • lidocaine (XYLOCAINE) 1 % injection 1-2 mL  1-2 mL Tracheal Tube Q30 MIN PRN Harjinder Crowe M.D.       • labetalol (NORMODYNE/TRANDATE) injection 10-20 mg  10-20 mg Intravenous Q4HRS PRN Nii Rivera M.D.       • ondansetron (ZOFRAN) syringe/vial injection 4 mg  4 mg Intravenous Q4HRS PRN Nii Rivera M.D.   4 mg at 08/29/20 0552   • promethazine (PHENERGAN) suppository 12.5-25 mg  12.5-25 mg Rectal Q4HRS PRN Nii Rivera M.D.       • prochlorperazine (COMPAZINE) injection 5-10 mg  5-10 mg Intravenous Q4HRS PRN Nii Rivera M.D.           Fluids    Intake/Output Summary (Last 24 hours) at 9/14/2020 1755  Last data filed at 9/14/2020 1602  Gross per 24 hour   Intake 2225.18 ml   Output 2795 ml   Net -569.82 ml       Laboratory  Recent Labs     09/12/20  0451 09/13/20  0253 09/14/20  0328   ISTATAPH 7.257* 7.278* 7.259*   ISTATAPCO2 63.5* 63.7* 69.0*   ISTATAPO2 83 99* 84   ISTATATCO2 30 32 33   [id number] 94 96 94   ISTATARTHCO3 28.3* 29.7* 30.9*   ISTATARTBE 0 1 2   ISTATTEMP 37.7 C 37.9 C 37.8 C    ISTATFIO2 100 90 100   ISTATSPEC Arterial Arterial Arterial   ISTATAPHTC 7.248* 7.265* 7.248*   [id number] 87 104* 88*         Recent Labs     09/12/20 0305 09/13/20 0440 09/14/20 0430   SODIUM 136 134* 141   POTASSIUM 5.1 5.1 5.5   CHLORIDE 101 99 106   CO2 27 28 29   BUN 54* 55* 50*   CREATININE 0.50 0.46* 0.37*   CALCIUM 8.5 8.8 9.1     Recent Labs     09/12/20 0305 09/13/20 0440 09/14/20 0430 09/14/20  1445   ALTSGPT  --   --  19  --    ASTSGOT  --   --  29  --    ALKPHOSPHAT  --   --  70  --    TBILIRUBIN  --   --  1.1  --    PREALBUMIN  --   --   --  7.0*   GLUCOSE 146* 141* 128*  --      Recent Labs     09/12/20 0305 09/13/20 0440 09/14/20 0430   WBC 8.5 6.6 6.9   NEUTSPOLYS 80.50* 78.10* 77.90*   LYMPHOCYTES 6.80* 7.30* 9.30*   MONOCYTES 10.90 10.50 9.00   EOSINOPHILS 0.70 2.40 2.20   BASOPHILS 0.20 0.30 0.60   ASTSGOT  --   --  29   ALTSGPT  --   --  19   ALKPHOSPHAT  --   --  70   TBILIRUBIN  --   --  1.1     Recent Labs     09/12/20 0305 09/13/20 0440 09/14/20 0430   RBC 4.30* 4.11* 4.15*   HEMOGLOBIN 13.1* 12.8* 12.6*   HEMATOCRIT 43.1 42.5 43.5   PLATELETCT 241 215 205       Imaging  X-Ray:  I have personally reviewed the images and compared with prior images. and My impression is: Bilateral opacities are about the same    Assessment/Plan  * Pneumonia due to severe acute respiratory syndrome coronavirus 2 (SARS-CoV-2)  Assessment & Plan  S/P remdesivir from 8/30-9/3  S/P dexamethasone, 6 mg daily for 10 days  Continue full anticoagulation with Lovenox for elevated d-dimer    Acute hypoxemic respiratory failure (HCC)- (present on admission)  Assessment & Plan  Intubated 8/29  All of the appropriate ventilator bundles are in place  Continue full vent support  Proning discontinued due to skin breakdown/injury    Pulmonary hypertension (HCC)  Assessment & Plan  RVSP 60 mmHg    Pneumonia  Assessment & Plan  Sputum with Serratia marcescens  Change Zosyn to Rocephin    Septic shock due to  undetermined organism (HCC)  Assessment & Plan  Shock has resolved - off vasopressor support  Sepsis improving    Controlled type 2 diabetes mellitus, without long-term current use of insulin (HCC)- (present on admission)  Assessment & Plan  Check glycohemoglobin    Essential hypertension- (present on admission)  Assessment & Plan  Resume home amlodipine and lisinopril when clinically appropriate    Morbid obesity with BMI of 50.0-59.9, adult (HCC)- (present on admission)  Assessment & Plan  BMI 53  Behavioral modification and nutrition counseling when clinically appropriate    ABELARDO (obstructive sleep apnea)- (present on admission)  Assessment & Plan  PSG on 1/29/2019 revealed AHI of 101.9 with minimum oxygen saturation of 50%    HLD (hyperlipidemia)- (present on admission)  Assessment & Plan  Continue statin       VTE:  Lovenox  Ulcer: H2 Antagonist  Lines: Central Line  Ongoing indication addressed, Arterial Line  Ongoing indication addressed and Briseno Catheter  Ongoing indication addressed    I have performed a physical exam and reviewed and updated ROS and Plan today (9/14/2020). In review of yesterday's note (9/13/2020), there are no changes except as documented above.     I have assessed and reassessed his respiratory status with ventilator adjustments, airway mechanics, ventilator waveforms, blood pressure, hemodynamics, cardiovascular status and his neurologic status.  He is at increased risk for worsening respiratory and cardiovascular system dysfunction.    Discussed patient condition and risk of morbidity and/or mortality with RN, RT, Pharmacy, Charge nurse / hot rounds and QA team     The patient remains critically ill.  Critical care time = 85 minutes in directly providing and coordinating critical care and extensive data review.  No time overlap and excludes procedures.    Aguilar Marquez MD  Pulmonary and Critical Care Medicine

## 2020-09-14 NOTE — CARE PLAN
Problem: Safety  Goal: Will remain free from falls  Outcome: PROGRESSING AS EXPECTED  -Hourly rounding, bed to lowest position, bed alarm on, x3 bed side rails up to prevent falls and promote safety     Problem: Infection  Goal: Will remain free from infection  Outcome: PROGRESSING AS EXPECTED  -Patient placed in own room with isolation precautions, call staff educated on protocol for donning/doffing isolation equipment.      Problem: Venous Thromboembolism (VTW)/Deep Vein Thrombosis (DVT) Prevention:  Goal: Patient will participate in Venous Thrombosis (VTE)/Deep Vein Thrombosis (DVT)Prevention Measures  Outcome: PROGRESSING AS EXPECTED  -SCD's placed on calves, and passive range of motion exercises done

## 2020-09-14 NOTE — PROGRESS NOTES
I certify that the patient requires continued medically necessary hospital services for the treatment of respiratory failure due to pneumonia due to SARS-CoV-2 and will remain in the hospital for 20 days.  Discharge plan is anticipated to be eventually to home after he has recovered.    Aguilar Marquez MD  Pulmonary and Critical Care Medicine

## 2020-09-14 NOTE — CARE PLAN
Problem: Ventilation Defect:  Goal: Ability to achieve and maintain unassisted ventilation or tolerate decreased levels of ventilator support  Outcome: NOT MET   Vent day 16  34/370/+16, 100%

## 2020-09-15 ENCOUNTER — APPOINTMENT (OUTPATIENT)
Dept: RADIOLOGY | Facility: MEDICAL CENTER | Age: 60
DRG: 004 | End: 2020-09-15
Attending: INTERNAL MEDICINE
Payer: MEDICARE

## 2020-09-15 LAB
ACTION RANGE TRIGGERED IACRT: YES
ANION GAP SERPL CALC-SCNC: 8 MMOL/L (ref 7–16)
BASE EXCESS BLDA CALC-SCNC: 2 MMOL/L (ref -4–3)
BASOPHILS # BLD AUTO: 0.5 % (ref 0–1.8)
BASOPHILS # BLD: 0.03 K/UL (ref 0–0.12)
BODY TEMPERATURE: ABNORMAL DEGREES
BUN SERPL-MCNC: 46 MG/DL (ref 8–22)
CALCIUM SERPL-MCNC: 8.9 MG/DL (ref 8.5–10.5)
CHLORIDE SERPL-SCNC: 108 MMOL/L (ref 96–112)
CO2 BLDA-SCNC: 33 MMOL/L (ref 20–33)
CO2 SERPL-SCNC: 28 MMOL/L (ref 20–33)
CREAT SERPL-MCNC: 0.3 MG/DL (ref 0.5–1.4)
EOSINOPHIL # BLD AUTO: 0.46 K/UL (ref 0–0.51)
EOSINOPHIL NFR BLD: 7.1 % (ref 0–6.9)
ERYTHROCYTE [DISTWIDTH] IN BLOOD BY AUTOMATED COUNT: 60.1 FL (ref 35.9–50)
EST. AVERAGE GLUCOSE BLD GHB EST-MCNC: 140 MG/DL
GLUCOSE SERPL-MCNC: 124 MG/DL (ref 65–99)
HBA1C MFR BLD: 6.5 % (ref 0–5.6)
HCO3 BLDA-SCNC: 30.5 MMOL/L (ref 17–25)
HCT VFR BLD AUTO: 43.3 % (ref 42–52)
HGB BLD-MCNC: 12.5 G/DL (ref 14–18)
HOROWITZ INDEX BLDA+IHG-RTO: 76 MM[HG]
IMM GRANULOCYTES # BLD AUTO: 0.08 K/UL (ref 0–0.11)
IMM GRANULOCYTES NFR BLD AUTO: 1.2 % (ref 0–0.9)
INST. QUALIFIED PATIENT IIQPT: YES
LYMPHOCYTES # BLD AUTO: 0.6 K/UL (ref 1–4.8)
LYMPHOCYTES NFR BLD: 9.2 % (ref 22–41)
MCH RBC QN AUTO: 30.4 PG (ref 27–33)
MCHC RBC AUTO-ENTMCNC: 28.9 G/DL (ref 33.7–35.3)
MCV RBC AUTO: 105.4 FL (ref 81.4–97.8)
MONOCYTES # BLD AUTO: 0.53 K/UL (ref 0–0.85)
MONOCYTES NFR BLD AUTO: 8.1 % (ref 0–13.4)
NEUTROPHILS # BLD AUTO: 4.81 K/UL (ref 1.82–7.42)
NEUTROPHILS NFR BLD: 73.9 % (ref 44–72)
NRBC # BLD AUTO: 0 K/UL
NRBC BLD-RTO: 0 /100 WBC
O2/TOTAL GAS SETTING VFR VENT: 95 %
PCO2 BLDA: 67.4 MMHG (ref 26–37)
PCO2 TEMP ADJ BLDA: 69.5 MMHG (ref 26–37)
PH BLDA: 7.26 [PH] (ref 7.4–7.5)
PH TEMP ADJ BLDA: 7.25 [PH] (ref 7.4–7.5)
PLATELET # BLD AUTO: 196 K/UL (ref 164–446)
PMV BLD AUTO: 10.5 FL (ref 9–12.9)
PO2 BLDA: 72 MMHG (ref 64–87)
PO2 TEMP ADJ BLDA: 76 MMHG (ref 64–87)
POTASSIUM SERPL-SCNC: 5.3 MMOL/L (ref 3.6–5.5)
RBC # BLD AUTO: 4.11 M/UL (ref 4.7–6.1)
SAO2 % BLDA: 91 % (ref 93–99)
SODIUM SERPL-SCNC: 144 MMOL/L (ref 135–145)
SPECIMEN DRAWN FROM PATIENT: ABNORMAL
WBC # BLD AUTO: 6.5 K/UL (ref 4.8–10.8)

## 2020-09-15 PROCEDURE — A9270 NON-COVERED ITEM OR SERVICE: HCPCS | Performed by: INTERNAL MEDICINE

## 2020-09-15 PROCEDURE — 37799 UNLISTED PX VASCULAR SURGERY: CPT

## 2020-09-15 PROCEDURE — 700102 HCHG RX REV CODE 250 W/ 637 OVERRIDE(OP): Performed by: INTERNAL MEDICINE

## 2020-09-15 PROCEDURE — 83036 HEMOGLOBIN GLYCOSYLATED A1C: CPT

## 2020-09-15 PROCEDURE — 82803 BLOOD GASES ANY COMBINATION: CPT

## 2020-09-15 PROCEDURE — 94770 HCHG CO2 EXPIRED GAS DETERMINATION: CPT

## 2020-09-15 PROCEDURE — 700105 HCHG RX REV CODE 258: Performed by: INTERNAL MEDICINE

## 2020-09-15 PROCEDURE — 94003 VENT MGMT INPAT SUBQ DAY: CPT

## 2020-09-15 PROCEDURE — 770022 HCHG ROOM/CARE - ICU (200)

## 2020-09-15 PROCEDURE — 700111 HCHG RX REV CODE 636 W/ 250 OVERRIDE (IP): Mod: JG | Performed by: INTERNAL MEDICINE

## 2020-09-15 PROCEDURE — 700111 HCHG RX REV CODE 636 W/ 250 OVERRIDE (IP): Performed by: INTERNAL MEDICINE

## 2020-09-15 PROCEDURE — 85025 COMPLETE CBC W/AUTO DIFF WBC: CPT

## 2020-09-15 PROCEDURE — 80048 BASIC METABOLIC PNL TOTAL CA: CPT

## 2020-09-15 PROCEDURE — 71045 X-RAY EXAM CHEST 1 VIEW: CPT

## 2020-09-15 PROCEDURE — 99291 CRITICAL CARE FIRST HOUR: CPT | Performed by: INTERNAL MEDICINE

## 2020-09-15 RX ORDER — SODIUM CHLORIDE 9 MG/ML
INJECTION, SOLUTION INTRAVENOUS
Status: ACTIVE
Start: 2020-09-15 | End: 2020-09-15

## 2020-09-15 RX ADMIN — PROPOFOL 15 MCG/KG/MIN: 10 INJECTION, EMULSION INTRAVENOUS at 03:27

## 2020-09-15 RX ADMIN — Medication 1.5 MG/HR: at 02:42

## 2020-09-15 RX ADMIN — Medication 1.5 MG/HR: at 10:16

## 2020-09-15 RX ADMIN — Medication 10 MG: at 16:03

## 2020-09-15 RX ADMIN — FAMOTIDINE 20 MG: 20 TABLET, FILM COATED ORAL at 05:31

## 2020-09-15 RX ADMIN — PROPOFOL 20 MCG/KG/MIN: 10 INJECTION, EMULSION INTRAVENOUS at 16:05

## 2020-09-15 RX ADMIN — ACETAMINOPHEN 650 MG: 325 TABLET, FILM COATED ORAL at 10:17

## 2020-09-15 RX ADMIN — ENOXAPARIN SODIUM 150 MG: 150 INJECTION SUBCUTANEOUS at 17:31

## 2020-09-15 RX ADMIN — ENOXAPARIN SODIUM 150 MG: 150 INJECTION SUBCUTANEOUS at 05:32

## 2020-09-15 RX ADMIN — ALTEPLASE 2 MG: 2.2 INJECTION, POWDER, LYOPHILIZED, FOR SOLUTION INTRAVENOUS at 22:09

## 2020-09-15 RX ADMIN — FAMOTIDINE 20 MG: 20 TABLET, FILM COATED ORAL at 16:06

## 2020-09-15 RX ADMIN — Medication 2 MG/HR: at 21:58

## 2020-09-15 RX ADMIN — PROPOFOL 20 MCG/KG/MIN: 10 INJECTION, EMULSION INTRAVENOUS at 21:40

## 2020-09-15 RX ADMIN — ASPIRIN 81 MG 81 MG: 81 TABLET ORAL at 05:31

## 2020-09-15 RX ADMIN — ACETAMINOPHEN 650 MG: 325 TABLET, FILM COATED ORAL at 16:06

## 2020-09-15 RX ADMIN — ATORVASTATIN CALCIUM 20 MG: 20 TABLET, FILM COATED ORAL at 05:31

## 2020-09-15 RX ADMIN — PROPOFOL 15 MCG/KG/MIN: 10 INJECTION, EMULSION INTRAVENOUS at 10:17

## 2020-09-15 RX ADMIN — CHLOROTHIAZIDE SODIUM 500 MG: 500 INJECTION INTRAVENOUS at 16:05

## 2020-09-15 ASSESSMENT — FIBROSIS 4 INDEX: FIB4 SCORE: 1.97

## 2020-09-15 NOTE — ASSESSMENT & PLAN NOTE
Sputum with Serratia marcescens  Continue Bactrim  UA unremarkable  Blood cultures negative from 9/16 so far  Trach aspirate 9/26 gram neg lactose fermenting  Off ab

## 2020-09-15 NOTE — CARE PLAN
Problem: Bowel/Gastric:  Goal: Will not experience complications related to bowel motility  Outcome: PROGRESSING AS EXPECTED  Note: Previous BM 9/10; bowel care escalated. X2 loose bowel movements overnight. Active bowel sounds.      Problem: Skin Integrity  Goal: Risk for impaired skin integrity will decrease  Outcome: PROGRESSING SLOWER THAN EXPECTED  Intervention: Assess and monitor skin integrity, appearance and/or temperature  Note: Pt turned Q2. Skin assessed with turns and bath. Interdry placed under pannus. Mepilex changed over skin tear on back. Devices free from pt with each turn.

## 2020-09-15 NOTE — WOUND TEAM
[facility]  Inpatient Services  Initial Wound and Skin Care Evaluation    Admission Date: 8/27/2020     Last order of IP CONSULT TO WOUND CARE was found on 9/14/2020 from Hospital Encounter on 8/27/2020     HPI, PMH, SH: Reviewed    Unit where seen by Wound Team: T630/00     WOUND CONSULT/FOLLOW UP RELATED TO:  Left Flank/Back & Follow up on pressure injuries     Self Report / Pain Level:  No SS of pain or discomfort.       OBJECTIVE:  Pt in ICU low airloss with waffle overlay in use. Pillows under left side. Bilateral heels elevated off bed using pillows.     WOUND TYPE, LOCATION, CHARACTERISTICS (Pressure Injuries: location, stage, POA or date identified)     Wound 09/14/20 Skin Tear Back;Flank Left (Active)   Wound Image   09/15/20 6355   Site Assessment Red    Periwound Assessment Dry;Clean;Intact    Margins Defined edges    Closure Adhesive bandage    Drainage Amount Small    Drainage Description Serosanguineous    Treatments Cleansed;Site care;Offloading    Wound Cleansing Approved Wound Cleanser    Periwound Protectant Skin Protectant Wipes to Periwound    Dressing Cleansing/Solutions Not Applicable    Dressing Options Mepitel One;Mepilex    Dressing Changed New    Dressing Status Clean;Dry;Intact    Dressing Change/Treatment Frequency Every 72 hrs, and As Needed    NEXT Dressing Change/Treatment Date 09/18/20    NEXT Weekly Photo (Inpatient Only) 09/22/20    Non-staged Wound Description Partial thickness    Wound Length (cm) 5.5 cm    Wound Width (cm) 4 cm    Wound Depth (cm) 0.1 cm    Wound Surface Area (cm^2) 22 cm^2    Wound Volume (cm^3) 2.2 cm^3    Shape Irregular    Wound Odor None    Exposed Structures None    WOUND NURSE ONLY - Time Spent with Patient (mins) 90        Wound 09/15/20 Full Thickness Wound Toe, Hallux Medial Right Unknown Etiology (Active)   Wound Image    09/15/20 3753   Site Assessment Purple;Boggy    Periwound Assessment Dry;Clean;Intact    Margins Attached  edges;Defined edges    Closure None;Open to air    Drainage Amount None    Treatments Offloading;Cleansed;Site care    Wound Cleansing Not Applicable    Dressing Cleansing/Solutions 3% Betadine    Dressing Options Open to Air    Dressing Status Open to Air    Dressing Change/Treatment Frequency Every Shift, and As Needed    NEXT Dressing Change/Treatment Date 09/15/20    NEXT Weekly Photo (Inpatient Only) 09/22/20    Non-staged Wound Description Full thickness    Wound Length (cm) 4 cm    Wound Width (cm) 3.2 cm    Wound Surface Area (cm^2) 12.8 cm^2    Shape Irregular    Wound Odor None    Exposed Structures RUBY      Vascular:    MONIK:   No results found.    Lab Values:    Lab Results   Component Value Date/Time    WBC 6.5 09/15/2020 04:51 AM    RBC 4.11 (L) 09/15/2020 04:51 AM    HEMOGLOBIN 12.5 (L) 09/15/2020 04:51 AM    HEMATOCRIT 43.3 09/15/2020 04:51 AM    CREACTPROT 13.29 (H) 09/14/2020 02:45 PM    HBA1C 6.5 (H) 09/15/2020 04:51 AM      Culture Results show:  No results found for this or any previous visit (from the past 720 hour(s)).    INTERVENTIONS BY WOUND TEAM:  Chart and images reviewed. This RN in to assess patient with wound RN, Tata and bedside RN. Pts septum was assessed and is now intact. Image obtained. Pts bilateral knees now fully blanching. Measurements and pictures obtained. Pts right medial great toe has a large dark fluctuant blood blister which was assessed, measured and photographed and then left open to air. Order placed for nursing to paint with betadine Pts bilateral heels are intact. Pt was then turned to the right side. Left flank/back was assessed. Heel mepilex was removed. Wound was cleansed. Measurements and pictures obtained. Beatrice-wound prepped with no sting skin barrier. Mepitel one was applied over the wound and then secured in place with a sacral mepilex. Pt was returned to a supine position before pillows were placed under the right side to offload  pressure.    Interdisciplinary consultation: Patient, Bedside RN (Tierney),     EVALUATION / RATIONALE FOR TREATMENT: Pt is an older obese gentleman admitted with pneumonia related to Covid 19. Pt was being proned for mutlipe hours a day. Pt developed pressure injuries to bilateral knees and septum. Pt has since been remained supine and wounds have subsequently healed. Pt however has developed a partial thickness wound to his left flank which appears to be a skin tear. mepitel applied as nonadherent contact layer. Sacral mepilex used to prevent further breakdown. Right medial great toe also has a fluctuant blood blister of unknown etiology may be related to pressor use. Wound will likely be full thickness in the event that it opens up. Betadine was ordered to firm up the wound and prevent infection.      Goals: Steady decrease in wound area and depth weekly.    NURSING PLAN OF CARE ORDERS (X):    Dressing changes: See Dressing Care orders: X  Skin care: See Skin Care orders: X  Rectal tube care: See Rectal Tube Care orders:   Other orders:    RSKIN:   CURRENTLY IN PLACE (X), APPLIED THIS VISIT (A), ORDERED (O):   Q shift Noe:  X  Q shift pressure point assessments:  X  Pressure redistribution mattress            Low Airloss  X, ICU Bed        Bariatric VITALIY         Bariatric foam           Heel float boots     Heel Silicone dressing O       Float Heels off Bed with Pillows  X             Barrier wipes         Barrier Cream         Barrier paste  X        Sacral silicone dressing         Silicone O2 tubing         Anchorfast   X      Cannula fixation Device (Tender )          Gray Foam Ear protectors     High flow offloading Clip    Elastic head band offloading device                                                      Trach with Optifoam split foam       Z Claudio Pillow                             Waffle cushion        Waffle Overlay   X      Rectal tube or BMS    Purwick/Condom Cath          Antifungal tx       Interdry          Reposition q 2 hours  X, pillow moved to under right side    TAPs Turning system                 Up to chair        Ambulate      PT/OT        Dietician        Diabetes Education      PO     TF  X   TPN     NPO   # days   Other        WOUND TEAM PLAN OF CARE:   Dressing changes by wound team:                   Follow up 3 times weekly:                NPWT change 3 times weekly:     Follow up 1-2 times weekly:      Follow up Bi-Monthly:                   Follow up as needed:   X    Other (explain):     Anticipated discharge plans: X, TBD pt critically ill  LTACH:        SNF/Rehab:                  Home Health Care:           Outpatient Wound Center:            Self Care:

## 2020-09-15 NOTE — DIETARY
Nutrition support weekly update:  Day 19 of admit.  Rashi Shirley is a 60 y.o. male with admitting DX of Pneumonia, sepsis.     Tube feeding initiated on 9/1.   Current TF via gastric Cortrak is Peptamen Intense VHP @ 60 mL/hr providing 1440 kcal (+kcal from propofol), 132 grams protein (1.8 g/kg/day IBW, 0.8k g/kg/day), 1210 mL free water per day.     Assessment:  Weight 168.1 kg.  Pt weight is down since admit. Per I/Os -4.5L, current weight likely dry weight.   IBW: 75.5 kg (166lbs)    Estimated Nutritional Needs:   REE per MSJ x 1 = 2510 kcal/day (14.9 kcal/kg/day)  RMR per P[facility] (vent L/min 22, Tmax 37.9) = 3206 (65-70% = 3752-3547 kcal/day)  22-25 kcal/kg IBW = 8476-4872 kcal/day  2.5 g protein/kg IBW = 189 g protein/day       Evaluation:   1. Pt remains intubated, Vent Day 17.  2. TF running @ goal. Propofol @ 15.9mL/hr = +420 kcal/day  3. Labs: Bun 46, HbA1c 6.5, [id number], Crea 0.3, Triglycerides (9/13) 266  4. Meds: Lipitor, Lovenox, Pepcid, ICU electrolyte replacement per pharmacy, bowel regimen, Propofol.  5. Increase current tube feeding rate to adjust for propofol decrease.       Malnutrition risk: No risk identified.     Recommendations/Plan:  1. Increase TF rate per protocol to 70 mL/hr to provide 1680 kcal (+420kcal from propofol), 155 grams protein and 1411 mL free water per day.   2. When propofol is d/c'd, increase goal rate to 85 mL/hr to provide 2040 kcal, 188 g protein and 1714 mL free water per day.   3. Fluids per MD.  4. Monitor triglycerides.  5. Monitor weights.    RD following.

## 2020-09-15 NOTE — PROGRESS NOTES
Critical Care Progress Note    Date of admission  8/27/2020    Chief Complaint  60 y.o. male admitted 8/27/2020 with respiratory failure and pneumonia due to SARS-CoV-2.    Hospital Course     8/29-worsening mental status, rapid shallow breathing and hypoxia required endotracheal intubation and prone positioning.  8/30-I spoke with Brittney, the patient's daughter and surrogate decision maker about initiating Remdesivir.  We discussed the risks benefits and alternatives.  All questions were answered.  She consented to initiating Remdesivir today.  8/31 -art line placed, remains on PCV, transition off rocuronium drip as tolerated, propofol/fentanyl, continue prone protocol  9/1 - remains on pressure control ventilation, PEEP 19, improved P/F ratio with prone ventilation, off rocuronium, starting trophic tube feed, full dose anticoagulation with Lovenox, day 3/5 Remdesivir, day 5 dexamethasone  9/4 - stopped prone ventilation with some skin breakdown, difficult prone protocol with morbid obesity, full ventilator support, advancing nutrition  9/5 - Ongoing diuresis with 3 times daily Lasix, advancing tube feeds to goal, remains on supine ventilation now  9/6 - PCV, weaning IP and PEEP, remains supine, ongoing aggressive diuresis, abdi TF, lighten sedation as abdi  9/7 Switch to VC, switched from fentanyl to Dilaudid with improved ventilator synchrony, decrease Lasix to daily; stopped amlodipine as he is now on norepinephrine to achieve a map greater than 65  9/8 continue full vent support, compliance gradually improving  9/10 Febrile: urine, blood, sputum cultures, continue full ventilator support  9/11 febrile, increased NE, urine culture, MRSA nare, then begin zosyn  9/12 UA + for enterococcus; resp culture + Non-lactose fermenting GNR  - currently on zosyn; will await sensitivities   9/13 continues on PEEP 16, on Zosyn for Serratia and enterococcus  9/14 -    change Zosyn to Rocephin.  Continue full anticoagulation.   Continue vent support.  Off norepinephrine.  9/15 -    continue full vent support.  Force diuresis with chlorothiazide.  Continue Lovenox.      Interval Problem Update  Reviewed last 24 hour events:      Opens eyes, does not follow  Withdraws in the LE minimally  High RR with SAT  Dilaudid 1.5  Prop 15  SR-ST  100.4  TF at 60 (goal)  Good UOP  Vent 17  70%   PEEP 16  Serratia in sputum - change to Rocephin  Increase water to 200 q 4  Give chlorothiazine      Review of Systems  Review of Systems   Unable to perform ROS: Acuity of condition        Vital Signs for last 24 hours   Pulse:  [] 109  Resp:  [12-44] 23  BP: ()/(37-65) 106/49  SpO2:  [91 %-97 %] 92 %    Hemodynamic parameters for last 24 hours       Respiratory Information for the last 24 hours  Vent Mode: APVCMV  Rate (breaths/min): 34  Vt Target (mL): 370  PEEP/CPAP: 16  MAP: 22  Control VTE (exp VT): 413    Physical Exam   Physical Exam  Constitutional:       Appearance: He is not diaphoretic.      Comments: On ventilator   HENT:      Head: Normocephalic and atraumatic.      Right Ear: External ear normal.      Left Ear: External ear normal.      Nose: Nose normal.      Mouth/Throat:      Mouth: Mucous membranes are moist.   Eyes:      General: No scleral icterus.     Pupils: Pupils are equal, round, and reactive to light.   Neck:      Musculoskeletal: Normal range of motion. No neck rigidity.   Cardiovascular:      Pulses: Normal pulses.      Heart sounds: No murmur. No friction rub.      Comments: Sinus rhythm  Pulmonary:      Breath sounds: Rales (Unchanged crackles) present. No wheezing.   Abdominal:      General: Bowel sounds are normal. There is no distension.      Palpations: Abdomen is soft.      Tenderness: There is no abdominal tenderness. There is no rebound.      Comments: Tolerating enteral tube feedings   Musculoskeletal: Normal range of motion.         General: Swelling present.      Right lower leg: Edema present.      Left  lower leg: Edema present.      Comments: No clubbing or cyanosis   Skin:     General: Skin is warm and dry.      Capillary Refill: Capillary refill takes less than 2 seconds.   Neurological:      Comments: Pupils 4 mm and briskly react to light.  He withdraws in the lower extremities.         Medications  Current Facility-Administered Medications   Medication Dose Route Frequency Provider Last Rate Last Dose   • SODIUM CHLORIDE 0.9 % IV SOLN            • chlorothiazide (DIURIL) 500 mg in NS 50 mL IVPB  500 mg Intravenous Once Aguilar Marquez M.D.       • cefTRIAXone (ROCEPHIN) 2 g in  mL IVPB  2 g Intravenous Q24HRS Aguilar Marquez M.D.   Stopped at 09/14/20 1914   • norepinephrine (Levophed) infusion 8 mg/250 mL (premix)  0-30 mcg/min Intravenous Continuous Aguilar Marquez M.D.   Stopped at 09/13/20 1613   • HYDROmorphone (DILAUDID) 0.2 mg/mL in 50mL NS (Continuous Infusion)   Intravenous Continuous Jorge Sanchez M.D. 7.5 mL/hr at 09/15/20 1016 1.5 mg/hr at 09/15/20 1016   • HYDROmorphone pf (DILAUDID) injection 0.75 mg  0.75 mg Intravenous Q30 MIN PRN Jorge Sanchez M.D.        And   • HYDROmorphone (DILAUDID) injection 1.5 mg  1.5 mg Intravenous Q30 MIN PRN Jorge Sanchez M.D.       • atorvastatin (LIPITOR) tablet 20 mg  20 mg Enteral Tube DAILY Nii Rivera M.D.   20 mg at 09/15/20 0531   • ondansetron (ZOFRAN ODT) dispertab 4 mg  4 mg Enteral Tube Q4HRS PRN Nii Rivera M.D.       • Pharmacy Consult: Enteral tube insertion - review meds/change route/product selection  1 Each Other PHARMACY TO DOSE Nii Rivera M.D.       • aspirin (ASA) chewable tab 81 mg  81 mg Enteral Tube DAILY Nii Rivera M.D.   81 mg at 09/15/20 0531   • acetaminophen (TYLENOL) tablet 650 mg  650 mg Enteral Tube Q6HRS PRN Nii Rivera M.D.   650 mg at 09/15/20 1017   • HYDROcodone/acetaminophen (NORCO)  MG per tablet 1 Tab  1 Tab Enteral Tube Q6HRS PRN Nii Rivera M.D.       •  promethazine (PHENERGAN) tablet 12.5-25 mg  12.5-25 mg Enteral Tube Q4HRS PRN Nii Rivera M.D.       • propofol (DIPRIVAN) injection  0-80 mcg/kg/min Intravenous Continuous Harjinder Crowe M.D. 15.9 mL/hr at 09/15/20 1017 15 mcg/kg/min at 09/15/20 1017   • enoxaparin (LOVENOX) injection 150 mg  150 mg Subcutaneous Q12HRS Harjinder Crowe M.D.   150 mg at 09/15/20 0532   • Respiratory Therapy Consult   Nebulization Continuous RT Harjinder Crowe M.D.       • ipratropium-albuterol (DUONEB) nebulizer solution  3 mL Nebulization Q2HRS PRN (RT) Harjinder Crowe M.D.       • famotidine (PEPCID) tablet 20 mg  20 mg Enteral Tube Q12HRS Nii Rivera M.D.   20 mg at 09/15/20 0531   • senna-docusate (PERICOLACE or SENOKOT S) 8.6-50 MG per tablet 2 Tab  2 Tab Enteral Tube BID Harjinder Crowe M.D.   Stopped at 09/15/20 0600    And   • polyethylene glycol/lytes (MIRALAX) PACKET 1 Packet  1 Packet Enteral Tube QDAY PRN Harjinder Crowe M.D.   1 Packet at 09/12/20 1716    And   • magnesium hydroxide (MILK OF MAGNESIA) suspension 30 mL  30 mL Enteral Tube QDAY PRN Harjinder Crowe M.D.   30 mL at 09/13/20 1641    And   • bisacodyl (DULCOLAX) suppository 10 mg  10 mg Rectal QDAY PRN Harjinder Crowe M.D.   10 mg at 09/14/20 1603   • MD Alert...ICU Electrolyte Replacement per Pharmacy   Other PHARMACY TO DOSE Harjinder Crowe M.D.       • lidocaine (XYLOCAINE) 1 % injection 1-2 mL  1-2 mL Tracheal Tube Q30 MIN PRN Harjinder Crowe M.D.       • labetalol (NORMODYNE/TRANDATE) injection 10-20 mg  10-20 mg Intravenous Q4HRS PRN Nii Rivera M.D.       • ondansetron (ZOFRAN) syringe/vial injection 4 mg  4 mg Intravenous Q4HRS PRN Nii Rivera M.D.   4 mg at 08/29/20 0552   • promethazine (PHENERGAN) suppository 12.5-25 mg  12.5-25 mg Rectal Q4HRS PRN Nii Rivear M.D.       • prochlorperazine (COMPAZINE) injection 5-10 mg  5-10 mg Intravenous Q4HRS PRN Nii Rivera M.D.           Fluids    Intake/Output Summary (Last 24 hours)  at 9/15/2020 1336  Last data filed at 9/15/2020 1200  Gross per 24 hour   Intake 2355.02 ml   Output 2850 ml   Net -494.98 ml       Laboratory  Recent Labs     09/13/20  0253 09/14/20  0328 09/15/20  0353   ISTATAPH 7.278* 7.259* 7.264*   ISTATAPCO2 63.7* 69.0* 67.4*   ISTATAPO2 99* 84 72   ISTATATCO2 32 33 33   [id number] 96 94 91*   ISTATARTHCO3 29.7* 30.9* 30.5*   ISTATARTBE 1 2 2   ISTATTEMP 37.9 C 37.8 C 37.7 C   ISTATFIO2 90 100 95   ISTATSPEC Arterial Arterial Arterial   ISTATAPHTC 7.265* 7.248* 7.254*   [id number] 104* 88* 76         Recent Labs     09/13/20 0440 09/14/20  0430 09/15/20  0451   SODIUM 134* 141 144   POTASSIUM 5.1 5.5 5.3   CHLORIDE 99 106 108   CO2 28 29 28   BUN 55* 50* 46*   CREATININE 0.46* 0.37* 0.30*   CALCIUM 8.8 9.1 8.9     Recent Labs     09/13/20 0440 09/14/20  0430 09/14/20  1445 09/15/20  0451   ALTSGPT  --  19  --   --    ASTSGOT  --  29  --   --    ALKPHOSPHAT  --  70  --   --    TBILIRUBIN  --  1.1  --   --    PREALBUMIN  --   --  7.0*  --    GLUCOSE 141* 128*  --  124*     Recent Labs     09/13/20 0440 09/14/20  0430 09/15/20  0451   WBC 6.6 6.9 6.5   NEUTSPOLYS 78.10* 77.90* 73.90*   LYMPHOCYTES 7.30* 9.30* 9.20*   MONOCYTES 10.50 9.00 8.10   EOSINOPHILS 2.40 2.20 7.10*   BASOPHILS 0.30 0.60 0.50   ASTSGOT  --  29  --    ALTSGPT  --  19  --    ALKPHOSPHAT  --  70  --    TBILIRUBIN  --  1.1  --      Recent Labs     09/13/20  0440 09/14/20  0430 09/15/20  0451   RBC 4.11* 4.15* 4.11*   HEMOGLOBIN 12.8* 12.6* 12.5*   HEMATOCRIT 42.5 43.5 43.3   PLATELETCT 215 205 196       Imaging  X-Ray:  I have personally reviewed the images and compared with prior images. and My impression is: Slightly increased bilateral opacities    Assessment/Plan  * Pneumonia due to severe acute respiratory syndrome coronavirus 2 (SARS-CoV-2)  Assessment & Plan  S/P remdesivir from 8/30-9/3  S/P dexamethasone, 6 mg daily for 10 days  Continue full anticoagulation with Lovenox for elevated  d-dimer    Acute hypoxemic respiratory failure (HCC)- (present on admission)  Assessment & Plan  Intubated 8/29  All of the appropriate ventilator bundles are in place  Continue full vent support  Proning discontinued due to skin breakdown/injury  Force diuresis with chlorothiazide    Pulmonary hypertension ([facility])  Assessment & Plan  RVSP 60 mmHg    Pneumonia  Assessment & Plan  Sputum with Serratia marcescens  Continue Rocephin    Septic shock due to undetermined organism ([facility])  Assessment & Plan  Shock has resolved - off vasopressor support  Sepsis improving    Controlled type 2 diabetes mellitus, without long-term current use of insulin ([facility])- (present on admission)  Assessment & Plan  Glycohemoglobin 6.5  Glucose control    Essential hypertension- (present on admission)  Assessment & Plan  Resume home amlodipine and lisinopril when clinically appropriate    Morbid obesity with BMI of 50.0-59.9, adult ([facility])- (present on admission)  Assessment & Plan  BMI 53  Behavioral modification and nutrition counseling when clinically appropriate    ABELARDO (obstructive sleep apnea)- (present on admission)  Assessment & Plan  PSG on 1/29/2019 revealed AHI of 101.9 with minimum oxygen saturation of 50%    HLD (hyperlipidemia)- (present on admission)  Assessment & Plan  Continue statin       VTE:  Lovenox  Ulcer: H2 Antagonist  Lines: Central Line  Ongoing indication addressed, Arterial Line  Ongoing indication addressed and Briseno Catheter  Ongoing indication addressed    I have performed a physical exam and reviewed and updated ROS and Plan today (9/15/2020). In review of yesterday's note (9/14/2020), there are no changes except as documented above.     I have assessed and reassessed his respiratory status with ventilator adjustments, ventilator waveforms, airway mechanics, hemodynamics, blood pressure, cardiovascular status and his neurologic status.  He is at increased risk for worsening respiratory and cardiovascular system  dysfunction.    Discussed patient condition and risk of morbidity and/or mortality with RN, RT, Pharmacy, Charge nurse / hot rounds and QA team     The patient remains critically ill.  Critical care time = 40 minutes in directly providing and coordinating critical care and extensive data review.  No time overlap and excludes procedures.    Aguilar Marquez MD  Pulmonary and Critical Care Medicine

## 2020-09-16 ENCOUNTER — APPOINTMENT (OUTPATIENT)
Dept: RADIOLOGY | Facility: MEDICAL CENTER | Age: 60
DRG: 004 | End: 2020-09-16
Attending: INTERNAL MEDICINE
Payer: MEDICARE

## 2020-09-16 LAB
ACTION RANGE TRIGGERED IACRT: YES
ANION GAP SERPL CALC-SCNC: 6 MMOL/L (ref 7–16)
APPEARANCE UR: CLEAR
BACTERIA #/AREA URNS HPF: NEGATIVE /HPF
BACTERIA BLD CULT: NORMAL
BACTERIA BLD CULT: NORMAL
BASE EXCESS BLDA CALC-SCNC: 3 MMOL/L (ref -4–3)
BASOPHILS # BLD AUTO: 0.5 % (ref 0–1.8)
BASOPHILS # BLD: 0.03 K/UL (ref 0–0.12)
BILIRUB UR QL STRIP.AUTO: ABNORMAL
BODY TEMPERATURE: ABNORMAL DEGREES
BUN SERPL-MCNC: 44 MG/DL (ref 8–22)
CALCIUM SERPL-MCNC: 9.1 MG/DL (ref 8.5–10.5)
CHLORIDE SERPL-SCNC: 110 MMOL/L (ref 96–112)
CO2 BLDA-SCNC: 33 MMOL/L (ref 20–33)
CO2 SERPL-SCNC: 28 MMOL/L (ref 20–33)
COLOR UR: ABNORMAL
CREAT SERPL-MCNC: 0.32 MG/DL (ref 0.5–1.4)
EOSINOPHIL # BLD AUTO: 0.63 K/UL (ref 0–0.51)
EOSINOPHIL NFR BLD: 10.9 % (ref 0–6.9)
EPI CELLS #/AREA URNS HPF: NEGATIVE /HPF
ERYTHROCYTE [DISTWIDTH] IN BLOOD BY AUTOMATED COUNT: 60.6 FL (ref 35.9–50)
GLUCOSE SERPL-MCNC: 121 MG/DL (ref 65–99)
GLUCOSE UR STRIP.AUTO-MCNC: NEGATIVE MG/DL
HCO3 BLDA-SCNC: 30.8 MMOL/L (ref 17–25)
HCT VFR BLD AUTO: 42.1 % (ref 42–52)
HGB BLD-MCNC: 12.2 G/DL (ref 14–18)
HOROWITZ INDEX BLDA+IHG-RTO: 94 MM[HG]
HYALINE CASTS #/AREA URNS LPF: ABNORMAL /LPF
IMM GRANULOCYTES # BLD AUTO: 0.08 K/UL (ref 0–0.11)
IMM GRANULOCYTES NFR BLD AUTO: 1.4 % (ref 0–0.9)
INST. QUALIFIED PATIENT IIQPT: YES
KETONES UR STRIP.AUTO-MCNC: NEGATIVE MG/DL
LEUKOCYTE ESTERASE UR QL STRIP.AUTO: ABNORMAL
LYMPHOCYTES # BLD AUTO: 0.74 K/UL (ref 1–4.8)
LYMPHOCYTES NFR BLD: 12.8 % (ref 22–41)
MCH RBC QN AUTO: 30.4 PG (ref 27–33)
MCHC RBC AUTO-ENTMCNC: 29 G/DL (ref 33.7–35.3)
MCV RBC AUTO: 105 FL (ref 81.4–97.8)
MICRO URNS: ABNORMAL
MONOCYTES # BLD AUTO: 0.5 K/UL (ref 0–0.85)
MONOCYTES NFR BLD AUTO: 8.6 % (ref 0–13.4)
NEUTROPHILS # BLD AUTO: 3.82 K/UL (ref 1.82–7.42)
NEUTROPHILS NFR BLD: 65.8 % (ref 44–72)
NITRITE UR QL STRIP.AUTO: NEGATIVE
NRBC # BLD AUTO: 0.02 K/UL
NRBC BLD-RTO: 0.3 /100 WBC
O2/TOTAL GAS SETTING VFR VENT: 70 %
PCO2 BLDA: 65.3 MMHG (ref 26–37)
PCO2 TEMP ADJ BLDA: 68.6 MMHG (ref 26–37)
PH BLDA: 7.28 [PH] (ref 7.4–7.5)
PH TEMP ADJ BLDA: 7.27 [PH] (ref 7.4–7.5)
PH UR STRIP.AUTO: 5 [PH] (ref 5–8)
PLATELET # BLD AUTO: 198 K/UL (ref 164–446)
PMV BLD AUTO: 10.3 FL (ref 9–12.9)
PO2 BLDA: 66 MMHG (ref 64–87)
PO2 TEMP ADJ BLDA: 71 MMHG (ref 64–87)
POTASSIUM SERPL-SCNC: 5.2 MMOL/L (ref 3.6–5.5)
PROT UR QL STRIP: 30 MG/DL
RBC # BLD AUTO: 4.01 M/UL (ref 4.7–6.1)
RBC # URNS HPF: ABNORMAL /HPF
RBC UR QL AUTO: ABNORMAL
SAO2 % BLDA: 89 % (ref 93–99)
SIGNIFICANT IND 70042: NORMAL
SIGNIFICANT IND 70042: NORMAL
SITE SITE: NORMAL
SITE SITE: NORMAL
SODIUM SERPL-SCNC: 144 MMOL/L (ref 135–145)
SOURCE SOURCE: NORMAL
SOURCE SOURCE: NORMAL
SP GR UR STRIP.AUTO: 1.02
SPECIMEN DRAWN FROM PATIENT: ABNORMAL
TRIGL SERPL-MCNC: 233 MG/DL (ref 0–149)
UROBILINOGEN UR STRIP.AUTO-MCNC: 4 MG/DL
WBC # BLD AUTO: 5.8 K/UL (ref 4.8–10.8)
WBC #/AREA URNS HPF: ABNORMAL /HPF

## 2020-09-16 PROCEDURE — 700111 HCHG RX REV CODE 636 W/ 250 OVERRIDE (IP): Performed by: INTERNAL MEDICINE

## 2020-09-16 PROCEDURE — 71045 X-RAY EXAM CHEST 1 VIEW: CPT

## 2020-09-16 PROCEDURE — 87040 BLOOD CULTURE FOR BACTERIA: CPT

## 2020-09-16 PROCEDURE — 700102 HCHG RX REV CODE 250 W/ 637 OVERRIDE(OP): Performed by: INTERNAL MEDICINE

## 2020-09-16 PROCEDURE — 700105 HCHG RX REV CODE 258: Performed by: INTERNAL MEDICINE

## 2020-09-16 PROCEDURE — 84478 ASSAY OF TRIGLYCERIDES: CPT

## 2020-09-16 PROCEDURE — 85025 COMPLETE CBC W/AUTO DIFF WBC: CPT

## 2020-09-16 PROCEDURE — 87070 CULTURE OTHR SPECIMN AEROBIC: CPT

## 2020-09-16 PROCEDURE — 94003 VENT MGMT INPAT SUBQ DAY: CPT

## 2020-09-16 PROCEDURE — 37799 UNLISTED PX VASCULAR SURGERY: CPT

## 2020-09-16 PROCEDURE — 770022 HCHG ROOM/CARE - ICU (200)

## 2020-09-16 PROCEDURE — 82803 BLOOD GASES ANY COMBINATION: CPT

## 2020-09-16 PROCEDURE — 81001 URINALYSIS AUTO W/SCOPE: CPT

## 2020-09-16 PROCEDURE — A9270 NON-COVERED ITEM OR SERVICE: HCPCS | Performed by: INTERNAL MEDICINE

## 2020-09-16 PROCEDURE — 87186 SC STD MICRODIL/AGAR DIL: CPT

## 2020-09-16 PROCEDURE — 87077 CULTURE AEROBIC IDENTIFY: CPT

## 2020-09-16 PROCEDURE — 99291 CRITICAL CARE FIRST HOUR: CPT | Performed by: INTERNAL MEDICINE

## 2020-09-16 PROCEDURE — 87205 SMEAR GRAM STAIN: CPT

## 2020-09-16 PROCEDURE — 94770 HCHG CO2 EXPIRED GAS DETERMINATION: CPT

## 2020-09-16 PROCEDURE — 80048 BASIC METABOLIC PNL TOTAL CA: CPT

## 2020-09-16 RX ORDER — AMLODIPINE BESYLATE 10 MG/1
10 TABLET ORAL
Status: DISCONTINUED | OUTPATIENT
Start: 2020-09-16 | End: 2020-09-28 | Stop reason: HOSPADM

## 2020-09-16 RX ADMIN — ACETAMINOPHEN 650 MG: 325 TABLET, FILM COATED ORAL at 17:31

## 2020-09-16 RX ADMIN — PROPOFOL 25 MCG/KG/MIN: 10 INJECTION, EMULSION INTRAVENOUS at 23:08

## 2020-09-16 RX ADMIN — DOCUSATE SODIUM 50 MG AND SENNOSIDES 8.6 MG 2 TABLET: 8.6; 5 TABLET, FILM COATED ORAL at 17:31

## 2020-09-16 RX ADMIN — DOCUSATE SODIUM 50 MG AND SENNOSIDES 8.6 MG 2 TABLET: 8.6; 5 TABLET, FILM COATED ORAL at 05:24

## 2020-09-16 RX ADMIN — ACETAMINOPHEN 650 MG: 325 TABLET, FILM COATED ORAL at 02:15

## 2020-09-16 RX ADMIN — ASPIRIN 81 MG 81 MG: 81 TABLET ORAL at 05:24

## 2020-09-16 RX ADMIN — PROPOFOL 20 MCG/KG/MIN: 10 INJECTION, EMULSION INTRAVENOUS at 01:23

## 2020-09-16 RX ADMIN — PIPERACILLIN AND TAZOBACTAM 4.5 G: 4; .5 INJECTION, POWDER, LYOPHILIZED, FOR SOLUTION INTRAVENOUS; PARENTERAL at 11:39

## 2020-09-16 RX ADMIN — Medication 2.5 MG/HR: at 10:06

## 2020-09-16 RX ADMIN — PROPOFOL 35 MCG/KG/MIN: 10 INJECTION, EMULSION INTRAVENOUS at 08:37

## 2020-09-16 RX ADMIN — AMLODIPINE BESYLATE 10 MG: 10 TABLET ORAL at 11:38

## 2020-09-16 RX ADMIN — CHLOROTHIAZIDE SODIUM 500 MG: 500 INJECTION INTRAVENOUS at 13:24

## 2020-09-16 RX ADMIN — PROPOFOL 35 MCG/KG/MIN: 10 INJECTION, EMULSION INTRAVENOUS at 14:20

## 2020-09-16 RX ADMIN — Medication 10 MG: at 14:17

## 2020-09-16 RX ADMIN — CEFTRIAXONE SODIUM 2 G: 2 INJECTION, POWDER, FOR SOLUTION INTRAMUSCULAR; INTRAVENOUS at 05:24

## 2020-09-16 RX ADMIN — ENOXAPARIN SODIUM 150 MG: 150 INJECTION SUBCUTANEOUS at 17:31

## 2020-09-16 RX ADMIN — FAMOTIDINE 20 MG: 20 TABLET, FILM COATED ORAL at 17:32

## 2020-09-16 RX ADMIN — PROPOFOL 35 MCG/KG/MIN: 10 INJECTION, EMULSION INTRAVENOUS at 11:38

## 2020-09-16 RX ADMIN — PIPERACILLIN AND TAZOBACTAM 4.5 G: 4; .5 INJECTION, POWDER, LYOPHILIZED, FOR SOLUTION INTRAVENOUS; PARENTERAL at 20:55

## 2020-09-16 RX ADMIN — ATORVASTATIN CALCIUM 20 MG: 20 TABLET, FILM COATED ORAL at 05:24

## 2020-09-16 RX ADMIN — PROPOFOL 15 MCG/KG/MIN: 10 INJECTION, EMULSION INTRAVENOUS at 18:34

## 2020-09-16 RX ADMIN — FAMOTIDINE 20 MG: 20 TABLET, FILM COATED ORAL at 05:24

## 2020-09-16 RX ADMIN — PROPOFOL 20 MCG/KG/MIN: 10 INJECTION, EMULSION INTRAVENOUS at 05:48

## 2020-09-16 RX ADMIN — PIPERACILLIN AND TAZOBACTAM 4.5 G: 4; .5 INJECTION, POWDER, LYOPHILIZED, FOR SOLUTION INTRAVENOUS; PARENTERAL at 15:54

## 2020-09-16 RX ADMIN — Medication 2 MG/HR: at 03:17

## 2020-09-16 RX ADMIN — ENOXAPARIN SODIUM 150 MG: 150 INJECTION SUBCUTANEOUS at 05:26

## 2020-09-16 RX ADMIN — Medication 10 MG: at 22:37

## 2020-09-16 RX ADMIN — ACETAMINOPHEN 650 MG: 325 TABLET, FILM COATED ORAL at 08:38

## 2020-09-16 ASSESSMENT — FIBROSIS 4 INDEX: FIB4 SCORE: 1.97

## 2020-09-16 NOTE — CARE PLAN
Adult Ventilation Update    Total Vent Days: 19  Vent: , 70% +16    Patient Lines/Drains/Airways Status      Active Airway       Name: Placement date: Placement time: Site: Days:    Airway ETT Oral 8.0  08/29/20   1226   Oral  19                  Mobility  Activity Performed: Unable to mobilize     Events/Summary/Plan: Patient stable on vent. Changed from CMV to ASV.

## 2020-09-16 NOTE — CARE PLAN
Problem: Fluid Volume:  Goal: Will maintain balanced intake and output  Outcome: PROGRESSING AS EXPECTED  Intervention: Monitor, educate, and encourage compliance with therapeutic intake of liquids  Note: Diuretics given during the day yesterday. Pts weight down 3.8kg this morning. Continues to have 3/4+ pitting edema on upper and lower extremities.      Problem: Pain Management  Goal: Pain level will decrease to patient's comfort goal  Outcome: PROGRESSING AS EXPECTED  Intervention: Follow pain managment plan developed in collaboration with patient and Interdisciplinary Team  Note: Pt's CPOT 0-2 throughout the night. Grimacing seen with painful stimuli and movement during bath. Dilaudid gtt continued throughout the night.

## 2020-09-16 NOTE — PROGRESS NOTES
Critical Care Progress Note    Date of admission  8/27/2020    Chief Complaint  60 y.o. male admitted 8/27/2020 with respiratory failure and pneumonia due to SARS-CoV-2.    Hospital Course     8/29-worsening mental status, rapid shallow breathing and hypoxia required endotracheal intubation and prone positioning.  8/30-I spoke with Brittney, the patient's daughter and surrogate decision maker about initiating Remdesivir.  We discussed the risks benefits and alternatives.  All questions were answered.  She consented to initiating Remdesivir today.  8/31 -art line placed, remains on PCV, transition off rocuronium drip as tolerated, propofol/fentanyl, continue prone protocol  9/1 - remains on pressure control ventilation, PEEP 19, improved P/F ratio with prone ventilation, off rocuronium, starting trophic tube feed, full dose anticoagulation with Lovenox, day 3/5 Remdesivir, day 5 dexamethasone  9/4 - stopped prone ventilation with some skin breakdown, difficult prone protocol with morbid obesity, full ventilator support, advancing nutrition  9/5 - Ongoing diuresis with 3 times daily Lasix, advancing tube feeds to goal, remains on supine ventilation now  9/6 - PCV, weaning IP and PEEP, remains supine, ongoing aggressive diuresis, abdi TF, lighten sedation as abdi  9/7 Switch to VC, switched from fentanyl to Dilaudid with improved ventilator synchrony, decrease Lasix to daily; stopped amlodipine as he is now on norepinephrine to achieve a map greater than 65  9/8 continue full vent support, compliance gradually improving  9/10 Febrile: urine, blood, sputum cultures, continue full ventilator support  9/11 febrile, increased NE, urine culture, MRSA nare, then begin zosyn  9/12 UA + for enterococcus; resp culture + Non-lactose fermenting GNR  - currently on zosyn; will await sensitivities   9/13 continues on PEEP 16, on Zosyn for Serratia and enterococcus  9/14 -    change Zosyn to Rocephin.  Continue full anticoagulation.   Continue vent support.  Off norepinephrine.  9/15 -    continue full vent support.  Force diuresis with chlorothiazide.  Continue Lovenox.  9/16 -    change Rocephin back to Zosyn due to fever.  Culture blood, sputum and check UA.  Change to ASV mode of ventilation to help with vent synchrony.  Forced diuresis with chlorothiazide.      Interval Problem Update  Reviewed last 24 hour events:      Prop 35  Dilaudid 2.5  102.4  Hypertensive  TF 70 (goal)  Vent 18  PEEP 16   70% - change to ASV  Lovenox  Change Rocephin to Zosyn  Start Norvasc 10  Give Diuril  Reculture blood, sputum and check UA      Review of Systems  Review of Systems   Unable to perform ROS: Acuity of condition        Vital Signs for last 24 hours   Pulse:  [] 113  Resp:  [19-44] 21  BP: ()/(37-84) 126/70  SpO2:  [92 %-98 %] 98 %    Hemodynamic parameters for last 24 hours       Respiratory Information for the last 24 hours  Vent Mode: ASV  Rate (breaths/min): 34  Vt Target (mL): 370  PEEP/CPAP: 16  MAP: 23  Control VTE (exp VT): 513    Physical Exam   Physical Exam  Constitutional:       Appearance: He is not diaphoretic.      Comments: On ventilator   HENT:      Head: Normocephalic and atraumatic.      Right Ear: External ear normal.      Left Ear: External ear normal.      Nose: Nose normal.      Mouth/Throat:      Mouth: Mucous membranes are moist.      Pharynx: No oropharyngeal exudate.   Eyes:      Conjunctiva/sclera: Conjunctivae normal.      Pupils: Pupils are equal, round, and reactive to light.   Neck:      Musculoskeletal: Normal range of motion and neck supple.   Cardiovascular:      Pulses: Normal pulses.      Heart sounds: No murmur. No gallop.       Comments: Sinus rhythm  Pulmonary:      Breath sounds: Rales (Scattered coarse crackles) present. No wheezing.   Abdominal:      General: Bowel sounds are normal. There is no distension.      Palpations: Abdomen is soft.      Tenderness: There is no abdominal tenderness. There  is no guarding.      Comments: Tolerating enteral tube feedings   Musculoskeletal: Normal range of motion.         General: Swelling present.      Right lower leg: Edema present.      Left lower leg: Edema present.      Comments: No clubbing or cyanosis   Skin:     General: Skin is warm and dry.      Capillary Refill: Capillary refill takes less than 2 seconds.   Neurological:      Comments: Pupils 4 mm and briskly react to light.  He withdraws in the lower extremities.         Medications  Current Facility-Administered Medications   Medication Dose Route Frequency Provider Last Rate Last Dose   • amLODIPine (NORVASC) tablet 10 mg  10 mg Enteral Tube Q DAY Aguilar Marquez M.D.   10 mg at 09/16/20 1138   • piperacillin-tazobactam (ZOSYN) 4.5 g in  mL IVPB  4.5 g Intravenous Q8HRS Aguilar Marquez M.D.       • norepinephrine (Levophed) infusion 8 mg/250 mL (premix)  0-30 mcg/min Intravenous Continuous Aguilar Marquez M.D.   Stopped at 09/13/20 1613   • HYDROmorphone (DILAUDID) 0.2 mg/mL in 50mL NS (Continuous Infusion)   Intravenous Continuous Jorge Sanchez M.D. 10 mL/hr at 09/16/20 1434 2 mg/hr at 09/16/20 1434   • HYDROmorphone pf (DILAUDID) injection 0.75 mg  0.75 mg Intravenous Q30 MIN PRN Jorge Sanchez M.D.        And   • HYDROmorphone (DILAUDID) injection 1.5 mg  1.5 mg Intravenous Q30 MIN PRN Jorge Sanchez M.D.       • atorvastatin (LIPITOR) tablet 20 mg  20 mg Enteral Tube DAILY Nii Rivera M.D.   20 mg at 09/16/20 0524   • ondansetron (ZOFRAN ODT) dispertab 4 mg  4 mg Enteral Tube Q4HRS PRN Nii Rivera M.D.       • Pharmacy Consult: Enteral tube insertion - review meds/change route/product selection  1 Each Other PHARMACY TO DOSE Nii Rivera M.D.       • aspirin (ASA) chewable tab 81 mg  81 mg Enteral Tube DAILY Nii Rivera M.D.   81 mg at 09/16/20 0524   • acetaminophen (TYLENOL) tablet 650 mg  650 mg Enteral Tube Q6HRS PRN Nii Rivera M.D.   650 mg  at 09/16/20 0838   • HYDROcodone/acetaminophen (NORCO)  MG per tablet 1 Tab  1 Tab Enteral Tube Q6HRS PRN Nii Rivera M.D.       • promethazine (PHENERGAN) tablet 12.5-25 mg  12.5-25 mg Enteral Tube Q4HRS PRN Nii Rivera M.D.       • propofol (DIPRIVAN) injection  0-80 mcg/kg/min Intravenous Continuous Harjinder Crowe M.D. 37.1 mL/hr at 09/16/20 1420 35 mcg/kg/min at 09/16/20 1420   • enoxaparin (LOVENOX) injection 150 mg  150 mg Subcutaneous Q12HRS Harjinder Crowe M.D.   150 mg at 09/16/20 0526   • Respiratory Therapy Consult   Nebulization Continuous RT Harjinder Crowe M.D.       • ipratropium-albuterol (DUONEB) nebulizer solution  3 mL Nebulization Q2HRS PRN (RT) Harjinedr Crowe M.D.       • famotidine (PEPCID) tablet 20 mg  20 mg Enteral Tube Q12HRS Nii Rivera M.D.   20 mg at 09/16/20 0524   • senna-docusate (PERICOLACE or SENOKOT S) 8.6-50 MG per tablet 2 Tab  2 Tab Enteral Tube BID Harjinder Crowe M.D.   2 Tab at 09/16/20 0524    And   • polyethylene glycol/lytes (MIRALAX) PACKET 1 Packet  1 Packet Enteral Tube QDAY PRN Harjinder Crowe M.D.   1 Packet at 09/12/20 1716    And   • magnesium hydroxide (MILK OF MAGNESIA) suspension 30 mL  30 mL Enteral Tube QDAY PRN Harjinder Crowe M.D.   30 mL at 09/13/20 1641    And   • bisacodyl (DULCOLAX) suppository 10 mg  10 mg Rectal QDAY PRN Harjinder Crowe M.D.   10 mg at 09/14/20 1603   • MD Alert...ICU Electrolyte Replacement per Pharmacy   Other PHARMACY TO DOSE Harjinder Crowe M.D.       • lidocaine (XYLOCAINE) 1 % injection 1-2 mL  1-2 mL Tracheal Tube Q30 MIN PRN Harjinder Crowe M.D.       • labetalol (NORMODYNE/TRANDATE) injection 10-20 mg  10-20 mg Intravenous Q4HRS PRN Nii Rivera M.D.       • ondansetron (ZOFRAN) syringe/vial injection 4 mg  4 mg Intravenous Q4HRS PRN Nii Rivera M.D.   4 mg at 08/29/20 0552   • promethazine (PHENERGAN) suppository 12.5-25 mg  12.5-25 mg Rectal Q4HRS PRN Nii Rivera M.D.       • prochlorperazine  (COMPAZINE) injection 5-10 mg  5-10 mg Intravenous Q4HRS PRN Nii Rivera M.D.           Fluids    Intake/Output Summary (Last 24 hours) at 9/16/2020 1534  Last data filed at 9/16/2020 1400  Gross per 24 hour   Intake 2460 ml   Output 2775 ml   Net -315 ml       Laboratory  Recent Labs     09/14/20  0328 09/15/20  0353 09/16/20  0337   ISTATAPH 7.259* 7.264* 7.282*   ISTATAPCO2 69.0* 67.4* 65.3*   ISTATAPO2 84 72 66   ISTATATCO2 33 33 33   WVGEAUF0WLK 94 91* 89*   ISTATARTHCO3 30.9* 30.5* 30.8*   ISTATARTBE 2 2 3   ISTATTEMP 37.8 C 37.7 C 38.1 C   ISTATFIO2 100 95 70   ISTATSPEC Arterial Arterial Arterial   ISTATAPHTC 7.248* 7.254* 7.267*   HVTBNODP2AC 88* 76 71         Recent Labs     09/14/20  0430 09/15/20  0451 09/16/20  0430   SODIUM 141 144 144   POTASSIUM 5.5 5.3 5.2   CHLORIDE 106 108 110   CO2 29 28 28   BUN 50* 46* 44*   CREATININE 0.37* 0.30* 0.32*   CALCIUM 9.1 8.9 9.1     Recent Labs     09/14/20  0430 09/14/20  1445 09/15/20  0451 09/16/20  0430   ALTSGPT 19  --   --   --    ASTSGOT 29  --   --   --    ALKPHOSPHAT 70  --   --   --    TBILIRUBIN 1.1  --   --   --    PREALBUMIN  --  7.0*  --   --    GLUCOSE 128*  --  124* 121*     Recent Labs     09/14/20  0430 09/15/20  0451 09/16/20  0430   WBC 6.9 6.5 5.8   NEUTSPOLYS 77.90* 73.90* 65.80   LYMPHOCYTES 9.30* 9.20* 12.80*   MONOCYTES 9.00 8.10 8.60   EOSINOPHILS 2.20 7.10* 10.90*   BASOPHILS 0.60 0.50 0.50   ASTSGOT 29  --   --    ALTSGPT 19  --   --    ALKPHOSPHAT 70  --   --    TBILIRUBIN 1.1  --   --      Recent Labs     09/14/20  0430 09/15/20  0451 09/16/20  0430   RBC 4.15* 4.11* 4.01*   HEMOGLOBIN 12.6* 12.5* 12.2*   HEMATOCRIT 43.5 43.3 42.1   PLATELETCT 205 196 198       Imaging  X-Ray:  I have personally reviewed the images and compared with prior images. and My impression is: Persistent edema and bilateral opacities    Assessment/Plan  * Pneumonia due to severe acute respiratory syndrome coronavirus 2 (SARS-CoV-2)  Assessment & Plan  S/P  remdesivir from 8/30-9/3  S/P dexamethasone, 6 mg daily for 10 days  Continue full anticoagulation with Lovenox for elevated d-dimer    Acute hypoxemic respiratory failure (HCC)- (present on admission)  Assessment & Plan  Intubated 8/29  All of the appropriate ventilator bundles are in place  Continue full vent support - change to ASV mode of ventilation to improve synchrony  Proning discontinued due to skin breakdown/injury  Force diuresis with chlorothiazide    Pulmonary hypertension ([facility])  Assessment & Plan  RVSP 60 mmHg    Pneumonia  Assessment & Plan  Sputum with Serratia marcescens  Change Rocephin back to Zosyn due to fever  Reculture sputum, check blood cultures and UA    Septic shock due to undetermined organism ([facility])  Assessment & Plan  Shock has resolved - off vasopressor support  Change Rocephin to Zosyn    Controlled type 2 diabetes mellitus, without long-term current use of insulin ([facility])- (present on admission)  Assessment & Plan  Glycohemoglobin 6.5  Glucose control    Essential hypertension- (present on admission)  Assessment & Plan  Resume amlodipine, 10 mg daily    Morbid obesity with BMI of 50.0-59.9, adult ([facility])- (present on admission)  Assessment & Plan  BMI 53  Behavioral modification and nutrition counseling when clinically appropriate    ABELARDO (obstructive sleep apnea)- (present on admission)  Assessment & Plan  PSG on 1/29/2019 revealed AHI of 101.9 with minimum oxygen saturation of 50%    HLD (hyperlipidemia)- (present on admission)  Assessment & Plan  Continue statin       VTE:  Lovenox  Ulcer: H2 Antagonist  Lines: Central Line  Ongoing indication addressed, Arterial Line  Ongoing indication addressed and Briseno Catheter  Ongoing indication addressed    I have performed a physical exam and reviewed and updated ROS and Plan today (9/16/2020). In review of yesterday's note (9/15/2020), there are no changes except as documented above.     I have assessed and reassessed his respiratory status  with ventilator adjustments, airway mechanics, ventilator waveforms, blood pressure, hemodynamics, cardiovascular status and his neurologic status.  He is at increased risk for worsening respiratory and cardiovascular system dysfunction.    Discussed patient condition and risk of morbidity and/or mortality with RN, RT, Pharmacy, Charge nurse / hot rounds and QA team     The patient remains critically ill.  Critical care time = 35 minutes in directly providing and coordinating critical care and extensive data review.  No time overlap and excludes procedures.    Aguilar Marquez MD  Pulmonary and Critical Care Medicine

## 2020-09-16 NOTE — RESPIRATORY CARE
Ventilator Daily Summary    Vent Day #18    Ventilator settings changed this shift: None. APV 34/370/+16 70%    Weaning trials: No    Respiratory Procedures:No    Plan: Continue current ventilator settings and wean mechanical ventilation as tolerated per physician orders.

## 2020-09-16 NOTE — PROGRESS NOTES
Dr. Michaels notified of pts increasing temp despite tylenol and ice pack application. Cooling blanket ordered.

## 2020-09-17 ENCOUNTER — APPOINTMENT (OUTPATIENT)
Dept: RADIOLOGY | Facility: MEDICAL CENTER | Age: 60
DRG: 004 | End: 2020-09-17
Attending: INTERNAL MEDICINE
Payer: MEDICARE

## 2020-09-17 LAB
ACTION RANGE TRIGGERED IACRT: YES
ANION GAP SERPL CALC-SCNC: 8 MMOL/L (ref 7–16)
BASE EXCESS BLDA CALC-SCNC: 4 MMOL/L (ref -4–3)
BASOPHILS # BLD AUTO: 0.5 % (ref 0–1.8)
BASOPHILS # BLD: 0.03 K/UL (ref 0–0.12)
BODY TEMPERATURE: ABNORMAL DEGREES
BUN SERPL-MCNC: 43 MG/DL (ref 8–22)
CALCIUM SERPL-MCNC: 9 MG/DL (ref 8.5–10.5)
CHLORIDE SERPL-SCNC: 108 MMOL/L (ref 96–112)
CO2 BLDA-SCNC: 34 MMOL/L (ref 20–33)
CO2 SERPL-SCNC: 30 MMOL/L (ref 20–33)
CREAT SERPL-MCNC: 0.37 MG/DL (ref 0.5–1.4)
EOSINOPHIL # BLD AUTO: 0.67 K/UL (ref 0–0.51)
EOSINOPHIL NFR BLD: 10.3 % (ref 0–6.9)
ERYTHROCYTE [DISTWIDTH] IN BLOOD BY AUTOMATED COUNT: 60.1 FL (ref 35.9–50)
GLUCOSE SERPL-MCNC: 131 MG/DL (ref 65–99)
GRAM STN SPEC: NORMAL
HCO3 BLDA-SCNC: 32.3 MMOL/L (ref 17–25)
HCT VFR BLD AUTO: 41.7 % (ref 42–52)
HGB BLD-MCNC: 12.1 G/DL (ref 14–18)
HOROWITZ INDEX BLDA+IHG-RTO: 106 MM[HG]
IMM GRANULOCYTES # BLD AUTO: 0.12 K/UL (ref 0–0.11)
IMM GRANULOCYTES NFR BLD AUTO: 1.8 % (ref 0–0.9)
INST. QUALIFIED PATIENT IIQPT: YES
LYMPHOCYTES # BLD AUTO: 0.64 K/UL (ref 1–4.8)
LYMPHOCYTES NFR BLD: 9.8 % (ref 22–41)
MCH RBC QN AUTO: 30.6 PG (ref 27–33)
MCHC RBC AUTO-ENTMCNC: 29 G/DL (ref 33.7–35.3)
MCV RBC AUTO: 105.3 FL (ref 81.4–97.8)
MONOCYTES # BLD AUTO: 0.51 K/UL (ref 0–0.85)
MONOCYTES NFR BLD AUTO: 7.8 % (ref 0–13.4)
NEUTROPHILS # BLD AUTO: 4.54 K/UL (ref 1.82–7.42)
NEUTROPHILS NFR BLD: 69.8 % (ref 44–72)
NRBC # BLD AUTO: 0.02 K/UL
NRBC BLD-RTO: 0.3 /100 WBC
O2/TOTAL GAS SETTING VFR VENT: 70 %
PCO2 BLDA: 70.9 MMHG (ref 26–37)
PCO2 TEMP ADJ BLDA: 70.9 MMHG (ref 26–37)
PH BLDA: 7.27 [PH] (ref 7.4–7.5)
PH TEMP ADJ BLDA: 7.27 [PH] (ref 7.4–7.5)
PLATELET # BLD AUTO: 199 K/UL (ref 164–446)
PMV BLD AUTO: 10.7 FL (ref 9–12.9)
PO2 BLDA: 74 MMHG (ref 64–87)
PO2 TEMP ADJ BLDA: 74 MMHG (ref 64–87)
POTASSIUM SERPL-SCNC: 5.1 MMOL/L (ref 3.6–5.5)
RBC # BLD AUTO: 3.96 M/UL (ref 4.7–6.1)
SAO2 % BLDA: 91 % (ref 93–99)
SIGNIFICANT IND 70042: NORMAL
SITE SITE: NORMAL
SODIUM SERPL-SCNC: 146 MMOL/L (ref 135–145)
SOURCE SOURCE: NORMAL
SPECIMEN DRAWN FROM PATIENT: ABNORMAL
WBC # BLD AUTO: 6.5 K/UL (ref 4.8–10.8)

## 2020-09-17 PROCEDURE — A9270 NON-COVERED ITEM OR SERVICE: HCPCS | Performed by: INTERNAL MEDICINE

## 2020-09-17 PROCEDURE — 99291 CRITICAL CARE FIRST HOUR: CPT | Performed by: INTERNAL MEDICINE

## 2020-09-17 PROCEDURE — 94003 VENT MGMT INPAT SUBQ DAY: CPT

## 2020-09-17 PROCEDURE — 700102 HCHG RX REV CODE 250 W/ 637 OVERRIDE(OP): Performed by: INTERNAL MEDICINE

## 2020-09-17 PROCEDURE — 37799 UNLISTED PX VASCULAR SURGERY: CPT

## 2020-09-17 PROCEDURE — 80048 BASIC METABOLIC PNL TOTAL CA: CPT

## 2020-09-17 PROCEDURE — 82803 BLOOD GASES ANY COMBINATION: CPT

## 2020-09-17 PROCEDURE — 700105 HCHG RX REV CODE 258: Performed by: INTERNAL MEDICINE

## 2020-09-17 PROCEDURE — 94770 HCHG CO2 EXPIRED GAS DETERMINATION: CPT

## 2020-09-17 PROCEDURE — 700111 HCHG RX REV CODE 636 W/ 250 OVERRIDE (IP): Performed by: INTERNAL MEDICINE

## 2020-09-17 PROCEDURE — 770022 HCHG ROOM/CARE - ICU (200)

## 2020-09-17 PROCEDURE — 71045 X-RAY EXAM CHEST 1 VIEW: CPT

## 2020-09-17 PROCEDURE — 85025 COMPLETE CBC W/AUTO DIFF WBC: CPT

## 2020-09-17 RX ADMIN — Medication 10 MG: at 04:44

## 2020-09-17 RX ADMIN — ASPIRIN 81 MG 81 MG: 81 TABLET ORAL at 05:02

## 2020-09-17 RX ADMIN — DOCUSATE SODIUM 50 MG AND SENNOSIDES 8.6 MG 2 TABLET: 8.6; 5 TABLET, FILM COATED ORAL at 16:47

## 2020-09-17 RX ADMIN — Medication 10 MG: at 15:09

## 2020-09-17 RX ADMIN — ATORVASTATIN CALCIUM 20 MG: 20 TABLET, FILM COATED ORAL at 05:02

## 2020-09-17 RX ADMIN — PROPOFOL 25 MCG/KG/MIN: 10 INJECTION, EMULSION INTRAVENOUS at 02:13

## 2020-09-17 RX ADMIN — PROPOFOL 25 MCG/KG/MIN: 10 INJECTION, EMULSION INTRAVENOUS at 15:10

## 2020-09-17 RX ADMIN — DOCUSATE SODIUM 50 MG AND SENNOSIDES 8.6 MG 2 TABLET: 8.6; 5 TABLET, FILM COATED ORAL at 05:02

## 2020-09-17 RX ADMIN — ACETAMINOPHEN 650 MG: 325 TABLET, FILM COATED ORAL at 07:46

## 2020-09-17 RX ADMIN — ENOXAPARIN SODIUM 150 MG: 150 INJECTION SUBCUTANEOUS at 07:46

## 2020-09-17 RX ADMIN — PROPOFOL 25 MCG/KG/MIN: 10 INJECTION, EMULSION INTRAVENOUS at 11:58

## 2020-09-17 RX ADMIN — AMLODIPINE BESYLATE 10 MG: 10 TABLET ORAL at 05:02

## 2020-09-17 RX ADMIN — PROPOFOL 15 MCG/KG/MIN: 10 INJECTION, EMULSION INTRAVENOUS at 06:09

## 2020-09-17 RX ADMIN — FAMOTIDINE 20 MG: 20 TABLET, FILM COATED ORAL at 05:02

## 2020-09-17 RX ADMIN — PROPOFOL 20 MCG/KG/MIN: 10 INJECTION, EMULSION INTRAVENOUS at 19:31

## 2020-09-17 RX ADMIN — CHLOROTHIAZIDE SODIUM 500 MG: 500 INJECTION INTRAVENOUS at 10:28

## 2020-09-17 RX ADMIN — PIPERACILLIN AND TAZOBACTAM 4.5 G: 4; .5 INJECTION, POWDER, LYOPHILIZED, FOR SOLUTION INTRAVENOUS; PARENTERAL at 12:08

## 2020-09-17 RX ADMIN — PIPERACILLIN AND TAZOBACTAM 4.5 G: 4; .5 INJECTION, POWDER, LYOPHILIZED, FOR SOLUTION INTRAVENOUS; PARENTERAL at 04:50

## 2020-09-17 RX ADMIN — ENOXAPARIN SODIUM 150 MG: 150 INJECTION SUBCUTANEOUS at 20:12

## 2020-09-17 RX ADMIN — Medication 10 MG: at 21:13

## 2020-09-17 RX ADMIN — FAMOTIDINE 20 MG: 20 TABLET, FILM COATED ORAL at 16:47

## 2020-09-17 RX ADMIN — PIPERACILLIN AND TAZOBACTAM 4.5 G: 4; .5 INJECTION, POWDER, LYOPHILIZED, FOR SOLUTION INTRAVENOUS; PARENTERAL at 20:16

## 2020-09-17 ASSESSMENT — FIBROSIS 4 INDEX: FIB4 SCORE: 1.95

## 2020-09-17 NOTE — CARE PLAN
Problem: Ventilation Defect:  Goal: Ability to achieve and maintain unassisted ventilation or tolerate decreased levels of ventilator support  Intervention: Support and monitor invasive and noninvasive mechanical ventilation  Note:   Adult Ventilation Update    Total Vent Days: 20  8.0 24  ASV  150% +16 70%    Patient Lines/Drains/Airways Status      Active Airway       Name: Placement date: Placement time: Site: Days:    Airway ETT Oral 8.0  08/29/20   1226   Oral  18                  Sputum/Suction  Cough: Productive (09/17/20 0259)  Sputum Amount: Moderate (09/17/20 0259)  Sputum Color: Tan (09/17/20 0259)  Sputum Consistency: Thick (09/17/20 0259)    Events/Summary/Plan: SBT not done, PEEP >10 and FiO2 >60%

## 2020-09-17 NOTE — PROGRESS NOTES
Critical Care Progress Note    Date of admission  8/27/2020    Chief Complaint  60 y.o. male admitted 8/27/2020 with respiratory failure and pneumonia due to SARS-CoV-2.    Hospital Course     8/29-worsening mental status, rapid shallow breathing and hypoxia required endotracheal intubation and prone positioning.  8/30-I spoke with Brittney, the patient's daughter and surrogate decision maker about initiating Remdesivir.  We discussed the risks benefits and alternatives.  All questions were answered.  She consented to initiating Remdesivir today.  8/31 -art line placed, remains on PCV, transition off rocuronium drip as tolerated, propofol/fentanyl, continue prone protocol  9/1 - remains on pressure control ventilation, PEEP 19, improved P/F ratio with prone ventilation, off rocuronium, starting trophic tube feed, full dose anticoagulation with Lovenox, day 3/5 Remdesivir, day 5 dexamethasone  9/4 - stopped prone ventilation with some skin breakdown, difficult prone protocol with morbid obesity, full ventilator support, advancing nutrition  9/5 - Ongoing diuresis with 3 times daily Lasix, advancing tube feeds to goal, remains on supine ventilation now  9/6 - PCV, weaning IP and PEEP, remains supine, ongoing aggressive diuresis, abdi TF, lighten sedation as abdi  9/7 Switch to VC, switched from fentanyl to Dilaudid with improved ventilator synchrony, decrease Lasix to daily; stopped amlodipine as he is now on norepinephrine to achieve a map greater than 65  9/8 continue full vent support, compliance gradually improving  9/10 Febrile: urine, blood, sputum cultures, continue full ventilator support  9/11 febrile, increased NE, urine culture, MRSA nare, then begin zosyn  9/12 UA + for enterococcus; resp culture + Non-lactose fermenting GNR  - currently on zosyn; will await sensitivities   9/13 continues on PEEP 16, on Zosyn for Serratia and enterococcus  9/14 -    change Zosyn to Rocephin.  Continue full anticoagulation.   Continue vent support.  Off norepinephrine.  9/15 -    continue full vent support.  Force diuresis with chlorothiazide.  Continue Lovenox.  9/16 -    change Rocephin back to Zosyn due to fever.  Culture blood, sputum and check UA.  Change to ASV mode of ventilation to help with vent synchrony.  Forced diuresis with chlorothiazide.  9/17 -    continue Zosyn.  Continue vent support.  Force diuresis with chlorothiazide.  Full anticoagulation with Lovenox.      Interval Problem Update  Reviewed last 24 hour events:      Opens eyes to pain  Prop 10  Dilaudid 1  SR-ST  100.6  Cooling blanket in place  BM on 9/15  TF at 70  Vent 20  PEEP 16   70%  ASV mode  Diuril      Review of Systems  Review of Systems   Unable to perform ROS: Acuity of condition        Vital Signs for last 24 hours   Temp:  [37.8 °C (100 °F)-38.1 °C (100.6 °F)] 37.9 °C (100.2 °F)  Pulse:  [] 97  Resp:  [18-32] 31  BP: ()/(40-60) 113/60  SpO2:  [89 %-100 %] 96 %    Hemodynamic parameters for last 24 hours       Respiratory Information for the last 24 hours  Vent Mode: ASV  PEEP/CPAP: 16  MAP: 22  Control VTE (exp VT): 469    Physical Exam   Physical Exam  Constitutional:       Appearance: He is not diaphoretic.      Comments: On ventilator   HENT:      Head: Normocephalic and atraumatic.      Right Ear: External ear normal.      Left Ear: External ear normal.      Nose: Nose normal.      Mouth/Throat:      Mouth: Mucous membranes are moist.      Pharynx: No oropharyngeal exudate.   Eyes:      Conjunctiva/sclera: Conjunctivae normal.      Pupils: Pupils are equal, round, and reactive to light.   Neck:      Musculoskeletal: Normal range of motion and neck supple.   Cardiovascular:      Pulses: Normal pulses.      Heart sounds: No murmur. No gallop.       Comments: Sinus rhythm  Pulmonary:      Breath sounds: Rales (Scattered coarse crackles) present. No wheezing.   Abdominal:      General: Bowel sounds are normal. There is no distension.       Palpations: Abdomen is soft.      Tenderness: There is no abdominal tenderness. There is no guarding.      Comments: Tolerating enteral tube feedings   Musculoskeletal: Normal range of motion.         General: Swelling present.      Right lower leg: Edema present.      Left lower leg: Edema present.      Comments: No clubbing or cyanosis   Skin:     General: Skin is warm and dry.      Capillary Refill: Capillary refill takes less than 2 seconds.   Neurological:      Comments: Pupils 4 mm and briskly react to light.  He withdraws in the lower extremities.         Medications  Current Facility-Administered Medications   Medication Dose Route Frequency Provider Last Rate Last Dose   • amLODIPine (NORVASC) tablet 10 mg  10 mg Enteral Tube Q DAY Aguilar Marquez M.D.   10 mg at 09/17/20 0502   • piperacillin-tazobactam (ZOSYN) 4.5 g in  mL IVPB  4.5 g Intravenous Q8HRS Aguilar Marquez M.D. 25 mL/hr at 09/17/20 1208 4.5 g at 09/17/20 1208   • HYDROmorphone (DILAUDID) 0.2 mg/mL in 50mL NS (Continuous Infusion)   Intravenous Continuous Jorge Sanchez M.D. 7.5 mL/hr at 09/17/20 1112 1.5 mg/hr at 09/17/20 1112   • HYDROmorphone pf (DILAUDID) injection 0.75 mg  0.75 mg Intravenous Q30 MIN PRN Jorge Sanchez M.D.        And   • HYDROmorphone (DILAUDID) injection 1.5 mg  1.5 mg Intravenous Q30 MIN PRN Jorge Sanchez M.D.       • atorvastatin (LIPITOR) tablet 20 mg  20 mg Enteral Tube DAILY Nii Rivera M.D.   20 mg at 09/17/20 0502   • ondansetron (ZOFRAN ODT) dispertab 4 mg  4 mg Enteral Tube Q4HRS PRN Nii Rivera M.D.       • Pharmacy Consult: Enteral tube insertion - review meds/change route/product selection  1 Each Other PHARMACY TO DOSE Nii Rivera M.D.       • aspirin (ASA) chewable tab 81 mg  81 mg Enteral Tube DAILY Nii Rivera M.D.   81 mg at 09/17/20 0502   • acetaminophen (TYLENOL) tablet 650 mg  650 mg Enteral Tube Q6HRS PRN Nii Rivera M.D.   650 mg at 09/17/20 07    • HYDROcodone/acetaminophen (NORCO)  MG per tablet 1 Tab  1 Tab Enteral Tube Q6HRS PRN Nii Rivera M.D.       • promethazine (PHENERGAN) tablet 12.5-25 mg  12.5-25 mg Enteral Tube Q4HRS PRN Nii Rivera M.D.       • propofol (DIPRIVAN) injection  0-80 mcg/kg/min Intravenous Continuous Harjinder Crowe M.D. 26.5 mL/hr at 09/17/20 1158 25 mcg/kg/min at 09/17/20 1158   • enoxaparin (LOVENOX) injection 150 mg  150 mg Subcutaneous Q12HRS Harjinder Crowe M.D.   150 mg at 09/17/20 0746   • Respiratory Therapy Consult   Nebulization Continuous RT Harjinder Crowe M.D.       • ipratropium-albuterol (DUONEB) nebulizer solution  3 mL Nebulization Q2HRS PRN (RT) Harjinder Crowe M.D.       • famotidine (PEPCID) tablet 20 mg  20 mg Enteral Tube Q12HRS Nii Rivera M.D.   20 mg at 09/17/20 0502   • senna-docusate (PERICOLACE or SENOKOT S) 8.6-50 MG per tablet 2 Tab  2 Tab Enteral Tube BID Hrajinder Crowe M.D.   2 Tab at 09/17/20 0502    And   • polyethylene glycol/lytes (MIRALAX) PACKET 1 Packet  1 Packet Enteral Tube QDAY PRN Harjinder Crowe M.D.   1 Packet at 09/12/20 1716    And   • magnesium hydroxide (MILK OF MAGNESIA) suspension 30 mL  30 mL Enteral Tube QDAY PRN Harjinder Crowe M.D.   30 mL at 09/13/20 1641    And   • bisacodyl (DULCOLAX) suppository 10 mg  10 mg Rectal QDAY PRN Harjinder Crowe M.D.   10 mg at 09/14/20 1603   • MD Alert...ICU Electrolyte Replacement per Pharmacy   Other PHARMACY TO DOSE Harjinder Crowe M.D.       • lidocaine (XYLOCAINE) 1 % injection 1-2 mL  1-2 mL Tracheal Tube Q30 MIN PRN Harjinder Crowe M.D.       • labetalol (NORMODYNE/TRANDATE) injection 10-20 mg  10-20 mg Intravenous Q4HRS PRN Nii Rivera M.D.       • ondansetron (ZOFRAN) syringe/vial injection 4 mg  4 mg Intravenous Q4HRS PRN Nii Rivera M.D.   4 mg at 08/29/20 0552   • promethazine (PHENERGAN) suppository 12.5-25 mg  12.5-25 mg Rectal Q4HRS PRN Nii Rivera M.D.       • prochlorperazine (COMPAZINE)  injection 5-10 mg  5-10 mg Intravenous Q4HRS PRN Nii Rivera M.D.           Fluids    Intake/Output Summary (Last 24 hours) at 9/17/2020 1412  Last data filed at 9/17/2020 1200  Gross per 24 hour   Intake 4338.67 ml   Output 3210 ml   Net 1128.67 ml       Laboratory  Recent Labs     09/15/20  0353 09/16/20  0337 09/17/20  0301   ISTATAPH 7.264* 7.282* 7.266*   ISTATAPCO2 67.4* 65.3* 70.9*   ISTATAPO2 72 66 74   ISTATATCO2 33 33 34*   [id number] 91* 89* 91*   ISTATARTHCO3 30.5* 30.8* 32.3*   ISTATARTBE 2 3 4*   ISTATTEMP 37.7 C 38.1 C 37.0 C   ISTATFIO2 95 70 70   ISTATSPEC Arterial Arterial Arterial   ISTATAPHTC 7.254* 7.267* 7.266*   WLCEJADB1CS 76 71 74         Recent Labs     09/15/20  0451 09/16/20  0430 09/17/20  0455   SODIUM 144 144 146*   POTASSIUM 5.3 5.2 5.1   CHLORIDE 108 110 108   CO2 28 28 30   BUN 46* 44* 43*   CREATININE 0.30* 0.32* 0.37*   CALCIUM 8.9 9.1 9.0     Recent Labs     09/14/20  1445 09/15/20  0451 09/16/20  0430 09/17/20  0455   PREALBUMIN 7.0*  --   --   --    GLUCOSE  --  124* 121* 131*     Recent Labs     09/15/20  0451 09/16/20  0430 09/17/20  0455   WBC 6.5 5.8 6.5   NEUTSPOLYS 73.90* 65.80 69.80   LYMPHOCYTES 9.20* 12.80* 9.80*   MONOCYTES 8.10 8.60 7.80   EOSINOPHILS 7.10* 10.90* 10.30*   BASOPHILS 0.50 0.50 0.50     Recent Labs     09/15/20  0451 09/16/20  0430 09/17/20  0455   RBC 4.11* 4.01* 3.96*   HEMOGLOBIN 12.5* 12.2* 12.1*   HEMATOCRIT 43.3 42.1 41.7*   PLATELETCT 196 198 199       Imaging  X-Ray:  I have personally reviewed the images and compared with prior images. and My impression is: Unchanged bilateral opacities    Assessment/Plan  * Pneumonia due to severe acute respiratory syndrome coronavirus 2 (SARS-CoV-2)  Assessment & Plan  S/P remdesivir from 8/30-9/3  S/P dexamethasone, 6 mg daily for 10 days  Continue full anticoagulation with Lovenox for elevated d-dimer    Acute hypoxemic respiratory failure (HCC)- (present on admission)  Assessment & Plan  Intubated  8/29  All of the appropriate ventilator bundles are in place  Continue full vent support  Proning discontinued due to skin breakdown/injury  Continue diuresis with chlorothiazide    Pulmonary hypertension (HCC)  Assessment & Plan  RVSP 60 mmHg    Pneumonia  Assessment & Plan  Sputum with Serratia marcescens  Continue Zosyn  UA unremarkable  Blood cultures negative from 9/16 so far    Septic shock due to undetermined organism ([facility])  Assessment & Plan  Shock has resolved - off vasopressor support  Continue Zosyn    Controlled type 2 diabetes mellitus, without long-term current use of insulin ([facility])- (present on admission)  Assessment & Plan  Glycohemoglobin 6.5  Glucose control    Essential hypertension- (present on admission)  Assessment & Plan  Continue amlodipine, 10 mg daily    Morbid obesity with BMI of 50.0-59.9, adult ([facility])- (present on admission)  Assessment & Plan  BMI 53  Behavioral modification and nutrition counseling when clinically appropriate    ABELARDO (obstructive sleep apnea)- (present on admission)  Assessment & Plan  PSG on 1/29/2019 revealed AHI of 101.9 with minimum oxygen saturation of 50%    HLD (hyperlipidemia)- (present on admission)  Assessment & Plan  Continue statin       VTE:  Lovenox  Ulcer: H2 Antagonist  Lines: Central Line  Ongoing indication addressed, Arterial Line  Ongoing indication addressed and Briseno Catheter  Ongoing indication addressed    I have performed a physical exam and reviewed and updated ROS and Plan today (9/17/2020). In review of yesterday's note (9/16/2020), there are no changes except as documented above.     I have assessed and reassessed his respiratory status with ventilator adjustments, ventilator waveforms, airway mechanics, hemodynamics, blood pressure, cardiovascular status and his neurologic status.  He is at increased risk for worsening cardiovascular and respiratory system dysfunction.    Discussed patient condition and risk of morbidity and/or mortality with  RN, RT, Pharmacy, Charge nurse / hot rounds and QA team     The patient remains critically ill.  Critical care time = 40 minutes in directly providing and coordinating critical care and extensive data review.  No time overlap and excludes procedures.    Aguilar Marquez MD  Pulmonary and Critical Care Medicine

## 2020-09-18 ENCOUNTER — APPOINTMENT (OUTPATIENT)
Dept: RADIOLOGY | Facility: MEDICAL CENTER | Age: 60
DRG: 004 | End: 2020-09-18
Attending: INTERNAL MEDICINE
Payer: MEDICARE

## 2020-09-18 LAB
ACTION RANGE TRIGGERED IACRT: YES
ANION GAP SERPL CALC-SCNC: 8 MMOL/L (ref 7–16)
ANISOCYTOSIS BLD QL SMEAR: ABNORMAL
BACTERIA SPEC RESP CULT: ABNORMAL
BACTERIA SPEC RESP CULT: ABNORMAL
BASE EXCESS BLDA CALC-SCNC: 5 MMOL/L (ref -4–3)
BASOPHILS # BLD AUTO: 0.9 % (ref 0–1.8)
BASOPHILS # BLD: 0.04 K/UL (ref 0–0.12)
BODY TEMPERATURE: ABNORMAL DEGREES
BUN SERPL-MCNC: 35 MG/DL (ref 8–22)
CALCIUM SERPL-MCNC: 8.9 MG/DL (ref 8.5–10.5)
CHLORIDE SERPL-SCNC: 111 MMOL/L (ref 96–112)
CO2 BLDA-SCNC: 34 MMOL/L (ref 20–33)
CO2 SERPL-SCNC: 31 MMOL/L (ref 20–33)
CREAT SERPL-MCNC: 0.36 MG/DL (ref 0.5–1.4)
EOSINOPHIL # BLD AUTO: 0.98 K/UL (ref 0–0.51)
EOSINOPHIL NFR BLD: 20.9 % (ref 0–6.9)
ERYTHROCYTE [DISTWIDTH] IN BLOOD BY AUTOMATED COUNT: 59.7 FL (ref 35.9–50)
GLUCOSE SERPL-MCNC: 123 MG/DL (ref 65–99)
GRAM STN SPEC: ABNORMAL
HCO3 BLDA-SCNC: 32.5 MMOL/L (ref 17–25)
HCT VFR BLD AUTO: 42.1 % (ref 42–52)
HGB BLD-MCNC: 12.6 G/DL (ref 14–18)
HOROWITZ INDEX BLDA+IHG-RTO: 116 MM[HG]
INST. QUALIFIED PATIENT IIQPT: YES
LYMPHOCYTES # BLD AUTO: 0.49 K/UL (ref 1–4.8)
LYMPHOCYTES NFR BLD: 10.4 % (ref 22–41)
MACROCYTES BLD QL SMEAR: ABNORMAL
MANUAL DIFF BLD: NORMAL
MCH RBC QN AUTO: 30.7 PG (ref 27–33)
MCHC RBC AUTO-ENTMCNC: 29.9 G/DL (ref 33.7–35.3)
MCV RBC AUTO: 102.4 FL (ref 81.4–97.8)
MONOCYTES # BLD AUTO: 0.12 K/UL (ref 0–0.85)
MONOCYTES NFR BLD AUTO: 2.6 % (ref 0–13.4)
MORPHOLOGY BLD-IMP: NORMAL
MYELOCYTES NFR BLD MANUAL: 1.7 %
NEUTROPHILS # BLD AUTO: 2.98 K/UL (ref 1.82–7.42)
NEUTROPHILS NFR BLD: 63.5 % (ref 44–72)
NRBC # BLD AUTO: 0.02 K/UL
NRBC BLD-RTO: 0.4 /100 WBC
O2/TOTAL GAS SETTING VFR VENT: 70 %
PCO2 BLDA: 64.2 MMHG (ref 26–37)
PCO2 TEMP ADJ BLDA: 63.3 MMHG (ref 26–37)
PH BLDA: 7.31 [PH] (ref 7.4–7.5)
PH TEMP ADJ BLDA: 7.32 [PH] (ref 7.4–7.5)
PLATELET # BLD AUTO: 221 K/UL (ref 164–446)
PLATELET BLD QL SMEAR: NORMAL
PMV BLD AUTO: 10.4 FL (ref 9–12.9)
PO2 BLDA: 81 MMHG (ref 64–87)
PO2 TEMP ADJ BLDA: 79 MMHG (ref 64–87)
POTASSIUM SERPL-SCNC: 4.9 MMOL/L (ref 3.6–5.5)
RBC # BLD AUTO: 4.11 M/UL (ref 4.7–6.1)
RBC BLD AUTO: PRESENT
SAO2 % BLDA: 94 % (ref 93–99)
SIGNIFICANT IND 70042: ABNORMAL
SITE SITE: ABNORMAL
SODIUM SERPL-SCNC: 150 MMOL/L (ref 135–145)
SOURCE SOURCE: ABNORMAL
SPECIMEN DRAWN FROM PATIENT: ABNORMAL
WBC # BLD AUTO: 4.7 K/UL (ref 4.8–10.8)

## 2020-09-18 PROCEDURE — A9270 NON-COVERED ITEM OR SERVICE: HCPCS | Performed by: INTERNAL MEDICINE

## 2020-09-18 PROCEDURE — 700111 HCHG RX REV CODE 636 W/ 250 OVERRIDE (IP): Performed by: INTERNAL MEDICINE

## 2020-09-18 PROCEDURE — 700102 HCHG RX REV CODE 250 W/ 637 OVERRIDE(OP): Performed by: INTERNAL MEDICINE

## 2020-09-18 PROCEDURE — 94003 VENT MGMT INPAT SUBQ DAY: CPT

## 2020-09-18 PROCEDURE — 700105 HCHG RX REV CODE 258: Performed by: INTERNAL MEDICINE

## 2020-09-18 PROCEDURE — 94770 HCHG CO2 EXPIRED GAS DETERMINATION: CPT

## 2020-09-18 PROCEDURE — 85007 BL SMEAR W/DIFF WBC COUNT: CPT

## 2020-09-18 PROCEDURE — 85027 COMPLETE CBC AUTOMATED: CPT

## 2020-09-18 PROCEDURE — 80048 BASIC METABOLIC PNL TOTAL CA: CPT

## 2020-09-18 PROCEDURE — 770022 HCHG ROOM/CARE - ICU (200)

## 2020-09-18 PROCEDURE — 71045 X-RAY EXAM CHEST 1 VIEW: CPT

## 2020-09-18 PROCEDURE — 82803 BLOOD GASES ANY COMBINATION: CPT

## 2020-09-18 PROCEDURE — 37799 UNLISTED PX VASCULAR SURGERY: CPT

## 2020-09-18 PROCEDURE — 99291 CRITICAL CARE FIRST HOUR: CPT | Performed by: INTERNAL MEDICINE

## 2020-09-18 PROCEDURE — 700105 HCHG RX REV CODE 258

## 2020-09-18 RX ORDER — SODIUM CHLORIDE 9 MG/ML
INJECTION, SOLUTION INTRAVENOUS
Status: COMPLETED
Start: 2020-09-18 | End: 2020-09-19

## 2020-09-18 RX ORDER — SULFAMETHOXAZOLE AND TRIMETHOPRIM 800; 160 MG/1; MG/1
3 TABLET ORAL EVERY 6 HOURS
Status: DISCONTINUED | OUTPATIENT
Start: 2020-09-18 | End: 2020-09-21

## 2020-09-18 RX ADMIN — Medication 10 MG: at 05:53

## 2020-09-18 RX ADMIN — POLYETHYLENE GLYCOL 3350 1 PACKET: 17 POWDER, FOR SOLUTION ORAL at 10:49

## 2020-09-18 RX ADMIN — DOCUSATE SODIUM 50 MG AND SENNOSIDES 8.6 MG 2 TABLET: 8.6; 5 TABLET, FILM COATED ORAL at 05:29

## 2020-09-18 RX ADMIN — ACETAMINOPHEN 650 MG: 325 TABLET, FILM COATED ORAL at 10:55

## 2020-09-18 RX ADMIN — PIPERACILLIN AND TAZOBACTAM 4.5 G: 4; .5 INJECTION, POWDER, LYOPHILIZED, FOR SOLUTION INTRAVENOUS; PARENTERAL at 05:44

## 2020-09-18 RX ADMIN — ACETAMINOPHEN 650 MG: 325 TABLET, FILM COATED ORAL at 00:33

## 2020-09-18 RX ADMIN — ASPIRIN 81 MG 81 MG: 81 TABLET ORAL at 05:29

## 2020-09-18 RX ADMIN — MAGNESIUM HYDROXIDE 30 ML: 400 SUSPENSION ORAL at 05:30

## 2020-09-18 RX ADMIN — AMLODIPINE BESYLATE 10 MG: 10 TABLET ORAL at 05:30

## 2020-09-18 RX ADMIN — ENOXAPARIN SODIUM 150 MG: 150 INJECTION SUBCUTANEOUS at 06:37

## 2020-09-18 RX ADMIN — PROPOFOL 20 MCG/KG/MIN: 10 INJECTION, EMULSION INTRAVENOUS at 05:29

## 2020-09-18 RX ADMIN — DOCUSATE SODIUM 50 MG AND SENNOSIDES 8.6 MG 2 TABLET: 8.6; 5 TABLET, FILM COATED ORAL at 18:18

## 2020-09-18 RX ADMIN — ENOXAPARIN SODIUM 150 MG: 150 INJECTION SUBCUTANEOUS at 18:17

## 2020-09-18 RX ADMIN — PROPOFOL 20 MCG/KG/MIN: 10 INJECTION, EMULSION INTRAVENOUS at 20:46

## 2020-09-18 RX ADMIN — ATORVASTATIN CALCIUM 20 MG: 20 TABLET, FILM COATED ORAL at 05:29

## 2020-09-18 RX ADMIN — FAMOTIDINE 20 MG: 20 TABLET, FILM COATED ORAL at 05:30

## 2020-09-18 RX ADMIN — FAMOTIDINE 20 MG: 20 TABLET, FILM COATED ORAL at 18:18

## 2020-09-18 RX ADMIN — Medication 0.8 MG/HR: at 18:04

## 2020-09-18 RX ADMIN — SULFAMETHOXAZOLE AND TRIMETHOPRIM 3 TABLET: 800; 160 TABLET ORAL at 11:56

## 2020-09-18 RX ADMIN — PROPOFOL 20 MCG/KG/MIN: 10 INJECTION, EMULSION INTRAVENOUS at 00:33

## 2020-09-18 RX ADMIN — SODIUM CHLORIDE 500 ML: 9 INJECTION, SOLUTION INTRAVENOUS at 10:48

## 2020-09-18 RX ADMIN — PROPOFOL 10 MCG/KG/MIN: 10 INJECTION, EMULSION INTRAVENOUS at 11:57

## 2020-09-18 RX ADMIN — SULFAMETHOXAZOLE AND TRIMETHOPRIM 3 TABLET: 800; 160 TABLET ORAL at 18:17

## 2020-09-18 RX ADMIN — SODIUM CHLORIDE 500 MG: 9 INJECTION, SOLUTION INTRAVENOUS at 10:56

## 2020-09-18 ASSESSMENT — FIBROSIS 4 INDEX: FIB4 SCORE: 1.75

## 2020-09-18 NOTE — CARE PLAN
Problem: Safety  Goal: Will remain free from injury  Outcome: PROGRESSING AS EXPECTED  Goal: Will remain free from falls  Outcome: PROGRESSING AS EXPECTED     Problem: Venous Thromboembolism (VTW)/Deep Vein Thrombosis (DVT) Prevention:  Goal: Patient will participate in Venous Thrombosis (VTE)/Deep Vein Thrombosis (DVT)Prevention Measures  Outcome: PROGRESSING AS EXPECTED     Problem: Communication  Goal: The ability to communicate needs accurately and effectively will improve  Outcome: PROGRESSING SLOWER THAN EXPECTED     Problem: Infection  Goal: Will remain free from infection  Outcome: PROGRESSING SLOWER THAN EXPECTED     Problem: Knowledge Deficit  Goal: Knowledge of the prescribed therapeutic regimen will improve  Outcome: PROGRESSING SLOWER THAN EXPECTED

## 2020-09-18 NOTE — PROGRESS NOTES
Critical Care Progress Note    Date of admission  8/27/2020    Chief Complaint  60 y.o. male admitted 8/27/2020 with respiratory failure and pneumonia due to SARS-CoV-2.    Hospital Course     8/29-worsening mental status, rapid shallow breathing and hypoxia required endotracheal intubation and prone positioning.  8/30-I spoke with Brittney, the patient's daughter and surrogate decision maker about initiating Remdesivir.  We discussed the risks benefits and alternatives.  All questions were answered.  She consented to initiating Remdesivir today.  8/31 -art line placed, remains on PCV, transition off rocuronium drip as tolerated, propofol/fentanyl, continue prone protocol  9/1 - remains on pressure control ventilation, PEEP 19, improved P/F ratio with prone ventilation, off rocuronium, starting trophic tube feed, full dose anticoagulation with Lovenox, day 3/5 Remdesivir, day 5 dexamethasone  9/4 - stopped prone ventilation with some skin breakdown, difficult prone protocol with morbid obesity, full ventilator support, advancing nutrition  9/5 - Ongoing diuresis with 3 times daily Lasix, advancing tube feeds to goal, remains on supine ventilation now  9/6 - PCV, weaning IP and PEEP, remains supine, ongoing aggressive diuresis, abdi TF, lighten sedation as abdi  9/7 Switch to VC, switched from fentanyl to Dilaudid with improved ventilator synchrony, decrease Lasix to daily; stopped amlodipine as he is now on norepinephrine to achieve a map greater than 65  9/8 continue full vent support, compliance gradually improving  9/10 Febrile: urine, blood, sputum cultures, continue full ventilator support  9/11 febrile, increased NE, urine culture, MRSA nare, then begin zosyn  9/12 UA + for enterococcus; resp culture + Non-lactose fermenting GNR  - currently on zosyn; will await sensitivities   9/13 continues on PEEP 16, on Zosyn for Serratia and enterococcus  9/14 -    change Zosyn to Rocephin.  Continue full anticoagulation.   Continue vent support.  Off norepinephrine.  9/15 -    continue full vent support.  Force diuresis with chlorothiazide.  Continue Lovenox.  9/16 -    change Rocephin back to Zosyn due to fever.  Culture blood, sputum and check UA.  Change to ASV mode of ventilation to help with vent synchrony.  Forced diuresis with chlorothiazide.  9/17 -    continue Zosyn.  Continue vent support.  Force diuresis with chlorothiazide.  Full anticoagulation with Lovenox.  9/18 -    increase free water for hypernatremia.  Forced diuresis with Diamox.  Change Zosyn to Bactrim due to eosinophilia and rash.  Continue vent support.      Interval Problem Update  Reviewed last 24 hour events:      + Eosinophils  SAT done  - increased RR and grimaces  Prop 10  Dilaudid 1  Does not follow  ST  Na - 150 - increase free water 200 q 2  Vent 21  PEEP 16   60%  ASV mode  Zosyn 3/5  Change Zosyn to Bactrim for rash  Diamox      Review of Systems  Review of Systems   Unable to perform ROS: Acuity of condition        Vital Signs for last 24 hours   Temp:  [38.1 °C (100.6 °F)-38.3 °C (100.9 °F)] 38.3 °C (100.9 °F)  Pulse:  [] 102  Resp:  [7-35] 28  BP: ()/(44-71) 101/54  SpO2:  [94 %-100 %] 97 %    Hemodynamic parameters for last 24 hours       Respiratory Information for the last 24 hours  Vent Mode: ASV  PEEP/CPAP: 16  MAP: 22  Control VTE (exp VT): 525    Physical Exam   Physical Exam  Constitutional:       Appearance: He is not diaphoretic.      Comments: On ventilator   HENT:      Head: Normocephalic and atraumatic.      Right Ear: External ear normal.      Left Ear: External ear normal.      Nose: Nose normal.      Mouth/Throat:      Mouth: Mucous membranes are moist.      Pharynx: No posterior oropharyngeal erythema.   Eyes:      General:         Right eye: No discharge.         Left eye: No discharge.      Pupils: Pupils are equal, round, and reactive to light.   Neck:      Musculoskeletal: Normal range of motion. No neck rigidity.    Cardiovascular:      Pulses: Normal pulses.      Heart sounds: No murmur. No friction rub.      Comments: Sinus rhythm  Pulmonary:      Breath sounds: Rales (Unchanged crackles) present. No wheezing.   Abdominal:      General: Bowel sounds are normal. There is no distension.      Palpations: Abdomen is soft.      Tenderness: There is no abdominal tenderness. There is no rebound.      Comments: Tolerating enteral tube feedings   Musculoskeletal: Normal range of motion.         General: Swelling present.      Right lower leg: Edema present.      Left lower leg: Edema present.      Comments: No clubbing or cyanosis   Skin:     General: Skin is warm and dry.      Capillary Refill: Capillary refill takes less than 2 seconds.   Neurological:      Comments: Pupils 4 mm and briskly react to light.  He withdraws in the lower extremities.         Medications  Current Facility-Administered Medications   Medication Dose Route Frequency Provider Last Rate Last Dose   • sulfamethoxazole-trimethoprim (BACTRIM DS) 800-160 MG tablet 3 Tab  3 Tab Enteral Tube Q6HRS Aguilar Marquez M.D.   3 Tab at 09/18/20 1156   • amLODIPine (NORVASC) tablet 10 mg  10 mg Enteral Tube Q DAY Aguilar Marquez M.D.   10 mg at 09/18/20 0530   • HYDROmorphone (DILAUDID) 0.2 mg/mL in 50mL NS (Continuous Infusion)   Intravenous Continuous Jorge Sanchez M.D. 4 mL/hr at 09/18/20 1600 0.8 mg/hr at 09/18/20 1600   • HYDROmorphone pf (DILAUDID) injection 0.75 mg  0.75 mg Intravenous Q30 MIN PRN Jorge Sanchez M.D.        And   • HYDROmorphone (DILAUDID) injection 1.5 mg  1.5 mg Intravenous Q30 MIN PRN Jorge Sanchez M.D.       • atorvastatin (LIPITOR) tablet 20 mg  20 mg Enteral Tube DAILY Nii Rivera M.D.   20 mg at 09/18/20 0529   • ondansetron (ZOFRAN ODT) dispertab 4 mg  4 mg Enteral Tube Q4HRS PRN Nii Rivera M.D.       • Pharmacy Consult: Enteral tube insertion - review meds/change route/product selection  1 Each Other  PHARMACY TO DOSE Nii Rivera M.D.       • aspirin (ASA) chewable tab 81 mg  81 mg Enteral Tube DAILY Nii Rivera M.D.   81 mg at 09/18/20 0529   • acetaminophen (TYLENOL) tablet 650 mg  650 mg Enteral Tube Q6HRS PRN Nii Rivera M.D.   650 mg at 09/18/20 1055   • HYDROcodone/acetaminophen (NORCO)  MG per tablet 1 Tab  1 Tab Enteral Tube Q6HRS PRN Nii Rivera M.D.       • promethazine (PHENERGAN) tablet 12.5-25 mg  12.5-25 mg Enteral Tube Q4HRS PRN Nii Rivera M.D.       • propofol (DIPRIVAN) injection  0-80 mcg/kg/min Intravenous Continuous Harjinder Crowe M.D. 10.6 mL/hr at 09/18/20 1157 10 mcg/kg/min at 09/18/20 1157   • enoxaparin (LOVENOX) injection 150 mg  150 mg Subcutaneous Q12HRS Harjinder Crowe M.D.   150 mg at 09/18/20 0637   • Respiratory Therapy Consult   Nebulization Continuous RT Harjinder Crowe M.D.       • ipratropium-albuterol (DUONEB) nebulizer solution  3 mL Nebulization Q2HRS PRN (RT) Harjinder Crowe M.D.       • famotidine (PEPCID) tablet 20 mg  20 mg Enteral Tube Q12HRS Nii Rivera M.D.   20 mg at 09/18/20 0530   • senna-docusate (PERICOLACE or SENOKOT S) 8.6-50 MG per tablet 2 Tab  2 Tab Enteral Tube BID Harjinder Crowe M.D.   2 Tab at 09/18/20 0529    And   • polyethylene glycol/lytes (MIRALAX) PACKET 1 Packet  1 Packet Enteral Tube QDAY PRN Harjinder Crowe M.D.   1 Packet at 09/18/20 1049    And   • magnesium hydroxide (MILK OF MAGNESIA) suspension 30 mL  30 mL Enteral Tube QDAY PRN Harjinder Crowe M.D.   30 mL at 09/18/20 0530    And   • bisacodyl (DULCOLAX) suppository 10 mg  10 mg Rectal QDAY PRN Harjinder Crowe M.D.   Stopped at 09/18/20 0524   • MD Alert...ICU Electrolyte Replacement per Pharmacy   Other PHARMACY TO DOSE Harjinder Crowe M.D.       • lidocaine (XYLOCAINE) 1 % injection 1-2 mL  1-2 mL Tracheal Tube Q30 MIN PRN Harjinder Crowe M.D.       • labetalol (NORMODYNE/TRANDATE) injection 10-20 mg  10-20 mg Intravenous Q4HRS PRN Nii Rivera M.D.        • ondansetron (ZOFRAN) syringe/vial injection 4 mg  4 mg Intravenous Q4HRS PRN Nii Rivera M.D.   4 mg at 08/29/20 0552   • promethazine (PHENERGAN) suppository 12.5-25 mg  12.5-25 mg Rectal Q4HRS PRN Nii Rivera M.D.       • prochlorperazine (COMPAZINE) injection 5-10 mg  5-10 mg Intravenous Q4HRS PRN Nii Rivera M.D.           Fluids    Intake/Output Summary (Last 24 hours) at 9/18/2020 1748  Last data filed at 9/18/2020 1600  Gross per 24 hour   Intake 4288.87 ml   Output 3815 ml   Net 473.87 ml       Laboratory  Recent Labs     09/16/20  0337 09/17/20  0301 09/18/20  0400   ISTATAPH 7.282* 7.266* 7.312*   ISTATAPCO2 65.3* 70.9* 64.2*   ISTATAPO2 66 74 81   ISTATATCO2 33 34* 34*   COJALBO1EZP 89* 91* 94   ISTATARTHCO3 30.8* 32.3* 32.5*   ISTATARTBE 3 4* 5*   ISTATTEMP 38.1 C 37.0 C 98.0 F   ISTATFIO2 70 70 70   ISTATSPEC Arterial Arterial Arterial   ISTATAPHTC 7.267* 7.266* 7.317*   TITVIDEV8KN 71 74 79         Recent Labs     09/16/20  0430 09/17/20  0455 09/18/20  0510   SODIUM 144 146* 150*   POTASSIUM 5.2 5.1 4.9   CHLORIDE 110 108 111   CO2 28 30 31   BUN 44* 43* 35*   CREATININE 0.32* 0.37* 0.36*   CALCIUM 9.1 9.0 8.9     Recent Labs     09/16/20  0430 09/17/20  0455 09/18/20  0510   GLUCOSE 121* 131* 123*     Recent Labs     09/16/20  0430 09/17/20  0455 09/18/20  0510   WBC 5.8 6.5 4.7*   NEUTSPOLYS 65.80 69.80 63.50   LYMPHOCYTES 12.80* 9.80* 10.40*   MONOCYTES 8.60 7.80 2.60   EOSINOPHILS 10.90* 10.30* 20.90*   BASOPHILS 0.50 0.50 0.90     Recent Labs     09/16/20  0430 09/17/20  0455 09/18/20  0510   RBC 4.01* 3.96* 4.11*   HEMOGLOBIN 12.2* 12.1* 12.6*   HEMATOCRIT 42.1 41.7* 42.1   PLATELETCT 198 199 221       Imaging  X-Ray:  I have personally reviewed the images and compared with prior images. and My impression is: Bilateral opacities are about the same    Assessment/Plan  * Pneumonia due to severe acute respiratory syndrome coronavirus 2 (SARS-CoV-2)  Assessment & Plan  S/P remdesivir  from 8/30-9/3  S/P dexamethasone, 6 mg daily for 10 days  Continue full anticoagulation with Lovenox for elevated d-dimer    Acute hypoxemic respiratory failure (HCC)- (present on admission)  Assessment & Plan  Intubated 8/29  All of the appropriate ventilator bundles are in place  Continue full vent support  Proning discontinued due to skin breakdown/injury  Force diuresis with Diamox    Pulmonary hypertension ([facility])  Assessment & Plan  RVSP 60 mmHg    Pneumonia  Assessment & Plan  Sputum with Serratia marcescens  Change Zosyn to Bactrim due to eosinophilia and rash  UA unremarkable  Blood cultures negative from 9/16 so far    Septic shock due to undetermined organism ([facility])  Assessment & Plan  Shock has resolved - off vasopressor support  Continue antibiotics    Controlled type 2 diabetes mellitus, without long-term current use of insulin ([facility])- (present on admission)  Assessment & Plan  Glycohemoglobin 6.5  Glucose control    Essential hypertension- (present on admission)  Assessment & Plan  Continue amlodipine, 10 mg daily    Morbid obesity with BMI of 50.0-59.9, adult ([facility])- (present on admission)  Assessment & Plan  BMI 53  Behavioral modification and nutrition counseling when clinically appropriate    ABELARDO (obstructive sleep apnea)- (present on admission)  Assessment & Plan  PSG on 1/29/2019 revealed AHI of 101.9 with minimum oxygen saturation of 50%    HLD (hyperlipidemia)- (present on admission)  Assessment & Plan  Continue statin       VTE:  Lovenox  Ulcer: H2 Antagonist  Lines: Central Line  Ongoing indication addressed, Arterial Line  Ongoing indication addressed and Briseno Catheter  Ongoing indication addressed    I have performed a physical exam and reviewed and updated ROS and Plan today (9/18/2020). In review of yesterday's note (9/17/2020), there are no changes except as documented above.     I have assessed and reassessed his respiratory status with ventilator adjustments, airway mechanics, ventilator  waveforms, blood pressure, hemodynamics, cardiovascular status and his neurologic status.  He is at increased risk for worsening respiratory and cardiovascular system dysfunction.    Discussed patient condition and risk of morbidity and/or mortality with RN, RT, Pharmacy, Charge nurse / hot rounds and QA team     The patient remains critically ill.  Critical care time = 35 minutes in directly providing and coordinating critical care and extensive data review.  No time overlap and excludes procedures.    Aguilar Marquez MD  Pulmonary and Critical Care Medicine

## 2020-09-18 NOTE — CARE PLAN
Adult Ventilation Update    Total Vent Days: 21  Vent: , 50% +16    Patient Lines/Drains/Airways Status      Active Airway       Name: Placement date: Placement time: Site: Days:    Airway ETT Oral 8.0  08/29/20   1226   Oral  21                  Mobility  Activity Performed: Unable to mobilize     Events/Summary/Plan: Patient stable on vent. No changes.

## 2020-09-18 NOTE — CARE PLAN
Adult Ventilation Update    Total Vent Days: 20  Vent: , 70% +16    Patient Lines/Drains/Airways Status      Active Airway       Name: Placement date: Placement time: Site: Days:    Airway ETT Oral 8.0  08/29/20   1226   Oral  20                  Mobility  Activity Performed: Unable to mobilize     Events/Summary/Plan: Patient stable on vent. No changes.

## 2020-09-19 ENCOUNTER — APPOINTMENT (OUTPATIENT)
Dept: RADIOLOGY | Facility: MEDICAL CENTER | Age: 60
DRG: 004 | End: 2020-09-19
Attending: INTERNAL MEDICINE
Payer: MEDICARE

## 2020-09-19 LAB
ACTION RANGE TRIGGERED IACRT: YES
ANION GAP SERPL CALC-SCNC: 9 MMOL/L (ref 7–16)
BASE EXCESS BLDA CALC-SCNC: 2 MMOL/L (ref -4–3)
BASOPHILS # BLD AUTO: 0.6 % (ref 0–1.8)
BASOPHILS # BLD: 0.03 K/UL (ref 0–0.12)
BODY TEMPERATURE: ABNORMAL DEGREES
BUN SERPL-MCNC: 31 MG/DL (ref 8–22)
CALCIUM SERPL-MCNC: 8.7 MG/DL (ref 8.5–10.5)
CHLORIDE SERPL-SCNC: 105 MMOL/L (ref 96–112)
CO2 BLDA-SCNC: 31 MMOL/L (ref 20–33)
CO2 SERPL-SCNC: 27 MMOL/L (ref 20–33)
CREAT SERPL-MCNC: 0.34 MG/DL (ref 0.5–1.4)
EOSINOPHIL # BLD AUTO: 0.76 K/UL (ref 0–0.51)
EOSINOPHIL NFR BLD: 15.7 % (ref 0–6.9)
ERYTHROCYTE [DISTWIDTH] IN BLOOD BY AUTOMATED COUNT: 58.5 FL (ref 35.9–50)
GLUCOSE SERPL-MCNC: 106 MG/DL (ref 65–99)
HCO3 BLDA-SCNC: 29.1 MMOL/L (ref 17–25)
HCT VFR BLD AUTO: 41.4 % (ref 42–52)
HGB BLD-MCNC: 12.5 G/DL (ref 14–18)
HOROWITZ INDEX BLDA+IHG-RTO: 148 MM[HG]
IMM GRANULOCYTES # BLD AUTO: 0.15 K/UL (ref 0–0.11)
IMM GRANULOCYTES NFR BLD AUTO: 3.1 % (ref 0–0.9)
INST. QUALIFIED PATIENT IIQPT: YES
LYMPHOCYTES # BLD AUTO: 0.75 K/UL (ref 1–4.8)
LYMPHOCYTES NFR BLD: 15.5 % (ref 22–41)
MCH RBC QN AUTO: 30.6 PG (ref 27–33)
MCHC RBC AUTO-ENTMCNC: 30.2 G/DL (ref 33.7–35.3)
MCV RBC AUTO: 101.5 FL (ref 81.4–97.8)
MONOCYTES # BLD AUTO: 0.42 K/UL (ref 0–0.85)
MONOCYTES NFR BLD AUTO: 8.7 % (ref 0–13.4)
NEUTROPHILS # BLD AUTO: 2.72 K/UL (ref 1.82–7.42)
NEUTROPHILS NFR BLD: 56.4 % (ref 44–72)
NRBC # BLD AUTO: 0.03 K/UL
NRBC BLD-RTO: 0.6 /100 WBC
O2/TOTAL GAS SETTING VFR VENT: 50 %
PCO2 BLDA: 56 MMHG (ref 26–37)
PCO2 TEMP ADJ BLDA: 57.2 MMHG (ref 26–37)
PH BLDA: 7.32 [PH] (ref 7.4–7.5)
PH TEMP ADJ BLDA: 7.32 [PH] (ref 7.4–7.5)
PLATELET # BLD AUTO: 238 K/UL (ref 164–446)
PMV BLD AUTO: 11 FL (ref 9–12.9)
PO2 BLDA: 74 MMHG (ref 64–87)
PO2 TEMP ADJ BLDA: 77 MMHG (ref 64–87)
POTASSIUM SERPL-SCNC: 4.8 MMOL/L (ref 3.6–5.5)
RBC # BLD AUTO: 4.08 M/UL (ref 4.7–6.1)
SAO2 % BLDA: 93 % (ref 93–99)
SODIUM SERPL-SCNC: 141 MMOL/L (ref 135–145)
SPECIMEN DRAWN FROM PATIENT: ABNORMAL
TRIGL SERPL-MCNC: 277 MG/DL (ref 0–149)
WBC # BLD AUTO: 4.8 K/UL (ref 4.8–10.8)

## 2020-09-19 PROCEDURE — 99291 CRITICAL CARE FIRST HOUR: CPT | Performed by: INTERNAL MEDICINE

## 2020-09-19 PROCEDURE — 84478 ASSAY OF TRIGLYCERIDES: CPT

## 2020-09-19 PROCEDURE — 94770 HCHG CO2 EXPIRED GAS DETERMINATION: CPT

## 2020-09-19 PROCEDURE — 770022 HCHG ROOM/CARE - ICU (200)

## 2020-09-19 PROCEDURE — 700111 HCHG RX REV CODE 636 W/ 250 OVERRIDE (IP): Performed by: INTERNAL MEDICINE

## 2020-09-19 PROCEDURE — A9270 NON-COVERED ITEM OR SERVICE: HCPCS | Performed by: INTERNAL MEDICINE

## 2020-09-19 PROCEDURE — 700102 HCHG RX REV CODE 250 W/ 637 OVERRIDE(OP): Performed by: INTERNAL MEDICINE

## 2020-09-19 PROCEDURE — 71045 X-RAY EXAM CHEST 1 VIEW: CPT

## 2020-09-19 PROCEDURE — 80048 BASIC METABOLIC PNL TOTAL CA: CPT

## 2020-09-19 PROCEDURE — 37799 UNLISTED PX VASCULAR SURGERY: CPT

## 2020-09-19 PROCEDURE — 82803 BLOOD GASES ANY COMBINATION: CPT

## 2020-09-19 PROCEDURE — 85025 COMPLETE CBC W/AUTO DIFF WBC: CPT

## 2020-09-19 PROCEDURE — 700101 HCHG RX REV CODE 250: Performed by: INTERNAL MEDICINE

## 2020-09-19 PROCEDURE — 94003 VENT MGMT INPAT SUBQ DAY: CPT

## 2020-09-19 RX ORDER — SODIUM CHLORIDE 9 MG/ML
INJECTION, SOLUTION INTRAVENOUS
Status: ACTIVE
Start: 2020-09-19 | End: 2020-09-19

## 2020-09-19 RX ADMIN — Medication 0.8 MG/HR: at 07:13

## 2020-09-19 RX ADMIN — AMLODIPINE BESYLATE 10 MG: 10 TABLET ORAL at 06:19

## 2020-09-19 RX ADMIN — PROPOFOL 10 MCG/KG/MIN: 10 INJECTION, EMULSION INTRAVENOUS at 14:13

## 2020-09-19 RX ADMIN — ENOXAPARIN SODIUM 150 MG: 150 INJECTION SUBCUTANEOUS at 18:21

## 2020-09-19 RX ADMIN — ATORVASTATIN CALCIUM 20 MG: 20 TABLET, FILM COATED ORAL at 05:41

## 2020-09-19 RX ADMIN — PROPOFOL 20 MCG/KG/MIN: 10 INJECTION, EMULSION INTRAVENOUS at 06:17

## 2020-09-19 RX ADMIN — Medication 10 MG: at 20:39

## 2020-09-19 RX ADMIN — ENOXAPARIN SODIUM 150 MG: 150 INJECTION SUBCUTANEOUS at 06:18

## 2020-09-19 RX ADMIN — PROPOFOL 15 MCG/KG/MIN: 10 INJECTION, EMULSION INTRAVENOUS at 20:45

## 2020-09-19 RX ADMIN — FAMOTIDINE 20 MG: 20 TABLET, FILM COATED ORAL at 05:41

## 2020-09-19 RX ADMIN — MAGNESIUM HYDROXIDE 30 ML: 400 SUSPENSION ORAL at 05:41

## 2020-09-19 RX ADMIN — ASPIRIN 81 MG 81 MG: 81 TABLET ORAL at 05:40

## 2020-09-19 RX ADMIN — SULFAMETHOXAZOLE AND TRIMETHOPRIM 3 TABLET: 800; 160 TABLET ORAL at 01:10

## 2020-09-19 RX ADMIN — SULFAMETHOXAZOLE AND TRIMETHOPRIM 3 TABLET: 800; 160 TABLET ORAL at 12:26

## 2020-09-19 RX ADMIN — PROPOFOL 15 MCG/KG/MIN: 10 INJECTION, EMULSION INTRAVENOUS at 01:00

## 2020-09-19 RX ADMIN — SULFAMETHOXAZOLE AND TRIMETHOPRIM 3 TABLET: 800; 160 TABLET ORAL at 06:18

## 2020-09-19 RX ADMIN — POLYETHYLENE GLYCOL 3350 1 PACKET: 17 POWDER, FOR SOLUTION ORAL at 05:41

## 2020-09-19 RX ADMIN — FAMOTIDINE 20 MG: 20 TABLET, FILM COATED ORAL at 18:22

## 2020-09-19 RX ADMIN — PROPOFOL 10 MCG/KG/MIN: 10 INJECTION, EMULSION INTRAVENOUS at 08:44

## 2020-09-19 RX ADMIN — DOCUSATE SODIUM 50 MG AND SENNOSIDES 8.6 MG 2 TABLET: 8.6; 5 TABLET, FILM COATED ORAL at 05:40

## 2020-09-19 RX ADMIN — ACETAMINOPHEN 650 MG: 325 TABLET, FILM COATED ORAL at 01:10

## 2020-09-19 RX ADMIN — SULFAMETHOXAZOLE AND TRIMETHOPRIM 3 TABLET: 800; 160 TABLET ORAL at 18:21

## 2020-09-19 ASSESSMENT — PAIN DESCRIPTION - PAIN TYPE
TYPE: ACUTE PAIN

## 2020-09-19 ASSESSMENT — PATIENT HEALTH QUESTIONNAIRE - PHQ9
SUM OF ALL RESPONSES TO PHQ9 QUESTIONS 1 AND 2: 0
2. FEELING DOWN, DEPRESSED, IRRITABLE, OR HOPELESS: NOT AT ALL
1. LITTLE INTEREST OR PLEASURE IN DOING THINGS: NOT AT ALL

## 2020-09-19 ASSESSMENT — FIBROSIS 4 INDEX: FIB4 SCORE: 1.62

## 2020-09-19 NOTE — PROGRESS NOTES
Critical Care Progress Note    Date of admission  8/27/2020    Chief Complaint  60 y.o. male admitted 8/27/2020 with respiratory failure and pneumonia due to SARS-CoV-2.    Hospital Course     8/29-worsening mental status, rapid shallow breathing and hypoxia required endotracheal intubation and prone positioning.  8/30-I spoke with Brittney, the patient's daughter and surrogate decision maker about initiating Remdesivir.  We discussed the risks benefits and alternatives.  All questions were answered.  She consented to initiating Remdesivir today.  8/31 -art line placed, remains on PCV, transition off rocuronium drip as tolerated, propofol/fentanyl, continue prone protocol  9/1 - remains on pressure control ventilation, PEEP 19, improved P/F ratio with prone ventilation, off rocuronium, starting trophic tube feed, full dose anticoagulation with Lovenox, day 3/5 Remdesivir, day 5 dexamethasone  9/4 - stopped prone ventilation with some skin breakdown, difficult prone protocol with morbid obesity, full ventilator support, advancing nutrition  9/5 - Ongoing diuresis with 3 times daily Lasix, advancing tube feeds to goal, remains on supine ventilation now  9/6 - PCV, weaning IP and PEEP, remains supine, ongoing aggressive diuresis, abdi TF, lighten sedation as abdi  9/7 Switch to VC, switched from fentanyl to Dilaudid with improved ventilator synchrony, decrease Lasix to daily; stopped amlodipine as he is now on norepinephrine to achieve a map greater than 65  9/8 continue full vent support, compliance gradually improving  9/10 Febrile: urine, blood, sputum cultures, continue full ventilator support  9/11 febrile, increased NE, urine culture, MRSA nare, then begin zosyn  9/12 UA + for enterococcus; resp culture + Non-lactose fermenting GNR  - currently on zosyn; will await sensitivities   9/13 continues on PEEP 16, on Zosyn for Serratia and enterococcus  9/14 -    change Zosyn to Rocephin.  Continue full anticoagulation.   Continue vent support.  Off norepinephrine.  9/15 -    continue full vent support.  Force diuresis with chlorothiazide.  Continue Lovenox.  9/16 -    change Rocephin back to Zosyn due to fever.  Culture blood, sputum and check UA.  Change to ASV mode of ventilation to help with vent synchrony.  Forced diuresis with chlorothiazide.  9/17 -    continue Zosyn.  Continue vent support.  Force diuresis with chlorothiazide.  Full anticoagulation with Lovenox.  9/18 -    increase free water for hypernatremia.  Forced diuresis with Diamox.  Change Zosyn to Bactrim due to eosinophilia and rash.  Continue vent support.  9/19 -    decrease free water.  Continue Bactrim.  Continue vent support.      Interval Problem Update  Reviewed last 24 hour events:      Opens eyes and grimaces with SAT  Dilaudid 0.8  Prop 10  Vent  ST  TF OK  Vent 22  PEEP 16   40%  Decrease water to q 4 - 200 mL      Review of Systems  Review of Systems   Unable to perform ROS: Acuity of condition        Vital Signs for last 24 hours   Temp:  [37.5 °C (99.5 °F)-37.9 °C (100.2 °F)] 37.5 °C (99.5 °F)  Pulse:  [] 97  Resp:  [17-41] 25  BP: ()/(44-69) 126/58  SpO2:  [93 %-100 %] 100 %    Hemodynamic parameters for last 24 hours       Respiratory Information for the last 24 hours  Vent Mode: ASV  PEEP/CPAP: 16  MAP: 25  Control VTE (exp VT): 841    Physical Exam   Physical Exam  Constitutional:       Appearance: He is not diaphoretic.      Comments: On ventilator   HENT:      Head: Normocephalic and atraumatic.      Right Ear: External ear normal.      Left Ear: External ear normal.      Nose: Nose normal.      Mouth/Throat:      Mouth: Mucous membranes are moist.      Pharynx: No oropharyngeal exudate.   Eyes:      General: No scleral icterus.     Pupils: Pupils are equal, round, and reactive to light.   Neck:      Musculoskeletal: Normal range of motion and neck supple.   Cardiovascular:      Pulses: Normal pulses.      Heart sounds: No murmur. No  gallop.       Comments: Sinus rhythm  Pulmonary:      Breath sounds: Rales (Scattered crackles) present. No wheezing.   Abdominal:      General: Bowel sounds are normal. There is no distension.      Palpations: Abdomen is soft.      Tenderness: There is no abdominal tenderness. There is no guarding.      Comments: Tolerating enteral tube feedings   Musculoskeletal: Normal range of motion.         General: Swelling present.      Right lower leg: Edema present.      Left lower leg: Edema present.      Comments: No clubbing or cyanosis   Skin:     General: Skin is warm and dry.      Capillary Refill: Capillary refill takes less than 2 seconds.   Neurological:      Comments: Pupils 4 mm and briskly react to light.  He opens his eyes, but does not track or follow.  He grimaces.         Medications  Current Facility-Administered Medications   Medication Dose Route Frequency Provider Last Rate Last Dose   • SODIUM CHLORIDE 0.9 % IV SOLN        Stopped at 09/19/20 0530   • sulfamethoxazole-trimethoprim (BACTRIM DS) 800-160 MG tablet 3 Tab  3 Tab Enteral Tube Q6HRS Aguilar Marquez M.D.   3 Tab at 09/19/20 1226   • amLODIPine (NORVASC) tablet 10 mg  10 mg Enteral Tube Q DAY Aguilar Marquez M.D.   10 mg at 09/19/20 0619   • HYDROmorphone (DILAUDID) 0.2 mg/mL in 50mL NS (Continuous Infusion)   Intravenous Continuous Jorge Sanchez M.D. 4 mL/hr at 09/19/20 0830 0.8 mg/hr at 09/19/20 0830   • HYDROmorphone pf (DILAUDID) injection 0.75 mg  0.75 mg Intravenous Q30 MIN PRN Jorge Sanchez M.D.        And   • HYDROmorphone (DILAUDID) injection 1.5 mg  1.5 mg Intravenous Q30 MIN PRN Jorge Sanchez M.D.       • atorvastatin (LIPITOR) tablet 20 mg  20 mg Enteral Tube DAILY Nii Rivera M.D.   20 mg at 09/19/20 0541   • ondansetron (ZOFRAN ODT) dispertab 4 mg  4 mg Enteral Tube Q4HRS PRN Nii Rivera M.D.       • Pharmacy Consult: Enteral tube insertion - review meds/change route/product selection  1 Each  Other PHARMACY TO DOSE Nii Rivera M.D.       • aspirin (ASA) chewable tab 81 mg  81 mg Enteral Tube DAILY Nii Rivera M.D.   81 mg at 09/19/20 0540   • acetaminophen (TYLENOL) tablet 650 mg  650 mg Enteral Tube Q6HRS PRN Nii Rivera M.D.   650 mg at 09/19/20 0110   • HYDROcodone/acetaminophen (NORCO)  MG per tablet 1 Tab  1 Tab Enteral Tube Q6HRS PRN Nii Rivera M.D.       • promethazine (PHENERGAN) tablet 12.5-25 mg  12.5-25 mg Enteral Tube Q4HRS PRN Nii Rivera M.D.       • propofol (DIPRIVAN) injection  0-80 mcg/kg/min Intravenous Continuous Harjinder Crowe M.D. 10.6 mL/hr at 09/19/20 1413 10 mcg/kg/min at 09/19/20 1413   • enoxaparin (LOVENOX) injection 150 mg  150 mg Subcutaneous Q12HRS Harjinder Crowe M.D.   150 mg at 09/19/20 0618   • Respiratory Therapy Consult   Nebulization Continuous RT Harjinder Crowe M.D.       • ipratropium-albuterol (DUONEB) nebulizer solution  3 mL Nebulization Q2HRS PRN (RT) Harjinder Crowe M.D.       • famotidine (PEPCID) tablet 20 mg  20 mg Enteral Tube Q12HRS Nii Rivera M.D.   20 mg at 09/19/20 0541   • senna-docusate (PERICOLACE or SENOKOT S) 8.6-50 MG per tablet 2 Tab  2 Tab Enteral Tube BID Harjinder Crowe M.D.   2 Tab at 09/19/20 0540    And   • polyethylene glycol/lytes (MIRALAX) PACKET 1 Packet  1 Packet Enteral Tube QDAY PRN Harjinder Crowe M.D.   1 Packet at 09/19/20 0541    And   • magnesium hydroxide (MILK OF MAGNESIA) suspension 30 mL  30 mL Enteral Tube QDAY PRN Harjinder Crowe M.D.   30 mL at 09/19/20 0541    And   • bisacodyl (DULCOLAX) suppository 10 mg  10 mg Rectal QDAY PRN Harjinder Crowe M.D.   Stopped at 09/18/20 0524   • MD Alert...ICU Electrolyte Replacement per Pharmacy   Other PHARMACY TO DOSE Harjidner Crowe M.D.       • lidocaine (XYLOCAINE) 1 % injection 1-2 mL  1-2 mL Tracheal Tube Q30 MIN PRN Harjinder Crowe M.D.       • labetalol (NORMODYNE/TRANDATE) injection 10-20 mg  10-20 mg Intravenous Q4HRS PRN Nii Rivera,  M.D.       • ondansetron (ZOFRAN) syringe/vial injection 4 mg  4 mg Intravenous Q4HRS PRN Nii Rivera M.D.   4 mg at 08/29/20 0552   • promethazine (PHENERGAN) suppository 12.5-25 mg  12.5-25 mg Rectal Q4HRS PRN Nii Rivera M.D.       • prochlorperazine (COMPAZINE) injection 5-10 mg  5-10 mg Intravenous Q4HRS PRN Nii Rivera M.D.           Fluids    Intake/Output Summary (Last 24 hours) at 9/19/2020 1520  Last data filed at 9/19/2020 1400  Gross per 24 hour   Intake 4107.87 ml   Output 4850 ml   Net -742.13 ml       Laboratory  Recent Labs     09/17/20  0301 09/18/20  0400 09/19/20  0352   ISTATAPH 7.266* 7.312* 7.324*   ISTATAPCO2 70.9* 64.2* 56.0*   ISTATAPO2 74 81 74   ISTATATCO2 34* 34* 31   [id number] 91* 94 93   ISTATARTHCO3 32.3* 32.5* 29.1*   ISTATARTBE 4* 5* 2   ISTATTEMP 37.0 C 98.0 F 37.5 C   ISTATFIO2 70 70 50   ISTATSPEC Arterial Arterial Arterial   ISTATAPHTC 7.266* 7.317* 7.317*   [id number] 74 79 77         Recent Labs     09/17/20  0455 09/18/20  0510 09/19/20  0347   SODIUM 146* 150* 141   POTASSIUM 5.1 4.9 4.8   CHLORIDE 108 111 105   CO2 30 31 27   BUN 43* 35* 31*   CREATININE 0.37* 0.36* 0.34*   CALCIUM 9.0 8.9 8.7     Recent Labs     09/17/20  0455 09/18/20  0510 09/19/20  0347   GLUCOSE 131* 123* 106*     Recent Labs     09/17/20  0455 09/18/20  0510 09/19/20  0347   WBC 6.5 4.7* 4.8   NEUTSPOLYS 69.80 63.50 56.40   LYMPHOCYTES 9.80* 10.40* 15.50*   MONOCYTES 7.80 2.60 8.70   EOSINOPHILS 10.30* 20.90* 15.70*   BASOPHILS 0.50 0.90 0.60     Recent Labs     09/17/20  0455 09/18/20  0510 09/19/20  0347   RBC 3.96* 4.11* 4.08*   HEMOGLOBIN 12.1* 12.6* 12.5*   HEMATOCRIT 41.7* 42.1 41.4*   PLATELETCT 199 221 238       Imaging  X-Ray:  I have personally reviewed the images and compared with prior images. and My impression is: Improved edema    Assessment/Plan  * Pneumonia due to severe acute respiratory syndrome coronavirus 2 (SARS-CoV-2)  Assessment & Plan  S/P remdesivir from  8/30-9/3  S/P dexamethasone, 6 mg daily for 10 days  Continue full anticoagulation with Lovenox for elevated d-dimer    Acute hypoxemic respiratory failure (HCC)- (present on admission)  Assessment & Plan  Intubated 8/29  All of the appropriate ventilator bundles are in place  Proning discontinued due to skin breakdown/injury  Continue full vent support    Pulmonary hypertension (HCC)  Assessment & Plan  RVSP 60 mmHg    Pneumonia  Assessment & Plan  Sputum with Serratia marcescens  Continue Bactrim  UA unremarkable  Blood cultures negative from 9/16 so far    Septic shock due to undetermined organism ([facility])  Assessment & Plan  Shock has resolved - off vasopressor support  Continue antibiotics    Controlled type 2 diabetes mellitus, without long-term current use of insulin ([facility])- (present on admission)  Assessment & Plan  Glycohemoglobin 6.5  Glucose control    Essential hypertension- (present on admission)  Assessment & Plan  Continue amlodipine, 10 mg daily    Morbid obesity with BMI of 50.0-59.9, adult ([facility])- (present on admission)  Assessment & Plan  BMI 53  Behavioral modification and nutrition counseling when clinically appropriate    ABELARDO (obstructive sleep apnea)- (present on admission)  Assessment & Plan  PSG on 1/29/2019 revealed AHI of 101.9 with minimum oxygen saturation of 50%    HLD (hyperlipidemia)- (present on admission)  Assessment & Plan  Continue statin       VTE:  Lovenox  Ulcer: H2 Antagonist  Lines: Central Line  Ongoing indication addressed, Arterial Line  Ongoing indication addressed and Briseno Catheter  Ongoing indication addressed    I have performed a physical exam and reviewed and updated ROS and Plan today (9/19/2020). In review of yesterday's note (9/18/2020), there are no changes except as documented above.     I have assessed and reassessed his respiratory status with ventilator adjustments, ventilator waveforms, airway mechanics, blood pressure, hemodynamics, cardiovascular status and  his neurologic status.  He is at increased risk for worsening respiratory and cardiovascular system dysfunction.    Discussed patient condition and risk of morbidity and/or mortality with RN, RT, Pharmacy, Charge nurse / hot rounds and QA team     The patient remains critically ill.  Critical care time = 32 minutes in directly providing and coordinating critical care and extensive data review.  No time overlap and excludes procedures.    Aguilar Marquze MD  Pulmonary and Critical Care Medicine

## 2020-09-19 NOTE — CARE PLAN
Problem: Safety  Goal: Will remain free from injury  Outcome: PROGRESSING AS EXPECTED  Goal: Will remain free from falls  Outcome: PROGRESSING AS EXPECTED     Problem: Infection  Goal: Will remain free from infection  Outcome: PROGRESSING AS EXPECTED     Problem: Bowel/Gastric:  Goal: Normal bowel function is maintained or improved  Outcome: PROGRESSING AS EXPECTED     Problem: Communication  Goal: The ability to communicate needs accurately and effectively will improve  Outcome: PROGRESSING SLOWER THAN EXPECTED     Problem: Knowledge Deficit  Goal: Knowledge of disease process/condition, treatment plan, diagnostic tests, and medications will improve  Outcome: PROGRESSING SLOWER THAN EXPECTED  Goal: Knowledge of the prescribed therapeutic regimen will improve  Outcome: PROGRESSING SLOWER THAN EXPECTED

## 2020-09-19 NOTE — PALLIATIVE CARE
"Palliative Care follow-up  PC RN called pt's dtr Cherry to provide support. Cherry had surgery yesterday and is currently in pain. She's also feel \"very sad\" about her father's condition. Emotional support and validation provided. Cherry inquires if visitation is possible. PC RN explained that visiting is restricted to end of life and that would have to be addressed based on the circumstance. Cherry verbalized understanding. Cherry has PC RN contact information and thanks PC RN for phone call.        Updated: MARY CARMEN LINK and Dr. Marquez    Plan: PC RN will continue to follow and support.     Thank you for allowing Palliative Care to support this patient and family. Contact [phone] for additional assistance, change in patient status, or with any questions/concerns.   "

## 2020-09-20 ENCOUNTER — APPOINTMENT (OUTPATIENT)
Dept: RADIOLOGY | Facility: MEDICAL CENTER | Age: 60
DRG: 004 | End: 2020-09-20
Attending: INTERNAL MEDICINE
Payer: MEDICARE

## 2020-09-20 LAB
ACTION RANGE TRIGGERED IACRT: YES
ANION GAP SERPL CALC-SCNC: 11 MMOL/L (ref 7–16)
ANISOCYTOSIS BLD QL SMEAR: ABNORMAL
BASE EXCESS BLDA CALC-SCNC: 0 MMOL/L (ref -4–3)
BASOPHILS # BLD AUTO: 1.6 % (ref 0–1.8)
BASOPHILS # BLD: 0.08 K/UL (ref 0–0.12)
BODY TEMPERATURE: ABNORMAL DEGREES
BUN SERPL-MCNC: 28 MG/DL (ref 8–22)
CALCIUM SERPL-MCNC: 8.3 MG/DL (ref 8.5–10.5)
CHLORIDE SERPL-SCNC: 101 MMOL/L (ref 96–112)
CO2 BLDA-SCNC: 28 MMOL/L (ref 20–33)
CO2 SERPL-SCNC: 23 MMOL/L (ref 20–33)
CREAT SERPL-MCNC: 0.36 MG/DL (ref 0.5–1.4)
EOSINOPHIL # BLD AUTO: 1.15 K/UL (ref 0–0.51)
EOSINOPHIL NFR BLD: 23 % (ref 0–6.9)
ERYTHROCYTE [DISTWIDTH] IN BLOOD BY AUTOMATED COUNT: 55.9 FL (ref 35.9–50)
GLUCOSE SERPL-MCNC: 105 MG/DL (ref 65–99)
HCO3 BLDA-SCNC: 26.4 MMOL/L (ref 17–25)
HCT VFR BLD AUTO: 40.3 % (ref 42–52)
HGB BLD-MCNC: 12.6 G/DL (ref 14–18)
HOROWITZ INDEX BLDA+IHG-RTO: 178 MM[HG]
INST. QUALIFIED PATIENT IIQPT: YES
LYMPHOCYTES # BLD AUTO: 0.24 K/UL (ref 1–4.8)
LYMPHOCYTES NFR BLD: 4.8 % (ref 22–41)
MACROCYTES BLD QL SMEAR: ABNORMAL
MANUAL DIFF BLD: NORMAL
MCH RBC QN AUTO: 31 PG (ref 27–33)
MCHC RBC AUTO-ENTMCNC: 31.3 G/DL (ref 33.7–35.3)
MCV RBC AUTO: 99 FL (ref 81.4–97.8)
METAMYELOCYTES NFR BLD MANUAL: 1.6 %
MONOCYTES # BLD AUTO: 0.36 K/UL (ref 0–0.85)
MONOCYTES NFR BLD AUTO: 7.1 % (ref 0–13.4)
MORPHOLOGY BLD-IMP: NORMAL
MYELOCYTES NFR BLD MANUAL: 2.4 %
NEUTROPHILS # BLD AUTO: 2.98 K/UL (ref 1.82–7.42)
NEUTROPHILS NFR BLD: 53.2 % (ref 44–72)
NEUTS BAND NFR BLD MANUAL: 6.3 % (ref 0–10)
NRBC # BLD AUTO: 0.03 K/UL
NRBC BLD-RTO: 0.6 /100 WBC
O2/TOTAL GAS SETTING VFR VENT: 40 %
OVALOCYTES BLD QL SMEAR: NORMAL
PCO2 BLDA: 51.1 MMHG (ref 26–37)
PCO2 TEMP ADJ BLDA: 52.7 MMHG (ref 26–37)
PH BLDA: 7.32 [PH] (ref 7.4–7.5)
PH TEMP ADJ BLDA: 7.31 [PH] (ref 7.4–7.5)
PLATELET # BLD AUTO: 219 K/UL (ref 164–446)
PLATELET BLD QL SMEAR: NORMAL
PMV BLD AUTO: 11.3 FL (ref 9–12.9)
PO2 BLDA: 71 MMHG (ref 64–87)
PO2 TEMP ADJ BLDA: 75 MMHG (ref 64–87)
POIKILOCYTOSIS BLD QL SMEAR: NORMAL
POTASSIUM SERPL-SCNC: 5.4 MMOL/L (ref 3.6–5.5)
RBC # BLD AUTO: 4.07 M/UL (ref 4.7–6.1)
RBC BLD AUTO: PRESENT
SAO2 % BLDA: 92 % (ref 93–99)
SODIUM SERPL-SCNC: 135 MMOL/L (ref 135–145)
SPECIMEN DRAWN FROM PATIENT: ABNORMAL
WBC # BLD AUTO: 5 K/UL (ref 4.8–10.8)

## 2020-09-20 PROCEDURE — 700111 HCHG RX REV CODE 636 W/ 250 OVERRIDE (IP): Performed by: INTERNAL MEDICINE

## 2020-09-20 PROCEDURE — 71045 X-RAY EXAM CHEST 1 VIEW: CPT

## 2020-09-20 PROCEDURE — 700111 HCHG RX REV CODE 636 W/ 250 OVERRIDE (IP): Mod: JG | Performed by: INTERNAL MEDICINE

## 2020-09-20 PROCEDURE — 700102 HCHG RX REV CODE 250 W/ 637 OVERRIDE(OP): Performed by: INTERNAL MEDICINE

## 2020-09-20 PROCEDURE — A9270 NON-COVERED ITEM OR SERVICE: HCPCS | Performed by: INTERNAL MEDICINE

## 2020-09-20 PROCEDURE — 94003 VENT MGMT INPAT SUBQ DAY: CPT

## 2020-09-20 PROCEDURE — 80048 BASIC METABOLIC PNL TOTAL CA: CPT

## 2020-09-20 PROCEDURE — 94770 HCHG CO2 EXPIRED GAS DETERMINATION: CPT

## 2020-09-20 PROCEDURE — 37799 UNLISTED PX VASCULAR SURGERY: CPT

## 2020-09-20 PROCEDURE — 85027 COMPLETE CBC AUTOMATED: CPT

## 2020-09-20 PROCEDURE — 99291 CRITICAL CARE FIRST HOUR: CPT | Performed by: INTERNAL MEDICINE

## 2020-09-20 PROCEDURE — 85007 BL SMEAR W/DIFF WBC COUNT: CPT

## 2020-09-20 PROCEDURE — 82803 BLOOD GASES ANY COMBINATION: CPT

## 2020-09-20 PROCEDURE — 770022 HCHG ROOM/CARE - ICU (200)

## 2020-09-20 RX ORDER — FUROSEMIDE 10 MG/ML
40 INJECTION INTRAMUSCULAR; INTRAVENOUS ONCE
Status: COMPLETED | OUTPATIENT
Start: 2020-09-20 | End: 2020-09-20

## 2020-09-20 RX ADMIN — PROPOFOL 10 MCG/KG/MIN: 10 INJECTION, EMULSION INTRAVENOUS at 09:57

## 2020-09-20 RX ADMIN — SULFAMETHOXAZOLE AND TRIMETHOPRIM 3 TABLET: 800; 160 TABLET ORAL at 01:21

## 2020-09-20 RX ADMIN — AMLODIPINE BESYLATE 10 MG: 10 TABLET ORAL at 05:49

## 2020-09-20 RX ADMIN — ALTEPLASE 2 MG: 2.2 INJECTION, POWDER, LYOPHILIZED, FOR SOLUTION INTRAVENOUS at 03:29

## 2020-09-20 RX ADMIN — PROPOFOL 20 MCG/KG/MIN: 10 INJECTION, EMULSION INTRAVENOUS at 14:02

## 2020-09-20 RX ADMIN — FUROSEMIDE 40 MG: 10 INJECTION, SOLUTION INTRAMUSCULAR; INTRAVENOUS at 11:17

## 2020-09-20 RX ADMIN — Medication 10 MG: at 22:24

## 2020-09-20 RX ADMIN — DOCUSATE SODIUM 50 MG AND SENNOSIDES 8.6 MG 2 TABLET: 8.6; 5 TABLET, FILM COATED ORAL at 05:49

## 2020-09-20 RX ADMIN — ENOXAPARIN SODIUM 150 MG: 150 INJECTION SUBCUTANEOUS at 17:47

## 2020-09-20 RX ADMIN — PROPOFOL 15 MCG/KG/MIN: 10 INJECTION, EMULSION INTRAVENOUS at 19:39

## 2020-09-20 RX ADMIN — PROPOFOL 15 MCG/KG/MIN: 10 INJECTION, EMULSION INTRAVENOUS at 03:48

## 2020-09-20 RX ADMIN — ATORVASTATIN CALCIUM 20 MG: 20 TABLET, FILM COATED ORAL at 05:50

## 2020-09-20 RX ADMIN — ASPIRIN 81 MG 81 MG: 81 TABLET ORAL at 05:49

## 2020-09-20 RX ADMIN — DOCUSATE SODIUM 50 MG AND SENNOSIDES 8.6 MG 2 TABLET: 8.6; 5 TABLET, FILM COATED ORAL at 17:46

## 2020-09-20 RX ADMIN — SULFAMETHOXAZOLE AND TRIMETHOPRIM 3 TABLET: 800; 160 TABLET ORAL at 05:49

## 2020-09-20 RX ADMIN — Medication 0.6 MG/HR: at 09:58

## 2020-09-20 RX ADMIN — ENOXAPARIN SODIUM 150 MG: 150 INJECTION SUBCUTANEOUS at 05:51

## 2020-09-20 RX ADMIN — SULFAMETHOXAZOLE AND TRIMETHOPRIM 3 TABLET: 800; 160 TABLET ORAL at 17:46

## 2020-09-20 RX ADMIN — FAMOTIDINE 20 MG: 20 TABLET, FILM COATED ORAL at 05:50

## 2020-09-20 RX ADMIN — SULFAMETHOXAZOLE AND TRIMETHOPRIM 3 TABLET: 800; 160 TABLET ORAL at 11:17

## 2020-09-20 RX ADMIN — FAMOTIDINE 20 MG: 20 TABLET, FILM COATED ORAL at 17:46

## 2020-09-20 ASSESSMENT — PAIN DESCRIPTION - PAIN TYPE
TYPE: ACUTE PAIN

## 2020-09-20 ASSESSMENT — FIBROSIS 4 INDEX: FIB4 SCORE: 1.76

## 2020-09-20 NOTE — CARE PLAN
Problem: Safety  Goal: Will remain free from injury  Outcome: PROGRESSING AS EXPECTED  Goal: Will remain free from falls  Outcome: PROGRESSING AS EXPECTED     Problem: Venous Thromboembolism (VTW)/Deep Vein Thrombosis (DVT) Prevention:  Goal: Patient will participate in Venous Thrombosis (VTE)/Deep Vein Thrombosis (DVT)Prevention Measures  Outcome: PROGRESSING AS EXPECTED     Problem: Bowel/Gastric:  Goal: Normal bowel function is maintained or improved  Outcome: PROGRESSING AS EXPECTED     Problem: Fluid Volume:  Goal: Will maintain balanced intake and output  Outcome: PROGRESSING AS EXPECTED     Problem: Skin Integrity  Goal: Risk for impaired skin integrity will decrease  Outcome: PROGRESSING AS EXPECTED

## 2020-09-20 NOTE — CARE PLAN
Problem: Safety  Goal: Will remain free from injury  Outcome: PROGRESSING AS EXPECTED  Goal: Will remain free from falls  Outcome: PROGRESSING AS EXPECTED     Problem: Infection  Goal: Will remain free from infection  Outcome: PROGRESSING AS EXPECTED     Problem: Venous Thromboembolism (VTW)/Deep Vein Thrombosis (DVT) Prevention:  Goal: Patient will participate in Venous Thrombosis (VTE)/Deep Vein Thrombosis (DVT)Prevention Measures  Outcome: PROGRESSING AS EXPECTED     Problem: Bowel/Gastric:  Goal: Normal bowel function is maintained or improved  Outcome: PROGRESSING AS EXPECTED  Goal: Will not experience complications related to bowel motility  Outcome: PROGRESSING AS EXPECTED     Problem: Communication  Goal: The ability to communicate needs accurately and effectively will improve  Outcome: PROGRESSING SLOWER THAN EXPECTED     Problem: Knowledge Deficit  Goal: Knowledge of disease process/condition, treatment plan, diagnostic tests, and medications will improve  Outcome: PROGRESSING SLOWER THAN EXPECTED  Goal: Knowledge of the prescribed therapeutic regimen will improve  Outcome: PROGRESSING SLOWER THAN EXPECTED

## 2020-09-20 NOTE — RESPIRATORY CARE
Ventilator Daily Summary    Vent Day #23    Ventilator settings changed this shift:PEEP Down to 14,  ASV% 160    Weaning trials:No    Respiratory Procedures:None    Plan: Continue current ventilator settings and wean mechanical ventilation as tolerated per physician orders.

## 2020-09-20 NOTE — PROGRESS NOTES
Critical Care Progress Note    Date of admission  8/27/2020    Chief Complaint  60 y.o. male admitted 8/27/2020 with respiratory failure and pneumonia due to SARS-CoV-2.    Hospital Course     8/29-worsening mental status, rapid shallow breathing and hypoxia required endotracheal intubation and prone positioning.  8/30-I spoke with Brittney, the patient's daughter and surrogate decision maker about initiating Remdesivir.  We discussed the risks benefits and alternatives.  All questions were answered.  She consented to initiating Remdesivir today.  8/31 -art line placed, remains on PCV, transition off rocuronium drip as tolerated, propofol/fentanyl, continue prone protocol  9/1 - remains on pressure control ventilation, PEEP 19, improved P/F ratio with prone ventilation, off rocuronium, starting trophic tube feed, full dose anticoagulation with Lovenox, day 3/5 Remdesivir, day 5 dexamethasone  9/4 - stopped prone ventilation with some skin breakdown, difficult prone protocol with morbid obesity, full ventilator support, advancing nutrition  9/5 - Ongoing diuresis with 3 times daily Lasix, advancing tube feeds to goal, remains on supine ventilation now  9/6 - PCV, weaning IP and PEEP, remains supine, ongoing aggressive diuresis, abdi TF, lighten sedation as abdi  9/7 Switch to VC, switched from fentanyl to Dilaudid with improved ventilator synchrony, decrease Lasix to daily; stopped amlodipine as he is now on norepinephrine to achieve a map greater than 65  9/8 continue full vent support, compliance gradually improving  9/10 Febrile: urine, blood, sputum cultures, continue full ventilator support  9/11 febrile, increased NE, urine culture, MRSA nare, then begin zosyn  9/12 UA + for enterococcus; resp culture + Non-lactose fermenting GNR  - currently on zosyn; will await sensitivities   9/13 continues on PEEP 16, on Zosyn for Serratia and enterococcus  9/14 -    change Zosyn to Rocephin.  Continue full anticoagulation.   Continue vent support.  Off norepinephrine.  9/15 -    continue full vent support.  Force diuresis with chlorothiazide.  Continue Lovenox.  9/16 -    change Rocephin back to Zosyn due to fever.  Culture blood, sputum and check UA.  Change to ASV mode of ventilation to help with vent synchrony.  Forced diuresis with chlorothiazide.  9/17 -    continue Zosyn.  Continue vent support.  Force diuresis with chlorothiazide.  Full anticoagulation with Lovenox.  9/18 -    increase free water for hypernatremia.  Forced diuresis with Diamox.  Change Zosyn to Bactrim due to eosinophilia and rash.  Continue vent support.  9/19 -    decrease free water.  Continue Bactrim.  Continue vent support.  9/20 -    stop free water.  Force diuresis.  Continue Bactrim.  Decrease PEEP.  Continue vent support.      Interval Problem Update  Reviewed last 24 hour events:      Grimaces, opens eyes spont with SAT  PERRL  ST    TF OK  Good UOP  Stop free water  Vent 23  PEEP 16   40%  ASV at 160%  Decrease PEEP to 14  Bactrim  Give Lasix 40      Review of Systems  Review of Systems   Unable to perform ROS: Acuity of condition        Vital Signs for last 24 hours   Temp:  [37.5 °C (99.5 °F)-38.1 °C (100.6 °F)] 37.5 °C (99.5 °F)  Pulse:  [] 91  Resp:  [0-31] 19  BP: ()/(50-82) 90/59  SpO2:  [92 %-100 %] 98 %    Hemodynamic parameters for last 24 hours       Respiratory Information for the last 24 hours  Vent Mode: ASV  PEEP/CPAP: 14  MAP: 20  Control VTE (exp VT): 460    Physical Exam   Physical Exam  Constitutional:       Appearance: He is not diaphoretic.      Comments: On ventilator   HENT:      Head: Normocephalic and atraumatic.      Right Ear: External ear normal.      Left Ear: External ear normal.      Nose: Nose normal.      Mouth/Throat:      Mouth: Mucous membranes are moist.      Pharynx: Oropharynx is clear.   Eyes:      General:         Right eye: No discharge.         Left eye: No discharge.      Pupils: Pupils are  equal, round, and reactive to light.   Neck:      Musculoskeletal: Normal range of motion and neck supple.   Cardiovascular:      Pulses: Normal pulses.      Heart sounds: No murmur. No friction rub.      Comments: Sinus rhythm  Pulmonary:      Breath sounds: Rales (Unchanged crackles) present. No wheezing.   Abdominal:      General: Bowel sounds are normal. There is no distension.      Palpations: Abdomen is soft.      Tenderness: There is no abdominal tenderness. There is no rebound.      Comments: Tolerating enteral tube feedings   Musculoskeletal: Normal range of motion.         General: Swelling present.      Right lower leg: Edema present.      Left lower leg: Edema present.      Comments: No clubbing or cyanosis   Skin:     General: Skin is warm and dry.      Capillary Refill: Capillary refill takes less than 2 seconds.   Neurological:      Comments: Pupils 4 mm and briskly react to light.  He will open his eyes and grimace.  He does not track or follow.         Medications  Current Facility-Administered Medications   Medication Dose Route Frequency Provider Last Rate Last Dose   • sulfamethoxazole-trimethoprim (BACTRIM DS) 800-160 MG tablet 3 Tab  3 Tab Enteral Tube Q6HRS Aguilar Marquez M.D.   3 Tab at 09/20/20 1117   • amLODIPine (NORVASC) tablet 10 mg  10 mg Enteral Tube Q DAY Aguilar Marquez M.D.   10 mg at 09/20/20 0549   • HYDROmorphone (DILAUDID) 0.2 mg/mL in 50mL NS (Continuous Infusion)   Intravenous Continuous Jorge Sanchez M.D. 4 mL/hr at 09/20/20 1000 0.8 mg/hr at 09/20/20 1000   • HYDROmorphone pf (DILAUDID) injection 0.75 mg  0.75 mg Intravenous Q30 MIN PRN Jorge Sanchez M.D.        And   • HYDROmorphone (DILAUDID) injection 1.5 mg  1.5 mg Intravenous Q30 MIN PRN Jorge Sanchez M.D.       • atorvastatin (LIPITOR) tablet 20 mg  20 mg Enteral Tube DAILY Nii Rivera M.D.   20 mg at 09/20/20 0550   • ondansetron (ZOFRAN ODT) dispertab 4 mg  4 mg Enteral Tube Q4HRS PRN  Nii Rivera M.D.       • Pharmacy Consult: Enteral tube insertion - review meds/change route/product selection  1 Each Other PHARMACY TO DOSE Nii Rivera M.D.       • aspirin (ASA) chewable tab 81 mg  81 mg Enteral Tube DAILY Nii Rivera M.D.   81 mg at 09/20/20 0549   • acetaminophen (TYLENOL) tablet 650 mg  650 mg Enteral Tube Q6HRS PRN Nii Rivera M.D.   650 mg at 09/19/20 0110   • HYDROcodone/acetaminophen (NORCO)  MG per tablet 1 Tab  1 Tab Enteral Tube Q6HRS PRN Nii Rivera M.D.       • promethazine (PHENERGAN) tablet 12.5-25 mg  12.5-25 mg Enteral Tube Q4HRS PRN Nii Rivera M.D.       • propofol (DIPRIVAN) injection  0-80 mcg/kg/min Intravenous Continuous Harjinder Crowe M.D. 21.2 mL/hr at 09/20/20 1402 20 mcg/kg/min at 09/20/20 1402   • enoxaparin (LOVENOX) injection 150 mg  150 mg Subcutaneous Q12HRS Harjinder Crowe M.D.   150 mg at 09/20/20 0551   • Respiratory Therapy Consult   Nebulization Continuous RT Harjinder Crowe M.D.       • ipratropium-albuterol (DUONEB) nebulizer solution  3 mL Nebulization Q2HRS PRN (RT) Harjinder Crowe M.D.       • famotidine (PEPCID) tablet 20 mg  20 mg Enteral Tube Q12HRS Nii Rivera M.D.   20 mg at 09/20/20 0550   • senna-docusate (PERICOLACE or SENOKOT S) 8.6-50 MG per tablet 2 Tab  2 Tab Enteral Tube BID Harjinder Crowe M.D.   2 Tab at 09/20/20 0549    And   • polyethylene glycol/lytes (MIRALAX) PACKET 1 Packet  1 Packet Enteral Tube QDAY PRN Harjinder Crowe M.D.   1 Packet at 09/19/20 0541    And   • magnesium hydroxide (MILK OF MAGNESIA) suspension 30 mL  30 mL Enteral Tube QDAY PRN Harjinder Crowe M.D.   30 mL at 09/19/20 0541    And   • bisacodyl (DULCOLAX) suppository 10 mg  10 mg Rectal QDAY PRN Harjinder Crowe M.D.   Stopped at 09/18/20 0524   • MD Alert...ICU Electrolyte Replacement per Pharmacy   Other PHARMACY TO DOSE Harjinder Crowe M.D.       • lidocaine (XYLOCAINE) 1 % injection 1-2 mL  1-2 mL Tracheal Tube Q30 MIN PRN  Harjinder Crowe M.D.       • labetalol (NORMODYNE/TRANDATE) injection 10-20 mg  10-20 mg Intravenous Q4HRS PRN Nii Rivera M.D.       • ondansetron (ZOFRAN) syringe/vial injection 4 mg  4 mg Intravenous Q4HRS PRN Nii Rivera M.D.   4 mg at 08/29/20 0552   • promethazine (PHENERGAN) suppository 12.5-25 mg  12.5-25 mg Rectal Q4HRS PRN Nii Rivera M.D.       • prochlorperazine (COMPAZINE) injection 5-10 mg  5-10 mg Intravenous Q4HRS PRN Nii Rivera M.D.           Fluids    Intake/Output Summary (Last 24 hours) at 9/20/2020 1435  Last data filed at 9/20/2020 1400  Gross per 24 hour   Intake 3346.15 ml   Output 5525 ml   Net -2178.85 ml       Laboratory  Recent Labs     09/18/20  0400 09/19/20  0352 09/20/20  0305   ISTATAPH 7.312* 7.324* 7.321*   ISTATAPCO2 64.2* 56.0* 51.1*   ISTATAPO2 81 74 71   ISTATATCO2 34* 31 28   [id number] 94 93 92*   ISTATARTHCO3 32.5* 29.1* 26.4*   ISTATARTBE 5* 2 0   ISTATTEMP 98.0 F 37.5 C 37.7 C   ISTATFIO2 70 50 40   ISTATSPEC Arterial Arterial Arterial   ISTATAPHTC 7.317* 7.317* 7.311*   ZTRSQBFS2TR 79 77 75         Recent Labs     09/18/20  0510 09/19/20  0347 09/20/20  0610   SODIUM 150* 141 135   POTASSIUM 4.9 4.8 5.4   CHLORIDE 111 105 101   CO2 31 27 23   BUN 35* 31* 28*   CREATININE 0.36* 0.34* 0.36*   CALCIUM 8.9 8.7 8.3*     Recent Labs     09/18/20  0510 09/19/20  0347 09/20/20  0610   GLUCOSE 123* 106* 105*     Recent Labs     09/18/20  0510 09/19/20  0347 09/20/20  0610   WBC 4.7* 4.8 5.0   NEUTSPOLYS 63.50 56.40 53.20   LYMPHOCYTES 10.40* 15.50* 4.80*   MONOCYTES 2.60 8.70 7.10   EOSINOPHILS 20.90* 15.70* 23.00*   BASOPHILS 0.90 0.60 1.60     Recent Labs     09/18/20  0510 09/19/20  0347 09/20/20  0610   RBC 4.11* 4.08* 4.07*   HEMOGLOBIN 12.6* 12.5* 12.6*   HEMATOCRIT 42.1 41.4* 40.3*   PLATELETCT 221 238 219       Imaging  X-Ray:  I have personally reviewed the images and compared with prior images. and My impression is: Very slightly improved bilateral  opacities    Assessment/Plan  * Pneumonia due to severe acute respiratory syndrome coronavirus 2 (SARS-CoV-2)  Assessment & Plan  S/P remdesivir from 8/30-9/3  S/P dexamethasone, 6 mg daily for 10 days  Continue full anticoagulation with Lovenox    Acute hypoxemic respiratory failure (HCC)- (present on admission)  Assessment & Plan  Intubated 8/29  All of the appropriate ventilator bundles are in place  Proning discontinued due to skin breakdown/injury  Continue vent support  Decrease PEEP from 16-14  Force diuresis with furosemide    Pulmonary hypertension ([facility])  Assessment & Plan  RVSP 60 mmHg    Pneumonia  Assessment & Plan  Sputum with Serratia marcescens  Continue Bactrim  UA unremarkable  Blood cultures negative from 9/16 so far    Septic shock due to undetermined organism ([facility])  Assessment & Plan  Shock has resolved - off vasopressor support  Continue antibiotics    Controlled type 2 diabetes mellitus, without long-term current use of insulin ([facility])- (present on admission)  Assessment & Plan  Glycohemoglobin 6.5  Glucose controlled    Essential hypertension- (present on admission)  Assessment & Plan  Continue amlodipine, 10 mg daily    Morbid obesity with BMI of 50.0-59.9, adult ([facility])- (present on admission)  Assessment & Plan  BMI 53  Behavioral modification and nutrition counseling when clinically appropriate    ABELARDO (obstructive sleep apnea)- (present on admission)  Assessment & Plan  PSG on 1/29/2019 revealed AHI of 101.9 with minimum oxygen saturation of 50%    HLD (hyperlipidemia)- (present on admission)  Assessment & Plan  Continue statin       VTE:  Lovenox  Ulcer: H2 Antagonist  Lines: Central Line  Ongoing indication addressed, Arterial Line  Ongoing indication addressed and Briseno Catheter  Ongoing indication addressed    I have performed a physical exam and reviewed and updated ROS and Plan today (9/20/2020). In review of yesterday's note (9/19/2020), there are no changes except as documented above.      I have assessed and reassessed his respiratory status with ventilator adjustments, airway mechanics, ventilator waveforms, hemodynamics, blood pressure, cardiovascular status and his neurologic status.  He is at increased risk for worsening cardiovascular, respiratory and CNS system dysfunction.    Discussed patient condition and risk of morbidity and/or mortality with RN, RT, Pharmacy, Charge nurse / hot rounds and QA team     The patient remains critically ill.  Critical care time = 35 minutes in directly providing and coordinating critical care and extensive data review.  No time overlap and excludes procedures.    Aguilar Marquez MD  Pulmonary and Critical Care Medicine

## 2020-09-20 NOTE — CARE PLAN
Adult Ventilation Update    Total Vent Days: 23  ASV  180%  +16  40%    Patient Lines/Drains/Airways Status    Active Airway     Name: Placement date: Placement time: Site: Days:    Airway ETT Oral 8.0@24  08/29/20   1226   Oral  23                Increased Ve% to 180% per MD order.

## 2020-09-21 ENCOUNTER — APPOINTMENT (OUTPATIENT)
Dept: RADIOLOGY | Facility: MEDICAL CENTER | Age: 60
DRG: 004 | End: 2020-09-21
Attending: INTERNAL MEDICINE
Payer: MEDICARE

## 2020-09-21 LAB
ACTION RANGE TRIGGERED IACRT: NO
ACTION RANGE TRIGGERED IACRT: YES
ALBUMIN SERPL BCP-MCNC: 2 G/DL (ref 3.2–4.9)
ALBUMIN/GLOB SERPL: 0.5 G/DL
ALP SERPL-CCNC: 184 U/L (ref 30–99)
ALT SERPL-CCNC: 103 U/L (ref 2–50)
ANION GAP SERPL CALC-SCNC: 11 MMOL/L (ref 7–16)
ANISOCYTOSIS BLD QL SMEAR: ABNORMAL
AST SERPL-CCNC: 165 U/L (ref 12–45)
BACTERIA BLD CULT: NORMAL
BACTERIA BLD CULT: NORMAL
BASE EXCESS BLDA CALC-SCNC: -1 MMOL/L (ref -4–3)
BASE EXCESS BLDA CALC-SCNC: 2 MMOL/L (ref -4–3)
BASOPHILS # BLD AUTO: 0 % (ref 0–1.8)
BASOPHILS # BLD: 0 K/UL (ref 0–0.12)
BILIRUB SERPL-MCNC: 0.6 MG/DL (ref 0.1–1.5)
BODY TEMPERATURE: ABNORMAL DEGREES
BODY TEMPERATURE: ABNORMAL DEGREES
BUN SERPL-MCNC: 34 MG/DL (ref 8–22)
CALCIUM SERPL-MCNC: 8.5 MG/DL (ref 8.5–10.5)
CHLORIDE SERPL-SCNC: 98 MMOL/L (ref 96–112)
CO2 BLDA-SCNC: 27 MMOL/L (ref 20–33)
CO2 BLDA-SCNC: 31 MMOL/L (ref 20–33)
CO2 SERPL-SCNC: 23 MMOL/L (ref 20–33)
CREAT SERPL-MCNC: 0.43 MG/DL (ref 0.5–1.4)
CRP SERPL HS-MCNC: 4.08 MG/DL (ref 0–0.75)
EOSINOPHIL # BLD AUTO: 1.54 K/UL (ref 0–0.51)
EOSINOPHIL NFR BLD: 21.1 % (ref 0–6.9)
ERYTHROCYTE [DISTWIDTH] IN BLOOD BY AUTOMATED COUNT: 56.1 FL (ref 35.9–50)
GLOBULIN SER CALC-MCNC: 4.4 G/DL (ref 1.9–3.5)
GLUCOSE SERPL-MCNC: 98 MG/DL (ref 65–99)
HCO3 BLDA-SCNC: 25.8 MMOL/L (ref 17–25)
HCO3 BLDA-SCNC: 29.1 MMOL/L (ref 17–25)
HCT VFR BLD AUTO: 40.6 % (ref 42–52)
HGB BLD-MCNC: 12.7 G/DL (ref 14–18)
HOROWITZ INDEX BLDA+IHG-RTO: 200 MM[HG]
HOROWITZ INDEX BLDA+IHG-RTO: 605 MM[HG]
INST. QUALIFIED PATIENT IIQPT: YES
INST. QUALIFIED PATIENT IIQPT: YES
LYMPHOCYTES # BLD AUTO: 1.99 K/UL (ref 1–4.8)
LYMPHOCYTES NFR BLD: 27.2 % (ref 22–41)
MACROCYTES BLD QL SMEAR: ABNORMAL
MANUAL DIFF BLD: NORMAL
MCH RBC QN AUTO: 31.2 PG (ref 27–33)
MCHC RBC AUTO-ENTMCNC: 31.3 G/DL (ref 33.7–35.3)
MCV RBC AUTO: 99.8 FL (ref 81.4–97.8)
MONOCYTES # BLD AUTO: 0.96 K/UL (ref 0–0.85)
MONOCYTES NFR BLD AUTO: 13.2 % (ref 0–13.4)
MORPHOLOGY BLD-IMP: NORMAL
MYELOCYTES NFR BLD MANUAL: 2.6 %
NEUTROPHILS # BLD AUTO: 2.63 K/UL (ref 1.82–7.42)
NEUTROPHILS NFR BLD: 36 % (ref 44–72)
NRBC # BLD AUTO: 0.06 K/UL
NRBC BLD-RTO: 0.8 /100 WBC
O2/TOTAL GAS SETTING VFR VENT: 40 %
O2/TOTAL GAS SETTING VFR VENT: 40 %
PCO2 BLDA: 48.5 MMHG (ref 26–37)
PCO2 BLDA: 55.4 MMHG (ref 26–37)
PCO2 TEMP ADJ BLDA: 48.5 MMHG (ref 26–37)
PCO2 TEMP ADJ BLDA: 55.4 MMHG (ref 26–37)
PH BLDA: 7.33 [PH] (ref 7.4–7.5)
PH BLDA: 7.33 [PH] (ref 7.4–7.5)
PH TEMP ADJ BLDA: 7.33 [PH] (ref 7.4–7.5)
PH TEMP ADJ BLDA: 7.33 [PH] (ref 7.4–7.5)
PLATELET # BLD AUTO: 244 K/UL (ref 164–446)
PLATELET BLD QL SMEAR: NORMAL
PMV BLD AUTO: 11.1 FL (ref 9–12.9)
PO2 BLDA: 242 MMHG (ref 64–87)
PO2 BLDA: 80 MMHG (ref 64–87)
PO2 TEMP ADJ BLDA: 242 MMHG (ref 64–87)
PO2 TEMP ADJ BLDA: 80 MMHG (ref 64–87)
POTASSIUM SERPL-SCNC: 5.7 MMOL/L (ref 3.6–5.5)
PREALB SERPL-MCNC: 14.2 MG/DL (ref 18–38)
PROT SERPL-MCNC: 6.4 G/DL (ref 6–8.2)
RBC # BLD AUTO: 4.07 M/UL (ref 4.7–6.1)
RBC BLD AUTO: PRESENT
SAO2 % BLDA: 100 % (ref 93–99)
SAO2 % BLDA: 95 % (ref 93–99)
SIGNIFICANT IND 70042: NORMAL
SIGNIFICANT IND 70042: NORMAL
SITE SITE: NORMAL
SITE SITE: NORMAL
SMUDGE CELLS BLD QL SMEAR: NORMAL
SODIUM SERPL-SCNC: 132 MMOL/L (ref 135–145)
SOURCE SOURCE: NORMAL
SOURCE SOURCE: NORMAL
SPECIMEN DRAWN FROM PATIENT: ABNORMAL
SPECIMEN DRAWN FROM PATIENT: ABNORMAL
WBC # BLD AUTO: 7.3 K/UL (ref 4.8–10.8)

## 2020-09-21 PROCEDURE — 700101 HCHG RX REV CODE 250: Performed by: INTERNAL MEDICINE

## 2020-09-21 PROCEDURE — 700102 HCHG RX REV CODE 250 W/ 637 OVERRIDE(OP): Performed by: INTERNAL MEDICINE

## 2020-09-21 PROCEDURE — 94003 VENT MGMT INPAT SUBQ DAY: CPT

## 2020-09-21 PROCEDURE — 85007 BL SMEAR W/DIFF WBC COUNT: CPT

## 2020-09-21 PROCEDURE — 770022 HCHG ROOM/CARE - ICU (200)

## 2020-09-21 PROCEDURE — A9270 NON-COVERED ITEM OR SERVICE: HCPCS | Performed by: INTERNAL MEDICINE

## 2020-09-21 PROCEDURE — 85027 COMPLETE CBC AUTOMATED: CPT

## 2020-09-21 PROCEDURE — 700111 HCHG RX REV CODE 636 W/ 250 OVERRIDE (IP): Performed by: INTERNAL MEDICINE

## 2020-09-21 PROCEDURE — 80053 COMPREHEN METABOLIC PANEL: CPT

## 2020-09-21 PROCEDURE — 84134 ASSAY OF PREALBUMIN: CPT

## 2020-09-21 PROCEDURE — 86140 C-REACTIVE PROTEIN: CPT

## 2020-09-21 PROCEDURE — 37799 UNLISTED PX VASCULAR SURGERY: CPT

## 2020-09-21 PROCEDURE — 82803 BLOOD GASES ANY COMBINATION: CPT

## 2020-09-21 PROCEDURE — 70450 CT HEAD/BRAIN W/O DYE: CPT

## 2020-09-21 PROCEDURE — 71045 X-RAY EXAM CHEST 1 VIEW: CPT

## 2020-09-21 PROCEDURE — 94770 HCHG CO2 EXPIRED GAS DETERMINATION: CPT

## 2020-09-21 PROCEDURE — 99291 CRITICAL CARE FIRST HOUR: CPT | Performed by: INTERNAL MEDICINE

## 2020-09-21 RX ORDER — HYDROMORPHONE HYDROCHLORIDE 1 MG/ML
0.75 INJECTION, SOLUTION INTRAMUSCULAR; INTRAVENOUS; SUBCUTANEOUS
Status: DISCONTINUED | OUTPATIENT
Start: 2020-09-21 | End: 2020-09-28 | Stop reason: HOSPADM

## 2020-09-21 RX ORDER — HYDROMORPHONE HYDROCHLORIDE 2 MG/ML
1.5 INJECTION, SOLUTION INTRAMUSCULAR; INTRAVENOUS; SUBCUTANEOUS
Status: DISCONTINUED | OUTPATIENT
Start: 2020-09-21 | End: 2020-09-28 | Stop reason: HOSPADM

## 2020-09-21 RX ORDER — DEXMEDETOMIDINE HYDROCHLORIDE 4 UG/ML
0-.7 INJECTION, SOLUTION INTRAVENOUS CONTINUOUS
Status: DISCONTINUED | OUTPATIENT
Start: 2020-09-21 | End: 2020-09-23

## 2020-09-21 RX ADMIN — FAMOTIDINE 20 MG: 20 TABLET, FILM COATED ORAL at 17:55

## 2020-09-21 RX ADMIN — PROPOFOL 25 MCG/KG/MIN: 10 INJECTION, EMULSION INTRAVENOUS at 05:02

## 2020-09-21 RX ADMIN — SULFAMETHOXAZOLE AND TRIMETHOPRIM 3 TABLET: 800; 160 TABLET ORAL at 05:05

## 2020-09-21 RX ADMIN — ATORVASTATIN CALCIUM 20 MG: 20 TABLET, FILM COATED ORAL at 05:05

## 2020-09-21 RX ADMIN — PROPOFOL 15 MCG/KG/MIN: 10 INJECTION, EMULSION INTRAVENOUS at 00:35

## 2020-09-21 RX ADMIN — ASPIRIN 81 MG 81 MG: 81 TABLET ORAL at 05:04

## 2020-09-21 RX ADMIN — POLYETHYLENE GLYCOL 3350 1 PACKET: 17 POWDER, FOR SOLUTION ORAL at 09:15

## 2020-09-21 RX ADMIN — FAMOTIDINE 20 MG: 20 TABLET, FILM COATED ORAL at 05:03

## 2020-09-21 RX ADMIN — ENOXAPARIN SODIUM 150 MG: 150 INJECTION SUBCUTANEOUS at 05:03

## 2020-09-21 RX ADMIN — Medication 2 MG/HR: at 17:49

## 2020-09-21 RX ADMIN — Medication 2 MG/HR: at 11:50

## 2020-09-21 RX ADMIN — HYDROMORPHONE HYDROCHLORIDE 0.75 MG: 1 INJECTION, SOLUTION INTRAMUSCULAR; INTRAVENOUS; SUBCUTANEOUS at 10:16

## 2020-09-21 RX ADMIN — DOCUSATE SODIUM 50 MG AND SENNOSIDES 8.6 MG 2 TABLET: 8.6; 5 TABLET, FILM COATED ORAL at 17:55

## 2020-09-21 RX ADMIN — DOCUSATE SODIUM 50 MG AND SENNOSIDES 8.6 MG 2 TABLET: 8.6; 5 TABLET, FILM COATED ORAL at 05:04

## 2020-09-21 RX ADMIN — Medication 1.5 MG: at 22:29

## 2020-09-21 RX ADMIN — ENOXAPARIN SODIUM 150 MG: 150 INJECTION SUBCUTANEOUS at 17:55

## 2020-09-21 RX ADMIN — SULFAMETHOXAZOLE AND TRIMETHOPRIM 3 TABLET: 800; 160 TABLET ORAL at 00:35

## 2020-09-21 ASSESSMENT — PAIN DESCRIPTION - PAIN TYPE
TYPE: ACUTE PAIN

## 2020-09-21 ASSESSMENT — FIBROSIS 4 INDEX: FIB4 SCORE: 3.86

## 2020-09-21 NOTE — CARE PLAN
Problem: Safety  Goal: Will remain free from injury  Outcome: PROGRESSING AS EXPECTED  Goal: Will remain free from falls  Outcome: PROGRESSING AS EXPECTED     Problem: Bowel/Gastric:  Goal: Normal bowel function is maintained or improved  Outcome: PROGRESSING AS EXPECTED     Problem: Fluid Volume:  Goal: Will maintain balanced intake and output  Outcome: PROGRESSING AS EXPECTED     Problem: Respiratory:  Goal: Respiratory status will improve  Outcome: PROGRESSING AS EXPECTED     Problem: Skin Integrity  Goal: Risk for impaired skin integrity will decrease  Outcome: PROGRESSING AS EXPECTED

## 2020-09-21 NOTE — RESPIRATORY CARE
PLEASE DISREGARD ABG DRAWN AT 0228, iSTAT CARTRIDGE  LAB ERROR!!    0238 ABG IS ACCURATE THANK YOU        Results for JAELYN TAYLOR (MRN [id number]) as of 9/21/2020 03:04   Ref. Range 9/21/2020 02:28 9/21/2020 02:38   Ph Latest Ref Range: 7.400 - 7.500  7.328 (L) 7.334 (L)   Pco2 Latest Ref Range: 26.0 - 37.0 mmHg 55.4 (HH) 48.5 (H)   Po2 Latest Ref Range: 64 - 87 mmHg 242 (H) 80   Hco3 Latest Ref Range: 17.0 - 25.0 mmol/L 29.1 (H) 25.8 (H)   BE Latest Ref Range: -4 - 3 mmol/L 2 -1   Tco2 Latest Ref Range: 20 - 33 mmol/L 31 27   S02 Latest Ref Range: 93 - 99 % 100 (H) 95   Ph Temp [place] Latest Ref Range: 7.400 - 7.500  7.328 (L) 7.334 (L)   Pco2 Temp Co Latest Ref Range: 26.0 - 37.0 mmHg 55.4 (HH) 48.5 (H)   Po2 Temp Cor Latest Ref Range: 64 - 87 mmHg 242 (H) 80

## 2020-09-21 NOTE — CARE PLAN
Problem: Communication  Goal: The ability to communicate needs accurately and effectively will improve  Outcome: PROGRESSING AS EXPECTED     Problem: Safety  Goal: Will remain free from injury  Outcome: PROGRESSING AS EXPECTED  Goal: Will remain free from falls  Outcome: PROGRESSING AS EXPECTED     Problem: Infection  Goal: Will remain free from infection  Outcome: PROGRESSING AS EXPECTED     Problem: Bowel/Gastric:  Goal: Normal bowel function is maintained or improved  Outcome: PROGRESSING AS EXPECTED  Goal: Will not experience complications related to bowel motility  Outcome: PROGRESSING AS EXPECTED     Problem: Knowledge Deficit  Goal: Knowledge of disease process/condition, treatment plan, diagnostic tests, and medications will improve  Outcome: PROGRESSING AS EXPECTED

## 2020-09-21 NOTE — DISCHARGE PLANNING
Care Transition Team Discharge Planning    Anticipated Discharge Disposition: TBD    Action: Per AM rounds, pt has hx of co-morbidities. Pt is able to grimace in pain, withdrawals from pain in lower extremities, and becomes agitated at times. Pt has recovered from his low grade fever over the last few days. Pt has a skin rash from ABX.     Barriers to Discharge: TBD    Plan: f/u w/ medical team, etc.

## 2020-09-21 NOTE — CARE PLAN
Ventilator Daily Summary    Vent Day # 24    Ventilator settings changed this shift:None    Weaning trials: PEEP +14    Respiratory Procedures: none    Plan: Continue current ventilator settings and wean mechanical ventilation as tolerated per physician orders.

## 2020-09-21 NOTE — PROGRESS NOTES
12h Chart Check    Monitor Summary - Sinus rhythm/tachycardia, occasional PVC's; HR 80-100s; .18/.10/.36

## 2020-09-21 NOTE — PROGRESS NOTES
Critical Care Progress Note    Date of admission  8/27/2020    Chief Complaint  60 y.o. male admitted 8/27/2020 with respiratory failure and pneumonia due to SARS-CoV-2.    Hospital Course     8/29-worsening mental status, rapid shallow breathing and hypoxia required endotracheal intubation and prone positioning.  8/30-I spoke with Brittney, the patient's daughter and surrogate decision maker about initiating Remdesivir.  We discussed the risks benefits and alternatives.  All questions were answered.  She consented to initiating Remdesivir today.  8/31 -art line placed, remains on PCV, transition off rocuronium drip as tolerated, propofol/fentanyl, continue prone protocol  9/1 - remains on pressure control ventilation, PEEP 19, improved P/F ratio with prone ventilation, off rocuronium, starting trophic tube feed, full dose anticoagulation with Lovenox, day 3/5 Remdesivir, day 5 dexamethasone  9/4 - stopped prone ventilation with some skin breakdown, difficult prone protocol with morbid obesity, full ventilator support, advancing nutrition  9/5 - Ongoing diuresis with 3 times daily Lasix, advancing tube feeds to goal, remains on supine ventilation now  9/6 - PCV, weaning IP and PEEP, remains supine, ongoing aggressive diuresis, abdi TF, lighten sedation as abdi  9/7 Switch to VC, switched from fentanyl to Dilaudid with improved ventilator synchrony, decrease Lasix to daily; stopped amlodipine as he is now on norepinephrine to achieve a map greater than 65  9/8 continue full vent support, compliance gradually improving  9/10 Febrile: urine, blood, sputum cultures, continue full ventilator support  9/11 febrile, increased NE, urine culture, MRSA nare, then begin zosyn  9/12 UA + for enterococcus; resp culture + Non-lactose fermenting GNR  - currently on zosyn; will await sensitivities   9/13 continues on PEEP 16, on Zosyn for Serratia and enterococcus  9/14 -    change Zosyn to Rocephin.  Continue full anticoagulation.   Continue vent support.  Off norepinephrine.  9/15 -    continue full vent support.  Force diuresis with chlorothiazide.  Continue Lovenox.  9/16 -    change Rocephin back to Zosyn due to fever.  Culture blood, sputum and check UA.  Change to ASV mode of ventilation to help with vent synchrony.  Forced diuresis with chlorothiazide.  9/17 -    continue Zosyn.  Continue vent support.  Force diuresis with chlorothiazide.  Full anticoagulation with Lovenox.  9/18 -    increase free water for hypernatremia.  Forced diuresis with Diamox.  Change Zosyn to Bactrim due to eosinophilia and rash.  Continue vent support.  9/19 -    decrease free water.  Continue Bactrim.  Continue vent support.  9/20 -    stop free water.  Force diuresis.  Continue Bactrim.  Decrease PEEP.  Continue vent support.     9/21- PEEP weaned down from 16 to 10 today, still on 40 percent, negative acute CT of head, remains encephalopathic on minimal sedation, diureses continues, Bactrim stopped (high K)    Interval Problem Update  Reviewed last 24 hour events:    No major interval changes except decreased PEEP, poor mental status continues    Grimaces, opens eyes spont with SAT  PERRL  ST    TF OK  Good UOP  Stop free water  Vent 23  PEEP 16   40%  ASV at 160%  Decrease PEEP to 14  Bactrim  Give Lasix 40      Review of Systems  Review of Systems   Unable to perform ROS: Acuity of condition        Vital Signs for last 24 hours   Temp:  [36.9 °C (98.4 °F)-37.5 °C (99.5 °F)] 37.5 °C (99.5 °F)  Pulse:  [] 105  Resp:  [14-46] 33  BP: (103-138)/(44-68) 107/57  SpO2:  [87 %-100 %] 87 %    Hemodynamic parameters for last 24 hours       Respiratory Information for the last 24 hours  Vent Mode: ASV  PEEP/CPAP: 10  MAP: 17  Control VTE (exp VT): 342    Physical Exam   Physical Exam  Constitutional:       Appearance: He is not diaphoretic.      Comments: On ventilator   HENT:      Head: Normocephalic and atraumatic.      Right Ear: External ear normal.       Left Ear: External ear normal.      Nose: Nose normal.      Mouth/Throat:      Mouth: Mucous membranes are moist.      Pharynx: Oropharynx is clear.   Eyes:      General:         Right eye: No discharge.         Left eye: No discharge.      Pupils: Pupils are equal, round, and reactive to light.   Neck:      Musculoskeletal: Normal range of motion and neck supple.   Cardiovascular:      Pulses: Normal pulses.      Heart sounds: No murmur. No friction rub.      Comments: Sinus rhythm  Pulmonary:      Breath sounds: Rales (Unchanged crackles) present. No wheezing.   Abdominal:      General: Bowel sounds are normal. There is no distension.      Palpations: Abdomen is soft.      Tenderness: There is no abdominal tenderness. There is no rebound.      Comments: Tolerating enteral tube feedings   Musculoskeletal: Normal range of motion.         General: Swelling present.      Right lower leg: Edema present.      Left lower leg: Edema present.      Comments: No clubbing or cyanosis   Skin:     General: Skin is warm and dry.      Capillary Refill: Capillary refill takes less than 2 seconds.   Neurological:      Comments: Pupils 4 mm and briskly react to light.  He will open his eyes and grimace.  He does not track or follow.         Medications  Current Facility-Administered Medications   Medication Dose Route Frequency Provider Last Rate Last Dose   • HYDROmorphone pf (DILAUDID) injection 0.75 mg  0.75 mg Intravenous Q15 MIN PRN Jorge Colindres M.D.        And   • HYDROmorphone (DILAUDID) injection 1.5 mg  1.5 mg Intravenous Q15 MIN PRN Jorge Colindres M.D.        And   • dexmedetomidine (PRECEDEX) 400 mcg/100mL NS premix infusion  0-0.7 mcg/kg/hr Intravenous Continuous Jorge Colindres M.D.   Stopped at 09/21/20 1115   • HYDROmorphone (DILAUDID) 0.2 mg/mL in 50mL NS (Continuous Infusion)   Intravenous Continuous Jorge Colindres M.D. 10 mL/hr at 09/21/20 1150 2 mg/hr at 09/21/20 1150   • amLODIPine (NORVASC)  tablet 10 mg  10 mg Enteral Tube Q DAY Aguilar Marquez M.D.   Stopped at 09/21/20 0600   • atorvastatin (LIPITOR) tablet 20 mg  20 mg Enteral Tube DAILY Nii Rivera M.D.   20 mg at 09/21/20 0505   • ondansetron (ZOFRAN ODT) dispertab 4 mg  4 mg Enteral Tube Q4HRS PRN Nii Rivera M.D.       • Pharmacy Consult: Enteral tube insertion - review meds/change route/product selection  1 Each Other PHARMACY TO DOSE Nii Rivera M.D.       • aspirin (ASA) chewable tab 81 mg  81 mg Enteral Tube DAILY Nii Rivera M.D.   81 mg at 09/21/20 0504   • acetaminophen (TYLENOL) tablet 650 mg  650 mg Enteral Tube Q6HRS PRN Nii Rivera M.D.   650 mg at 09/19/20 0110   • promethazine (PHENERGAN) tablet 12.5-25 mg  12.5-25 mg Enteral Tube Q4HRS PRN Nii Rivera M.D.       • enoxaparin (LOVENOX) injection 150 mg  150 mg Subcutaneous Q12HRS Harjinder Crowe M.D.   150 mg at 09/21/20 0503   • Respiratory Therapy Consult   Nebulization Continuous RT Harjinder Crowe M.D.       • ipratropium-albuterol (DUONEB) nebulizer solution  3 mL Nebulization Q2HRS PRN (RT) Harjinder Crowe M.D.       • famotidine (PEPCID) tablet 20 mg  20 mg Enteral Tube Q12HRS Nii Rivera M.D.   20 mg at 09/21/20 0503   • senna-docusate (PERICOLACE or SENOKOT S) 8.6-50 MG per tablet 2 Tab  2 Tab Enteral Tube BID Harjinder Crowe M.D.   2 Tab at 09/21/20 0504    And   • polyethylene glycol/lytes (MIRALAX) PACKET 1 Packet  1 Packet Enteral Tube QDAY PRN Harjinder Crowe M.D.   1 Packet at 09/21/20 0915    And   • magnesium hydroxide (MILK OF MAGNESIA) suspension 30 mL  30 mL Enteral Tube QDAY PRN Harjinder Crowe M.D.   30 mL at 09/19/20 0541    And   • bisacodyl (DULCOLAX) suppository 10 mg  10 mg Rectal QDAY PRN Harjinder Crowe M.D.   Stopped at 09/18/20 0524   • MD Alert...ICU Electrolyte Replacement per Pharmacy   Other PHARMACY TO DOSE Harjinder Crowe M.D.       • lidocaine (XYLOCAINE) 1 % injection 1-2 mL  1-2 mL Tracheal Tube Q30 MIN PRN  Harjinder Crowe M.D.       • labetalol (NORMODYNE/TRANDATE) injection 10-20 mg  10-20 mg Intravenous Q4HRS PRN Nii Rivera M.D.       • ondansetron (ZOFRAN) syringe/vial injection 4 mg  4 mg Intravenous Q4HRS PRN Nii Rivera M.D.   4 mg at 08/29/20 0552   • promethazine (PHENERGAN) suppository 12.5-25 mg  12.5-25 mg Rectal Q4HRS PRN Nii Rivera M.D.       • prochlorperazine (COMPAZINE) injection 5-10 mg  5-10 mg Intravenous Q4HRS PRN Nii Rivera M.D.           Fluids    Intake/Output Summary (Last 24 hours) at 9/21/2020 1643  Last data filed at 9/21/2020 1000  Gross per 24 hour   Intake 1805.45 ml   Output 2150 ml   Net -344.55 ml       Laboratory  Recent Labs     09/20/20  0305 09/21/20  0228 09/21/20  0238   ISTATAPH 7.321* 7.328* 7.334*   ISTATAPCO2 51.1* 55.4* 48.5*   ISTATAPO2 71 242* 80   ISTATATCO2 28 31 27   [id number] 92* 100* 95   ISTATARTHCO3 26.4* 29.1* 25.8*   ISTATARTBE 0 2 -1   ISTATTEMP 37.7 C 37.0 C 37.0 C   ISTATFIO2 40 40 40   ISTATSPEC Arterial Arterial Arterial   ISTATAPHTC 7.311* 7.328* 7.334*   [id number] 75 242* 80         Recent Labs     09/19/20  0347 09/20/20  0610 09/21/20  0520   SODIUM 141 135 132*   POTASSIUM 4.8 5.4 5.7*   CHLORIDE 105 101 98   CO2 27 23 23   BUN 31* 28* 34*   CREATININE 0.34* 0.36* 0.43*   CALCIUM 8.7 8.3* 8.5     Recent Labs     09/19/20  0347 09/20/20  0610 09/21/20  0520   ALTSGPT  --   --  103*   ASTSGOT  --   --  165*   ALKPHOSPHAT  --   --  184*   TBILIRUBIN  --   --  0.6   GLUCOSE 106* 105* 98     Recent Labs     09/19/20 0347 09/20/20  0610 09/21/20  0520   WBC 4.8 5.0 7.3   NEUTSPOLYS 56.40 53.20 36.00*   LYMPHOCYTES 15.50* 4.80* 27.20   MONOCYTES 8.70 7.10 13.20   EOSINOPHILS 15.70* 23.00* 21.10*   BASOPHILS 0.60 1.60 0.00   ASTSGOT  --   --  165*   ALTSGPT  --   --  103*   ALKPHOSPHAT  --   --  184*   TBILIRUBIN  --   --  0.6     Recent Labs     09/19/20 0347 09/20/20  0610 09/21/20  0520   RBC 4.08* 4.07* 4.07*   HEMOGLOBIN 12.5* 12.6*  12.7*   HEMATOCRIT 41.4* 40.3* 40.6*   PLATELETCT 238 219 244       Imaging  X-Ray:  I have personally reviewed the images and compared with prior images. and My impression is: Very slightly improved bilateral opacities    Assessment/Plan  * Pneumonia due to severe acute respiratory syndrome coronavirus 2 (SARS-CoV-2)  Assessment & Plan  S/P remdesivir from 8/30-9/3  S/P dexamethasone, 6 mg daily for 10 days  Continue full anticoagulation with Lovenox    Acute hypoxemic respiratory failure (HCC)- (present on admission)  Assessment & Plan  Intubated 8/29  All of the appropriate ventilator bundles are in place  Proning discontinued due to skin breakdown/injury  Continue vent support  Decrease PEEP from 16-14  Force diuresis with furosemide    PEEP down to 10  Qualifies for SBP but mental status too poor for extubation    Pulmonary hypertension (HCC)  Assessment & Plan  RVSP 60 mmHg    Pneumonia  Assessment & Plan  Sputum with Serratia marcescens  Continue Bactrim  UA unremarkable  Blood cultures negative from 9/16 so far    Bactrim stopped today with rising K (received all but last day of scheduled therapy)    Septic shock due to undetermined organism (HCC)  Assessment & Plan  Shock has resolved - off vasopressor support  Continue antibiotics    Controlled type 2 diabetes mellitus, without long-term current use of insulin (HCC)- (present on admission)  Assessment & Plan  Glycohemoglobin 6.5  Glucose controlled    Essential hypertension- (present on admission)  Assessment & Plan  Continue amlodipine, 10 mg daily    Morbid obesity with BMI of 50.0-59.9, adult (HCC)- (present on admission)  Assessment & Plan  BMI 53  Behavioral modification and nutrition counseling when clinically appropriate    ABELARDO (obstructive sleep apnea)- (present on admission)  Assessment & Plan  PSG on 1/29/2019 revealed AHI of 101.9 with minimum oxygen saturation of 50%    HLD (hyperlipidemia)- (present on admission)  Assessment & Plan  Continue  statin       VTE:  Lovenox  Ulcer: H2 Antagonist  Lines: Central Line  Ongoing indication addressed, Arterial Line  Ongoing indication addressed and Briseno Catheter  Ongoing indication addressed    I have performed a physical exam and reviewed and updated ROS and Plan today (9/21/2020). In review of yesterday's note (9/20/2020), there are no changes except as documented above.     I have assessed and reassessed his respiratory status with ventilator adjustments, airway mechanics, ventilator waveforms, hemodynamics, blood pressure, cardiovascular status and his neurologic status.  He is at increased risk for worsening cardiovascular, respiratory and CNS system dysfunction.    Discussed patient condition and risk of morbidity and/or mortality with RN, RT, Pharmacy, Charge nurse / hot rounds and QA team     The patient remains critically ill.  Critical care time = 30 minutes in directly providing and coordinating critical care and extensive data review.  No time overlap and excludes procedures.

## 2020-09-22 ENCOUNTER — APPOINTMENT (OUTPATIENT)
Dept: RADIOLOGY | Facility: MEDICAL CENTER | Age: 60
DRG: 004 | End: 2020-09-22
Attending: INTERNAL MEDICINE
Payer: MEDICARE

## 2020-09-22 LAB
ACTION RANGE TRIGGERED IACRT: YES
AMMONIA PLAS-SCNC: 57 UMOL/L (ref 11–45)
ANION GAP SERPL CALC-SCNC: 10 MMOL/L (ref 7–16)
ANION GAP SERPL CALC-SCNC: 8 MMOL/L (ref 7–16)
ANISOCYTOSIS BLD QL SMEAR: ABNORMAL
APPEARANCE UR: CLEAR
BACTERIA #/AREA URNS HPF: NEGATIVE /HPF
BASE EXCESS BLDA CALC-SCNC: 4 MMOL/L (ref -4–3)
BASOPHILS # BLD AUTO: 0.8 % (ref 0–1.8)
BASOPHILS # BLD: 0.08 K/UL (ref 0–0.12)
BILIRUB UR QL STRIP.AUTO: NEGATIVE
BODY TEMPERATURE: ABNORMAL DEGREES
BUN SERPL-MCNC: 40 MG/DL (ref 8–22)
BUN SERPL-MCNC: 40 MG/DL (ref 8–22)
CA-I BLD ISE-SCNC: 1.29 MMOL/L (ref 1.1–1.3)
CALCIUM SERPL-MCNC: 8.4 MG/DL (ref 8.5–10.5)
CALCIUM SERPL-MCNC: 8.6 MG/DL (ref 8.5–10.5)
CHLORIDE SERPL-SCNC: 100 MMOL/L (ref 96–112)
CHLORIDE SERPL-SCNC: 100 MMOL/L (ref 96–112)
CK SERPL-CCNC: 197 U/L (ref 0–154)
CO2 BLDA-SCNC: 34 MMOL/L (ref 20–33)
CO2 SERPL-SCNC: 25 MMOL/L (ref 20–33)
CO2 SERPL-SCNC: 27 MMOL/L (ref 20–33)
COLOR UR: YELLOW
CREAT SERPL-MCNC: 0.47 MG/DL (ref 0.5–1.4)
CREAT SERPL-MCNC: 0.48 MG/DL (ref 0.5–1.4)
EKG IMPRESSION: NORMAL
EOSINOPHIL # BLD AUTO: 1.19 K/UL (ref 0–0.51)
EOSINOPHIL NFR BLD: 11.9 % (ref 0–6.9)
EPI CELLS #/AREA URNS HPF: NEGATIVE /HPF
ERYTHROCYTE [DISTWIDTH] IN BLOOD BY AUTOMATED COUNT: 58.3 FL (ref 35.9–50)
GLUCOSE BLD-MCNC: 113 MG/DL (ref 65–99)
GLUCOSE BLD-MCNC: 114 MG/DL (ref 65–99)
GLUCOSE BLD-MCNC: 132 MG/DL (ref 65–99)
GLUCOSE BLD-MCNC: 179 MG/DL (ref 65–99)
GLUCOSE SERPL-MCNC: 124 MG/DL (ref 65–99)
GLUCOSE SERPL-MCNC: 131 MG/DL (ref 65–99)
GLUCOSE UR STRIP.AUTO-MCNC: NEGATIVE MG/DL
GRAM STN SPEC: NORMAL
HCO3 BLDA-SCNC: 31.7 MMOL/L (ref 17–25)
HCT VFR BLD AUTO: 43.5 % (ref 42–52)
HCT VFR BLD CALC: 40 % (ref 42–52)
HGB BLD-MCNC: 13.4 G/DL (ref 14–18)
HGB BLD-MCNC: 13.6 G/DL (ref 14–18)
HOROWITZ INDEX BLDA+IHG-RTO: 145 MM[HG]
HYALINE CASTS #/AREA URNS LPF: ABNORMAL /LPF
INST. QUALIFIED PATIENT IIQPT: YES
KETONES UR STRIP.AUTO-MCNC: NEGATIVE MG/DL
LEUKOCYTE ESTERASE UR QL STRIP.AUTO: NEGATIVE
LYMPHOCYTES # BLD AUTO: 1.86 K/UL (ref 1–4.8)
LYMPHOCYTES NFR BLD: 18.6 % (ref 22–41)
MACROCYTES BLD QL SMEAR: ABNORMAL
MANUAL DIFF BLD: NORMAL
MCH RBC QN AUTO: 31 PG (ref 27–33)
MCHC RBC AUTO-ENTMCNC: 30.8 G/DL (ref 33.7–35.3)
MCV RBC AUTO: 100.7 FL (ref 81.4–97.8)
METAMYELOCYTES NFR BLD MANUAL: 1.7 %
MICRO URNS: ABNORMAL
MONOCYTES # BLD AUTO: 0.68 K/UL (ref 0–0.85)
MONOCYTES NFR BLD AUTO: 6.8 % (ref 0–13.4)
MORPHOLOGY BLD-IMP: NORMAL
MYELOCYTES NFR BLD MANUAL: 4.2 %
NEUTROPHILS # BLD AUTO: 5.59 K/UL (ref 1.82–7.42)
NEUTROPHILS NFR BLD: 54.2 % (ref 44–72)
NEUTS BAND NFR BLD MANUAL: 1.7 % (ref 0–10)
NITRITE UR QL STRIP.AUTO: NEGATIVE
NRBC # BLD AUTO: 0.17 K/UL
NRBC BLD-RTO: 1.7 /100 WBC
O2/TOTAL GAS SETTING VFR VENT: 40 %
PCO2 BLDA: 61.7 MMHG (ref 26–37)
PCO2 TEMP ADJ BLDA: 64.5 MMHG (ref 26–37)
PH BLDA: 7.32 [PH] (ref 7.4–7.5)
PH TEMP ADJ BLDA: 7.3 [PH] (ref 7.4–7.5)
PH UR STRIP.AUTO: 5 [PH] (ref 5–8)
PLATELET # BLD AUTO: 284 K/UL (ref 164–446)
PLATELET BLD QL SMEAR: NORMAL
PMV BLD AUTO: 10.6 FL (ref 9–12.9)
PO2 BLDA: 58 MMHG (ref 64–87)
PO2 TEMP ADJ BLDA: 62 MMHG (ref 64–87)
POTASSIUM BLD-SCNC: 6.1 MMOL/L (ref 3.6–5.5)
POTASSIUM SERPL-SCNC: 5.5 MMOL/L (ref 3.6–5.5)
POTASSIUM SERPL-SCNC: 6.2 MMOL/L (ref 3.6–5.5)
POTASSIUM SERPL-SCNC: 6.4 MMOL/L (ref 3.6–5.5)
PROCALCITONIN SERPL-MCNC: 0.14 NG/ML
PROT UR QL STRIP: NEGATIVE MG/DL
RBC # BLD AUTO: 4.32 M/UL (ref 4.7–6.1)
RBC # URNS HPF: ABNORMAL /HPF
RBC BLD AUTO: PRESENT
RBC UR QL AUTO: ABNORMAL
SAO2 % BLDA: 86 % (ref 93–99)
SIGNIFICANT IND 70042: NORMAL
SITE SITE: NORMAL
SODIUM BLD-SCNC: 138 MMOL/L (ref 135–145)
SODIUM SERPL-SCNC: 133 MMOL/L (ref 135–145)
SODIUM SERPL-SCNC: 137 MMOL/L (ref 135–145)
SOURCE SOURCE: NORMAL
SP GR UR STRIP.AUTO: 1.01
SPECIMEN DRAWN FROM PATIENT: ABNORMAL
UROBILINOGEN UR STRIP.AUTO-MCNC: 0.2 MG/DL
WBC # BLD AUTO: 10 K/UL (ref 4.8–10.8)
WBC #/AREA URNS HPF: ABNORMAL /HPF

## 2020-09-22 PROCEDURE — 700105 HCHG RX REV CODE 258: Performed by: INTERNAL MEDICINE

## 2020-09-22 PROCEDURE — 87040 BLOOD CULTURE FOR BACTERIA: CPT

## 2020-09-22 PROCEDURE — 700101 HCHG RX REV CODE 250: Performed by: INTERNAL MEDICINE

## 2020-09-22 PROCEDURE — 700111 HCHG RX REV CODE 636 W/ 250 OVERRIDE (IP): Performed by: INTERNAL MEDICINE

## 2020-09-22 PROCEDURE — 87077 CULTURE AEROBIC IDENTIFY: CPT

## 2020-09-22 PROCEDURE — 93005 ELECTROCARDIOGRAM TRACING: CPT | Performed by: INTERNAL MEDICINE

## 2020-09-22 PROCEDURE — 82140 ASSAY OF AMMONIA: CPT

## 2020-09-22 PROCEDURE — 700102 HCHG RX REV CODE 250 W/ 637 OVERRIDE(OP): Performed by: INTERNAL MEDICINE

## 2020-09-22 PROCEDURE — 85014 HEMATOCRIT: CPT

## 2020-09-22 PROCEDURE — A9270 NON-COVERED ITEM OR SERVICE: HCPCS | Performed by: INTERNAL MEDICINE

## 2020-09-22 PROCEDURE — 82550 ASSAY OF CK (CPK): CPT

## 2020-09-22 PROCEDURE — 85007 BL SMEAR W/DIFF WBC COUNT: CPT

## 2020-09-22 PROCEDURE — 4A10X4Z MONITORING OF CENTRAL NERVOUS ELECTRICAL ACTIVITY, EXTERNAL APPROACH: ICD-10-PCS | Performed by: PSYCHIATRY & NEUROLOGY

## 2020-09-22 PROCEDURE — 85027 COMPLETE CBC AUTOMATED: CPT

## 2020-09-22 PROCEDURE — 84145 PROCALCITONIN (PCT): CPT

## 2020-09-22 PROCEDURE — 87070 CULTURE OTHR SPECIMN AEROBIC: CPT

## 2020-09-22 PROCEDURE — 95816 EEG AWAKE AND DROWSY: CPT | Performed by: PSYCHIATRY & NEUROLOGY

## 2020-09-22 PROCEDURE — 82962 GLUCOSE BLOOD TEST: CPT | Mod: 91

## 2020-09-22 PROCEDURE — 81001 URINALYSIS AUTO W/SCOPE: CPT

## 2020-09-22 PROCEDURE — 87186 SC STD MICRODIL/AGAR DIL: CPT

## 2020-09-22 PROCEDURE — 700105 HCHG RX REV CODE 258

## 2020-09-22 PROCEDURE — 99223 1ST HOSP IP/OBS HIGH 75: CPT | Performed by: INTERNAL MEDICINE

## 2020-09-22 PROCEDURE — 95816 EEG AWAKE AND DROWSY: CPT | Mod: 26 | Performed by: PSYCHIATRY & NEUROLOGY

## 2020-09-22 PROCEDURE — 82330 ASSAY OF CALCIUM: CPT

## 2020-09-22 PROCEDURE — 71045 X-RAY EXAM CHEST 1 VIEW: CPT

## 2020-09-22 PROCEDURE — 84295 ASSAY OF SERUM SODIUM: CPT

## 2020-09-22 PROCEDURE — 93010 ELECTROCARDIOGRAM REPORT: CPT | Performed by: INTERNAL MEDICINE

## 2020-09-22 PROCEDURE — 94003 VENT MGMT INPAT SUBQ DAY: CPT

## 2020-09-22 PROCEDURE — 87205 SMEAR GRAM STAIN: CPT

## 2020-09-22 PROCEDURE — 82803 BLOOD GASES ANY COMBINATION: CPT

## 2020-09-22 PROCEDURE — 84132 ASSAY OF SERUM POTASSIUM: CPT | Mod: 91

## 2020-09-22 PROCEDURE — 80048 BASIC METABOLIC PNL TOTAL CA: CPT

## 2020-09-22 PROCEDURE — 94770 HCHG CO2 EXPIRED GAS DETERMINATION: CPT

## 2020-09-22 PROCEDURE — 770022 HCHG ROOM/CARE - ICU (200)

## 2020-09-22 RX ORDER — FUROSEMIDE 10 MG/ML
40 INJECTION INTRAMUSCULAR; INTRAVENOUS ONCE
Status: COMPLETED | OUTPATIENT
Start: 2020-09-22 | End: 2020-09-22

## 2020-09-22 RX ORDER — DEXTROSE MONOHYDRATE 25 G/50ML
50 INJECTION, SOLUTION INTRAVENOUS ONCE
Status: COMPLETED | OUTPATIENT
Start: 2020-09-22 | End: 2020-09-22

## 2020-09-22 RX ORDER — SODIUM CHLORIDE, SODIUM LACTATE, POTASSIUM CHLORIDE, AND CALCIUM CHLORIDE .6; .31; .03; .02 G/100ML; G/100ML; G/100ML; G/100ML
1000 INJECTION, SOLUTION INTRAVENOUS ONCE
Status: COMPLETED | OUTPATIENT
Start: 2020-09-22 | End: 2020-09-22

## 2020-09-22 RX ORDER — LACTULOSE 20 G/30ML
30 SOLUTION ORAL 2 TIMES DAILY
Status: DISCONTINUED | OUTPATIENT
Start: 2020-09-22 | End: 2020-09-28 | Stop reason: HOSPADM

## 2020-09-22 RX ORDER — SODIUM CHLORIDE 9 MG/ML
INJECTION, SOLUTION INTRAVENOUS
Status: COMPLETED
Start: 2020-09-22 | End: 2020-09-22

## 2020-09-22 RX ADMIN — SODIUM CHLORIDE, POTASSIUM CHLORIDE, SODIUM LACTATE AND CALCIUM CHLORIDE 1000 ML: 600; 310; 30; 20 INJECTION, SOLUTION INTRAVENOUS at 14:22

## 2020-09-22 RX ADMIN — ENOXAPARIN SODIUM 150 MG: 150 INJECTION SUBCUTANEOUS at 05:50

## 2020-09-22 RX ADMIN — ENOXAPARIN SODIUM 150 MG: 150 INJECTION SUBCUTANEOUS at 18:30

## 2020-09-22 RX ADMIN — Medication 1.5 MG: at 06:04

## 2020-09-22 RX ADMIN — FAMOTIDINE 20 MG: 20 TABLET, FILM COATED ORAL at 18:33

## 2020-09-22 RX ADMIN — SODIUM CHLORIDE 500 ML: 9 INJECTION, SOLUTION INTRAVENOUS at 13:40

## 2020-09-22 RX ADMIN — DEXTROSE MONOHYDRATE 50 ML: 25 INJECTION, SOLUTION INTRAVENOUS at 14:29

## 2020-09-22 RX ADMIN — HYDROMORPHONE HYDROCHLORIDE 0.75 MG: 1 INJECTION, SOLUTION INTRAMUSCULAR; INTRAVENOUS; SUBCUTANEOUS at 10:52

## 2020-09-22 RX ADMIN — LACTULOSE 30 ML: 20 SOLUTION ORAL at 16:27

## 2020-09-22 RX ADMIN — POLYETHYLENE GLYCOL 3350 1 PACKET: 17 POWDER, FOR SOLUTION ORAL at 10:12

## 2020-09-22 RX ADMIN — FUROSEMIDE 40 MG: 10 INJECTION, SOLUTION INTRAMUSCULAR; INTRAVENOUS at 15:05

## 2020-09-22 RX ADMIN — CALCIUM GLUCONATE 1 G: 98 INJECTION, SOLUTION INTRAVENOUS at 14:22

## 2020-09-22 RX ADMIN — ASPIRIN 81 MG 81 MG: 81 TABLET ORAL at 05:50

## 2020-09-22 RX ADMIN — DOCUSATE SODIUM 50 MG AND SENNOSIDES 8.6 MG 2 TABLET: 8.6; 5 TABLET, FILM COATED ORAL at 05:50

## 2020-09-22 RX ADMIN — ACETAMINOPHEN 650 MG: 325 TABLET, FILM COATED ORAL at 18:33

## 2020-09-22 RX ADMIN — FAMOTIDINE 20 MG: 20 TABLET, FILM COATED ORAL at 05:50

## 2020-09-22 RX ADMIN — FUROSEMIDE 40 MG: 10 INJECTION, SOLUTION INTRAMUSCULAR; INTRAVENOUS at 09:09

## 2020-09-22 RX ADMIN — Medication 2 MG/HR: at 12:45

## 2020-09-22 RX ADMIN — MAGNESIUM HYDROXIDE 30 ML: 400 SUSPENSION ORAL at 10:12

## 2020-09-22 RX ADMIN — ATORVASTATIN CALCIUM 20 MG: 20 TABLET, FILM COATED ORAL at 05:50

## 2020-09-22 RX ADMIN — Medication 2 MG/HR: at 17:12

## 2020-09-22 RX ADMIN — INSULIN HUMAN 10 UNITS: 100 INJECTION, SOLUTION PARENTERAL at 14:34

## 2020-09-22 RX ADMIN — Medication 1 MG: at 21:27

## 2020-09-22 RX ADMIN — AMLODIPINE BESYLATE 10 MG: 10 TABLET ORAL at 05:50

## 2020-09-22 RX ADMIN — VANCOMYCIN HYDROCHLORIDE 3 G: 500 INJECTION, POWDER, LYOPHILIZED, FOR SOLUTION INTRAVENOUS at 13:41

## 2020-09-22 RX ADMIN — ACETAMINOPHEN 650 MG: 325 TABLET, FILM COATED ORAL at 10:20

## 2020-09-22 ASSESSMENT — PAIN DESCRIPTION - PAIN TYPE
TYPE: ACUTE PAIN

## 2020-09-22 ASSESSMENT — FIBROSIS 4 INDEX: FIB4 SCORE: 3.32

## 2020-09-22 NOTE — PROCEDURES
ROUTINE ELECTROENCEPHALOGRAM REPORT      Referring provider: Dr. Colindres.     DOS: 9/22/2020 (total recording of 26 minutes)    INDICATION:  Rashi Shirley 60 y.o. male presenting with ams.     CURRENT ANTIEPILEPTIC REGIMEN: None.     TECHNIQUE: 30 channel routine electroencephalogram (EEG) was performed in accordance with the international 10-20 system. The study was reviewed in bipolar and referential montages. The recording examined the patient during wakeful and drowsy state(s).     DESCRIPTION OF THE RECORD:  During the wakefulness, the background showed a symmetrical 5-6 Hz theta activity posteriorly with amplitude of 70 mV.  There was reactivity to eye closure/opening.  A normal anterior-posterior gradient was noted with faster beta frequencies seen anteriorly.  During drowsiness, theta/delta frequencies were seen.    ACTIVATION PROCEDURES:   Intermittent Photic stimulation was performed in a stepwise fashion from 1 to 30 Hz and elicited a normal response (photic driving), most noticeable in the posterior leads.      ICTAL AND/OR INTERICTAL FINDINGS:   Frequent triphasic waves noted, briefly and intermittently exhibiting a periodic pattern. No regional slowing was seen during this routine study.  No seizures were reported or recorded during the study.     EKG: sampling of the EKG recording demonstrated sinus rhythm.       INTERPRETATION:  This is an abnormal routine EEG recording in the awake and drowsy state(s). A moderate, toxic / metabolic encephalopathy is suggested. No seizures captured. Clinical correlation is recommended.    Note: this EEG does not rule out epilepsy.  If the clinical suspicion remains high for seizures, a prolonged recording to capture clinical or subclinical events may be helpful.        Stas Carrillo MD   Epilepsy and Neurodiagnostics.   Clinical  of Neurology [facility].   Diplomate in Neurology, Epilepsy, and  Electrodiagnostic Medicine.   Office: [phone]  Fax: [phone]

## 2020-09-22 NOTE — CONSULTS
Infection Control Note    Consult: clearance of COVID isolation    Reason for admission is reviewed  Admit date 8/27/2020  Positive COVID 8/27  Intubated 8/29, today day 24 of mechanical ventilation     59yo male, BMI 52, DM, HTN admitted for acute hypoxic resp failure due to severe COVID pneumonia. Hospital course has been complicated by worsening resp failure, intubated 8/29, hospital acquired Serratia pneumonia 9/16 and acute metabolic encephalopathy. Pt had completed courses of remdesivir, dexamethasone, and antibiotics. He's on full anticoagulation and diuresis.      In the past 4 days, pt's resp status has been improving and low FIO2 at 40%, and PEEP was decreased to 10 on 9/21. Pt is tolerating with spontaneous breathing trial. Encephalopathy remains. Pt has low grade fever this am.   Briseno exchanged 9/13  No central line, only peripherals    Assessment  Severe COVID pneumonia  Acute hypoxic respiratory failure  On mechanical ventilation, day 24  ICU weakness  Acute metabolic encephalopathy  BMI 52    Pt overall is clinically improving, particularly from resp standpoint. However, he does have fever this am, though I feel the cause of fever is non-COVID related. Could likely be ventlator associated.    Plan:   Investigate causes of fever.  When patient is afebrile >24 hours, patient should no longer be infectious and we could clear from the isolation. Until then, please keep patient in isolation.     D/w Dr. Colindres, Critical Care  D/w Mari Cavanaugh, Infection Prevention    Chava Cash D.O.  CC/ID  Medical Director of Infection Prevention

## 2020-09-22 NOTE — PROGRESS NOTES
12h Chart Check     Monitor Summary - Sinus rhythm/sinus tachycardia, HR 80-100s; rare PVCs; .16/.08/.32

## 2020-09-22 NOTE — PROGRESS NOTES
12h Chart Check     Monitor Summary - sinus rhythm, rare/occasional PVC's, .16/.08/.32     Pt transported to CT on vent/monitor in bed with RN, RT, transporter this afternoon. No issues.

## 2020-09-22 NOTE — DIETARY
"Nutrition support weekly update:  Day  of admit.  Rashi Shirley is a 60 y.o. male with admitting DX of pneumonia and sepsis.  Tube feeding initiated on . Current TF via gastric Cortrak is Peptamen Intense VHP @ 70 mL/hr (rate with Propofol), providing 1680 kcal (+kcal from Propofol), 155 gm protein, 128 gm CHO, and 1411 mL of free water per day.      Propofol has been discontinued from the MAR - TF can advance towards final goal rate of 85 mL/hr.     Assessment:  Weight from today is 163.5 kg via bed scale, which is down 4.6 kg (10 lbs) from previous weeks wt of 168.1 kg via bed scale.  Suspect wt loss r/t fluid status - noted to be -2.4L in addition to 1+ pitting edema to BUE, 2+ pitting edema to BLE, mild edema noted to both eyes, and general edema to face.  Pt last given Lasix this AM.    Assessment:  Estimated Nutritional Needs based on:   Height: 177.8 cm (5' 10\")  Weight: (!) 163.5 kg (360 lb 7.2 oz)  Weight to Use in Calculations: 163.5 kg (360 lb 7.2 oz)  Ideal Body Weight: 75.3 kg (166 lb)    Calculation/Equation: MSJ x 1 = 2464 kcal.  PSU = 3010 kcal (65-70%: 7 - ) (VE: 16.0, Tmax over the past 24 hours: 38.1).  Total Calories / day: 1657 - 1883 (Calories / k - 25 IBW)  Total Grams Protein / day: 188+ (Grams Protein / k.5+ IBW)    Evaluation:   1. Pt has been tolerating TF @ rate with Propofol.   2. Vent day 25.  Respiratory status has been improving per MD progress notes.  Tolerating SBT.  3. Per MD progress note, pt had completed courses of remdesivir, dexamethasone, and antibiotics. He's on full anticoagulation and diuresis.    4. Labs: Sodium: 133, Glucose: 124, BUN: 40, Creat.: 0.47.  Triglycerides from : 277.  5. Prealb from  was 14.2, which has increased from 7.0 taken on .  CRP from  was 4.08, which has decreased from 13.29 on .  Correlation indicative of improvement in inflammation.   6. Meds: Pepcid, Electrolyte replacement, Senokot, " Dilaudid.  7. LBM: 9/19 - large, brown.   8. Current feeding remains appropriate - advance Peptamen Intense towards 85 mL/hr.    Malnutrition risk: No new risk identified at this time.     Recommendations/Plan:  1. Advance Peptamen Intense to final goal rate of 85 mL/hr.  2. Fluids per MD.  3. Continue to monitor weight.     RD follows.

## 2020-09-22 NOTE — RESPIRATORY CARE
Ventilator Daily Summary    Vent Day #25    Ventilator settings changed this shift:PEEP to 10    Weaning trials:No    Respiratory Procedures:None  Plan: Continue current ventilator settings and wean mechanical ventilation as tolerated per physician orders.

## 2020-09-22 NOTE — CONSULTS
[facility] INFECTIOUS DISEASES INPATIENT CONSULT NOTE     Date of Service: 9/22/2020    Consult Requested By: Jorge Colindres M.D.    Reason for Consultation: Fever, COVID-19    Chief Complaint: Fevers    History of Present Illness:     Rashi Shirley is a 60 y.o. male admitted 8/27/2020.  Patient with known history of morbid obesity, hyperlipidemia, diabetes, ABELARDO, noncompliance, chronic hypoxemic respiratory failure on 2 L nasal cannula oxygen, was initially admitted on 8/27 with worsening shortness of breath, found to be profoundly hypoxemic satting 50% on nonrebreather.  SARS-CoV-2 PCR was positive, patient was intubated.    Patient received a 5-day course of remdesivir 8/32 9/3, dexamethasone 10-day course. Patient was afebrile on admission, procalcitonin negative, did receive a course of ceftriaxone and azithromycin.    On 9/6, patient's fevers returned to 100.7.  Since then, patient continued to be persistently febrile.  UA showed mild pyuria, urine culture from the [facility] grew Enterococcus, tracheal aspirate grew Serratia.  Patient was started on IV Zosyn.  On 9/14, Zosyn was briefly changed to ceftriaxone.  Fevers continued throughout this period, but got as high as 102.4 on 9/16, thus ceftriaxone changed back to Zosyn on 9/16.  Repeat tracheal aspirate again grew Serratia.  Blood cultures from 9/16 negative.  Patient off pressors on 9/14.    Patient reported to develop eosinophilia and rash while on Zosyn, thus changed to Bactrim on 9/18.  Bactrim discontinued 9/21 due to hyperkalemia.  On 9/21, PEEP down from 16-10, on 40% FiO2.  Patient remains encephalopathic, negative CT head.    Patient has since continued to remain febrile overall, with low-grade fevers.  White count 10,000.  Given a dose of vancomycin today.    Review of Systems:  All other systems reviewed and are negative expect as noted in HPI    Past Medical History:   Diagnosis Date   • Chickenpox    • Diabetes (HCC)     borderline   •  [place] measles    • Hyperlipidemia    • Mumps    • Obesity    • Sleep apnea    • Tonsillitis        Past Surgical History:   Procedure Laterality Date   • COLONOSCOPY N/A 4/24/2019    Procedure: COLONOSCOPY;  Surgeon: Rashi Goldberg M.D.;  Location: SURGERY [facility];  Service: Gastroenterology   • GASTROSCOPY-ENDO N/A 4/24/2019    Procedure: GASTROSCOPY;  Surgeon: Rashi Goldberg M.D.;  Location: SURGERY [facility];  Service: Gastroenterology   • OTHER ORTHOPEDIC SURGERY      bilateral knee surgery       Family History   Problem Relation Age of Onset   • Diabetes Mother    • Diabetes Father    • Sleep Apnea Neg Hx        Social History     Socioeconomic History   • Marital status: Single     Spouse name: Not on file   • Number of children: Not on file   • Years of education: Not on file   • Highest education level: Not on file   Occupational History   • Not on file   Social Needs   • Financial resource strain: Not on file   • Food insecurity     Worry: Not on file     Inability: Not on file   • Transportation needs     Medical: Not on file     Non-medical: Not on file   Tobacco Use   • Smoking status: Never Smoker   • Smokeless tobacco: Never Used   Substance and Sexual Activity   • Alcohol use: Yes     Comment: occasional   • Drug use: No   • Sexual activity: Not on file   Lifestyle   • Physical activity     Days per week: Not on file     Minutes per session: Not on file   • Stress: Not on file   Relationships   • Social connections     Talks on phone: Not on file     Gets together: Not on file     Attends [employer] service: Not on file     Active member of club or organization: Not on file     Attends meetings of clubs or organizations: Not on file     Relationship status: Not on file   • Intimate partner violence     Fear of current or ex partner: Not on file     Emotionally abused: Not on file     Physically abused: Not on file     Forced sexual activity: Not on file   Other Topics Concern   •  Not on file   Social History Narrative   • Not on file       Allergies   Allergen Reactions   • Zosyn      Rash with eosinophilia - unknown for certain if secondary to zosyn but was getting zosyn at the time       Medications:    Current Facility-Administered Medications:   •  MD Alert...Vancomycin per Pharmacy, , Other, PHARMACY TO DOSE, Jorge Colindres M.D.  •  vancomycin (VANCOCIN) 3 g in  mL IVPB, 3 g, Intravenous, Once, Jorge Colindres M.D., Last Rate: 166.7 mL/hr at 09/22/20 1341, 3 g at 09/22/20 1341  •  HYDROmorphone pf (DILAUDID) injection 0.75 mg, 0.75 mg, Intravenous, Q15 MIN PRN, 0.75 mg at 09/22/20 1052 **AND** HYDROmorphone (DILAUDID) injection 1.5 mg, 1.5 mg, Intravenous, Q15 MIN PRN **AND** [DISCONTINUED] HYDROmorphone (DILAUDID) 1 mg/mL in 50mL NS (HIGH ALERT - Non-Standard Continuous Infusion Concentration), , Intravenous, Continuous **AND** dexmedetomidine (PRECEDEX) 400 mcg/100mL NS premix infusion, 0-0.7 mcg/kg/hr, Intravenous, Continuous, Jorge Colindres M.D., Stopped at 09/21/20 1115  •  HYDROmorphone (DILAUDID) 0.2 mg/mL in 50mL NS (Continuous Infusion), , Intravenous, Continuous, Jorge Colindres M.D., Last Rate: 10 mL/hr at 09/22/20 1245, 2 mg/hr at 09/22/20 1245  •  amLODIPine (NORVASC) tablet 10 mg, 10 mg, Enteral Tube, Q DAY, Aguilar Marquez M.D., 10 mg at 09/22/20 0550  •  ondansetron (ZOFRAN ODT) dispertab 4 mg, 4 mg, Enteral Tube, Q4HRS PRN, Nii Rivera M.D.  •  Pharmacy Consult: Enteral tube insertion - review meds/change route/product selection, 1 Each, Other, PHARMACY TO DOSE, Nii Rivera M.D.  •  aspirin (ASA) chewable tab 81 mg, 81 mg, Enteral Tube, DAILY, Nii Rivera M.D., 81 mg at 09/22/20 0550  •  acetaminophen (TYLENOL) tablet 650 mg, 650 mg, Enteral Tube, Q6HRS PRN, Nii Rivera M.D., 650 mg at 09/22/20 1020  •  promethazine (PHENERGAN) tablet 12.5-25 mg, 12.5-25 mg, Enteral Tube, Q4HRS PRN, Nii Rivera M.D.  •  enoxaparin (LOVENOX)  "injection 150 mg, 150 mg, Subcutaneous, Q12HRS, Harjinder Crowe M.D., 150 mg at 20 0550  •  Respiratory Therapy Consult, , Nebulization, Continuous RT, Harjinder Crowe M.D.  •  ipratropium-albuterol (DUONEB) nebulizer solution, 3 mL, Nebulization, Q2HRS PRN (RT), Harjinder Crowe M.D.  •  famotidine (PEPCID) tablet 20 mg, 20 mg, Enteral Tube, Q12HRS, 20 mg at 20 0550 **OR** [DISCONTINUED] famotidine (PEPCID) injection 20 mg, 20 mg, Intravenous, Q12HRS, Harjinder Crowe M.D., 20 mg at 20 0600  •  senna-docusate (PERICOLACE or SENOKOT S) 8.6-50 MG per tablet 2 Tab, 2 Tab, Enteral Tube, BID, 2 Tab at 20 0550 **AND** polyethylene glycol/lytes (MIRALAX) PACKET 1 Packet, 1 Packet, Enteral Tube, QDAY PRN, 1 Packet at 20 1012 **AND** magnesium hydroxide (MILK OF MAGNESIA) suspension 30 mL, 30 mL, Enteral Tube, QDAY PRN, 30 mL at 20 1012 **AND** bisacodyl (DULCOLAX) suppository 10 mg, 10 mg, Rectal, QDAY PRN, Harjinder Crowe M.D., Stopped at 20 0524  •  MD Alert...ICU Electrolyte Replacement per Pharmacy, , Other, PHARMACY TO DOSE, Harjinder Crowe M.D.  •  lidocaine (XYLOCAINE) 1 % injection 1-2 mL, 1-2 mL, Tracheal Tube, Q30 MIN PRN, Harjinder Crowe M.D.  •  labetalol (NORMODYNE/TRANDATE) injection 10-20 mg, 10-20 mg, Intravenous, Q4HRS PRN, Nii Rivera M.D.  •  ondansetron (ZOFRAN) syringe/vial injection 4 mg, 4 mg, Intravenous, Q4HRS PRN, Nii Rivera M.D., 4 mg at 20 0552  •  promethazine (PHENERGAN) suppository 12.5-25 mg, 12.5-25 mg, Rectal, Q4HRS PRN, Nii Rivera M.D.  •  prochlorperazine (COMPAZINE) injection 5-10 mg, 5-10 mg, Intravenous, Q4HRS PRN, Nii Rivera M.D.    Physical Exam:   Vital Signs: /55   Pulse (!) 103   Temp 37.5 °C (99.5 °F) (Bladder)   Resp (!) 24   Ht 1.778 m (5' 10\")   Wt (!) 163.5 kg (360 lb 7.2 oz)   SpO2 94%   BMI 51.72 kg/m²   Temp  Av.3 °C (99.1 °F)  Min: 35.6 °C (96.1 °F)  Max: 38.6 °C (101.5 °F)  Vital signs " reviewed  Constitutional: Patient is intubated, eyes open, not tracking  Head: Atraumatic, normocephalic  Eyes: Conjunctivae normal, Pupils are equal, round, and reactive to light.   Mouth/Throat: ET tube in place  Neck: Neck supple. No masses/lymphadenopathy.  CVL removed  Cardiovascular: Normal rate, regular rhythm, normal S1S2 and intact distal pulses. No murmur, gallop, or friction rub. No pedal edema.  Pulmonary/Chest: No respiratory distress.  Mechanically ventilated, bilateral scattered rales  Abdominal: Soft, mild wincing with deep palpation of right upper quadrant, not significant, no rebound. BS + x 4. No masses or hepatosplenomegaly.   Musculoskeletal: No joint tenderness, swelling, erythema, or restriction of motion noted.  Medial tip of right toe with blackish discoloration, no surrounding erythema or active discharge  Neurological: Intubated, not following commands  Skin: As above. No other rashes or embolic phenomena noted on exposed skin  Psychiatric: Unable to assess    LABS:  Recent Labs     09/20/20  0610 09/21/20  0520 09/22/20  0600   WBC 5.0 7.3 10.0      Recent Labs     09/20/20  0610 09/21/20  0520 09/22/20  0600   HEMOGLOBIN 12.6* 12.7* 13.4*   HEMATOCRIT 40.3* 40.6* 43.5   MCV 99.0* 99.8* 100.7*   MCH 31.0 31.2 31.0   MACROCYTOSIS 1+ 1+ 1+   ANISOCYTOSIS 1+ 1+ 1+   PLATELETCT 219 244 284       Recent Labs     09/21/20  0520 09/22/20  0600 09/22/20  1220   SODIUM 132* 133* 137   POTASSIUM 5.7* 6.2* 6.4*   CHLORIDE 98 100 100   CO2 23 25 27   CREATININE 0.43* 0.47* 0.48*        Recent Labs     09/21/20  0520   ALBUMIN 2.0*        MICRO:  Blood Culture Hold   Date Value Ref Range Status   09/10/2020 Collected  Final        Latest pertinent labs were reviewed    IMAGING STUDIES:  Chest x-ray from today with bilateral airspace and interstitial opacities    Hospital Course/Assessment:   Rashi Shirley is a 60 y.o. male with a history of  known history of morbid obesity, hyperlipidemia,  diabetes, ABELARDO, noncompliance, chronic hypoxemic respiratory failure on 2 L nasal cannula oxygen, was initially admitted on 8/27 with worsening shortness of breath, found to be profoundly hypoxemic satting 50% on nonrebreather.  SARS-CoV-2 PCR was positive, patient was intubated. Patient received a 5-day course of remdesivir 8/32 9/3, dexamethasone 10-day course. Patient was afebrile on admission, procalcitonin negative, did receive a course of ceftriaxone and azithromycin.  Patient has been febrile since 9/6/2020, with no real resolution of fevers.  He has been on multiple antibiotic courses for possible UTI and pneumonia with enterococcus and Serratia respectively, including Zosyn, ceftriaxone, most recently completed a course of Bactrim due to eosinophilia and rash thought to be secondary to Zosyn.    Plan:   -Febrile since 9/6/2020, possibly related to ARDS and inflammation secondary to COVID-19 or perhaps drug fever.  Last blood cultures from 9/16 negative.  Multiple positive tracheal aspirate culture for Serratia, could be colonized.  UA with no significant pyuria  -PEEP and FiO2 improved but patient remains encephalopathic  -Completed multiple course of antibiotics as above.  Recommend holding any further antibiotics and clinically assessing.  Will get a procalcitonin for another data point to help make that decision  -Patient with mild wincing on deep palpation of right upper quadrant, not significant and no rebound.  If clinically worsens off of antibiotics, consider CMP and CT abdomen.     Plan was discussed with the primary team, Dr. Colindres    ID will follow. Please feel free to call with questions.    Dallas Fields M.D.    Please note that this dictation was created using voice recognition software. I have worked with technical experts from [employer] to optimize the interface.  I have made every reasonable attempt to correct obvious errors, but there may be errors of grammar and possibly content  that I did not discover before finalizing the note.

## 2020-09-23 ENCOUNTER — APPOINTMENT (OUTPATIENT)
Dept: RADIOLOGY | Facility: MEDICAL CENTER | Age: 60
DRG: 004 | End: 2020-09-23
Attending: INTERNAL MEDICINE
Payer: MEDICARE

## 2020-09-23 PROBLEM — G93.40 ENCEPHALOPATHY: Status: ACTIVE | Noted: 2020-09-23

## 2020-09-23 LAB
ANION GAP SERPL CALC-SCNC: 9 MMOL/L (ref 7–16)
ANISOCYTOSIS BLD QL SMEAR: ABNORMAL
BASOPHILS # BLD AUTO: 0 % (ref 0–1.8)
BASOPHILS # BLD: 0 K/UL (ref 0–0.12)
BUN SERPL-MCNC: 37 MG/DL (ref 8–22)
CALCIUM SERPL-MCNC: 9.1 MG/DL (ref 8.5–10.5)
CHLORIDE SERPL-SCNC: 102 MMOL/L (ref 96–112)
CO2 SERPL-SCNC: 26 MMOL/L (ref 20–33)
CREAT SERPL-MCNC: 0.3 MG/DL (ref 0.5–1.4)
EOSINOPHIL # BLD AUTO: 2.06 K/UL (ref 0–0.51)
EOSINOPHIL NFR BLD: 18.6 % (ref 0–6.9)
ERYTHROCYTE [DISTWIDTH] IN BLOOD BY AUTOMATED COUNT: 58.3 FL (ref 35.9–50)
GLUCOSE SERPL-MCNC: 127 MG/DL (ref 65–99)
HCT VFR BLD AUTO: 41.4 % (ref 42–52)
HGB BLD-MCNC: 12.8 G/DL (ref 14–18)
LYMPHOCYTES # BLD AUTO: 1.38 K/UL (ref 1–4.8)
LYMPHOCYTES NFR BLD: 12.4 % (ref 22–41)
MANUAL DIFF BLD: NORMAL
MCH RBC QN AUTO: 31.1 PG (ref 27–33)
MCHC RBC AUTO-ENTMCNC: 30.9 G/DL (ref 33.7–35.3)
MCV RBC AUTO: 100.5 FL (ref 81.4–97.8)
MONOCYTES # BLD AUTO: 1.08 K/UL (ref 0–0.85)
MONOCYTES NFR BLD AUTO: 9.7 % (ref 0–13.4)
MORPHOLOGY BLD-IMP: NORMAL
MYELOCYTES NFR BLD MANUAL: 0.9 %
NEUTROPHILS # BLD AUTO: 6.48 K/UL (ref 1.82–7.42)
NEUTROPHILS NFR BLD: 57.5 % (ref 44–72)
NEUTS BAND NFR BLD MANUAL: 0.9 % (ref 0–10)
NRBC # BLD AUTO: 0.12 K/UL
NRBC BLD-RTO: 1.1 /100 WBC
PLATELET # BLD AUTO: 307 K/UL (ref 164–446)
PLATELET BLD QL SMEAR: NORMAL
PMV BLD AUTO: 10.5 FL (ref 9–12.9)
POLYCHROMASIA BLD QL SMEAR: NORMAL
POTASSIUM SERPL-SCNC: 4.7 MMOL/L (ref 3.6–5.5)
POTASSIUM SERPL-SCNC: 4.9 MMOL/L (ref 3.6–5.5)
RBC # BLD AUTO: 4.12 M/UL (ref 4.7–6.1)
RBC BLD AUTO: PRESENT
SODIUM SERPL-SCNC: 137 MMOL/L (ref 135–145)
WBC # BLD AUTO: 11.1 K/UL (ref 4.8–10.8)

## 2020-09-23 PROCEDURE — A9270 NON-COVERED ITEM OR SERVICE: HCPCS | Performed by: INTERNAL MEDICINE

## 2020-09-23 PROCEDURE — 700111 HCHG RX REV CODE 636 W/ 250 OVERRIDE (IP): Performed by: INTERNAL MEDICINE

## 2020-09-23 PROCEDURE — 700102 HCHG RX REV CODE 250 W/ 637 OVERRIDE(OP): Performed by: INTERNAL MEDICINE

## 2020-09-23 PROCEDURE — 770022 HCHG ROOM/CARE - ICU (200)

## 2020-09-23 PROCEDURE — 94770 HCHG CO2 EXPIRED GAS DETERMINATION: CPT

## 2020-09-23 PROCEDURE — 85007 BL SMEAR W/DIFF WBC COUNT: CPT

## 2020-09-23 PROCEDURE — 80048 BASIC METABOLIC PNL TOTAL CA: CPT

## 2020-09-23 PROCEDURE — 700101 HCHG RX REV CODE 250: Performed by: INTERNAL MEDICINE

## 2020-09-23 PROCEDURE — 99232 SBSQ HOSP IP/OBS MODERATE 35: CPT | Performed by: INTERNAL MEDICINE

## 2020-09-23 PROCEDURE — 85027 COMPLETE CBC AUTOMATED: CPT

## 2020-09-23 PROCEDURE — 94003 VENT MGMT INPAT SUBQ DAY: CPT

## 2020-09-23 PROCEDURE — 99291 CRITICAL CARE FIRST HOUR: CPT | Performed by: INTERNAL MEDICINE

## 2020-09-23 PROCEDURE — 71045 X-RAY EXAM CHEST 1 VIEW: CPT

## 2020-09-23 RX ADMIN — ENOXAPARIN SODIUM 150 MG: 150 INJECTION SUBCUTANEOUS at 17:35

## 2020-09-23 RX ADMIN — FAMOTIDINE 20 MG: 20 TABLET, FILM COATED ORAL at 05:47

## 2020-09-23 RX ADMIN — DEXMEDETOMIDINE HYDROCHLORIDE 0.2 MCG/KG/HR: 100 INJECTION, SOLUTION INTRAVENOUS at 05:15

## 2020-09-23 RX ADMIN — ENOXAPARIN SODIUM 150 MG: 150 INJECTION SUBCUTANEOUS at 05:47

## 2020-09-23 RX ADMIN — Medication 0.5 MG/HR: at 07:28

## 2020-09-23 RX ADMIN — LACTULOSE 30 ML: 20 SOLUTION ORAL at 05:46

## 2020-09-23 RX ADMIN — ASPIRIN 81 MG 81 MG: 81 TABLET ORAL at 05:47

## 2020-09-23 RX ADMIN — FAMOTIDINE 20 MG: 20 TABLET, FILM COATED ORAL at 17:35

## 2020-09-23 RX ADMIN — DOCUSATE SODIUM 50 MG AND SENNOSIDES 8.6 MG 2 TABLET: 8.6; 5 TABLET, FILM COATED ORAL at 05:47

## 2020-09-23 RX ADMIN — AMLODIPINE BESYLATE 10 MG: 10 TABLET ORAL at 05:47

## 2020-09-23 ASSESSMENT — PATIENT HEALTH QUESTIONNAIRE - PHQ9
1. LITTLE INTEREST OR PLEASURE IN DOING THINGS: NOT AT ALL
SUM OF ALL RESPONSES TO PHQ9 QUESTIONS 1 AND 2: 0
2. FEELING DOWN, DEPRESSED, IRRITABLE, OR HOPELESS: NOT AT ALL

## 2020-09-23 ASSESSMENT — PAIN DESCRIPTION - PAIN TYPE
TYPE: ACUTE PAIN
TYPE: ACUTE PAIN

## 2020-09-23 NOTE — PROGRESS NOTES
Infectious Disease Progress Note    Author: Dallas Fields M.D. Date & Time of service: 2020  3:51 PM    Chief Complaint:  Follow-up for fevers, COVID-19    Interval History:   T-max one 1.5 overnight, white count up to 11.1, but eosinophilic, normal neutrophils.  FiO2 down to 35%    Labs Reviewed and Medications Reviewed.    Review of Systems:  Review of Systems   Unable to perform ROS: Intubated       Hemodynamics:  No data recorded.  Monitored Temp: 37.5 °C (99.5 °F)  Pulse  Av.4  Min: 40  Max: 137   Blood Pressure: 100/47       Physical Exam:  Physical Exam  Vitals signs and nursing note reviewed.   Constitutional:       Comments: Intubated, off Precedex, not following commands   HENT:      Head: Normocephalic and atraumatic.      Mouth/Throat:      Comments: ET tube in place  Eyes:      General: No scleral icterus.        Right eye: No discharge.         Left eye: No discharge.      Comments: Some conjunctival erythema bilaterally noted   Cardiovascular:      Rate and Rhythm: Normal rate.      Heart sounds: No murmur.   Pulmonary:      Comments: Mechanically ventilated, scattered rales  Abdominal:      General: Abdomen is flat. There is no distension.      Comments: Some wincing noted on deep palpation right upper quadrant   Musculoskeletal:         General: No tenderness.   Neurological:      Comments: Opens eyes spontaneously, not tracking  Intubated, not following commands, moving all extremities per report   Psychiatric:      Comments: Unable to assess         Meds:    Current Facility-Administered Medications:   •  lactulose  •  HYDROmorphone **AND** HYDROmorphone **AND** [DISCONTINUED] HYDROmorphone **AND** [DISCONTINUED] dexmedetomidine (PRECEDEX) infusion  •  HYDROmorphone  •  amLODIPine  •  ondansetron  •  Pharmacy  •  aspirin  •  acetaminophen  •  promethazine  •  enoxaparin (LOVENOX) injection  •  Respiratory Therapy Consult  •  ipratropium-albuterol  •  famotidine **OR**  [DISCONTINUED] famotidine  •  senna-docusate **AND** polyethylene glycol/lytes **AND** magnesium hydroxide **AND** bisacodyl  •  MD Alert...Adult ICU Electrolyte Replacement per Pharmacy  •  lidocaine  •  labetalol  •  ondansetron  •  promethazine  •  prochlorperazine    Labs:  Recent Labs     09/21/20 0520 09/22/20  0600 09/23/20  0550   WBC 7.3 10.0 11.1*   RBC 4.07* 4.32* 4.12*   HEMOGLOBIN 12.7* 13.4* 12.8*   HEMATOCRIT 40.6* 43.5 41.4*   MCV 99.8* 100.7* 100.5*   MCH 31.2 31.0 31.1   RDW 56.1* 58.3* 58.3*   PLATELETCT 244 284 307   MPV 11.1 10.6 10.5   NEUTSPOLYS 36.00* 54.20 57.50   LYMPHOCYTES 27.20 18.60* 12.40*   MONOCYTES 13.20 6.80 9.70   EOSINOPHILS 21.10* 11.90* 18.60*   BASOPHILS 0.00 0.80 0.00   RBCMORPHOLO Present Present Present     Recent Labs     09/22/20  0600 09/22/20  1220 09/22/20  1350 09/22/20  1630 09/22/20  2355 09/23/20  0550   SODIUM 133* 137  --   --   --  137   POTASSIUM 6.2* 6.4*  --  5.5 4.9 4.7   CHLORIDE 100 100  --   --   --  102   CO2 25 27  --   --   --  26   GLUCOSE 124* 131*  --   --   --  127*   BUN 40* 40*  --   --   --  37*   CPKTOTAL  --   --  197*  --   --   --      Recent Labs     09/21/20  0520 09/22/20  0600 09/22/20  1220 09/23/20  0550   ALBUMIN 2.0*  --   --   --    TBILIRUBIN 0.6  --   --   --    ALKPHOSPHAT 184*  --   --   --    TOTPROTEIN 6.4  --   --   --    ALTSGPT 103*  --   --   --    ASTSGOT 165*  --   --   --    CREATININE 0.43* 0.47* 0.48* 0.30*       Imaging:  Ct-head W/o    Result Date: 9/21/2020 9/21/2020 1:56 PM HISTORY/REASON FOR EXAM:  Altered mental status. Covid 19 positive. TECHNIQUE/EXAM DESCRIPTION AND NUMBER OF VIEWS: CT of the head without contrast. The study was performed on a helical multidetector CT scanner. Contiguous 2.5 mm axial sections were obtained from the skull base through the vertex. Up to date radiation dose reduction adjustments have been utilized to meet ALARA standards for radiation dose reduction. COMPARISON:  None  available FINDINGS: Lateral ventricles are normal in size and symmetric. Cortical sulci are within normal limits. No significant mass effect or midline shift. Basal cisterns are patent. No evidence for intracranial hemorrhage. Calvaria are intact. Visualized orbits are unremarkable. Bilateral mastoid and middle ear fluid. Sphenoid and maxillary sinus mucosal thickening with secretions.     1.  No acute intracranial abnormality. 2.  Mild paranasal sinus disease. 3.  Bilateral mastoid and middle ear fluid suggesting mastoiditis and otitis media.    Dx-chest-for Line Placement Perform Procedure In: Patient's Room    Result Date: 8/29/2020 8/29/2020 1:05 PM HISTORY/REASON FOR EXAM:  intubation and central line placement. TECHNIQUE/EXAM DESCRIPTION AND NUMBER OF VIEWS: Single view of the chest. COMPARISON: None FINDINGS: Endotracheal tube terminates above the keira. Right IJ catheter tip projects over the SVC. NG tube extends into the left upper abdomen. Cardiac silhouette is enlarged. There are diffuse bilateral patchy airspace opacities. There may be a trace of left pleural effusion. There is no pneumothorax visualized.     1.  Satisfactory appearance of the tubes and lines. 2.  There is no pneumothorax. 3.  There are bilateral patchy hazy airspace opacities which could be consistent with Covid 19 pneumonia.     Dx-chest-portable (1 View)    Result Date: 9/23/2020 9/23/2020 3:17 AM HISTORY/REASON FOR EXAM:  Shortness of Breath. Covid 19 positive TECHNIQUE/EXAM DESCRIPTION AND NUMBER OF VIEWS: Single portable view of the chest. COMPARISON: One day prior FINDINGS: Lines and tubes are stable. Cardiomediastinal silhouette is stable. Diffuse interstitial and hazy opacities could represent edema and/or infection. Left basilar atelectasis, airspace disease and/or small effusion. No pneumothorax. No acute osseous abnormality.     No significant interval change.    Dx-chest-portable (1 View)    Result Date:  9/22/2020 9/22/2020 3:39 AM HISTORY/REASON FOR EXAM:  Shortness of Breath TECHNIQUE/EXAM DESCRIPTION AND NUMBER OF VIEWS: Single portable view of the chest. COMPARISON: Yesterday FINDINGS: The cardiomediastinal silhouettes are unchanged. Previous right central venous line is no longer present. Endotracheal tube is unchanged in position. Interstitial opacities persist throughout both lungs.     Pulmonary edema, with no significant interval change.    Dx-chest-portable (1 View)    Result Date: 9/21/2020 9/21/2020 1:23 AM HISTORY/REASON FOR EXAM:  Shortness of Breath TECHNIQUE/EXAM DESCRIPTION AND NUMBER OF VIEWS: Single portable view of the chest. COMPARISON: Yesterday FINDINGS: The cardiomediastinal silhouettes are unchanged. Diffuse hazy opacities persist throughout both lungs. Endotracheal tube and right central venous line are unchanged in position.     Stable chest appearance compared with 9/20.    Dx-chest-portable (1 View)    Result Date: 9/20/2020 9/20/2020 1:54 AM HISTORY/REASON FOR EXAM:  Shortness of Breath TECHNIQUE/EXAM DESCRIPTION AND NUMBER OF VIEWS: Single portable view of the chest. COMPARISON: Yesterday FINDINGS: Patchy interstitial opacities are again seen throughout both lungs. Support devices are positionally unchanged. Cardiomediastinal silhouettes are stable.     Stable chest findings compared with 9/19.    Dx-chest-portable (1 View)    Result Date: 9/19/2020 9/19/2020 2:29 AM HISTORY/REASON FOR EXAM:  Shortness of Breath TECHNIQUE/EXAM DESCRIPTION AND NUMBER OF VIEWS: Single portable view of the chest. COMPARISON: Yesterday FINDINGS: Hazy primarily interstitial opacities persist throughout both lungs. The cardiomediastinal silhouettes are unchanged. Endotracheal tube and right central venous line are unchanged in position.     Stable diffuse bilateral opacities, likely due to pulmonary edema.    Dx-chest-portable (1 View)    Result Date: 9/18/2020 9/18/2020 3:15 AM HISTORY/REASON FOR EXAM:  Respiratory failure. TECHNIQUE/EXAM DESCRIPTION AND NUMBER OF VIEWS: Single portable view of the chest. COMPARISON: 9/17/2020 FINDINGS: LUNGS: Stable multifocal pulmonary opacities. PNEUMOTHORAX: None. LINES AND TUBES: Stable ETT. Stable course of enteric tube. Stable right IJ central catheter. MEDIASTINUM: Stable cardiac silhouette. MUSCULOSKELETAL STRUCTURES: Unchanged.     1. Stable lines and tubes. 2. Stable multifocal pulmonary opacities. No pleural effusions.     Dx-chest-portable (1 View)    Result Date: 9/17/2020 9/17/2020 2:56 AM HISTORY/REASON FOR EXAM: Respiratory failure. TECHNIQUE/EXAM DESCRIPTION AND NUMBER OF VIEWS: Single portable view of the chest. COMPARISON: 9/16/2020 FINDINGS: LUNGS: Slight interval worsening of bilateral interstitial and alveolar opacities. No effusions. PNEUMOTHORAX: None. LINES AND TUBES: Stable ETT. Stable right IJ central catheter. Stable course of enteric tube. MEDIASTINUM: Stable cardiac silhouette. MUSCULOSKELETAL STRUCTURES: Unchanged.     1. Stable lines and tubes. 2. Slight interval worsening of bilateral interstitial and alveolar opacities. No pleural effusions.     Dx-chest-portable (1 View)    Result Date: 9/16/2020 9/16/2020 2:43 AM HISTORY/REASON FOR EXAM:  Shortness of Breath. Covid 19 positive TECHNIQUE/EXAM DESCRIPTION AND NUMBER OF VIEWS: Single portable view of the chest. COMPARISON: One day prior FINDINGS: Lines and tubes are stable. Cardiomediastinal silhouette is stable. There are interstitial and hazy pulmonary opacities consistent with the history of Covid 19. No large pleural effusion or pneumothorax. No acute osseous abnormality.     No significant interval change.     Dx-chest-portable (1 View)    Result Date: 9/15/2020  9/15/2020 3:08 AM HISTORY/REASON FOR EXAM:  Shortness of Breath. Covid 19 positive TECHNIQUE/EXAM DESCRIPTION AND NUMBER OF VIEWS: Single portable view of the chest. COMPARISON: One day prior FINDINGS: Lines and tubes are stable.  Cardiomediastinal silhouette is stable. There are interstitial and hazy pulmonary opacities consistent with the history of Covid 19. Some patchy airspace opacity in the left perihilar region appears more conspicuous but could be related to technical or positional differences versus worsening disease. The costophrenic angles are incompletely visualized. No large pleural effusion or pneumothorax. No acute osseous abnormality.     Bilateral pulmonary opacities with possible mild increased airspace opacity in the left perihilar region.    Dx-chest-portable (1 View)    Result Date: 9/14/2020 9/14/2020 12:38 AM HISTORY/REASON FOR EXAM:  Shortness of Breath TECHNIQUE/EXAM DESCRIPTION AND NUMBER OF VIEWS: Single portable view of the chest. COMPARISON: Yesterday FINDINGS: Hardware is stably positioned in its visualized extent. HEART: Stable size. LUNGS: Lung volumes are low. There are perihilar opacities. There are bibasilar opacities. There are faint peripheral pulmonary opacities in the RIGHT lung PLEURA: No effusion or pneumothorax.     Unchanged BILATERAL atelectasis with suspected superimposed airspace disease particularly in the RIGHT lung    Dx-chest-portable (1 View)    Result Date: 9/13/2020 9/13/2020 2:00 AM HISTORY/REASON FOR EXAM:  Shortness of Breath. TECHNIQUE/EXAM DESCRIPTION AND NUMBER OF VIEWS: Single portable view of the chest. COMPARISON: 9/12/2020 FINDINGS: Support equipment is stable.  The cardiomediastinal silhouette is stable. Extensive bilateral airspace opacities are again noted. No pleural effusion or pneumothorax is seen.     No significant interval change compared to the prior examination.    Dx-chest-portable (1 View)    Result Date: 9/12/2020 9/12/2020 2:08 AM HISTORY/REASON FOR EXAM: Shortness of Breath TECHNIQUE/EXAM DESCRIPTION:  Single AP view of the chest. COMPARISON: Yesterday FINDINGS: Position of medical devices appears stable. Cardiomegaly is observed. The mediastinal contour  appears within normal limits.  The central  pulmonary vasculature appears prominent and indistinct. The lungs appear well expanded bilaterally.  Diffuse scattered hazy pulmonary parenchymal opacities are seen. No significant pleural effusions are identified. The bony structures appear age-appropriate.     1.  Pulmonary edema and/or infiltrates are identified, which are stable since the prior exam. 2.  Cardiomegaly     Dx-chest-portable (1 View)    Result Date: 9/11/2020 9/11/2020 3:46 AM HISTORY/REASON FOR EXAM: Shortness of Breath TECHNIQUE/EXAM DESCRIPTION:  Single AP view of the chest. COMPARISON: Yesterday FINDINGS: Position of medical devices appears stable. Cardiomegaly is observed. The mediastinal contour appears within normal limits.  The central  pulmonary vasculature appears prominent and indistinct. The lungs appear well expanded bilaterally.  Diffuse scattered hazy pulmonary parenchymal opacities are seen. No significant pleural effusions are identified. The bony structures appear age-appropriate.     1.  Pulmonary edema and/or infiltrates are identified, which are stable since the prior exam. 2.  Cardiomegaly     Dx-chest-portable (1 View)    Result Date: 9/10/2020    9/10/2020 3:00 AM HISTORY/REASON FOR EXAM: Shortness of Breath TECHNIQUE/EXAM DESCRIPTION:  Single AP view of the chest. COMPARISON: Yesterday FINDINGS: Position of medical devices appears stable. Cardiomegaly is observed. The mediastinal contour appears within normal limits.  The central  pulmonary vasculature appears prominent and indistinct. The lungs appear well expanded bilaterally.  Diffuse scattered hazy pulmonary parenchymal opacities are seen. No significant pleural effusions are identified. The bony structures appear age-appropriate.     1.  Pulmonary edema and/or infiltrates are identified, which are stable since the prior exam. 2.  Cardiomegaly     Dx-chest-portable (1 View)    Result Date: 9/9/2020 9/9/2020 3:39 AM  HISTORY/REASON FOR EXAM: Shortness of Breath TECHNIQUE/EXAM DESCRIPTION:  Single AP view of the chest. COMPARISON: Yesterday FINDINGS: Position of medical devices appears stable. Cardiomegaly is observed. The mediastinal contour appears within normal limits.  The central  pulmonary vasculature appears prominent and indistinct. The lungs appear well expanded bilaterally.  Diffuse scattered hazy pulmonary parenchymal opacities are seen. No significant pleural effusions are identified. The bony structures appear age-appropriate.     1.  Pulmonary edema and/or infiltrates are identified, which are stable since the prior exam. 2.  Cardiomegaly    Dx-chest-portable (1 View)    Result Date: 9/8/2020 9/8/2020 3:30 AM HISTORY/REASON FOR EXAM:  Shortness of Breath POSITIVE FOR COVID-19 TESTING TECHNIQUE/EXAM DESCRIPTION AND NUMBER OF VIEWS: Single portable view of the chest. COMPARISON: 9/7/2020 FINDINGS: Tubes and lines: ET tube, feeding tube and right central venous catheter are redemonstrated. Diffuse groundglass and airspace opacities throughout both lungs. No pleural effusion. No pneumothorax. Stable cardiopericardial silhouette.     1. No significant interval change.    Dx-chest-portable (1 View)    Result Date: 9/7/2020 9/7/2020 2:20 AM HISTORY/REASON FOR EXAM:  Shortness of Breath TECHNIQUE/EXAM DESCRIPTION AND NUMBER OF VIEWS: Single portable view of the chest. COMPARISON: 9/6/2020 FINDINGS: Tubes and lines: ET tube, feeding tube and right central venous catheter are redemonstrated. Diffuse groundglass and airspace opacities throughout both lungs. No pleural effusion. No pneumothorax. Stable cardiopericardial silhouette.     1. No significant interval change.    Dx-chest-portable (1 View)    Result Date: 9/6/2020 9/6/2020 2:33 AM HISTORY/REASON FOR EXAM:  Shortness of Breath. TECHNIQUE/EXAM DESCRIPTION AND NUMBER OF VIEWS: Single portable view of the chest. COMPARISON: 1 view chest 9/5/2020 FINDINGS: Endotracheal  tube, enteric catheter, and right internal jugular catheter are unchanged in their positions and appear appropriately located. Lungs show diffuse bilateral unchanged airspace process. The cardiomediastinal silhouette is enlarged. There are no pneumothoraces. No pleural effusion are identified.     No significant change    Dx-chest-portable (1 View)    Result Date: 9/5/2020 9/5/2020 1:50 AM HISTORY/REASON FOR EXAM: Shortness of Breath TECHNIQUE/EXAM DESCRIPTION:  Single AP view of the chest. COMPARISON: Yesterday FINDINGS: Position of medical devices appears stable. Cardiomegaly is observed. The mediastinal contour appears within normal limits.  The central  pulmonary vasculature appears prominent and indistinct. The lungs appear well expanded bilaterally.  Diffuse scattered hazy pulmonary parenchymal opacities are seen. No significant pleural effusions are identified. The bony structures appear age-appropriate.     1.  Pulmonary edema and/or infiltrates are identified, which appear somewhat increased since the prior exam. 2.  Cardiomegaly    Dx-chest-portable (1 View)    Result Date: 9/4/2020 9/4/2020 3:19 AM HISTORY/REASON FOR EXAM: Shortness of Breath TECHNIQUE/EXAM DESCRIPTION:  Single AP view of the chest. COMPARISON: Yesterday FINDINGS: Position of medical devices appears stable. Cardiomegaly is observed. The mediastinal contour appears within normal limits.  The central  pulmonary vasculature appears prominent and indistinct. The lungs appear well expanded bilaterally.  Diffuse scattered hazy pulmonary parenchymal opacities are seen. No significant pleural effusions are identified. The bony structures appear age-appropriate.     1.  Pulmonary edema and/or infiltrates are identified, which are somewhat decreased since the prior exam. 2.  Cardiomegaly     Dx-chest-portable (1 View)    Result Date: 9/3/2020    9/3/2020 3:54 AM HISTORY/REASON FOR EXAM: Shortness of Breath TECHNIQUE/EXAM DESCRIPTION:  Single  AP view of the chest. COMPARISON: Yesterday FINDINGS: Position of medical devices appears stable. Cardiomegaly is observed. The mediastinal contour appears within normal limits.  The central  pulmonary vasculature appears prominent and indistinct. The lungs appear well expanded bilaterally.  Diffuse scattered hazy pulmonary parenchymal opacities are seen. No significant pleural effusions are identified. The bony structures appear age-appropriate.     1.  Pulmonary edema and/or infiltrates are identified, which are stable since the prior exam. 2.  Cardiomegaly     Dx-chest-portable (1 View)    Result Date: 9/2/2020 9/2/2020 2:59 AM HISTORY/REASON FOR EXAM: For indication of respiratory failure TECHNIQUE/EXAM DESCRIPTION:  Single AP view of the chest. COMPARISON: Yesterday FINDINGS: Position of medical devices appears stable. Cardiomegaly is observed. The mediastinal contour appears within normal limits.  The central  pulmonary vasculature appears prominent and indistinct. The lungs appear well expanded bilaterally.  Diffuse scattered hazy pulmonary parenchymal opacities are seen. Veil-like opacities are seen in the right lung base. The bony structures appear age-appropriate.     1.  Pulmonary edema and/or infiltrates are identified, which appear somewhat increased since the prior exam. 2.  Layering right pleural effusion 3.  Cardiomegaly     Dx-chest-portable (1 View)    Result Date: 9/1/2020 9/1/2020 3:56 AM HISTORY/REASON FOR EXAM: Shortness of Breath TECHNIQUE/EXAM DESCRIPTION:  Single AP view of the chest. COMPARISON: Yesterday FINDINGS: Right internal jugular central line is seen terminating at the right atriocaval junction.  Endotracheal tube terminates between the clavicles and keira.  Nasogastric tube has been removed, Dobbhoff tube has been placed in the interim which terminates below the lower margin of the film. Cardiomegaly is observed. The mediastinal contour appears within normal limits.  The  central  pulmonary vasculature appears prominent and indistinct. The lungs appear well expanded bilaterally.  Diffuse scattered hazy pulmonary parenchymal opacities are seen. No significant pleural effusions are identified. The bony structures appear age-appropriate.     1.  Pulmonary edema and/or infiltrates are identified, which are slightly decreased since the prior exam. 2.  Cardiomegaly     Dx-chest-portable (1 View)    Result Date: 8/31/2020 8/31/2020 2:06 AM HISTORY/REASON FOR EXAM: Shortness of Breath TECHNIQUE/EXAM DESCRIPTION:  Single AP view of the chest. COMPARISON: Yesterday FINDINGS: Position of medical devices appears stable. Cardiomegaly is observed. The mediastinal contour appears within normal limits.  The central  pulmonary vasculature appears prominent and indistinct. The lungs appear well expanded bilaterally.  Diffuse scattered hazy pulmonary parenchymal opacities are seen. Veil-like opacities are seen in the right lung base. The bony structures appear age-appropriate.     1.  Pulmonary edema and/or infiltrates are identified, which are slightly decreased since the prior exam. 2.  Layering right pleural effusion 3.  Cardiomegaly     Dx-chest-portable (1 View)    Result Date: 8/30/2020 8/30/2020 1:47 AM HISTORY/REASON FOR EXAM:  Shortness of Breath. TECHNIQUE/EXAM DESCRIPTION AND NUMBER OF VIEWS: Single portable view of the chest. COMPARISON: Yesterday FINDINGS: Endotracheal tube is present with tip at the level of the aortic arch. NG tube is present with tip extending off the inferior aspect of film in the left upper quadrant. Right IJ central line is present with tip in expected location of the superior vena cava. There is worsening bilateral diffuse pulmonary consolidation. No pneumothorax identified.     Worsening bilateral pulmonary consolidation.    Dx-chest-portable (1 View)    Result Date: 8/29/2020 8/29/2020 3:56 AM HISTORY/REASON FOR EXAM: Shortness of Breath TECHNIQUE/EXAM  DESCRIPTION:  Single AP view of the chest. COMPARISON: Yesterday FINDINGS: Overlying cardiac leads are present. Cardiomegaly is observed. The mediastinal contour appears within normal limits.  The central  pulmonary vasculature appears prominent and indistinct. The lungs appear well expanded bilaterally.  Diffuse scattered hazy pulmonary parenchymal opacities are seen. The lung bases are partially excluded. The bony structures appear age-appropriate.     1.  Pulmonary edema and/or infiltrates are identified, which are stable since the prior exam. 2.  Cardiomegaly    Dx-chest-portable (1 View)    Result Date: 8/28/2020 8/28/2020 3:25 AM HISTORY/REASON FOR EXAM: Shortness of Breath TECHNIQUE/EXAM DESCRIPTION:  Single AP view of the chest. COMPARISON: August 27, 2020 FINDINGS: Cardiomegaly is observed. The mediastinal contour appears within normal limits.  The central  pulmonary vasculature appears prominent and indistinct. The lungs appear well expanded bilaterally.  Diffuse scattered hazy pulmonary parenchymal opacities are seen. The lung bases are partially excluded. The bony structures appear age-appropriate.     1.  Pulmonary edema and/or infiltrates are identified, which are stable since the prior exam. 2.  Cardiomegaly    Dx-chest-portable (1 View)    Result Date: 8/27/2020 8/27/2020 4:14 PM HISTORY/REASON FOR EXAM:  Shortness of Breath Suspect possible Covid-19 exposure rule out TECHNIQUE/EXAM DESCRIPTION AND NUMBER OF VIEWS: Single portable view of the chest. COMPARISON: 9/26/2018 FINDINGS: Extensive airspace opacity throughout the right lung. No pleural effusion. No pneumothorax. Stable cardiopericardial silhouette.     Extensive airspace opacity throughout the right lung, in keeping with severe multifocal lobar pneumonia.    Ct-cta Chest Pulmonary Artery W/ Recons    Result Date: 8/27/2020 8/27/2020 6:11 PM HISTORY/REASON FOR EXAM:  PE suspected, high pretest prob; r/o PE, eval pneumonia, ? Loculation  Cough and short of breath TECHNIQUE/EXAM DESCRIPTION: CT angiogram scan for pulmonary embolism with contrast, with reconstructions. 1.25 mm helical sections were obtained from the lung apices through the lung bases following the rapid bolus administration of 65 mL of Omnipaque 350 nonionic contrast. Thin-section overlapping reconstruction interval was utilized. Coronal reconstructions were generated from the axial data. MIP post processing was performed and utilized for the interpretation. Low dose optimization technique was utilized for this CT exam including automated exposure control and adjustment of the mA and/or kV according to patient size. COMPARISON: None FINDINGS: Pulmonary Embolism: No. Main Pulmonary Arteries: No. Segmental branches: No. Subsegmental branches: No. Additional Comments: None. Lungs: Extensive airspace opacities in the right middle lobe and right lower lobe. Additional airspace opacities in the left upper lobe and left lower lobe as well. Airway: Patent. Pleura: No significant pleural effusion or pneumothorax. Nodes: No enlarged mediastinal or hilar lymph nodes. Additional findings: Thoracic aorta and great vessels:  No aneurysm. Heart and pericardium: Mild coronary artery calcification. No pericardial effusion. Thoracic spine:  No fracture or malalignment. No compression deformity. Chest wall:   Unremarkable. Visualized upper abdomen: No acute abnormality.     1. No CT evidence of pulmonary embolism. 2. Extensive airspace opacities in bilateral lungs, right more the left, in keeping with severe multifocal pneumonia.     Ec-echocardiogram Complete W/ Cont    Result Date: 8/28/2020  Transthoracic Echo Report Echocardiography Laboratory CONCLUSIONS Prior study done 9/27/2018; compared to the report of the study done - there has been increase of PASP. Normal left ventricular systolic function. Left ventricular ejection fraction is visually estimated to be 70%. Mild tricuspid regurgitation.  Estimated right ventricular systolic pressure is 60 mmHg. JAELYN TAYLOR Exam Date:         2020                    12:33 Exam Location:     Inpatient Priority:          Routine Ordering Physician:        LUCILLE LÓPEZ Referring Physician: Sonographer:               Ro Grey RDCS Age:    60     Gender:    M MRN:    [id number] :    1960 BSA:    2.74   Ht (in):    70     Wt (lb):    380 Exam Type:     Complete Indications:     Edema, unspecified ICD Codes:       R60.9 CPT Codes:       18810,  BP:   144    /   84     HR:   80 Technical Quality:       Technically difficult study -                          adequate information is obtained MEASUREMENTS  (Male / Female) Normal Values 2D ECHO LV Diastolic Diameter PLAX        5 cm                  4.2 - 5.9 / 3.9 - 5.3 cm LV Systolic Diameter PLAX         4.1 cm                2.1 - 4.0 cm IVS Diastolic Thickness           0.95 cm               LVPW Diastolic Thickness          0.96 cm               RV Diameter 4C                    3.4 cm                2.5 - 2.9 cm LVOT Diameter                     2 cm                  RA Diameter                       4.5 cm                Estimated LV Ejection Fraction    70 %                  LV Ejection Fraction MOD BP       72.3 %                >= 55  % LV Ejection Fraction MOD 4C       73.9 %                LV Ejection Fraction MOD 2C       66.9 %                LA Volume Index                   28.2 cm3/m2           16 - 28 cm3/m2 IVC Diameter                      2.5 cm                DOPPLER AV Peak Velocity                  1.5 m/s               AV Peak Gradient                  9.3 mmHg              AV Mean Gradient                  5.1 mmHg              LVOT Peak Velocity                1.2 m/s               AV Area Cont Eq vti               2.4 cm2               MV Velocity Time Integral         23.5 cm               Mitral E Point Velocity           0.93 m/s              Mitral E to A Ratio                0.85                  MV Pressure Half Time             80.1 ms               MV Area PHT                       2.7 cm2               MV Deceleration Time              276 ms                TR Peak Velocity                  344 cm/s              PV Peak Velocity                  1.2 m/s               PV Peak Gradient                  5.3 mmHg              RVOT Peak Velocity                0.94 m/s              * Indicates values subject to auto-interpretation LV EF:  70    % FINDINGS Left Ventricle Normal left ventricular chamber size. Normal left ventricular wall thickness. Normal left ventricular systolic function. Left ventricular ejection fraction is visually estimated to be 70%. Normal regional wall motion. Indeterminate diastolic function. Contrast was used to enhance visualization of the endocardial border. 3 ML of contrast was administered. Existing IV was used. Right Ventricle Mildly dilated right ventricle. Normal right ventricular systolic function. Right Atrium Normal right atrial size. Dilated inferior vena cava without inspiratory collapse. Left Atrium Normal left atrial size. Left atrial volume index is 28 mL/sq m. Mitral Valve The mitral valve is not well visualized. No mitral stenosis. Trace mitral regurgitation. Aortic Valve The aortic valve is not well visualized. No stenosis or regurgitation seen. Tricuspid Valve Structurally normal tricuspid valve. No tricuspid stenosis. Mild tricuspid regurgitation. Estimated right ventricular systolic pressure is 60 mmHg. Pulmonic Valve Structurally normal pulmonic valve. No pulmonic stenosis. No pulmonic insufficiency. Pericardium No pericardial effusion seen. Aorta Normal aortic root for body surface area. Ascending aorta diameter is 3.7 cm. Sachin Morataya M.D. (Electronically Signed) Final Date:     28 August 2020                 14:50    Dx-abdomen For Tube Placement    Result Date: 8/31/2020 8/31/2020 6:57 PM HISTORY/REASON FOR EXAM:  Line  evaluation. TECHNIQUE/EXAM DESCRIPTION AND NUMBER OF VIEWS:  1 view(s) of the abdomen. COMPARISON:  None. FINDINGS: Enteric tube has been placed. The tip projects over the stomach body. There is a paucity of bowel gas.     Feeding tube placement with the tip projecting over the stomach body.    Dx-abdomen For Tube Placement    Result Date: 8/29/2020 8/29/2020 1:05 PM HISTORY/REASON FOR EXAM:  Line evaluation. Covid infection TECHNIQUE/EXAM DESCRIPTION AND NUMBER OF VIEWS:  1 view(s) of the abdomen. COMPARISON:  None. FINDINGS: Body habitus decreases sensitivity of the study. Enteric tube has been placed. The tip projects over the gastric antrum. The bowel gas pattern is within normal limits.     NG tube terminates over the stomach.      Micro:  Results     Procedure Component Value Units Date/Time    CULTURE RESPIRATORY W/ GRM STN [[id number]]  (Abnormal) Collected: 09/22/20 1357    Order Status: Completed Specimen: Respirate from Tracheal Aspirate Updated: 09/23/20 1450     Significant Indicator POS     Source RESP     Site TRACHEAL ASPIRATE     Culture Result Light growth usual upper respiratory alex     Gram Stain Result Many WBCs.  Rare Gram negative rods.       Culture Result Gram negative kate  Light growth      Narrative:      Droplet, Contact, and Eye [id number] SABATKA YANY  A  Droplet, Contact, and Eye [id number] SABATKA YANY    Blood Culture [[id number]] Collected: 09/22/20 1403    Order Status: Completed Specimen: Blood Updated: 09/23/20 0720     Significant Indicator NEG     Source BLD     Site Peripheral     Culture Result No Growth  Note: Blood cultures are incubated for 5 days and  are monitored continuously.Positive blood cultures  are called to the RN and reported as soon as  they are identified.      Narrative:      Right Hand    Blood Culture [[id number]] Collected: 09/22/20 1403    Order Status: Completed Specimen: Blood Updated: 09/23/20 0720     Significant Indicator NEG      Source BLD     Site Peripheral     Culture Result No Growth  Note: Blood cultures are incubated for 5 days and  are monitored continuously.Positive blood cultures  are called to the RN and reported as soon as  they are identified.      Narrative:      Left Forearm/Arm    GRAM STAIN [[id number]] Collected: 09/22/20 1357    Order Status: Completed Specimen: Respirate Updated: 09/22/20 1808     Significant Indicator .     Source RESP     Site TRACHEAL ASPIRATE     Gram Stain Result Many WBCs.  Rare Gram negative rods.      Narrative:      Droplet, Contact, and Eye [id number] SABATKA YANY  A  Droplet, Contact, and Eye [id number] SABATKA YANY    URINALYSIS [[id number]]  (Abnormal) Collected: 09/22/20 1615    Order Status: Completed Specimen: Urine, Purvis Cath Updated: 09/22/20 1703     Color Yellow     Character Clear     Specific Gravity 1.009     Ph 5.0     Glucose Negative mg/dL      Ketones Negative mg/dL      Protein Negative mg/dL      Bilirubin Negative     Urobilinogen, Urine 0.2     Nitrite Negative     Leukocyte Esterase Negative     Occult Blood Trace     Micro Urine Req Microscopic    Narrative:      Droplet, Contact, and Eye [id number] SABATKA YANY  A  Please exchange purvis catheter prior to obtaining specimen.    BLOOD CULTURE [[id number]] Collected: 09/22/20 1420    Order Status: Sent Specimen: Blood from Peripheral     BLOOD CULTURE [[id number]]     Order Status: Canceled Specimen: Blood from Peripheral     BLOOD CULTURE [[id number]]     Order Status: Canceled Specimen: Blood from Peripheral     BLOOD CULTURE [[id number]] Collected: 09/16/20 1425    Order Status: Completed Specimen: Blood from Peripheral Updated: 09/21/20 1700     Significant Indicator NEG     Source BLD     Site PERIPHERAL     Culture Result No growth after 5 days of incubation.    Narrative:      Droplet, Contact, and Eye [id number] AMANDA NOEL  Per Hospital Policy: Only change Specimen Src: to  "\"Line\" if  specified by physician order.  Left AC    BLOOD CULTURE [[id number]] Collected: 09/16/20 1440    Order Status: Completed Specimen: Blood from Peripheral Updated: 09/21/20 1700     Significant Indicator NEG     Source BLD     Site PERIPHERAL     Culture Result No growth after 5 days of incubation.    Narrative:      Droplet, Contact, and Eye [id number] BLACK RUPESH J  Per Hospital Policy: Only change Specimen Src: to \"Line\" if  specified by physician order.  Left Hand    CULTURE RESPIRATORY W/ GRM STN [[id number]]  (Abnormal)  (Susceptibility) Collected: 09/16/20 1220    Order Status: Completed Specimen: Respirate from Tracheal Aspirate Updated: 09/18/20 1004     Significant Indicator POS     Source RESP     Site TRACHEAL ASPIRATE     Culture Result Light growth usual upper respiratory alex  No clinically significant Staphylococcus aureus, Methicillin  Resistant Staphylococcus aureus, or Pseudomonas species  isolated.       Gram Stain Result Moderate WBCs.  Few Gram positive rods.  Few Gram positive cocci.  Rare Gram negative rods.       Culture Result Serratia marcescens  Moderate growth      Narrative:      Droplet, Contact, and Eye [id number] BLACK RUPESH J  Droplet, Contact, and Eye [id number] BLACK RUPESH J    Susceptibility     Serratia marcescens (1)     Antibiotic Interpretation Microscan Method Status    Ceftriaxone Sensitive <=1 mcg/mL PACO Final    Ceftazidime Sensitive <=1 mcg/mL PACO Final    Cefotaxime Sensitive 8 mcg/mL PACO Final    Cefazolin Resistant >16 mcg/mL PACO Final    Ciprofloxacin Sensitive <=1 mcg/mL PACO Final    Ampicillin Sensitive <=8 mcg/mL PACO Final    Cefepime Sensitive <=2 mcg/mL PACO Final    Tobramycin Sensitive <=4 mcg/mL PACO Final    Cefotetan Sensitive <=16 mcg/mL PACO Final    Ertapenem Sensitive <=0.5 mcg/mL PACO Final    Gentamicin Sensitive <=4 mcg/mL PACO Final    Pip/Tazobactam Sensitive <=16 mcg/mL PACO Final    Trimeth/Sulfa Sensitive <=2/38 " mcg/mL PACO Final                   GRAM STAIN [[id number]] Collected: 09/16/20 1220    Order Status: Completed Specimen: Respirate Updated: 09/17/20 0731     Significant Indicator .     Source RESP     Site TRACHEAL ASPIRATE     Gram Stain Result Moderate WBCs.  Few Gram positive rods.  Few Gram positive cocci.  Rare Gram negative rods.      Narrative:      Droplet, Contact, and Eye [id number] BLACK RUPESH J  Droplet, Contact, and Eye [id number] BLACK RUPESH J          Assessment:  Rashi Shirley is a 60 y.o. male with a history of  known history of morbid obesity, hyperlipidemia, diabetes, ABELARDO, noncompliance, chronic hypoxemic respiratory failure on 2 L nasal cannula oxygen, was initially admitted on 8/27 with worsening shortness of breath, found to be profoundly hypoxemic satting 50% on nonrebreather.  SARS-CoV-2 PCR was positive, patient was intubated. Patient received a 5-day course of remdesivir 8/32 9/3, dexamethasone 10-day course. Patient was afebrile on admission, procalcitonin negative, did receive a course of ceftriaxone and azithromycin.  Patient has been febrile since 9/6/2020, with no real resolution of fevers.  He has been on multiple antibiotic courses for possible UTI and pneumonia with enterococcus and Serratia respectively, including Zosyn, ceftriaxone, most recently completed a course of Bactrim due to eosinophilia and rash thought to be secondary to Zosyn.    Pertinent Diagnoses:  Fevers, persistent  ARDS secondary to COVID-19 pneumonia  Eosinophilia  Acute on chronic hypoxemic respiratory failure  Morbid obesity  Diabetes  ABELARDO     Plan:   -Febrile since 9/6/2020, possibly related to ARDS and inflammation secondary to COVID-19 or perhaps drug fever given significant eosinophilia. Last blood cultures from 9/16 negative.  Multiple positive tracheal aspirate culture for Serratia, likely colonized.  UA with no significant pyuria  -PEEP and FiO2 improved but patient remains  encephalopathic  -Completed multiple course of antibiotics as above.  Recommend holding any further antibiotics and clinically assessing.  Procalcitonin within normal range  -Antibiotics discontinued 9/22.  Rising white count noted today but normal neutrophils.  Primarily eosinophilia  -Tracheal aspirate growing a GNR, anticipate this will be Serratia again and likely colonized.  If blood cultures turn positive or if patient develops neutrophilia, may need to target this organism  -Patient with mild wincing on deep palpation of right upper quadrant, not significant and no rebound.  If clinically worsens off of antibiotics, consider CMP and CT abdomen.      Plan was discussed with the primary team, Dr. Colindres     ID will follow. Please feel free to call with questions.

## 2020-09-23 NOTE — PROGRESS NOTES
Critical Care Progress Note    Date of admission  8/27/2020    Chief Complaint  60 y.o. male admitted 8/27/2020 with respiratory failure and pneumonia due to SARS-CoV-2.    Hospital Course     8/29-worsening mental status, rapid shallow breathing and hypoxia required endotracheal intubation and prone positioning.  8/30-I spoke with Brittney, the patient's daughter and surrogate decision maker about initiating Remdesivir.  We discussed the risks benefits and alternatives.  All questions were answered.  She consented to initiating Remdesivir today.  8/31 -art line placed, remains on PCV, transition off rocuronium drip as tolerated, propofol/fentanyl, continue prone protocol  9/1 - remains on pressure control ventilation, PEEP 19, improved P/F ratio with prone ventilation, off rocuronium, starting trophic tube feed, full dose anticoagulation with Lovenox, day 3/5 Remdesivir, day 5 dexamethasone  9/4 - stopped prone ventilation with some skin breakdown, difficult prone protocol with morbid obesity, full ventilator support, advancing nutrition  9/5 - Ongoing diuresis with 3 times daily Lasix, advancing tube feeds to goal, remains on supine ventilation now  9/6 - PCV, weaning IP and PEEP, remains supine, ongoing aggressive diuresis, abdi TF, lighten sedation as abdi  9/7 Switch to VC, switched from fentanyl to Dilaudid with improved ventilator synchrony, decrease Lasix to daily; stopped amlodipine as he is now on norepinephrine to achieve a map greater than 65  9/8 continue full vent support, compliance gradually improving  9/10 Febrile: urine, blood, sputum cultures, continue full ventilator support  9/11 febrile, increased NE, urine culture, MRSA nare, then begin zosyn  9/12 UA + for enterococcus; resp culture + Non-lactose fermenting GNR  - currently on zosyn; will await sensitivities   9/13 continues on PEEP 16, on Zosyn for Serratia and enterococcus  9/14 -    change Zosyn to Rocephin.  Continue full anticoagulation.   Continue vent support.  Off norepinephrine.  9/15 -    continue full vent support.  Force diuresis with chlorothiazide.  Continue Lovenox.  9/16 -    change Rocephin back to Zosyn due to fever.  Culture blood, sputum and check UA.  Change to ASV mode of ventilation to help with vent synchrony.  Forced diuresis with chlorothiazide.  9/17 -    continue Zosyn.  Continue vent support.  Force diuresis with chlorothiazide.  Full anticoagulation with Lovenox.  9/18 -    increase free water for hypernatremia.  Forced diuresis with Diamox.  Change Zosyn to Bactrim due to eosinophilia and rash.  Continue vent support.  9/19 -    decrease free water.  Continue Bactrim.  Continue vent support.  9/20 -    stop free water.  Force diuresis.  Continue Bactrim.  Decrease PEEP.  Continue vent support.     9/21- PEEP weaned down from 16 to 10 today, still on 40 percent, negative acute CT of head, remains encephalopathic on minimal sedation, diureses continues, Bactrim stopped (high K)    Interval Problem Update  Reviewed last 24 hour events:    No major interval changes except decreased PEEP, poor mental status continues  Intermittent fevers  Infection control not clearing pt for trach secondary to fevers unknown cause  Cultures negative except sputum  Tolerates trach collar trials but not good with secretions- remains intubated day 24      Prior  Grimaces, opens eyes spont with SAT  PERRL  ST    TF OK  Good UOP  Stop free water  Vent 23  PEEP 16   40%  ASV at 160%  Decrease PEEP to 14  Bactrim  Give Lasix 40      Review of Systems  Review of Systems   Unable to perform ROS: Acuity of condition        Vital Signs for last 24 hours   Pulse:  [] 105  Resp:  [16-30] 30  BP: ()/(44-75) 144/72  SpO2:  [90 %-96 %] 96 %    Hemodynamic parameters for last 24 hours       Respiratory Information for the last 24 hours  Vent Mode: Spont  PEEP/CPAP: 10  P Support: 15  MAP: 13  Control VTE (exp VT): 517    Physical Exam    Physical Exam  Constitutional:       Appearance: He is not diaphoretic.      Comments: On ventilator   HENT:      Head: Normocephalic and atraumatic.      Right Ear: External ear normal.      Left Ear: External ear normal.      Nose: Nose normal.      Mouth/Throat:      Mouth: Mucous membranes are moist.      Pharynx: Oropharynx is clear.   Eyes:      General:         Right eye: No discharge.         Left eye: No discharge.      Pupils: Pupils are equal, round, and reactive to light.   Neck:      Musculoskeletal: Normal range of motion and neck supple.   Cardiovascular:      Pulses: Normal pulses.      Heart sounds: No murmur. No friction rub.      Comments: Sinus rhythm  Pulmonary:      Breath sounds: Rales (Unchanged crackles) present. No wheezing.   Abdominal:      General: Bowel sounds are normal. There is no distension.      Palpations: Abdomen is soft.      Tenderness: There is no abdominal tenderness. There is no rebound.      Comments: Tolerating enteral tube feedings   Musculoskeletal: Normal range of motion.         General: Swelling present.      Right lower leg: Edema present.      Left lower leg: Edema present.      Comments: No clubbing or cyanosis   Skin:     General: Skin is warm and dry.      Capillary Refill: Capillary refill takes less than 2 seconds.   Neurological:      Comments: Pupils 4 mm and briskly react to light.  He will open his eyes and grimace.  He does not track or follow.         Medications  Current Facility-Administered Medications   Medication Dose Route Frequency Provider Last Rate Last Dose   • lactulose 20 GM/30ML solution 30 mL  30 mL Enteral Tube BID Jorge Colindres M.D.   30 mL at 09/23/20 0546   • HYDROmorphone pf (DILAUDID) injection 0.75 mg  0.75 mg Intravenous Q15 MIN PRN Jorge Colindres M.D.   0.75 mg at 09/22/20 1052    And   • HYDROmorphone (DILAUDID) injection 1.5 mg  1.5 mg Intravenous Q15 MIN PRN Jorge Colindres M.D.       • HYDROmorphone (DILAUDID) 0.2  mg/mL in 50mL NS (Continuous Infusion)   Intravenous Continuous Jorge Colindres M.D. 2.5 mL/hr at 09/23/20 0728 0.5 mg/hr at 09/23/20 0728   • amLODIPine (NORVASC) tablet 10 mg  10 mg Enteral Tube Q DAY Aguilar Marquez M.D.   10 mg at 09/23/20 0547   • ondansetron (ZOFRAN ODT) dispertab 4 mg  4 mg Enteral Tube Q4HRS PRN Nii Rivera M.D.       • Pharmacy Consult: Enteral tube insertion - review meds/change route/product selection  1 Each Other PHARMACY TO DOSE Nii Rivera M.D.       • aspirin (ASA) chewable tab 81 mg  81 mg Enteral Tube DAILY Nii Rivera M.D.   81 mg at 09/23/20 0547   • acetaminophen (TYLENOL) tablet 650 mg  650 mg Enteral Tube Q6HRS PRN Nii Rivera M.D.   650 mg at 09/22/20 1833   • promethazine (PHENERGAN) tablet 12.5-25 mg  12.5-25 mg Enteral Tube Q4HRS PRN Nii Rivera M.D.       • enoxaparin (LOVENOX) injection 150 mg  150 mg Subcutaneous Q12HRS Harjinder Crowe M.D.   150 mg at 09/23/20 0547   • Respiratory Therapy Consult   Nebulization Continuous RT Harjinder Crowe M.D.       • ipratropium-albuterol (DUONEB) nebulizer solution  3 mL Nebulization Q2HRS PRN (RT) Harjinder Crowe M.D.       • famotidine (PEPCID) tablet 20 mg  20 mg Enteral Tube Q12HRS Nii Rivera M.D.   20 mg at 09/23/20 0547   • senna-docusate (PERICOLACE or SENOKOT S) 8.6-50 MG per tablet 2 Tab  2 Tab Enteral Tube BID Harjinder Crowe M.D.   2 Tab at 09/23/20 0547    And   • polyethylene glycol/lytes (MIRALAX) PACKET 1 Packet  1 Packet Enteral Tube QDAY PRN Harjinder Crowe M.D.   1 Packet at 09/22/20 1012    And   • magnesium hydroxide (MILK OF MAGNESIA) suspension 30 mL  30 mL Enteral Tube QDAY PRN Harjinder Crowe M.D.   30 mL at 09/22/20 1012    And   • bisacodyl (DULCOLAX) suppository 10 mg  10 mg Rectal QDAY PRN Harjinder Crowe M.D.   Stopped at 09/18/20 0524   • MD Alert...ICU Electrolyte Replacement per Pharmacy   Other PHARMACY TO DOSE Harjinder Crowe M.D.       • lidocaine (XYLOCAINE) 1 %  injection 1-2 mL  1-2 mL Tracheal Tube Q30 MIN PRN Harjinder Crowe M.D.       • labetalol (NORMODYNE/TRANDATE) injection 10-20 mg  10-20 mg Intravenous Q4HRS PRN Nii Rivera M.D.       • ondansetron (ZOFRAN) syringe/vial injection 4 mg  4 mg Intravenous Q4HRS PRN Nii Rivera M.D.   4 mg at 08/29/20 0552   • promethazine (PHENERGAN) suppository 12.5-25 mg  12.5-25 mg Rectal Q4HRS PRN Nii Rivera M.D.       • prochlorperazine (COMPAZINE) injection 5-10 mg  5-10 mg Intravenous Q4HRS PRN Nii Rivera M.D.           Fluids    Intake/Output Summary (Last 24 hours) at 9/23/2020 1626  Last data filed at 9/23/2020 1400  Gross per 24 hour   Intake 1704.68 ml   Output 2330 ml   Net -625.32 ml       Laboratory  Recent Labs     09/21/20  0228 09/21/20  0238 09/22/20  1347   ISTATAPH 7.328* 7.334* 7.319*   ISTATAPCO2 55.4* 48.5* 61.7*   ISTATAPO2 242* 80 58*   ISTATATCO2 31 27 34*   [id number] 100* 95 86*   ISTATARTHCO3 29.1* 25.8* 31.7*   ISTATARTBE 2 -1 4*   ISTATTEMP 37.0 C 37.0 C 38.0 C   ISTATFIO2 40 40 40   ISTATSPEC Arterial Arterial Arterial   ISTATAPHTC 7.328* 7.334* 7.305*   BTETPABV0YA 242* 80 62*     Recent Labs     09/22/20  1350   CPKTOTAL 197*     Recent Labs     09/22/20  0600 09/22/20  1220 09/22/20  1630 09/22/20  2355 09/23/20  0550   SODIUM 133* 137  --   --  137   POTASSIUM 6.2* 6.4* 5.5 4.9 4.7   CHLORIDE 100 100  --   --  102   CO2 25 27  --   --  26   BUN 40* 40*  --   --  37*   CREATININE 0.47* 0.48*  --   --  0.30*   CALCIUM 8.4* 8.6  --   --  9.1     Recent Labs     09/21/20  0520 09/21/20  1805 09/22/20  0600 09/22/20  1220 09/23/20  0550   ALTSGPT 103*  --   --   --   --    ASTSGOT 165*  --   --   --   --    ALKPHOSPHAT 184*  --   --   --   --    TBILIRUBIN 0.6  --   --   --   --    PREALBUMIN  --  14.2*  --   --   --    GLUCOSE 98  --  124* 131* 127*     Recent Labs     09/21/20 0520 09/22/20  0600 09/23/20  0550   WBC 7.3 10.0 11.1*   NEUTSPOLYS 36.00* 54.20 57.50   LYMPHOCYTES  27.20 18.60* 12.40*   MONOCYTES 13.20 6.80 9.70   EOSINOPHILS 21.10* 11.90* 18.60*   BASOPHILS 0.00 0.80 0.00   ASTSGOT 165*  --   --    ALTSGPT 103*  --   --    ALKPHOSPHAT 184*  --   --    TBILIRUBIN 0.6  --   --      Recent Labs     09/21/20  0520 09/22/20  0600 09/23/20  0550   RBC 4.07* 4.32* 4.12*   HEMOGLOBIN 12.7* 13.4* 12.8*   HEMATOCRIT 40.6* 43.5 41.4*   PLATELETCT 244 284 307       Imaging  X-Ray:  I have personally reviewed the images and compared with prior images. and My impression is: Very slightly improved bilateral opacities    Assessment/Plan  * Pneumonia due to severe acute respiratory syndrome coronavirus 2 (SARS-CoV-2)  Assessment & Plan  S/P remdesivir from 8/30-9/3  S/P dexamethasone, 6 mg daily for 10 days  Continue full anticoagulation with Lovenox    Off all antibiotics per ID    Acute hypoxemic respiratory failure (HCC)- (present on admission)  Assessment & Plan  Intubated 8/29  All of the appropriate ventilator bundles are in place  Proning discontinued due to skin breakdown/injury  Continue vent support  Decrease PEEP from 16-14  Force diuresis with furosemide    PEEP down to 10  Qualifies for SBP but mental status too poor for extubation    PEEP 8, oxygenates/ventilates well, not great with secretions. Poor mental status is rate limiting factor for extubation  Infection control not clearing pt yet for trach secondary to fevers unknown source    Pulmonary hypertension (HCC)  Assessment & Plan  RVSP 60 mmHg    Pneumonia  Assessment & Plan  Sputum with Serratia marcescens  Continue Bactrim  UA unremarkable  Blood cultures negative from 9/16 so far    Bactrim stopped today with rising K (received all but last day of scheduled therapy)    Bactrim off three days, K normalized now    Septic shock due to undetermined organism (HCC)  Assessment & Plan  Shock has resolved - off vasopressor support      Controlled type 2 diabetes mellitus, without long-term current use of insulin (HCC)- (present  on admission)  Assessment & Plan  Glycohemoglobin 6.5  Glucose controlled    Encephalopathy  Assessment & Plan  Almost 4 weeks out since intubation. Now on minimal sedation for days. Not following commands, does have some purposeful movement. Negative CT brain, EEG slowing- metabolic/toxic but no seizure    MRI brain ordered  Minimize sedation    Essential hypertension- (present on admission)  Assessment & Plan  Continue amlodipine, 10 mg daily    Morbid obesity with BMI of 50.0-59.9, adult (HCC)- (present on admission)  Assessment & Plan  BMI 53  Behavioral modification and nutrition counseling when clinically appropriate    ABELARDO (obstructive sleep apnea)- (present on admission)  Assessment & Plan  PSG on 1/29/2019 revealed AHI of 101.9 with minimum oxygen saturation of 50%    HLD (hyperlipidemia)- (present on admission)  Assessment & Plan  Continue statin       VTE:  Lovenox  Ulcer: H2 Antagonist  Lines: Central Line  Ongoing indication addressed, Arterial Line  Ongoing indication addressed and Briseno Catheter  Ongoing indication addressed    I have performed a physical exam and reviewed and updated ROS and Plan today (9/23/2020). In review of yesterday's note (9/22/2020), there are no changes except as documented above.     I have assessed and reassessed his respiratory status with ventilator adjustments, airway mechanics, ventilator waveforms, hemodynamics, blood pressure, cardiovascular status and his neurologic status.  He is at increased risk for worsening cardiovascular, respiratory and CNS system dysfunction.    Discussed patient condition and risk of morbidity and/or mortality with RN, RT, Pharmacy, Charge nurse / hot rounds and QA team     The patient remains critically ill.  Critical care time = 40 minutes in directly providing and coordinating critical care and extensive data review.  No time overlap and excludes procedures.

## 2020-09-23 NOTE — CARE PLAN
Ventilator Daily Summary    Vent Day #   26    Ventilator settings changed this shift:  none    Weaning trials:   no    Respiratory Procedures:   none    Plan: Continue current ventilator settings and wean mechanical ventilation as tolerated per physician orders.

## 2020-09-23 NOTE — DISCHARGE PLANNING
Care Transition Team Discharge Planning    Anticipated Discharge Disposition: TBD    Action: Per AM rounds, pt is able to move all extremities. Pt is not able to follow RN's direction. Pt opens his eyes spontaneously, but not purposefully. Pt is not tracking. Pt had cooling blanket last night d/t fever. Pt is sedated, and on Vent Day 26.    Barriers to Discharge: A/O, medical stability    Plan: f/u w/ medical team

## 2020-09-23 NOTE — PROGRESS NOTES
0700- Bedside report received from NOC RN.    0800- Assessment complete    1106- Precedex stopped.     1200- Assessment complete.

## 2020-09-23 NOTE — ASSESSMENT & PLAN NOTE
"Resolved off all sedation. Weakness of critical illness may take long time resolve and pt may never return to baseline function.  LTAC referral  Component covid \"brain\" likely  Mobilize as tolerated                                                              "

## 2020-09-24 ENCOUNTER — APPOINTMENT (OUTPATIENT)
Dept: RADIOLOGY | Facility: MEDICAL CENTER | Age: 60
DRG: 004 | End: 2020-09-24
Attending: INTERNAL MEDICINE
Payer: MEDICARE

## 2020-09-24 LAB
ACTION RANGE TRIGGERED IACRT: NO
ANION GAP SERPL CALC-SCNC: 10 MMOL/L (ref 7–16)
BACTERIA SPEC RESP CULT: ABNORMAL
BACTERIA SPEC RESP CULT: ABNORMAL
BASE EXCESS BLDA CALC-SCNC: 4 MMOL/L (ref -4–3)
BASOPHILS # BLD AUTO: 0.6 % (ref 0–1.8)
BASOPHILS # BLD: 0.07 K/UL (ref 0–0.12)
BODY TEMPERATURE: ABNORMAL DEGREES
BUN SERPL-MCNC: 30 MG/DL (ref 8–22)
CALCIUM SERPL-MCNC: 8.9 MG/DL (ref 8.5–10.5)
CHLORIDE SERPL-SCNC: 104 MMOL/L (ref 96–112)
CO2 BLDA-SCNC: 31 MMOL/L (ref 20–33)
CO2 SERPL-SCNC: 26 MMOL/L (ref 20–33)
CREAT SERPL-MCNC: 0.19 MG/DL (ref 0.5–1.4)
EOSINOPHIL # BLD AUTO: 1.08 K/UL (ref 0–0.51)
EOSINOPHIL NFR BLD: 9.8 % (ref 0–6.9)
ERYTHROCYTE [DISTWIDTH] IN BLOOD BY AUTOMATED COUNT: 58.9 FL (ref 35.9–50)
GLUCOSE SERPL-MCNC: 146 MG/DL (ref 65–99)
GRAM STN SPEC: ABNORMAL
HCO3 BLDA-SCNC: 29.3 MMOL/L (ref 17–25)
HCT VFR BLD AUTO: 41.3 % (ref 42–52)
HGB BLD-MCNC: 12.6 G/DL (ref 14–18)
HOROWITZ INDEX BLDA+IHG-RTO: 194 MM[HG]
IMM GRANULOCYTES # BLD AUTO: 0.47 K/UL (ref 0–0.11)
IMM GRANULOCYTES NFR BLD AUTO: 4.3 % (ref 0–0.9)
INST. QUALIFIED PATIENT IIQPT: YES
LYMPHOCYTES # BLD AUTO: 2.07 K/UL (ref 1–4.8)
LYMPHOCYTES NFR BLD: 18.7 % (ref 22–41)
MCH RBC QN AUTO: 30.5 PG (ref 27–33)
MCHC RBC AUTO-ENTMCNC: 30.5 G/DL (ref 33.7–35.3)
MCV RBC AUTO: 100 FL (ref 81.4–97.8)
MONOCYTES # BLD AUTO: 0.99 K/UL (ref 0–0.85)
MONOCYTES NFR BLD AUTO: 9 % (ref 0–13.4)
NEUTROPHILS # BLD AUTO: 6.37 K/UL (ref 1.82–7.42)
NEUTROPHILS NFR BLD: 57.6 % (ref 44–72)
NRBC # BLD AUTO: 0.02 K/UL
NRBC BLD-RTO: 0.2 /100 WBC
O2/TOTAL GAS SETTING VFR VENT: 35 %
PCO2 BLDA: 44.4 MMHG (ref 26–37)
PCO2 TEMP ADJ BLDA: 44.4 MMHG (ref 26–37)
PH BLDA: 7.43 [PH] (ref 7.4–7.5)
PH TEMP ADJ BLDA: 7.43 [PH] (ref 7.4–7.5)
PLATELET # BLD AUTO: 319 K/UL (ref 164–446)
PMV BLD AUTO: 10.6 FL (ref 9–12.9)
PO2 BLDA: 68 MMHG (ref 64–87)
PO2 TEMP ADJ BLDA: 68 MMHG (ref 64–87)
POTASSIUM SERPL-SCNC: 4 MMOL/L (ref 3.6–5.5)
RBC # BLD AUTO: 4.13 M/UL (ref 4.7–6.1)
SAO2 % BLDA: 94 % (ref 93–99)
SIGNIFICANT IND 70042: ABNORMAL
SITE SITE: ABNORMAL
SODIUM SERPL-SCNC: 140 MMOL/L (ref 135–145)
SOURCE SOURCE: ABNORMAL
SPECIMEN DRAWN FROM PATIENT: ABNORMAL
WBC # BLD AUTO: 11.1 K/UL (ref 4.8–10.8)

## 2020-09-24 PROCEDURE — A9270 NON-COVERED ITEM OR SERVICE: HCPCS | Performed by: INTERNAL MEDICINE

## 2020-09-24 PROCEDURE — 99291 CRITICAL CARE FIRST HOUR: CPT | Performed by: INTERNAL MEDICINE

## 2020-09-24 PROCEDURE — 700102 HCHG RX REV CODE 250 W/ 637 OVERRIDE(OP): Performed by: INTERNAL MEDICINE

## 2020-09-24 PROCEDURE — 99232 SBSQ HOSP IP/OBS MODERATE 35: CPT | Performed by: INTERNAL MEDICINE

## 2020-09-24 PROCEDURE — 94003 VENT MGMT INPAT SUBQ DAY: CPT

## 2020-09-24 PROCEDURE — 700111 HCHG RX REV CODE 636 W/ 250 OVERRIDE (IP): Performed by: INTERNAL MEDICINE

## 2020-09-24 PROCEDURE — 770022 HCHG ROOM/CARE - ICU (200)

## 2020-09-24 PROCEDURE — 36600 WITHDRAWAL OF ARTERIAL BLOOD: CPT

## 2020-09-24 PROCEDURE — 94770 HCHG CO2 EXPIRED GAS DETERMINATION: CPT

## 2020-09-24 PROCEDURE — 85025 COMPLETE CBC W/AUTO DIFF WBC: CPT

## 2020-09-24 PROCEDURE — 80048 BASIC METABOLIC PNL TOTAL CA: CPT

## 2020-09-24 PROCEDURE — 302307 BAG FECAL MANAGEMENT TEMPORARY COLLECTION WITH FILTER - SEAL SIGNAL FMS: Performed by: INTERNAL MEDICINE

## 2020-09-24 PROCEDURE — 82803 BLOOD GASES ANY COMBINATION: CPT

## 2020-09-24 PROCEDURE — 71045 X-RAY EXAM CHEST 1 VIEW: CPT

## 2020-09-24 RX ADMIN — ENOXAPARIN SODIUM 150 MG: 150 INJECTION SUBCUTANEOUS at 05:24

## 2020-09-24 RX ADMIN — ENOXAPARIN SODIUM 150 MG: 150 INJECTION SUBCUTANEOUS at 18:26

## 2020-09-24 RX ADMIN — FAMOTIDINE 20 MG: 20 TABLET, FILM COATED ORAL at 18:26

## 2020-09-24 RX ADMIN — AMLODIPINE BESYLATE 10 MG: 10 TABLET ORAL at 05:24

## 2020-09-24 RX ADMIN — FAMOTIDINE 20 MG: 20 TABLET, FILM COATED ORAL at 05:24

## 2020-09-24 RX ADMIN — HYDROMORPHONE HYDROCHLORIDE 0.75 MG: 1 INJECTION, SOLUTION INTRAMUSCULAR; INTRAVENOUS; SUBCUTANEOUS at 22:13

## 2020-09-24 RX ADMIN — LACTULOSE 30 ML: 20 SOLUTION ORAL at 05:24

## 2020-09-24 RX ADMIN — ASPIRIN 81 MG 81 MG: 81 TABLET ORAL at 05:24

## 2020-09-24 RX ADMIN — Medication 10 MG: at 00:07

## 2020-09-24 RX ADMIN — ACETAMINOPHEN 650 MG: 325 TABLET, FILM COATED ORAL at 12:15

## 2020-09-24 ASSESSMENT — PATIENT HEALTH QUESTIONNAIRE - PHQ9
SUM OF ALL RESPONSES TO PHQ9 QUESTIONS 1 AND 2: 0
1. LITTLE INTEREST OR PLEASURE IN DOING THINGS: NOT AT ALL

## 2020-09-24 NOTE — PROGRESS NOTES
Critical Care Progress Note    Date of admission  8/27/2020    Chief Complaint  60 y.o. male admitted 8/27/2020 with respiratory failure and pneumonia due to SARS-CoV-2.    Hospital Course     8/29-worsening mental status, rapid shallow breathing and hypoxia required endotracheal intubation and prone positioning.  8/30-I spoke with Brittney, the patient's daughter and surrogate decision maker about initiating Remdesivir.  We discussed the risks benefits and alternatives.  All questions were answered.  She consented to initiating Remdesivir today.  8/31 -art line placed, remains on PCV, transition off rocuronium drip as tolerated, propofol/fentanyl, continue prone protocol  9/1 - remains on pressure control ventilation, PEEP 19, improved P/F ratio with prone ventilation, off rocuronium, starting trophic tube feed, full dose anticoagulation with Lovenox, day 3/5 Remdesivir, day 5 dexamethasone  9/4 - stopped prone ventilation with some skin breakdown, difficult prone protocol with morbid obesity, full ventilator support, advancing nutrition  9/5 - Ongoing diuresis with 3 times daily Lasix, advancing tube feeds to goal, remains on supine ventilation now  9/6 - PCV, weaning IP and PEEP, remains supine, ongoing aggressive diuresis, abdi TF, lighten sedation as abdi  9/7 Switch to VC, switched from fentanyl to Dilaudid with improved ventilator synchrony, decrease Lasix to daily; stopped amlodipine as he is now on norepinephrine to achieve a map greater than 65  9/8 continue full vent support, compliance gradually improving  9/10 Febrile: urine, blood, sputum cultures, continue full ventilator support  9/11 febrile, increased NE, urine culture, MRSA nare, then begin zosyn  9/12 UA + for enterococcus; resp culture + Non-lactose fermenting GNR  - currently on zosyn; will await sensitivities   9/13 continues on PEEP 16, on Zosyn for Serratia and enterococcus  9/14 -    change Zosyn to Rocephin.  Continue full anticoagulation.   Continue vent support.  Off norepinephrine.  9/15 -    continue full vent support.  Force diuresis with chlorothiazide.  Continue Lovenox.  9/16 -    change Rocephin back to Zosyn due to fever.  Culture blood, sputum and check UA.  Change to ASV mode of ventilation to help with vent synchrony.  Forced diuresis with chlorothiazide.  9/17 -    continue Zosyn.  Continue vent support.  Force diuresis with chlorothiazide.  Full anticoagulation with Lovenox.  9/18 -    increase free water for hypernatremia.  Forced diuresis with Diamox.  Change Zosyn to Bactrim due to eosinophilia and rash.  Continue vent support.  9/19 -    decrease free water.  Continue Bactrim.  Continue vent support.  9/20 -    stop free water.  Force diuresis.  Continue Bactrim.  Decrease PEEP.  Continue vent support.     9/21- PEEP weaned down from 16 to 10 today, still on 40 percent, negative acute CT of head, remains encephalopathic on minimal sedation, diureses continues, Bactrim stopped (high K)    Interval Problem Update  Reviewed last 24 hour events:  Vent/intubation day 26  Weakly following some commands intermittently with hands/eyes  Tolerates SBT  Occasional thick secretions requiring extensive suctioning  Trach scheduled (Dr Ram) for tomorrow at 1 pm  Discussed medical condition today in detail with pt's daughter (Brittney- [phone])    Prior  No major interval changes except decreased PEEP, poor mental status continues  Intermittent fevers  Infection control not clearing pt for trach secondary to fevers unknown cause  Cultures negative except sputum  Tolerates trach collar trials but not good with secretions- remains intubated day 24      Prior  Grimaces, opens eyes spont with SAT  PERRL  ST    TF OK  Good UOP  Stop free water  Vent 23  PEEP 16   40%  ASV at 160%  Decrease PEEP to 14  Bactrim  Give Lasix 40      Review of Systems  Review of Systems   Unable to perform ROS: Acuity of condition        Vital Signs for last 24  hours   Pulse:  [102-119] 117  Resp:  [18-39] 27  BP: (130-168)/(62-89) 138/73  SpO2:  [89 %-97 %] 95 %    Hemodynamic parameters for last 24 hours       Respiratory Information for the last 24 hours  Vent Mode: Spont  PEEP/CPAP: 10  P Support: 15  MAP: 16  Control VTE (exp VT): 487    Physical Exam   Physical Exam  Constitutional:       Appearance: He is not diaphoretic.      Comments: On ventilator   HENT:      Head: Normocephalic and atraumatic.      Right Ear: External ear normal.      Left Ear: External ear normal.      Nose: Nose normal.      Mouth/Throat:      Mouth: Mucous membranes are moist.      Pharynx: Oropharynx is clear.   Eyes:      General:         Right eye: No discharge.         Left eye: No discharge.      Pupils: Pupils are equal, round, and reactive to light.   Neck:      Musculoskeletal: Normal range of motion and neck supple.   Cardiovascular:      Pulses: Normal pulses.      Heart sounds: No murmur. No friction rub.      Comments: Sinus rhythm  Pulmonary:      Breath sounds: Rales (Unchanged crackles) present. No wheezing.   Abdominal:      General: Bowel sounds are normal. There is no distension.      Palpations: Abdomen is soft.      Tenderness: There is no abdominal tenderness. There is no rebound.      Comments: Tolerating enteral tube feedings   Musculoskeletal: Normal range of motion.         General: Swelling present.      Right lower leg: Edema present.      Left lower leg: Edema present.      Comments: No clubbing or cyanosis   Skin:     General: Skin is warm and dry.      Capillary Refill: Capillary refill takes less than 2 seconds.   Neurological:      Comments: Pupils 4 mm and briskly react to light.  He will open his eyes and grimace.  He does not track or follow.         Medications  Current Facility-Administered Medications   Medication Dose Route Frequency Provider Last Rate Last Dose   • lactulose 20 GM/30ML solution 30 mL  30 mL Enteral Tube BID Jorge Colindres M.D.   30  mL at 09/24/20 0524   • HYDROmorphone pf (DILAUDID) injection 0.75 mg  0.75 mg Intravenous Q15 MIN PRN Jorge Colindres M.D.   0.75 mg at 09/22/20 1052    And   • HYDROmorphone (DILAUDID) injection 1.5 mg  1.5 mg Intravenous Q15 MIN PRN Jorge Colindres M.D.       • HYDROmorphone (DILAUDID) 0.2 mg/mL in 50mL NS (Continuous Infusion)   Intravenous Continuous Jorge Colindres M.D.   Stopped at 09/24/20 0930   • amLODIPine (NORVASC) tablet 10 mg  10 mg Enteral Tube Q DAY Aguilar Marquez M.D.   10 mg at 09/24/20 0524   • ondansetron (ZOFRAN ODT) dispertab 4 mg  4 mg Enteral Tube Q4HRS PRN Nii Rivera M.D.       • Pharmacy Consult: Enteral tube insertion - review meds/change route/product selection  1 Each Other PHARMACY TO DOSE Nii Rivera M.D.       • aspirin (ASA) chewable tab 81 mg  81 mg Enteral Tube DAILY Nii Rivera M.D.   81 mg at 09/24/20 0524   • acetaminophen (TYLENOL) tablet 650 mg  650 mg Enteral Tube Q6HRS PRN Nii Rivera M.D.   650 mg at 09/24/20 1215   • promethazine (PHENERGAN) tablet 12.5-25 mg  12.5-25 mg Enteral Tube Q4HRS PRN Nii Rivera M.D.       • enoxaparin (LOVENOX) injection 150 mg  150 mg Subcutaneous Q12HRS Harjinder Crowe M.D.   150 mg at 09/24/20 0524   • Respiratory Therapy Consult   Nebulization Continuous RT Harjinder Crowe M.D.       • ipratropium-albuterol (DUONEB) nebulizer solution  3 mL Nebulization Q2HRS PRN (RT) Harjinder Crowe M.D.       • famotidine (PEPCID) tablet 20 mg  20 mg Enteral Tube Q12HRS Nii Rivera M.D.   20 mg at 09/24/20 0524   • senna-docusate (PERICOLACE or SENOKOT S) 8.6-50 MG per tablet 2 Tab  2 Tab Enteral Tube BID Harjinder Crowe M.D.   Stopped at 09/23/20 1800    And   • polyethylene glycol/lytes (MIRALAX) PACKET 1 Packet  1 Packet Enteral Tube QDAY PRN Harjinder Crowe M.D.   1 Packet at 09/22/20 1012    And   • magnesium hydroxide (MILK OF MAGNESIA) suspension 30 mL  30 mL Enteral Tube QDAY PRN Harjinder Crowe M.D.   30 mL  at 09/22/20 1012    And   • bisacodyl (DULCOLAX) suppository 10 mg  10 mg Rectal QDAY PRN Harjinder Crowe M.D.   Stopped at 09/18/20 0524   • MD Alert...ICU Electrolyte Replacement per Pharmacy   Other PHARMACY TO DOSE Harjinder Crowe M.D.       • lidocaine (XYLOCAINE) 1 % injection 1-2 mL  1-2 mL Tracheal Tube Q30 MIN PRN Harjinder Crowe M.D.       • labetalol (NORMODYNE/TRANDATE) injection 10-20 mg  10-20 mg Intravenous Q4HRS PRN Nii Rivera M.D.       • ondansetron (ZOFRAN) syringe/vial injection 4 mg  4 mg Intravenous Q4HRS PRN Nii Rivera M.D.   4 mg at 08/29/20 0552   • promethazine (PHENERGAN) suppository 12.5-25 mg  12.5-25 mg Rectal Q4HRS PRN Nii Rivera M.D.       • prochlorperazine (COMPAZINE) injection 5-10 mg  5-10 mg Intravenous Q4HRS PRN Nii Rivera M.D.           Fluids    Intake/Output Summary (Last 24 hours) at 9/24/2020 1329  Last data filed at 9/24/2020 1200  Gross per 24 hour   Intake 1050 ml   Output 2905 ml   Net -1855 ml       Laboratory  Recent Labs     09/22/20  1347 09/24/20  0347   ISTATAPH 7.319* 7.428   ISTATAPCO2 61.7* 44.4*   ISTATAPO2 58* 68   ISTATATCO2 34* 31   [id number] 86* 94   ISTATARTHCO3 31.7* 29.3*   ISTATARTBE 4* 4*   ISTATTEMP 38.0 C 37.0 C   ISTATFIO2 40 35   ISTATSPEC Arterial Arterial   ISTATAPHTC 7.305* 7.428   [id number] 62* 68     Recent Labs     09/22/20  1350   CPKTOTAL 197*     Recent Labs     09/22/20  1220  09/22/20  2355 09/23/20  0550 09/24/20  0535   SODIUM 137  --   --  137 140   POTASSIUM 6.4*   < > 4.9 4.7 4.0   CHLORIDE 100  --   --  102 104   CO2 27  --   --  26 26   BUN 40*  --   --  37* 30*   CREATININE 0.48*  --   --  0.30* 0.19*   CALCIUM 8.6  --   --  9.1 8.9    < > = values in this interval not displayed.     Recent Labs     09/21/20  1805  09/22/20  1220 09/23/20  0550 09/24/20  0535   PREALBUMIN 14.2*  --   --   --   --    GLUCOSE  --    < > 131* 127* 146*    < > = values in this interval not displayed.     Recent Labs      09/22/20  0600 09/23/20  0550 09/24/20  0535   WBC 10.0 11.1* 11.1*   NEUTSPOLYS 54.20 57.50 57.60   LYMPHOCYTES 18.60* 12.40* 18.70*   MONOCYTES 6.80 9.70 9.00   EOSINOPHILS 11.90* 18.60* 9.80*   BASOPHILS 0.80 0.00 0.60     Recent Labs     09/22/20  0600 09/23/20  0550 09/24/20  0535   RBC 4.32* 4.12* 4.13*   HEMOGLOBIN 13.4* 12.8* 12.6*   HEMATOCRIT 43.5 41.4* 41.3*   PLATELETCT 284 307 319       Imaging  X-Ray:  I have personally reviewed the images and compared with prior images. and My impression is: Very slightly improved bilateral opacities    Assessment/Plan  * Pneumonia due to severe acute respiratory syndrome coronavirus 2 (SARS-CoV-2)  Assessment & Plan  S/P remdesivir from 8/30-9/3  S/P dexamethasone, 6 mg daily for 10 days  Continue full anticoagulation with Lovenox    Off all antibiotics per ID    Acute hypoxemic respiratory failure (HCC)- (present on admission)  Assessment & Plan  Intubated 8/29  All of the appropriate ventilator bundles are in place  Proning discontinued due to skin breakdown/injury  Continue vent support  Decrease PEEP from 16-14  Force diuresis with furosemide    PEEP down to 10  Qualifies for SBP but mental status too poor for extubation    Trach tomorrow at 1 pm    PEEP 8, oxygenates/ventilates well, not great with secretions. Poor mental status is rate limiting factor for extubation  Infection control not clearing pt yet for trach secondary to fevers unknown source    Pulmonary hypertension (HCC)  Assessment & Plan  RVSP 60 mmHg    Pneumonia  Assessment & Plan  Sputum with Serratia marcescens  Continue Bactrim  UA unremarkable  Blood cultures negative from 9/16 so far    Bactrim stopped today with rising K (received all but last day of scheduled therapy)    Bactrim off three days, K normalized now    Septic shock due to undetermined organism (HCC)  Assessment & Plan  Shock has resolved - off vasopressor support      Controlled type 2 diabetes mellitus, without long-term current  use of insulin (HCC)- (present on admission)  Assessment & Plan  Glycohemoglobin 6.5  Glucose controlled    Encephalopathy  Assessment & Plan  Almost 4 weeks out since intubation. Now on minimal sedation for days. Not following commands, does have some purposeful movement. Negative CT brain, EEG slowing- metabolic/toxic but no seizure    Showing some improvement today- weakly following some commands, non-focal exam, negtive CT, non-specific EEG, labs OK. Suspect ICU hypoactive delirium    - minimize sedation  - mobilize  - trach  - sleep at night, awake during day    Essential hypertension- (present on admission)  Assessment & Plan  Continue amlodipine, 10 mg daily    Morbid obesity with BMI of 50.0-59.9, adult (HCC)- (present on admission)  Assessment & Plan  BMI 53  Behavioral modification and nutrition counseling when clinically appropriate    ABELARDO (obstructive sleep apnea)- (present on admission)  Assessment & Plan  PSG on 1/29/2019 revealed AHI of 101.9 with minimum oxygen saturation of 50%    HLD (hyperlipidemia)- (present on admission)  Assessment & Plan  Continue statin       VTE:  Lovenox  Ulcer: H2 Antagonist  Lines: Central Line  Ongoing indication addressed, Arterial Line  Ongoing indication addressed and Briseno Catheter  Ongoing indication addressed    I have performed a physical exam and reviewed and updated ROS and Plan today (9/24/2020). In review of yesterday's note (9/23/2020), there are no changes except as documented above.     I have assessed and reassessed his respiratory status with ventilator adjustments, airway mechanics, ventilator waveforms, hemodynamics, blood pressure, cardiovascular status and his neurologic status.  He is at increased risk for worsening cardiovascular, respiratory and CNS system dysfunction.    Discussed patient condition and risk of morbidity and/or mortality with RN, RT, Pharmacy, Charge nurse / hot rounds and QA team     The patient remains critically ill.  Critical  care time = 40 minutes in directly providing and coordinating critical care and extensive data review.  No time overlap and excludes procedures.

## 2020-09-24 NOTE — PROGRESS NOTES
Infectious Disease Progress Note    Author: Dallas Fields M.D. Date & Time of service: 2020  2:23 PM    Chief Complaint:  Follow-up for fevers, COVID-19    Interval History:   T-max 101.5 overnight, white count up to 11.1, but eosinophilic, normal neutrophils.  FiO2 down to 35%   T-max 100 overnight, 101.5 this morning.  White count remains at 11.1, but eosinophilia with some improvement, no neutrophilia.  Blood cultures no growth till date, sputum cultures positive for Serratia.  Plan for trach tomorrow.    Labs Reviewed and Medications Reviewed.    Review of Systems:  Review of Systems   Unable to perform ROS: Intubated       Hemodynamics:  No data recorded.  Monitored Temp: 38.6 °C (101.5 °F)  Pulse  Av.9  Min: 40  Max: 137   Blood Pressure: 138/73       Physical Exam:  Physical Exam  Vitals signs and nursing note reviewed.   Constitutional:       Comments: Intubated, off Precedex, not following commands, does intermittently follow commands per primary documentation   HENT:      Head: Normocephalic and atraumatic.      Mouth/Throat:      Comments: ET tube in place  Eyes:      General: No scleral icterus.        Right eye: No discharge.         Left eye: No discharge.      Comments: Some conjunctival erythema bilaterally noted   Cardiovascular:      Rate and Rhythm: Normal rate.      Heart sounds: No murmur.   Pulmonary:      Comments: Mechanically ventilated, scattered rales  Abdominal:      General: Abdomen is flat. There is no distension.      Tenderness: There is no abdominal tenderness.      Comments: Some wincing noted on deep palpation right upper quadrant   Musculoskeletal:         General: No tenderness.   Neurological:      Comments: Opens eyes spontaneously, not tracking  Intubated, not following commands, moving all extremities per report   Psychiatric:      Comments: Unable to assess         Meds:    Current Facility-Administered Medications:   •  lactulose  •  HYDROmorphone  **AND** HYDROmorphone **AND** [DISCONTINUED] HYDROmorphone **AND** [DISCONTINUED] dexmedetomidine (PRECEDEX) infusion  •  HYDROmorphone  •  amLODIPine  •  ondansetron  •  Pharmacy  •  aspirin  •  acetaminophen  •  promethazine  •  enoxaparin (LOVENOX) injection  •  Respiratory Therapy Consult  •  ipratropium-albuterol  •  famotidine **OR** [DISCONTINUED] famotidine  •  senna-docusate **AND** polyethylene glycol/lytes **AND** magnesium hydroxide **AND** bisacodyl  •  MD Alert...Adult ICU Electrolyte Replacement per Pharmacy  •  lidocaine  •  labetalol  •  ondansetron  •  promethazine  •  prochlorperazine    Labs:  Recent Labs     09/22/20  0600 09/23/20  0550 09/24/20  0535   WBC 10.0 11.1* 11.1*   RBC 4.32* 4.12* 4.13*   HEMOGLOBIN 13.4* 12.8* 12.6*   HEMATOCRIT 43.5 41.4* 41.3*   .7* 100.5* 100.0*   MCH 31.0 31.1 30.5   RDW 58.3* 58.3* 58.9*   PLATELETCT 284 307 319   MPV 10.6 10.5 10.6   NEUTSPOLYS 54.20 57.50 57.60   LYMPHOCYTES 18.60* 12.40* 18.70*   MONOCYTES 6.80 9.70 9.00   EOSINOPHILS 11.90* 18.60* 9.80*   BASOPHILS 0.80 0.00 0.60   RBCMORPHOLO Present Present  --      Recent Labs     09/22/20  1220 09/22/20  1350  09/22/20  2355 09/23/20  0550 09/24/20  0535   SODIUM 137  --   --   --  137 140   POTASSIUM 6.4*  --    < > 4.9 4.7 4.0   CHLORIDE 100  --   --   --  102 104   CO2 27  --   --   --  26 26   GLUCOSE 131*  --   --   --  127* 146*   BUN 40*  --   --   --  37* 30*   CPKTOTAL  --  197*  --   --   --   --     < > = values in this interval not displayed.     Recent Labs     09/22/20  1220 09/23/20  0550 09/24/20  0535   CREATININE 0.48* 0.30* 0.19*       Imaging:  Ct-head W/o    Result Date: 9/21/2020 9/21/2020 1:56 PM HISTORY/REASON FOR EXAM:  Altered mental status. Covid 19 positive. TECHNIQUE/EXAM DESCRIPTION AND NUMBER OF VIEWS: CT of the head without contrast. The study was performed on a helical multidetector CT scanner. Contiguous 2.5 mm axial sections were obtained from the skull base  through the vertex. Up to date radiation dose reduction adjustments have been utilized to meet ALARA standards for radiation dose reduction. COMPARISON:  None available FINDINGS: Lateral ventricles are normal in size and symmetric. Cortical sulci are within normal limits. No significant mass effect or midline shift. Basal cisterns are patent. No evidence for intracranial hemorrhage. Calvaria are intact. Visualized orbits are unremarkable. Bilateral mastoid and middle ear fluid. Sphenoid and maxillary sinus mucosal thickening with secretions.     1.  No acute intracranial abnormality. 2.  Mild paranasal sinus disease. 3.  Bilateral mastoid and middle ear fluid suggesting mastoiditis and otitis media.    Dx-chest-for Line Placement Perform Procedure In: Patient's Room    Result Date: 8/29/2020 8/29/2020 1:05 PM HISTORY/REASON FOR EXAM:  intubation and central line placement. TECHNIQUE/EXAM DESCRIPTION AND NUMBER OF VIEWS: Single view of the chest. COMPARISON: None FINDINGS: Endotracheal tube terminates above the keira. Right IJ catheter tip projects over the SVC. NG tube extends into the left upper abdomen. Cardiac silhouette is enlarged. There are diffuse bilateral patchy airspace opacities. There may be a trace of left pleural effusion. There is no pneumothorax visualized.     1.  Satisfactory appearance of the tubes and lines. 2.  There is no pneumothorax. 3.  There are bilateral patchy hazy airspace opacities which could be consistent with Covid 19 pneumonia.     Dx-chest-portable (1 View)    Result Date: 9/23/2020 9/23/2020 3:17 AM HISTORY/REASON FOR EXAM:  Shortness of Breath. Covid 19 positive TECHNIQUE/EXAM DESCRIPTION AND NUMBER OF VIEWS: Single portable view of the chest. COMPARISON: One day prior FINDINGS: Lines and tubes are stable. Cardiomediastinal silhouette is stable. Diffuse interstitial and hazy opacities could represent edema and/or infection. Left basilar atelectasis, airspace disease and/or  small effusion. No pneumothorax. No acute osseous abnormality.     No significant interval change.    Dx-chest-portable (1 View)    Result Date: 9/22/2020 9/22/2020 3:39 AM HISTORY/REASON FOR EXAM:  Shortness of Breath TECHNIQUE/EXAM DESCRIPTION AND NUMBER OF VIEWS: Single portable view of the chest. COMPARISON: Yesterday FINDINGS: The cardiomediastinal silhouettes are unchanged. Previous right central venous line is no longer present. Endotracheal tube is unchanged in position. Interstitial opacities persist throughout both lungs.     Pulmonary edema, with no significant interval change.    Dx-chest-portable (1 View)    Result Date: 9/21/2020 9/21/2020 1:23 AM HISTORY/REASON FOR EXAM:  Shortness of Breath TECHNIQUE/EXAM DESCRIPTION AND NUMBER OF VIEWS: Single portable view of the chest. COMPARISON: Yesterday FINDINGS: The cardiomediastinal silhouettes are unchanged. Diffuse hazy opacities persist throughout both lungs. Endotracheal tube and right central venous line are unchanged in position.     Stable chest appearance compared with 9/20.    Dx-chest-portable (1 View)    Result Date: 9/20/2020 9/20/2020 1:54 AM HISTORY/REASON FOR EXAM:  Shortness of Breath TECHNIQUE/EXAM DESCRIPTION AND NUMBER OF VIEWS: Single portable view of the chest. COMPARISON: Yesterday FINDINGS: Patchy interstitial opacities are again seen throughout both lungs. Support devices are positionally unchanged. Cardiomediastinal silhouettes are stable.     Stable chest findings compared with 9/19.    Dx-chest-portable (1 View)    Result Date: 9/19/2020 9/19/2020 2:29 AM HISTORY/REASON FOR EXAM:  Shortness of Breath TECHNIQUE/EXAM DESCRIPTION AND NUMBER OF VIEWS: Single portable view of the chest. COMPARISON: Yesterday FINDINGS: Hazy primarily interstitial opacities persist throughout both lungs. The cardiomediastinal silhouettes are unchanged. Endotracheal tube and right central venous line are unchanged in position.     Stable diffuse  bilateral opacities, likely due to pulmonary edema.    Dx-chest-portable (1 View)    Result Date: 9/18/2020 9/18/2020 3:15 AM HISTORY/REASON FOR EXAM: Respiratory failure. TECHNIQUE/EXAM DESCRIPTION AND NUMBER OF VIEWS: Single portable view of the chest. COMPARISON: 9/17/2020 FINDINGS: LUNGS: Stable multifocal pulmonary opacities. PNEUMOTHORAX: None. LINES AND TUBES: Stable ETT. Stable course of enteric tube. Stable right IJ central catheter. MEDIASTINUM: Stable cardiac silhouette. MUSCULOSKELETAL STRUCTURES: Unchanged.     1. Stable lines and tubes. 2. Stable multifocal pulmonary opacities. No pleural effusions.     Dx-chest-portable (1 View)    Result Date: 9/17/2020 9/17/2020 2:56 AM HISTORY/REASON FOR EXAM: Respiratory failure. TECHNIQUE/EXAM DESCRIPTION AND NUMBER OF VIEWS: Single portable view of the chest. COMPARISON: 9/16/2020 FINDINGS: LUNGS: Slight interval worsening of bilateral interstitial and alveolar opacities. No effusions. PNEUMOTHORAX: None. LINES AND TUBES: Stable ETT. Stable right IJ central catheter. Stable course of enteric tube. MEDIASTINUM: Stable cardiac silhouette. MUSCULOSKELETAL STRUCTURES: Unchanged.     1. Stable lines and tubes. 2. Slight interval worsening of bilateral interstitial and alveolar opacities. No pleural effusions.     Dx-chest-portable (1 View)    Result Date: 9/16/2020 9/16/2020 2:43 AM HISTORY/REASON FOR EXAM:  Shortness of Breath. Covid 19 positive TECHNIQUE/EXAM DESCRIPTION AND NUMBER OF VIEWS: Single portable view of the chest. COMPARISON: One day prior FINDINGS: Lines and tubes are stable. Cardiomediastinal silhouette is stable. There are interstitial and hazy pulmonary opacities consistent with the history of Covid 19. No large pleural effusion or pneumothorax. No acute osseous abnormality.     No significant interval change.     Dx-chest-portable (1 View)    Result Date: 9/15/2020  9/15/2020 3:08 AM HISTORY/REASON FOR EXAM:  Shortness of Breath. Covid 19  positive TECHNIQUE/EXAM DESCRIPTION AND NUMBER OF VIEWS: Single portable view of the chest. COMPARISON: One day prior FINDINGS: Lines and tubes are stable. Cardiomediastinal silhouette is stable. There are interstitial and hazy pulmonary opacities consistent with the history of Covid 19. Some patchy airspace opacity in the left perihilar region appears more conspicuous but could be related to technical or positional differences versus worsening disease. The costophrenic angles are incompletely visualized. No large pleural effusion or pneumothorax. No acute osseous abnormality.     Bilateral pulmonary opacities with possible mild increased airspace opacity in the left perihilar region.    Dx-chest-portable (1 View)    Result Date: 9/14/2020 9/14/2020 12:38 AM HISTORY/REASON FOR EXAM:  Shortness of Breath TECHNIQUE/EXAM DESCRIPTION AND NUMBER OF VIEWS: Single portable view of the chest. COMPARISON: Yesterday FINDINGS: Hardware is stably positioned in its visualized extent. HEART: Stable size. LUNGS: Lung volumes are low. There are perihilar opacities. There are bibasilar opacities. There are faint peripheral pulmonary opacities in the RIGHT lung PLEURA: No effusion or pneumothorax.     Unchanged BILATERAL atelectasis with suspected superimposed airspace disease particularly in the RIGHT lung    Dx-chest-portable (1 View)    Result Date: 9/13/2020 9/13/2020 2:00 AM HISTORY/REASON FOR EXAM:  Shortness of Breath. TECHNIQUE/EXAM DESCRIPTION AND NUMBER OF VIEWS: Single portable view of the chest. COMPARISON: 9/12/2020 FINDINGS: Support equipment is stable.  The cardiomediastinal silhouette is stable. Extensive bilateral airspace opacities are again noted. No pleural effusion or pneumothorax is seen.     No significant interval change compared to the prior examination.    Dx-chest-portable (1 View)    Result Date: 9/12/2020 9/12/2020 2:08 AM HISTORY/REASON FOR EXAM: Shortness of Breath TECHNIQUE/EXAM DESCRIPTION:   Single AP view of the chest. COMPARISON: Yesterday FINDINGS: Position of medical devices appears stable. Cardiomegaly is observed. The mediastinal contour appears within normal limits.  The central  pulmonary vasculature appears prominent and indistinct. The lungs appear well expanded bilaterally.  Diffuse scattered hazy pulmonary parenchymal opacities are seen. No significant pleural effusions are identified. The bony structures appear age-appropriate.     1.  Pulmonary edema and/or infiltrates are identified, which are stable since the prior exam. 2.  Cardiomegaly     Dx-chest-portable (1 View)    Result Date: 9/11/2020 9/11/2020 3:46 AM HISTORY/REASON FOR EXAM: Shortness of Breath TECHNIQUE/EXAM DESCRIPTION:  Single AP view of the chest. COMPARISON: Yesterday FINDINGS: Position of medical devices appears stable. Cardiomegaly is observed. The mediastinal contour appears within normal limits.  The central  pulmonary vasculature appears prominent and indistinct. The lungs appear well expanded bilaterally.  Diffuse scattered hazy pulmonary parenchymal opacities are seen. No significant pleural effusions are identified. The bony structures appear age-appropriate.     1.  Pulmonary edema and/or infiltrates are identified, which are stable since the prior exam. 2.  Cardiomegaly     Dx-chest-portable (1 View)    Result Date: 9/10/2020    9/10/2020 3:00 AM HISTORY/REASON FOR EXAM: Shortness of Breath TECHNIQUE/EXAM DESCRIPTION:  Single AP view of the chest. COMPARISON: Yesterday FINDINGS: Position of medical devices appears stable. Cardiomegaly is observed. The mediastinal contour appears within normal limits.  The central  pulmonary vasculature appears prominent and indistinct. The lungs appear well expanded bilaterally.  Diffuse scattered hazy pulmonary parenchymal opacities are seen. No significant pleural effusions are identified. The bony structures appear age-appropriate.     1.  Pulmonary edema and/or  infiltrates are identified, which are stable since the prior exam. 2.  Cardiomegaly     Dx-chest-portable (1 View)    Result Date: 9/9/2020 9/9/2020 3:39 AM HISTORY/REASON FOR EXAM: Shortness of Breath TECHNIQUE/EXAM DESCRIPTION:  Single AP view of the chest. COMPARISON: Yesterday FINDINGS: Position of medical devices appears stable. Cardiomegaly is observed. The mediastinal contour appears within normal limits.  The central  pulmonary vasculature appears prominent and indistinct. The lungs appear well expanded bilaterally.  Diffuse scattered hazy pulmonary parenchymal opacities are seen. No significant pleural effusions are identified. The bony structures appear age-appropriate.     1.  Pulmonary edema and/or infiltrates are identified, which are stable since the prior exam. 2.  Cardiomegaly    Dx-chest-portable (1 View)    Result Date: 9/8/2020 9/8/2020 3:30 AM HISTORY/REASON FOR EXAM:  Shortness of Breath POSITIVE FOR COVID-19 TESTING TECHNIQUE/EXAM DESCRIPTION AND NUMBER OF VIEWS: Single portable view of the chest. COMPARISON: 9/7/2020 FINDINGS: Tubes and lines: ET tube, feeding tube and right central venous catheter are redemonstrated. Diffuse groundglass and airspace opacities throughout both lungs. No pleural effusion. No pneumothorax. Stable cardiopericardial silhouette.     1. No significant interval change.    Dx-chest-portable (1 View)    Result Date: 9/7/2020 9/7/2020 2:20 AM HISTORY/REASON FOR EXAM:  Shortness of Breath TECHNIQUE/EXAM DESCRIPTION AND NUMBER OF VIEWS: Single portable view of the chest. COMPARISON: 9/6/2020 FINDINGS: Tubes and lines: ET tube, feeding tube and right central venous catheter are redemonstrated. Diffuse groundglass and airspace opacities throughout both lungs. No pleural effusion. No pneumothorax. Stable cardiopericardial silhouette.     1. No significant interval change.    Dx-chest-portable (1 View)    Result Date: 9/6/2020 9/6/2020 2:33 AM HISTORY/REASON FOR EXAM:   Shortness of Breath. TECHNIQUE/EXAM DESCRIPTION AND NUMBER OF VIEWS: Single portable view of the chest. COMPARISON: 1 view chest 9/5/2020 FINDINGS: Endotracheal tube, enteric catheter, and right internal jugular catheter are unchanged in their positions and appear appropriately located. Lungs show diffuse bilateral unchanged airspace process. The cardiomediastinal silhouette is enlarged. There are no pneumothoraces. No pleural effusion are identified.     No significant change    Dx-chest-portable (1 View)    Result Date: 9/5/2020 9/5/2020 1:50 AM HISTORY/REASON FOR EXAM: Shortness of Breath TECHNIQUE/EXAM DESCRIPTION:  Single AP view of the chest. COMPARISON: Yesterday FINDINGS: Position of medical devices appears stable. Cardiomegaly is observed. The mediastinal contour appears within normal limits.  The central  pulmonary vasculature appears prominent and indistinct. The lungs appear well expanded bilaterally.  Diffuse scattered hazy pulmonary parenchymal opacities are seen. No significant pleural effusions are identified. The bony structures appear age-appropriate.     1.  Pulmonary edema and/or infiltrates are identified, which appear somewhat increased since the prior exam. 2.  Cardiomegaly    Dx-chest-portable (1 View)    Result Date: 9/4/2020 9/4/2020 3:19 AM HISTORY/REASON FOR EXAM: Shortness of Breath TECHNIQUE/EXAM DESCRIPTION:  Single AP view of the chest. COMPARISON: Yesterday FINDINGS: Position of medical devices appears stable. Cardiomegaly is observed. The mediastinal contour appears within normal limits.  The central  pulmonary vasculature appears prominent and indistinct. The lungs appear well expanded bilaterally.  Diffuse scattered hazy pulmonary parenchymal opacities are seen. No significant pleural effusions are identified. The bony structures appear age-appropriate.     1.  Pulmonary edema and/or infiltrates are identified, which are somewhat decreased since the prior exam. 2.   Cardiomegaly     Dx-chest-portable (1 View)    Result Date: 9/3/2020    9/3/2020 3:54 AM HISTORY/REASON FOR EXAM: Shortness of Breath TECHNIQUE/EXAM DESCRIPTION:  Single AP view of the chest. COMPARISON: Yesterday FINDINGS: Position of medical devices appears stable. Cardiomegaly is observed. The mediastinal contour appears within normal limits.  The central  pulmonary vasculature appears prominent and indistinct. The lungs appear well expanded bilaterally.  Diffuse scattered hazy pulmonary parenchymal opacities are seen. No significant pleural effusions are identified. The bony structures appear age-appropriate.     1.  Pulmonary edema and/or infiltrates are identified, which are stable since the prior exam. 2.  Cardiomegaly     Dx-chest-portable (1 View)    Result Date: 9/2/2020 9/2/2020 2:59 AM HISTORY/REASON FOR EXAM: For indication of respiratory failure TECHNIQUE/EXAM DESCRIPTION:  Single AP view of the chest. COMPARISON: Yesterday FINDINGS: Position of medical devices appears stable. Cardiomegaly is observed. The mediastinal contour appears within normal limits.  The central  pulmonary vasculature appears prominent and indistinct. The lungs appear well expanded bilaterally.  Diffuse scattered hazy pulmonary parenchymal opacities are seen. Veil-like opacities are seen in the right lung base. The bony structures appear age-appropriate.     1.  Pulmonary edema and/or infiltrates are identified, which appear somewhat increased since the prior exam. 2.  Layering right pleural effusion 3.  Cardiomegaly     Dx-chest-portable (1 View)    Result Date: 9/1/2020 9/1/2020 3:56 AM HISTORY/REASON FOR EXAM: Shortness of Breath TECHNIQUE/EXAM DESCRIPTION:  Single AP view of the chest. COMPARISON: Yesterday FINDINGS: Right internal jugular central line is seen terminating at the right atriocaval junction.  Endotracheal tube terminates between the clavicles and keira.  Nasogastric tube has been removed, Dobbhoff tube  has been placed in the interim which terminates below the lower margin of the film. Cardiomegaly is observed. The mediastinal contour appears within normal limits.  The central  pulmonary vasculature appears prominent and indistinct. The lungs appear well expanded bilaterally.  Diffuse scattered hazy pulmonary parenchymal opacities are seen. No significant pleural effusions are identified. The bony structures appear age-appropriate.     1.  Pulmonary edema and/or infiltrates are identified, which are slightly decreased since the prior exam. 2.  Cardiomegaly     Dx-chest-portable (1 View)    Result Date: 8/31/2020 8/31/2020 2:06 AM HISTORY/REASON FOR EXAM: Shortness of Breath TECHNIQUE/EXAM DESCRIPTION:  Single AP view of the chest. COMPARISON: Yesterday FINDINGS: Position of medical devices appears stable. Cardiomegaly is observed. The mediastinal contour appears within normal limits.  The central  pulmonary vasculature appears prominent and indistinct. The lungs appear well expanded bilaterally.  Diffuse scattered hazy pulmonary parenchymal opacities are seen. Veil-like opacities are seen in the right lung base. The bony structures appear age-appropriate.     1.  Pulmonary edema and/or infiltrates are identified, which are slightly decreased since the prior exam. 2.  Layering right pleural effusion 3.  Cardiomegaly     Dx-chest-portable (1 View)    Result Date: 8/30/2020 8/30/2020 1:47 AM HISTORY/REASON FOR EXAM:  Shortness of Breath. TECHNIQUE/EXAM DESCRIPTION AND NUMBER OF VIEWS: Single portable view of the chest. COMPARISON: Yesterday FINDINGS: Endotracheal tube is present with tip at the level of the aortic arch. NG tube is present with tip extending off the inferior aspect of film in the left upper quadrant. Right IJ central line is present with tip in expected location of the superior vena cava. There is worsening bilateral diffuse pulmonary consolidation. No pneumothorax identified.     Worsening  bilateral pulmonary consolidation.    Dx-chest-portable (1 View)    Result Date: 8/29/2020 8/29/2020 3:56 AM HISTORY/REASON FOR EXAM: Shortness of Breath TECHNIQUE/EXAM DESCRIPTION:  Single AP view of the chest. COMPARISON: Yesterday FINDINGS: Overlying cardiac leads are present. Cardiomegaly is observed. The mediastinal contour appears within normal limits.  The central  pulmonary vasculature appears prominent and indistinct. The lungs appear well expanded bilaterally.  Diffuse scattered hazy pulmonary parenchymal opacities are seen. The lung bases are partially excluded. The bony structures appear age-appropriate.     1.  Pulmonary edema and/or infiltrates are identified, which are stable since the prior exam. 2.  Cardiomegaly    Dx-chest-portable (1 View)    Result Date: 8/28/2020 8/28/2020 3:25 AM HISTORY/REASON FOR EXAM: Shortness of Breath TECHNIQUE/EXAM DESCRIPTION:  Single AP view of the chest. COMPARISON: August 27, 2020 FINDINGS: Cardiomegaly is observed. The mediastinal contour appears within normal limits.  The central  pulmonary vasculature appears prominent and indistinct. The lungs appear well expanded bilaterally.  Diffuse scattered hazy pulmonary parenchymal opacities are seen. The lung bases are partially excluded. The bony structures appear age-appropriate.     1.  Pulmonary edema and/or infiltrates are identified, which are stable since the prior exam. 2.  Cardiomegaly    Dx-chest-portable (1 View)    Result Date: 8/27/2020 8/27/2020 4:14 PM HISTORY/REASON FOR EXAM:  Shortness of Breath Suspect possible Covid-19 exposure rule out TECHNIQUE/EXAM DESCRIPTION AND NUMBER OF VIEWS: Single portable view of the chest. COMPARISON: 9/26/2018 FINDINGS: Extensive airspace opacity throughout the right lung. No pleural effusion. No pneumothorax. Stable cardiopericardial silhouette.     Extensive airspace opacity throughout the right lung, in keeping with severe multifocal lobar pneumonia.    Ct-cta  Chest Pulmonary Artery W/ Recons    Result Date: 8/27/2020 8/27/2020 6:11 PM HISTORY/REASON FOR EXAM:  PE suspected, high pretest prob; r/o PE, eval pneumonia, ? Loculation Cough and short of breath TECHNIQUE/EXAM DESCRIPTION: CT angiogram scan for pulmonary embolism with contrast, with reconstructions. 1.25 mm helical sections were obtained from the lung apices through the lung bases following the rapid bolus administration of 65 mL of Omnipaque 350 nonionic contrast. Thin-section overlapping reconstruction interval was utilized. Coronal reconstructions were generated from the axial data. MIP post processing was performed and utilized for the interpretation. Low dose optimization technique was utilized for this CT exam including automated exposure control and adjustment of the mA and/or kV according to patient size. COMPARISON: None FINDINGS: Pulmonary Embolism: No. Main Pulmonary Arteries: No. Segmental branches: No. Subsegmental branches: No. Additional Comments: None. Lungs: Extensive airspace opacities in the right middle lobe and right lower lobe. Additional airspace opacities in the left upper lobe and left lower lobe as well. Airway: Patent. Pleura: No significant pleural effusion or pneumothorax. Nodes: No enlarged mediastinal or hilar lymph nodes. Additional findings: Thoracic aorta and great vessels:  No aneurysm. Heart and pericardium: Mild coronary artery calcification. No pericardial effusion. Thoracic spine:  No fracture or malalignment. No compression deformity. Chest wall:   Unremarkable. Visualized upper abdomen: No acute abnormality.     1. No CT evidence of pulmonary embolism. 2. Extensive airspace opacities in bilateral lungs, right more the left, in keeping with severe multifocal pneumonia.     Ec-echocardiogram Complete W/ Cont    Result Date: 8/28/2020  Transthoracic Echo Report Echocardiography Laboratory CONCLUSIONS Prior study done 9/27/2018; compared to the report of the study done -  there has been increase of PASP. Normal left ventricular systolic function. Left ventricular ejection fraction is visually estimated to be 70%. Mild tricuspid regurgitation. Estimated right ventricular systolic pressure is 60 mmHg. JAELYN TAYLOR Exam Date:         2020                    12:33 Exam Location:     Inpatient Priority:          Routine Ordering Physician:        LUCILLE LÓPEZ Referring Physician: Sonographer:               Ro Grey RDCS Age:    60     Gender:    M MRN:    [id number] :    1960 BSA:    2.74   Ht (in):    70     Wt (lb):    380 Exam Type:     Complete Indications:     Edema, unspecified ICD Codes:       R60.9 CPT Codes:       13329,  BP:   144    /   84     HR:   80 Technical Quality:       Technically difficult study -                          adequate information is obtained MEASUREMENTS  (Male / Female) Normal Values 2D ECHO LV Diastolic Diameter PLAX        5 cm                  4.2 - 5.9 / 3.9 - 5.3 cm LV Systolic Diameter PLAX         4.1 cm                2.1 - 4.0 cm IVS Diastolic Thickness           0.95 cm               LVPW Diastolic Thickness          0.96 cm               RV Diameter 4C                    3.4 cm                2.5 - 2.9 cm LVOT Diameter                     2 cm                  RA Diameter                       4.5 cm                Estimated LV Ejection Fraction    70 %                  LV Ejection Fraction MOD BP       72.3 %                >= 55  % LV Ejection Fraction MOD 4C       73.9 %                LV Ejection Fraction MOD 2C       66.9 %                LA Volume Index                   28.2 cm3/m2           16 - 28 cm3/m2 IVC Diameter                      2.5 cm                DOPPLER AV Peak Velocity                  1.5 m/s               AV Peak Gradient                  9.3 mmHg              AV Mean Gradient                  5.1 mmHg              LVOT Peak Velocity                1.2 m/s               AV Area Cont Eq  vti               2.4 cm2               MV Velocity Time Integral         23.5 cm               Mitral E Point Velocity           0.93 m/s              Mitral E to A Ratio               0.85                  MV Pressure Half Time             80.1 ms               MV Area PHT                       2.7 cm2               MV Deceleration Time              276 ms                TR Peak Velocity                  344 cm/s              PV Peak Velocity                  1.2 m/s               PV Peak Gradient                  5.3 mmHg              RVOT Peak Velocity                0.94 m/s              * Indicates values subject to auto-interpretation LV EF:  70    % FINDINGS Left Ventricle Normal left ventricular chamber size. Normal left ventricular wall thickness. Normal left ventricular systolic function. Left ventricular ejection fraction is visually estimated to be 70%. Normal regional wall motion. Indeterminate diastolic function. Contrast was used to enhance visualization of the endocardial border. 3 ML of contrast was administered. Existing IV was used. Right Ventricle Mildly dilated right ventricle. Normal right ventricular systolic function. Right Atrium Normal right atrial size. Dilated inferior vena cava without inspiratory collapse. Left Atrium Normal left atrial size. Left atrial volume index is 28 mL/sq m. Mitral Valve The mitral valve is not well visualized. No mitral stenosis. Trace mitral regurgitation. Aortic Valve The aortic valve is not well visualized. No stenosis or regurgitation seen. Tricuspid Valve Structurally normal tricuspid valve. No tricuspid stenosis. Mild tricuspid regurgitation. Estimated right ventricular systolic pressure is 60 mmHg. Pulmonic Valve Structurally normal pulmonic valve. No pulmonic stenosis. No pulmonic insufficiency. Pericardium No pericardial effusion seen. Aorta Normal aortic root for body surface area. Ascending aorta diameter is 3.7 cm. Sachin Morataya M.D.  (Electronically Signed) Final Date:     28 August 2020                 14:50    Dx-abdomen For Tube Placement    Result Date: 8/31/2020 8/31/2020 6:57 PM HISTORY/REASON FOR EXAM:  Line evaluation. TECHNIQUE/EXAM DESCRIPTION AND NUMBER OF VIEWS:  1 view(s) of the abdomen. COMPARISON:  None. FINDINGS: Enteric tube has been placed. The tip projects over the stomach body. There is a paucity of bowel gas.     Feeding tube placement with the tip projecting over the stomach body.    Dx-abdomen For Tube Placement    Result Date: 8/29/2020 8/29/2020 1:05 PM HISTORY/REASON FOR EXAM:  Line evaluation. Covid infection TECHNIQUE/EXAM DESCRIPTION AND NUMBER OF VIEWS:  1 view(s) of the abdomen. COMPARISON:  None. FINDINGS: Body habitus decreases sensitivity of the study. Enteric tube has been placed. The tip projects over the gastric antrum. The bowel gas pattern is within normal limits.     NG tube terminates over the stomach.      Micro:  Results     Procedure Component Value Units Date/Time    CULTURE RESPIRATORY W/ GRM STN [[id number]]  (Abnormal)  (Susceptibility) Collected: 09/22/20 1357    Order Status: Completed Specimen: Respirate from Tracheal Aspirate Updated: 09/24/20 1159     Significant Indicator POS     Source RESP     Site TRACHEAL ASPIRATE     Culture Result Light growth usual upper respiratory alex  No clinically significant Staphylococcus aureus, Methicillin  Resistant Staphylococcus aureus, or Pseudomonas species  isolated.       Gram Stain Result Many WBCs.  Rare Gram negative rods.       Culture Result Serratia marcescens  Light growth      Narrative:      Droplet, Contact, and Eye [id number] SABATKA YANY  A  Droplet, Contact, and Eye [id number] SABATKA YANY    Susceptibility     Serratia marcescens (1)     Antibiotic Interpretation Microscan Method Status    Ceftriaxone Sensitive <=1 mcg/mL PACO Final    Ceftazidime Intermediate 16 mcg/mL PACO Final    Cefotaxime Sensitive 8 mcg/mL PACO  Final    Cefazolin Resistant >16 mcg/mL PACO Final    Ciprofloxacin Sensitive <=1 mcg/mL PACO Final    Ampicillin Sensitive <=8 mcg/mL PACO Final    Cefepime Sensitive <=2 mcg/mL PACO Final    Tobramycin Sensitive <=4 mcg/mL PACO Final    Cefotetan Sensitive <=16 mcg/mL PACO Final    Ertapenem Sensitive <=0.5 mcg/mL PACO Final    Gentamicin Sensitive <=4 mcg/mL PACO Final    Pip/Tazobactam Sensitive <=16 mcg/mL PACO Final    Trimeth/Sulfa Sensitive <=2/38 mcg/mL PACO Final                   Blood Culture [[id number]] Collected: 09/22/20 1403    Order Status: Completed Specimen: Blood Updated: 09/23/20 0720     Significant Indicator NEG     Source BLD     Site Peripheral     Culture Result No Growth  Note: Blood cultures are incubated for 5 days and  are monitored continuously.Positive blood cultures  are called to the RN and reported as soon as  they are identified.      Narrative:      Right Hand    Blood Culture [[id number]] Collected: 09/22/20 1403    Order Status: Completed Specimen: Blood Updated: 09/23/20 0720     Significant Indicator NEG     Source BLD     Site Peripheral     Culture Result No Growth  Note: Blood cultures are incubated for 5 days and  are monitored continuously.Positive blood cultures  are called to the RN and reported as soon as  they are identified.      Narrative:      Left Forearm/Arm    GRAM STAIN [[id number]] Collected: 09/22/20 1357    Order Status: Completed Specimen: Respirate Updated: 09/22/20 1808     Significant Indicator .     Source RESP     Site TRACHEAL ASPIRATE     Gram Stain Result Many WBCs.  Rare Gram negative rods.      Narrative:      Droplet, Contact, and Eye [id number] SABATKA YANY  A  Droplet, Contact, and Eye [id number] SABATKA YANY    URINALYSIS [[id number]]  (Abnormal) Collected: 09/22/20 1615    Order Status: Completed Specimen: Urine, Briseno Cath Updated: 09/22/20 1703     Color Yellow     Character Clear     Specific Gravity 1.009     Ph 5.0     Glucose  "Negative mg/dL      Ketones Negative mg/dL      Protein Negative mg/dL      Bilirubin Negative     Urobilinogen, Urine 0.2     Nitrite Negative     Leukocyte Esterase Negative     Occult Blood Trace     Micro Urine Req Microscopic    Narrative:      Droplet, Contact, and Eye [id number] JOSÉ ANTONIO HDEZ  Please exchange purvis catheter prior to obtaining specimen.    BLOOD CULTURE [[id number]] Collected: 09/22/20 1420    Order Status: Canceled Specimen: Other from Peripheral     BLOOD CULTURE [[id number]]     Order Status: Canceled Specimen: Blood from Peripheral     BLOOD CULTURE [[id number]]     Order Status: Canceled Specimen: Blood from Peripheral     BLOOD CULTURE [[id number]] Collected: 09/16/20 1425    Order Status: Completed Specimen: Blood from Peripheral Updated: 09/21/20 1700     Significant Indicator NEG     Source BLD     Site PERIPHERAL     Culture Result No growth after 5 days of incubation.    Narrative:      Droplet, Contact, and Eye [id number] BLACK RUPESH J  Per Hospital Policy: Only change Specimen Src: to \"Line\" if  specified by physician order.  Left AC    BLOOD CULTURE [[id number]] Collected: 09/16/20 1440    Order Status: Completed Specimen: Blood from Peripheral Updated: 09/21/20 1700     Significant Indicator NEG     Source BLD     Site PERIPHERAL     Culture Result No growth after 5 days of incubation.    Narrative:      Droplet, Contact, and Eye [id number] BLACK RUPESH J  Per Hospital Policy: Only change Specimen Src: to \"Line\" if  specified by physician order.  Left Hand    CULTURE RESPIRATORY W/ GRM STN [[id number]]  (Abnormal)  (Susceptibility) Collected: 09/16/20 1220    Order Status: Completed Specimen: Respirate from Tracheal Aspirate Updated: 09/18/20 1004     Significant Indicator POS     Source RESP     Site TRACHEAL ASPIRATE     Culture Result Light growth usual upper respiratory alex  No clinically significant Staphylococcus aureus, Methicillin  Resistant " Staphylococcus aureus, or Pseudomonas species  isolated.       Gram Stain Result Moderate WBCs.  Few Gram positive rods.  Few Gram positive cocci.  Rare Gram negative rods.       Culture Result Serratia marcescens  Moderate growth      Narrative:      Droplet, Contact, and Eye [id number] BLACK RUPESH J  Droplet, Contact, and Eye [id number] BLACK RUPESH J    Susceptibility     Serratia marcescens (1)     Antibiotic Interpretation Microscan Method Status    Ceftriaxone Sensitive <=1 mcg/mL PACO Final    Ceftazidime Sensitive <=1 mcg/mL PACO Final    Cefotaxime Sensitive 8 mcg/mL PACO Final    Cefazolin Resistant >16 mcg/mL PACO Final    Ciprofloxacin Sensitive <=1 mcg/mL PACO Final    Ampicillin Sensitive <=8 mcg/mL PACO Final    Cefepime Sensitive <=2 mcg/mL PACO Final    Tobramycin Sensitive <=4 mcg/mL PACO Final    Cefotetan Sensitive <=16 mcg/mL PACO Final    Ertapenem Sensitive <=0.5 mcg/mL PACO Final    Gentamicin Sensitive <=4 mcg/mL PACO Final    Pip/Tazobactam Sensitive <=16 mcg/mL PACO Final    Trimeth/Sulfa Sensitive <=2/38 mcg/mL PACO Final                         Assessment:  Rashi Shirley is a 60 y.o. male with a history of  known history of morbid obesity, hyperlipidemia, diabetes, ABELARDO, noncompliance, chronic hypoxemic respiratory failure on 2 L nasal cannula oxygen, was initially admitted on 8/27 with worsening shortness of breath, found to be profoundly hypoxemic satting 50% on nonrebreather.  SARS-CoV-2 PCR was positive, patient was intubated. Patient received a 5-day course of remdesivir 8/32 9/3, dexamethasone 10-day course. Patient was afebrile on admission, procalcitonin negative, did receive a course of ceftriaxone and azithromycin.  Patient has been febrile since 9/6/2020, with no real resolution of fevers.  He has been on multiple antibiotic courses for possible UTI and pneumonia with enterococcus and Serratia respectively, including Zosyn, ceftriaxone, most recently completed a  course of Bactrim due to eosinophilia and rash thought to be secondary to Zosyn.    Pertinent Diagnoses:  Fevers, persistent  ARDS secondary to COVID-19 pneumonia  Eosinophilia  Acute on chronic hypoxemic respiratory failure  Morbid obesity  Diabetes  ABELARDO     Plan:   -Febrile since 9/6/2020, possibly related to ARDS and inflammation secondary to COVID-19 or perhaps drug fever given significant eosinophilia which continues, improved today. Last blood cultures from 9/16 negative.  Multiple positive tracheal aspirate culture for Serratia, likely colonized.  UA with no significant pyuria  -PEEP and FiO2 improved but patient remains encephalopathic  -Completed multiple course of antibiotics as above.  Recommend holding any further antibiotics and clinically assessing.  Procalcitonin within normal range  -Antibiotics discontinued 9/22.  Mild elevated white count noted but normal neutrophils.  Eosinophilia noted suggesting drug reaction  -Tracheal aspirate growing Serratia as anticipated, likely colonized. If blood cultures turn positive or if patient develops neutrophilia, hemodynamic instability, or increased ventilator requirements, may need to target this organism -drug of choice would be cefepime     Plan was discussed with the primary team, Dr. Colindres     ID will sign off. Please feel free to call with questions.

## 2020-09-24 NOTE — PROGRESS NOTES
Infection Control note    Fever has resolved/improving, resp status is much improved. Patient is >21 days from intubation. Patient is deemed no longer infectious from COVID standpoint.     Can discontinue COVID isolation  Patient may have tracheostomy    D/w ID Dr. Fields, and intensivist, Dr. Colindres  D/w Infection Control    Chava Cash D.O.       Pulses equal bilaterally, no edema present.

## 2020-09-24 NOTE — CARE PLAN
Problem: Bowel/Gastric:  Goal: Normal bowel function is maintained or improved  Outcome: PROGRESSING SLOWER THAN EXPECTED  Frequent BMs t/o day, rectal tube order given, maintain skin integrity.     Problem: Psychosocial Needs:  Goal: Level of anxiety will decrease  Outcome: PROGRESSING AS EXPECTED  Mentation slowly improving, eyes open spontaneously, d/c'd Dilaudid IV.

## 2020-09-24 NOTE — PROGRESS NOTES
"2020 Paged dr. Urbina regarding patient's family wanting to know \"why father is awake and not responding\". Dr. Grande return call. Will need to speak to neurologist and/or Dr. Colindres regarding patient.   "

## 2020-09-25 ENCOUNTER — APPOINTMENT (OUTPATIENT)
Dept: RADIOLOGY | Facility: MEDICAL CENTER | Age: 60
DRG: 004 | End: 2020-09-25
Attending: INTERNAL MEDICINE
Payer: MEDICARE

## 2020-09-25 LAB
ACTION RANGE TRIGGERED IACRT: NO
ANION GAP SERPL CALC-SCNC: 8 MMOL/L (ref 7–16)
BASE EXCESS BLDA CALC-SCNC: 3 MMOL/L (ref -4–3)
BASOPHILS # BLD AUTO: 0.4 % (ref 0–1.8)
BASOPHILS # BLD: 0.04 K/UL (ref 0–0.12)
BODY TEMPERATURE: ABNORMAL DEGREES
BUN SERPL-MCNC: 26 MG/DL (ref 8–22)
CALCIUM SERPL-MCNC: 8.6 MG/DL (ref 8.5–10.5)
CHLORIDE SERPL-SCNC: 108 MMOL/L (ref 96–112)
CO2 BLDA-SCNC: 29 MMOL/L (ref 20–33)
CO2 SERPL-SCNC: 26 MMOL/L (ref 20–33)
CREAT SERPL-MCNC: 0.23 MG/DL (ref 0.5–1.4)
EOSINOPHIL # BLD AUTO: 0.56 K/UL (ref 0–0.51)
EOSINOPHIL NFR BLD: 6.2 % (ref 0–6.9)
ERYTHROCYTE [DISTWIDTH] IN BLOOD BY AUTOMATED COUNT: 59.9 FL (ref 35.9–50)
GLUCOSE SERPL-MCNC: 137 MG/DL (ref 65–99)
HCO3 BLDA-SCNC: 27.5 MMOL/L (ref 17–25)
HCT VFR BLD AUTO: 39.6 % (ref 42–52)
HGB BLD-MCNC: 12.4 G/DL (ref 14–18)
HOROWITZ INDEX BLDA+IHG-RTO: 220 MM[HG]
IMM GRANULOCYTES # BLD AUTO: 0.24 K/UL (ref 0–0.11)
IMM GRANULOCYTES NFR BLD AUTO: 2.7 % (ref 0–0.9)
INST. QUALIFIED PATIENT IIQPT: YES
LYMPHOCYTES # BLD AUTO: 2.1 K/UL (ref 1–4.8)
LYMPHOCYTES NFR BLD: 23.4 % (ref 22–41)
MCH RBC QN AUTO: 30.9 PG (ref 27–33)
MCHC RBC AUTO-ENTMCNC: 31.3 G/DL (ref 33.7–35.3)
MCV RBC AUTO: 98.8 FL (ref 81.4–97.8)
MONOCYTES # BLD AUTO: 0.93 K/UL (ref 0–0.85)
MONOCYTES NFR BLD AUTO: 10.4 % (ref 0–13.4)
NEUTROPHILS # BLD AUTO: 5.11 K/UL (ref 1.82–7.42)
NEUTROPHILS NFR BLD: 56.9 % (ref 44–72)
NRBC # BLD AUTO: 0 K/UL
NRBC BLD-RTO: 0 /100 WBC
O2/TOTAL GAS SETTING VFR VENT: 35 %
PCO2 BLDA: 43.3 MMHG (ref 26–37)
PCO2 TEMP ADJ BLDA: 45 MMHG (ref 26–37)
PH BLDA: 7.41 [PH] (ref 7.4–7.5)
PH TEMP ADJ BLDA: 7.4 [PH] (ref 7.4–7.5)
PLATELET # BLD AUTO: 302 K/UL (ref 164–446)
PMV BLD AUTO: 10 FL (ref 9–12.9)
PO2 BLDA: 77 MMHG (ref 64–87)
PO2 TEMP ADJ BLDA: 82 MMHG (ref 64–87)
POTASSIUM SERPL-SCNC: 3.7 MMOL/L (ref 3.6–5.5)
RBC # BLD AUTO: 4.01 M/UL (ref 4.7–6.1)
SAO2 % BLDA: 95 % (ref 93–99)
SODIUM SERPL-SCNC: 142 MMOL/L (ref 135–145)
SPECIMEN DRAWN FROM PATIENT: ABNORMAL
WBC # BLD AUTO: 9 K/UL (ref 4.8–10.8)

## 2020-09-25 PROCEDURE — 82803 BLOOD GASES ANY COMBINATION: CPT

## 2020-09-25 PROCEDURE — 71045 X-RAY EXAM CHEST 1 VIEW: CPT

## 2020-09-25 PROCEDURE — 80048 BASIC METABOLIC PNL TOTAL CA: CPT

## 2020-09-25 PROCEDURE — A9270 NON-COVERED ITEM OR SERVICE: HCPCS | Performed by: INTERNAL MEDICINE

## 2020-09-25 PROCEDURE — 85025 COMPLETE CBC W/AUTO DIFF WBC: CPT

## 2020-09-25 PROCEDURE — 99291 CRITICAL CARE FIRST HOUR: CPT | Performed by: INTERNAL MEDICINE

## 2020-09-25 PROCEDURE — 36600 WITHDRAWAL OF ARTERIAL BLOOD: CPT

## 2020-09-25 PROCEDURE — 94003 VENT MGMT INPAT SUBQ DAY: CPT

## 2020-09-25 PROCEDURE — 700102 HCHG RX REV CODE 250 W/ 637 OVERRIDE(OP): Performed by: INTERNAL MEDICINE

## 2020-09-25 PROCEDURE — 700111 HCHG RX REV CODE 636 W/ 250 OVERRIDE (IP): Performed by: INTERNAL MEDICINE

## 2020-09-25 PROCEDURE — 770022 HCHG ROOM/CARE - ICU (200)

## 2020-09-25 PROCEDURE — 94770 HCHG CO2 EXPIRED GAS DETERMINATION: CPT

## 2020-09-25 RX ADMIN — FAMOTIDINE 20 MG: 20 TABLET, FILM COATED ORAL at 06:15

## 2020-09-25 RX ADMIN — ASPIRIN 81 MG 81 MG: 81 TABLET ORAL at 06:15

## 2020-09-25 RX ADMIN — AMLODIPINE BESYLATE 10 MG: 10 TABLET ORAL at 06:15

## 2020-09-25 RX ADMIN — POTASSIUM BICARBONATE 25 MEQ: 978 TABLET, EFFERVESCENT ORAL at 10:13

## 2020-09-25 RX ADMIN — ENOXAPARIN SODIUM 150 MG: 150 INJECTION SUBCUTANEOUS at 06:17

## 2020-09-25 RX ADMIN — FAMOTIDINE 20 MG: 20 TABLET, FILM COATED ORAL at 17:59

## 2020-09-25 ASSESSMENT — PAIN DESCRIPTION - PAIN TYPE: TYPE: ACUTE PAIN

## 2020-09-25 NOTE — DISCHARGE PLANNING
Received Choice form at 0940   Agency/Facility Name: [facility]   Referral sent per Choice form at 0959.

## 2020-09-25 NOTE — PROGRESS NOTES
MD order and indication verified. Rectal tone assessed.  Balloon inflated with 45 mL of water and patency assessed. BMS then flushed with 80 mL of water. Stool return present/not present. Bedside RN educated regarding flushing BMS Qshift and need for BMS supersuser or wound care RN for balloon adjustments. Rectal tube order set in place.

## 2020-09-25 NOTE — WOUND TEAM
Wound consult placed for BMS, BMS already placed, insert rectal tube order verified, rectal tube care orders placed.

## 2020-09-25 NOTE — DISCHARGE PLANNING
Anticipated Discharge Disposition: LT[facility]    Action: spoke w/ daughter Amy [phone]. Consent for choice obtained from daughter and faxed to [facility]. Daughter agreeable to both [facility] and [facility]    Barriers to Discharge: TBD    Plan: TBD

## 2020-09-25 NOTE — PROGRESS NOTES
1135: Notified pts daughter Amy that trach will be moved to tomorrow 9/26. Appreciative of update; answered all further questions she had.

## 2020-09-25 NOTE — FLOWSHEET NOTE
Conscious Sedation Respiratory Update    FiO2%: 35 % (09/25/20 1000)  O2 (LPM): 90 (09/07/20 0800)       Events/Summary/Plan: Unable to do the Metabolic study due to the pt has Covid 19 and is in strict isolation. (09/25/20 1117)

## 2020-09-25 NOTE — CARE PLAN
Problem: Safety  Goal: Will remain free from injury  Outcome: PROGRESSING AS EXPECTED  Goal: Will remain free from falls  Outcome: PROGRESSING AS EXPECTED     Problem: Venous Thromboembolism (VTW)/Deep Vein Thrombosis (DVT) Prevention:  Goal: Patient will participate in Venous Thrombosis (VTE)/Deep Vein Thrombosis (DVT)Prevention Measures  Outcome: PROGRESSING AS EXPECTED     Problem: Bowel/Gastric:  Goal: Normal bowel function is maintained or improved  Outcome: PROGRESSING AS EXPECTED     Problem: Respiratory:  Goal: Respiratory status will improve  Outcome: PROGRESSING AS EXPECTED     Problem: Skin Integrity  Goal: Risk for impaired skin integrity will decrease  Outcome: PROGRESSING AS EXPECTED

## 2020-09-25 NOTE — PROGRESS NOTES
Critical Care Progress Note    Date of admission  8/27/2020    Chief Complaint  60 y.o. male admitted 8/27/2020 with respiratory failure and pneumonia due to SARS-CoV-2.    Hospital Course     8/29-worsening mental status, rapid shallow breathing and hypoxia required endotracheal intubation and prone positioning.  8/30-I spoke with Brittney, the patient's daughter and surrogate decision maker about initiating Remdesivir.  We discussed the risks benefits and alternatives.  All questions were answered.  She consented to initiating Remdesivir today.  8/31 -art line placed, remains on PCV, transition off rocuronium drip as tolerated, propofol/fentanyl, continue prone protocol  9/1 - remains on pressure control ventilation, PEEP 19, improved P/F ratio with prone ventilation, off rocuronium, starting trophic tube feed, full dose anticoagulation with Lovenox, day 3/5 Remdesivir, day 5 dexamethasone  9/4 - stopped prone ventilation with some skin breakdown, difficult prone protocol with morbid obesity, full ventilator support, advancing nutrition  9/5 - Ongoing diuresis with 3 times daily Lasix, advancing tube feeds to goal, remains on supine ventilation now  9/6 - PCV, weaning IP and PEEP, remains supine, ongoing aggressive diuresis, abid TF, lighten sedation as abdi  9/7 Switch to VC, switched from fentanyl to Dilaudid with improved ventilator synchrony, decrease Lasix to daily; stopped amlodipine as he is now on norepinephrine to achieve a map greater than 65  9/8 continue full vent support, compliance gradually improving  9/10 Febrile: urine, blood, sputum cultures, continue full ventilator support  9/11 febrile, increased NE, urine culture, MRSA nare, then begin zosyn  9/12 UA + for enterococcus; resp culture + Non-lactose fermenting GNR  - currently on zosyn; will await sensitivities   9/13 continues on PEEP 16, on Zosyn for Serratia and enterococcus  9/14 -    change Zosyn to Rocephin.  Continue full anticoagulation.   Continue vent support.  Off norepinephrine.  9/15 -    continue full vent support.  Force diuresis with chlorothiazide.  Continue Lovenox.  9/16 -    change Rocephin back to Zosyn due to fever.  Culture blood, sputum and check UA.  Change to ASV mode of ventilation to help with vent synchrony.  Forced diuresis with chlorothiazide.  9/17 -    continue Zosyn.  Continue vent support.  Force diuresis with chlorothiazide.  Full anticoagulation with Lovenox.  9/18 -    increase free water for hypernatremia.  Forced diuresis with Diamox.  Change Zosyn to Bactrim due to eosinophilia and rash.  Continue vent support.  9/19 -    decrease free water.  Continue Bactrim.  Continue vent support.  9/20 -    stop free water.  Force diuresis.  Continue Bactrim.  Decrease PEEP.  Continue vent support.     9/21- PEEP weaned down from 16 to 10 today, still on 40 percent, negative acute CT of head, remains encephalopathic on minimal sedation, diureses continues, Bactrim stopped (high K)    Interval Problem Update  Reviewed last 24 hour events  Trach scheduled for today postponed to tomorrow as is receiving full dose Lovenox (Lovenox discontinued)  Slightly more active today  Off all sedation except for very occasional dilaudid  BMS in place  Intermittent fevers    Prior 24 hours:  Vent/intubation day 26  Weakly following some commands intermittently with hands/eyes  Tolerates SBT  Occasional thick secretions requiring extensive suctioning  Trach scheduled (Dr Ram) for tomorrow at 1 pm  Discussed medical condition today in detail with pt's daughter (Brittney- [phone])    Prior  No major interval changes except decreased PEEP, poor mental status continues  Intermittent fevers  Infection control not clearing pt for trach secondary to fevers unknown cause  Cultures negative except sputum  Tolerates trach collar trials but not good with secretions- remains intubated day 24      Prior  Grimaces, opens eyes spont with  SAT  PERRL  ST    TF OK  Good UOP  Stop free water  Vent 23  PEEP 16   40%  ASV at 160%  Decrease PEEP to 14  Bactrim  Give Lasix 40      Review of Systems  Review of Systems   Unable to perform ROS: Acuity of condition        Vital Signs for last 24 hours   Pulse:  [] 93  Resp:  [8-35] 20  BP: (125-160)/(54-75) 142/67  SpO2:  [92 %-97 %] 97 %    Hemodynamic parameters for last 24 hours       Respiratory Information for the last 24 hours  Vent Mode: Spont  PEEP/CPAP: 10  P Support: 15  MAP: 16  Control VTE (exp VT): 384    Physical Exam   Physical Exam  Constitutional:       Appearance: He is not diaphoretic.      Comments: On ventilator   HENT:      Head: Normocephalic and atraumatic.      Right Ear: External ear normal.      Left Ear: External ear normal.      Nose: Nose normal.      Mouth/Throat:      Mouth: Mucous membranes are moist.      Pharynx: Oropharynx is clear.   Eyes:      General:         Right eye: No discharge.         Left eye: No discharge.      Pupils: Pupils are equal, round, and reactive to light.   Neck:      Musculoskeletal: Normal range of motion and neck supple.   Cardiovascular:      Pulses: Normal pulses.      Heart sounds: No murmur. No friction rub.      Comments: Sinus rhythm  Pulmonary:      Breath sounds: Rales (Unchanged crackles) present. No wheezing.   Abdominal:      General: Bowel sounds are normal. There is no distension.      Palpations: Abdomen is soft.      Tenderness: There is no abdominal tenderness. There is no rebound.      Comments: Tolerating enteral tube feedings   Musculoskeletal: Normal range of motion.         General: Swelling present.      Right lower leg: Edema present.      Left lower leg: Edema present.      Comments: No clubbing or cyanosis   Skin:     General: Skin is warm and dry.      Capillary Refill: Capillary refill takes less than 2 seconds.   Neurological:      Comments: Pupils 4 mm and briskly react to light.  He will open his eyes and  jagdeep.  He does not track or follow.         Medications  Current Facility-Administered Medications   Medication Dose Route Frequency Provider Last Rate Last Dose   • lactulose 20 GM/30ML solution 30 mL  30 mL Enteral Tube BID Jorge Colindres M.D.   Stopped at 09/24/20 1800   • HYDROmorphone pf (DILAUDID) injection 0.75 mg  0.75 mg Intravenous Q15 MIN PRN Jorge Colindres M.D.   0.75 mg at 09/24/20 2213    And   • HYDROmorphone (DILAUDID) injection 1.5 mg  1.5 mg Intravenous Q15 MIN PRN Jorge Colindres M.D.       • amLODIPine (NORVASC) tablet 10 mg  10 mg Enteral Tube Q DAY Aguilar Marquez M.D.   10 mg at 09/25/20 0615   • ondansetron (ZOFRAN ODT) dispertab 4 mg  4 mg Enteral Tube Q4HRS PRN Nii Rivera M.D.       • Pharmacy Consult: Enteral tube insertion - review meds/change route/product selection  1 Each Other PHARMACY TO DOSE Nii Rivera M.D.       • aspirin (ASA) chewable tab 81 mg  81 mg Enteral Tube DAILY Nii Rivera M.D.   81 mg at 09/25/20 0615   • acetaminophen (TYLENOL) tablet 650 mg  650 mg Enteral Tube Q6HRS PRN Nii Rivera M.D.   650 mg at 09/24/20 1215   • promethazine (PHENERGAN) tablet 12.5-25 mg  12.5-25 mg Enteral Tube Q4HRS PRN Nii Rivera M.D.       • Respiratory Therapy Consult   Nebulization Continuous RT Harjinder Crowe M.D.       • ipratropium-albuterol (DUONEB) nebulizer solution  3 mL Nebulization Q2HRS PRN (RT) Harjinder Crowe M.D.       • famotidine (PEPCID) tablet 20 mg  20 mg Enteral Tube Q12HRS Nii Rivera M.D.   20 mg at 09/25/20 0615   • senna-docusate (PERICOLACE or SENOKOT S) 8.6-50 MG per tablet 2 Tab  2 Tab Enteral Tube BID Harjinder Crowe M.D.   Stopped at 09/23/20 1800    And   • polyethylene glycol/lytes (MIRALAX) PACKET 1 Packet  1 Packet Enteral Tube QDAY PRN Harjinder Crowe M.D.   1 Packet at 09/22/20 1012    And   • magnesium hydroxide (MILK OF MAGNESIA) suspension 30 mL  30 mL Enteral Tube QDAY PRN Harjinder Crowe M.D.   30 mL at  09/22/20 1012    And   • bisacodyl (DULCOLAX) suppository 10 mg  10 mg Rectal QDAY PRN Harjinder Crowe M.D.   Stopped at 09/18/20 0524   • MD Alert...ICU Electrolyte Replacement per Pharmacy   Other PHARMACY TO DOSE Harjinder Crowe M.D.       • lidocaine (XYLOCAINE) 1 % injection 1-2 mL  1-2 mL Tracheal Tube Q30 MIN PRN Harjinder Crowe M.D.       • labetalol (NORMODYNE/TRANDATE) injection 10-20 mg  10-20 mg Intravenous Q4HRS PRN Nii Rivera M.D.       • ondansetron (ZOFRAN) syringe/vial injection 4 mg  4 mg Intravenous Q4HRS PRN Nii Rivera M.D.   4 mg at 08/29/20 0552   • promethazine (PHENERGAN) suppository 12.5-25 mg  12.5-25 mg Rectal Q4HRS PRN Nii Rivera M.D.       • prochlorperazine (COMPAZINE) injection 5-10 mg  5-10 mg Intravenous Q4HRS PRN Nii Rivera M.D.           Fluids    Intake/Output Summary (Last 24 hours) at 9/25/2020 1414  Last data filed at 9/25/2020 1000  Gross per 24 hour   Intake 675 ml   Output 1850 ml   Net -1175 ml       Laboratory  Recent Labs     09/24/20  0347 09/25/20  0344   ISTATAPH 7.428 7.412   ISTATAPCO2 44.4* 43.3*   ISTATAPO2 68 77   ISTATATCO2 31 29   [id number] 94 95   ISTATARTHCO3 29.3* 27.5*   ISTATARTBE 4* 3   ISTATTEMP 37.0 C 37.9 C   ISTATFIO2 35 35   ISTATSPEC Arterial Arterial   ISTATAPHTC 7.428 7.398*   [id number] 68 82         Recent Labs     09/23/20  0550 09/24/20  0535 09/25/20  0629   SODIUM 137 140 142   POTASSIUM 4.7 4.0 3.7   CHLORIDE 102 104 108   CO2 26 26 26   BUN 37* 30* 26*   CREATININE 0.30* 0.19* 0.23*   CALCIUM 9.1 8.9 8.6     Recent Labs     09/23/20  0550 09/24/20  0535 09/25/20  0629   GLUCOSE 127* 146* 137*     Recent Labs     09/23/20  0550 09/24/20  0535 09/25/20  0629   WBC 11.1* 11.1* 9.0   NEUTSPOLYS 57.50 57.60 56.90   LYMPHOCYTES 12.40* 18.70* 23.40   MONOCYTES 9.70 9.00 10.40   EOSINOPHILS 18.60* 9.80* 6.20   BASOPHILS 0.00 0.60 0.40     Recent Labs     09/23/20  0550 09/24/20  0535 09/25/20  0629   RBC 4.12* 4.13* 4.01*    HEMOGLOBIN 12.8* 12.6* 12.4*   HEMATOCRIT 41.4* 41.3* 39.6*   PLATELETCT 307 319 302       Imaging  X-Ray:  I have personally reviewed the images and compared with prior images. and My impression is: Very slightly improved bilateral opacities    Assessment/Plan  * Pneumonia due to severe acute respiratory syndrome coronavirus 2 (SARS-CoV-2)  Assessment & Plan  S/P remdesivir from 8/30-9/3  S/P dexamethasone, 6 mg daily for 10 days  Continue full anticoagulation with Lovenox    Off all antibiotics per ID    Acute hypoxemic respiratory failure (HCC)- (present on admission)  Assessment & Plan  Intubated 8/29  All of the appropriate ventilator bundles are in place  Proning discontinued due to skin breakdown/injury  Continue vent support  Decrease PEEP from 16-14  Force diuresis with furosemide    PEEP down to 10  Qualifies for SBP but mental status too poor for extubation    Trach tomorrow at 1 pm    PEEP 8, oxygenates/ventilates well, not great with secretions. Poor mental status is rate limiting factor for extubation  Infection control not clearing pt yet for trach secondary to fevers unknown source    Trach in am  Suspect will be able to go to [facility] soon  May need trach for several months pending neurologic recovery  LTAC consult placed     Pulmonary hypertension (HCC)  Assessment & Plan  RVSP 60 mmHg    Pneumonia  Assessment & Plan  Sputum with Serratia marcescens  Continue Bactrim  UA unremarkable  Blood cultures negative from 9/16 so far    Bactrim stopped today with rising K (received all but last day of scheduled therapy)    Bactrim off three days, K normalized now    Septic shock due to undetermined organism ([facility])  Assessment & Plan  Shock has resolved - off vasopressor support      Controlled type 2 diabetes mellitus, without long-term current use of insulin ([facility])- (present on admission)  Assessment & Plan  Glycohemoglobin 6.5  Glucose controlled    Encephalopathy  Assessment & Plan  Almost 4 weeks out  since intubation. Now on minimal sedation for days. Not following commands, does have some purposeful movement. Negative CT brain, EEG slowing- metabolic/toxic but no seizure    Showing some improvement today- weakly following some commands, non-focal exam, negtive CT, non-specific EEG, labs OK. Suspect ICU hypoactive delirium    - minimize sedation  - mobilize  - trach  - sleep at night, awake during day    Slowly improving. May take weeks/months to recover and he may never be back to pre-acute illness mental status and level of function                                                                                              Essential hypertension- (present on admission)  Assessment & Plan  Continue amlodipine, 10 mg daily    Morbid obesity with BMI of 50.0-59.9, adult (HCC)- (present on admission)  Assessment & Plan  BMI 53  Behavioral modification and nutrition counseling when clinically appropriate    ABELARDO (obstructive sleep apnea)- (present on admission)  Assessment & Plan  PSG on 1/29/2019 revealed AHI of 101.9 with minimum oxygen saturation of 50%    HLD (hyperlipidemia)- (present on admission)  Assessment & Plan  Continue statin       VTE:  Lovenox  Ulcer: H2 Antagonist  Lines: Central Line  Ongoing indication addressed, Arterial Line  Ongoing indication addressed and Briseno Catheter  Ongoing indication addressed    I have performed a physical exam and reviewed and updated ROS and Plan today (9/25/2020). In review of yesterday's note (9/24/2020), there are no changes except as documented above.     I have assessed and reassessed his respiratory status with ventilator adjustments, airway mechanics, ventilator waveforms, hemodynamics, blood pressure, cardiovascular status and his neurologic status.  He is at increased risk for worsening cardiovascular, respiratory and CNS system dysfunction.    Discussed patient condition and risk of morbidity and/or mortality with RN, RT, Pharmacy, Charge nurse / hot rounds  and QA team     The patient remains critically ill.  Critical care time = 30 minutes in directly providing and coordinating critical care and extensive data review.  No time overlap and excludes procedures.

## 2020-09-26 ENCOUNTER — APPOINTMENT (OUTPATIENT)
Dept: RADIOLOGY | Facility: MEDICAL CENTER | Age: 60
DRG: 004 | End: 2020-09-26
Attending: INTERNAL MEDICINE
Payer: MEDICARE

## 2020-09-26 LAB
ACTION RANGE TRIGGERED IACRT: NO
ANION GAP SERPL CALC-SCNC: 10 MMOL/L (ref 7–16)
BASE EXCESS BLDA CALC-SCNC: 2 MMOL/L (ref -4–3)
BASOPHILS # BLD AUTO: 0.8 % (ref 0–1.8)
BASOPHILS # BLD: 0.07 K/UL (ref 0–0.12)
BODY TEMPERATURE: NORMAL DEGREES
BUN SERPL-MCNC: 27 MG/DL (ref 8–22)
CALCIUM SERPL-MCNC: 8.7 MG/DL (ref 8.5–10.5)
CHLORIDE SERPL-SCNC: 106 MMOL/L (ref 96–112)
CO2 BLDA-SCNC: 26 MMOL/L (ref 20–33)
CO2 SERPL-SCNC: 23 MMOL/L (ref 20–33)
CREAT SERPL-MCNC: 0.34 MG/DL (ref 0.5–1.4)
EOSINOPHIL # BLD AUTO: 0.43 K/UL (ref 0–0.51)
EOSINOPHIL NFR BLD: 4.8 % (ref 0–6.9)
ERYTHROCYTE [DISTWIDTH] IN BLOOD BY AUTOMATED COUNT: 59 FL (ref 35.9–50)
GLUCOSE SERPL-MCNC: 138 MG/DL (ref 65–99)
HCO3 BLDA-SCNC: 25 MMOL/L (ref 17–25)
HCT VFR BLD AUTO: 42.6 % (ref 42–52)
HGB BLD-MCNC: 13.2 G/DL (ref 14–18)
HOROWITZ INDEX BLDA+IHG-RTO: 220 MM[HG]
IMM GRANULOCYTES # BLD AUTO: 0.15 K/UL (ref 0–0.11)
IMM GRANULOCYTES NFR BLD AUTO: 1.7 % (ref 0–0.9)
INST. QUALIFIED PATIENT IIQPT: YES
LYMPHOCYTES # BLD AUTO: 1.73 K/UL (ref 1–4.8)
LYMPHOCYTES NFR BLD: 19.4 % (ref 22–41)
MAGNESIUM SERPL-MCNC: 1.9 MG/DL (ref 1.5–2.5)
MCH RBC QN AUTO: 30.4 PG (ref 27–33)
MCHC RBC AUTO-ENTMCNC: 31 G/DL (ref 33.7–35.3)
MCV RBC AUTO: 98.2 FL (ref 81.4–97.8)
MONOCYTES # BLD AUTO: 0.87 K/UL (ref 0–0.85)
MONOCYTES NFR BLD AUTO: 9.7 % (ref 0–13.4)
NEUTROPHILS # BLD AUTO: 5.69 K/UL (ref 1.82–7.42)
NEUTROPHILS NFR BLD: 63.6 % (ref 44–72)
NRBC # BLD AUTO: 0 K/UL
NRBC BLD-RTO: 0 /100 WBC
O2/TOTAL GAS SETTING VFR VENT: 35 %
PCO2 BLDA: 33.1 MMHG (ref 26–37)
PCO2 TEMP ADJ BLDA: 33.1 MMHG (ref 26–37)
PH BLDA: 7.49 [PH] (ref 7.4–7.5)
PH TEMP ADJ BLDA: 7.49 [PH] (ref 7.4–7.5)
PHOSPHATE SERPL-MCNC: 2.6 MG/DL (ref 2.5–4.5)
PLATELET # BLD AUTO: 355 K/UL (ref 164–446)
PMV BLD AUTO: 10.1 FL (ref 9–12.9)
PO2 BLDA: 77 MMHG (ref 64–87)
PO2 TEMP ADJ BLDA: 77 MMHG (ref 64–87)
POTASSIUM SERPL-SCNC: 3.6 MMOL/L (ref 3.6–5.5)
RBC # BLD AUTO: 4.34 M/UL (ref 4.7–6.1)
SAO2 % BLDA: 96 % (ref 93–99)
SODIUM SERPL-SCNC: 139 MMOL/L (ref 135–145)
SPECIMEN DRAWN FROM PATIENT: NORMAL
WBC # BLD AUTO: 8.9 K/UL (ref 4.8–10.8)

## 2020-09-26 PROCEDURE — 87077 CULTURE AEROBIC IDENTIFY: CPT

## 2020-09-26 PROCEDURE — 700102 HCHG RX REV CODE 250 W/ 637 OVERRIDE(OP): Performed by: INTERNAL MEDICINE

## 2020-09-26 PROCEDURE — 94770 HCHG CO2 EXPIRED GAS DETERMINATION: CPT

## 2020-09-26 PROCEDURE — 700101 HCHG RX REV CODE 250: Performed by: INTERNAL MEDICINE

## 2020-09-26 PROCEDURE — 0B113F4 BYPASS TRACHEA TO CUTANEOUS WITH TRACHEOSTOMY DEVICE, PERCUTANEOUS APPROACH: ICD-10-PCS | Performed by: SURGERY

## 2020-09-26 PROCEDURE — 83735 ASSAY OF MAGNESIUM: CPT

## 2020-09-26 PROCEDURE — 85025 COMPLETE CBC W/AUTO DIFF WBC: CPT

## 2020-09-26 PROCEDURE — 80048 BASIC METABOLIC PNL TOTAL CA: CPT

## 2020-09-26 PROCEDURE — A9270 NON-COVERED ITEM OR SERVICE: HCPCS | Performed by: INTERNAL MEDICINE

## 2020-09-26 PROCEDURE — 770022 HCHG ROOM/CARE - ICU (200)

## 2020-09-26 PROCEDURE — 87186 SC STD MICRODIL/AGAR DIL: CPT

## 2020-09-26 PROCEDURE — 36600 WITHDRAWAL OF ARTERIAL BLOOD: CPT

## 2020-09-26 PROCEDURE — 93970 EXTREMITY STUDY: CPT | Mod: 26 | Performed by: INTERNAL MEDICINE

## 2020-09-26 PROCEDURE — 302978 HCHG BRONCHOSCOPY-DIAGNOSTIC

## 2020-09-26 PROCEDURE — 87205 SMEAR GRAM STAIN: CPT

## 2020-09-26 PROCEDURE — 93970 EXTREMITY STUDY: CPT

## 2020-09-26 PROCEDURE — 700111 HCHG RX REV CODE 636 W/ 250 OVERRIDE (IP): Performed by: INTERNAL MEDICINE

## 2020-09-26 PROCEDURE — 0BJ08ZZ INSPECTION OF TRACHEOBRONCHIAL TREE, VIA NATURAL OR ARTIFICIAL OPENING ENDOSCOPIC: ICD-10-PCS | Performed by: INTERNAL MEDICINE

## 2020-09-26 PROCEDURE — 84100 ASSAY OF PHOSPHORUS: CPT

## 2020-09-26 PROCEDURE — 31645 BRNCHSC W/THER ASPIR 1ST: CPT | Performed by: INTERNAL MEDICINE

## 2020-09-26 PROCEDURE — 99291 CRITICAL CARE FIRST HOUR: CPT | Mod: 25 | Performed by: INTERNAL MEDICINE

## 2020-09-26 PROCEDURE — 31600 PLANNED TRACHEOSTOMY: CPT | Performed by: SURGERY

## 2020-09-26 PROCEDURE — 94003 VENT MGMT INPAT SUBQ DAY: CPT

## 2020-09-26 PROCEDURE — 71045 X-RAY EXAM CHEST 1 VIEW: CPT

## 2020-09-26 PROCEDURE — 87070 CULTURE OTHR SPECIMN AEROBIC: CPT

## 2020-09-26 PROCEDURE — 82803 BLOOD GASES ANY COMBINATION: CPT

## 2020-09-26 RX ORDER — MAGNESIUM SULFATE HEPTAHYDRATE 40 MG/ML
2 INJECTION, SOLUTION INTRAVENOUS ONCE
Status: COMPLETED | OUTPATIENT
Start: 2020-09-26 | End: 2020-09-26

## 2020-09-26 RX ORDER — ROCURONIUM BROMIDE 10 MG/ML
50 INJECTION, SOLUTION INTRAVENOUS ONCE
Status: COMPLETED | OUTPATIENT
Start: 2020-09-26 | End: 2020-09-26

## 2020-09-26 RX ORDER — ATORVASTATIN CALCIUM 20 MG/1
20 TABLET, FILM COATED ORAL EVERY EVENING
Status: DISCONTINUED | OUTPATIENT
Start: 2020-09-26 | End: 2020-09-28 | Stop reason: HOSPADM

## 2020-09-26 RX ORDER — BISACODYL 10 MG
10 SUPPOSITORY, RECTAL RECTAL
Status: DISCONTINUED | OUTPATIENT
Start: 2020-09-26 | End: 2020-09-28 | Stop reason: HOSPADM

## 2020-09-26 RX ORDER — MIDAZOLAM HYDROCHLORIDE 1 MG/ML
1-5 INJECTION INTRAMUSCULAR; INTRAVENOUS ONCE
Status: COMPLETED | OUTPATIENT
Start: 2020-09-26 | End: 2020-09-26

## 2020-09-26 RX ORDER — POLYETHYLENE GLYCOL 3350 17 G/17G
1 POWDER, FOR SOLUTION ORAL
Status: DISCONTINUED | OUTPATIENT
Start: 2020-09-26 | End: 2020-09-28 | Stop reason: HOSPADM

## 2020-09-26 RX ORDER — AMOXICILLIN 250 MG
2 CAPSULE ORAL 2 TIMES DAILY
Status: DISCONTINUED | OUTPATIENT
Start: 2020-09-26 | End: 2020-09-28 | Stop reason: HOSPADM

## 2020-09-26 RX ADMIN — MAGNESIUM SULFATE 2 G: 2 INJECTION INTRAVENOUS at 10:08

## 2020-09-26 RX ADMIN — FAMOTIDINE 20 MG: 20 TABLET, FILM COATED ORAL at 05:54

## 2020-09-26 RX ADMIN — FENTANYL CITRATE 100 MCG: 50 INJECTION INTRAMUSCULAR; INTRAVENOUS at 13:21

## 2020-09-26 RX ADMIN — ATORVASTATIN CALCIUM 20 MG: 20 TABLET, FILM COATED ORAL at 18:21

## 2020-09-26 RX ADMIN — FAMOTIDINE 20 MG: 20 TABLET, FILM COATED ORAL at 18:21

## 2020-09-26 RX ADMIN — ACETAMINOPHEN 650 MG: 325 TABLET, FILM COATED ORAL at 07:38

## 2020-09-26 RX ADMIN — MIDAZOLAM HYDROCHLORIDE 5 MG: 1 INJECTION, SOLUTION INTRAMUSCULAR; INTRAVENOUS at 13:21

## 2020-09-26 RX ADMIN — AMLODIPINE BESYLATE 10 MG: 10 TABLET ORAL at 05:54

## 2020-09-26 RX ADMIN — POTASSIUM BICARBONATE 50 MEQ: 978 TABLET, EFFERVESCENT ORAL at 10:08

## 2020-09-26 RX ADMIN — ASPIRIN 81 MG 81 MG: 81 TABLET ORAL at 05:54

## 2020-09-26 RX ADMIN — ROCURONIUM BROMIDE 50 MG: 10 INJECTION, SOLUTION INTRAVENOUS at 13:22

## 2020-09-26 ASSESSMENT — FIBROSIS 4 INDEX: FIB4 SCORE: 2.66

## 2020-09-26 NOTE — OP REPORT
DATE OF OPERATION: 9/26/2020     PREOPERATIVE DIAGNOSIS: acute on chronic respiratory failure and recovered COVID-19 infection.    POSTOPERATIVE DIAGNOSIS: Same.     PROCEDURE PERFORMED: Percutaneous tracheostomy under bronchoscopic guidance.    SURGEON: Alexandro Ram M.D.     ASSISTANT AND ENDOSCOPIST: Harjinder Gibson D.O.    ANESTHESIA: Intravenous sedation, analgesia, and pharmacologic restraint.    INDICATIONS: The patient is a 60 year-old man with acute on chronic respiratory failure.  Percutaneous tracheostomy is carried out at the bedside under bronchoscopic guidance.     PROCEDURE: Following informed consent consent, the patient was properly identified and optimally positioned in bed. He was preoxygenated with 100% oxygen and placed on a regular ventilatory rate. Intravenous sedation, analgesia, and pharmacologic restraint was administered.    The fiberoptic bronchoscope was advance through the indwelling endotracheal tube. The endotracheal tube was repositioned just above the vocal cords under direct vision. Satisfactory ventilation and delivered tidal volumes were confirmed. The anterior neck was transilluminated at the surface landmark site for the tracheostomy. Please see Dr Gibson's dictation for full details of the bronchoscopy.    The anterior neck was prepped. The skin over the tracheostomy site was infiltrated with lidocaine with epinephrine. The bronchoscope was withdrawn well back into the endotracheal tube. A Portex® ULTRAperc® Single Stage Dilator Technique Kit was employed. The trachea was cannulated in the midline with a 14 gauge angiocatheter midway between the thyroid cartilage and suprasternal notch. A flexible guidewire was passed without resistance. The scope was advanced distally and satisfactory cannulation and wire placement was confirmed.  A #8 Blue Line Ultra® Suctionaid tracheostomy tube was passed using Selldinger technique and secured to the skin with sutures and a tracheostomy  tape.     The patient tolerated the procedure well. There were no apparent complications.         ____________________________________     Alexandro Ram M.D.    DD: 9/26/2020  1:39 PM

## 2020-09-26 NOTE — PROGRESS NOTES
1030: MDR performed with MDT; plan for trach today at 1300 w/ Dr. Ram and Dr. Thomas for bronch. Addressed pt having left upper arm swelling, hard to touch, grimacing when arm is touched. MD gave verbal order to US of B/L UE.    1420: Dr. Gibson and Dr. Ram at bedside performing tracheostomy followed by bronchoscopy. Consent obtained and placed on chart.     1445: Pt tolerated bedside procedures; VS remained stable. See documentation.     1445: Spoke with Dr. Colindres regarding MRI that was ordered days ago. Pts mentation is now improving; following commands, nodding, tracking. MD gave verbal order to cancel MRI.     1715: Called pts daughter Amy and notified her that her father's trach went well; answered all questions she had. Appreciative of update.

## 2020-09-26 NOTE — PROGRESS NOTES
Pharmacy Pharmacotherapy Consult for LOS >30 days    Admit Date: 8/27/2020      Medications were reviewed for appropriateness and ongoing need.     Current Facility-Administered Medications   Medication Dose Route Frequency Provider Last Rate Last Dose   • senna-docusate (PERICOLACE or SENOKOT S) 8.6-50 MG per tablet 2 Tab  2 Tab Enteral Tube BID Jorge Colindres M.D.        And   • polyethylene glycol/lytes (MIRALAX) PACKET 1 Packet  1 Packet Enteral Tube QDAY PRN Jorge Colindres M.D.        And   • magnesium hydroxide (MILK OF MAGNESIA) suspension 30 mL  30 mL Enteral Tube QDAY PRN Jorge Colindres M.D.        And   • bisacodyl (DULCOLAX) suppository 10 mg  10 mg Rectal QDAY PRN Jorge Colindres M.D.       • [START ON 9/27/2020] enoxaparin (LOVENOX) inj 40 mg  40 mg Subcutaneous Q12HRS Jorge Colindres M.D.       • atorvastatin (LIPITOR) tablet 20 mg  20 mg Enteral Tube Q EVENING Jorge Colindres M.D.       • lactulose 20 GM/30ML solution 30 mL  30 mL Enteral Tube BID Jorge Colindres M.D.   Stopped at 09/24/20 1800   • HYDROmorphone pf (DILAUDID) injection 0.75 mg  0.75 mg Intravenous Q15 MIN PRN Jorge Colindres M.D.   0.75 mg at 09/24/20 2213    And   • HYDROmorphone (DILAUDID) injection 1.5 mg  1.5 mg Intravenous Q15 MIN PRN Jorge Colindres M.D.       • amLODIPine (NORVASC) tablet 10 mg  10 mg Enteral Tube Q DAY Jorge Colindres M.D.   10 mg at 09/26/20 0554   • Pharmacy Consult: Enteral tube insertion - review meds/change route/product selection  1 Each Other PHARMACY TO DOSE Jorge Colindres M.D.       • aspirin (ASA) chewable tab 81 mg  81 mg Enteral Tube DAILY Jorge Colindres M.D.   81 mg at 09/26/20 0554   • acetaminophen (TYLENOL) tablet 650 mg  650 mg Enteral Tube Q6HRS PRN Jorge Colindres M.D.   650 mg at 09/26/20 0738   • Respiratory Therapy Consult   Nebulization Continuous RT Jorge Colindres M.D.       • ipratropium-albuterol (DUONEB) nebulizer solution  3 mL Nebulization Q2HRS  PRN (RT) Jorge Colindres M.D.       • famotidine (PEPCID) tablet 20 mg  20 mg Enteral Tube Q12HRS Jorge Colindres M.D.   20 mg at 09/26/20 0554   • MD Alert...ICU Electrolyte Replacement per Pharmacy   Other PHARMACY TO DOSE Jorge Colindres M.D.       • lidocaine (XYLOCAINE) 1 % injection 1-2 mL  1-2 mL Tracheal Tube Q30 MIN PRN Jorge Colindres M.D.           Recommendations:  Discontinue PRN antiemetics and antihypertensives as patient has not required a dose since admission.  Resume outpatient Lipitor 20 mg daily.    Currently has anticoagulation for COVID discontinued for trach.  This was completed today.    Discussed with Dr. Colindres - he would like to resume DVT prophylaxis with Lovenox 40 mg BID tomorrow given BMI.  In agreement with other recommendations.  Orders updated.     Brooke Hinton, PharmD, BCPS, BCCCP

## 2020-09-26 NOTE — CARE PLAN
Problem: Respiratory:  Goal: Respiratory status will improve  Outcome: PROGRESSING AS EXPECTED  Tolerating SBT for 12 hours, strong cough w/ ET tube, secretions decreasing, plan for trach.     Problem: Communication  Goal: The ability to communicate needs accurately and effectively will improve  Outcome: PROGRESSING SLOWER THAN EXPECTED   Mentation slowly improving, able to nod when spoken to, tracking staff around room.

## 2020-09-26 NOTE — CARE PLAN
Problem: Ventilation Defect:  Goal: Ability to achieve and maintain unassisted ventilation or tolerate decreased levels of ventilator support  Note: Vent Day #28    %  PEEP 10  FiO2 35%

## 2020-09-26 NOTE — CARE PLAN
Problem: Respiratory:  Goal: Respiratory status will improve  Outcome: PROGRESSING AS EXPECTED  Trach done today at bedside, tolerating SBTs when performed, secretions improving.     Problem: Communication  Goal: The ability to communicate needs accurately and effectively will improve  Outcome: PROGRESSING SLOWER THAN EXPECTED   Mentation improving, tracking staff around room, attempting to move extremities more.

## 2020-09-27 ENCOUNTER — APPOINTMENT (OUTPATIENT)
Dept: RADIOLOGY | Facility: MEDICAL CENTER | Age: 60
DRG: 004 | End: 2020-09-27
Attending: INTERNAL MEDICINE
Payer: MEDICARE

## 2020-09-27 LAB
ACTION RANGE TRIGGERED IACRT: NO
ANION GAP SERPL CALC-SCNC: 13 MMOL/L (ref 7–16)
BACTERIA BLD CULT: NORMAL
BACTERIA BLD CULT: NORMAL
BASE EXCESS BLDA CALC-SCNC: 2 MMOL/L (ref -4–3)
BASOPHILS # BLD AUTO: 0.6 % (ref 0–1.8)
BASOPHILS # BLD: 0.05 K/UL (ref 0–0.12)
BODY TEMPERATURE: ABNORMAL DEGREES
BUN SERPL-MCNC: 25 MG/DL (ref 8–22)
CALCIUM SERPL-MCNC: 8.7 MG/DL (ref 8.5–10.5)
CHLORIDE SERPL-SCNC: 107 MMOL/L (ref 96–112)
CO2 BLDA-SCNC: 28 MMOL/L (ref 20–33)
CO2 SERPL-SCNC: 23 MMOL/L (ref 20–33)
CREAT SERPL-MCNC: 0.26 MG/DL (ref 0.5–1.4)
EOSINOPHIL # BLD AUTO: 0.39 K/UL (ref 0–0.51)
EOSINOPHIL NFR BLD: 4.9 % (ref 0–6.9)
ERYTHROCYTE [DISTWIDTH] IN BLOOD BY AUTOMATED COUNT: 59.7 FL (ref 35.9–50)
GLUCOSE SERPL-MCNC: 155 MG/DL (ref 65–99)
HCO3 BLDA-SCNC: 26.7 MMOL/L (ref 17–25)
HCT VFR BLD AUTO: 42.3 % (ref 42–52)
HGB BLD-MCNC: 13.2 G/DL (ref 14–18)
HOROWITZ INDEX BLDA+IHG-RTO: 248 MM[HG]
IMM GRANULOCYTES # BLD AUTO: 0.09 K/UL (ref 0–0.11)
IMM GRANULOCYTES NFR BLD AUTO: 1.1 % (ref 0–0.9)
INST. QUALIFIED PATIENT IIQPT: YES
LYMPHOCYTES # BLD AUTO: 1.33 K/UL (ref 1–4.8)
LYMPHOCYTES NFR BLD: 16.8 % (ref 22–41)
MAGNESIUM SERPL-MCNC: 2.1 MG/DL (ref 1.5–2.5)
MCH RBC QN AUTO: 31.3 PG (ref 27–33)
MCHC RBC AUTO-ENTMCNC: 31.2 G/DL (ref 33.7–35.3)
MCV RBC AUTO: 100.2 FL (ref 81.4–97.8)
MONOCYTES # BLD AUTO: 0.68 K/UL (ref 0–0.85)
MONOCYTES NFR BLD AUTO: 8.6 % (ref 0–13.4)
NEUTROPHILS # BLD AUTO: 5.36 K/UL (ref 1.82–7.42)
NEUTROPHILS NFR BLD: 68 % (ref 44–72)
NRBC # BLD AUTO: 0 K/UL
NRBC BLD-RTO: 0 /100 WBC
O2/TOTAL GAS SETTING VFR VENT: 40 %
PCO2 BLDA: 39 MMHG (ref 26–37)
PCO2 TEMP ADJ BLDA: 39.9 MMHG (ref 26–37)
PH BLDA: 7.44 [PH] (ref 7.4–7.5)
PH TEMP ADJ BLDA: 7.44 [PH] (ref 7.4–7.5)
PLATELET # BLD AUTO: 293 K/UL (ref 164–446)
PMV BLD AUTO: 10.1 FL (ref 9–12.9)
PO2 BLDA: 99 MMHG (ref 64–87)
PO2 TEMP ADJ BLDA: 102 MMHG (ref 64–87)
POTASSIUM SERPL-SCNC: 3.4 MMOL/L (ref 3.6–5.5)
RBC # BLD AUTO: 4.22 M/UL (ref 4.7–6.1)
SAO2 % BLDA: 98 % (ref 93–99)
SIGNIFICANT IND 70042: NORMAL
SIGNIFICANT IND 70042: NORMAL
SITE SITE: NORMAL
SITE SITE: NORMAL
SODIUM SERPL-SCNC: 143 MMOL/L (ref 135–145)
SOURCE SOURCE: NORMAL
SOURCE SOURCE: NORMAL
SPECIMEN DRAWN FROM PATIENT: ABNORMAL
WBC # BLD AUTO: 7.9 K/UL (ref 4.8–10.8)

## 2020-09-27 PROCEDURE — 700102 HCHG RX REV CODE 250 W/ 637 OVERRIDE(OP): Performed by: INTERNAL MEDICINE

## 2020-09-27 PROCEDURE — 71045 X-RAY EXAM CHEST 1 VIEW: CPT

## 2020-09-27 PROCEDURE — A9270 NON-COVERED ITEM OR SERVICE: HCPCS | Performed by: INTERNAL MEDICINE

## 2020-09-27 PROCEDURE — 94003 VENT MGMT INPAT SUBQ DAY: CPT

## 2020-09-27 PROCEDURE — 770022 HCHG ROOM/CARE - ICU (200)

## 2020-09-27 PROCEDURE — 82803 BLOOD GASES ANY COMBINATION: CPT

## 2020-09-27 PROCEDURE — 99233 SBSQ HOSP IP/OBS HIGH 50: CPT | Performed by: INTERNAL MEDICINE

## 2020-09-27 PROCEDURE — 85025 COMPLETE CBC W/AUTO DIFF WBC: CPT

## 2020-09-27 PROCEDURE — 83735 ASSAY OF MAGNESIUM: CPT

## 2020-09-27 PROCEDURE — 36600 WITHDRAWAL OF ARTERIAL BLOOD: CPT

## 2020-09-27 PROCEDURE — 94760 N-INVAS EAR/PLS OXIMETRY 1: CPT

## 2020-09-27 PROCEDURE — 700111 HCHG RX REV CODE 636 W/ 250 OVERRIDE (IP): Performed by: INTERNAL MEDICINE

## 2020-09-27 PROCEDURE — 80048 BASIC METABOLIC PNL TOTAL CA: CPT

## 2020-09-27 PROCEDURE — 94640 AIRWAY INHALATION TREATMENT: CPT

## 2020-09-27 RX ADMIN — POTASSIUM BICARBONATE 50 MEQ: 978 TABLET, EFFERVESCENT ORAL at 05:57

## 2020-09-27 RX ADMIN — ASPIRIN 81 MG 81 MG: 81 TABLET ORAL at 05:57

## 2020-09-27 RX ADMIN — FAMOTIDINE 20 MG: 20 TABLET, FILM COATED ORAL at 05:57

## 2020-09-27 RX ADMIN — AMLODIPINE BESYLATE 10 MG: 10 TABLET ORAL at 05:57

## 2020-09-27 RX ADMIN — FAMOTIDINE 20 MG: 20 TABLET, FILM COATED ORAL at 17:56

## 2020-09-27 RX ADMIN — ENOXAPARIN SODIUM 40 MG: 40 INJECTION SUBCUTANEOUS at 17:55

## 2020-09-27 RX ADMIN — ATORVASTATIN CALCIUM 20 MG: 20 TABLET, FILM COATED ORAL at 17:56

## 2020-09-27 RX ADMIN — ENOXAPARIN SODIUM 40 MG: 40 INJECTION SUBCUTANEOUS at 05:57

## 2020-09-27 ASSESSMENT — FIBROSIS 4 INDEX: FIB4 SCORE: 3.22

## 2020-09-27 NOTE — CARE PLAN
Problem: Communication  Goal: The ability to communicate needs accurately and effectively will improve  Outcome: PROGRESSING AS EXPECTED  Mentation continues to improve, attempting to mouth words, strength improving in extremities.     Problem: Infection  Goal: Will remain free from infection  Outcome: PROGRESSING AS EXPECTED   Bloodwork continues to improve, fevers dropping below 100F, HR stable.

## 2020-09-27 NOTE — PROGRESS NOTES
Infection Control Note Follow up    Low grade fever appears to persist with last was on 9/26 morning.   Pt is s/p tracheostomy on 9/26  Eosinophilia has resolved.   Respiratory status remains stable at minimal oxygenation support.   Blood cultures negative.   Pt has no central lines. Briseno was exchanged.   Repeated trach aspirate 9/22 was + Serratia, which I think this is likely colonization given stable low setting mechanical ventilation.   Overall pt clinical status continues to improve    Assessment/Plan:  Given the overall significant improvement of patient's symptoms, I don't believe the persistent low-grade fever is related to COVID. Pt is no longer having active COVID.     For this reason, pt is deemed no longer infectious from Infection Control standpoint.    We can discontinue COVID isolation    D/w Dr. Colindres, Intensivist    Chava Cash D.O.

## 2020-09-27 NOTE — PROGRESS NOTES
1845: Report received from day shift RN. Patient care assumed at this time. Patient resting in bed, trach to vent tolerating well. Cardiac monitor- SB/NSR, VSS. Will continue to monitor patient.  2000: Complete assessment done per flow sheet, see flow sheet for further details. Patient drowsy able to arouse to voice/ name calling. Cardiac monitor- NSR. Trach #8 to vent at 40%FiO2. VSS. Will continue to monitor patient. ADL's per flow sheet.  2037: Care plan and education addressed.   2200: ADL's per flow sheet  0000: Complete reassessment done per flow sheet, see flow sheet for further details. No acute change noted from previous assessment. Patient more alert, able to follow simple commands, mouths words, and nods head. Cardiac monitor- NSR. Trach #8 patent to vent. VSS. Temperature trending back down at this time- will monitor. ADL's per flow sheet.  0200: ADL's per flow sheet  0339: This RN was in the room with the patient, patient was coughing, suction performed. Patient had a 8.2 second pause on the monitor, patient shook and asked if he was okay. Patient was able to nod head and back into NSR on monitor.  0350: Dr Watters made aware, no orders at this time.  0400: Complete reassessment done per flow sheet, see flow sheet for further details. Cardiac monitor- NSR, trach #8 to vent, ADL's per flow sheet.  0425: AM blood work obtained per MD orders at this time.   0503: Patient being suctioned by RT at this time, another pause occurred- see chart for details. Dr Watters paged, awaiting response.  0512: Dr Watters gave verbal order with readback to given 50mEq Potassium via Coretrack.   0600: ADL's per flow sheet.  0645: Report given to oncoming day shift RN. Patient care assumed by day shift at this time. All questions answered.

## 2020-09-27 NOTE — PROCEDURES
Procedures  Procedure Note    Date: 9/26/2020  Time: 13:00    Procedure: Bronchoscopy    Indication: Assistance to Dr. Ram with bedside percutaneous tracheostomy placement    Consent: Informed consent obtained from patient or designated decision maker after explaining the benefits/risks of the procedure including but not limited to bleeding, infection, airway trauma or loss therof, pneumothorax/hemothorax, arrhythmia, or death. Patient or surrogate expressed understanding and agreement and signed consent which can be found in the patient's chart.    Procedure: After obtaining consent, a time-out was performed. Respiratory therapy and nursing at bedside throughout procedure.  Patient provided sedation and analgesia throughout the procedure. Using a fiberoptic bronchoscope, trachea entered via ETT. Airways visualized directly bilaterally to the subsegmental airways. The bronchoscope was then placed into R main bronchus and held in place while ETT backed up through vocal cords by RT and held in place right above the cords. The bronchoscope then withdrawn to level of ETT tip to provide transillumination for the percutaneous tracheostomy. Direct observation of the needle, guidewire, and seldinger technique of the percutaneous tracheostomy placement occurred throughout the entire procedure. Once tracheostomy tube in place, bronchoscope withdrawn and again placed into the trachea through the newly inserted tracheostomy tube providing direct confirmation of appropriate placement. Findings as below. Patient tolerated procedure well without any difficulties and left in care of bedside nurse/RT.     Medications: Per surgery  Findings:  Upper airway - Not visualized as bronchoscope passed through ETT    Trachea to keira - normal mucosa without anatomic variation, lesions, or intraluminal mass    R proximal and distal airways - normal mucosa without anatomic variation, lesions, or obvious endobronchial mass, secretions:  moderate thick, bloody    L proximal and distal airways - normal mucosa without anatomic variation, lesions, or obvious endobronchial mass, secretions: mild thick, bloody    Samples - none    Complications: none  CXR (if applicable): N/A    Harjinder Gibson,   Critical Care Medicine

## 2020-09-27 NOTE — PROGRESS NOTES
12 Hour Chart Check Done.    Monitor Summary: SN/NSR on monitor. Patient did have 6 pauses overnight total. Dr Watters was made aware of all occurences.     (0.16/0.10/0.38)

## 2020-09-27 NOTE — CARE PLAN
Problem: Safety  Goal: Will remain free from injury  Outcome: PROGRESSING AS EXPECTED  Note: Fall precautions in use, bed in low position, call bell within reach, side rails up per protocol.      Problem: Respiratory:  Goal: Respiratory status will improve  Outcome: PROGRESSING AS EXPECTED  Note: Improving respiratory status at this, trach done today. Patient tolerating well. Continue to wean vent as tolerated.

## 2020-09-27 NOTE — PROGRESS NOTES
Critical Care Progress Note    Date of admission  8/27/2020    Chief Complaint  60 y.o. male admitted 8/27/2020 with respiratory failure and pneumonia due to SARS-CoV-2.    Hospital Course     8/29-worsening mental status, rapid shallow breathing and hypoxia required endotracheal intubation and prone positioning.  8/30-I spoke with Brittney, the patient's daughter and surrogate decision maker about initiating Remdesivir.  We discussed the risks benefits and alternatives.  All questions were answered.  She consented to initiating Remdesivir today.  8/31 -art line placed, remains on PCV, transition off rocuronium drip as tolerated, propofol/fentanyl, continue prone protocol  9/1 - remains on pressure control ventilation, PEEP 19, improved P/F ratio with prone ventilation, off rocuronium, starting trophic tube feed, full dose anticoagulation with Lovenox, day 3/5 Remdesivir, day 5 dexamethasone  9/4 - stopped prone ventilation with some skin breakdown, difficult prone protocol with morbid obesity, full ventilator support, advancing nutrition  9/5 - Ongoing diuresis with 3 times daily Lasix, advancing tube feeds to goal, remains on supine ventilation now  9/6 - PCV, weaning IP and PEEP, remains supine, ongoing aggressive diuresis, abdi TF, lighten sedation as abdi  9/7 Switch to VC, switched from fentanyl to Dilaudid with improved ventilator synchrony, decrease Lasix to daily; stopped amlodipine as he is now on norepinephrine to achieve a map greater than 65  9/8 continue full vent support, compliance gradually improving  9/10 Febrile: urine, blood, sputum cultures, continue full ventilator support  9/11 febrile, increased NE, urine culture, MRSA nare, then begin zosyn  9/12 UA + for enterococcus; resp culture + Non-lactose fermenting GNR  - currently on zosyn; will await sensitivities   9/13 continues on PEEP 16, on Zosyn for Serratia and enterococcus  9/14 -    change Zosyn to Rocephin.  Continue full anticoagulation.   Continue vent support.  Off norepinephrine.  9/15 -    continue full vent support.  Force diuresis with chlorothiazide.  Continue Lovenox.  9/16 -    change Rocephin back to Zosyn due to fever.  Culture blood, sputum and check UA.  Change to ASV mode of ventilation to help with vent synchrony.  Forced diuresis with chlorothiazide.  9/17 -    continue Zosyn.  Continue vent support.  Force diuresis with chlorothiazide.  Full anticoagulation with Lovenox.  9/18 -    increase free water for hypernatremia.  Forced diuresis with Diamox.  Change Zosyn to Bactrim due to eosinophilia and rash.  Continue vent support.  9/19 -    decrease free water.  Continue Bactrim.  Continue vent support.  9/20 -    stop free water.  Force diuresis.  Continue Bactrim.  Decrease PEEP.  Continue vent support.     9/21- PEEP weaned down from 16 to 10 today, still on 40 percent, negative acute CT of head, remains encephalopathic on minimal sedation, diureses continues, Bactrim stopped (high K)    Interval Problem Update  Reviewed last 24 hour events  ON continuous T-piece today with RR < 20, no distress, cough cough, good sats  COVID status cleared today by ID  Mobility (OOB to chair and effort to stand) requested  OT/PT consults already in place  LTAC referral made last week  No major overnight events  Alert, appropriate- globally slightly stronger today but remains very weak    Trach today  Lovenox to restart in am (low dose)  More awake, alert, active  Labs-no major interval change  Remains off sedation  MRI cancelled given improving mental/neuro status      Prior  Trach scheduled for today postponed to tomorrow as is receiving full dose Lovenox (Lovenox discontinued)  Slightly more active today  Off all sedation except for very occasional dilaudid  BMS in place  Intermittent fevers    Prior 24 hours:  Vent/intubation day 26  Weakly following some commands intermittently with hands/eyes  Tolerates SBT  Occasional thick secretions requiring  extensive suctioning  Trach scheduled (Dr Ram) for tomorrow at 1 pm  Discussed medical condition today in detail with pt's daughter (Manasa [phone])    Prior  No major interval changes except decreased PEEP, poor mental status continues  Intermittent fevers  Infection control not clearing pt for trach secondary to fevers unknown cause  Cultures negative except sputum  Tolerates trach collar trials but not good with secretions- remains intubated day 24      Prior  Grimaces, opens eyes spont with SAT  PERRL  ST    TF OK  Good UOP  Stop free water  Vent 23  PEEP 16   40%  ASV at 160%  Decrease PEEP to 14  Bactrim  Give Lasix 40      Review of Systems  Review of Systems   Unable to perform ROS: Acuity of condition        Vital Signs for last 24 hours   Pulse:  [62-94] 82  Resp:  [14-33] 26  BP: (118-148)/(43-85) 118/73  SpO2:  [92 %-100 %] 98 %    Hemodynamic parameters for last 24 hours       Respiratory Information for the last 24 hours  Vent Mode: ASV  Vt Target (mL): 370  PEEP/CPAP: 10  MAP: 14  Control VTE (exp VT): 557    Physical Exam   Physical Exam  Vitals signs (follows more commands more consistently) and nursing note reviewed.   Constitutional:       Appearance: He is obese. He is not diaphoretic.      Comments: On ventilator   HENT:      Head: Normocephalic and atraumatic.      Right Ear: External ear normal.      Left Ear: External ear normal.      Nose: Nose normal.      Mouth/Throat:      Mouth: Mucous membranes are moist.      Pharynx: Oropharynx is clear.   Eyes:      General:         Right eye: No discharge.         Left eye: No discharge.      Pupils: Pupils are equal, round, and reactive to light.   Neck:      Musculoskeletal: Normal range of motion and neck supple.   Cardiovascular:      Pulses: Normal pulses.      Heart sounds: No murmur. No friction rub.      Comments: Sinus rhythm  Pulmonary:      Breath sounds: Rales (Unchanged crackles) present. No wheezing.   Abdominal:       General: Bowel sounds are normal. There is no distension.      Palpations: Abdomen is soft.      Tenderness: There is no abdominal tenderness. There is no rebound.      Comments: Tolerating enteral tube feedings   Musculoskeletal: Normal range of motion.         General: Swelling present.      Right lower leg: Edema present.      Left lower leg: Edema present.      Comments: No clubbing or cyanosis   Skin:     General: Skin is warm and dry.      Capillary Refill: Capillary refill takes less than 2 seconds.   Neurological:      Mental Status: He is alert.      Comments: Tracks, follows and follows simple commands- globally very weak         Medications  Current Facility-Administered Medications   Medication Dose Route Frequency Provider Last Rate Last Dose   • senna-docusate (PERICOLACE or SENOKOT S) 8.6-50 MG per tablet 2 Tab  2 Tab Enteral Tube BID Jorge Colindres M.D.   Stopped at 09/26/20 1800    And   • polyethylene glycol/lytes (MIRALAX) PACKET 1 Packet  1 Packet Enteral Tube QDAY PRN Jorge Colindres M.D.        And   • magnesium hydroxide (MILK OF MAGNESIA) suspension 30 mL  30 mL Enteral Tube QDAY PRN Jorge Colindres M.D.        And   • bisacodyl (DULCOLAX) suppository 10 mg  10 mg Rectal QDAY PRN Jorge Colindres M.D.       • enoxaparin (LOVENOX) inj 40 mg  40 mg Subcutaneous Q12HRS Jorge Colindres M.D.   40 mg at 09/27/20 0557   • atorvastatin (LIPITOR) tablet 20 mg  20 mg Enteral Tube Q EVENING Jorge Colindres M.D.   20 mg at 09/26/20 1821   • lactulose 20 GM/30ML solution 30 mL  30 mL Enteral Tube BID Jorge Colindres M.D.   Stopped at 09/24/20 1800   • HYDROmorphone pf (DILAUDID) injection 0.75 mg  0.75 mg Intravenous Q15 MIN PRN Jorge Colindres M.D.   0.75 mg at 09/24/20 2213    And   • HYDROmorphone (DILAUDID) injection 1.5 mg  1.5 mg Intravenous Q15 MIN PRN Jorge Colindres M.D.       • amLODIPine (NORVASC) tablet 10 mg  10 mg Enteral Tube Q DAY Jorge Colindres M.D.   10 mg at  09/27/20 0557   • Pharmacy Consult: Enteral tube insertion - review meds/change route/product selection  1 Each Other PHARMACY TO DOSE Jorge Colindres M.D.       • aspirin (ASA) chewable tab 81 mg  81 mg Enteral Tube DAILY Jorge Colindres M.D.   81 mg at 09/27/20 0557   • acetaminophen (TYLENOL) tablet 650 mg  650 mg Enteral Tube Q6HRS PRN Jorge Colindres M.D.   650 mg at 09/26/20 0738   • Respiratory Therapy Consult   Nebulization Continuous RT Jorge Colindres M.D.       • ipratropium-albuterol (DUONEB) nebulizer solution  3 mL Nebulization Q2HRS PRN (RT) Jorge Colindres M.D.       • famotidine (PEPCID) tablet 20 mg  20 mg Enteral Tube Q12HRS Jorge Colindres M.D.   20 mg at 09/27/20 0557   • MD Alert...ICU Electrolyte Replacement per Pharmacy   Other PHARMACY TO DOSE Jorge Colindres M.D.       • lidocaine (XYLOCAINE) 1 % injection 1-2 mL  1-2 mL Tracheal Tube Q30 MIN PRN Jorge Colindres M.D.           Fluids    Intake/Output Summary (Last 24 hours) at 9/27/2020 1327  Last data filed at 9/27/2020 0800  Gross per 24 hour   Intake 1510 ml   Output 1955 ml   Net -445 ml       Laboratory  Recent Labs     09/25/20  0344 09/26/20  0333 09/27/20  0509   ISTATAPH 7.412 7.486 7.443   ISTATAPCO2 43.3* 33.1 39.0*   ISTATAPO2 77 77 99*   ISTATATCO2 29 26 28   AKHNVUS4YEI 95 96 98   ISTATARTHCO3 27.5* 25.0 26.7*   ISTATARTBE 3 2 2   ISTATTEMP 37.9 C 37.0 C 37.5 C   ISTATFIO2 35 35 40   ISTATSPEC Arterial Arterial Arterial   ISTATAPHTC 7.398* 7.486 7.436   [id number] 82 77 102*         Recent Labs     09/25/20  0629 09/26/20  0450 09/27/20  0425   SODIUM 142 139 143   POTASSIUM 3.7 3.6 3.4*   CHLORIDE 108 106 107   CO2 26 23 23   BUN 26* 27* 25*   CREATININE 0.23* 0.34* 0.26*   MAGNESIUM  --  1.9 2.1   PHOSPHORUS  --  2.6  --    CALCIUM 8.6 8.7 8.7     Recent Labs     09/25/20  0629 09/26/20  0450 09/27/20  0425   GLUCOSE 137* 138* 155*     Recent Labs     09/25/20  0629 09/26/20  0450 09/27/20  0425   WBC 9.0  8.9 7.9   NEUTSPOLYS 56.90 63.60 68.00   LYMPHOCYTES 23.40 19.40* 16.80*   MONOCYTES 10.40 9.70 8.60   EOSINOPHILS 6.20 4.80 4.90   BASOPHILS 0.40 0.80 0.60     Recent Labs     09/25/20  0629 09/26/20  0450 09/27/20  0425   RBC 4.01* 4.34* 4.22*   HEMOGLOBIN 12.4* 13.2* 13.2*   HEMATOCRIT 39.6* 42.6 42.3   PLATELETCT 302 355 293       Imaging  X-Ray:  I have personally reviewed the images and compared with prior images. and My impression is: Very slightly improved bilateral opacities    Assessment/Plan  * Pneumonia due to severe acute respiratory syndrome coronavirus 2 (SARS-CoV-2)  Assessment & Plan  S/P remdesivir from 8/30-9/3  S/P dexamethasone, 6 mg daily for 10 days  Continue full anticoagulation with Lovenox    Off all antibiotics per ID  Pulmonary status improving slowly    Acute hypoxemic respiratory failure (HCC)- (present on admission)  Assessment & Plan  Intubated 8/29  All of the appropriate ventilator bundles are in place  Proning discontinued due to skin breakdown/injury  Continue vent support  Decrease PEEP from 16-14  Force diuresis with furosemide    PEEP down to 10  Qualifies for SBP but mental status too poor for extubation    Trach tomorrow at 1 pm    PEEP 8, oxygenates/ventilates well, not great with secretions. Poor mental status is rate limiting factor for extubation  Infection control not clearing pt yet for trach secondary to fevers unknown source    Trach done  T-piece trial starting in am  Given global weakness may require trach for months  LTAC referral made  Mobility    Now on T-piece- will go continuous unless fatigues, issues with secretions or change in mental status  LTAC referral for weakness of critical illness- likely to persist for many weeks/ months    Pulmonary hypertension (HCC)  Assessment & Plan  RVSP 60 mmHg    Pneumonia  Assessment & Plan  Sputum with Serratia marcescens  Continue Bactrim  UA unremarkable  Blood cultures negative from 9/16 so far    Bactrim stopped today  with rising K (received all but last day of scheduled therapy)    Bactrim off 5 days, High k issues resolved    Septic shock due to undetermined organism (HCC)  Assessment & Plan  Shock has resolved - off vasopressor support      Controlled type 2 diabetes mellitus, without long-term current use of insulin ([facility])- (present on admission)  Assessment & Plan  Glycohemoglobin 6.5  Glucose controlled    Encephalopathy  Assessment & Plan  Resolved off all sedation. Weakness of critical illness may take long time resolve and pt may never return to baseline function.  LTAC referral                                                                                             Essential hypertension- (present on admission)  Assessment & Plan  Continue amlodipine, 10 mg daily    Morbid obesity with BMI of 50.0-59.9, adult (HCC)- (present on admission)  Assessment & Plan  BMI 53  Behavioral modification and nutrition counseling when clinically appropriate    ABELARDO (obstructive sleep apnea)- (present on admission)  Assessment & Plan  PSG on 1/29/2019 revealed AHI of 101.9 with minimum oxygen saturation of 50%    HLD (hyperlipidemia)- (present on admission)  Assessment & Plan  Continue statin       VTE:  Lovenox  Ulcer: H2 Antagonist  Lines: Central Line  Ongoing indication addressed, Arterial Line  Ongoing indication addressed and Briseno Catheter  Ongoing indication addressed    I have performed a physical exam and reviewed and updated ROS and Plan today (9/27/2020). In review of yesterday's note (9/26/2020), there are no changes except as documented above.     I have assessed and reassessed his respiratory status with ventilator adjustments, airway mechanics, ventilator waveforms, hemodynamics, blood pressure, cardiovascular status and his neurologic status.  He is at increased risk for worsening cardiovascular, respiratory and CNS system dysfunction.    Discussed patient condition and risk of morbidity and/or mortality with RN, RT,  Pharmacy, Charge nurse / hot rounds and QA team

## 2020-09-27 NOTE — PROGRESS NOTES
Critical Care Progress Note    Date of admission  8/27/2020    Chief Complaint  60 y.o. male admitted 8/27/2020 with respiratory failure and pneumonia due to SARS-CoV-2.    Hospital Course     8/29-worsening mental status, rapid shallow breathing and hypoxia required endotracheal intubation and prone positioning.  8/30-I spoke with Brittney, the patient's daughter and surrogate decision maker about initiating Remdesivir.  We discussed the risks benefits and alternatives.  All questions were answered.  She consented to initiating Remdesivir today.  8/31 -art line placed, remains on PCV, transition off rocuronium drip as tolerated, propofol/fentanyl, continue prone protocol  9/1 - remains on pressure control ventilation, PEEP 19, improved P/F ratio with prone ventilation, off rocuronium, starting trophic tube feed, full dose anticoagulation with Lovenox, day 3/5 Remdesivir, day 5 dexamethasone  9/4 - stopped prone ventilation with some skin breakdown, difficult prone protocol with morbid obesity, full ventilator support, advancing nutrition  9/5 - Ongoing diuresis with 3 times daily Lasix, advancing tube feeds to goal, remains on supine ventilation now  9/6 - PCV, weaning IP and PEEP, remains supine, ongoing aggressive diuresis, abdi TF, lighten sedation as abdi  9/7 Switch to VC, switched from fentanyl to Dilaudid with improved ventilator synchrony, decrease Lasix to daily; stopped amlodipine as he is now on norepinephrine to achieve a map greater than 65  9/8 continue full vent support, compliance gradually improving  9/10 Febrile: urine, blood, sputum cultures, continue full ventilator support  9/11 febrile, increased NE, urine culture, MRSA nare, then begin zosyn  9/12 UA + for enterococcus; resp culture + Non-lactose fermenting GNR  - currently on zosyn; will await sensitivities   9/13 continues on PEEP 16, on Zosyn for Serratia and enterococcus  9/14 -    change Zosyn to Rocephin.  Continue full anticoagulation.   Continue vent support.  Off norepinephrine.  9/15 -    continue full vent support.  Force diuresis with chlorothiazide.  Continue Lovenox.  9/16 -    change Rocephin back to Zosyn due to fever.  Culture blood, sputum and check UA.  Change to ASV mode of ventilation to help with vent synchrony.  Forced diuresis with chlorothiazide.  9/17 -    continue Zosyn.  Continue vent support.  Force diuresis with chlorothiazide.  Full anticoagulation with Lovenox.  9/18 -    increase free water for hypernatremia.  Forced diuresis with Diamox.  Change Zosyn to Bactrim due to eosinophilia and rash.  Continue vent support.  9/19 -    decrease free water.  Continue Bactrim.  Continue vent support.  9/20 -    stop free water.  Force diuresis.  Continue Bactrim.  Decrease PEEP.  Continue vent support.     9/21- PEEP weaned down from 16 to 10 today, still on 40 percent, negative acute CT of head, remains encephalopathic on minimal sedation, diureses continues, Bactrim stopped (high K)    Interval Problem Update  Reviewed last 24 hour events  Trach today  Lovenox to restart in am (low dose)  More awake, alert, active  Labs-no major interval change  Remains off sedation  MRI cancelled given improving mental/neuro status      Prior  Trach scheduled for today postponed to tomorrow as is receiving full dose Lovenox (Lovenox discontinued)  Slightly more active today  Off all sedation except for very occasional dilaudid  BMS in place  Intermittent fevers    Prior 24 hours:  Vent/intubation day 26  Weakly following some commands intermittently with hands/eyes  Tolerates SBT  Occasional thick secretions requiring extensive suctioning  Trach scheduled (Dr Ram) for tomorrow at 1 pm  Discussed medical condition today in detail with pt's daughter (Manasa [phone])    Prior  No major interval changes except decreased PEEP, poor mental status continues  Intermittent fevers  Infection control not clearing pt for trach secondary to fevers  unknown cause  Cultures negative except sputum  Tolerates trach collar trials but not good with secretions- remains intubated day 24      Prior  Grimaces, opens eyes spont with SAT  PERRL  ST    TF OK  Good UOP  Stop free water  Vent 23  PEEP 16   40%  ASV at 160%  Decrease PEEP to 14  Bactrim  Give Lasix 40      Review of Systems  Review of Systems   Unable to perform ROS: Acuity of condition        Vital Signs for last 24 hours   Pulse:  [] 79  Resp:  [15-34] 24  BP: (128-170)/(56-81) 135/70  SpO2:  [92 %-100 %] 98 %    Hemodynamic parameters for last 24 hours       Respiratory Information for the last 24 hours  Vent Mode: ASV  Vt Target (mL): 370  PEEP/CPAP: 10  MAP: 16  Control VTE (exp VT): 700    Physical Exam   Physical Exam  Vitals signs (follows more commands more consistently) and nursing note reviewed.   Constitutional:       Appearance: He is obese. He is not diaphoretic.      Comments: On ventilator   HENT:      Head: Normocephalic and atraumatic.      Right Ear: External ear normal.      Left Ear: External ear normal.      Nose: Nose normal.      Mouth/Throat:      Mouth: Mucous membranes are moist.      Pharynx: Oropharynx is clear.   Eyes:      General:         Right eye: No discharge.         Left eye: No discharge.      Pupils: Pupils are equal, round, and reactive to light.   Neck:      Musculoskeletal: Normal range of motion and neck supple.   Cardiovascular:      Pulses: Normal pulses.      Heart sounds: No murmur. No friction rub.      Comments: Sinus rhythm  Pulmonary:      Breath sounds: Rales (Unchanged crackles) present. No wheezing.   Abdominal:      General: Bowel sounds are normal. There is no distension.      Palpations: Abdomen is soft.      Tenderness: There is no abdominal tenderness. There is no rebound.      Comments: Tolerating enteral tube feedings   Musculoskeletal: Normal range of motion.         General: Swelling present.      Right lower leg: Edema present.       Left lower leg: Edema present.      Comments: No clubbing or cyanosis   Skin:     General: Skin is warm and dry.      Capillary Refill: Capillary refill takes less than 2 seconds.   Neurological:      Mental Status: He is alert.      Comments: Tracks, follows and follows simple commands- globally very weak         Medications  Current Facility-Administered Medications   Medication Dose Route Frequency Provider Last Rate Last Dose   • senna-docusate (PERICOLACE or SENOKOT S) 8.6-50 MG per tablet 2 Tab  2 Tab Enteral Tube BID Jorge Colindres M.D.        And   • polyethylene glycol/lytes (MIRALAX) PACKET 1 Packet  1 Packet Enteral Tube QDAY PRN Jorge Colindres M.D.        And   • magnesium hydroxide (MILK OF MAGNESIA) suspension 30 mL  30 mL Enteral Tube QDAY PRN Jorge Colindres M.D.        And   • bisacodyl (DULCOLAX) suppository 10 mg  10 mg Rectal QDAY PRN Jorge Colindres M.D.       • [START ON 9/27/2020] enoxaparin (LOVENOX) inj 40 mg  40 mg Subcutaneous Q12HRS Jorge Colindres M.D.       • atorvastatin (LIPITOR) tablet 20 mg  20 mg Enteral Tube Q EVENING Jorge Colindres M.D.       • lactulose 20 GM/30ML solution 30 mL  30 mL Enteral Tube BID Jorge Colindres M.D.   Stopped at 09/24/20 1800   • HYDROmorphone pf (DILAUDID) injection 0.75 mg  0.75 mg Intravenous Q15 MIN PRN Jorge Colindres M.D.   0.75 mg at 09/24/20 2213    And   • HYDROmorphone (DILAUDID) injection 1.5 mg  1.5 mg Intravenous Q15 MIN PRN Jorge Colindres M.D.       • amLODIPine (NORVASC) tablet 10 mg  10 mg Enteral Tube Q DAY Jorge Colindres M.D.   10 mg at 09/26/20 0554   • Pharmacy Consult: Enteral tube insertion - review meds/change route/product selection  1 Each Other PHARMACY TO DOSE Jorge Colindres M.D.       • aspirin (ASA) chewable tab 81 mg  81 mg Enteral Tube DAILY Jorge Colindres M.D.   81 mg at 09/26/20 0554   • acetaminophen (TYLENOL) tablet 650 mg  650 mg Enteral Tube Q6HRS PRN Jorge Colindres M.D.   650 mg at  09/26/20 0738   • Respiratory Therapy Consult   Nebulization Continuous RT Jorge Colindres M.D.       • ipratropium-albuterol (DUONEB) nebulizer solution  3 mL Nebulization Q2HRS PRN (RT) Jorge Colindres M.D.       • famotidine (PEPCID) tablet 20 mg  20 mg Enteral Tube Q12HRS Jorge Colindres M.D.   20 mg at 09/26/20 0554   • MD Alert...ICU Electrolyte Replacement per Pharmacy   Other PHARMACY TO DOSE Jorge Colindres M.D.       • lidocaine (XYLOCAINE) 1 % injection 1-2 mL  1-2 mL Tracheal Tube Q30 MIN PRN Jorge Colindres M.D.           Fluids    Intake/Output Summary (Last 24 hours) at 9/26/2020 1717  Last data filed at 9/26/2020 1600  Gross per 24 hour   Intake 250 ml   Output 1400 ml   Net -1150 ml       Laboratory  Recent Labs     09/24/20  0347 09/25/20  0344 09/26/20  0333   ISTATAPH 7.428 7.412 7.486   ISTATAPCO2 44.4* 43.3* 33.1   ISTATAPO2 68 77 77   ISTATATCO2 31 29 26   WQZNBJL1AYL 94 95 96   ISTATARTHCO3 29.3* 27.5* 25.0   ISTATARTBE 4* 3 2   ISTATTEMP 37.0 C 37.9 C 37.0 C   ISTATFIO2 35 35 35   ISTATSPEC Arterial Arterial Arterial   ISTATAPHTC 7.428 7.398* 7.486   [id number] 68 82 77         Recent Labs     09/24/20  0535 09/25/20  0629 09/26/20  0450   SODIUM 140 142 139   POTASSIUM 4.0 3.7 3.6   CHLORIDE 104 108 106   CO2 26 26 23   BUN 30* 26* 27*   CREATININE 0.19* 0.23* 0.34*   MAGNESIUM  --   --  1.9   PHOSPHORUS  --   --  2.6   CALCIUM 8.9 8.6 8.7     Recent Labs     09/24/20  0535 09/25/20  0629 09/26/20  0450   GLUCOSE 146* 137* 138*     Recent Labs     09/24/20  0535 09/25/20  0629 09/26/20  0450   WBC 11.1* 9.0 8.9   NEUTSPOLYS 57.60 56.90 63.60   LYMPHOCYTES 18.70* 23.40 19.40*   MONOCYTES 9.00 10.40 9.70   EOSINOPHILS 9.80* 6.20 4.80   BASOPHILS 0.60 0.40 0.80     Recent Labs     09/24/20  0535 09/25/20  0629 09/26/20  0450   RBC 4.13* 4.01* 4.34*   HEMOGLOBIN 12.6* 12.4* 13.2*   HEMATOCRIT 41.3* 39.6* 42.6   PLATELETCT 319 302 355       Imaging  X-Ray:  I have personally reviewed  the images and compared with prior images. and My impression is: Very slightly improved bilateral opacities    Assessment/Plan  * Pneumonia due to severe acute respiratory syndrome coronavirus 2 (SARS-CoV-2)  Assessment & Plan  S/P remdesivir from 8/30-9/3  S/P dexamethasone, 6 mg daily for 10 days  Continue full anticoagulation with Lovenox    Off all antibiotics per ID    Acute hypoxemic respiratory failure (HCC)- (present on admission)  Assessment & Plan  Intubated 8/29  All of the appropriate ventilator bundles are in place  Proning discontinued due to skin breakdown/injury  Continue vent support  Decrease PEEP from 16-14  Force diuresis with furosemide    PEEP down to 10  Qualifies for SBP but mental status too poor for extubation    Trach tomorrow at 1 pm    PEEP 8, oxygenates/ventilates well, not great with secretions. Poor mental status is rate limiting factor for extubation  Infection control not clearing pt yet for trach secondary to fevers unknown source    Trach done  T-piece trial starting in am  Given global weakness may require trach for months  [facility] referral made  Mobility    Pulmonary hypertension (HCC)  Assessment & Plan  RVSP 60 mmHg    Pneumonia  Assessment & Plan  Sputum with Serratia marcescens  Continue Bactrim  UA unremarkable  Blood cultures negative from 9/16 so far    Bactrim stopped today with rising K (received all but last day of scheduled therapy)    Bactrim off three days, K normalized now    Septic shock due to undetermined organism ([facility])  Assessment & Plan  Shock has resolved - off vasopressor support      Controlled type 2 diabetes mellitus, without long-term current use of insulin ([facility])- (present on admission)  Assessment & Plan  Glycohemoglobin 6.5  Glucose controlled    Encephalopathy  Assessment & Plan  More active, very weak- suspect weakness of critical illness which may improve slowly in weeks to months to come                                                                                              Essential hypertension- (present on admission)  Assessment & Plan  Continue amlodipine, 10 mg daily    Morbid obesity with BMI of 50.0-59.9, adult (HCC)- (present on admission)  Assessment & Plan  BMI 53  Behavioral modification and nutrition counseling when clinically appropriate    ABELARDO (obstructive sleep apnea)- (present on admission)  Assessment & Plan  PSG on 1/29/2019 revealed AHI of 101.9 with minimum oxygen saturation of 50%    HLD (hyperlipidemia)- (present on admission)  Assessment & Plan  Continue statin       VTE:  Lovenox  Ulcer: H2 Antagonist  Lines: Central Line  Ongoing indication addressed, Arterial Line  Ongoing indication addressed and Briseno Catheter  Ongoing indication addressed    I have performed a physical exam and reviewed and updated ROS and Plan today (9/26/2020). In review of yesterday's note (9/25/2020), there are no changes except as documented above.     I have assessed and reassessed his respiratory status with ventilator adjustments, airway mechanics, ventilator waveforms, hemodynamics, blood pressure, cardiovascular status and his neurologic status.  He is at increased risk for worsening cardiovascular, respiratory and CNS system dysfunction.    Discussed patient condition and risk of morbidity and/or mortality with RN, RT, Pharmacy, Charge nurse / hot rounds and QA team     The patient remains critically ill.  Critical care time = 40 minutes in directly providing and coordinating critical care and extensive data review.  No time overlap and excludes procedures.

## 2020-09-28 ENCOUNTER — APPOINTMENT (OUTPATIENT)
Dept: RADIOLOGY | Facility: MEDICAL CENTER | Age: 60
DRG: 004 | End: 2020-09-28
Attending: INTERNAL MEDICINE
Payer: MEDICARE

## 2020-09-28 VITALS
TEMPERATURE: 99.3 F | SYSTOLIC BLOOD PRESSURE: 152 MMHG | BODY MASS INDEX: 45.1 KG/M2 | OXYGEN SATURATION: 93 % | HEIGHT: 70 IN | DIASTOLIC BLOOD PRESSURE: 79 MMHG | WEIGHT: 315 LBS | HEART RATE: 110 BPM | RESPIRATION RATE: 31 BRPM

## 2020-09-28 LAB
ALBUMIN SERPL BCP-MCNC: 2.9 G/DL (ref 3.2–4.9)
ALBUMIN/GLOB SERPL: 0.7 G/DL
ALP SERPL-CCNC: 104 U/L (ref 30–99)
ALT SERPL-CCNC: 39 U/L (ref 2–50)
ANION GAP SERPL CALC-SCNC: 11 MMOL/L (ref 7–16)
AST SERPL-CCNC: 27 U/L (ref 12–45)
BACTERIA SPEC RESP CULT: ABNORMAL
BACTERIA SPEC RESP CULT: ABNORMAL
BASOPHILS # BLD AUTO: 0.9 % (ref 0–1.8)
BASOPHILS # BLD: 0.07 K/UL (ref 0–0.12)
BILIRUB SERPL-MCNC: 0.6 MG/DL (ref 0.1–1.5)
BUN SERPL-MCNC: 21 MG/DL (ref 8–22)
CALCIUM SERPL-MCNC: 8.7 MG/DL (ref 8.5–10.5)
CHLORIDE SERPL-SCNC: 106 MMOL/L (ref 96–112)
CO2 SERPL-SCNC: 24 MMOL/L (ref 20–33)
CREAT SERPL-MCNC: 0.18 MG/DL (ref 0.5–1.4)
EOSINOPHIL # BLD AUTO: 0.69 K/UL (ref 0–0.51)
EOSINOPHIL NFR BLD: 8.6 % (ref 0–6.9)
ERYTHROCYTE [DISTWIDTH] IN BLOOD BY AUTOMATED COUNT: 57.7 FL (ref 35.9–50)
GLOBULIN SER CALC-MCNC: 4.4 G/DL (ref 1.9–3.5)
GLUCOSE SERPL-MCNC: 146 MG/DL (ref 65–99)
GRAM STN SPEC: ABNORMAL
GRAM STN SPEC: NORMAL
HCT VFR BLD AUTO: 42.8 % (ref 42–52)
HGB BLD-MCNC: 13.5 G/DL (ref 14–18)
IMM GRANULOCYTES # BLD AUTO: 0.05 K/UL (ref 0–0.11)
IMM GRANULOCYTES NFR BLD AUTO: 0.6 % (ref 0–0.9)
LYMPHOCYTES # BLD AUTO: 1.21 K/UL (ref 1–4.8)
LYMPHOCYTES NFR BLD: 15 % (ref 22–41)
MCH RBC QN AUTO: 30.9 PG (ref 27–33)
MCHC RBC AUTO-ENTMCNC: 31.5 G/DL (ref 33.7–35.3)
MCV RBC AUTO: 97.9 FL (ref 81.4–97.8)
MONOCYTES # BLD AUTO: 0.64 K/UL (ref 0–0.85)
MONOCYTES NFR BLD AUTO: 7.9 % (ref 0–13.4)
NEUTROPHILS # BLD AUTO: 5.41 K/UL (ref 1.82–7.42)
NEUTROPHILS NFR BLD: 67 % (ref 44–72)
NRBC # BLD AUTO: 0 K/UL
NRBC BLD-RTO: 0 /100 WBC
PLATELET # BLD AUTO: 313 K/UL (ref 164–446)
PMV BLD AUTO: 10.4 FL (ref 9–12.9)
POTASSIUM SERPL-SCNC: 3.6 MMOL/L (ref 3.6–5.5)
PROT SERPL-MCNC: 7.3 G/DL (ref 6–8.2)
RBC # BLD AUTO: 4.37 M/UL (ref 4.7–6.1)
SIGNIFICANT IND 70042: ABNORMAL
SIGNIFICANT IND 70042: NORMAL
SITE SITE: ABNORMAL
SITE SITE: NORMAL
SODIUM SERPL-SCNC: 141 MMOL/L (ref 135–145)
SOURCE SOURCE: ABNORMAL
SOURCE SOURCE: NORMAL
WBC # BLD AUTO: 8.1 K/UL (ref 4.8–10.8)

## 2020-09-28 PROCEDURE — 85025 COMPLETE CBC W/AUTO DIFF WBC: CPT

## 2020-09-28 PROCEDURE — 71045 X-RAY EXAM CHEST 1 VIEW: CPT

## 2020-09-28 PROCEDURE — 700111 HCHG RX REV CODE 636 W/ 250 OVERRIDE (IP): Performed by: INTERNAL MEDICINE

## 2020-09-28 PROCEDURE — 99239 HOSP IP/OBS DSCHRG MGMT >30: CPT | Performed by: INTERNAL MEDICINE

## 2020-09-28 PROCEDURE — 700102 HCHG RX REV CODE 250 W/ 637 OVERRIDE(OP): Performed by: INTERNAL MEDICINE

## 2020-09-28 PROCEDURE — A9270 NON-COVERED ITEM OR SERVICE: HCPCS | Performed by: INTERNAL MEDICINE

## 2020-09-28 PROCEDURE — 94640 AIRWAY INHALATION TREATMENT: CPT

## 2020-09-28 PROCEDURE — 80053 COMPREHEN METABOLIC PANEL: CPT

## 2020-09-28 RX ORDER — ACETAMINOPHEN 325 MG/1
650 TABLET ORAL EVERY 6 HOURS PRN
Qty: 30 TAB | Refills: 0 | Status: ON HOLD
Start: 2020-09-28 | End: 2022-11-08

## 2020-09-28 RX ORDER — FUROSEMIDE 10 MG/ML
40 INJECTION INTRAMUSCULAR; INTRAVENOUS
Status: DISCONTINUED | OUTPATIENT
Start: 2020-09-28 | End: 2020-09-28 | Stop reason: HOSPADM

## 2020-09-28 RX ORDER — LACTULOSE 20 G/30ML
30 SOLUTION ORAL 2 TIMES DAILY
Qty: 450 ML | Status: ON HOLD
Start: 2020-09-28 | End: 2022-11-08

## 2020-09-28 RX ORDER — FUROSEMIDE 10 MG/ML
40 INJECTION INTRAMUSCULAR; INTRAVENOUS DAILY
Refills: 0 | Status: ON HOLD
Start: 2020-09-29 | End: 2022-11-08

## 2020-09-28 RX ORDER — FUROSEMIDE 10 MG/ML
40 INJECTION INTRAMUSCULAR; INTRAVENOUS ONCE
Status: DISCONTINUED | OUTPATIENT
Start: 2020-09-28 | End: 2020-09-28

## 2020-09-28 RX ADMIN — ASPIRIN 81 MG 81 MG: 81 TABLET ORAL at 06:20

## 2020-09-28 RX ADMIN — POTASSIUM BICARBONATE 50 MEQ: 978 TABLET, EFFERVESCENT ORAL at 09:35

## 2020-09-28 RX ADMIN — AMLODIPINE BESYLATE 10 MG: 10 TABLET ORAL at 06:20

## 2020-09-28 RX ADMIN — FUROSEMIDE 40 MG: 10 INJECTION, SOLUTION INTRAMUSCULAR; INTRAVENOUS at 09:35

## 2020-09-28 RX ADMIN — LACTULOSE 30 ML: 20 SOLUTION ORAL at 06:20

## 2020-09-28 RX ADMIN — FAMOTIDINE 20 MG: 20 TABLET, FILM COATED ORAL at 06:20

## 2020-09-28 RX ADMIN — ENOXAPARIN SODIUM 40 MG: 40 INJECTION SUBCUTANEOUS at 06:20

## 2020-09-28 ASSESSMENT — FIBROSIS 4 INDEX: FIB4 SCORE: 3.22

## 2020-09-28 NOTE — DISCHARGE INSTRUCTIONS
Discharge Instructions    Discharged to other by ambulance with escort. Discharged via ambulance, hospital escort: Yes.  Special equipment needed: Oxygen    Be sure to schedule a follow-up appointment with your primary care doctor or any specialists as instructed.     Discharge Plan:   Influenza Vaccine Indication: Indicated: 9 to 64 years of age    I understand that a diet low in cholesterol, fat, and sodium is recommended for good health. Unless I have been given specific instructions below for another diet, I accept this instruction as my diet prescription.       Special Instructions: None    · Is patient discharged on Warfarin / Coumadin?   No     Depression / Suicide Risk    As you are discharged from this [facility] facility, it is important to learn how to keep safe from harming yourself.    Recognize the warning signs:  · Abrupt changes in personality, positive or negative- including increase in energy   · Giving away possessions  · Change in eating patterns- significant weight changes-  positive or negative  · Change in sleeping patterns- unable to sleep or sleeping all the time   · Unwillingness or inability to communicate  · Depression  · Unusual sadness, discouragement and loneliness  · Talk of wanting to die  · Neglect of personal appearance   · Rebelliousness- reckless behavior  · Withdrawal from people/activities they love  · Confusion- inability to concentrate     If you or a loved one observes any of these behaviors or has concerns about self-harm, here's what you can do:  · Talk about it- your feelings and reasons for harming yourself  · Remove any means that you might use to hurt yourself (examples: pills, rope, extension cords, firearm)  · Get professional help from the community (Mental Health, Substance Abuse, psychological counseling)  · Do not be alone:Call your Safe Contact- someone whom you trust who will be there for you.  · Call your local CRISIS HOTLINE 425-5331 or  922-447-1920  · Call your local Children's Mobile Crisis Response Team [place] (913) 820-2904 or www.[place].[employer]  · Call the toll free National Suicide Prevention Hotlines   · National Suicide Prevention Lifeline 217-222-GCFN (8918)  · [employer] Network 800-SUICIDE (140-2493)

## 2020-09-28 NOTE — DISCHARGE PLANNING
Received Transport Form 2725  Spoke to Davy at [facility]  Transport is scheduled for today at 1700 going to [facility] ([facility]) aware of transport time

## 2020-09-28 NOTE — DISCHARGE PLANNING
Care Transition Team Discharge Planning    Anticipated Discharge Disposition: d/c to [facility]    Action: Lsw received confirmation from medical team and liaison for [facility]:  Pt chooses to d/c to [facility] and [facility] [[facility]] can take him today. Medical team can d/c pt today.    [facility] would like transport via [facility] set up for 5PM.    PMA, Dr Tai, will complete d/c order and summary. BS RN Pietro aware as above.    LSw began to complete the ITF and PCS for [facility].    Barriers to Discharge: acceptance of 5pm transport by [facility]     Plan: w/u w/ [facility], medical team, complete COBRA and transfer packet

## 2020-09-28 NOTE — DISCHARGE PLANNING
Spoke To: Frankie  Agency/Facility Name: [facility]  Plan or Request: pt accepted    Spoke To: Martin  Agency/Facility Name: [facility]  Plan or Request: referral in review.    1220- [facility] ([facility])- bed available today.

## 2020-09-28 NOTE — CARE PLAN
Problem: Communication  Goal: The ability to communicate needs accurately and effectively will improve  Outcome: PROGRESSING AS EXPECTED  Intervention: Educate patient and significant other/support system about the plan of care, procedures, treatments, medications and allow for questions  Note: Discussed with patient plan of care and medications to be given.      Problem: Safety  Goal: Will remain free from falls  Outcome: PROGRESSING AS EXPECTED  Note: Fall precautions in place. Call light and belongings within reach. Hourly rounding. Bed locked and lowered. Room near nurses station.

## 2020-09-28 NOTE — DISCHARGE PLANNING
Care Transition Team Discharge Planning    Anticipated Discharge Disposition: d/c to [facility]    Action: Lsw received update [facility]    Lsw completed COBRA, acquired MD signature, and completed transfer packet for transport to [facility].     Barriers to Discharge: none    Plan: pt to transport to [facility] at 5PM via [facility]

## 2020-09-28 NOTE — DISCHARGE SUMMARY
Discharge Summary    CHIEF COMPLAINT ON ADMISSION  Chief Complaint   Patient presents with   • Shortness of Breath       Reason for Admission  COVID 19 pneumonia    CODE STATUS  Full Code    HPI & HOSPITAL COURSE  This is a 60 y.o. male here with shortness of breath.  Diagnosed with covid 19 pneumonia.  Admitted 8/27/20.  Complex medical stay with extended intubation on mechanical ventilator.  Treated with antibiotics for pneumonia and now off of antibiotics.  HE has been on t piece for past 24 hours. Continuing diuresis as tolerated.  Restarted lasix and kcl today. He had delirium and now alert and oriented and following commands. Severe weakness requiring pt/ot.  Obesity that contributes to respiratory failure.   Ongoing suctioning with secretions.  He received remdesivir, dexamethasone and anticoagulation full dose.  9/26/20 trach aspirate gram negative lactose fermenting bacteria on culture.  S/p bactrim therapy for serratia marcescens.  Patient requiring 10 L oxygen this morning.         Therefore, he is discharged in guarded and stable condition to an inpatient rehabilitation hospital.  [facility] facility.    The patient met 2-midnight criteria for an inpatient stay at the time of discharge.      FOLLOW UP ITEMS POST DISCHARGE  None    DISCHARGE DIAGNOSES  Principal Problem:    Pneumonia due to severe acute respiratory syndrome coronavirus 2 (SARS-CoV-2) POA: Unknown  Active Problems:    Acute hypoxemic respiratory failure (HCC) POA: Yes    Controlled type 2 diabetes mellitus, without long-term current use of insulin (HCC) POA: Yes    Septic shock due to undetermined organism (HCC) POA: Unknown    Pneumonia POA: Unknown    Pulmonary hypertension (HCC) POA: Unknown    Encephalopathy POA: Unknown    HLD (hyperlipidemia) POA: Yes    ABELARDO (obstructive sleep apnea) POA: Yes    Morbid obesity with BMI of 50.0-59.9, adult (HCC) POA: Yes    Essential hypertension POA: Yes  Resolved Problems:    * No resolved hospital  problems. *      FOLLOW UP  No future appointments.  No follow-up provider specified.    MEDICATIONS ON DISCHARGE     Medication List      ASK your doctor about these medications      Instructions   amLODIPine 10 MG Tabs  Commonly known as: NORVASC   Take 10 mg by mouth every day.  Dose: 10 mg     ascorbic acid 500 MG tablet  Commonly known as: VITAMIN C  Ask about: Which instructions should I use?   Take 500 mg by mouth every day.  Dose: 500 mg     aspirin EC 81 MG Tbec  Commonly known as: ECOTRIN   Take 81 mg by mouth every day.  Dose: 81 mg     atorvastatin 20 MG Tabs  Commonly known as: LIPITOR   Take 20 mg by mouth every day.  Dose: 20 mg     ferrous sulfate 325 (65 Fe) MG tablet   Take 325 mg by mouth every day.  Dose: 325 mg     folic acid 1 MG Tabs  Commonly known as: FOLVITE   Take 1 mg by mouth every day.  Dose: 1 mg     furosemide 40 MG Tabs  Commonly known as: LASIX   Take 40 mg by mouth every day.  Dose: 40 mg     HYDROcodone/acetaminophen  MG Tabs  Commonly known as: NORCO   Take 0.5-1 Tabs by mouth 3 times a day.  Dose: 0.5-1 Tab     lisinopril 40 MG tablet  Commonly known as: PRINIVIL  Ask about: Which instructions should I use?   Take 40 mg by mouth every day.  Dose: 40 mg     metFORMIN 500 MG Tabs  Commonly known as: GLUCOPHAGE   Take 500 mg by mouth 2 times a day, with meals.  Dose: 500 mg     potassium chloride SA 10 MEQ Tbcr  Commonly known as: K-DUR   Take 10 mEq by mouth every day.  Dose: 10 mEq            Allergies  Allergies   Allergen Reactions   • Zosyn      Rash with eosinophilia - unknown for certain if secondary to zosyn but was getting zosyn at the time       DIET  Orders Placed This Encounter   Procedures   • Diet NPO     Standing Status:   Standing     Number of Occurrences:   1     Order Specific Question:   Type:     Answer:   Now [1]     Order Specific Question:   Restrict to:     Answer:   Strict [1]       ACTIVITY  As tolerated.  Weight bearing as tolerated    LINES,  DRAINS, AND WOUNDS  This is an automated list. Peripheral IVs will be removed prior to discharge.  Peripheral IV 09/21/20 18 G Left Antecubital (Active)   Site Assessment Clean;Dry;Intact 09/28/20 0800   Dressing Type Occlusive;Transparent 09/28/20 0800   Line Status Blood return noted;Flushed;Scrubbed the hub prior to access;Saline locked 09/28/20 0800   Dressing Status Clean;Dry;Intact 09/28/20 0800   Dressing Intervention N/A 09/28/20 0800   Date Primary Tubing Changed 09/22/20 09/23/20 2000   Infiltration Grading ([facility], [facility]) 0 09/27/20 2000   Phlebitis Scale ([facility] Only) 0 09/27/20 2000     Rectal Tube (Active)   Skin Care Protective Barrier Applied 09/27/20 2000   Skin Integrity Intact 09/27/20 2000   Flushed Per Protocol Yes 09/27/20 2000   Rectal Tube Output 0 mL 09/28/20 0600       Urethral Catheter Temperature probe (Active)   Site Assessment Clean;Skin intact 09/28/20 0800   Collection Container Standard drainage bag 09/28/20 0800   Urinary Catheter Care Tamper Evident Seal in Place;Drainage Tube Extended;Drainage Tubing Properly Secured;Drainage Bag Not Overfilled;Drainage Bag Below Bladder Level and Not on Floor 09/28/20 0800   Securement Method Tape 09/28/20 0800   Output (mL) 150 mL 09/28/20 1000     Airway Trach Tracheostomy 8.0 (Active)   Site Assessment Intact 09/28/20 1110   Airway Tube Secured Velcro attachment 09/28/20 1110   Date Securing Device changed? 09/26/20 09/27/20 0400   Date Securing Device to be changed (Q 7 days) 10/03/20 09/26/20 1400   Cuffless No 09/28/20 0303   Cuff Pressure cmH2O (R.C.) 26 09/28/20 0303   Status Other (Comment) 09/27/20 0400   Tracheostomy/Stoma Care Clean Stoma Site;Inner Cannula Changed 09/27/20 1800   Tracheostomy/Cannula Changed (Date) 09/27/20 09/27/20 1800   Next Tracheostomy/Cannula Change Due (Date) 09/27/20 09/26/20 2000   Suctioning Device Inline Catheter Replaced 09/26/20 1400   Next Closed Suction Device Change Due  10/03/20 09/26/20 1400   Next  Tracheostomy Due Date 10/24/20 09/26/20 1400   Extra Tracheostomy Tube at Bedside Yes 09/28/20 1110     Wound 09/14/20 Skin Tear Back;Flank Left (Active)   Wound Image   09/15/20 1545   Site Assessment Red 09/28/20 0800   Periwound Assessment Dry 09/28/20 0800   Margins Defined edges 09/26/20 0800   Closure Open to air 09/28/20 0800   Drainage Amount None 09/26/20 0800   Drainage Description Serosanguineous 09/26/20 0400   Treatments Cleansed 09/25/20 2000   Wound Cleansing Approved Wound Cleanser 09/26/20 0400   Periwound Protectant Skin Protectant Wipes to Periwound 09/26/20 0400   Dressing Cleansing/Solutions Normal Saline 09/26/20 0400   Dressing Options Open to Air 09/28/20 0800   Dressing Changed Observed 09/27/20 2000   Dressing Status Clean;Dry;Intact 09/26/20 0400   Dressing Change/Treatment Frequency Every 72 hrs, and As Needed 09/26/20 0400   NEXT Dressing Change/Treatment Date 09/21/20 09/23/20 2000   NEXT Weekly Photo (Inpatient Only) 09/22/20 09/23/20 2000   Non-staged Wound Description Partial thickness 09/24/20 0400   Wound Length (cm) 5.5 cm 09/15/20 1545   Wound Width (cm) 4 cm 09/15/20 1545   Wound Depth (cm) 0.1 cm 09/15/20 1545   Wound Surface Area (cm^2) 22 cm^2 09/15/20 1545   Wound Volume (cm^3) 2.2 cm^3 09/15/20 1545   Shape irregular  09/23/20 2000   Wound Odor None 09/23/20 2000   Exposed Structures None 09/15/20 1545   WOUND NURSE ONLY - Time Spent with Patient (mins) 90 09/15/20 1545       Wound 09/15/20 Full Thickness Wound Toe, Hallux Medial Right Unknown Etiology (Active)   Wound Image    09/15/20 1545   Site Assessment Dry;Purple;Black 09/28/20 0800   Periwound Assessment Dry;Intact 09/28/20 0800   Margins Attached edges 09/28/20 0800   Closure Open to air 09/28/20 0800   Drainage Amount None 09/26/20 0800   Treatments Cleansed 09/25/20 2000   Wound Cleansing Not Applicable 09/26/20 0400   Periwound Protectant Skin Protectant Wipes to Periwound 09/28/20 0800   Dressing  Cleansing/Solutions 3% Betadine 09/26/20 0400   Dressing Options Open to Air 09/26/20 0400   Dressing Status Open to Air 09/28/20 0800   Dressing Change/Treatment Frequency Every 72 hrs, and As Needed 09/26/20 0400   NEXT Dressing Change/Treatment Date 09/18/20 09/18/20 0800   NEXT Weekly Photo (Inpatient Only) 09/22/20 09/18/20 0800   Non-staged Wound Description Full thickness 09/23/20 1600   Wound Length (cm) 4 cm 09/15/20 1545   Wound Width (cm) 3.2 cm 09/15/20 1545   Wound Surface Area (cm^2) 12.8 cm^2 09/15/20 1545   Shape irregular 09/16/20 2000   Wound Odor None 09/15/20 1545   Exposed Structures RUBY 09/15/20 1545       Wound 09/20/20 Skin Tear Arm Posterior;Mid Left (Active)   Wound Image   09/20/20 1251   Site Assessment Pink;Red 09/28/20 0800   Periwound Assessment Dry 09/28/20 0800   Margins Defined edges 09/28/20 0800   Closure Open to air 09/28/20 0800   Drainage Amount None 09/26/20 0800   Drainage Description Serosanguineous 09/25/20 0400   Treatments Cleansed;Site care 09/24/20 2000   Wound Cleansing Approved Wound Cleanser 09/25/20 0400   Periwound Protectant Skin Protectant Wipes to Periwound 09/25/20 0400   Dressing Cleansing/Solutions Normal Saline 09/25/20 0400   Dressing Options Mepilex 09/28/20 0800   Dressing Changed Observed 09/27/20 2000   Dressing Status Clean;Dry;Intact 09/28/20 0800   Dressing Change/Treatment Frequency Every 72 hrs, and As Needed 09/25/20 0400       Wound 09/22/20 Skin Tear Neck Anterior Right Tear/blistering (Active)   Wound Image   09/22/20 0800   Site Assessment Pink;Red 09/28/20 0800   Periwound Assessment Intact 09/28/20 0800   Margins Attached edges 09/26/20 0800   Closure Open to air 09/28/20 0800   Drainage Amount None 09/26/20 0800   Drainage Description RUBY 09/26/20 0400   Treatments Site care 09/27/20 2000   Wound Cleansing Normal Saline Irrigation 09/26/20 0400   Periwound Protectant Skin Protectant Wipes to Periwound 09/26/20 0400   Dressing  Cleansing/Solutions Normal Saline 09/26/20 0400   Dressing Options Open to Air 09/28/20 0800   Dressing Status Open to Air 09/26/20 0400       Peripheral IV 09/21/20 18 G Left Antecubital (Active)   Site Assessment Clean;Dry;Intact 09/28/20 0800   Dressing Type Occlusive;Transparent 09/28/20 0800   Line Status Blood return noted;Flushed;Scrubbed the hub prior to access;Saline locked 09/28/20 0800   Dressing Status Clean;Dry;Intact 09/28/20 0800   Dressing Intervention N/A 09/28/20 0800   Date Primary Tubing Changed 09/22/20 09/23/20 2000   Infiltration Grading ([facility] [place]) 0 09/27/20 2000   Phlebitis Scale ([facility] Only) 0 09/27/20 2000           Urethral Catheter Temperature probe (Active)   Site Assessment Clean;Skin intact 09/28/20 0800   Collection Container Standard drainage bag 09/28/20 0800   Urinary Catheter Care Tamper Evident Seal in Place;Drainage Tube Extended;Drainage Tubing Properly Secured;Drainage Bag Not Overfilled;Drainage Bag Below Bladder Level and Not on Floor 09/28/20 0800   Securement Method Tape 09/28/20 0800   Output (mL) 150 mL 09/28/20 1000        MENTAL STATUS ON TRANSFER  Level of Consciousness: Alert  Orientation : Unable to Assess(jeffrey and follows commands. )  Speech: Intubated / Trached    CONSULTATIONS  Palliative care  Infectious disease    PROCEDURES  Tracheostomy 9/26  Bronchoscopy  eeg  Art line  Central line  Intubation      LABORATORY  Lab Results   Component Value Date    SODIUM 141 09/28/2020    POTASSIUM 3.6 09/28/2020    CHLORIDE 106 09/28/2020    CO2 24 09/28/2020    GLUCOSE 146 (H) 09/28/2020    BUN 21 09/28/2020    CREATININE 0.18 (L) 09/28/2020        Lab Results   Component Value Date    WBC 8.1 09/28/2020    HEMOGLOBIN 13.5 (L) 09/28/2020    HEMATOCRIT 42.8 09/28/2020    PLATELETCT 313 09/28/2020        Total time of the discharge process exceeds 38 minutes

## 2020-09-28 NOTE — PROGRESS NOTES
Critical Care Progress Note    Date of admission  8/27/2020    Chief Complaint  60 y.o. male admitted 8/27/2020 with respiratory failure and pneumonia due to SARS-CoV-2.    Hospital Course     8/29-worsening mental status, rapid shallow breathing and hypoxia required endotracheal intubation and prone positioning.  8/30-I spoke with Brittney, the patient's daughter and surrogate decision maker about initiating Remdesivir.  We discussed the risks benefits and alternatives.  All questions were answered.  She consented to initiating Remdesivir today.  8/31 -art line placed, remains on PCV, transition off rocuronium drip as tolerated, propofol/fentanyl, continue prone protocol  9/1 - remains on pressure control ventilation, PEEP 19, improved P/F ratio with prone ventilation, off rocuronium, starting trophic tube feed, full dose anticoagulation with Lovenox, day 3/5 Remdesivir, day 5 dexamethasone  9/4 - stopped prone ventilation with some skin breakdown, difficult prone protocol with morbid obesity, full ventilator support, advancing nutrition  9/5 - Ongoing diuresis with 3 times daily Lasix, advancing tube feeds to goal, remains on supine ventilation now  9/6 - PCV, weaning IP and PEEP, remains supine, ongoing aggressive diuresis, abdi TF, lighten sedation as abdi  9/7 Switch to VC, switched from fentanyl to Dilaudid with improved ventilator synchrony, decrease Lasix to daily; stopped amlodipine as he is now on norepinephrine to achieve a map greater than 65  9/8 continue full vent support, compliance gradually improving  9/10 Febrile: urine, blood, sputum cultures, continue full ventilator support  9/11 febrile, increased NE, urine culture, MRSA nare, then begin zosyn  9/12 UA + for enterococcus; resp culture + Non-lactose fermenting GNR  - currently on zosyn; will await sensitivities   9/13 continues on PEEP 16, on Zosyn for Serratia and enterococcus  9/14 -    change Zosyn to Rocephin.  Continue full anticoagulation.   Continue vent support.  Off norepinephrine.  9/15 -    continue full vent support.  Force diuresis with chlorothiazide.  Continue Lovenox.  9/16 -    change Rocephin back to Zosyn due to fever.  Culture blood, sputum and check UA.  Change to ASV mode of ventilation to help with vent synchrony.  Forced diuresis with chlorothiazide.  9/17 -    continue Zosyn.  Continue vent support.  Force diuresis with chlorothiazide.  Full anticoagulation with Lovenox.  9/18 -    increase free water for hypernatremia.  Forced diuresis with Diamox.  Change Zosyn to Bactrim due to eosinophilia and rash.  Continue vent support.  9/19 -    decrease free water.  Continue Bactrim.  Continue vent support.  9/20 -    stop free water.  Force diuresis.  Continue Bactrim.  Decrease PEEP.  Continue vent support.     9/21- PEEP weaned down from 16 to 10 today, still on 40 percent, negative acute CT of head, remains encephalopathic on minimal sedation, diureses continues, Bactrim stopped (high K)    Interval Problem Update  Reviewed last 24 hour events  Pending ltac  On t piece trial, made it 24 hours  Diuresis as tolerated  No events overnight  Sinus rhythm  Sinus tach  sbp 120-130s  Started kcl and lasix daily for now  Consider transfer to floor    Addendum  Accepted as transfer to LTAC  Dc summary placed, see for details      Review of Systems  Review of Systems   Unable to perform ROS: Acuity of condition    No abdominal pain, sob, acute motor/sensory changes, urinary pain or chest pain.  Reliant on patient nodding head as he has trach in place.    Vital Signs for last 24 hours   Temp:  [36.9 °C (98.4 °F)-37.4 °C (99.3 °F)] 37.4 °C (99.3 °F)  Pulse:  [79-99] 97  Resp:  [14-34] 29  BP: ()/(42-81) 129/72  SpO2:  [92 %-100 %] 93 %    Hemodynamic parameters for last 24 hours       Respiratory Information for the last 24 hours       Physical Exam   Physical Exam  Vitals signs and nursing note reviewed.   Constitutional:       General: He is  not in acute distress.     Appearance: He is obese. He is ill-appearing. He is not toxic-appearing or diaphoretic.   HENT:      Head: Normocephalic and atraumatic.      Right Ear: External ear normal.      Left Ear: External ear normal.      Nose: No congestion or rhinorrhea.      Mouth/Throat:      Mouth: Mucous membranes are moist.      Pharynx: Oropharynx is clear. No oropharyngeal exudate or posterior oropharyngeal erythema.   Eyes:      General: No scleral icterus.        Right eye: No discharge.         Left eye: No discharge.      Extraocular Movements: Extraocular movements intact.      Conjunctiva/sclera: Conjunctivae normal.      Pupils: Pupils are equal, round, and reactive to light.   Neck:      Musculoskeletal: Normal range of motion. No neck rigidity or muscular tenderness.      Comments: Trach in place, no signs of infection or bleeding noted.  Cardiovascular:      Rate and Rhythm: Normal rate and regular rhythm.      Pulses: Normal pulses.      Heart sounds: Normal heart sounds. No murmur.   Pulmonary:      Effort: Respiratory distress present.      Breath sounds: No stridor. Rales present. No wheezing or rhonchi.      Comments: Trach in place, mucus thick on suctioning but adequately controlled  Chest:      Chest wall: No tenderness.   Abdominal:      General: There is no distension.      Palpations: There is no mass.      Tenderness: There is no abdominal tenderness. There is no guarding.   Musculoskeletal:         General: Swelling present. No tenderness or deformity.      Right lower leg: Edema present.      Left lower leg: Edema present.   Lymphadenopathy:      Cervical: No cervical adenopathy.   Skin:     Coloration: Skin is not jaundiced or pale.      Findings: No bruising, erythema, lesion or rash.   Neurological:      General: No focal deficit present.      Mental Status: He is alert and oriented to person, place, and time. Mental status is at baseline.      Cranial Nerves: No cranial nerve  deficit.      Sensory: No sensory deficit.      Motor: No weakness.      Coordination: Coordination normal.      Gait: Gait normal.   Psychiatric:         Mood and Affect: Mood normal.         Behavior: Behavior normal.         Medications  Current Facility-Administered Medications   Medication Dose Route Frequency Provider Last Rate Last Dose   • senna-docusate (PERICOLACE or SENOKOT S) 8.6-50 MG per tablet 2 Tab  2 Tab Enteral Tube BID Jorge Colindres M.D.   Stopped at 09/26/20 1800    And   • polyethylene glycol/lytes (MIRALAX) PACKET 1 Packet  1 Packet Enteral Tube QDAY PRN Jorge Colindres M.D.        And   • magnesium hydroxide (MILK OF MAGNESIA) suspension 30 mL  30 mL Enteral Tube QDAY PRN Jorge Colindres M.D.        And   • bisacodyl (DULCOLAX) suppository 10 mg  10 mg Rectal QDAY PRN Jorge Colindres M.D.       • enoxaparin (LOVENOX) inj 40 mg  40 mg Subcutaneous Q12HRS Jorge Colindres M.D.   40 mg at 09/28/20 0620   • atorvastatin (LIPITOR) tablet 20 mg  20 mg Enteral Tube Q EVENING Jorge Colindres M.D.   20 mg at 09/27/20 1756   • lactulose 20 GM/30ML solution 30 mL  30 mL Enteral Tube BID Jorge Colindres M.D.   30 mL at 09/28/20 0620   • HYDROmorphone pf (DILAUDID) injection 0.75 mg  0.75 mg Intravenous Q15 MIN PRN Jorge Colindres M.D.   0.75 mg at 09/24/20 2213    And   • HYDROmorphone (DILAUDID) injection 1.5 mg  1.5 mg Intravenous Q15 MIN PRN Jorge Colindres M.D.       • amLODIPine (NORVASC) tablet 10 mg  10 mg Enteral Tube Q DAY Jorge Colindres M.D.   10 mg at 09/28/20 0620   • Pharmacy Consult: Enteral tube insertion - review meds/change route/product selection  1 Each Other PHARMACY TO DOSE Jorge Colindres M.D.       • aspirin (ASA) chewable tab 81 mg  81 mg Enteral Tube DAILY Jorge Colindres M.D.   81 mg at 09/28/20 0620   • acetaminophen (TYLENOL) tablet 650 mg  650 mg Enteral Tube Q6HRS PRN Jorge Colindres M.D.   650 mg at 09/26/20 0738   • Respiratory Therapy Consult    Nebulization Continuous RT Jorge Colindres M.D.       • ipratropium-albuterol (DUONEB) nebulizer solution  3 mL Nebulization Q2HRS PRN (RT) Jorge Colindres M.D.       • famotidine (PEPCID) tablet 20 mg  20 mg Enteral Tube Q12HRS Jorge Colindres M.D.   20 mg at 09/28/20 0620   • MD Alert...ICU Electrolyte Replacement per Pharmacy   Other PHARMACY TO DOSE Jorge Colindres M.D.       • lidocaine (XYLOCAINE) 1 % injection 1-2 mL  1-2 mL Tracheal Tube Q30 MIN PRN Jorge Colindres M.D.           Fluids    Intake/Output Summary (Last 24 hours) at 9/28/2020 0737  Last data filed at 9/28/2020 0600  Gross per 24 hour   Intake 1960 ml   Output 2785 ml   Net -825 ml       Laboratory  Recent Labs     09/26/20  0333 09/27/20  0509   ISTATAPH 7.486 7.443   ISTATAPCO2 33.1 39.0*   ISTATAPO2 77 99*   ISTATATCO2 26 28   VWUFKJY1XMN 96 98   ISTATARTHCO3 25.0 26.7*   ISTATARTBE 2 2   ISTATTEMP 37.0 C 37.5 C   ISTATFIO2 35 40   ISTATSPEC Arterial Arterial   ISTATAPHTC 7.486 7.436   AIATXMPL0US 77 102*         Recent Labs     09/26/20  0450 09/27/20 0425 09/28/20  0619   SODIUM 139 143 141   POTASSIUM 3.6 3.4* 3.6   CHLORIDE 106 107 106   CO2 23 23 24   BUN 27* 25* 21   CREATININE 0.34* 0.26* 0.18*   MAGNESIUM 1.9 2.1  --    PHOSPHORUS 2.6  --   --    CALCIUM 8.7 8.7 8.7     Recent Labs     09/26/20  0450 09/27/20 0425 09/28/20  0619   ALTSGPT  --   --  39   ASTSGOT  --   --  27   ALKPHOSPHAT  --   --  104*   TBILIRUBIN  --   --  0.6   GLUCOSE 138* 155* 146*     Recent Labs     09/26/20  0450 09/27/20 0425 09/28/20  0619   WBC 8.9 7.9 8.1   NEUTSPOLYS 63.60 68.00 67.00   LYMPHOCYTES 19.40* 16.80* 15.00*   MONOCYTES 9.70 8.60 7.90   EOSINOPHILS 4.80 4.90 8.60*   BASOPHILS 0.80 0.60 0.90   ASTSGOT  --   --  27   ALTSGPT  --   --  39   ALKPHOSPHAT  --   --  104*   TBILIRUBIN  --   --  0.6     Recent Labs     09/26/20  0450 09/27/20  0425 09/28/20  0619   RBC 4.34* 4.22* 4.37*   HEMOGLOBIN 13.2* 13.2* 13.5*   HEMATOCRIT 42.6 42.3  "42.8   PLATELETCT 355 837 129       Imaging  X-Ray:  I have personally reviewed the images and compared with prior images. and My impression is: continued bilateral opacities and edema  EKG:  I have personally reviewed the images and compared with prior images., My impression is: sinus and No film today  CT:    Reviewed  Echo:   Reviewed    Assessment/Plan  * Pneumonia due to severe acute respiratory syndrome coronavirus 2 (SARS-CoV-2)  Assessment & Plan  S/P remdesivir from 8/30-9/3  S/P dexamethasone, 6 mg daily for 10 days  Continue full anticoagulation with Lovenox    Off all antibiotics per ID  Pulmonary status improving slowly  Gram neg lactose fermenting  on 9/26 trach aspirate    Acute hypoxemic respiratory failure (HCC)- (present on admission)  Assessment & Plan  Intubated 8/29  All of the appropriate ventilator bundles are in place  Proning discontinued due to skin breakdown/injury  Continue vent support  Continue diuresis as tolerated  covid 19  On trach  Pending ltach  Continue t piece trial as tolerated      Pulmonary hypertension (HCC)  Assessment & Plan  RVSP 60 mmHg  Diuresis as tolerated    Pneumonia  Assessment & Plan  Sputum with Serratia marcescens  Continue Bactrim  UA unremarkable  Blood cultures negative from 9/16 so far  Trach aspirate 9/26 gram neg lactose fermenting  Off ab      Septic shock due to undetermined organism (HCC)  Assessment & Plan  Shock has resolved - off vasopressor support      Controlled type 2 diabetes mellitus, without long-term current use of insulin (HCC)- (present on admission)  Assessment & Plan  Glycohemoglobin 6.5  Glucose controlled    Encephalopathy  Assessment & Plan  Resolved off all sedation. Weakness of critical illness may take long time resolve and pt may never return to baseline function.  LTAC referral  Component covid \"brain\" likely  Mobilize as tolerated                                                                Essential hypertension- (present on " admission)  Assessment & Plan  Continue amlodipine, 10 mg daily    Morbid obesity with BMI of 50.0-59.9, adult (HCC)- (present on admission)  Assessment & Plan  BMI 53  Behavioral modification and nutrition counseling when clinically appropriate    ABELARDO (obstructive sleep apnea)- (present on admission)  Assessment & Plan  PSG on 1/29/2019 revealed AHI of 101.9 with minimum oxygen saturation of 50%    HLD (hyperlipidemia)- (present on admission)  Assessment & Plan  Continue statin       VTE:  Lovenox  Ulcer: H2 Antagonist  Lines: Briseno Catheter  Ongoing indication addressed and tracheostomy    I have performed a physical exam and reviewed and updated ROS and Plan today (9/28/2020). In review of yesterday's note (9/27/2020), there are no changes except as documented above.     I have assessed and reassessed his respiratory status with ventilator adjustments, airway mechanics, ventilator waveforms, hemodynamics, blood pressure, cardiovascular status and his neurologic status.  He is at increased risk for worsening cardiovascular, respiratory and CNS system dysfunction.    Discussed patient condition and risk of morbidity and/or mortality with Hospitalist, RN, RT, Pharmacy, Code status disscussed, Charge nurse / hot rounds, QA team and Patient

## 2020-09-28 NOTE — PROGRESS NOTES
AVS printed, reviewed with patient and daughter. Gathered patient's belongings. Cleared with MD to leave Briseno, BMS and cortrak in place. VSS, patient A&O, responds appropriately to all questions and commands.    Awaiting [facility] for

## 2020-09-28 NOTE — RESPIRATORY CARE
TRACHE day 2  8/portex     Pt has tolerated t-piece since 7:30 9/27  No distress during rest, mild desats.     T-piece 4L/ 28% up to 10L/ 40%   For desat during rest.    Pt tolerated well. Moderate secretions

## 2020-09-29 NOTE — PALLIATIVE CARE
Palliative Care follow-up  PC RN received phone call from pt's dtr Cherry with questions regarding DC plan and doing a Zoom. PC RN presented possibility of Cherry visiting pt at bedside due to being COVID free. PC RN discussed with BS NIKOLAY Westbrook who will follow up with Cherry.       Plan: plan to DC today. PC RN will follow and support.    Thank you for allowing Palliative Care to support this patient and family. Contact [phone] for additional assistance, change in patient status, or with any questions/concerns.

## 2020-10-12 NOTE — DOCUMENTATION QUERY
"                                                                         [facility]                                                                       Query Response Note      PATIENT:               JAELYN TAYLOR  ACCT #:                  [id number]  MRN:                     [id number]  :                      1960  ADMIT DATE:       2020 3:50 PM  DISCH DATE:        2020 6:40 PM  RESPONDING  PROVIDER #:        [id number]           QUERY TEXT:    Please clarify in documentation the relationship, if any, between Urinary Tract Infection and Indwelling Urethral Catheter.    The patient's Clinical Indicators include:  9/10 Urinalysis: Positive nitrite, small leukocyte esterase. Culture result: Positive for Enterococcus Faecalis 50-100K cfu/mL.     Bris[facility] Exchange done.      Urinalysis: Negative nitrite, small leukocyte esterase. Not cultured.      Dr. Cash: \"Pt overall clinically improving, particularly from resp standpoint. However, he does have fever this am though I feel cause is non Covid related. Could like be ventilator associated.\"       Dr. Fields: \"Febrile since  possibly r/t ARDS and inflammation 2/2 Covid or perhaps drug fever. Last blood culture from  negative. Multiple positive tracheal aspirate cultures for serratia could be colonized. UA with no significant pyuria.\"    Treatment: Zosyn IVPB 3.375 g Q8H from  to  then increased to 4.5g Q8H                    Rocephin 2g IVPB Q24H from  to                     Bactrim DS 3 tabs Q6H from  to     Risk: Covid positive, borderline Diabetes, HLD, Obesity  Options provided:   -- UTI is due to or associated with Indwelling Urethral Catheter   -- UTI ruled out   -- Unrelated to each other   -- Unable to determine      Query created by: Jammie Diamond on 10/7/2020 7:48 AM    RESPONSE TEXT:    Unrelated to each other          Electronically signed by:  COLLETTE BARRIOS MD 10/11/2020 8:51 PM      "

## 2020-11-17 NOTE — CARE PLAN
Problem: Nutritional:  Goal: Nutrition support tolerated and meeting greater than 85% of estimated needs  Outcome: MET   TF @ goal off propofol.    Above RN's/Staff's notes reviewed.  Reviewed medications and allergies.    SUBJECTIVE:    Delbert Savage is a 62 year old adult who presents with presenting with cough, body aches and congestion that started yesterday morning.   Low grade fever today.         Denies known Latex allergy or symptoms of Latex sensitivity.  Medications verified, no changes.  Social History           Tobacco Use   Smoking Status Former Smoker   • Packs/day: 1.00   • Types: Cigarettes   • Start date: 1974   • Quit date: 2001   • Years since quittin.8   Smokeless Tobacco Never Used   Tobacco Comment     quit 2 years        Patient would like communication of their results via:        Cell Phone:       Telephone Information:   Mobile [phone]     Okay to leave a message containing results? Yes        OBJECTIVE:    Vitals:    20 1516   BP: 125/79   Pulse: 79   Temp: 99.3 °F (37.4 °C)       General appearance:  Healthy, alert, in no distress, cooperative  Skin:  Skin color, texture, turgor are normal. There are no bruises, rashes or lesions.  Eyes: Conjunctivae and sclerae normal and pupils equal, round, reactive to light    Nose:  Normal  Ears:  External ears normal. Canals clear. Tympanic membranes are clear with all landmarks noted.  Throat:  Normal without tonsillar hypertrophy, no erythema, exudates or mucosal lesions.  Neck:  Supple, no lymphadenopathy.  Lungs:  Scattered rhonchi with mild end expiratory wheezing no moist rales.  Cardiac:  Regular rate and rhythm, no rubs, click gallops or murmurs  Abdomen:  Soft, no tenderness, bowel sounds normoactive  Extremities:  No pretibial edema      ASSESSMENT:  1. COVID-19 virus infection    2. Body aches    3. Cough    4. Acute bronchitis with bronchospasm          PLAN:  Orders Placed This Encounter   • COVID-19 ANTIGEN Test (Mary)   • azithromycin (ZITHROMAX) 500 MG tablet   • methylPREDNISolone (MEDROL DOSEPAK) 4 MG tablet   • albuterol 108 (90 Base) MCG/ACT inhaler        · Zithromax daily for 5 day along with Medrol Dosepak and albuterol inhaler  · Patient did test positive for COVID-19 and has comorbidities including coronary artery disease reactive airways disease  · Stop smoking  · Recheck with PCP if not back to baseline November 25th and quarantine until such time  · Tylenol for fever prn.  A Zithromax for bronchitis to prevent COVID pneumonia and Medrol as well and albuterol for mild wheezing  · Call if condition worsens and return to work if able , Wednesday November 25, 2020

## 2021-04-20 NOTE — PROGRESS NOTES
Monitor summary:  SB 40s-50s  .18/.10/.48    12 hour chart check    Detail Level: Simple Detail Level: Detailed

## 2022-06-04 ENCOUNTER — OFFICE VISIT (OUTPATIENT)
Dept: URGENT CARE | Facility: PHYSICIAN GROUP | Age: 62
End: 2022-06-04
Payer: MEDICARE

## 2022-06-04 VITALS
DIASTOLIC BLOOD PRESSURE: 82 MMHG | WEIGHT: 315 LBS | SYSTOLIC BLOOD PRESSURE: 144 MMHG | OXYGEN SATURATION: 94 % | RESPIRATION RATE: 16 BRPM | HEART RATE: 82 BPM | BODY MASS INDEX: 45.1 KG/M2 | HEIGHT: 70 IN | TEMPERATURE: 98.6 F

## 2022-06-04 DIAGNOSIS — S29.012A MUSCLE STRAIN OF UPPER BACK: ICD-10-CM
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
## 2022-06-04 PROCEDURE — 99203 OFFICE O/P NEW LOW 30 MIN: CPT | Performed by: PHYSICIAN ASSISTANT

## 2022-06-04 RX ORDER — CYCLOBENZAPRINE HCL 5 MG
5-10 TABLET ORAL NIGHTLY PRN
Qty: 20 TABLET | Refills: 0 | Status: ON HOLD | OUTPATIENT
Start: 2022-06-04 | End: 2022-11-08

## 2022-06-04 RX ORDER — IBUPROFEN 800 MG/1
800 TABLET ORAL EVERY 8 HOURS PRN
Qty: 30 TABLET | Refills: 0 | Status: ON HOLD | OUTPATIENT
Start: 2022-06-04 | End: 2022-11-08

## 2022-06-04 ASSESSMENT — ENCOUNTER SYMPTOMS
NAUSEA: 0
DOUBLE VISION: 0
COUGH: 0
BACK PAIN: 1
EYE REDNESS: 0
VOMITING: 0
FEVER: 0
SHORTNESS OF BREATH: 0
EYE DISCHARGE: 0
HEADACHES: 0
BLURRED VISION: 0
SORE THROAT: 0

## 2022-06-04 ASSESSMENT — FIBROSIS 4 INDEX: FIB4 SCORE: 0.83
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
## 2022-11-07 ENCOUNTER — APPOINTMENT (OUTPATIENT)
Dept: RADIOLOGY | Facility: MEDICAL CENTER | Age: 62
DRG: 291 | End: 2022-11-07
Attending: EMERGENCY MEDICINE
Payer: MEDICARE

## 2022-11-07 ENCOUNTER — HOSPITAL ENCOUNTER (INPATIENT)
Facility: MEDICAL CENTER | Age: 62
LOS: 7 days | DRG: 291 | End: 2022-11-14
Attending: EMERGENCY MEDICINE | Admitting: INTERNAL MEDICINE
Payer: MEDICARE

## 2022-11-07 DIAGNOSIS — R09.02 HYPOXIA: ICD-10-CM

## 2022-11-07 DIAGNOSIS — I10 ESSENTIAL HYPERTENSION: ICD-10-CM

## 2022-11-07 DIAGNOSIS — I27.20 PULMONARY HYPERTENSION (HCC): ICD-10-CM

## 2022-11-07 DIAGNOSIS — T14.8XXA HEMATOMA: ICD-10-CM

## 2022-11-07 DIAGNOSIS — E11.9 CONTROLLED TYPE 2 DIABETES MELLITUS WITHOUT COMPLICATION, WITHOUT LONG-TERM CURRENT USE OF INSULIN (HCC): ICD-10-CM

## 2022-11-07 DIAGNOSIS — R06.2 WHEEZING: ICD-10-CM

## 2022-11-07 DIAGNOSIS — J40 BRONCHITIS: ICD-10-CM

## 2022-11-07 PROBLEM — J96.00 ACUTE RESPIRATORY FAILURE (HCC): Status: ACTIVE | Noted: 2022-11-07

## 2022-11-07 LAB
ALBUMIN SERPL BCP-MCNC: 3.5 G/DL (ref 3.2–4.9)
ALBUMIN/GLOB SERPL: 0.9 G/DL
ALP SERPL-CCNC: 97 U/L (ref 30–99)
ALT SERPL-CCNC: 21 U/L (ref 2–50)
ANION GAP SERPL CALC-SCNC: 11 MMOL/L (ref 7–16)
ANISOCYTOSIS BLD QL SMEAR: ABNORMAL
AST SERPL-CCNC: 26 U/L (ref 12–45)
BASOPHILS # BLD AUTO: 0.9 % (ref 0–1.8)
BASOPHILS # BLD: 0.04 K/UL (ref 0–0.12)
BILIRUB SERPL-MCNC: 0.4 MG/DL (ref 0.1–1.5)
BUN SERPL-MCNC: 18 MG/DL (ref 8–22)
CALCIUM SERPL-MCNC: 8.5 MG/DL (ref 8.5–10.5)
CHLORIDE SERPL-SCNC: 97 MMOL/L (ref 96–112)
CO2 SERPL-SCNC: 33 MMOL/L (ref 20–33)
CREAT SERPL-MCNC: 0.97 MG/DL (ref 0.5–1.4)
D DIMER PPP IA.FEU-MCNC: 0.38 UG/ML (FEU) (ref 0–0.5)
EKG IMPRESSION: NORMAL
EOSINOPHIL # BLD AUTO: 0.12 K/UL (ref 0–0.51)
EOSINOPHIL NFR BLD: 2.6 % (ref 0–6.9)
ERYTHROCYTE [DISTWIDTH] IN BLOOD BY AUTOMATED COUNT: 61.1 FL (ref 35.9–50)
FLUAV RNA SPEC QL NAA+PROBE: NEGATIVE
FLUBV RNA SPEC QL NAA+PROBE: NEGATIVE
GFR SERPLBLD CREATININE-BSD FMLA CKD-EPI: 89 ML/MIN/1.73 M 2
GLOBULIN SER CALC-MCNC: 3.8 G/DL (ref 1.9–3.5)
GLUCOSE SERPL-MCNC: 124 MG/DL (ref 65–99)
HCT VFR BLD AUTO: 60.9 % (ref 42–52)
HGB BLD-MCNC: 18.1 G/DL (ref 14–18)
LACTATE SERPL-SCNC: 1.6 MMOL/L (ref 0.5–2)
LACTATE SERPL-SCNC: 2.2 MMOL/L (ref 0.5–2)
LACTATE SERPL-SCNC: 2.7 MMOL/L (ref 0.5–2)
LYMPHOCYTES # BLD AUTO: 0.75 K/UL (ref 1–4.8)
LYMPHOCYTES NFR BLD: 16.2 % (ref 22–41)
MACROCYTES BLD QL SMEAR: ABNORMAL
MANUAL DIFF BLD: NORMAL
MCH RBC QN AUTO: 27.8 PG (ref 27–33)
MCHC RBC AUTO-ENTMCNC: 29.7 G/DL (ref 33.7–35.3)
MCV RBC AUTO: 93.7 FL (ref 81.4–97.8)
MONOCYTES # BLD AUTO: 0.59 K/UL (ref 0–0.85)
MONOCYTES NFR BLD AUTO: 12.8 % (ref 0–13.4)
MORPHOLOGY BLD-IMP: NORMAL
NEUTROPHILS # BLD AUTO: 3.11 K/UL (ref 1.82–7.42)
NEUTROPHILS NFR BLD: 67.5 % (ref 44–72)
NRBC # BLD AUTO: 0.02 K/UL
NRBC BLD-RTO: 0.4 /100 WBC
NT-PROBNP SERPL IA-MCNC: 100 PG/ML (ref 0–125)
PLATELET # BLD AUTO: 131 K/UL (ref 164–446)
PLATELET BLD QL SMEAR: NORMAL
PMV BLD AUTO: 10.3 FL (ref 9–12.9)
POLYCHROMASIA BLD QL SMEAR: NORMAL
POTASSIUM SERPL-SCNC: 4.9 MMOL/L (ref 3.6–5.5)
PROCALCITONIN SERPL-MCNC: 0.08 NG/ML
PROT SERPL-MCNC: 7.3 G/DL (ref 6–8.2)
RBC # BLD AUTO: 6.5 M/UL (ref 4.7–6.1)
RBC BLD AUTO: PRESENT
RSV RNA SPEC QL NAA+PROBE: NEGATIVE
SARS-COV-2 RNA RESP QL NAA+PROBE: NOTDETECTED
SODIUM SERPL-SCNC: 141 MMOL/L (ref 135–145)
SPECIMEN SOURCE: NORMAL
TROPONIN T SERPL-MCNC: 9 NG/L (ref 6–19)
WBC # BLD AUTO: 4.6 K/UL (ref 4.8–10.8)

## 2022-11-07 PROCEDURE — 71045 X-RAY EXAM CHEST 1 VIEW: CPT

## 2022-11-07 PROCEDURE — 93005 ELECTROCARDIOGRAM TRACING: CPT | Performed by: EMERGENCY MEDICINE

## 2022-11-07 PROCEDURE — 99223 1ST HOSP IP/OBS HIGH 75: CPT | Mod: AI | Performed by: INTERNAL MEDICINE

## 2022-11-07 PROCEDURE — 85379 FIBRIN DEGRADATION QUANT: CPT

## 2022-11-07 PROCEDURE — 94640 AIRWAY INHALATION TREATMENT: CPT

## 2022-11-07 PROCEDURE — 36415 COLL VENOUS BLD VENIPUNCTURE: CPT

## 2022-11-07 PROCEDURE — 80053 COMPREHEN METABOLIC PANEL: CPT

## 2022-11-07 PROCEDURE — 96375 TX/PRO/DX INJ NEW DRUG ADDON: CPT

## 2022-11-07 PROCEDURE — 700102 HCHG RX REV CODE 250 W/ 637 OVERRIDE(OP): Performed by: EMERGENCY MEDICINE

## 2022-11-07 PROCEDURE — 85025 COMPLETE CBC W/AUTO DIFF WBC: CPT

## 2022-11-07 PROCEDURE — C9803 HOPD COVID-19 SPEC COLLECT: HCPCS | Performed by: EMERGENCY MEDICINE

## 2022-11-07 PROCEDURE — 770020 HCHG ROOM/CARE - TELE (206)

## 2022-11-07 PROCEDURE — 0241U HCHG SARS-COV-2 COVID-19 NFCT DS RESP RNA 4 TRGT MIC: CPT

## 2022-11-07 PROCEDURE — A9270 NON-COVERED ITEM OR SERVICE: HCPCS | Performed by: INTERNAL MEDICINE

## 2022-11-07 PROCEDURE — 87040 BLOOD CULTURE FOR BACTERIA: CPT | Mod: 91

## 2022-11-07 PROCEDURE — 84145 PROCALCITONIN (PCT): CPT

## 2022-11-07 PROCEDURE — 83880 ASSAY OF NATRIURETIC PEPTIDE: CPT

## 2022-11-07 PROCEDURE — 83605 ASSAY OF LACTIC ACID: CPT | Mod: 91

## 2022-11-07 PROCEDURE — 99285 EMERGENCY DEPT VISIT HI MDM: CPT

## 2022-11-07 PROCEDURE — 700102 HCHG RX REV CODE 250 W/ 637 OVERRIDE(OP): Performed by: INTERNAL MEDICINE

## 2022-11-07 PROCEDURE — 700101 HCHG RX REV CODE 250: Performed by: EMERGENCY MEDICINE

## 2022-11-07 PROCEDURE — 84484 ASSAY OF TROPONIN QUANT: CPT

## 2022-11-07 PROCEDURE — 85007 BL SMEAR W/DIFF WBC COUNT: CPT

## 2022-11-07 PROCEDURE — 700111 HCHG RX REV CODE 636 W/ 250 OVERRIDE (IP): Performed by: INTERNAL MEDICINE

## 2022-11-07 PROCEDURE — 96374 THER/PROPH/DIAG INJ IV PUSH: CPT

## 2022-11-07 PROCEDURE — A9270 NON-COVERED ITEM OR SERVICE: HCPCS | Performed by: EMERGENCY MEDICINE

## 2022-11-07 PROCEDURE — 700111 HCHG RX REV CODE 636 W/ 250 OVERRIDE (IP): Performed by: EMERGENCY MEDICINE

## 2022-11-07 RX ORDER — PROMETHAZINE HYDROCHLORIDE 25 MG/1
12.5-25 SUPPOSITORY RECTAL EVERY 4 HOURS PRN
Status: DISCONTINUED | OUTPATIENT
Start: 2022-11-07 | End: 2022-11-14 | Stop reason: HOSPADM

## 2022-11-07 RX ORDER — POLYETHYLENE GLYCOL 3350 17 G/17G
1 POWDER, FOR SOLUTION ORAL DAILY
Status: DISCONTINUED | OUTPATIENT
Start: 2022-11-08 | End: 2022-11-14 | Stop reason: HOSPADM

## 2022-11-07 RX ORDER — POLYETHYLENE GLYCOL 3350 17 G/17G
1 POWDER, FOR SOLUTION ORAL
Status: DISCONTINUED | OUTPATIENT
Start: 2022-11-07 | End: 2022-11-07

## 2022-11-07 RX ORDER — IPRATROPIUM BROMIDE AND ALBUTEROL SULFATE 2.5; .5 MG/3ML; MG/3ML
3 SOLUTION RESPIRATORY (INHALATION)
Status: DISCONTINUED | OUTPATIENT
Start: 2022-11-07 | End: 2022-11-14 | Stop reason: HOSPADM

## 2022-11-07 RX ORDER — BISACODYL 10 MG
10 SUPPOSITORY, RECTAL RECTAL
Status: DISCONTINUED | OUTPATIENT
Start: 2022-11-07 | End: 2022-11-07

## 2022-11-07 RX ORDER — ACETAMINOPHEN 325 MG/1
650 TABLET ORAL EVERY 6 HOURS PRN
Status: DISCONTINUED | OUTPATIENT
Start: 2022-11-07 | End: 2022-11-14 | Stop reason: HOSPADM

## 2022-11-07 RX ORDER — AMOXICILLIN 250 MG
2 CAPSULE ORAL 2 TIMES DAILY
Status: DISCONTINUED | OUTPATIENT
Start: 2022-11-08 | End: 2022-11-14 | Stop reason: HOSPADM

## 2022-11-07 RX ORDER — ATORVASTATIN CALCIUM 20 MG/1
20 TABLET, FILM COATED ORAL DAILY
Status: DISCONTINUED | OUTPATIENT
Start: 2022-11-08 | End: 2022-11-08

## 2022-11-07 RX ORDER — AMLODIPINE BESYLATE 10 MG/1
10 TABLET ORAL DAILY
Status: DISCONTINUED | OUTPATIENT
Start: 2022-11-08 | End: 2022-11-14 | Stop reason: HOSPADM

## 2022-11-07 RX ORDER — GUAIFENESIN/DEXTROMETHORPHAN 100-10MG/5
10 SYRUP ORAL EVERY 6 HOURS PRN
Status: DISCONTINUED | OUTPATIENT
Start: 2022-11-07 | End: 2022-11-14 | Stop reason: HOSPADM

## 2022-11-07 RX ORDER — BUDESONIDE AND FORMOTEROL FUMARATE DIHYDRATE 80; 4.5 UG/1; UG/1
2 AEROSOL RESPIRATORY (INHALATION)
Status: DISCONTINUED | OUTPATIENT
Start: 2022-11-07 | End: 2022-11-14 | Stop reason: HOSPADM

## 2022-11-07 RX ORDER — PROCHLORPERAZINE EDISYLATE 5 MG/ML
5-10 INJECTION INTRAMUSCULAR; INTRAVENOUS EVERY 4 HOURS PRN
Status: DISCONTINUED | OUTPATIENT
Start: 2022-11-07 | End: 2022-11-14 | Stop reason: HOSPADM

## 2022-11-07 RX ORDER — ONDANSETRON 4 MG/1
4 TABLET, ORALLY DISINTEGRATING ORAL EVERY 4 HOURS PRN
Status: DISCONTINUED | OUTPATIENT
Start: 2022-11-07 | End: 2022-11-14 | Stop reason: HOSPADM

## 2022-11-07 RX ORDER — AMOXICILLIN 250 MG
2 CAPSULE ORAL 2 TIMES DAILY
Status: DISCONTINUED | OUTPATIENT
Start: 2022-11-07 | End: 2022-11-07

## 2022-11-07 RX ORDER — METHYLPREDNISOLONE SODIUM SUCCINATE 125 MG/2ML
125 INJECTION, POWDER, LYOPHILIZED, FOR SOLUTION INTRAMUSCULAR; INTRAVENOUS ONCE
Status: COMPLETED | OUTPATIENT
Start: 2022-11-07 | End: 2022-11-07

## 2022-11-07 RX ORDER — ONDANSETRON 2 MG/ML
4 INJECTION INTRAMUSCULAR; INTRAVENOUS EVERY 4 HOURS PRN
Status: DISCONTINUED | OUTPATIENT
Start: 2022-11-07 | End: 2022-11-14 | Stop reason: HOSPADM

## 2022-11-07 RX ORDER — FUROSEMIDE 10 MG/ML
60 INJECTION INTRAMUSCULAR; INTRAVENOUS
Status: DISCONTINUED | OUTPATIENT
Start: 2022-11-07 | End: 2022-11-11

## 2022-11-07 RX ORDER — DOXYCYCLINE 100 MG/1
100 TABLET ORAL ONCE
Status: COMPLETED | OUTPATIENT
Start: 2022-11-07 | End: 2022-11-07

## 2022-11-07 RX ORDER — LACTULOSE 20 G/30ML
30 SOLUTION ORAL 2 TIMES DAILY
Status: DISCONTINUED | OUTPATIENT
Start: 2022-11-07 | End: 2022-11-07

## 2022-11-07 RX ORDER — PROMETHAZINE HYDROCHLORIDE 25 MG/1
12.5-25 TABLET ORAL EVERY 4 HOURS PRN
Status: DISCONTINUED | OUTPATIENT
Start: 2022-11-07 | End: 2022-11-14 | Stop reason: HOSPADM

## 2022-11-07 RX ORDER — IPRATROPIUM BROMIDE AND ALBUTEROL SULFATE 2.5; .5 MG/3ML; MG/3ML
3 SOLUTION RESPIRATORY (INHALATION) ONCE
Status: COMPLETED | OUTPATIENT
Start: 2022-11-07 | End: 2022-11-07

## 2022-11-07 RX ORDER — BISACODYL 10 MG
10 SUPPOSITORY, RECTAL RECTAL
Status: DISCONTINUED | OUTPATIENT
Start: 2022-11-07 | End: 2022-11-14 | Stop reason: HOSPADM

## 2022-11-07 RX ORDER — FUROSEMIDE 10 MG/ML
40 INJECTION INTRAMUSCULAR; INTRAVENOUS
Status: DISCONTINUED | OUTPATIENT
Start: 2022-11-07 | End: 2022-11-07

## 2022-11-07 RX ADMIN — RIVAROXABAN 10 MG: 10 TABLET, FILM COATED ORAL at 20:58

## 2022-11-07 RX ADMIN — METHYLPREDNISOLONE SODIUM SUCCINATE 125 MG: 125 INJECTION, POWDER, FOR SOLUTION INTRAMUSCULAR; INTRAVENOUS at 19:57

## 2022-11-07 RX ADMIN — DOXYCYCLINE 100 MG: 100 TABLET, FILM COATED ORAL at 19:57

## 2022-11-07 RX ADMIN — FUROSEMIDE 60 MG: 10 INJECTION, SOLUTION INTRAMUSCULAR; INTRAVENOUS at 20:57

## 2022-11-07 RX ADMIN — BUDESONIDE AND FORMOTEROL FUMARATE DIHYDRATE 2 PUFF: 80; 4.5 AEROSOL RESPIRATORY (INHALATION) at 20:58

## 2022-11-07 RX ADMIN — IPRATROPIUM BROMIDE AND ALBUTEROL SULFATE 3 ML: .5; 2.5 SOLUTION RESPIRATORY (INHALATION) at 18:51

## 2022-11-07 ASSESSMENT — COPD QUESTIONNAIRES
HAVE YOU SMOKED AT LEAST 100 CIGARETTES IN YOUR ENTIRE LIFE: NO/DON'T KNOW
DO YOU EVER COUGH UP ANY MUCUS OR PHLEGM?: YES, A FEW DAYS A WEEK OR MONTH
COPD SCREENING SCORE: 3
DURING THE PAST 4 WEEKS HOW MUCH DID YOU FEEL SHORT OF BREATH: NONE/LITTLE OF THE TIME

## 2022-11-07 ASSESSMENT — ENCOUNTER SYMPTOMS
MYALGIAS: 0
WEAKNESS: 0
WEIGHT LOSS: 0
NAUSEA: 0
SPEECH CHANGE: 0
SHORTNESS OF BREATH: 1
PHOTOPHOBIA: 0
DIARRHEA: 0
BLURRED VISION: 0
HEMOPTYSIS: 0
PALPITATIONS: 0
FEVER: 0
NECK PAIN: 0
VOMITING: 0
CLAUDICATION: 0
COUGH: 1
ORTHOPNEA: 0
ABDOMINAL PAIN: 0
CHILLS: 0
DIZZINESS: 0
DOUBLE VISION: 0
CONSTIPATION: 0

## 2022-11-08 ENCOUNTER — APPOINTMENT (OUTPATIENT)
Dept: CARDIOLOGY | Facility: MEDICAL CENTER | Age: 62
DRG: 291 | End: 2022-11-08
Attending: INTERNAL MEDICINE
Payer: MEDICARE

## 2022-11-08 PROBLEM — D75.1 POLYCYTHEMIA: Status: ACTIVE | Noted: 2022-11-08

## 2022-11-08 LAB
ALBUMIN SERPL BCP-MCNC: 3.6 G/DL (ref 3.2–4.9)
ALBUMIN/GLOB SERPL: 0.9 G/DL
ALP SERPL-CCNC: 98 U/L (ref 30–99)
ALT SERPL-CCNC: 20 U/L (ref 2–50)
ANION GAP SERPL CALC-SCNC: 12 MMOL/L (ref 7–16)
AST SERPL-CCNC: 22 U/L (ref 12–45)
BASOPHILS # BLD AUTO: 0.3 % (ref 0–1.8)
BASOPHILS # BLD: 0.02 K/UL (ref 0–0.12)
BILIRUB SERPL-MCNC: 0.5 MG/DL (ref 0.1–1.5)
BUN SERPL-MCNC: 21 MG/DL (ref 8–22)
CALCIUM SERPL-MCNC: 8.5 MG/DL (ref 8.5–10.5)
CHLORIDE SERPL-SCNC: 97 MMOL/L (ref 96–112)
CO2 SERPL-SCNC: 30 MMOL/L (ref 20–33)
CREAT SERPL-MCNC: 0.94 MG/DL (ref 0.5–1.4)
CRP SERPL HS-MCNC: 1.03 MG/DL (ref 0–0.75)
EOSINOPHIL # BLD AUTO: 0 K/UL (ref 0–0.51)
EOSINOPHIL NFR BLD: 0 % (ref 0–6.9)
ERYTHROCYTE [DISTWIDTH] IN BLOOD BY AUTOMATED COUNT: 58.2 FL (ref 35.9–50)
GFR SERPLBLD CREATININE-BSD FMLA CKD-EPI: 92 ML/MIN/1.73 M 2
GLOBULIN SER CALC-MCNC: 3.9 G/DL (ref 1.9–3.5)
GLUCOSE BLD STRIP.AUTO-MCNC: 223 MG/DL (ref 65–99)
GLUCOSE BLD STRIP.AUTO-MCNC: 247 MG/DL (ref 65–99)
GLUCOSE BLD STRIP.AUTO-MCNC: 271 MG/DL (ref 65–99)
GLUCOSE SERPL-MCNC: 245 MG/DL (ref 65–99)
HCT VFR BLD AUTO: 58.8 % (ref 42–52)
HGB BLD-MCNC: 18.5 G/DL (ref 14–18)
IMM GRANULOCYTES # BLD AUTO: 0.04 K/UL (ref 0–0.11)
IMM GRANULOCYTES NFR BLD AUTO: 0.6 % (ref 0–0.9)
LV EJECT FRACT  99904: 75
LYMPHOCYTES # BLD AUTO: 0.75 K/UL (ref 1–4.8)
LYMPHOCYTES NFR BLD: 11.3 % (ref 22–41)
MAGNESIUM SERPL-MCNC: 1.8 MG/DL (ref 1.5–2.5)
MCH RBC QN AUTO: 28.4 PG (ref 27–33)
MCHC RBC AUTO-ENTMCNC: 31.5 G/DL (ref 33.7–35.3)
MCV RBC AUTO: 90.2 FL (ref 81.4–97.8)
MONOCYTES # BLD AUTO: 0.07 K/UL (ref 0–0.85)
MONOCYTES NFR BLD AUTO: 1.1 % (ref 0–13.4)
NEUTROPHILS # BLD AUTO: 5.75 K/UL (ref 1.82–7.42)
NEUTROPHILS NFR BLD: 86.7 % (ref 44–72)
NRBC # BLD AUTO: 0.03 K/UL
NRBC BLD-RTO: 0.5 /100 WBC
PLATELET # BLD AUTO: 143 K/UL (ref 164–446)
PMV BLD AUTO: 10 FL (ref 9–12.9)
POTASSIUM SERPL-SCNC: 4.7 MMOL/L (ref 3.6–5.5)
PROT SERPL-MCNC: 7.5 G/DL (ref 6–8.2)
RBC # BLD AUTO: 6.52 M/UL (ref 4.7–6.1)
SODIUM SERPL-SCNC: 139 MMOL/L (ref 135–145)
TROPONIN T SERPL-MCNC: 7 NG/L (ref 6–19)
WBC # BLD AUTO: 6.6 K/UL (ref 4.8–10.8)

## 2022-11-08 PROCEDURE — 83735 ASSAY OF MAGNESIUM: CPT

## 2022-11-08 PROCEDURE — 82962 GLUCOSE BLOOD TEST: CPT

## 2022-11-08 PROCEDURE — 770020 HCHG ROOM/CARE - TELE (206)

## 2022-11-08 PROCEDURE — 80053 COMPREHEN METABOLIC PANEL: CPT

## 2022-11-08 PROCEDURE — 36415 COLL VENOUS BLD VENIPUNCTURE: CPT

## 2022-11-08 PROCEDURE — 93306 TTE W/DOPPLER COMPLETE: CPT | Mod: 26 | Performed by: INTERNAL MEDICINE

## 2022-11-08 PROCEDURE — A9270 NON-COVERED ITEM OR SERVICE: HCPCS | Performed by: INTERNAL MEDICINE

## 2022-11-08 PROCEDURE — 86140 C-REACTIVE PROTEIN: CPT

## 2022-11-08 PROCEDURE — 84484 ASSAY OF TROPONIN QUANT: CPT

## 2022-11-08 PROCEDURE — 700102 HCHG RX REV CODE 250 W/ 637 OVERRIDE(OP): Performed by: HOSPITALIST

## 2022-11-08 PROCEDURE — 700111 HCHG RX REV CODE 636 W/ 250 OVERRIDE (IP): Performed by: INTERNAL MEDICINE

## 2022-11-08 PROCEDURE — 700102 HCHG RX REV CODE 250 W/ 637 OVERRIDE(OP): Performed by: INTERNAL MEDICINE

## 2022-11-08 PROCEDURE — A9270 NON-COVERED ITEM OR SERVICE: HCPCS | Performed by: HOSPITALIST

## 2022-11-08 PROCEDURE — 96376 TX/PRO/DX INJ SAME DRUG ADON: CPT

## 2022-11-08 PROCEDURE — 99233 SBSQ HOSP IP/OBS HIGH 50: CPT | Performed by: HOSPITALIST

## 2022-11-08 PROCEDURE — 85025 COMPLETE CBC W/AUTO DIFF WBC: CPT

## 2022-11-08 PROCEDURE — 93306 TTE W/DOPPLER COMPLETE: CPT

## 2022-11-08 PROCEDURE — 94640 AIRWAY INHALATION TREATMENT: CPT

## 2022-11-08 RX ORDER — FUROSEMIDE 80 MG
80 TABLET ORAL DAILY
COMMUNITY
End: 2023-03-14

## 2022-11-08 RX ORDER — AMIODARONE HYDROCHLORIDE 200 MG/1
200 TABLET ORAL DAILY
COMMUNITY

## 2022-11-08 RX ORDER — LIDOCAINE 50 MG/G
1 PATCH TOPICAL EVERY 24 HOURS
COMMUNITY
End: 2023-03-14

## 2022-11-08 RX ORDER — HYDROCODONE BITARTRATE AND ACETAMINOPHEN 10; 325 MG/1; MG/1
1 TABLET ORAL 3 TIMES DAILY PRN
Status: DISCONTINUED | OUTPATIENT
Start: 2022-11-08 | End: 2022-11-10

## 2022-11-08 RX ORDER — LEVOTHYROXINE SODIUM 0.03 MG/1
25 TABLET ORAL
COMMUNITY
End: 2023-03-14

## 2022-11-08 RX ORDER — AMIODARONE HYDROCHLORIDE 200 MG/1
200 TABLET ORAL DAILY
Status: DISCONTINUED | OUTPATIENT
Start: 2022-11-08 | End: 2022-11-14 | Stop reason: HOSPADM

## 2022-11-08 RX ORDER — ATORVASTATIN CALCIUM 20 MG/1
20 TABLET, FILM COATED ORAL NIGHTLY
COMMUNITY

## 2022-11-08 RX ORDER — ATORVASTATIN CALCIUM 20 MG/1
20 TABLET, FILM COATED ORAL NIGHTLY
Status: DISCONTINUED | OUTPATIENT
Start: 2022-11-08 | End: 2022-11-14 | Stop reason: HOSPADM

## 2022-11-08 RX ORDER — HYDROCODONE BITARTRATE AND ACETAMINOPHEN 10; 325 MG/1; MG/1
1 TABLET ORAL 3 TIMES DAILY PRN
COMMUNITY

## 2022-11-08 RX ORDER — LEVOTHYROXINE SODIUM 0.03 MG/1
25 TABLET ORAL
Status: DISCONTINUED | OUTPATIENT
Start: 2022-11-08 | End: 2022-11-14 | Stop reason: HOSPADM

## 2022-11-08 RX ORDER — LIDOCAINE 50 MG/G
1 PATCH TOPICAL EVERY 24 HOURS
Status: DISCONTINUED | OUTPATIENT
Start: 2022-11-08 | End: 2022-11-14 | Stop reason: HOSPADM

## 2022-11-08 RX ADMIN — INSULIN HUMAN 3 UNITS: 100 INJECTION, SOLUTION PARENTERAL at 11:46

## 2022-11-08 RX ADMIN — SENNOSIDES AND DOCUSATE SODIUM 2 TABLET: 50; 8.6 TABLET ORAL at 17:11

## 2022-11-08 RX ADMIN — INSULIN HUMAN 2 UNITS: 100 INJECTION, SOLUTION PARENTERAL at 09:29

## 2022-11-08 RX ADMIN — ATORVASTATIN CALCIUM 20 MG: 20 TABLET, FILM COATED ORAL at 06:02

## 2022-11-08 RX ADMIN — INSULIN HUMAN 2 UNITS: 100 INJECTION, SOLUTION PARENTERAL at 16:28

## 2022-11-08 RX ADMIN — FUROSEMIDE 60 MG: 10 INJECTION, SOLUTION INTRAMUSCULAR; INTRAVENOUS at 16:28

## 2022-11-08 RX ADMIN — ATORVASTATIN CALCIUM 20 MG: 20 TABLET, FILM COATED ORAL at 20:27

## 2022-11-08 RX ADMIN — AMLODIPINE BESYLATE 10 MG: 10 TABLET ORAL at 06:02

## 2022-11-08 RX ADMIN — INSULIN HUMAN 2 UNITS: 100 INJECTION, SOLUTION PARENTERAL at 20:32

## 2022-11-08 RX ADMIN — FUROSEMIDE 60 MG: 10 INJECTION, SOLUTION INTRAMUSCULAR; INTRAVENOUS at 06:01

## 2022-11-08 RX ADMIN — AMIODARONE HYDROCHLORIDE 200 MG: 200 TABLET ORAL at 16:28

## 2022-11-08 RX ADMIN — RIVAROXABAN 10 MG: 10 TABLET, FILM COATED ORAL at 17:11

## 2022-11-08 RX ADMIN — BUDESONIDE AND FORMOTEROL FUMARATE DIHYDRATE 2 PUFF: 80; 4.5 AEROSOL RESPIRATORY (INHALATION) at 07:51

## 2022-11-08 RX ADMIN — ASPIRIN 81 MG: 81 TABLET, COATED ORAL at 06:02

## 2022-11-08 ASSESSMENT — COGNITIVE AND FUNCTIONAL STATUS - GENERAL
SUGGESTED CMS G CODE MODIFIER DAILY ACTIVITY: CH
DAILY ACTIVITIY SCORE: 24
CLIMB 3 TO 5 STEPS WITH RAILING: A LITTLE
STANDING UP FROM CHAIR USING ARMS: A LITTLE
TURNING FROM BACK TO SIDE WHILE IN FLAT BAD: A LITTLE
MOVING TO AND FROM BED TO CHAIR: A LITTLE
MOVING FROM LYING ON BACK TO SITTING ON SIDE OF FLAT BED: A LITTLE
MOBILITY SCORE: 19
SUGGESTED CMS G CODE MODIFIER MOBILITY: CK

## 2022-11-08 ASSESSMENT — ENCOUNTER SYMPTOMS
CARDIOVASCULAR NEGATIVE: 1
SPUTUM PRODUCTION: 1
SORE THROAT: 0
BLURRED VISION: 0
DEPRESSION: 0
DIAPHORESIS: 0
VOMITING: 0
SHORTNESS OF BREATH: 1
FOCAL WEAKNESS: 0
ABDOMINAL PAIN: 0
HEADACHES: 0
FEVER: 0
PSYCHIATRIC NEGATIVE: 1
CHILLS: 0
MUSCULOSKELETAL NEGATIVE: 1
EYES NEGATIVE: 1
BRUISES/BLEEDS EASILY: 0
PALPITATIONS: 0
WEAKNESS: 0
GASTROINTESTINAL NEGATIVE: 1
CONSTITUTIONAL NEGATIVE: 1
WEIGHT LOSS: 0
NEUROLOGICAL NEGATIVE: 1
MYALGIAS: 0
COUGH: 1
NAUSEA: 0
DIZZINESS: 0

## 2022-11-08 ASSESSMENT — LIFESTYLE VARIABLES
TOTAL SCORE: 0
CONSUMPTION TOTAL: NEGATIVE
EVER HAD A DRINK FIRST THING IN THE MORNING TO STEADY YOUR NERVES TO GET RID OF A HANGOVER: NO
HOW MANY TIMES IN THE PAST YEAR HAVE YOU HAD 5 OR MORE DRINKS IN A DAY: 0
EVER FELT BAD OR GUILTY ABOUT YOUR DRINKING: NO
TOTAL SCORE: 0
ALCOHOL_USE: YES
HAVE YOU EVER FELT YOU SHOULD CUT DOWN ON YOUR DRINKING: NO
HAVE PEOPLE ANNOYED YOU BY CRITICIZING YOUR DRINKING: NO
ON A TYPICAL DAY WHEN YOU DRINK ALCOHOL HOW MANY DRINKS DO YOU HAVE: 0
AVERAGE NUMBER OF DAYS PER WEEK YOU HAVE A DRINK CONTAINING ALCOHOL: 1
TOTAL SCORE: 0
SUBSTANCE_ABUSE: 0

## 2022-11-08 ASSESSMENT — PATIENT HEALTH QUESTIONNAIRE - PHQ9
2. FEELING DOWN, DEPRESSED, IRRITABLE, OR HOPELESS: NOT AT ALL
4. FEELING TIRED OR HAVING LITTLE ENERGY: SEVERAL DAYS
SUM OF ALL RESPONSES TO PHQ QUESTIONS 1-9: 4
8. MOVING OR SPEAKING SO SLOWLY THAT OTHER PEOPLE COULD HAVE NOTICED. OR THE OPPOSITE, BEING SO FIGETY OR RESTLESS THAT YOU HAVE BEEN MOVING AROUND A LOT MORE THAN USUAL: NOT AT ALL
SUM OF ALL RESPONSES TO PHQ9 QUESTIONS 1 AND 2: 3
6. FEELING BAD ABOUT YOURSELF - OR THAT YOU ARE A FAILURE OR HAVE LET YOURSELF OR YOUR FAMILY DOWN: NOT AL ALL
9. THOUGHTS THAT YOU WOULD BE BETTER OFF DEAD, OR OF HURTING YOURSELF: NOT AT ALL
5. POOR APPETITE OR OVEREATING: NOT AT ALL
1. LITTLE INTEREST OR PLEASURE IN DOING THINGS: NEARLY EVERY DAY
3. TROUBLE FALLING OR STAYING ASLEEP OR SLEEPING TOO MUCH: NOT AT ALL
7. TROUBLE CONCENTRATING ON THINGS, SUCH AS READING THE NEWSPAPER OR WATCHING TELEVISION: NOT AT ALL

## 2022-11-08 ASSESSMENT — FIBROSIS 4 INDEX: FIB4 SCORE: 2.06

## 2022-11-08 NOTE — ED NOTES
Report from Barbie and Tata RNs, assuming care of pt. Pt resting in gurney, respirations even, no needs at this time.

## 2022-11-08 NOTE — ASSESSMENT & PLAN NOTE
Last echo in 2020 showed right ventricular systolic pressure more than 60 mmHg   lower extremity edema and shortness of breath continue to improve  Continue  Lasix - transitioned to oral   Weight daily and I's and O's  Echo stable, EF 70%

## 2022-11-08 NOTE — ED NOTES
Pt trialed on RA o2, decreased to 85% before oxygen reapplied. Pt trialed on 2 lpm NC, maintaining >92% while awake. Pt dropping to 70s while sleeping, maintaining >92% on 4lpm while asleep.

## 2022-11-08 NOTE — ASSESSMENT & PLAN NOTE
Suspect recent non compliance with cpap contributing  Patient plans to follow up with pulm  Resolved with acute blood loss from hematoma  Patient agrees to have this followed up as outpatient

## 2022-11-08 NOTE — DISCHARGE PLANNING
Case Management Discharge Planning    Admission Date: 11/7/2022  GMLOS: 3.5  ALOS: 1    6-Clicks ADL Score:  none  6-Clicks Mobility Score:  none  PT and/or OT Eval ordered: No  Post-acute Referrals Ordered: No  Post-acute Choice Obtained: NA  Has referral(s) been sent to post-acute provider:  NA      Anticipated Discharge Dispo: Discharge Disposition: Discharged to home/self care (01)    DME Needed: pending O2 needs    Action(s) Taken: Pt pending medical clearance, per IDT rounds few days, O2 at 4 liters, none at baseline. Case management needs pending O2 needs.    Escalations Completed: None    Medically Clear: No    Next Steps: f/u with medical team and pt to discuss dc needs and barriers.    Barriers to Discharge: Medical clearance

## 2022-11-08 NOTE — DIETARY
NUTRITION SERVICES: BMI - Pt with BMI >40 (=Body mass index is 64.57 kg/m².), Class III obesity. Weight loss counseling not appropriate in acute care setting. RECOMMEND - If appropriate at DC please refer to outpatient nutrition services for weight management.

## 2022-11-08 NOTE — PROGRESS NOTES
"Hospital Medicine Daily Progress Note    Date of Service  11/8/2022    Chief Complaint  Rashi Shirley is a 62 y.o. male admitted 11/7/2022 with sob    Hospital Course  Patient is a 62 year old male with history of obesity, diabetes dyslipidemia, pulmonary edema and sleep apnea who presented 11/7 with worsening shortness of breath, started a few days prior to admission with coughing producing sputum, with worsening shortness of breath and worsening lower extremity edema.  He denied any fever or chills however patient did report generalized weakness.  Patient had a history of obstructive sleep apnea and COVID-19 infection and according to the patient he had  recovered and not been needing oxygen or been using a CPAP machine for several months.  On admission patient needed 2 L nasal cannula to keep saturation above 88%. Exam revealed wheezing and crackles with lower extremity edema.  Chest x-ray showed possible pulmonary edema.  Last echo in 2020 was normal and patient denied any history of heart disease or kidney disease.  IV Lasix was a started also patient received IV antibiotics in ER.  Patient was admitted for acute respiratory failure and possible pulmonary edema/emphysema.    Interval Problem Update  Seen in the ER, states he is doing \" a little better\" denies papin, stable on 4L, no chest pain, no wheezing currently. ROS otherwise negative.     I have discussed this patient's plan of care and discharge plan at IDT rounds today with Case Management, Nursing, Nursing leadership, and other members of the IDT team.    Consultants/Specialty    Code Status  Full Code    Disposition  Patient is not medically cleared for discharge.   Anticipate discharge to be determined    Review of Systems  Review of Systems   Constitutional: Negative.  Negative for chills, diaphoresis, fever, malaise/fatigue and weight loss.   HENT: Negative.  Negative for sore throat.    Eyes: Negative.  Negative for blurred vision. "   Respiratory:  Positive for cough, sputum production and shortness of breath.    Cardiovascular: Negative.  Negative for chest pain, palpitations and leg swelling.   Gastrointestinal: Negative.  Negative for abdominal pain, nausea and vomiting.   Genitourinary: Negative.  Negative for dysuria.   Musculoskeletal: Negative.  Negative for myalgias.   Skin: Negative.  Negative for itching and rash.   Neurological: Negative.  Negative for dizziness, focal weakness, weakness and headaches.   Endo/Heme/Allergies: Negative.  Does not bruise/bleed easily.   Psychiatric/Behavioral: Negative.  Negative for depression, substance abuse and suicidal ideas.    All other systems reviewed and are negative.     Physical Exam  Temp:  [36.7 °C (98 °F)-36.8 °C (98.2 °F)] 36.8 °C (98.2 °F)  Pulse:  [62-79] 78  Resp:  [] 20  BP: (106-158)/(46-80) 110/46  SpO2:  [88 %-98 %] 95 %    Physical Exam  Vitals and nursing note reviewed. Exam conducted with a chaperone present.   Constitutional:       General: He is not in acute distress.     Appearance: Normal appearance. He is obese. He is not diaphoretic.   HENT:      Head: Normocephalic.      Nose: Nose normal.      Mouth/Throat:      Mouth: Mucous membranes are moist.   Eyes:      Pupils: Pupils are equal, round, and reactive to light.   Cardiovascular:      Rate and Rhythm: Normal rate and regular rhythm.      Pulses: Normal pulses.      Heart sounds: Normal heart sounds.   Pulmonary:      Effort: Pulmonary effort is normal.      Breath sounds: Wheezing and rhonchi present.   Abdominal:      General: Abdomen is flat. Bowel sounds are normal.      Palpations: Abdomen is soft.   Musculoskeletal:         General: No swelling or deformity. Normal range of motion.   Skin:     General: Skin is warm and dry.      Capillary Refill: Capillary refill takes less than 2 seconds.   Neurological:      General: No focal deficit present.      Mental Status: He is alert and oriented to person, place,  and time.      Cranial Nerves: No cranial nerve deficit.   Psychiatric:         Mood and Affect: Mood normal.         Behavior: Behavior normal.       Fluids  No intake or output data in the 24 hours ending 11/08/22 1304    Laboratory  Recent Labs     11/07/22 1747 11/08/22  0605   WBC 4.6* 6.6   RBC 6.50* 6.52*   HEMOGLOBIN 18.1* 18.5*   HEMATOCRIT 60.9* 58.8*   MCV 93.7 90.2   MCH 27.8 28.4   MCHC 29.7* 31.5*   RDW 61.1* 58.2*   PLATELETCT 131* 143*   MPV 10.3 10.0     Recent Labs     11/07/22 1747 11/08/22  0605   SODIUM 141 139   POTASSIUM 4.9 4.7   CHLORIDE 97 97   CO2 33 30   GLUCOSE 124* 245*   BUN 18 21   CREATININE 0.97 0.94   CALCIUM 8.5 8.5                   Imaging  DX-CHEST-PORTABLE (1 VIEW)   Final Result      1.  There is no acute cardiopulmonary process.   2.  Stable enlarged cardiac silhouette likely in part due to body habitus and prominent pericardial fat pads.      EC-ECHOCARDIOGRAM COMPLETE W/O CONT    (Results Pending)        Assessment/Plan  * Acute respiratory failure (HCC)- (present on admission)  Assessment & Plan  Likely related to pulmonary edema however could be undiagnosed emphysema/asthma  Patient never had heart failure and no history of smoking however patient has significant obesity with BMI 64  His baseline is room air  Sx improving slowly  Continue iv Lasix  Echo pending  Telemetry  Weaning her from oxygen, inhalers and RT  pulmonary function test and sleep study as outpatient   procalcitonin low      Polycythemia  Assessment & Plan  Suspect recent non compliance with cpap contributing  Patient plans to follow up with pulm  following    Pulmonary hypertension (HCC)- (present on admission)  Assessment & Plan  Last echo in 2020 showed right ventricular systolic pressure more than 60 mmHg  Significant lower extremity edema and shortness of breath  Continue  Lasix  Weight daily and I's and O's  Repeat echo pending    Controlled type 2 diabetes mellitus, without long-term current use  of insulin (HCC)- (present on admission)  Assessment & Plan  A1c to 6.5 however 2 years ago  Will start insulin  Add lantus if needed  following    Essential hypertension- (present on admission)  Assessment & Plan  Continue amlodipine as tolerating  Close monitoring and adjust the medication if needed    ABELARDO (obstructive sleep apnea)- (present on admission)  Assessment & Plan  According to the patient, he recovered  Patient not using CPAP as outpatient  BMI 64  Patient needs CPAP and sleep study as outpatient    HLD (hyperlipidemia)- (present on admission)  Assessment & Plan  Continue home medication atorvastatin       VTE prophylaxis: Xarelto 10 mg daily as prophylaxis    I have performed a physical exam and reviewed and updated ROS and Plan today (11/8/2022). In review of yesterday's note (11/7/2022), there are no changes except as documented above.

## 2022-11-08 NOTE — ED NOTES
Pt in gown.  Call light in reach. Urinal provided.   Blood drawn and sent to lab. Covid swab completed and sent to lab.  Pt resting on gurney and all needs are met at this time.

## 2022-11-08 NOTE — ED PROVIDER NOTES
ED Provider Note    Scribed for Barbara Mcgill M.D. by Mikhail Moreno. 11/7/2022  6:25 PM    Primary care provider: Jeff Faust M.D. (Inactive)  Means of arrival: EMS  History obtained from: Patient  History limited by: None    CHIEF COMPLAINT  Chief Complaint   Patient presents with    Cough     Coughing sputum x1day       HPI  Rashi Shirley is a 62 y.o. male who presents via EMS from the [facility] for evaluation of productive cough and low oxygen saturation onset two days ago. He states that he presented to the [facility] today for the same symptoms. His oxygen saturation was 75% at the clinic, so he was instructed to present to the ED for further evaluation. He notes that his sputum is mainly a green color and denies any blood in it. He also reports associated symptoms of shortness of breath. He denies any new fevers, chill, chest pain, nausea, vomiting, abdominal pain, or lower extremity swelling. His shortness of breath is exacerbated with walking. There are no known alleviating factors. He notes that he has a history of shortness of breath secondary to his weight and required supplemental oxygen. Losing weight resolved this and he was taken off supplemental oxygen. However, he has since regained the weight. He had lost approximately 70 pounds. He had COVID-19 in September, 2022 but states that he has fully recovered. 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 He also denies any history of heart issues. He has had his flu vaccine and the initial dose of the COVID-19 vaccine.       PPE Note: I personally donned PPE for all patient encounters during this visit, with an N95 respirator mask and gloves.      Scribe remained outside the patient's room and did not have any contact with the patient for the duration of patient encounter.     REVIEW OF SYSTEMS  Pertinent positives include: productive  "cough and shortness of breath.   Pertinent negatives include no: fevers, chill, chest pain, nausea, vomiting, abdominal pain, or lower extremity swelling.    See HPI for further details. All other systems are negative.    PAST MEDICAL HISTORY   has a past medical history of Chickenpox, Diabetes (HCC), Indonesian measles, Hyperlipidemia, Mumps, Obesity, Sleep apnea, and Tonsillitis.    FAMILY HISTORY  Family History   Problem Relation Age of Onset    Diabetes Mother     Diabetes Father     Sleep Apnea Neg Hx        SOCIAL HISTORY  Social History     Tobacco Use    Smoking status: Never    Smokeless tobacco: Never   Vaping Use    Vaping Use: Never used   Substance Use Topics    Alcohol use: Yes     Comment: occasional    Drug use: No      Social History     Substance and Sexual Activity   Drug Use No       SURGICAL HISTORY  Past Surgical History:   Procedure Laterality Date    COLONOSCOPY N/A 4/24/2019    Procedure: COLONOSCOPY;  Surgeon: Rashi Goldberg M.D.;  Location: SURGERY [facility];  Service: Gastroenterology    GASTROSCOPY-ENDO N/A 4/24/2019    Procedure: GASTROSCOPY;  Surgeon: Rashi Goldberg M.D.;  Location: SURGERY [facility];  Service: Gastroenterology    OTHER ORTHOPEDIC SURGERY      bilateral knee surgery       CURRENT MEDICATIONS  Home Medications    **Home medications have not yet been reviewed for this encounter**         ALLERGIES  Allergies   Allergen Reactions    Zosyn      Rash with eosinophilia - unknown for certain if secondary to zosyn but was getting zosyn at the time       PHYSICAL EXAM  VITAL SIGNS: /66   Pulse 65   Temp 36.7 °C (98 °F) (Temporal)   Resp 15   Ht 1.778 m (5' 10\")   Wt (!) 204 kg (450 lb)   SpO2 94%   BMI 64.57 kg/m²      Constitutional: Markedly elevated BMI, Well developed, well nourished; No acute distress; Non-toxic appearance.   HENT: Normocephalic, atraumatic; Bilateral external ears normal; Oropharyngeal exam deferred to COVID-19 outbreak and " lack of oropharyngeal complaints.   Eyes: PERRL, EOMI, Conjunctiva normal. No discharge.   Neck:  Supple, nontender midline; No stridor; No nuchal rigidity.   Lymphatic: No cervical lymphadenopathy noted.   Cardiovascular: Regular rate and rhythm without murmurs, rubs, or gallop.   Thorax & Lungs: Decreased breath sounds throughout. No respiratory distress, Expiratory wheezes throughout, breath sounds bilaterally without rales or rhonchi. Nontender chest wall. No crepitus or subcutaneous air  Abdomen: Soft, nontender, bowel sounds normal. No obvious masses; No pulsatile masses; no rebound, guarding, or peritoneal signs.   Skin: Good color; warm and dry without rash or petechia.  Back: Nontender, No CVA tenderness.   Extremities: 2+ pitting edema bilateral with changes of chronic venous stasis. Distal radial, dorsalis pedis, posterior tibial pulses are equal bilaterally; Nontender calves or saphenous, No cyanosis, No clubbing.   Musculoskeletal: Good range of motion in all major joints. No tenderness to palpation or major deformities noted.   Neurologic: Alert & oriented x 4, clear speech.       EKG  12 Lead EKG interpreted by me as below.     LABS/RADIOLOGY/PROCEDURES  Results for orders placed or performed during the hospital encounter of 11/07/22   CoV-2, FLU A/B, and RSV by PCR (2-4 Hours CEPHEID) : Collect NP swab in VTM    Specimen: Respirate   Result Value Ref Range    Influenza virus A RNA Negative Negative    Influenza virus B, PCR Negative Negative    RSV, PCR Negative Negative    SARS-CoV-2 by PCR NotDetected     SARS-CoV-2 Source NP Swab    Lactic Acid   Result Value Ref Range    Lactic Acid 2.7 (H) 0.5 - 2.0 mmol/L   Lactic Acid   Result Value Ref Range    Lactic Acid 1.6 0.5 - 2.0 mmol/L   Troponin   Result Value Ref Range    Troponin T 9 6 - 19 ng/L   proBrain Natriuretic Peptide, NT   Result Value Ref Range    NT-proBNP 100 0 - 125 pg/mL   CBC WITH DIFFERENTIAL   Result Value Ref Range    WBC 4.6 (L)  4.8 - 10.8 K/uL    RBC 6.50 (H) 4.70 - 6.10 M/uL    Hemoglobin 18.1 (H) 14.0 - 18.0 g/dL    Hematocrit 60.9 (H) 42.0 - 52.0 %    MCV 93.7 81.4 - 97.8 fL    MCH 27.8 27.0 - 33.0 pg    MCHC 29.7 (L) 33.7 - 35.3 g/dL    RDW 61.1 (H) 35.9 - 50.0 fL    Platelet Count 131 (L) 164 - 446 K/uL    MPV 10.3 9.0 - 12.9 fL    Neutrophils-Polys 67.50 44.00 - 72.00 %    Lymphocytes 16.20 (L) 22.00 - 41.00 %    Monocytes 12.80 0.00 - 13.40 %    Eosinophils 2.60 0.00 - 6.90 %    Basophils 0.90 0.00 - 1.80 %    Nucleated RBC 0.40 /100 WBC    Neutrophils (Absolute) 3.11 1.82 - 7.42 K/uL    Lymphs (Absolute) 0.75 (L) 1.00 - 4.80 K/uL    Monos (Absolute) 0.59 0.00 - 0.85 K/uL    Eos (Absolute) 0.12 0.00 - 0.51 K/uL    Baso (Absolute) 0.04 0.00 - 0.12 K/uL    NRBC (Absolute) 0.02 K/uL    Anisocytosis 1+     Macrocytosis 1+    Comp Metabolic Panel   Result Value Ref Range    Sodium 141 135 - 145 mmol/L    Potassium 4.9 3.6 - 5.5 mmol/L    Chloride 97 96 - 112 mmol/L    Co2 33 20 - 33 mmol/L    Anion Gap 11.0 7.0 - 16.0    Glucose 124 (H) 65 - 99 mg/dL    Bun 18 8 - 22 mg/dL    Creatinine 0.97 0.50 - 1.40 mg/dL    Calcium 8.5 8.5 - 10.5 mg/dL    AST(SGOT) 26 12 - 45 U/L    ALT(SGPT) 21 2 - 50 U/L    Alkaline Phosphatase 97 30 - 99 U/L    Total Bilirubin 0.4 0.1 - 1.5 mg/dL    Albumin 3.5 3.2 - 4.9 g/dL    Total Protein 7.3 6.0 - 8.2 g/dL    Globulin 3.8 (H) 1.9 - 3.5 g/dL    A-G Ratio 0.9 g/dL   ESTIMATED GFR   Result Value Ref Range    GFR (CKD-EPI) 89 >60 mL/min/1.73 m 2   DIFFERENTIAL MANUAL   Result Value Ref Range    Manual Diff Status PERFORMED    PERIPHERAL SMEAR REVIEW   Result Value Ref Range    Peripheral Smear Review see below    PLATELET ESTIMATE   Result Value Ref Range    Plt Estimation Decreased    MORPHOLOGY   Result Value Ref Range    RBC Morphology Present     Polychromia 1+    PROCALCITONIN   Result Value Ref Range    Procalcitonin 0.08 <0.25 ng/mL   D-DIMER   Result Value Ref Range    D-Dimer Screen 0.38 0.00 - 0.50  ug/mL (FEU)   EKG   Result Value Ref Range    Report       [facility] Emergency Dept.    Test Date:  2022  Pt Name:    JAELYN TAYLOR             Department: ER  MRN:        [id number]                      Room:       BL 20  Gender:     Male                         Technician: [id number]  :        1960                   Requested By:ER TRIAGE PROTOCOL  Order #:    [id number]                    Reading MD:    Measurements  Intervals                                Axis  Rate:       66                           P:          32  NH:         199                          QRS:        87  QRSD:       82                           T:          40  QT:         436  QTc:        457    Interpretive Statements  Sinus rhythm  Borderline right axis deviation  Borderline T wave abnormalities  Baseline wander in lead(s) V4,V5,V6  Compared to ECG 2020 14:57:08  T-wave abnormality now present  Sinus tachycardia no longer present          All labs reviewed by me.    DX-CHEST-PORTABLE (1 VIEW)   Final Result      1.  There is no acute cardiopulmonary process.   2.  Stable enlarged cardiac silhouette likely in part due to body habitus and prominent pericardial fat pads.      EC-ECHOCARDIOGRAM COMPLETE W/O CONT    (Results Pending)       The radiologist's interpretation of all radiological studies have been reviewed by me.      COURSE & MEDICAL DECISION MAKING  Pertinent Labs & Imaging studies reviewed. (See chart for details)    Reviewed patient's old medical records which showed the patient spent 32 days in the [facility] in 2020. He was intubated and placed on a ventilator for COVID-19/Pneumonia. The patient was discharged from the hospital on supplemental O2. He has a history of obstructive sleep apnea with a questionable history of COPD.     6:25 PM - Patient seen and examined at bedside. Discussed plan of care, including a diagnostic work-up and admission to the hospital due to the  patient's extremely low oxygen saturation on room air. Patient agrees to the plan of care. The patient will be medicated with duoneb nebulizer solution and Solu-medrol 125 mg injection. Ordered for labs, imaging, and an EKG to evaluate his symptoms.     7:21 PM Paged Hospitalist.     7:47 PM I discussed the patient's case and the above findings with Dr. Major (Hospitalist) who agrees to evaluate the patient for hospitalization.      Patient presents with cough productive of a light green phlegm for the last couple days.  He went to the [facility] clinic today for evaluation and was found to have oxygen saturations of 75 to 76% on room air.  He is currently on 4 L of oxygen maintaining O2 sats in the mid 90s.  Patient is morbidly obese.  He said the last time he needed oxygen he was about this weight.  He lost 100 pounds but then gained it back.  He has expiratory wheezes throughout with decreased breath sounds.  Chest x-ray is negative for obvious pneumonia.  Patient came down with COVID 2 years ago and required mechanical ventilation.  He was here in the hospital for a month.  He is negative for COVID today.  He is also negative for flu and RSV.  White count is slightly low suggestive of viral process.  However, I did give him a dose of doxycycline in the event that he has a arterial bronchitis.  He was given a DuoNeb and is feeling better.  He is comfortably resting on the gurney on nasal cannula oxygen.  At this time he will need to be hospitalized for hypoxia with bronchospasm.  No hemoptysis.  No chest pain.  Troponin and BMP are normal.  EKG is nonacute.  At this time no evidence for STEMI, non-STEMI, myocarditis, pericarditis or heart failure.  I suspect this is all pulmonary, likely viral in etiology.  I spoke with the hospitalist on-call and he will kindly evaluate the patient hospitalization.    CRITICAL CARE  The very real possibilty of a deterioration of this patient's condition required  the highest level of my preparedness for sudden, emergent intervention.  I provided critical care services, which included medication orders, frequent reevaluations of the patient's condition and response to treatment, ordering and reviewing test results, and discussing the case with various consultants.  The critical care time associated with the care of the patient was 35 minutes. Review chart for interventions. This time is exclusive of any other billable procedures.      DISPOSITION:  Patient will be hospitalized by Dr. Major in guarded condition.      FINAL IMPRESSION  1. Hypoxia Acute   2. Wheezing Acute   3. Bronchitis Acute     The critical care time associated with the care of the patient was 35 minutes     Mikhail ALSTON (Anca), am scribing for, and in the presence of, Barbara Mcgill M.D..    Electronically signed by: Mikhail Moreno (Anca), 11/7/2022    Barbara ALSTON M.D. personally performed the services described in this documentation, as scribed by Mikhail Moerno in my presence, and it is both accurate and complete.    This dictation has been created using voice recognition software. The accuracy of the dictation is limited by the abilities of the software. I expect there may be some errors of grammar and possibly content. I made every attempt to manually correct the errors within my dictation. However, errors related to voice recognition software may still exist and should be interpreted within the appropriate context.    Patient presents to the ER the note accurately reflects work and decisions made by me.  Barbara Mcgill M.D.  11/7/2022  9:31 PM

## 2022-11-08 NOTE — ASSESSMENT & PLAN NOTE
Likely related to pulmonary edema, suspect due to acute diastolic chf exacerbation   His recent baseline is room air  Continues to slowly improve  Continue Lasix  Telemetry  Continue to wean oxygen as tolerated, inhalers and RT  pulmonary function test and sleep study as outpatient   procalcitonin low
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
## 2022-11-08 NOTE — ASSESSMENT & PLAN NOTE
According to the patient, he recovered  Patient not using CPAP as outpatient  BMI 64  Patient needs CPAP and sleep study as outpatient - he agrees to this, has pending appointment with pulmonary / sleep as outpatient

## 2022-11-08 NOTE — ED NOTES
Unable to obtain med rec at this time  On interviewing patient he was able to review allergies  However, patient is unsure of medications, and states he takes several more than we have on file, but he is unsure what they are.   Home pharmacy, [facility] is not currently open. Unable to confirm.

## 2022-11-09 LAB
GLUCOSE BLD STRIP.AUTO-MCNC: 182 MG/DL (ref 65–99)
GLUCOSE BLD STRIP.AUTO-MCNC: 204 MG/DL (ref 65–99)
GLUCOSE BLD STRIP.AUTO-MCNC: 204 MG/DL (ref 65–99)
GLUCOSE BLD STRIP.AUTO-MCNC: 213 MG/DL (ref 65–99)

## 2022-11-09 PROCEDURE — 700111 HCHG RX REV CODE 636 W/ 250 OVERRIDE (IP): Performed by: INTERNAL MEDICINE

## 2022-11-09 PROCEDURE — 700101 HCHG RX REV CODE 250

## 2022-11-09 PROCEDURE — A9270 NON-COVERED ITEM OR SERVICE: HCPCS | Performed by: HOSPITALIST

## 2022-11-09 PROCEDURE — 700102 HCHG RX REV CODE 250 W/ 637 OVERRIDE(OP): Performed by: INTERNAL MEDICINE

## 2022-11-09 PROCEDURE — 700101 HCHG RX REV CODE 250: Performed by: HOSPITALIST

## 2022-11-09 PROCEDURE — 99233 SBSQ HOSP IP/OBS HIGH 50: CPT | Performed by: HOSPITALIST

## 2022-11-09 PROCEDURE — 770020 HCHG ROOM/CARE - TELE (206)

## 2022-11-09 PROCEDURE — A9270 NON-COVERED ITEM OR SERVICE: HCPCS | Performed by: INTERNAL MEDICINE

## 2022-11-09 PROCEDURE — 82962 GLUCOSE BLOOD TEST: CPT | Mod: 91

## 2022-11-09 PROCEDURE — 94640 AIRWAY INHALATION TREATMENT: CPT

## 2022-11-09 PROCEDURE — 700102 HCHG RX REV CODE 250 W/ 637 OVERRIDE(OP): Performed by: HOSPITALIST

## 2022-11-09 RX ADMIN — INSULIN GLARGINE-YFGN 5 UNITS: 100 INJECTION, SOLUTION SUBCUTANEOUS at 17:17

## 2022-11-09 RX ADMIN — INSULIN HUMAN 2 UNITS: 100 INJECTION, SOLUTION PARENTERAL at 20:25

## 2022-11-09 RX ADMIN — RIVAROXABAN 10 MG: 10 TABLET, FILM COATED ORAL at 16:32

## 2022-11-09 RX ADMIN — ATORVASTATIN CALCIUM 20 MG: 20 TABLET, FILM COATED ORAL at 20:26

## 2022-11-09 RX ADMIN — BUDESONIDE AND FORMOTEROL FUMARATE DIHYDRATE 2 PUFF: 80; 4.5 AEROSOL RESPIRATORY (INHALATION) at 09:11

## 2022-11-09 RX ADMIN — SENNOSIDES AND DOCUSATE SODIUM 2 TABLET: 50; 8.6 TABLET ORAL at 05:29

## 2022-11-09 RX ADMIN — INSULIN HUMAN 2 UNITS: 100 INJECTION, SOLUTION PARENTERAL at 06:22

## 2022-11-09 RX ADMIN — DICLOFENAC SODIUM 2 G: 10 GEL TOPICAL at 20:29

## 2022-11-09 RX ADMIN — FUROSEMIDE 60 MG: 10 INJECTION, SOLUTION INTRAMUSCULAR; INTRAVENOUS at 05:31

## 2022-11-09 RX ADMIN — AMLODIPINE BESYLATE 10 MG: 10 TABLET ORAL at 05:29

## 2022-11-09 RX ADMIN — INSULIN HUMAN 2 UNITS: 100 INJECTION, SOLUTION PARENTERAL at 11:19

## 2022-11-09 RX ADMIN — INSULIN HUMAN 1 UNITS: 100 INJECTION, SOLUTION PARENTERAL at 16:38

## 2022-11-09 RX ADMIN — ASPIRIN 81 MG: 81 TABLET, COATED ORAL at 05:29

## 2022-11-09 RX ADMIN — LEVOTHYROXINE SODIUM 25 MCG: 0.03 TABLET ORAL at 05:30

## 2022-11-09 RX ADMIN — FUROSEMIDE 60 MG: 10 INJECTION, SOLUTION INTRAMUSCULAR; INTRAVENOUS at 16:32

## 2022-11-09 RX ADMIN — BUDESONIDE AND FORMOTEROL FUMARATE DIHYDRATE 2 PUFF: 80; 4.5 AEROSOL RESPIRATORY (INHALATION) at 19:20

## 2022-11-09 RX ADMIN — AMIODARONE HYDROCHLORIDE 200 MG: 200 TABLET ORAL at 05:29

## 2022-11-09 ASSESSMENT — ENCOUNTER SYMPTOMS
CONSTITUTIONAL NEGATIVE: 1
GASTROINTESTINAL NEGATIVE: 1
WEAKNESS: 0
PSYCHIATRIC NEGATIVE: 1
BLURRED VISION: 0
NEUROLOGICAL NEGATIVE: 1
DIZZINESS: 0
CARDIOVASCULAR NEGATIVE: 1
DEPRESSION: 0
MYALGIAS: 0
WEIGHT LOSS: 0
PALPITATIONS: 0
SHORTNESS OF BREATH: 1
HEADACHES: 0
SPUTUM PRODUCTION: 1
SORE THROAT: 0
COUGH: 1
EYES NEGATIVE: 1
CHILLS: 0
FOCAL WEAKNESS: 0
VOMITING: 0
BRUISES/BLEEDS EASILY: 0
ABDOMINAL PAIN: 0
FEVER: 0
DIAPHORESIS: 0
NAUSEA: 0
MUSCULOSKELETAL NEGATIVE: 1

## 2022-11-09 ASSESSMENT — FIBROSIS 4 INDEX
FIB4 SCORE: 2.06
FIB4 SCORE: 2.06

## 2022-11-09 ASSESSMENT — LIFESTYLE VARIABLES: SUBSTANCE_ABUSE: 0

## 2022-11-09 ASSESSMENT — PAIN DESCRIPTION - PAIN TYPE
TYPE: ACUTE PAIN
TYPE: ACUTE PAIN

## 2022-11-09 NOTE — CARE PLAN
The patient is Stable - Low risk of patient condition declining or worsening    Shift Goals  Clinical Goals: Monitor O2 status, wean O2  Patient Goals: Rest      Problem: Pain - Standard  Goal: Alleviation of pain or a reduction in pain to the patient’s comfort goal  Outcome: Progressing     Problem: Knowledge Deficit - Standard  Goal: Patient and family/care givers will demonstrate understanding of plan of care, disease process/condition, diagnostic tests and medications  Outcome: Progressing  Note: Patient educated on POC, verbalizes understanding.     Problem: Respiratory  Goal: Patient will achieve/maintain optimum respiratory ventilation and gas exchange  Outcome: Progressing  Note: Patient using IS at bedside, able to perform 2000 mL. Patient now on 5L and O2 sat 93%. Patient performing deep breathing and coughing exercises.        Problem: Self Care  Goal: Patient will have the ability to perform ADLs independently or with assistance (bathe, groom, dress, toilet and feed)  Outcome: Progressing  Note: Patient able to perform ADL's with little to no assistance.       Progress made toward(s) clinical / shift goals:  Progressing

## 2022-11-09 NOTE — DISCHARGE PLANNING
Care Transition Team Assessment    LSW met with pt at bedside to complete assessment. Pt A&Ox4 and able to verify the information on the face sheet. Pt lives with his son in a single story house that has four steps to enter. Prior to this hospitalization pt was independent with all ADLs and IADLs. Pt does not use any DME at baseline and drives himself. Pt reported his son is his main support.    Pt receives SSDI of $740/month. Pt denies any SA or MH concerns. Pt reported he has a living will, however no copy found in chart. Pt confirmed his son will provide transportation home upon DC. Pt reported he has used O2 in the past, however does not remember the name of the DME company. Pt is agreeable with sending DME referral to any DME company contracted with his insurance if O2 is needed for DC.    Information Source  Orientation Level: Oriented X4  Information Given By: Patient  Informant's Name: Rashi Shirley  Who is responsible for making decisions for patient? : Patient    Readmission Evaluation  Is this a readmission?: No    Elopement Risk  Legal Hold: No  Ambulatory or Self Mobile in Wheelchair: Yes  Disoriented: No  Psychiatric Symptoms: None  History of Wandering: No  Elopement this Admit: No  Vocalizing Wanting to Leave: No  Displays Behaviors, Body Language Wanting to Leave: No-Not at Risk for Elopement  Elopement Risk: Not at Risk for Elopement    Interdisciplinary Discharge Planning  Lives with - Patient's Self Care Capacity: Adult Children  Patient or legal guardian wants to designate a caregiver: No  Support Systems: Friends / Neighbors, Children  Housing / Facility: 1 Story House  Durable Medical Equipment: Not Applicable    Discharge Preparedness  What is your plan after discharge?: Home with help  What are your discharge supports?: Child  Prior Functional Level: Ambulatory, Drives Self, Independent with Activities of Daily Living, Independent with Medication Management  Difficulity with ADLs:  None  Difficulity with IADLs: None    Functional Assesment  Prior Functional Level: Ambulatory, Drives Self, Independent with Activities of Daily Living, Independent with Medication Management    Finances  Financial Barriers to Discharge: No  Prescription Coverage: Yes    Vision / Hearing Impairment  Vision Impairment : Yes  Right Eye Vision: Impaired, Wears Glasses  Left Eye Vision: Impaired, Wears Glasses  Hearing Impairment : No    Advance Directive  Advance Directive?: Living Will    Domestic Abuse  Have you ever been the victim of abuse or violence?: No  Physical Abuse or Sexual Abuse: No  Verbal Abuse or Emotional Abuse: No  Possible Abuse/Neglect Reported to:: Not Applicable    Psychological Assessment  History of Substance Abuse: None  History of Psychiatric Problems: No  Non-compliant with Treatment: No  Newly Diagnosed Illness: No    Discharge Risks or Barriers  Discharge risks or barriers?: No    Anticipated Discharge Information  Discharge Disposition: Discharged to home/self care (01)  Discharge Address: [address] [place] [phone]

## 2022-11-09 NOTE — CARE PLAN
The patient is Watcher - Medium risk of patient condition declining or worsening    Shift Goals  Clinical Goals: Monitor vitals & wean O2  Patient Goals: Rest    Progress made toward(s) clinical / shift goals:        Problem: Knowledge Deficit - Standard  Goal: Patient and family/care givers will demonstrate understanding of plan of care, disease process/condition, diagnostic tests and medications  Outcome: Progressing  Note: Patient verbally demonstrates understanding of POC and disease process      Problem: Respiratory  Goal: Patient will achieve/maintain optimum respiratory ventilation and gas exchange  Outcome: Progressing  Note: Patient is currently on 5L. Educated on using the IS throughout the day, best volume is 2000. Will wean O2 as tolerated.

## 2022-11-09 NOTE — PROGRESS NOTES
Assumed care of patient. Bedside report received from April RN. Updated POC, call light within reach, fall precautions in place. Bed locked, and in lowest position. Assessment completed, patient is A&Ox4, states 0/10 pain. Patient instructed to call for assistance. All questions answered, no further needs at this time.

## 2022-11-09 NOTE — PROGRESS NOTES
"Hospital Medicine Daily Progress Note    Date of Service  11/9/2022    Chief Complaint  Rashi Shirley is a 62 y.o. male admitted 11/7/2022 with sob    Hospital Course  Patient is a 62 year old male with history of obesity, diabetes dyslipidemia, pulmonary edema and sleep apnea who presented 11/7 with worsening shortness of breath, started a few days prior to admission with coughing producing sputum, with worsening shortness of breath and worsening lower extremity edema.  He denied any fever or chills however patient did report generalized weakness.  Patient had a history of obstructive sleep apnea and COVID-19 infection and according to the patient he had  recovered and not been needing oxygen or been using a CPAP machine for several months.  On admission patient needed 2 L nasal cannula to keep saturation above 88%. Exam revealed wheezing and crackles with lower extremity edema.  Chest x-ray showed possible pulmonary edema.  Last echo in 2020 was normal and patient denied any history of heart disease or kidney disease.  IV Lasix was a started also patient received IV antibiotics in ER.  Patient was admitted for acute respiratory failure and possible pulmonary edema/emphysema.    Interval Problem Update  Axox3, remain on 5L but reports that sob and cough continue to slowly improve. Mild L groin pain after \"pulling muscle getting up\" to the restroom earlier today. ROS otherwise negative.     I have discussed this patient's plan of care and discharge plan at IDT rounds today with Case Management, Nursing, Nursing leadership, and other members of the IDT team.    Consultants/Specialty    Code Status  Full Code    Disposition  Patient is not medically cleared for discharge.   Anticipate discharge to be determined    Review of Systems  Review of Systems   Constitutional: Negative.  Negative for chills, diaphoresis, fever, malaise/fatigue and weight loss.   HENT: Negative.  Negative for sore throat.    Eyes: " Negative.  Negative for blurred vision.   Respiratory:  Positive for cough, sputum production and shortness of breath.    Cardiovascular: Negative.  Negative for chest pain, palpitations and leg swelling.   Gastrointestinal: Negative.  Negative for abdominal pain, nausea and vomiting.   Genitourinary: Negative.  Negative for dysuria.   Musculoskeletal: Negative.  Negative for myalgias.   Skin: Negative.  Negative for itching and rash.   Neurological: Negative.  Negative for dizziness, focal weakness, weakness and headaches.   Endo/Heme/Allergies: Negative.  Does not bruise/bleed easily.   Psychiatric/Behavioral: Negative.  Negative for depression, substance abuse and suicidal ideas.    All other systems reviewed and are negative.     Physical Exam  Temp:  [36.5 °C (97.7 °F)-36.8 °C (98.2 °F)] 36.5 °C (97.7 °F)  Pulse:  [66-84] 74  Resp:  [18-20] 18  BP: (105-121)/(57-67) 117/62  SpO2:  [88 %-98 %] 95 %    Physical Exam  Vitals and nursing note reviewed. Exam conducted with a chaperone present.   Constitutional:       General: He is not in acute distress.     Appearance: Normal appearance. He is obese. He is not diaphoretic.   HENT:      Head: Normocephalic.      Nose: Nose normal.      Mouth/Throat:      Mouth: Mucous membranes are moist.   Eyes:      Pupils: Pupils are equal, round, and reactive to light.   Cardiovascular:      Rate and Rhythm: Normal rate and regular rhythm.      Pulses: Normal pulses.      Heart sounds: Normal heart sounds.   Pulmonary:      Effort: Pulmonary effort is normal.      Breath sounds: Rhonchi present. No wheezing.   Abdominal:      General: Abdomen is flat. Bowel sounds are normal.      Palpations: Abdomen is soft.   Musculoskeletal:         General: No swelling or deformity. Normal range of motion.   Skin:     General: Skin is warm and dry.      Capillary Refill: Capillary refill takes less than 2 seconds.   Neurological:      General: No focal deficit present.      Mental Status: He  is alert and oriented to person, place, and time.      Cranial Nerves: No cranial nerve deficit.   Psychiatric:         Mood and Affect: Mood normal.         Behavior: Behavior normal.       Fluids    Intake/Output Summary (Last 24 hours) at 11/9/2022 1206  Last data filed at 11/9/2022 0900  Gross per 24 hour   Intake 120 ml   Output 1850 ml   Net -1730 ml       Laboratory  Recent Labs     11/07/22  1747 11/08/22  0605   WBC 4.6* 6.6   RBC 6.50* 6.52*   HEMOGLOBIN 18.1* 18.5*   HEMATOCRIT 60.9* 58.8*   MCV 93.7 90.2   MCH 27.8 28.4   MCHC 29.7* 31.5*   RDW 61.1* 58.2*   PLATELETCT 131* 143*   MPV 10.3 10.0     Recent Labs     11/07/22 1747 11/08/22  0605   SODIUM 141 139   POTASSIUM 4.9 4.7   CHLORIDE 97 97   CO2 33 30   GLUCOSE 124* 245*   BUN 18 21   CREATININE 0.97 0.94   CALCIUM 8.5 8.5                   Imaging  EC-ECHOCARDIOGRAM COMPLETE W/O CONT   Final Result      DX-CHEST-PORTABLE (1 VIEW)   Final Result      1.  There is no acute cardiopulmonary process.   2.  Stable enlarged cardiac silhouette likely in part due to body habitus and prominent pericardial fat pads.           Assessment/Plan  * Acute respiratory failure (HCC)- (present on admission)  Assessment & Plan  Likely related to pulmonary edema however could be undiagnosed emphysema/asthma  Patient never had heart failure and no history of smoking however patient has significant obesity with BMI 64  His baseline is room air  Continues to slowly improving slowly  Continue iv Lasix  Sx continue to improve  IS encouraged  Telemetry  Weaning her from oxygen, inhalers and RT  pulmonary function test and sleep study as outpatient   procalcitonin low      Polycythemia  Assessment & Plan  Suspect recent non compliance with cpap contributing  Patient plans to follow up with pulm  following    Pulmonary hypertension (HCC)- (present on admission)  Assessment & Plan  Last echo in 2020 showed right ventricular systolic pressure more than 60 mmHg   lower  extremity edema and shortness of breath improving  Continue  Lasix  Weight daily and I's and O's  Echo stable, EF 70%    Controlled type 2 diabetes mellitus, without long-term current use of insulin (HCC)- (present on admission)  Assessment & Plan  Improving but remains high  Will add low dose lantus  Continue SSI  following    Essential hypertension- (present on admission)  Assessment & Plan  Continue amlodipine as tolerating  Close monitoring and adjust the medication if needed    ABELARDO (obstructive sleep apnea)- (present on admission)  Assessment & Plan  According to the patient, he recovered  Patient not using CPAP as outpatient  BMI 64  Patient needs CPAP and sleep study as outpatient    HLD (hyperlipidemia)- (present on admission)  Assessment & Plan  Continue home medication atorvastatin       VTE prophylaxis: Xarelto 10 mg daily as prophylaxis    I have performed a physical exam and reviewed and updated ROS and Plan today (11/9/2022). In review of yesterday's note (11/8/2022), there are no changes except as documented above.

## 2022-11-09 NOTE — PROGRESS NOTES
4 Eyes Skin Assessment Completed by NIKOLAY Hu and KRISTEN Patel.    Head WDL  Ears Redness and Blanching  Nose WDL  Mouth WDL  Neck WDL  Breast/Chest WDL  Shoulder Blades WDL  Spine WDL  (R) Arm/Elbow/Hand WDL  (L) Arm/Elbow/Hand WDL  Abdomen WDL  Groin WDL  Scrotum/Coccyx/Buttocks WDL  (R) Leg WDL  (L) Leg WDL  (R) Heel/Foot/Toe WDL  (L) Heel/Foot/Toe WDL          Devices In Places Tele Box and Oxy Mask      Interventions In Place Gray Ear Foams and Low Air Loss Mattress    Possible Skin Injury No    Pictures Uploaded Into Epic N/A  Wound Consult Placed N/A  RN Wound Prevention Protocol Ordered No

## 2022-11-09 NOTE — PROGRESS NOTES
Assumed care of patient, received bedside report from NOC RN. Patient is A&O X 4. Pain 6/10, left upper leg. Vital signs stable overnight, on 5L oxymask. On tele monitor, SR 78. POC discussed with patient and he verbalized understanding. Call light within reach and fall precautions in place. Bed locked and in lowest position.

## 2022-11-10 ENCOUNTER — APPOINTMENT (OUTPATIENT)
Dept: RADIOLOGY | Facility: MEDICAL CENTER | Age: 62
DRG: 291 | End: 2022-11-10
Attending: HOSPITALIST
Payer: MEDICARE

## 2022-11-10 PROBLEM — R19.09 LEFT GROIN MASS: Status: ACTIVE | Noted: 2022-11-10

## 2022-11-10 PROBLEM — D69.6 THROMBOCYTOPENIA (HCC): Status: ACTIVE | Noted: 2022-11-10

## 2022-11-10 LAB
ABO + RH BLD: NORMAL
ABO GROUP BLD: NORMAL
APTT PPP: <20 SEC (ref 24.7–36)
BASOPHILS # BLD AUTO: 0.5 % (ref 0–1.8)
BASOPHILS # BLD: 0.05 K/UL (ref 0–0.12)
BLD GP AB SCN SERPL QL: NORMAL
CFT BLD TEG: 1.9 MIN (ref 4.6–9.1)
CFT P HPASE BLD TEG: 2.1 MIN (ref 4.3–8.3)
CLOT ANGLE BLD TEG: 58.2 DEGREES (ref 63–78)
CLOT LYSIS 30M P MA LENFR BLD TEG: 0 % (ref 0–2.6)
CT.EXTRINSIC BLD ROTEM: 4.8 MIN (ref 0.8–2.1)
EOSINOPHIL # BLD AUTO: 0.16 K/UL (ref 0–0.51)
EOSINOPHIL NFR BLD: 1.7 % (ref 0–6.9)
ERYTHROCYTE [DISTWIDTH] IN BLOOD BY AUTOMATED COUNT: 56 FL (ref 35.9–50)
ERYTHROCYTE [DISTWIDTH] IN BLOOD BY AUTOMATED COUNT: 58.3 FL (ref 35.9–50)
GLUCOSE BLD STRIP.AUTO-MCNC: 209 MG/DL (ref 65–99)
GLUCOSE BLD STRIP.AUTO-MCNC: 209 MG/DL (ref 65–99)
GLUCOSE BLD STRIP.AUTO-MCNC: 220 MG/DL (ref 65–99)
GLUCOSE BLD STRIP.AUTO-MCNC: 229 MG/DL (ref 65–99)
GLUCOSE BLD STRIP.AUTO-MCNC: 257 MG/DL (ref 65–99)
HCT VFR BLD AUTO: 42.7 % (ref 42–52)
HCT VFR BLD AUTO: 43.7 % (ref 42–52)
HCT VFR BLD AUTO: 45.1 % (ref 42–52)
HGB BLD-MCNC: 13.2 G/DL (ref 14–18)
HGB BLD-MCNC: 13.5 G/DL (ref 14–18)
HGB BLD-MCNC: 13.9 G/DL (ref 14–18)
IMM GRANULOCYTES # BLD AUTO: 0.03 K/UL (ref 0–0.11)
IMM GRANULOCYTES NFR BLD AUTO: 0.3 % (ref 0–0.9)
INR PPP: 1.15 (ref 0.87–1.13)
LYMPHOCYTES # BLD AUTO: 1.29 K/UL (ref 1–4.8)
LYMPHOCYTES NFR BLD: 13.4 % (ref 22–41)
MCF BLD TEG: 41.2 MM (ref 52–69)
MCF.PLATELET INHIB BLD ROTEM: 13.3 MM (ref 15–32)
MCH RBC QN AUTO: 28 PG (ref 27–33)
MCH RBC QN AUTO: 28.3 PG (ref 27–33)
MCHC RBC AUTO-ENTMCNC: 30.8 G/DL (ref 33.7–35.3)
MCHC RBC AUTO-ENTMCNC: 30.9 G/DL (ref 33.7–35.3)
MCV RBC AUTO: 90.5 FL (ref 81.4–97.8)
MCV RBC AUTO: 91.7 FL (ref 81.4–97.8)
MONOCYTES # BLD AUTO: 1.15 K/UL (ref 0–0.85)
MONOCYTES NFR BLD AUTO: 12 % (ref 0–13.4)
NEUTROPHILS # BLD AUTO: 6.92 K/UL (ref 1.82–7.42)
NEUTROPHILS NFR BLD: 72.1 % (ref 44–72)
NRBC # BLD AUTO: 0 K/UL
NRBC BLD-RTO: 0 /100 WBC
PA AA BLD-ACNC: ABNORMAL % (ref 0–11)
PA ADP BLD-ACNC: ABNORMAL % (ref 0–17)
PLATELET # BLD AUTO: 122 K/UL (ref 164–446)
PLATELET # BLD AUTO: 37 K/UL (ref 164–446)
PMV BLD AUTO: 11.5 FL (ref 9–12.9)
PMV BLD AUTO: 13.3 FL (ref 9–12.9)
PROTHROMBIN TIME: 14.6 SEC (ref 12–14.6)
RBC # BLD AUTO: 4.72 M/UL (ref 4.7–6.1)
RBC # BLD AUTO: 4.92 M/UL (ref 4.7–6.1)
RH BLD: NORMAL
TEG ALGORITHM TGALG: ABNORMAL
WBC # BLD AUTO: 8.8 K/UL (ref 4.8–10.8)
WBC # BLD AUTO: 9.6 K/UL (ref 4.8–10.8)

## 2022-11-10 PROCEDURE — 99232 SBSQ HOSP IP/OBS MODERATE 35: CPT | Performed by: SURGERY

## 2022-11-10 PROCEDURE — 93971 EXTREMITY STUDY: CPT | Mod: LT

## 2022-11-10 PROCEDURE — 85014 HEMATOCRIT: CPT

## 2022-11-10 PROCEDURE — A9270 NON-COVERED ITEM OR SERVICE: HCPCS | Performed by: HOSPITALIST

## 2022-11-10 PROCEDURE — 700111 HCHG RX REV CODE 636 W/ 250 OVERRIDE (IP): Mod: JG | Performed by: HOSPITALIST

## 2022-11-10 PROCEDURE — 85384 FIBRINOGEN ACTIVITY: CPT

## 2022-11-10 PROCEDURE — 700117 HCHG RX CONTRAST REV CODE 255: Performed by: HOSPITALIST

## 2022-11-10 PROCEDURE — 700102 HCHG RX REV CODE 250 W/ 637 OVERRIDE(OP): Performed by: HOSPITALIST

## 2022-11-10 PROCEDURE — 770020 HCHG ROOM/CARE - TELE (206)

## 2022-11-10 PROCEDURE — 99221 1ST HOSP IP/OBS SF/LOW 40: CPT | Performed by: SURGERY

## 2022-11-10 PROCEDURE — 99233 SBSQ HOSP IP/OBS HIGH 50: CPT | Performed by: HOSPITALIST

## 2022-11-10 PROCEDURE — 85576 BLOOD PLATELET AGGREGATION: CPT

## 2022-11-10 PROCEDURE — 700101 HCHG RX REV CODE 250: Performed by: HOSPITALIST

## 2022-11-10 PROCEDURE — 700111 HCHG RX REV CODE 636 W/ 250 OVERRIDE (IP): Performed by: INTERNAL MEDICINE

## 2022-11-10 PROCEDURE — 85730 THROMBOPLASTIN TIME PARTIAL: CPT

## 2022-11-10 PROCEDURE — 85027 COMPLETE CBC AUTOMATED: CPT

## 2022-11-10 PROCEDURE — 93971 EXTREMITY STUDY: CPT | Mod: 26,LT | Performed by: INTERNAL MEDICINE

## 2022-11-10 PROCEDURE — A9270 NON-COVERED ITEM OR SERVICE: HCPCS | Performed by: INTERNAL MEDICINE

## 2022-11-10 PROCEDURE — 82962 GLUCOSE BLOOD TEST: CPT | Mod: 91

## 2022-11-10 PROCEDURE — 85018 HEMOGLOBIN: CPT

## 2022-11-10 PROCEDURE — 72191 CT ANGIOGRAPH PELV W/O&W/DYE: CPT

## 2022-11-10 PROCEDURE — 36415 COLL VENOUS BLD VENIPUNCTURE: CPT

## 2022-11-10 PROCEDURE — 86900 BLOOD TYPING SEROLOGIC ABO: CPT

## 2022-11-10 PROCEDURE — 94640 AIRWAY INHALATION TREATMENT: CPT

## 2022-11-10 PROCEDURE — 86850 RBC ANTIBODY SCREEN: CPT

## 2022-11-10 PROCEDURE — 700102 HCHG RX REV CODE 250 W/ 637 OVERRIDE(OP): Performed by: INTERNAL MEDICINE

## 2022-11-10 PROCEDURE — 85610 PROTHROMBIN TIME: CPT

## 2022-11-10 PROCEDURE — 85025 COMPLETE CBC W/AUTO DIFF WBC: CPT

## 2022-11-10 PROCEDURE — 86901 BLOOD TYPING SEROLOGIC RH(D): CPT

## 2022-11-10 PROCEDURE — 85347 COAGULATION TIME ACTIVATED: CPT

## 2022-11-10 RX ORDER — HYDROCODONE BITARTRATE AND ACETAMINOPHEN 10; 325 MG/1; MG/1
1 TABLET ORAL EVERY 4 HOURS PRN
Status: DISCONTINUED | OUTPATIENT
Start: 2022-11-10 | End: 2022-11-14 | Stop reason: HOSPADM

## 2022-11-10 RX ADMIN — AMLODIPINE BESYLATE 10 MG: 10 TABLET ORAL at 06:05

## 2022-11-10 RX ADMIN — AMIODARONE HYDROCHLORIDE 200 MG: 200 TABLET ORAL at 06:05

## 2022-11-10 RX ADMIN — HYDROCODONE BITARTRATE AND ACETAMINOPHEN 1 TABLET: 10; 325 TABLET ORAL at 02:53

## 2022-11-10 RX ADMIN — FUROSEMIDE 60 MG: 10 INJECTION, SOLUTION INTRAMUSCULAR; INTRAVENOUS at 15:37

## 2022-11-10 RX ADMIN — ASPIRIN 81 MG: 81 TABLET, COATED ORAL at 06:04

## 2022-11-10 RX ADMIN — PROTHROMBIN, COAGULATION FACTOR VII HUMAN, COAGULATION FACTOR IX HUMAN, COAGULATION FACTOR X HUMAN, PROTEIN C, PROTEIN S HUMAN, AND WATER 2500 UNITS: KIT at 17:00

## 2022-11-10 RX ADMIN — BUDESONIDE AND FORMOTEROL FUMARATE DIHYDRATE 2 PUFF: 80; 4.5 AEROSOL RESPIRATORY (INHALATION) at 18:19

## 2022-11-10 RX ADMIN — HYDROCODONE BITARTRATE AND ACETAMINOPHEN 1 TABLET: 10; 325 TABLET ORAL at 09:37

## 2022-11-10 RX ADMIN — IOHEXOL 100 ML: 350 INJECTION, SOLUTION INTRAVENOUS at 15:29

## 2022-11-10 RX ADMIN — INSULIN HUMAN 2 UNITS: 100 INJECTION, SOLUTION PARENTERAL at 16:49

## 2022-11-10 RX ADMIN — INSULIN HUMAN 3 UNITS: 100 INJECTION, SOLUTION PARENTERAL at 20:35

## 2022-11-10 RX ADMIN — INSULIN HUMAN 2 UNITS: 100 INJECTION, SOLUTION PARENTERAL at 11:12

## 2022-11-10 RX ADMIN — FUROSEMIDE 60 MG: 10 INJECTION, SOLUTION INTRAMUSCULAR; INTRAVENOUS at 06:04

## 2022-11-10 RX ADMIN — HYDROCODONE BITARTRATE AND ACETAMINOPHEN 1 TABLET: 10; 325 TABLET ORAL at 15:36

## 2022-11-10 RX ADMIN — INSULIN HUMAN 2 UNITS: 100 INJECTION, SOLUTION PARENTERAL at 07:28

## 2022-11-10 RX ADMIN — BUDESONIDE AND FORMOTEROL FUMARATE DIHYDRATE 2 PUFF: 80; 4.5 AEROSOL RESPIRATORY (INHALATION) at 07:28

## 2022-11-10 RX ADMIN — HYDROCODONE BITARTRATE AND ACETAMINOPHEN 1 TABLET: 10; 325 TABLET ORAL at 20:38

## 2022-11-10 RX ADMIN — ATORVASTATIN CALCIUM 20 MG: 20 TABLET, FILM COATED ORAL at 20:38

## 2022-11-10 RX ADMIN — LEVOTHYROXINE SODIUM 25 MCG: 0.03 TABLET ORAL at 06:05

## 2022-11-10 ASSESSMENT — ENCOUNTER SYMPTOMS
SPUTUM PRODUCTION: 1
EYES NEGATIVE: 1
VOMITING: 0
NAUSEA: 0
ABDOMINAL PAIN: 0
PALPITATIONS: 0
PSYCHIATRIC NEGATIVE: 1
SORE THROAT: 0
CHILLS: 0
FOCAL WEAKNESS: 0
NEUROLOGICAL NEGATIVE: 1
BLURRED VISION: 0
DIAPHORESIS: 0
MUSCULOSKELETAL NEGATIVE: 1
CONSTITUTIONAL NEGATIVE: 1
COUGH: 1
WEAKNESS: 0
HEADACHES: 0
FEVER: 0
WEIGHT LOSS: 0
MYALGIAS: 0
SHORTNESS OF BREATH: 1
DIZZINESS: 0
CARDIOVASCULAR NEGATIVE: 1
BRUISES/BLEEDS EASILY: 0
GASTROINTESTINAL NEGATIVE: 1
DEPRESSION: 0

## 2022-11-10 ASSESSMENT — FIBROSIS 4 INDEX: FIB4 SCORE: 7.98

## 2022-11-10 ASSESSMENT — PAIN DESCRIPTION - PAIN TYPE: TYPE: ACUTE PAIN

## 2022-11-10 ASSESSMENT — LIFESTYLE VARIABLES: SUBSTANCE_ABUSE: 0

## 2022-11-10 NOTE — PROGRESS NOTES
Hospital Medicine Daily Progress Note    Date of Service  11/10/2022    Chief Complaint  Rashi Shirley is a 62 y.o. male admitted 11/7/2022 with sob    Hospital Course  Patient is a 62 year old male with history of obesity, diabetes dyslipidemia, pulmonary edema and sleep apnea who presented 11/7 with worsening shortness of breath, started a few days prior to admission with coughing producing sputum, with worsening shortness of breath and worsening lower extremity edema.  He denied any fever or chills however patient did report generalized weakness.  Patient had a history of obstructive sleep apnea and COVID-19 infection and according to the patient he had  recovered and not been needing oxygen or been using a CPAP machine for several months.  On admission patient needed 2 L nasal cannula to keep saturation above 88%. Exam revealed wheezing and crackles with lower extremity edema.  Chest x-ray showed possible pulmonary edema.  Last echo in 2020 was normal and patient denied any history of heart disease or kidney disease.  IV Lasix was a started also patient received IV antibiotics in ER.  Patient was admitted for acute respiratory failure and possible pulmonary edema/emphysema.    Interval Problem Update  He remains axox3, increasing L groin pain, currently 7/10, now with a large palpable mass and associated bruising. Pain is constant. He denies difficulty with urination. He reports that sob and cough continue to improve. He is currently stable on 4L. Will check ultrasound of groin and consult surgery. H/h also decreased, he denies melena or hematuria, + flatus. Will stop prophylactic xarelto and recheck hgb now. We discussed transfusion if needed, including the risks and benefits and he agrees to this. ROS other    I have discussed this patient's plan of care and discharge plan at IDT rounds today with Case Management, Nursing, Nursing leadership, and other members of the IDT  team.    Consultants/Specialty  [facility]    Code Status  Full Code    Disposition  Patient is not medically cleared for discharge.   Anticipate discharge to be determined    Review of Systems  Review of Systems   Constitutional: Negative.  Negative for chills, diaphoresis, fever, malaise/fatigue and weight loss.   HENT: Negative.  Negative for sore throat.    Eyes: Negative.  Negative for blurred vision.   Respiratory:  Positive for cough, sputum production and shortness of breath.    Cardiovascular: Negative.  Negative for chest pain, palpitations and leg swelling.   Gastrointestinal: Negative.  Negative for abdominal pain, nausea and vomiting.   Genitourinary: Negative.  Negative for dysuria.   Musculoskeletal: Negative.  Negative for myalgias.   Skin: Negative.  Negative for itching and rash.   Neurological: Negative.  Negative for dizziness, focal weakness, weakness and headaches.   Endo/Heme/Allergies: Negative.  Does not bruise/bleed easily.   Psychiatric/Behavioral: Negative.  Negative for depression, substance abuse and suicidal ideas.    All other systems reviewed and are negative.     Physical Exam  Temp:  [36.6 °C (97.9 °F)-36.8 °C (98.2 °F)] 36.8 °C (98.2 °F)  Pulse:  [70-85] 84  Resp:  [15-18] 15  BP: (106-120)/(55-71) 113/61  SpO2:  [91 %-99 %] 94 %    Physical Exam  Vitals and nursing note reviewed. Exam conducted with a chaperone present.   Constitutional:       General: He is not in acute distress.     Appearance: Normal appearance. He is obese. He is not diaphoretic.   HENT:      Head: Normocephalic.      Nose: Nose normal.      Mouth/Throat:      Mouth: Mucous membranes are moist.   Eyes:      Pupils: Pupils are equal, round, and reactive to light.   Cardiovascular:      Rate and Rhythm: Normal rate and regular rhythm.      Pulses: Normal pulses.      Heart sounds: Normal heart sounds.   Pulmonary:      Effort: Pulmonary effort is normal.      Breath sounds: Rhonchi present. No wheezing.    Abdominal:      General: Abdomen is flat. Bowel sounds are normal.      Palpations: Abdomen is soft.   Musculoskeletal:         General: No swelling or deformity. Normal range of motion.   Skin:     General: Skin is warm and dry.      Capillary Refill: Capillary refill takes less than 2 seconds.   Neurological:      General: No focal deficit present.      Mental Status: He is alert and oriented to person, place, and time.      Cranial Nerves: No cranial nerve deficit.   Psychiatric:         Mood and Affect: Mood normal.         Behavior: Behavior normal.       Fluids    Intake/Output Summary (Last 24 hours) at 11/10/2022 1331  Last data filed at 11/9/2022 2030  Gross per 24 hour   Intake 240 ml   Output 1150 ml   Net -910 ml       Laboratory  Recent Labs     11/07/22 1747 11/08/22  0605 11/10/22  0315   WBC 4.6* 6.6 9.6   RBC 6.50* 6.52* 4.92   HEMOGLOBIN 18.1* 18.5* 13.9*   HEMATOCRIT 60.9* 58.8* 45.1   MCV 93.7 90.2 91.7   MCH 27.8 28.4 28.3   MCHC 29.7* 31.5* 30.8*   RDW 61.1* 58.2* 58.3*   PLATELETCT 131* 143* 122*   MPV 10.3 10.0 11.5     Recent Labs     11/07/22 1747 11/08/22  0605   SODIUM 141 139   POTASSIUM 4.9 4.7   CHLORIDE 97 97   CO2 33 30   GLUCOSE 124* 245*   BUN 18 21   CREATININE 0.97 0.94   CALCIUM 8.5 8.5                   Imaging  US-EXTREMITY VENOUS LOWER UNILAT LEFT   Final Result      EC-ECHOCARDIOGRAM COMPLETE W/O CONT   Final Result      DX-CHEST-PORTABLE (1 VIEW)   Final Result      1.  There is no acute cardiopulmonary process.   2.  Stable enlarged cardiac silhouette likely in part due to body habitus and prominent pericardial fat pads.      CT-CTA PELVIS WITH & W/O-POST PROCESS    (Results Pending)        Assessment/Plan  * Acute respiratory failure (HCC)- (present on admission)  Assessment & Plan  Likely related to pulmonary edema however could be undiagnosed emphysema/asthma  Patient never had heart failure and no history of smoking however patient has significant obesity with BMI  "64  His recent baseline is room air  Continues to slowly improve  Continue iv Lasix  Symptoms stable overnight  Telemetry  Continue to wean oxygen as tolerated, inhalers and RT  pulmonary function test and sleep study as outpatient   procalcitonin low      Left groin mass  Assessment & Plan  Acute overnight  Felt he \"pulled something\" yesterday  H/h did drop - will repeat stat h/h, stop ppx xarelto  CTA pending  Seen by general surgery   Following closely, if s/o bleed will address     Polycythemia  Assessment & Plan  Suspect recent non compliance with cpap contributing  Patient plans to follow up with pulm  following    Pulmonary hypertension (HCC)- (present on admission)  Assessment & Plan  Last echo in 2020 showed right ventricular systolic pressure more than 60 mmHg   lower extremity edema and shortness of breath improving  Continue  Lasix  Weight daily and I's and O's  Echo stable, EF 70%    Controlled type 2 diabetes mellitus, without long-term current use of insulin (HCC)- (present on admission)  Assessment & Plan  Bs increased  Will increase lantus  Continue SSI  following    Essential hypertension- (present on admission)  Assessment & Plan  Continue amlodipine as tolerating  Close monitoring and adjust the medication if needed    ABELARDO (obstructive sleep apnea)- (present on admission)  Assessment & Plan  According to the patient, he recovered  Patient not using CPAP as outpatient  BMI 64  Patient needs CPAP and sleep study as outpatient    HLD (hyperlipidemia)- (present on admission)  Assessment & Plan  Continue home medication atorvastatin       VTE prophylaxis: Xarelto 10 mg daily as prophylaxis    I have performed a physical exam and reviewed and updated ROS and Plan today (11/10/2022). In review of yesterday's note (11/9/2022), there are no changes except as documented above.        "

## 2022-11-10 NOTE — ASSESSMENT & PLAN NOTE
"See above  Large hematoma (asa and xarelto 10 mg stopped), s/p kcentra to reverse the xarelto   Felt he \"pulled something\" getting out of bed - see discussion above - resulted in partial muscle tear and therefore bleed  H/h did drop initially, now in stable range  CTA showed possible pseudoaneurysm - Mini evaluated him, consistent with partial muscle tear - is tamponading itself  General surgery and vascular surgery signed off  Continue to follow   "

## 2022-11-11 LAB
ANION GAP SERPL CALC-SCNC: 8 MMOL/L (ref 7–16)
BASOPHILS # BLD AUTO: 0.5 % (ref 0–1.8)
BASOPHILS # BLD: 0.04 K/UL (ref 0–0.12)
BUN SERPL-MCNC: 27 MG/DL (ref 8–22)
CALCIUM SERPL-MCNC: 8 MG/DL (ref 8.5–10.5)
CHLORIDE SERPL-SCNC: 93 MMOL/L (ref 96–112)
CO2 SERPL-SCNC: 33 MMOL/L (ref 20–33)
COMMENT 1642: NORMAL
CREAT SERPL-MCNC: 0.98 MG/DL (ref 0.5–1.4)
EOSINOPHIL # BLD AUTO: 0.1 K/UL (ref 0–0.51)
EOSINOPHIL NFR BLD: 1.2 % (ref 0–6.9)
ERYTHROCYTE [DISTWIDTH] IN BLOOD BY AUTOMATED COUNT: 56.3 FL (ref 35.9–50)
GFR SERPLBLD CREATININE-BSD FMLA CKD-EPI: 88 ML/MIN/1.73 M 2
GLUCOSE BLD STRIP.AUTO-MCNC: 200 MG/DL (ref 65–99)
GLUCOSE BLD STRIP.AUTO-MCNC: 212 MG/DL (ref 65–99)
GLUCOSE BLD STRIP.AUTO-MCNC: 234 MG/DL (ref 65–99)
GLUCOSE BLD STRIP.AUTO-MCNC: 246 MG/DL (ref 65–99)
GLUCOSE SERPL-MCNC: 239 MG/DL (ref 65–99)
HCT VFR BLD AUTO: 37 % (ref 42–52)
HCT VFR BLD AUTO: 39.7 % (ref 42–52)
HCT VFR BLD AUTO: 39.8 % (ref 42–52)
HGB BLD-MCNC: 11.3 G/DL (ref 14–18)
HGB BLD-MCNC: 12.1 G/DL (ref 14–18)
HGB BLD-MCNC: 12.3 G/DL (ref 14–18)
IMM GRANULOCYTES # BLD AUTO: 0.04 K/UL (ref 0–0.11)
IMM GRANULOCYTES NFR BLD AUTO: 0.5 % (ref 0–0.9)
LYMPHOCYTES # BLD AUTO: 1.27 K/UL (ref 1–4.8)
LYMPHOCYTES NFR BLD: 15.2 % (ref 22–41)
MCH RBC QN AUTO: 27.6 PG (ref 27–33)
MCHC RBC AUTO-ENTMCNC: 30.4 G/DL (ref 33.7–35.3)
MCV RBC AUTO: 90.7 FL (ref 81.4–97.8)
MONOCYTES # BLD AUTO: 1.02 K/UL (ref 0–0.85)
MONOCYTES NFR BLD AUTO: 12.2 % (ref 0–13.4)
MORPHOLOGY BLD-IMP: NORMAL
NEUTROPHILS # BLD AUTO: 5.88 K/UL (ref 1.82–7.42)
NEUTROPHILS NFR BLD: 70.4 % (ref 44–72)
NRBC # BLD AUTO: 0 K/UL
NRBC BLD-RTO: 0 /100 WBC
PLATELET # BLD AUTO: 82 K/UL (ref 164–446)
PLATELET BLD QL SMEAR: NORMAL
PMV BLD AUTO: 11.6 FL (ref 9–12.9)
POTASSIUM SERPL-SCNC: 4.5 MMOL/L (ref 3.6–5.5)
RBC # BLD AUTO: 4.39 M/UL (ref 4.7–6.1)
RBC BLD AUTO: NORMAL
SODIUM SERPL-SCNC: 134 MMOL/L (ref 135–145)
WBC # BLD AUTO: 8.4 K/UL (ref 4.8–10.8)

## 2022-11-11 PROCEDURE — 700111 HCHG RX REV CODE 636 W/ 250 OVERRIDE (IP): Performed by: INTERNAL MEDICINE

## 2022-11-11 PROCEDURE — 700111 HCHG RX REV CODE 636 W/ 250 OVERRIDE (IP): Performed by: HOSPITALIST

## 2022-11-11 PROCEDURE — A9270 NON-COVERED ITEM OR SERVICE: HCPCS | Performed by: INTERNAL MEDICINE

## 2022-11-11 PROCEDURE — 85014 HEMATOCRIT: CPT | Mod: 91

## 2022-11-11 PROCEDURE — 99232 SBSQ HOSP IP/OBS MODERATE 35: CPT | Performed by: HOSPITALIST

## 2022-11-11 PROCEDURE — 85018 HEMOGLOBIN: CPT | Mod: 91

## 2022-11-11 PROCEDURE — 99232 SBSQ HOSP IP/OBS MODERATE 35: CPT | Performed by: SURGERY

## 2022-11-11 PROCEDURE — 85025 COMPLETE CBC W/AUTO DIFF WBC: CPT

## 2022-11-11 PROCEDURE — 80048 BASIC METABOLIC PNL TOTAL CA: CPT

## 2022-11-11 PROCEDURE — 700102 HCHG RX REV CODE 250 W/ 637 OVERRIDE(OP): Performed by: INTERNAL MEDICINE

## 2022-11-11 PROCEDURE — 700102 HCHG RX REV CODE 250 W/ 637 OVERRIDE(OP): Performed by: HOSPITALIST

## 2022-11-11 PROCEDURE — 94640 AIRWAY INHALATION TREATMENT: CPT

## 2022-11-11 PROCEDURE — 36415 COLL VENOUS BLD VENIPUNCTURE: CPT

## 2022-11-11 PROCEDURE — 770020 HCHG ROOM/CARE - TELE (206)

## 2022-11-11 PROCEDURE — A9270 NON-COVERED ITEM OR SERVICE: HCPCS | Performed by: HOSPITALIST

## 2022-11-11 PROCEDURE — 82962 GLUCOSE BLOOD TEST: CPT | Mod: 91

## 2022-11-11 RX ORDER — FUROSEMIDE 10 MG/ML
40 INJECTION INTRAMUSCULAR; INTRAVENOUS
Status: DISCONTINUED | OUTPATIENT
Start: 2022-11-11 | End: 2022-11-12

## 2022-11-11 RX ADMIN — BUDESONIDE AND FORMOTEROL FUMARATE DIHYDRATE 2 PUFF: 80; 4.5 AEROSOL RESPIRATORY (INHALATION) at 19:08

## 2022-11-11 RX ADMIN — HYDROCODONE BITARTRATE AND ACETAMINOPHEN 1 TABLET: 10; 325 TABLET ORAL at 16:25

## 2022-11-11 RX ADMIN — INSULIN HUMAN 2 UNITS: 100 INJECTION, SOLUTION PARENTERAL at 11:39

## 2022-11-11 RX ADMIN — HYDROCODONE BITARTRATE AND ACETAMINOPHEN 1 TABLET: 10; 325 TABLET ORAL at 20:28

## 2022-11-11 RX ADMIN — ATORVASTATIN CALCIUM 20 MG: 20 TABLET, FILM COATED ORAL at 20:28

## 2022-11-11 RX ADMIN — HYDROCODONE BITARTRATE AND ACETAMINOPHEN 1 TABLET: 10; 325 TABLET ORAL at 07:52

## 2022-11-11 RX ADMIN — AMIODARONE HYDROCHLORIDE 200 MG: 200 TABLET ORAL at 04:55

## 2022-11-11 RX ADMIN — INSULIN HUMAN 2 UNITS: 100 INJECTION, SOLUTION PARENTERAL at 06:43

## 2022-11-11 RX ADMIN — HYDROCODONE BITARTRATE AND ACETAMINOPHEN 1 TABLET: 10; 325 TABLET ORAL at 01:55

## 2022-11-11 RX ADMIN — FUROSEMIDE 40 MG: 10 INJECTION, SOLUTION INTRAMUSCULAR; INTRAVENOUS at 16:24

## 2022-11-11 RX ADMIN — INSULIN HUMAN 1 UNITS: 100 INJECTION, SOLUTION PARENTERAL at 20:30

## 2022-11-11 RX ADMIN — BUDESONIDE AND FORMOTEROL FUMARATE DIHYDRATE 2 PUFF: 80; 4.5 AEROSOL RESPIRATORY (INHALATION) at 09:44

## 2022-11-11 RX ADMIN — SENNOSIDES AND DOCUSATE SODIUM 2 TABLET: 50; 8.6 TABLET ORAL at 16:24

## 2022-11-11 RX ADMIN — HYDROCODONE BITARTRATE AND ACETAMINOPHEN 1 TABLET: 10; 325 TABLET ORAL at 11:57

## 2022-11-11 RX ADMIN — LEVOTHYROXINE SODIUM 25 MCG: 0.03 TABLET ORAL at 04:55

## 2022-11-11 RX ADMIN — INSULIN HUMAN 2 UNITS: 100 INJECTION, SOLUTION PARENTERAL at 16:21

## 2022-11-11 RX ADMIN — FUROSEMIDE 60 MG: 10 INJECTION, SOLUTION INTRAMUSCULAR; INTRAVENOUS at 04:55

## 2022-11-11 RX ADMIN — AMLODIPINE BESYLATE 10 MG: 10 TABLET ORAL at 04:55

## 2022-11-11 ASSESSMENT — PAIN DESCRIPTION - PAIN TYPE: TYPE: ACUTE PAIN

## 2022-11-11 ASSESSMENT — FIBROSIS 4 INDEX: FIB4 SCORE: 3.6

## 2022-11-11 ASSESSMENT — ENCOUNTER SYMPTOMS
FEVER: 0
WEIGHT LOSS: 0
SPUTUM PRODUCTION: 1
WEAKNESS: 0
CHILLS: 0
NEUROLOGICAL NEGATIVE: 1
PALPITATIONS: 0
SHORTNESS OF BREATH: 1
DEPRESSION: 0
HEADACHES: 0
NAUSEA: 0
GASTROINTESTINAL NEGATIVE: 1
FOCAL WEAKNESS: 0
CARDIOVASCULAR NEGATIVE: 1
DIZZINESS: 0
PSYCHIATRIC NEGATIVE: 1
EYES NEGATIVE: 1
SORE THROAT: 0
CONSTITUTIONAL NEGATIVE: 1
MUSCULOSKELETAL NEGATIVE: 1
BLURRED VISION: 0
BRUISES/BLEEDS EASILY: 0
DIAPHORESIS: 0
COUGH: 1
VOMITING: 0
ABDOMINAL PAIN: 0
MYALGIAS: 0

## 2022-11-11 ASSESSMENT — LIFESTYLE VARIABLES: SUBSTANCE_ABUSE: 0

## 2022-11-11 NOTE — CARE PLAN
The patient is Stable - Low risk of patient condition declining or worsening    Shift Goals  Clinical Goals: monitor swelling L upper leg, monitor h/h and s/s of worsening bleeding  Patient Goals: control pain in leg  Family Goals: na    Progress made toward(s) clinical / shift goals:    Problem: Pain - Standard  Goal: Alleviation of pain or a reduction in pain to the patient’s comfort goal  Outcome: Progressing     Problem: Knowledge Deficit - Standard  Goal: Patient and family/care givers will demonstrate understanding of plan of care, disease process/condition, diagnostic tests and medications  Outcome: Progressing     Problem: Respiratory  Goal: Patient will achieve/maintain optimum respiratory ventilation and gas exchange  Outcome: Progressing     Problem: Self Care  Goal: Patient will have the ability to perform ADLs independently or with assistance (bathe, groom, dress, toilet and feed)  Outcome: Progressing       Patient is not progressing towards the following goals:

## 2022-11-11 NOTE — CONSULTS
"                    Vascular Surgery          New Patient Consultation    Patient:Rashi Shirley  MRN:[id number]    Date: 11/10/2022    Referring Provider: Carla Bennett M.d.    Consulting Physician: Augusto Morse MD  _____________________________________________________    Reason for consultation:  Left thigh hematoma    HPI:  This is a 62 y.o. male admitted 11/7/2022 with respiratory issues.  Apparently overnight he \"pulled a muscle\" in his left groin region and he has developed a hematoma in his left thigh, particularly along the medial aspect of the left thigh.  CT scan confirms the presence of the hematoma and there is possible blush in the middle of the hematoma, likely a small muscle bleeder.  When I came to see the patient he was alert and conversant and seems to be in relatively high spirits.  He does report that the left thigh is quite painful and hurts more with movement, however the pain has not been worsening throughout the day, just stable.  He has been able to get up and go to the bathroom throughout the day.  Vital signs are stable, no significant drop in hemoglobin.    Past Medical History:   Diagnosis Date    Chickenpox     Diabetes (HCC)     borderline    [place] measles     Hyperlipidemia     Mumps     Obesity     Sleep apnea     Tonsillitis        Past Surgical History:   Procedure Laterality Date    COLONOSCOPY N/A 4/24/2019    Procedure: COLONOSCOPY;  Surgeon: Rashi Goldberg M.D.;  Location: SURGERY [facility];  Service: Gastroenterology    GASTROSCOPY-ENDO N/A 4/24/2019    Procedure: GASTROSCOPY;  Surgeon: Rashi Goldberg M.D.;  Location: SURGERY [facility];  Service: Gastroenterology    OTHER ORTHOPEDIC SURGERY      bilateral knee surgery       Current Facility-Administered Medications   Medication Dose Route Frequency Provider Last Rate Last Admin    HYDROcodone/acetaminophen (NORCO)  MG per tablet 1 Tablet  1 Tablet Oral Q4HRS PRN Carla Bennett M.D.   1 " Tablet at 11/10/22 1536    insulin GLARGINE (Lantus,Semglee) injection  10 Units Subcutaneous Q EVENING Carla Bennett M.D.   10 Units at 11/10/22 1648    diclofenac sodium (Voltaren) 1 % gel 2 g  2 g Topical BID PRN Aguilar Hutton P.A.-C.   2 g at 11/09/22 2029    insulin regular (HumuLIN R,NovoLIN R) injection  1-6 Units Subcutaneous 4X/DAY ACHS Carla Bennett M.D.   3 Units at 11/10/22 2035    And    dextrose 10 % BOLUS 25 g  25 g Intravenous Q15 MIN PRN Carla Bennett M.D.        amiodarone (Cordarone) tablet 200 mg  200 mg Oral DAILY Carla Bennett M.D.   200 mg at 11/10/22 0605    atorvastatin (LIPITOR) tablet 20 mg  20 mg Oral Nightly Carla Bennett M.D.   20 mg at 11/09/22 2026    levothyroxine (SYNTHROID) tablet 25 mcg  25 mcg Oral AM ES Carla Bennett M.D.   25 mcg at 11/10/22 0605    lidocaine (LIDODERM) 5 % 1 Patch  1 Patch Transdermal Q24HRS Carla Bennett M.D.        amLODIPine (NORVASC) tablet 10 mg  10 mg Oral DAILY Maisha Major M.D.   10 mg at 11/10/22 0605    Respiratory Therapy Consult   Nebulization Continuous RT Maisha Major M.D.        acetaminophen (Tylenol) tablet 650 mg  650 mg Oral Q6HRS PRN Maisha Major M.D.        guaiFENesin dextromethorphan (ROBITUSSIN DM) 100-10 MG/5ML syrup 10 mL  10 mL Oral Q6HRS PRN Maisha Major M.D.        ondansetron (ZOFRAN) syringe/vial injection 4 mg  4 mg Intravenous Q4HRS PRN Maisha Major M.D.        ondansetron (ZOFRAN ODT) dispertab 4 mg  4 mg Oral Q4HRS PRN Maisha Major M.D.        promethazine (PHENERGAN) tablet 12.5-25 mg  12.5-25 mg Oral Q4HRS PRN Maisha Major M.D.        promethazine (PHENERGAN) suppository 12.5-25 mg  12.5-25 mg Rectal Q4HRS PRN Maisha Major M.D.        prochlorperazine (COMPAZINE) injection 5-10 mg  5-10 mg Intravenous Q4HRS PRN Maisha Major M.D.        ipratropium-albuterol (DUONEB) nebulizer solution  3 mL Nebulization Q4H PRN (RT) Maisha Major M.D.        budesonide-formoterol  (SYMBICORT) 80-4.5 MCG/ACT inhaler 2 Puff  2 Puff Inhalation BID (RT) Maisha Major M.D.   2 Puff at 11/10/22 1819    furosemide (LASIX) injection 60 mg  60 mg Intravenous BID DIURETIC Maisha Major M.D.   60 mg at 11/10/22 1537    senna-docusate (PERICOLACE or SENOKOT S) 8.6-50 MG per tablet 2 Tablet  2 Tablet Oral BID Maisha Major M.D.   2 Tablet at 11/09/22 0529    And    polyethylene glycol/lytes (MIRALAX) PACKET 1 Packet  1 Packet Oral DAILY Maisha Major M.D.        And    magnesium hydroxide (MILK OF MAGNESIA) suspension 30 mL  30 mL Oral QDAY PRN Maisha Major M.D.        And    bisacodyl (DULCOLAX) suppository 10 mg  10 mg Rectal QDAY PRN Maisha Major M.D.           Social History     Socioeconomic History    Marital status: Single     Spouse name: Not on file    Number of children: Not on file    Years of education: Not on file    Highest education level: Not on file   Occupational History    Not on file   Tobacco Use    Smoking status: Never    Smokeless tobacco: Never   Vaping Use    Vaping Use: Never used   Substance and Sexual Activity    Alcohol use: Yes     Comment: occasional    Drug use: No    Sexual activity: Not on file   Other Topics Concern    Not on file   Social History Narrative    Not on file     Social Determinants of Health     Financial Resource Strain: Not on file   Food Insecurity: Not on file   Transportation Needs: Not on file   Physical Activity: Not on file   Stress: Not on file   Social Connections: Not on file   Intimate Partner Violence: Not on file   Housing Stability: Not on file       Family History   Problem Relation Age of Onset    Diabetes Mother     Diabetes Father     Sleep Apnea Neg Hx        Allergies:  Zosyn    Review of Systems:    Constitutional: Negative for fever or chills  HENT:   Negative for hearing loss or tinnitus    Eyes:    Negative for blurred vision or loss of vision  Respiratory:  Negative for cough or  "hemoptysis  Cardiac:  Negative for chest pain or palpitations  Vascular:  Negative for claudication, Negative for rest pain  Gastrointestinal: Negative for vomiting or abdominal pain     Negative for hematochezia or melena   Genitourinary: Negative for dysuria or hematuria   Musculoskeletal: Negative for myalgias or acute joint pain  Skin:   Negative for itching or rash  Neurological:  Negative for dizziness or headaches     Negative for speech disturbance     Negative for extremity weakness or paresthesias  Endo/Heme:  Negative for easy bruising or bleeding    Physical Exam:  /64   Pulse 82   Temp 36.1 °C (97 °F) (Temporal)   Resp 16   Ht 1.778 m (5' 10\")   Wt (!) 189 kg (416 lb 7.2 oz)   SpO2 94%   BMI 59.75 kg/m²     Constitutional: Alert, oriented, no acute distress  HEENT:  Normocephalic and atraumatic, EOMI  Neck:   Supple, no JVD,   Cardiovascular: Regular rate and rhythm,   Pulmonary:  Good air entry bilaterally,    Abdominal:  Soft, non-tender, non-distended     Aortic impulse not appreciable due to body habitus  Musculoskeletal: No tenderness, no deformity  Neurological:  CN II-XII grossly intact, no focal deficits  Skin:   Skin is warm and dry. No rash noted.  Vascular exam:    RUE: warm, cap refill<3 seconds, no edema, no tissue loss,   LUE: warm, cap refill<3 seconds, no edema, no tissue loss   RLE: warm, cap refill<3 seconds, no edema, no tissue loss   LLE: warm, cap refill<3 seconds, no edema, no tissue loss, there is a large hematoma of the medial aspect of the left thigh, overlying skin does not appear to be compromised.  Motor and sensory exam of the left foot is intact.      Labs:  Recent Labs     11/08/22  0605 11/10/22  0315 11/10/22  1342 11/10/22  1648   WBC 6.6 9.6  --  8.8   RBC 6.52* 4.92  --  4.72   HEMOGLOBIN 18.5* 13.9* 13.5* 13.2*   HEMATOCRIT 58.8* 45.1 43.7 42.7   MCV 90.2 91.7  --  90.5   MCH 28.4 28.3  --  28.0   MCHC 31.5* 30.8*  --  30.9*   RDW 58.2* 58.3*  --  56.0* "   PLATELETCT 143* 122*  --  37*   MPV 10.0 11.5  --  13.3*     Recent Labs     11/08/22  0605   SODIUM 139   POTASSIUM 4.7   CHLORIDE 97   CO2 30   GLUCOSE 245*   BUN 21   CREATININE 0.94   CALCIUM 8.5     Recent Labs     11/10/22  1648   APTT <20.0*   INR 1.15*     Recent Labs     11/08/22  0605 11/10/22  1648   ASTSGOT 22  --    ALTSGPT 20  --    TBILIRUBIN 0.5  --    ALKPHOSPHAT 98  --    GLOBULIN 3.9*  --    INR  --  1.15*         Radiology:  CT scan confirms the presence of the hematoma and there is possible blush in the middle of the hematoma, likely a small muscle bleeder.      Assessment/Plan:   -Spontaneous left thigh hematoma    Patient does have a large spontaneous left thigh hematoma likely due to a torn muscle in the left thigh.  There is a good chance that this will tamponade on its own given enough time.  As long as the patient remains hemodynamically stable, the hemoglobin is within the safe range, and the overlying skin does not demonstrate evidence of breakdown, then I would not recommend any surgical intervention.  I also do not think that an endovascular approach, such as an angiogram with embolization, is likely to be very helpful as the site of blush is remote from any major arteries.  At this point time the patient best chance is to spontaneously tamponade the hematoma and no surgical intervention should be pursued until this approach has clearly not succeeded in stabilizing the bleeding.    Following along.    Augusto Morse MD  [facility]   [place] preferred or call my office [phone]  __________________________________________________________________  Patient:Rashi Shirley   MRN:[id number]   CSN:[id number]

## 2022-11-11 NOTE — PROGRESS NOTES
"Hospital Medicine Daily Progress Note    Date of Service  11/11/2022    Chief Complaint  Rashi Shirley is a 62 y.o. male admitted 11/7/2022 with sob    Hospital Course  Patient is a 62 year old male with history of obesity, diabetes dyslipidemia, pulmonary edema and sleep apnea who presented 11/7 with worsening shortness of breath, started a few days prior to admission with coughing producing sputum, with worsening shortness of breath and worsening lower extremity edema.  He denied any fever or chills however patient did report generalized weakness.  Patient had a history of obstructive sleep apnea and COVID-19 infection and according to the patient he had  recovered and not been needing oxygen or been using a CPAP machine for several months.  On admission patient needed 2 L nasal cannula to keep saturation above 88%. Exam revealed wheezing and crackles with lower extremity edema.  Chest x-ray showed possible pulmonary edema.  Last echo in 2020 was normal and patient denied any history of heart disease or kidney disease.  IV Lasix was a started also patient received IV antibiotics in ER.  Patient was admitted for acute respiratory failure and possible pulmonary edema/emphysema.    Interval Problem Update  He remains axox3, L groin swelling stable, bruising increased, he reports associated pain has improved, down to 7, h/h stable. Discussed with nursing, she is concerned with how he \"launches\" himself into bed, likely contributed to the muscle tear - patient agrees to continue to work to PT to develop a safer way to get into bed. Sob continues to slowly improve, no chest pain. ROS otherwise negative. Currently stable on 3L    I have discussed this patient's plan of care and discharge plan at IDT rounds today with Case Management, Nursing, Nursing leadership, and other members of the IDT team.    Consultants/Specialty  [facility] Surgery  [facility] vascular surgery    Code Status  Full " Code    Disposition  Patient is not medically cleared for discharge.   Anticipate discharge to be determined    Review of Systems  Review of Systems   Constitutional: Negative.  Negative for chills, diaphoresis, fever, malaise/fatigue and weight loss.   HENT: Negative.  Negative for sore throat.    Eyes: Negative.  Negative for blurred vision.   Respiratory:  Positive for cough, sputum production and shortness of breath.    Cardiovascular: Negative.  Negative for chest pain, palpitations and leg swelling.   Gastrointestinal: Negative.  Negative for abdominal pain, nausea and vomiting.   Genitourinary: Negative.  Negative for dysuria.   Musculoskeletal: Negative.  Negative for myalgias.   Skin: Negative.  Negative for itching and rash.   Neurological: Negative.  Negative for dizziness, focal weakness, weakness and headaches.   Endo/Heme/Allergies: Negative.  Does not bruise/bleed easily.   Psychiatric/Behavioral: Negative.  Negative for depression, substance abuse and suicidal ideas.    All other systems reviewed and are negative.     Physical Exam  Temp:  [36.1 °C (97 °F)-36.7 °C (98.1 °F)] 36.7 °C (98.1 °F)  Pulse:  [75-88] 75  Resp:  [16-17] 16  BP: ()/(43-74) 98/43  SpO2:  [88 %-98 %] 96 %    Physical Exam  Vitals and nursing note reviewed. Exam conducted with a chaperone present.   Constitutional:       General: He is not in acute distress.     Appearance: Normal appearance. He is obese. He is not diaphoretic.   HENT:      Head: Normocephalic.      Nose: Nose normal.      Mouth/Throat:      Mouth: Mucous membranes are moist.   Eyes:      Pupils: Pupils are equal, round, and reactive to light.   Cardiovascular:      Rate and Rhythm: Normal rate and regular rhythm.      Pulses: Normal pulses.      Heart sounds: Normal heart sounds.   Pulmonary:      Effort: Pulmonary effort is normal.      Breath sounds: Rhonchi present. No wheezing.   Abdominal:      General: Abdomen is flat. Bowel sounds are normal.       Palpations: Abdomen is soft.   Musculoskeletal:         General: No swelling or deformity. Normal range of motion.   Skin:     General: Skin is warm and dry.      Capillary Refill: Capillary refill takes less than 2 seconds.   Neurological:      General: No focal deficit present.      Mental Status: He is alert and oriented to person, place, and time.      Cranial Nerves: No cranial nerve deficit.   Psychiatric:         Mood and Affect: Mood normal.         Behavior: Behavior normal.       Fluids    Intake/Output Summary (Last 24 hours) at 11/11/2022 1529  Last data filed at 11/11/2022 0756  Gross per 24 hour   Intake 240 ml   Output 500 ml   Net -260 ml       Laboratory  Recent Labs     11/10/22  0315 11/10/22  1342 11/10/22  1648 11/11/22  0137 11/11/22  0935   WBC 9.6  --  8.8 8.4  --    RBC 4.92  --  4.72 4.39*  --    HEMOGLOBIN 13.9*   < > 13.2* 12.1*  12.3* 11.3*   HEMATOCRIT 45.1   < > 42.7 39.8*  39.7* 37.0*   MCV 91.7  --  90.5 90.7  --    MCH 28.3  --  28.0 27.6  --    MCHC 30.8*  --  30.9* 30.4*  --    RDW 58.3*  --  56.0* 56.3*  --    PLATELETCT 122*  --  37* 82*  --    MPV 11.5  --  13.3* 11.6  --     < > = values in this interval not displayed.     Recent Labs     11/11/22 0137   SODIUM 134*   POTASSIUM 4.5   CHLORIDE 93*   CO2 33   GLUCOSE 239*   BUN 27*   CREATININE 0.98   CALCIUM 8.0*     Recent Labs     11/10/22  1648   APTT <20.0*   INR 1.15*               Imaging  CT-CTA PELVIS WITH & W/O-POST PROCESS   Final Result   Addendum (preliminary) 1 of 1   Findings discussed with the patient's nurse Kristen at 4:28 PM on 11/10/2022      Final      Findings suspicious for small pseudoaneurysm or bleeding muscular branch vessel in the proximal LEFT thigh with large adjacent hematoma      CJ NANCE was paged at 11/10/2022 3:57 PM.      US-EXTREMITY VENOUS LOWER UNILAT LEFT   Final Result      EC-ECHOCARDIOGRAM COMPLETE W/O CONT   Final Result      DX-CHEST-PORTABLE (1 VIEW)   Final Result      1.  There  "is no acute cardiopulmonary process.   2.  Stable enlarged cardiac silhouette likely in part due to body habitus and prominent pericardial fat pads.           Assessment/Plan  * Acute respiratory failure (HCC)- (present on admission)  Assessment & Plan  Likely related to pulmonary edema however could be undiagnosed emphysema/asthma  Patient never had heart failure and no history of smoking however patient has significant obesity with BMI 64  His recent baseline is room air  Continues to slowly improve  Continue iv Lasix  Symptoms stable overnight  Telemetry  Continue to wean oxygen as tolerated, inhalers and RT  pulmonary function test and sleep study as outpatient   procalcitonin low      Left groin mass  Assessment & Plan  Acute overnight  Large hematoma (asa and xarelto 10 mg stopped), s/p kcentra to reverse the xarelto   Felt he \"pulled something\" getting out of bed - see discussion above - resulted in partial muscle tear and therefore bleed  H/h did drop initially, now in stable range  CTA showed possible pseudoaneurysm - Mini evaluated him, consistent with partial muscle tear - is tamponading itself  General surgery following   Continue to follow closely    Thrombocytopenia (HCC)  Assessment & Plan  Acute   Significant fluctuation - likely related acute hematoma L groin  Now improving  Continue to follow      Polycythemia  Assessment & Plan  Suspect recent non compliance with cpap contributing  Patient plans to follow up with pulm  following    Pulmonary hypertension (HCC)- (present on admission)  Assessment & Plan  Last echo in 2020 showed right ventricular systolic pressure more than 60 mmHg   lower extremity edema and shortness of breath improving  Continue  Lasix  Weight daily and I's and O's  Echo stable, EF 70%    Controlled type 2 diabetes mellitus, without long-term current use of insulin (HCC)- (present on admission)  Assessment & Plan  Bs still not at goal  Will continue to increase " lantus  Continue SSI  following    Essential hypertension- (present on admission)  Assessment & Plan  Continue amlodipine as tolerating  Close monitoring and adjust the medication if needed    ABELARDO (obstructive sleep apnea)- (present on admission)  Assessment & Plan  According to the patient, he recovered  Patient not using CPAP as outpatient  BMI 64  Patient needs CPAP and sleep study as outpatient    HLD (hyperlipidemia)- (present on admission)  Assessment & Plan  Continue home medication atorvastatin       VTE prophylaxis: Xarelto 10 mg daily as prophylaxis    I have performed a physical exam and reviewed and updated ROS and Plan today (11/11/2022). In review of yesterday's note (11/10/2022), there are no changes except as documented above.

## 2022-11-11 NOTE — CONSULTS
CHIEF COMPLAINT: Left thigh hematoma.     HISTORY OF PRESENT ILLNESS: The patient is a morbidly obese male with BMI's about 60 admitted to the medical service with some respiratory complaints on November 7.  He has been making slow improvement.  Last night patient got out of bed to ambulate and felt a pulling in his left groin area.  He developed bruising and hematoma which was noticed this morning on physical examination.  Surgical consultation is requested to evaluate this.    PAST MEDICAL HISTORY:  has a past medical history of Chickenpox, Diabetes (HCC), [place] measles, Hyperlipidemia, Mumps, Obesity, Sleep apnea, and Tonsillitis.    PAST SURGICAL HISTORY:  has a past surgical history that includes other orthopedic surgery; colonoscopy (N/A, 4/24/2019); and gastroscopy-endo (N/A, 4/24/2019).    ALLERGIES:   Allergies   Allergen Reactions    Zosyn Rash     Rash with eosinophilia - unknown for certain if secondary to zosyn but was getting zosyn at the time       CURRENT MEDICATIONS:   Home Medications       Reviewed by Ricardo Smith (Pharmacy Tech) on 11/08/22 at 0955  Med List Status: Complete     Medication Last Dose Status   amiodarone (CORDARONE) 200 MG Tab UNK Active   atorvastatin (LIPITOR) 20 MG Tab UNK Active   furosemide (LASIX) 80 MG Tab UNK Active   HYDROcodone/acetaminophen (NORCO)  MG Tab UNK Active   levothyroxine (SYNTHROID) 25 MCG Tab UNK Active   lidocaine (LIDODERM) 5 % Patch UNK Active                  FAMILY HISTORY: family history includes Diabetes in his father and mother.    SOCIAL HISTORY:  reports that he has never smoked. He has never used smokeless tobacco. He reports current alcohol use. He reports that he does not use drugs.    REVIEW OF SYSTEMS: Review of systems is remarkable for the following shortness of breath with cough, chronic leg swelling with acute change in the left proximal medial thigh.  No acute changes in sensorimotor exam of the left foot.. The  "remainder of the comprehensive ROS is negative with the exception of the aforementioned HPI, PMH, and PSH bullets in accordance with CMS guideline.    PHYSICAL EXAMINATION:      Vital Signs: /64   Pulse 78   Temp 36.8 °C (98.2 °F) (Temporal)   Resp 16   Ht 1.778 m (5' 10\")   Wt (!) 189 kg (416 lb 7.2 oz)   SpO2 98%   Physical Exam  Vitals and nursing note reviewed.   Constitutional:       Appearance: He is obese.   HENT:      Head: Normocephalic and atraumatic.      Nose: Nose normal.      Mouth/Throat:      Mouth: Mucous membranes are moist.      Pharynx: Oropharynx is clear.   Eyes:      Extraocular Movements: Extraocular movements intact.      Conjunctiva/sclera: Conjunctivae normal.   Cardiovascular:      Rate and Rhythm: Normal rate and regular rhythm.      Pulses: Normal pulses.   Pulmonary:      Effort: Pulmonary effort is normal.      Breath sounds: Normal breath sounds. No rales.   Abdominal:      Comments: Obese abdomen without tenderness   Genitourinary:     Comments: No sign of scrotal or perineal hematoma  Musculoskeletal:      Comments: Bilateral lower extremities with extensive chronic venous stasis changes with some elements of lymphedema of the upper legs.  Left proximal thigh below the inguinal ligament with soft tissue hematoma that is not overtly tense but is patchy.  Femoral pulses palpable.   Skin:     General: Skin is warm and dry.      Coloration: Skin is not pale.   Neurological:      General: No focal deficit present.      Mental Status: He is oriented to person, place, and time.      Sensory: No sensory deficit.      Motor: No weakness.       LABORATORY VALUES:   Recent Labs     11/07/22  1747 11/08/22  0605 11/10/22  0315 11/10/22  1342   WBC 4.6* 6.6 9.6  --    RBC 6.50* 6.52* 4.92  --    HEMOGLOBIN 18.1* 18.5* 13.9* 13.5*   HEMATOCRIT 60.9* 58.8* 45.1 43.7   MCV 93.7 90.2 91.7  --    MCH 27.8 28.4 28.3  --    MCHC 29.7* 31.5* 30.8*  --    RDW 61.1* 58.2* 58.3*  --  "   PLATELETCT 131* 143* 122*  --    MPV 10.3 10.0 11.5  --      Recent Labs     11/07/22  1747 11/08/22  0605   SODIUM 141 139   POTASSIUM 4.9 4.7   CHLORIDE 97 97   CO2 33 30   GLUCOSE 124* 245*   BUN 18 21   CREATININE 0.97 0.94   CALCIUM 8.5 8.5     Recent Labs     11/07/22  1747 11/08/22  0605   ASTSGOT 26 22   ALTSGPT 21 20   TBILIRUBIN 0.4 0.5   ALKPHOSPHAT 97 98   GLOBULIN 3.8* 3.9*            IMAGING:   CT-CTA PELVIS WITH & W/O-POST PROCESS   Final Result      Findings suspicious for small pseudoaneurysm or bleeding muscular branch vessel in the proximal LEFT thigh with large adjacent hematoma      CJ NANCE was paged at 11/10/2022 3:57 PM.      US-EXTREMITY VENOUS LOWER UNILAT LEFT   Final Result      EC-ECHOCARDIOGRAM COMPLETE W/O CONT   Final Result      DX-CHEST-PORTABLE (1 VIEW)   Final Result      1.  There is no acute cardiopulmonary process.   2.  Stable enlarged cardiac silhouette likely in part due to body habitus and prominent pericardial fat pads.          ASSESSMENT AND PLAN:   62-year-old morbidly obese male with multiple medical comorbidities with soft tissue trauma on the left proximal thigh with large hematoma.  This would not be overtly amenable to surgical exploration.  I expect that this should tamponade if it has not done so already.  Unfortunately patient body habitus is not going to be amenable to a compression wrap at this level.  If this is not self-limiting then would consider consultation with interventional radiology for angio/embolization.  Recommend serial exams and trending hemoglobins overnight.         ____________________________________     Oumar Gan D.O.    DD: 11/10/2022  4:07 PM

## 2022-11-11 NOTE — PROGRESS NOTES
DATE: 11/11/2022    No evidence for persistent hemodynamically significant hemorrhage from spontaneous left thigh hematoma.  No operative intervention warranted.  General surgery signing off.       ____________________________________     Alexandro Ram M.D.    DD: 11/11/2022  9:39 AM

## 2022-11-11 NOTE — PROGRESS NOTES
VASCULAR SURGERY PROGRESS NOTE        Awake, no complaints.  AF, VSS.    General: Pleasant male in none apparent distress.  Left lower extremity: Hematoma in medial thigh.  No blister or compromise of the overlying skin or subcutaneous tissue.  Compartments are soft.  Chronic lower leg skin thickening and discoloration.    Labs: Reviewed.      Assessment: Spontaneous left thigh hematoma.    Plan:  Stable.  No indication for operative intervention.  Hold antiplatelet and anticoagulation.  May be out of bed to chair.    Vascular service will sign off.  Please call for questions or concerns.

## 2022-11-12 PROBLEM — E87.29 CO2 RETENTION: Status: ACTIVE | Noted: 2022-11-12

## 2022-11-12 LAB
ANION GAP SERPL CALC-SCNC: 6 MMOL/L (ref 7–16)
BACTERIA BLD CULT: NORMAL
BACTERIA BLD CULT: NORMAL
BASOPHILS # BLD AUTO: 0.7 % (ref 0–1.8)
BASOPHILS # BLD: 0.07 K/UL (ref 0–0.12)
BUN SERPL-MCNC: 22 MG/DL (ref 8–22)
CALCIUM SERPL-MCNC: 8.2 MG/DL (ref 8.5–10.5)
CHLORIDE SERPL-SCNC: 89 MMOL/L (ref 96–112)
CO2 SERPL-SCNC: 38 MMOL/L (ref 20–33)
CREAT SERPL-MCNC: 0.98 MG/DL (ref 0.5–1.4)
EOSINOPHIL # BLD AUTO: 0.28 K/UL (ref 0–0.51)
EOSINOPHIL NFR BLD: 2.8 % (ref 0–6.9)
ERYTHROCYTE [DISTWIDTH] IN BLOOD BY AUTOMATED COUNT: 55.9 FL (ref 35.9–50)
GFR SERPLBLD CREATININE-BSD FMLA CKD-EPI: 88 ML/MIN/1.73 M 2
GLUCOSE BLD STRIP.AUTO-MCNC: 171 MG/DL (ref 65–99)
GLUCOSE BLD STRIP.AUTO-MCNC: 175 MG/DL (ref 65–99)
GLUCOSE BLD STRIP.AUTO-MCNC: 183 MG/DL (ref 65–99)
GLUCOSE BLD STRIP.AUTO-MCNC: 202 MG/DL (ref 65–99)
GLUCOSE SERPL-MCNC: 206 MG/DL (ref 65–99)
HCT VFR BLD AUTO: 35.1 % (ref 42–52)
HGB BLD-MCNC: 10.8 G/DL (ref 14–18)
IMM GRANULOCYTES # BLD AUTO: 0.11 K/UL (ref 0–0.11)
IMM GRANULOCYTES NFR BLD AUTO: 1.1 % (ref 0–0.9)
LYMPHOCYTES # BLD AUTO: 1.2 K/UL (ref 1–4.8)
LYMPHOCYTES NFR BLD: 12 % (ref 22–41)
MCH RBC QN AUTO: 28.7 PG (ref 27–33)
MCHC RBC AUTO-ENTMCNC: 30.8 G/DL (ref 33.7–35.3)
MCV RBC AUTO: 93.4 FL (ref 81.4–97.8)
MONOCYTES # BLD AUTO: 1.18 K/UL (ref 0–0.85)
MONOCYTES NFR BLD AUTO: 11.8 % (ref 0–13.4)
NEUTROPHILS # BLD AUTO: 7.19 K/UL (ref 1.82–7.42)
NEUTROPHILS NFR BLD: 71.6 % (ref 44–72)
NRBC # BLD AUTO: 0.03 K/UL
NRBC BLD-RTO: 0.3 /100 WBC
PLATELET # BLD AUTO: 72 K/UL (ref 164–446)
PMV BLD AUTO: 12.8 FL (ref 9–12.9)
POTASSIUM SERPL-SCNC: 4.1 MMOL/L (ref 3.6–5.5)
RBC # BLD AUTO: 3.76 M/UL (ref 4.7–6.1)
SIGNIFICANT IND 70042: NORMAL
SIGNIFICANT IND 70042: NORMAL
SITE SITE: NORMAL
SITE SITE: NORMAL
SODIUM SERPL-SCNC: 133 MMOL/L (ref 135–145)
SOURCE SOURCE: NORMAL
SOURCE SOURCE: NORMAL
WBC # BLD AUTO: 10 K/UL (ref 4.8–10.8)

## 2022-11-12 PROCEDURE — 82962 GLUCOSE BLOOD TEST: CPT | Mod: 91

## 2022-11-12 PROCEDURE — 700111 HCHG RX REV CODE 636 W/ 250 OVERRIDE (IP): Performed by: HOSPITALIST

## 2022-11-12 PROCEDURE — 94640 AIRWAY INHALATION TREATMENT: CPT

## 2022-11-12 PROCEDURE — 99232 SBSQ HOSP IP/OBS MODERATE 35: CPT | Performed by: HOSPITALIST

## 2022-11-12 PROCEDURE — A9270 NON-COVERED ITEM OR SERVICE: HCPCS | Performed by: HOSPITALIST

## 2022-11-12 PROCEDURE — 770020 HCHG ROOM/CARE - TELE (206)

## 2022-11-12 PROCEDURE — 36415 COLL VENOUS BLD VENIPUNCTURE: CPT

## 2022-11-12 PROCEDURE — 97161 PT EVAL LOW COMPLEX 20 MIN: CPT

## 2022-11-12 PROCEDURE — 700102 HCHG RX REV CODE 250 W/ 637 OVERRIDE(OP): Performed by: HOSPITALIST

## 2022-11-12 PROCEDURE — A9270 NON-COVERED ITEM OR SERVICE: HCPCS | Performed by: INTERNAL MEDICINE

## 2022-11-12 PROCEDURE — 700102 HCHG RX REV CODE 250 W/ 637 OVERRIDE(OP): Performed by: INTERNAL MEDICINE

## 2022-11-12 PROCEDURE — 80048 BASIC METABOLIC PNL TOTAL CA: CPT

## 2022-11-12 PROCEDURE — 85025 COMPLETE CBC W/AUTO DIFF WBC: CPT

## 2022-11-12 RX ORDER — FUROSEMIDE 40 MG/1
40 TABLET ORAL
Status: DISCONTINUED | OUTPATIENT
Start: 2022-11-12 | End: 2022-11-14 | Stop reason: HOSPADM

## 2022-11-12 RX ADMIN — AMIODARONE HYDROCHLORIDE 200 MG: 200 TABLET ORAL at 04:44

## 2022-11-12 RX ADMIN — FUROSEMIDE 40 MG: 10 INJECTION, SOLUTION INTRAMUSCULAR; INTRAVENOUS at 06:21

## 2022-11-12 RX ADMIN — HYDROCODONE BITARTRATE AND ACETAMINOPHEN 1 TABLET: 10; 325 TABLET ORAL at 06:51

## 2022-11-12 RX ADMIN — HYDROCODONE BITARTRATE AND ACETAMINOPHEN 1 TABLET: 10; 325 TABLET ORAL at 00:53

## 2022-11-12 RX ADMIN — INSULIN HUMAN 1 UNITS: 100 INJECTION, SOLUTION PARENTERAL at 06:21

## 2022-11-12 RX ADMIN — HYDROCODONE BITARTRATE AND ACETAMINOPHEN 1 TABLET: 10; 325 TABLET ORAL at 13:09

## 2022-11-12 RX ADMIN — HYDROCODONE BITARTRATE AND ACETAMINOPHEN 1 TABLET: 10; 325 TABLET ORAL at 19:39

## 2022-11-12 RX ADMIN — INSULIN HUMAN 1 UNITS: 100 INJECTION, SOLUTION PARENTERAL at 20:30

## 2022-11-12 RX ADMIN — ATORVASTATIN CALCIUM 20 MG: 20 TABLET, FILM COATED ORAL at 20:29

## 2022-11-12 RX ADMIN — BUDESONIDE AND FORMOTEROL FUMARATE DIHYDRATE 2 PUFF: 80; 4.5 AEROSOL RESPIRATORY (INHALATION) at 07:25

## 2022-11-12 RX ADMIN — AMLODIPINE BESYLATE 10 MG: 10 TABLET ORAL at 04:44

## 2022-11-12 RX ADMIN — INSULIN HUMAN 2 UNITS: 100 INJECTION, SOLUTION PARENTERAL at 11:54

## 2022-11-12 RX ADMIN — FUROSEMIDE 40 MG: 40 TABLET ORAL at 18:18

## 2022-11-12 RX ADMIN — LEVOTHYROXINE SODIUM 25 MCG: 0.03 TABLET ORAL at 04:44

## 2022-11-12 RX ADMIN — BUDESONIDE AND FORMOTEROL FUMARATE DIHYDRATE 2 PUFF: 80; 4.5 AEROSOL RESPIRATORY (INHALATION) at 18:54

## 2022-11-12 RX ADMIN — INSULIN HUMAN 1 UNITS: 100 INJECTION, SOLUTION PARENTERAL at 16:58

## 2022-11-12 ASSESSMENT — ENCOUNTER SYMPTOMS
ABDOMINAL PAIN: 0
PALPITATIONS: 0
FOCAL WEAKNESS: 0
MYALGIAS: 0
SORE THROAT: 0
DIZZINESS: 0
BRUISES/BLEEDS EASILY: 0
HEADACHES: 0
CARDIOVASCULAR NEGATIVE: 1
VOMITING: 0
WEIGHT LOSS: 0
MUSCULOSKELETAL NEGATIVE: 1
DEPRESSION: 0
FEVER: 0
PSYCHIATRIC NEGATIVE: 1
WEAKNESS: 0
NEUROLOGICAL NEGATIVE: 1
NAUSEA: 0
EYES NEGATIVE: 1
CONSTITUTIONAL NEGATIVE: 1
GASTROINTESTINAL NEGATIVE: 1
COUGH: 0
DIAPHORESIS: 0
CHILLS: 0
SPUTUM PRODUCTION: 0
BLURRED VISION: 0
SHORTNESS OF BREATH: 1

## 2022-11-12 ASSESSMENT — LIFESTYLE VARIABLES: SUBSTANCE_ABUSE: 0

## 2022-11-12 ASSESSMENT — COGNITIVE AND FUNCTIONAL STATUS - GENERAL
CLIMB 3 TO 5 STEPS WITH RAILING: A LOT
MOVING TO AND FROM BED TO CHAIR: A LITTLE
STANDING UP FROM CHAIR USING ARMS: A LITTLE
SUGGESTED CMS G CODE MODIFIER MOBILITY: CK
TURNING FROM BACK TO SIDE WHILE IN FLAT BAD: A LITTLE
MOVING FROM LYING ON BACK TO SITTING ON SIDE OF FLAT BED: UNABLE
MOBILITY SCORE: 15
WALKING IN HOSPITAL ROOM: A LITTLE

## 2022-11-12 ASSESSMENT — PAIN DESCRIPTION - PAIN TYPE
TYPE: ACUTE PAIN
TYPE: ACUTE PAIN

## 2022-11-12 ASSESSMENT — GAIT ASSESSMENTS: GAIT LEVEL OF ASSIST: UNABLE TO PARTICIPATE

## 2022-11-12 NOTE — THERAPY
"Physical Therapy   Initial Evaluation     Patient Name: Rashi Shirley  Age:  62 y.o., Sex:  male  Medical Record #: [id number]  Today's Date: 11/12/2022     Precautions  Precautions: Fall Risk  Comments: \"may be out of bed to chair\" with left thigh hematoma    Assessment  Patient is 62 y.o. male with a diagnosis of acute on chronic respiratory failure and spontaneous left thigh hematoma with no plan for surgery. Additional factors influencing patient status / progress obesity, DM, DLD, pulmonary edema.  Pt reports he is independent with mobility and self care and home, and lives with his son. Today, bed mobility and transfer into bed was assessed and discussed. Pt climbs into bed leading with left knee, then rolls into bed and repositions to supine. Discussed attention to positioning of left LE to remain under body and near midline to avoid strain of groin. Additionally recommending pt to have bed deflated while climbing into avoid unsteady surface. Gait and stairs were not assessed, as MD note indicates pt okay for up to chair, but did not clarify okay for gait. Pt will benefit from continued PT to further assess gait and stairs with limitations of LLE, when cleared by MD. Discharged recommendation will be based on pt's safety with gait and stairs, as required to enter his home.    Plan    Recommend Physical Therapy 4 times per week for 5 visits for the following treatments:  Bed Mobility, Gait Training, Neuro Re-Education / Balance, Stair Training, Therapeutic Activities, and Therapeutic Exercises    DC Equipment Recommendations: (P) Unable to determine at this time  Discharge Recommendations: (P) Recommend home health for continued physical therapy services       Subjective    Pt reporting feeling okay today.     Objective       11/12/22 1239   Precautions   Precautions Fall Risk   Comments \"may be out of bed to chair\" with left thigh hematoma   Pain 0 - 10 Group   Location Groin;Leg   Location Orientation " "Left   Therapist Pain Assessment 7;Post Activity Pain Same as Prior to Activity   Prior Living Situation   Prior Services Home-Independent   Housing / Facility 1 Story House   Steps Into Home 4   Steps In Home 0   Bathroom Set up Walk In Shower;Shower Chair   Equipment Owned Front-Wheel Walker;Single Point Cane;Tub / Shower Seat;Oxygen   Lives with - Patient's Self Care Capacity Adult Children   Prior Level of Functional Mobility   Bed Mobility Independent   Transfer Status Independent   Ambulation Independent   Distance Ambulation (Feet)   (limited community)   Assistive Devices Used None   Stairs Independent   History of Falls   History of Falls No   Cognition    Cognition / Consciousness WDL   Level of Consciousness Alert   Comments pleasant, cooperative   Balance Assessment   Sitting Balance (Static) Fair   Sitting Balance (Dynamic) Fair   Standing Balance (Static) Fair -   Standing Balance (Dynamic) Fair -   Weight Shift Sitting Fair   Weight Shift Standing Fair   Comments no LOB   Gait Analysis   Gait Level Of Assist Unable to Participate   Comments gait not tested, as MD note says \"may be out of bed to chair\"   Bed Mobility    Supine to Sit Standby Assist   Sit to Supine Unable to Participate   Scooting Standby Assist   Rolling Standby Assist   Comments to get into bed, pt crawls in on left knee, rolls into bed, then repositions self to supine; discussed safety of method to avoid strain of groin by keeping left leg close to midline, and having mattress deflated to avoid unsteady surface   Functional Mobility   Sit to Stand Contact Guard Assist   Transfer Method Stand Step   Mobility supine->sit EOB<->stand->supine   Comments no LOB   How much difficulty does the patient currently have...   Turning over in bed (including adjusting bedclothes, sheets and blankets)? 3   Sitting down on and standing up from a chair with arms (e.g., wheelchair, bedside commode, etc.) 1   Moving from lying on back to sitting on the " side of the bed? 3   How much help from another person does the patient currently need...   Moving to and from a bed to a chair (including a wheelchair)? 3   Need to walk in a hospital room? 3   Climbing 3-5 steps with a railing? 2   6 clicks Mobility Score 15   Activity Tolerance   Sitting in Chair declined due to LE pain   Sitting Edge of Bed 5 min approx   Standing 2x2 min approx   Comments limited by left LE pain   Short Term Goals    Short Term Goal # 1 Pt will transfer sit<->stand with FWW or no AD with supv within 6 visits in order to return home   Short Term Goal # 2 Pt will ambulate 150' with FWW or no AD with supv within 6 visits in order to return home   Short Term Goal # 3 Pt will perform 4 steps with supv within 6 visits as required to enter/exit home   Education Group   Education Provided Role of Physical Therapist   Role of Physical Therapist Patient Response Patient;Acceptance;Explanation;Verbal Demonstration   Problem List    Problems Pain;Impaired Bed Mobility;Impaired Ambulation;Impaired Balance;Decreased Activity Tolerance   Anticipated Discharge Equipment and Recommendations   DC Equipment Recommendations Unable to determine at this time   Discharge Recommendations Recommend home health for continued physical therapy services

## 2022-11-12 NOTE — PROGRESS NOTES
Hospital Medicine Daily Progress Note    Date of Service  11/12/2022    Chief Complaint  Rashi Shirley is a 62 y.o. male admitted 11/7/2022 with sob    Hospital Course  Patient is a 62 year old male with history of obesity, diabetes dyslipidemia, pulmonary edema and sleep apnea who presented 11/7 with worsening shortness of breath, started a few days prior to admission with coughing producing sputum, with worsening shortness of breath and worsening lower extremity edema.  He denied any fever or chills however patient did report generalized weakness.  Patient had a history of obstructive sleep apnea and COVID-19 infection and according to the patient he had  recovered and not been needing oxygen or been using a CPAP machine for several months.  On admission patient needed 2 L nasal cannula to keep saturation above 88%. Exam revealed wheezing and crackles with lower extremity edema.  Chest x-ray showed possible pulmonary edema.  Last echo in 2020 was normal and patient denied any history of heart disease or kidney disease.  IV Lasix was a started also patient received IV antibiotics in ER.  Patient was admitted for acute respiratory failure and possible pulmonary edema/emphysema.    Interval Problem Update  L groin pain 5/10, hematoma and bruising stable. He denies sob, matthew and hypoxia are stable, he is using IS frequently. No chest pain. ROS otherwise negative. Awaiting PT and OT eval, continue to wean down oxygen as tolerated    I have discussed this patient's plan of care and discharge plan at IDT rounds today with Case Management, Nursing, Nursing leadership, and other members of the IDT team.    Consultants/Specialty  [facility]  [facility] vascular surgery    Code Status  Full Code    Disposition  Patient is not medically cleared for discharge.   Anticipate discharge to be determined    Review of Systems  Review of Systems   Constitutional: Negative.  Negative for chills, diaphoresis, fever,  malaise/fatigue and weight loss.   HENT: Negative.  Negative for sore throat.    Eyes: Negative.  Negative for blurred vision.   Respiratory:  Positive for shortness of breath. Negative for cough and sputum production.    Cardiovascular: Negative.  Negative for chest pain, palpitations and leg swelling.   Gastrointestinal: Negative.  Negative for abdominal pain, nausea and vomiting.   Genitourinary: Negative.  Negative for dysuria.   Musculoskeletal: Negative.  Negative for myalgias.   Skin: Negative.  Negative for itching and rash.   Neurological: Negative.  Negative for dizziness, focal weakness, weakness and headaches.   Endo/Heme/Allergies: Negative.  Does not bruise/bleed easily.   Psychiatric/Behavioral: Negative.  Negative for depression, substance abuse and suicidal ideas.    All other systems reviewed and are negative.     Physical Exam  Temp:  [36.1 °C (96.9 °F)-36.9 °C (98.4 °F)] 36.8 °C (98.2 °F)  Pulse:  [66-80] 80  Resp:  [16-20] 20  BP: (105-126)/(48-77) 108/48  SpO2:  [91 %-100 %] 95 %    Physical Exam  Vitals and nursing note reviewed. Exam conducted with a chaperone present.   Constitutional:       General: He is not in acute distress.     Appearance: Normal appearance. He is obese. He is not diaphoretic.   HENT:      Head: Normocephalic.      Nose: Nose normal.      Mouth/Throat:      Mouth: Mucous membranes are moist.   Eyes:      Pupils: Pupils are equal, round, and reactive to light.   Cardiovascular:      Rate and Rhythm: Normal rate and regular rhythm.      Pulses: Normal pulses.      Heart sounds: Normal heart sounds.   Pulmonary:      Effort: Pulmonary effort is normal.      Breath sounds: No wheezing or rhonchi.      Comments: Decreased at bases  Abdominal:      General: Abdomen is flat. Bowel sounds are normal.      Palpations: Abdomen is soft.   Musculoskeletal:         General: No swelling or deformity. Normal range of motion.      Comments: L groin hematoma stable, stable  bruising  Lower extremities, chronic stasis changes   Skin:     General: Skin is warm and dry.      Capillary Refill: Capillary refill takes less than 2 seconds.   Neurological:      General: No focal deficit present.      Mental Status: He is alert and oriented to person, place, and time.      Cranial Nerves: No cranial nerve deficit.   Psychiatric:         Mood and Affect: Mood normal.         Behavior: Behavior normal.       Fluids    Intake/Output Summary (Last 24 hours) at 11/12/2022 1242  Last data filed at 11/12/2022 0400  Gross per 24 hour   Intake 740 ml   Output --   Net 740 ml       Laboratory  Recent Labs     11/10/22  1648 11/11/22 0137 11/11/22  0935 11/12/22  0041   WBC 8.8 8.4  --  10.0   RBC 4.72 4.39*  --  3.76*   HEMOGLOBIN 13.2* 12.1*  12.3* 11.3* 10.8*   HEMATOCRIT 42.7 39.8*  39.7* 37.0* 35.1*   MCV 90.5 90.7  --  93.4   MCH 28.0 27.6  --  28.7   MCHC 30.9* 30.4*  --  30.8*   RDW 56.0* 56.3*  --  55.9*   PLATELETCT 37* 82*  --  72*   MPV 13.3* 11.6  --  12.8     Recent Labs     11/11/22 0137 11/12/22  0041   SODIUM 134* 133*   POTASSIUM 4.5 4.1   CHLORIDE 93* 89*   CO2 33 38*   GLUCOSE 239* 206*   BUN 27* 22   CREATININE 0.98 0.98   CALCIUM 8.0* 8.2*     Recent Labs     11/10/22  1648   APTT <20.0*   INR 1.15*               Imaging  CT-CTA PELVIS WITH & W/O-POST PROCESS   Final Result   Addendum (preliminary) 1 of 1   Findings discussed with the patient's nurse Kristen at 4:28 PM on 11/10/2022      Final      Findings suspicious for small pseudoaneurysm or bleeding muscular branch vessel in the proximal LEFT thigh with large adjacent hematoma      CJ MORENO FELIXLIZ was paged at 11/10/2022 3:57 PM.      US-EXTREMITY VENOUS LOWER UNILAT LEFT   Final Result      EC-ECHOCARDIOGRAM COMPLETE W/O CONT   Final Result      DX-CHEST-PORTABLE (1 VIEW)   Final Result      1.  There is no acute cardiopulmonary process.   2.  Stable enlarged cardiac silhouette likely in part due to body habitus and prominent  "pericardial fat pads.           Assessment/Plan  * Acute respiratory failure (HCC)- (present on admission)  Assessment & Plan  Likely related to pulmonary edema however could be undiagnosed emphysema/asthma  Patient never had heart failure and no history of smoking however patient has significant obesity with BMI 64  His recent baseline is room air  Continues to slowly improve  Continue iv Lasix  Symptoms stable overnight  Telemetry  Continue to wean oxygen as tolerated, inhalers and RT  pulmonary function test and sleep study as outpatient   procalcitonin low      Left groin mass  Assessment & Plan  See above  Large hematoma (asa and xarelto 10 mg stopped), s/p kcentra to reverse the xarelto   Felt he \"pulled something\" getting out of bed - see discussion above - resulted in partial muscle tear and therefore bleed  H/h did drop initially, now in stable range  CTA showed possible pseudoaneurysm - Mini evaluated him, consistent with partial muscle tear - is tamponading itself  General surgery and vascular surgery signed off  Continue to follow closely    Thrombocytopenia (HCC)  Assessment & Plan  Acute   Continues to fluctuate, hematoma contributing   Continue to follow      Polycythemia  Assessment & Plan  Suspect recent non compliance with cpap contributing  Patient plans to follow up with pulm  following    Pulmonary hypertension (HCC)- (present on admission)  Assessment & Plan  Last echo in 2020 showed right ventricular systolic pressure more than 60 mmHg   lower extremity edema and shortness of breath improving  Continue  Lasix  Weight daily and I's and O's  Echo stable, EF 70%    Controlled type 2 diabetes mellitus, without long-term current use of insulin (HCC)- (present on admission)  Assessment & Plan  Bs improving but still not at goal   Will continue to increase lantus  Continue SSI  following    Essential hypertension- (present on admission)  Assessment & Plan  Continue amlodipine as tolerating  Close " monitoring and adjust the medication if needed    ABELARDO (obstructive sleep apnea)- (present on admission)  Assessment & Plan  According to the patient, he recovered  Patient not using CPAP as outpatient  BMI 64  Patient needs CPAP and sleep study as outpatient    HLD (hyperlipidemia)- (present on admission)  Assessment & Plan  Continue home medication atorvastatin       VTE prophylaxis: Xarelto 10 mg daily as prophylaxis    I have performed a physical exam and reviewed and updated ROS and Plan today (11/12/2022). In review of yesterday's note (11/11/2022), there are no changes except as documented above.

## 2022-11-12 NOTE — CARE PLAN
The patient is Stable - Low risk of patient condition declining or worsening    Shift Goals  Clinical Goals: PT/OT, titrate O2  Patient Goals: Pain control  Family Goals: na    Progress made toward(s) clinical / shift goals:    Patient needs to work with PT/OT. Refer to MAR for pain management. Titrating O2.    Problem: Pain - Standard  Goal: Alleviation of pain or a reduction in pain to the patient’s comfort goal  Outcome: Progressing  Note: Assess and monitor for pain. Provide pharmacological and non-pharmacological interventions as appropriate. Re-evaluate and continue to monitor. Patient states increased pain with moving left lower extremity.      Problem: Knowledge Deficit - Standard  Goal: Patient and family/care givers will demonstrate understanding of plan of care, disease process/condition, diagnostic tests and medications  Outcome: Progressing  Note: Discuss and review POC with patient. Re-educate as needed.       Problem: Respiratory  Goal: Patient will achieve/maintain optimum respiratory ventilation and gas exchange  Outcome: Progressing  Note: Titrating O2 as tolerated. Patient at 3 L NC sating in the 90's. Pt is self motivated with IS and educated on using every hour.        Patient is not progressing towards the following goals:

## 2022-11-12 NOTE — CARE PLAN
Problem: Pain - Standard  Goal: Alleviation of pain or a reduction in pain to the patient’s comfort goal  Outcome: Progressing  Note: Assess and monitor for pain. Provide pharmacological and non-pharmacological interventions as appropriate. Re-evaluate and continue to monitor.        Problem: Knowledge Deficit - Standard  Goal: Patient and family/care givers will demonstrate understanding of plan of care, disease process/condition, diagnostic tests and medications  Outcome: Progressing  Note: Discuss and review POC with patient/family. Re-educate as needed.     The patient is Watcher - Medium risk of patient condition declining or worsening    Shift Goals  Clinical Goals: monitor respiratiory status, diuresis, increase mobility  Patient Goals: pain management  Family Goals: della    Progress made toward(s) clinical / shift goals:  Pt diuresing, ambulatory to the RR, pt using the pain scale appropritately    Patient is not progressing towards the following goals: pt remains in a significant amount of pain in left groin, pt still requiring oxygen

## 2022-11-12 NOTE — PROGRESS NOTES
Assumed care of pt, received bedside report from night RN. Pt A/Ox4. Fall and safety precautions in place. POC discussed with pt, pt verbalizes understanding. No further needs at this time.

## 2022-11-12 NOTE — ASSESSMENT & PLAN NOTE
Discussed with nursing  With concurrent contraction alkalosis  Improving  Continue  to titrate oxygen down  He agrees to follow up with sleep study and pulmonary as outpatient  following

## 2022-11-12 NOTE — PROGRESS NOTES
Bedside report received from day RN, pt care assumed, assessment completed. Pt is A&O4, pain 6/10 pt understands next pain med due at 2030, repositioning offered, SR on the monitor. Updated on POC, questions answered. Bed in lowest, locked position, treaded socks on, call light and belongings within reach.

## 2022-11-13 LAB
ANION GAP SERPL CALC-SCNC: 10 MMOL/L (ref 7–16)
BASOPHILS # BLD AUTO: 0.7 % (ref 0–1.8)
BASOPHILS # BLD: 0.06 K/UL (ref 0–0.12)
BUN SERPL-MCNC: 21 MG/DL (ref 8–22)
CALCIUM SERPL-MCNC: 8.5 MG/DL (ref 8.5–10.5)
CHLORIDE SERPL-SCNC: 89 MMOL/L (ref 96–112)
CO2 SERPL-SCNC: 34 MMOL/L (ref 20–33)
CREAT SERPL-MCNC: 0.84 MG/DL (ref 0.5–1.4)
EOSINOPHIL # BLD AUTO: 0.47 K/UL (ref 0–0.51)
EOSINOPHIL NFR BLD: 5.1 % (ref 0–6.9)
ERYTHROCYTE [DISTWIDTH] IN BLOOD BY AUTOMATED COUNT: 54.8 FL (ref 35.9–50)
GFR SERPLBLD CREATININE-BSD FMLA CKD-EPI: 99 ML/MIN/1.73 M 2
GLUCOSE BLD STRIP.AUTO-MCNC: 183 MG/DL (ref 65–99)
GLUCOSE BLD STRIP.AUTO-MCNC: 190 MG/DL (ref 65–99)
GLUCOSE BLD STRIP.AUTO-MCNC: 191 MG/DL (ref 65–99)
GLUCOSE BLD STRIP.AUTO-MCNC: 241 MG/DL (ref 65–99)
GLUCOSE SERPL-MCNC: 168 MG/DL (ref 65–99)
HCT VFR BLD AUTO: 33.8 % (ref 42–52)
HGB BLD-MCNC: 10.4 G/DL (ref 14–18)
IMM GRANULOCYTES # BLD AUTO: 0.12 K/UL (ref 0–0.11)
IMM GRANULOCYTES NFR BLD AUTO: 1.3 % (ref 0–0.9)
LYMPHOCYTES # BLD AUTO: 1.21 K/UL (ref 1–4.8)
LYMPHOCYTES NFR BLD: 13.1 % (ref 22–41)
MCH RBC QN AUTO: 28.1 PG (ref 27–33)
MCHC RBC AUTO-ENTMCNC: 30.8 G/DL (ref 33.7–35.3)
MCV RBC AUTO: 91.4 FL (ref 81.4–97.8)
MONOCYTES # BLD AUTO: 1 K/UL (ref 0–0.85)
MONOCYTES NFR BLD AUTO: 10.8 % (ref 0–13.4)
NEUTROPHILS # BLD AUTO: 6.37 K/UL (ref 1.82–7.42)
NEUTROPHILS NFR BLD: 69 % (ref 44–72)
NRBC # BLD AUTO: 0.1 K/UL
NRBC BLD-RTO: 1.1 /100 WBC
PLATELET # BLD AUTO: 89 K/UL (ref 164–446)
PMV BLD AUTO: 12.3 FL (ref 9–12.9)
POTASSIUM SERPL-SCNC: 4.1 MMOL/L (ref 3.6–5.5)
RBC # BLD AUTO: 3.7 M/UL (ref 4.7–6.1)
SODIUM SERPL-SCNC: 133 MMOL/L (ref 135–145)
WBC # BLD AUTO: 9.2 K/UL (ref 4.8–10.8)

## 2022-11-13 PROCEDURE — A9270 NON-COVERED ITEM OR SERVICE: HCPCS | Performed by: HOSPITALIST

## 2022-11-13 PROCEDURE — 85025 COMPLETE CBC W/AUTO DIFF WBC: CPT

## 2022-11-13 PROCEDURE — 80048 BASIC METABOLIC PNL TOTAL CA: CPT

## 2022-11-13 PROCEDURE — 700102 HCHG RX REV CODE 250 W/ 637 OVERRIDE(OP): Performed by: HOSPITALIST

## 2022-11-13 PROCEDURE — 770001 HCHG ROOM/CARE - MED/SURG/GYN PRIV*

## 2022-11-13 PROCEDURE — 99232 SBSQ HOSP IP/OBS MODERATE 35: CPT | Performed by: HOSPITALIST

## 2022-11-13 PROCEDURE — 82962 GLUCOSE BLOOD TEST: CPT

## 2022-11-13 PROCEDURE — 94640 AIRWAY INHALATION TREATMENT: CPT

## 2022-11-13 PROCEDURE — 36415 COLL VENOUS BLD VENIPUNCTURE: CPT

## 2022-11-13 PROCEDURE — A9270 NON-COVERED ITEM OR SERVICE: HCPCS | Performed by: INTERNAL MEDICINE

## 2022-11-13 PROCEDURE — 700102 HCHG RX REV CODE 250 W/ 637 OVERRIDE(OP): Performed by: INTERNAL MEDICINE

## 2022-11-13 PROCEDURE — 97602 WOUND(S) CARE NON-SELECTIVE: CPT

## 2022-11-13 PROCEDURE — 97165 OT EVAL LOW COMPLEX 30 MIN: CPT

## 2022-11-13 RX ADMIN — HYDROCODONE BITARTRATE AND ACETAMINOPHEN 1 TABLET: 10; 325 TABLET ORAL at 07:42

## 2022-11-13 RX ADMIN — BUDESONIDE AND FORMOTEROL FUMARATE DIHYDRATE 2 PUFF: 80; 4.5 AEROSOL RESPIRATORY (INHALATION) at 07:12

## 2022-11-13 RX ADMIN — AMLODIPINE BESYLATE 10 MG: 10 TABLET ORAL at 05:01

## 2022-11-13 RX ADMIN — LEVOTHYROXINE SODIUM 25 MCG: 0.03 TABLET ORAL at 05:01

## 2022-11-13 RX ADMIN — HYDROCODONE BITARTRATE AND ACETAMINOPHEN 1 TABLET: 10; 325 TABLET ORAL at 01:43

## 2022-11-13 RX ADMIN — INSULIN HUMAN 2 UNITS: 100 INJECTION, SOLUTION PARENTERAL at 11:35

## 2022-11-13 RX ADMIN — AMIODARONE HYDROCHLORIDE 200 MG: 200 TABLET ORAL at 05:01

## 2022-11-13 RX ADMIN — ATORVASTATIN CALCIUM 20 MG: 20 TABLET, FILM COATED ORAL at 19:52

## 2022-11-13 RX ADMIN — INSULIN HUMAN 1 UNITS: 100 INJECTION, SOLUTION PARENTERAL at 06:20

## 2022-11-13 RX ADMIN — FUROSEMIDE 40 MG: 40 TABLET ORAL at 14:54

## 2022-11-13 RX ADMIN — BUDESONIDE AND FORMOTEROL FUMARATE DIHYDRATE 2 PUFF: 80; 4.5 AEROSOL RESPIRATORY (INHALATION) at 18:54

## 2022-11-13 RX ADMIN — FUROSEMIDE 40 MG: 40 TABLET ORAL at 06:18

## 2022-11-13 RX ADMIN — INSULIN HUMAN 1 UNITS: 100 INJECTION, SOLUTION PARENTERAL at 19:57

## 2022-11-13 RX ADMIN — HYDROCODONE BITARTRATE AND ACETAMINOPHEN 1 TABLET: 10; 325 TABLET ORAL at 18:39

## 2022-11-13 RX ADMIN — INSULIN HUMAN 1 UNITS: 100 INJECTION, SOLUTION PARENTERAL at 16:45

## 2022-11-13 ASSESSMENT — ENCOUNTER SYMPTOMS
PALPITATIONS: 0
ABDOMINAL PAIN: 0
WEAKNESS: 0
MYALGIAS: 0
VOMITING: 0
HEADACHES: 0
SORE THROAT: 0
PSYCHIATRIC NEGATIVE: 1
CONSTITUTIONAL NEGATIVE: 1
GASTROINTESTINAL NEGATIVE: 1
CARDIOVASCULAR NEGATIVE: 1
NAUSEA: 0
FEVER: 0
EYES NEGATIVE: 1
CHILLS: 0
SPUTUM PRODUCTION: 0
COUGH: 0
DIZZINESS: 0
DIAPHORESIS: 0
NEUROLOGICAL NEGATIVE: 1
MUSCULOSKELETAL NEGATIVE: 1
DEPRESSION: 0
FOCAL WEAKNESS: 0
BRUISES/BLEEDS EASILY: 0
BLURRED VISION: 0
SHORTNESS OF BREATH: 1
WEIGHT LOSS: 0

## 2022-11-13 ASSESSMENT — LIFESTYLE VARIABLES: SUBSTANCE_ABUSE: 0

## 2022-11-13 ASSESSMENT — FIBROSIS 4 INDEX: FIB4 SCORE: 3.32

## 2022-11-13 ASSESSMENT — ACTIVITIES OF DAILY LIVING (ADL): TOILETING: INDEPENDENT

## 2022-11-13 ASSESSMENT — COGNITIVE AND FUNCTIONAL STATUS - GENERAL
SUGGESTED CMS G CODE MODIFIER DAILY ACTIVITY: CH
DAILY ACTIVITIY SCORE: 24

## 2022-11-13 ASSESSMENT — PAIN DESCRIPTION - PAIN TYPE: TYPE: ACUTE PAIN

## 2022-11-13 NOTE — THERAPY
"Occupational Therapy   Initial Evaluation     Patient Name: Rashi Shirley  Age:  62 y.o., Sex:  male  Medical Record #: [id number]  Today's Date: 11/13/2022     Precautions  Precautions: Fall Risk  Comments: \"may be out of bed to chair\" per MD, with left thigh hematoma    Assessment  Patient is 62 y.o. male admitted on 11/7/22 with SOB and coughing up sputum while exhibiting lower extremity edema which had gotten much worse the few days prior to his admit.  Pt presents with challenges during functional mobility at times due to body habitus. Pt is supervision for all bed mobility, functional ADL mobility, toilet transfers, lower body dressing, and grooming tasks in standing throughout the evaluation. Patient will not be actively followed for occupational therapy services at this time, however may be seen if requested by physician for 1 more visit within 30 days to address any discharge or equipment needs. Upon discharge, recommending home health occupational therapy services to continue to work on activity tolerance, functional ADL, and home safety evaluation regarding ADL and functional mobility within the home.      Plan    Recommend Occupational Therapy for Evaluation only.    DC Equipment Recommendations: Grab Bar(s) by Toilet, Raised Toilet Seat with Arms  Discharge Recommendations: Recommend home health for continued occupational therapy services      Objective       11/13/22 1135   Initial Contact Note    Initial Contact Note Order Received and Verified, Evaluation Only - Patient Does Not Require Further Acute Occupational Therapy at this Time.  However, May Benefit from Post Acute Therapy for Higher Level Functional Deficits.   Prior Living Situation   Prior Services Home-Independent   Housing / Facility 1 Story House   Steps Into Home 4   Steps In Home 0   Bathroom Set up Walk In Shower;Shower Chair   Equipment Owned Grab Bar(s) By Toilet;Grab Bar(s) In Tub / Shower   Lives with - Patient's Self Care " "Capacity Adult Children   Prior Level of ADL Function   Self Feeding Independent   Grooming / Hygiene Independent   Bathing Independent   Dressing Independent   Toileting Independent   Precautions   Precautions Fall Risk   Balance Assessment   Sitting Balance (Static) Fair +   Sitting Balance (Dynamic) Fair   Standing Balance (Static) Fair   Standing Balance (Dynamic) Fair -   Weight Shift Sitting Fair   Weight Shift Standing Fair   Comments no LOB, pt declined use of FWW as he does not use at home   Bed Mobility    Supine to Sit Standby Assist   Comments Pt returns to bed using L LE to push onto the bed and crawl, then rolls into bed and repositions himself in supine. Discussed with pt a safer method by siting at EOB leaning onto L side by putting legs up on bed and then rolling onto bed instead of his aforementiond technique. Pt states \"this is how I have always done it.\"   ADL Assessment   Grooming Supervision;Standing   Lower Body Dressing Supervision   Toileting   (NT due to lack of pt need)   How much help from another person does the patient currently need...   Putting on and taking off regular lower body clothing? 4   Bathing (including washing, rinsing, and drying)? 4   Toileting, which includes using a toilet, bedpan, or urinal? 4   Putting on and taking off regular upper body clothing? 4   Taking care of personal grooming such as brushing teeth? 4   Eating meals? 4   6 Clicks Daily Activity Score 24   Functional Mobility   Sit to Stand Contact Guard Assist   Transfer Method Stand Step   Mobility Pt mobilized from bed to EOB into bathroom and back to bed   Activity Tolerance   Sitting Edge of Bed 5 mins   Standing 5 mins   Comments Pt exhibits discomfort with prolonged standing on L LE   Education Group   Education Provided Energy Conservation;Role of Occupational Therapist;Activities of Daily Living;Adaptive Equipment   Role of Occupational Therapist Patient Response Patient;Acceptance;Explanation;Verbal " Demonstration   Energy Conservation Patient Response Patient;Acceptance;Explanation;Verbal Demonstration;Reinforcement Needed   ADL Patient Response Patient;Acceptance;Explanation;Verbal Demonstration   Adaptive Equipment Patient Response Patient;Acceptance;Explanation;Verbal Demonstration   Problem List   Problem List Decreased Active Daily Living Skills;Decreased Homemaking Skills;Decreased Functional Mobility;Decreased Activity Tolerance   Anticipated Discharge Equipment and Recommendations   DC Equipment Recommendations Grab Bar(s) by Toilet;Raised Toilet Seat with Arms   Discharge Recommendations Recommend home health for continued occupational therapy services   Interdisciplinary Plan of Care Collaboration   IDT Collaboration with  Nursing   Patient Position at End of Therapy In Bed;Call Light within Reach;Tray Table within Reach;Phone within Reach   Collaboration Comments RN updated

## 2022-11-13 NOTE — CARE PLAN
Problem: Pain - Standard  Goal: Alleviation of pain or a reduction in pain to the patient’s comfort goal  Outcome: Progressing  Note: Pt using pain scale appropriately.     Problem: Knowledge Deficit - Standard  Goal: Patient and family/care givers will demonstrate understanding of plan of care, disease process/condition, diagnostic tests and medications  Outcome: Progressing  Note: Discuss and review POC with patient/family. Re-educate as needed.     The patient is Watcher - Medium risk of patient condition declining or worsening    Shift Goals  Clinical Goals: titrate oxygen, safe body mechanics  Patient Goals: pain management  Family Goals: RUBY    Progress made toward(s) clinical / shift goals:  Pt tolerating titration of oxygen down to 3L NC    Patient is not progressing towards the following goals: Pt remains in pain, and still requiring oxygen greater than room air

## 2022-11-13 NOTE — FACE TO FACE
"Face to Face Note  -  Durable Medical Equipment    Carla Bennett M.D. - NPI: [id number]  I certify that this patient is under my care and that they had a durable medical equipment(DME)face to face encounter by myself that meets the physician DME face-to-face encounter requirements with this patient on:    Date of encounter:   Patient:                    MRN:                       YOB: 2020  Rashi Shirley  [id number]  1960     The encounter with the patient was in whole, or in part, for the following medical condition, which is the primary reason for durable medical equipment:  CHF    I certify that, based on my findings, the following durable medical equipment is medically necessary:    Oxygen   HOME O2 Saturation Measurements:(Values must be present for Home Oxygen orders)  Room air sat at rest: 90  Room air sat with amb: 85  With liters of O2: 3, O2 sat at rest with O2: 98  With Liters of O2: 3, O2 sat with amb with O2 : 95     If patient feels more short of breath, they can go up to 6 liters per minute and contact healthcare provider.    Supporting Symptoms: The patient requires supplemental oxygen, as the following interventions have been tried with limited or no improvement: \"Incentive spirometry.    My Clinical findings support the need for the above equipment due to:  Hypoxia  "

## 2022-11-13 NOTE — PROGRESS NOTES
Monitor Summary:   Rhythm: SR  Rate: 69--78  Measurement: .20/.06/.37  Ectopy: no ectopy

## 2022-11-13 NOTE — PROGRESS NOTES
Bedside report received from day RN, pt care assumed, assessment completed. Pt is A&O4, pain 7/10 medicated per MAR, SR on the monitor. Updated on POC, questions answered. Bed in lowest, locked position, treaded socks on, call light and belongings within reach.

## 2022-11-13 NOTE — PROGRESS NOTES
Hospital Medicine Daily Progress Note    Date of Service  11/13/2022    Chief Complaint  Rashi Shirley is a 62 y.o. male admitted 11/7/2022 with sob    Hospital Course  Patient is a 62 year old male with history of obesity, diabetes dyslipidemia, pulmonary edema and sleep apnea who presented 11/7 with worsening shortness of breath, started a few days prior to admission with coughing producing sputum, with worsening shortness of breath and worsening lower extremity edema.  He denied any fever or chills however patient did report generalized weakness.  Patient had a history of obstructive sleep apnea and COVID-19 infection and according to the patient he had  recovered and not been needing oxygen or been using a CPAP machine for several months.  On admission patient needed 2 L nasal cannula to keep saturation above 88%. Exam revealed wheezing and crackles with lower extremity edema.  Chest x-ray showed possible pulmonary edema.  Last echo in 2020 was normal and patient denied any history of heart disease or kidney disease.  IV Lasix was a started also patient received IV antibiotics in ER.  Patient was admitted for acute respiratory failure and possible pulmonary edema/emphysema.    Interval Problem Update  He remains axox3, states he is feeling better, sob resolving. L groin pain statble at 5/10, hematoma and bruising stable. No chest pain. He did well with PT and OT, they recommend home health, patient agrees to this. Discussed with case management as he is S, oxygen and home health need to be arranged through them and they are not available on the weekend - therefore plan on d/c in am if stable.    I have discussed this patient's plan of care and discharge plan at IDT rounds today with Case Management, Nursing, Nursing leadership, and other members of the IDT team.    Consultants/Specialty  [facility] Surgery  [facility] vascular surgery    Code Status  Full Code    Disposition  Patient is not medically  cleared for discharge.   Anticipate discharge to be determined    Review of Systems  Review of Systems   Constitutional: Negative.  Negative for chills, diaphoresis, fever, malaise/fatigue and weight loss.   HENT: Negative.  Negative for sore throat.    Eyes: Negative.  Negative for blurred vision.   Respiratory:  Positive for shortness of breath. Negative for cough and sputum production.    Cardiovascular: Negative.  Negative for chest pain, palpitations and leg swelling.   Gastrointestinal: Negative.  Negative for abdominal pain, nausea and vomiting.   Genitourinary: Negative.  Negative for dysuria.   Musculoskeletal: Negative.  Negative for myalgias.   Skin: Negative.  Negative for itching and rash.   Neurological: Negative.  Negative for dizziness, focal weakness, weakness and headaches.   Endo/Heme/Allergies: Negative.  Does not bruise/bleed easily.   Psychiatric/Behavioral: Negative.  Negative for depression, substance abuse and suicidal ideas.    All other systems reviewed and are negative.     Physical Exam  Temp:  [36.7 °C (98.1 °F)-36.9 °C (98.4 °F)] 36.8 °C (98.2 °F)  Pulse:  [72-80] 72  Resp:  [17-20] 18  BP: (102-121)/(48-70) 119/64  SpO2:  [93 %-95 %] 93 %    Physical Exam  Vitals and nursing note reviewed. Exam conducted with a chaperone present.   Constitutional:       General: He is not in acute distress.     Appearance: Normal appearance. He is obese. He is not diaphoretic.   HENT:      Head: Normocephalic.      Nose: Nose normal.      Mouth/Throat:      Mouth: Mucous membranes are moist.   Eyes:      Pupils: Pupils are equal, round, and reactive to light.   Cardiovascular:      Rate and Rhythm: Normal rate and regular rhythm.      Pulses: Normal pulses.      Heart sounds: Normal heart sounds.   Pulmonary:      Effort: Pulmonary effort is normal.      Breath sounds: No wheezing or rhonchi.      Comments: Decreased at bases  Abdominal:      General: Abdomen is flat. Bowel sounds are normal.       Palpations: Abdomen is soft.   Musculoskeletal:         General: No swelling or deformity. Normal range of motion.      Comments: L groin hematoma stable, stable bruising  Lower extremities, chronic stasis changes   Skin:     General: Skin is warm and dry.      Capillary Refill: Capillary refill takes less than 2 seconds.   Neurological:      General: No focal deficit present.      Mental Status: He is alert and oriented to person, place, and time.      Cranial Nerves: No cranial nerve deficit.   Psychiatric:         Mood and Affect: Mood normal.         Behavior: Behavior normal.       Fluids  No intake or output data in the 24 hours ending 11/13/22 1033      Laboratory  Recent Labs     11/11/22 0137 11/11/22  0935 11/12/22  0041 11/13/22  0135   WBC 8.4  --  10.0 9.2   RBC 4.39*  --  3.76* 3.70*   HEMOGLOBIN 12.1*  12.3* 11.3* 10.8* 10.4*   HEMATOCRIT 39.8*  39.7* 37.0* 35.1* 33.8*   MCV 90.7  --  93.4 91.4   MCH 27.6  --  28.7 28.1   MCHC 30.4*  --  30.8* 30.8*   RDW 56.3*  --  55.9* 54.8*   PLATELETCT 82*  --  72* 89*   MPV 11.6  --  12.8 12.3     Recent Labs     11/11/22  0137 11/12/22  0041 11/13/22  0135   SODIUM 134* 133* 133*   POTASSIUM 4.5 4.1 4.1   CHLORIDE 93* 89* 89*   CO2 33 38* 34*   GLUCOSE 239* 206* 168*   BUN 27* 22 21   CREATININE 0.98 0.98 0.84   CALCIUM 8.0* 8.2* 8.5     Recent Labs     11/10/22  1648   APTT <20.0*   INR 1.15*               Imaging  CT-CTA PELVIS WITH & W/O-POST PROCESS   Final Result   Addendum (preliminary) 1 of 1   Findings discussed with the patient's nurse Kristen at 4:28 PM on 11/10/2022      Final      Findings suspicious for small pseudoaneurysm or bleeding muscular branch vessel in the proximal LEFT thigh with large adjacent hematoma      CJ NACNE was paged at 11/10/2022 3:57 PM.      US-EXTREMITY VENOUS LOWER UNILAT LEFT   Final Result      EC-ECHOCARDIOGRAM COMPLETE W/O CONT   Final Result      DX-CHEST-PORTABLE (1 VIEW)   Final Result      1.  There is no acute  "cardiopulmonary process.   2.  Stable enlarged cardiac silhouette likely in part due to body habitus and prominent pericardial fat pads.           Assessment/Plan  * Acute respiratory failure (HCC)- (present on admission)  Assessment & Plan  Likely related to pulmonary edema, suspect due to acute diastolic chf exacerbation   His recent baseline is room air  Continues to slowly improve  Continue Lasix  Telemetry  Continue to wean oxygen as tolerated, inhalers and RT  pulmonary function test and sleep study as outpatient   procalcitonin low      Left groin mass  Assessment & Plan  See above  Large hematoma (asa and xarelto 10 mg stopped), s/p kcentra to reverse the xarelto   Felt he \"pulled something\" getting out of bed - see discussion above - resulted in partial muscle tear and therefore bleed  H/h did drop initially, now in stable range  CTA showed possible pseudoaneurysm - Mini evaluated him, consistent with partial muscle tear - is tamponading itself  General surgery and vascular surgery signed off  Continue to follow     CO2 retention  Assessment & Plan  Discussed with nursing  With concurrent contraction alkalosis  Improving  Continue  to titrate oxygen down  He agrees to follow up with sleep study and pulmonary as outpatient  following    Thrombocytopenia (HCC)  Assessment & Plan  Acute   increasing  Some fluctuation, hematoma contributing   Continue to follow      Polycythemia  Assessment & Plan  Suspect recent non compliance with cpap contributing  Patient plans to follow up with pulm  Resolved with acute blood loss from hematoma  Patient agrees to have this followed up as outpatient    Pulmonary hypertension (HCC)- (present on admission)  Assessment & Plan  Last echo in 2020 showed right ventricular systolic pressure more than 60 mmHg   lower extremity edema and shortness of breath continue to improve  Continue  Lasix - transitioned to oral   Weight daily and I's and O's  Echo stable, EF " 70%    Controlled type 2 diabetes mellitus, without long-term current use of insulin (HCC)- (present on admission)  Assessment & Plan  Bs much improved  Continue lantus at current dose  Continue SSI  following    Essential hypertension- (present on admission)  Assessment & Plan  Continue amlodipine as tolerating  Close monitoring and adjust the medication if needed    ABELARDO (obstructive sleep apnea)- (present on admission)  Assessment & Plan  According to the patient, he recovered  Patient not using CPAP as outpatient  BMI 64  Patient needs CPAP and sleep study as outpatient - he agrees to this, has pending appointment with pulmonary / sleep as outpatient    HLD (hyperlipidemia)- (present on admission)  Assessment & Plan  Continue home medication atorvastatin       VTE prophylaxis: Xarelto 10 mg daily as prophylaxis    I have performed a physical exam and reviewed and updated ROS and Plan today (11/13/2022). In review of yesterday's note (11/12/2022), there are no changes except as documented above.

## 2022-11-13 NOTE — FACE TO FACE
Face to Face Supporting Documentation - Home Health    The encounter with this patient was in whole or in part the primary reason for home health admission.    Date of encounter:   Patient:                    MRN:                       YOB: 2020  Rashi Shirley  [id number]  1960     Home health to see patient for:  Skilled Nursing care for assessment, interventions & education    Skilled need for:  Exacerbation of Chronic Disease State thigh hematoma, acute respiratory failure    Skilled nursing interventions to include:  Comment: exam, labs, vitals   repeat cbc and bmp on 11/16/22 with results to pcp    Homebound status evidenced by:  Needs the assistance of another person in order to leave the home. Leaving home requires a considerable and taxing effort. There is a normal inability to leave the home.    Community Physician to provide follow up care: Jeff Faust M.D. (Inactive)     Optional Interventions? No      I certify the face to face encounter for this home health care referral meets the CMS requirements and the encounter/clinical assessment with the patient was, in whole, or in part, for the medical condition(s) listed above, which is the primary reason for home health care. Based on my clinical findings: the service(s) are medically necessary, support the need for home health care, and the homebound criteria are met.  I certify that this patient has had a face to face encounter by myself.  Carla Bennett M.D. - NPI: [id number]

## 2022-11-14 VITALS
OXYGEN SATURATION: 94 % | HEIGHT: 70 IN | SYSTOLIC BLOOD PRESSURE: 128 MMHG | BODY MASS INDEX: 45.1 KG/M2 | HEART RATE: 88 BPM | WEIGHT: 315 LBS | RESPIRATION RATE: 18 BRPM | DIASTOLIC BLOOD PRESSURE: 68 MMHG | TEMPERATURE: 99 F

## 2022-11-14 PROBLEM — E87.29 CO2 RETENTION: Status: RESOLVED | Noted: 2022-11-12 | Resolved: 2022-11-14

## 2022-11-14 PROBLEM — D75.1 POLYCYTHEMIA: Status: RESOLVED | Noted: 2022-11-08 | Resolved: 2022-11-14

## 2022-11-14 LAB
ANION GAP SERPL CALC-SCNC: 8 MMOL/L (ref 7–16)
BASOPHILS # BLD AUTO: 0.4 % (ref 0–1.8)
BASOPHILS # BLD: 0.04 K/UL (ref 0–0.12)
BUN SERPL-MCNC: 19 MG/DL (ref 8–22)
CALCIUM SERPL-MCNC: 8.4 MG/DL (ref 8.5–10.5)
CHLORIDE SERPL-SCNC: 91 MMOL/L (ref 96–112)
CO2 SERPL-SCNC: 32 MMOL/L (ref 20–33)
CREAT SERPL-MCNC: 0.9 MG/DL (ref 0.5–1.4)
EOSINOPHIL # BLD AUTO: 0.57 K/UL (ref 0–0.51)
EOSINOPHIL NFR BLD: 6 % (ref 0–6.9)
ERYTHROCYTE [DISTWIDTH] IN BLOOD BY AUTOMATED COUNT: 55.8 FL (ref 35.9–50)
GFR SERPLBLD CREATININE-BSD FMLA CKD-EPI: 97 ML/MIN/1.73 M 2
GLUCOSE BLD STRIP.AUTO-MCNC: 165 MG/DL (ref 65–99)
GLUCOSE BLD STRIP.AUTO-MCNC: 174 MG/DL (ref 65–99)
GLUCOSE SERPL-MCNC: 168 MG/DL (ref 65–99)
HCT VFR BLD AUTO: 32.8 % (ref 42–52)
HGB BLD-MCNC: 10 G/DL (ref 14–18)
IMM GRANULOCYTES # BLD AUTO: 0.22 K/UL (ref 0–0.11)
IMM GRANULOCYTES NFR BLD AUTO: 2.3 % (ref 0–0.9)
LYMPHOCYTES # BLD AUTO: 0.89 K/UL (ref 1–4.8)
LYMPHOCYTES NFR BLD: 9.4 % (ref 22–41)
MCH RBC QN AUTO: 28 PG (ref 27–33)
MCHC RBC AUTO-ENTMCNC: 30.5 G/DL (ref 33.7–35.3)
MCV RBC AUTO: 91.9 FL (ref 81.4–97.8)
MONOCYTES # BLD AUTO: 1.21 K/UL (ref 0–0.85)
MONOCYTES NFR BLD AUTO: 12.8 % (ref 0–13.4)
NEUTROPHILS # BLD AUTO: 6.5 K/UL (ref 1.82–7.42)
NEUTROPHILS NFR BLD: 69.1 % (ref 44–72)
NRBC # BLD AUTO: 0.25 K/UL
NRBC BLD-RTO: 2.7 /100 WBC
PLATELET # BLD AUTO: 92 K/UL (ref 164–446)
PMV BLD AUTO: 11.3 FL (ref 9–12.9)
POTASSIUM SERPL-SCNC: 5.1 MMOL/L (ref 3.6–5.5)
RBC # BLD AUTO: 3.57 M/UL (ref 4.7–6.1)
SODIUM SERPL-SCNC: 131 MMOL/L (ref 135–145)
WBC # BLD AUTO: 9.4 K/UL (ref 4.8–10.8)

## 2022-11-14 PROCEDURE — A9270 NON-COVERED ITEM OR SERVICE: HCPCS | Performed by: INTERNAL MEDICINE

## 2022-11-14 PROCEDURE — 99239 HOSP IP/OBS DSCHRG MGMT >30: CPT | Performed by: HOSPITALIST

## 2022-11-14 PROCEDURE — 700102 HCHG RX REV CODE 250 W/ 637 OVERRIDE(OP): Performed by: HOSPITALIST

## 2022-11-14 PROCEDURE — 80048 BASIC METABOLIC PNL TOTAL CA: CPT

## 2022-11-14 PROCEDURE — 94640 AIRWAY INHALATION TREATMENT: CPT

## 2022-11-14 PROCEDURE — A9270 NON-COVERED ITEM OR SERVICE: HCPCS | Performed by: HOSPITALIST

## 2022-11-14 PROCEDURE — 82962 GLUCOSE BLOOD TEST: CPT

## 2022-11-14 PROCEDURE — 700102 HCHG RX REV CODE 250 W/ 637 OVERRIDE(OP): Performed by: INTERNAL MEDICINE

## 2022-11-14 PROCEDURE — 36415 COLL VENOUS BLD VENIPUNCTURE: CPT

## 2022-11-14 PROCEDURE — 85025 COMPLETE CBC W/AUTO DIFF WBC: CPT

## 2022-11-14 RX ORDER — INSULIN GLARGINE 100 [IU]/ML
15 INJECTION, SOLUTION SUBCUTANEOUS EVERY EVENING
Qty: 10 EACH | Refills: 1 | Status: SHIPPED | OUTPATIENT
Start: 2022-11-14 | End: 2022-11-14

## 2022-11-14 RX ORDER — GLUCOSAMINE HCL/CHONDROITIN SU 500-400 MG
CAPSULE ORAL
Qty: 100 EACH | Refills: 0 | Status: SHIPPED | OUTPATIENT
Start: 2022-11-14 | End: 2023-03-14

## 2022-11-14 RX ORDER — INSULIN DETEMIR 100 [IU]/ML
15 INJECTION, SOLUTION SUBCUTANEOUS EVERY EVENING
Qty: 10 EACH | Refills: 0 | Status: SHIPPED | OUTPATIENT
Start: 2022-11-14

## 2022-11-14 RX ORDER — ACETAMINOPHEN 325 MG/1
650 TABLET ORAL EVERY 6 HOURS PRN
Qty: 30 TABLET | Refills: 0 | Status: SHIPPED | OUTPATIENT
Start: 2022-11-14

## 2022-11-14 RX ORDER — LANCETS 30 GAUGE
EACH MISCELLANEOUS
Qty: 100 EACH | Refills: 0 | Status: SHIPPED | OUTPATIENT
Start: 2022-11-14

## 2022-11-14 RX ORDER — AMLODIPINE BESYLATE 10 MG/1
10 TABLET ORAL DAILY
Qty: 30 TABLET | Refills: 0 | Status: SHIPPED | OUTPATIENT
Start: 2022-11-14 | End: 2023-03-14

## 2022-11-14 RX ORDER — IPRATROPIUM BROMIDE AND ALBUTEROL SULFATE 2.5; .5 MG/3ML; MG/3ML
3 SOLUTION RESPIRATORY (INHALATION) EVERY 6 HOURS PRN
Qty: 10 EACH | Refills: 1 | Status: SHIPPED | OUTPATIENT
Start: 2022-11-14

## 2022-11-14 RX ADMIN — AMIODARONE HYDROCHLORIDE 200 MG: 200 TABLET ORAL at 05:54

## 2022-11-14 RX ADMIN — HYDROCODONE BITARTRATE AND ACETAMINOPHEN 1 TABLET: 10; 325 TABLET ORAL at 08:23

## 2022-11-14 RX ADMIN — BUDESONIDE AND FORMOTEROL FUMARATE DIHYDRATE 2 PUFF: 80; 4.5 AEROSOL RESPIRATORY (INHALATION) at 07:33

## 2022-11-14 RX ADMIN — LEVOTHYROXINE SODIUM 25 MCG: 0.03 TABLET ORAL at 05:54

## 2022-11-14 RX ADMIN — FUROSEMIDE 40 MG: 40 TABLET ORAL at 05:54

## 2022-11-14 RX ADMIN — INSULIN HUMAN 1 UNITS: 100 INJECTION, SOLUTION PARENTERAL at 11:34

## 2022-11-14 RX ADMIN — AMLODIPINE BESYLATE 10 MG: 10 TABLET ORAL at 05:54

## 2022-11-14 RX ADMIN — INSULIN HUMAN 1 UNITS: 100 INJECTION, SOLUTION PARENTERAL at 06:01

## 2022-11-14 NOTE — CARE PLAN
The patient is Stable - Low risk of patient condition declining or worsening    Shift Goals  Clinical Goals: Discharge in AM  Patient Goals: pain management  Family Goals: RUBY    Progress made toward(s) clinical / shift goals:      Problem: Pain - Standard  Goal: Alleviation of pain or a reduction in pain to the patient’s comfort goal  Outcome: Progressing  Note: Pt has mild discomfort in L groin/upper leg around hematoma. Norco available as needed.      Problem: Mobility  Goal: Patient's capacity to carry out activities will improve  Outcome: Progressing  Note: Pt up SBA with FWW, requires reminders to avoid straining previously torn muscle       Patient is not progressing towards the following goals:

## 2022-11-14 NOTE — PROGRESS NOTES
Patient being discharged. Pt educated on discharge instructions and new prescriptions, verbalized understanding. Follow up appointment to be made with pulmonology. PIV removed, monitor checked in. Patient going home via car with relative. O2 delivered to bedside. Confirmed O2 tanks were full. Pt sent home on 3L O2.

## 2022-11-14 NOTE — DISCHARGE INSTRUCTIONS
For glucose:  70   - 150  mg/dL =      0 Units  151 - 200  mg/dL =     1 Units  201 - 250  mg/dL =    2 Units  251 - 300  mg/dL  =   3 Units  301 - 350  mg/dL  =   4 Units  351 - 400 mg/dL   =   5 Units  Over 400 mg/dL   =   6 Units  Over 400 mg/dL x 2 consecutive FSBG = 6 Units and call MD    Initiate Hypoglycemia protocol and notify MD and PharmD if FSBG level is less than or equal to 70 mg/dL.

## 2022-11-14 NOTE — DISCHARGE PLANNING
Received Choice form at 0941  Agency/Facility Name: [facility]  Referral sent per Choice form @ 0944      0944  Received Choice form at 0941  Agency/Facility Name: [facility](1), [facility] (2)  Referral sent per Choice form @ 0944    1029  Agency/Facility Name: [facility]  Spoke To: Karen  Outcome: Company has not received DME order, DPA refaxed order. DPA to follow up shortly     1047  Agency/Facility Name: [facility]  Spoke To: Radha  Outcome: Agency needs home address in order to confirm Pt. DPA to follow up once this information is available    1120  Agency/Facility Name: [facility]  Outcome: DPA left v-mail notifying agency of home address for Pt of [address], [place] [address]. DPA requested call back with any questions     1132  Agency/Facility Name: [facility]  Spoke To: Tony  Outcome: O2 will be delivered bedside within 2 hours. Company already spoke to Pt and confirmed, no further follow up needs     1400  Agency/Facility Name: [facility]  Outcome: Per [facility] representative O2 has been delivered bedside. No further follow up needs

## 2022-11-14 NOTE — PROGRESS NOTES
Bedside report received from night RN, pt care assumed, assessment completed. Pt is A&O4, pain 6-providing pain medication per MAR, not on the monitor (medical). Updated on POC, questions answered. Bed in lowest, locked position, treaded socks on, call light and belongings within reach.

## 2022-11-14 NOTE — DISCHARGE SUMMARY
Discharge Summary    CHIEF COMPLAINT ON ADMISSION  Chief Complaint   Patient presents with    Cough     Coughing sputum x1day       Reason for Admission  EMS     Admission Date  11/7/2022    CODE STATUS  Full Code    HPI & HOSPITAL COURSE  Patient is a 62 year old male with history of obesity, diabetes dyslipidemia, pulmonary edema and sleep apnea who presented 11/7 with worsening shortness of breath, started a few days prior to admission with coughing producing sputum, with worsening shortness of breath and worsening lower extremity edema.  He denied any fever or chills however patient did report generalized weakness.  Patient had a history of obstructive sleep apnea and COVID-19 infection and according to the patient he had  recovered and not been needing oxygen or been using a CPAP machine for several months.  On admission patient needed 2 L nasal cannula to keep saturation above 88%. Exam revealed wheezing and crackles with lower extremity edema.  Chest x-ray showed possible pulmonary edema.  Last echo in 2020 was normal and patient denied any history of heart disease or kidney disease.  IV Lasix was a started also patient received IV antibiotics in ER.  Patient was admitted for acute respiratory failure and possible pulmonary edema/emphysema.   He responded well to diuresis and his hypoxia improved. At time of discharge he is doing well on 2.5 L of oxygen. An echo was repeated and his EF is preserved.     His diabetes was noted to be poorly controlled so he was started on insulin and diabetic education was given. He agrees to continue a cardiac/ diabetic diet and to follow his DM closely with his pcp.    While getting out of bed, he pulled his left thigh, resulting in a large hematoma and suspected partial muscle tear. This did result in acute blood loss. He had been on a prophylactic dose of xarelto so he was given kcentra to reverse this. He was followed by surgery and vascular surgery and the bleed  tamponaded on it's own and his hemoglobin was followed and remained in a stable range, transfusion was required. His platelets did drop due to the bleed and they have been steadily increasing, they are back up to 92 at time of discharge. He agrees to have this and his mild hyponatremia followed by his pcp in 1-2 weeks.     He did well with PT and OT and will return home with home oxygen and home health. He agrees to follow up with pulmonary as he reports this is being coordinated through [facility].  He also agrees to return to the ER if needed.        Therefore, he is discharged in fair and stable condition to home with organized home healthcare and close outpatient follow-up.    The patient met 2-midnight criteria for an inpatient stay at the time of discharge.    Discharge Date  11/14/22    FOLLOW UP ITEMS POST DISCHARGE  Pcp  Pulmonary  Home health    DISCHARGE DIAGNOSES  Principal Problem:    Acute respiratory failure (HCC) POA: Yes  Active Problems:    Left groin mass POA: Unknown    HLD (hyperlipidemia) POA: Yes    ABELARDO (obstructive sleep apnea) POA: Yes    Essential hypertension POA: Yes    Controlled type 2 diabetes mellitus, without long-term current use of insulin (HCC) POA: Yes    Pulmonary hypertension (HCC) POA: Yes    Thrombocytopenia (HCC) POA: Unknown  Resolved Problems:    Polycythemia POA: Unknown    CO2 retention POA: Unknown      FOLLOW UP  No future appointments.  No follow-up provider specified.    MEDICATIONS ON DISCHARGE     Medication List        START taking these medications        Instructions   acetaminophen 325 MG Tabs  Commonly known as: Tylenol   Take 2 Tablets by mouth every 6 hours as needed for Moderate Pain.  Dose: 650 mg     Alcohol Swabs   Doctor's comments: Per formulary preference. ICD-10 code: E11.65 Uncontrolled type 2 Diabetes Mellitus  Wipe site with prep pad prior to injection.     * Blood Glucose Meter Kit   Doctor's comments: Or per formulary preference. ICD-10 code: E11.65  Uncontrolled type 2 Diabetes Mellitus  Test blood sugar as recommended by provider. One Touch Verio Flex blood glucose monitoring kit.     * Blood Glucose Test Strips   Doctor's comments: Or per formulary preference. ICD-10 code: E11.65 Uncontrolled type 2 Diabetes Mellitus  Use one One Touch Verio Flex strip to test blood sugar three times daily before meals.     insulin regular 100 Unit/mL Soln  Commonly known as: HumuLIN R   Inject 1-6 Units under the skin 4 Times a Day,Before Meals and at Bedtime.  Dose: 1-6 Units     * Insulin Syringes U-100 0.3 mL   Doctor's comments: Per formulary preference. ICD-10 code: E11.65 Uncontrolled type 2 Diabetes Mellitus  Use one syringe to inject insulin four times daily.     * Insulin Syringes U-100 0.5 mL   Doctor's comments: Per formulary preference. ICD-10 code: E11.65 Uncontrolled type 2 Diabetes Mellitus  Use one syringe to inject insulin four times daily.     ipratropium-albuterol 0.5-2.5 (3) MG/3ML nebulizer solution  Commonly known as: DUONEB   Take 3 mL by nebulization every 6 hours as needed for Shortness of Breath.  Dose: 3 mL     Lancets   Doctor's comments: Or per formulary preference. ICD-10 code: E11.65 Uncontrolled type 2 Diabetes Mellitus  Use one One Touch Verio Flex lancet to test blood sugar three times daily before meals.     Lantus SoloStar 100 UNIT/ML Sopn injection  Generic drug: insulin glargine   Inject 15 Units under the skin every evening.  Dose: 15 Units           * This list has 4 medication(s) that are the same as other medications prescribed for you. Read the directions carefully, and ask your doctor or other care provider to review them with you.                CONTINUE taking these medications        Instructions   amiodarone 200 MG Tabs  Commonly known as: Cordarone   Take 1 Tablet by mouth every day.  Dose: 200 mg     amLODIPine 10 MG Tabs  Commonly known as: NORVASC   Take 1 Tablet by mouth every day.  Dose: 10 mg     atorvastatin 20 MG  Tabs  Commonly known as: LIPITOR   Take 1 Tablet by mouth every evening.  Dose: 20 mg     furosemide 80 MG Tabs  Commonly known as: LASIX   Take 1 Tablet by mouth every day.  Dose: 80 mg     HYDROcodone/acetaminophen  MG Tabs  Commonly known as: NORCO   Take 1 Tablet by mouth 3 times a day as needed. Indications: Pain  Dose: 1 Tablet     levothyroxine 25 MCG Tabs  Commonly known as: SYNTHROID   Take 1 Tablet by mouth every morning on an empty stomach.  Dose: 25 mcg     lidocaine 5 % Ptch  Commonly known as: LIDODERM   Place 1 Patch on the skin every 24 hours.  Dose: 1 Patch              Allergies  Allergies   Allergen Reactions    Zosyn Rash     Rash with eosinophilia - unknown for certain if secondary to zosyn but was getting zosyn at the time       DIET  Orders Placed This Encounter   Procedures    Diet Order Diet: Cardiac; Second Modifier: (optional): Consistent CHO (Diabetic)     Standing Status:   Standing     Number of Occurrences:   1     Order Specific Question:   Diet:     Answer:   Cardiac [6]     Order Specific Question:   Second Modifier: (optional)     Answer:   Consistent CHO (Diabetic) [4]       ACTIVITY  As tolerated.  Weight bearing as tolerated    CONSULTATIONS  [facility] vascular surgery  [facility] general surgery    PROCEDURES  CT-CTA PELVIS WITH & W/O-POST PROCESS   Final Result   Addendum (preliminary) 1 of 1   Findings discussed with the patient's nurse Kristen at 4:28 PM on 11/10/2022      Final      Findings suspicious for small pseudoaneurysm or bleeding muscular branch vessel in the proximal LEFT thigh with large adjacent hematoma      CJ NANCE was paged at 11/10/2022 3:57 PM.      US-EXTREMITY VENOUS LOWER UNILAT LEFT   Final Result      EC-ECHOCARDIOGRAM COMPLETE W/O CONT   Final Result      DX-CHEST-PORTABLE (1 VIEW)   Final Result      1.  There is no acute cardiopulmonary process.   2.  Stable enlarged cardiac silhouette likely in part due to body habitus and prominent pericardial  fat pads.            LABORATORY  Lab Results   Component Value Date    SODIUM 131 (L) 11/14/2022    POTASSIUM 5.1 11/14/2022    CHLORIDE 91 (L) 11/14/2022    CO2 32 11/14/2022    GLUCOSE 168 (H) 11/14/2022    BUN 19 11/14/2022    CREATININE 0.90 11/14/2022        Lab Results   Component Value Date    WBC 9.4 11/14/2022    HEMOGLOBIN 10.0 (L) 11/14/2022    HEMATOCRIT 32.8 (L) 11/14/2022    PLATELETCT 92 (L) 11/14/2022        Total time of the discharge process exceeds 45 minutes.

## 2022-11-14 NOTE — DISCHARGE PLANNING
Case Management Discharge Planning    Admission Date: 11/7/2022  GMLOS: 3.9  ALOS: 7    6-Clicks ADL Score: 24  6-Clicks Mobility Score: 15  PT and/or OT Eval ordered: yes  Post-acute Referrals Ordered: yes  Post-acute Choice Obtained: yes  Has referral(s) been sent to post-acute provider:  yes      Anticipated Discharge Dispo: Discharge Disposition: Discharged to home/self care (01)  Discharge Address: [address] [place] [id number]( confirmed home address with pt)  MD ordered Homehealth.[facility] ACCEPTED.    DME Needed: Home O2. Choice made for [facility] ( [facility] has accepted)    Action(s) Taken: discussed with IDT , pt is medically clear for discharge today. Homehealth choice made by pt and faxed to [facility].     Escalations Completed: n/a    Medically Clear: yes    Next Steps: none    Barriers to Discharge: none-just waiting for O2 tank to be delivered.    Is the patient up for discharge tomorrow: today

## 2022-11-14 NOTE — WOUND TEAM
[facility]  Inpatient Services  Initial Wound and Skin Care Evaluation    Admission Date: 11/7/2022     Last order of IP CONSULT TO WOUND CARE was found on 11/12/2022 from Hospital Encounter on 11/7/2022     HPI, PMH, SH: Reviewed    Past Surgical History:   Procedure Laterality Date    COLONOSCOPY N/A 4/24/2019    Procedure: COLONOSCOPY;  Surgeon: Rashi Goldberg M.D.;  Location: SURGERY [facility];  Service: Gastroenterology    GASTROSCOPY-ENDO N/A 4/24/2019    Procedure: GASTROSCOPY;  Surgeon: Rashi Goldberg M.D.;  Location: SURGERY [facility];  Service: Gastroenterology    OTHER ORTHOPEDIC SURGERY      bilateral knee surgery     Social History     Tobacco Use    Smoking status: Never    Smokeless tobacco: Never   Substance Use Topics    Alcohol use: Yes     Comment: occasional     Chief Complaint   Patient presents with    Cough     Coughing sputum x1day     Diagnosis: Acute respiratory failure (HCC) [J96.00]    Unit where seen by Wound Team: T835/02     WOUND CONSULT/FOLLOW UP RELATED TO:  L. Posterior lower leg     WOUND HISTORY:  62-year-old male with history of obesity, diabetes dyslipidemia, pulmonary edema and sleep apnea who presented 11/7 with worsening shortness of breath, started couple days ago with coughing producing sputum, with worsening shortness of breath and worsening lower extremity edema.  Denied any fever or chills however patient has generalized weakness.  Patient had history of obstructive sleep apnea and COVID-19 infection and according to the patient he was recovered and not using oxygen or CPAP machine.  On admission patient needed 2 L nasal cannula To keep saturation above 88%.  Exam showed wheezing and crackles with lower extremity edema.  Chest x-ray showed possible pulmonary edema.  Last echo in 2020 was normal and patient denied any history of heart disease or kidney disease.  IV Lasix was a started also patient received IV antibiotics in ER.   Patient will be admitted for acute respiratory failure and possible pulmonary edema/emphysema.      Per patient wound is not new and is not painful     WOUND ASSESSMENT/LDA  Wound 09/14/20 Skin Tear Back;Flank Left (Active)   Number of days: 790       Wound 09/15/20 Full Thickness Wound Toe, Hallux Medial Right Unknown Etiology (Active)   Number of days: 789       Wound 09/20/20 Skin Tear Arm Posterior;Mid Left (Active)   Number of days: 784       Wound 09/22/20 Skin Tear Neck Anterior Right Tear/blistering (Active)   Number of days: 782       Wound 11/11/22 Partial Thickness Wound Pretibial Posterior Left (Active)   Wound Image   11/13/22 1700   Site Assessment Red 11/13/22 1700   Periwound Assessment Pink;Intact;Dry 11/13/22 1700   Margins Defined edges;Attached edges 11/13/22 1700   Closure Adhesive bandage 11/13/22 1700   Drainage Amount Scant 11/13/22 1700   Drainage Description Serosanguineous 11/13/22 1700   Treatments Cleansed;Site care 11/13/22 1700   Wound Cleansing Normal Saline Irrigation 11/13/22 1700   Periwound Protectant Skin Protectant Wipes to Periwound 11/13/22 1700   Dressing Cleansing/Solutions Not Applicable 11/13/22 1700   Dressing Options Hydrofera Blue Ready;Silicone Adhesive Foam 11/13/22 1700   Dressing Changed New 11/13/22 1700   Dressing Status Clean;Dry 11/13/22 1700   Dressing Change/Treatment Frequency Every 72 hrs, and As Needed 11/13/22 1700   NEXT Dressing Change/Treatment Date 11/16/22 11/13/22 1700   NEXT Weekly Photo (Inpatient Only) 11/20/22 11/13/22 1700   Non-staged Wound Description Partial thickness 11/13/22 1700   Shape oval 11/13/22 1700   Wound Odor None 11/13/22 1700   Pulses Left;DP;PT 11/13/22 1700   Exposed Structures None 11/13/22 1700   WOUND NURSE ONLY - Time Spent with Patient (mins) 60 11/13/22 1700   Number of days: 2        Vascular:    MONIK:   No results found.    Lab Values:    Lab Results   Component Value Date/Time    WBC 9.2 11/13/2022 01:35 AM    RBC 3.70  (L) 11/13/2022 01:35 AM    HEMOGLOBIN 10.4 (L) 11/13/2022 01:35 AM    HEMATOCRIT 33.8 (L) 11/13/2022 01:35 AM    CREACTPROT 1.03 (H) 11/08/2022 06:05 AM    HBA1C 6.5 (H) 09/15/2020 04:51 AM        Culture Results show:  No results found for this or any previous visit (from the past 720 hour(s)).    Pain Level/Medicated:  denied       INTERVENTIONS BY WOUND TEAM:  Chart and images reviewed. Discussed with bedside RN. All areas of concern (based on picture review, LDA review and discussion with bedside RN) have been thoroughly assessed. Documentation of areas based on significant findings. This RN in to assess patient. Performed standard wound care which includes appropriate positioning, dressing removal and non-selective debridement. Pictures and measurements obtained weekly if/when required.  Preparation for Dressing removal: Dressing soaked with n/a  Non-selectively Debrided with:  normal saline and gauze.  Sharp debridement: n/a  Beatirce wound: Cleansed with normal saline, Prepped with no sting  Primary Dressing: hydrofera blue   Secondary (Outer) Dressing: silicone adhesive foam    Interdisciplinary consultation: Patient, Bedside RN ,     EVALUATION / RATIONALE FOR TREATMENT:  Most Recent Date:  11/13/22: Partial thickness wound, Hydrofera Blue applied for the hydrophilic polyurethane foam which contains ethylene oxide used as a bactericidal, fungicidal, and sporicidal disinfectant. Hydrofera Blue also aids in maintaining a moist wound environment. The absorption properties of this dressing are important in collecting exudates and bacteria from the injured area. These harmful fluid secretions bind to the dressing removing it from the wound without the foam sticking to the wound causing more harm.       Goals: Steady decrease in wound area and depth weekly.    WOUND TEAM PLAN OF CARE ([X] for frequency of wound follow up,):   Nursing to follow dressing orders written for wound care. Contact wound team if area fails  to progress, deteriorates or with any questions/concerns if something comes up before next scheduled follow up (See below as to whether wound is following and frequency of wound follow up)  Dressing changes by wound team:                   Follow up 3 times weekly:                NPWT change 3 times weekly:     Follow up 1-2 times weekly:      Follow up Bi-Monthly:           Follow up Monthly (High Risk):                        Follow up as needed:     Other (explain): Wound team is not following patient    NURSING PLAN OF CARE ORDERS (X):  Dressing changes: See Dressing Care orders: X  Skin care: See Skin Care orders: X  RN Prevention Protocol: XZ  Rectal tube care: See Rectal Tube Care orders:   Other orders:    RSKIN:   CURRENTLY IN PLACE (X), APPLIED THIS VISIT (A), ORDERED (O):   Q shift Noe:  X  Q shift pressure point assessments:  X    Surface/Positioning X  Pressure redistribution mattress            Low Airloss          ICU Low Airloss   Bariatric VITALIY   X  Waffle cushion        Waffle Overlay          Reposition q 2 hours      TAPs Turning system     Z Claudio Pillow     Offloading/Redistribution n/a  Sacral Mepilex (Silicone dressing)     Heel Mepilex (Silicone dressing)         Heel float boots (Prevalon boot)             Float Heels off Bed with Pillows           Respiratory n/c  Silicone O2 tubing         Gray Foam Ear protectors     Cannula fixation Device (Tender )          High flow offloading Clip    Elastic head band offloading device      Anchorfast                                                         Trach with Optifoam split foam             Containment/Moisture Prevention continent  Rectal tube or BMS    Purwick/Condom Cath        Briseno Catheter    Barrier wipes           Barrier paste       Antifungal tx      Interdry        Mobilization up self      Up to chair        Ambulate      PT/OT      Nutrition PO      Dietician        Diabetes Education      PO     TF     TPN     NPO   # days      Other        Anticipated discharge plans: RUBY  LTACH:        SNF/Rehab:                  Home Health Care:           Outpatient Wound Center:            Self/Family Care:        Other:                  Vac Discharge Needs:   Not Applicable Pt not on a wound vac:    X   Regular Vac while inpatient, alternative dressing at DC:        Regular Vac in use and continued at DC:            Reg. Vac w/ Skin Sub/Biologic in use. Will need to be changed 2x wkly:      Veraflo Vac while inpatient, ok to transition to Regular Vac on Discharge:           Veraflo Vac while inpatient, will need to remain on Veraflo Vac upon discharge:

## 2022-11-19 NOTE — DOCUMENTATION QUERY
[facility]                                                                       Query Response Note      PATIENT:               JAELYN TAYLOR  ACCT #:                  [id number]  MRN:                     [id number]  :                      1960  ADMIT DATE:       2022 5:14 PM  DISCH DATE:        2022 3:20 PM  RESPONDING  PROVIDER #:        [id number]           QUERY TEXT:    Acute respiratory failure likely related to pulmonary edema, suspect due to acute diastolic chf exacerbation is documented in the physician's notes, however, acute diastolic chf exacerbation is not mentioned in the discharge summary.  Please clarify the status of  acute diastolic chf exacerbation for this admission.       The patient's Clinical Indicators include:  NOTED    ...admitted for acute respiratory failure and possible pulmonary edema/emphysema  Echo: EF is preserved - EF- 75% - Indeterminate diastolic function - no significant valvular disease  wheezing and crackles with lower extremity edema  BNP - 100   Lasix 40mg IV /Symbicort  Morbid obesity- BMI 64 /ABELARDO /Hx OHS     Thank you,  Sherrie Ortega RN, CCS  Clinical Documentation Integrity   Connect via Voalte  Options provided:   -- Acute diastolic chf exacerbation ruled in   -- Acute diastolic chf exacerbation ruled out   -- Acute diastolic chf exacerbation remains a concern but cannot be ruled in or ruled out   -- Other explanation, (Pls specify)   -- Unable to determine      Query created by: Sherrei Ortega on 11/15/2022 5:35 PM    RESPONSE TEXT:    Acute diastolic chf exacerbation ruled in          Electronically signed by:  CJ NANCE MD 2022 10:34 AM

## 2023-03-14 ENCOUNTER — OFFICE VISIT (OUTPATIENT)
Dept: WOUND CARE | Facility: MEDICAL CENTER | Age: 63
End: 2023-03-14
Attending: INTERNAL MEDICINE
Payer: MEDICARE

## 2023-03-14 VITALS
RESPIRATION RATE: 20 BRPM | HEART RATE: 72 BPM | DIASTOLIC BLOOD PRESSURE: 81 MMHG | TEMPERATURE: 97.8 F | OXYGEN SATURATION: 98 % | SYSTOLIC BLOOD PRESSURE: 142 MMHG

## 2023-03-14 DIAGNOSIS — E66.01 SEVERE OBESITY (BMI >= 40) (HCC): ICD-10-CM

## 2023-03-14 DIAGNOSIS — S81.802D OPEN WOUND OF LEFT LOWER LEG, SUBSEQUENT ENCOUNTER: Primary | ICD-10-CM

## 2023-03-14 DIAGNOSIS — E11.9 CONTROLLED TYPE 2 DIABETES MELLITUS WITHOUT COMPLICATION, WITHOUT LONG-TERM CURRENT USE OF INSULIN (HCC): ICD-10-CM

## 2023-03-14 DIAGNOSIS — B35.1 FUNGAL INFECTION OF NAIL: ICD-10-CM

## 2023-03-14 DIAGNOSIS — R60.0 EDEMA OF BOTH LOWER EXTREMITIES: ICD-10-CM

## 2023-03-14 PROCEDURE — 11042 DBRDMT SUBQ TIS 1ST 20SQCM/<: CPT

## 2023-03-14 PROCEDURE — 99213 OFFICE O/P EST LOW 20 MIN: CPT | Mod: 25

## 2023-03-14 PROCEDURE — 11042 DBRDMT SUBQ TIS 1ST 20SQCM/<: CPT | Performed by: NURSE PRACTITIONER

## 2023-03-14 PROCEDURE — 99214 OFFICE O/P EST MOD 30 MIN: CPT | Mod: 25 | Performed by: NURSE PRACTITIONER

## 2023-03-14 RX ORDER — LEVOTHYROXINE SODIUM 0.03 MG/1
1 TABLET ORAL DAILY
COMMUNITY
Start: 2023-03-06

## 2023-03-14 RX ORDER — PEN NEEDLE, DIABETIC 29 G X1/2"
NEEDLE, DISPOSABLE MISCELLANEOUS
COMMUNITY
Start: 2023-02-14

## 2023-03-14 RX ORDER — CHOLECALCIFEROL (VITAMIN D3) 125 MCG
CAPSULE ORAL
COMMUNITY
Start: 2023-03-06

## 2023-03-14 RX ORDER — BLOOD SUGAR DIAGNOSTIC
STRIP MISCELLANEOUS
COMMUNITY
Start: 2023-02-13

## 2023-03-14 RX ORDER — CALCIUM CITRATE/VITAMIN D3 200MG-6.25
TABLET ORAL
COMMUNITY
Start: 2023-02-13

## 2023-03-14 RX ORDER — FUROSEMIDE 80 MG
1 TABLET ORAL DAILY
COMMUNITY
Start: 2023-03-06

## 2023-03-14 ASSESSMENT — ENCOUNTER SYMPTOMS
COUGH: 0
PALPITATIONS: 0
ROS SKIN COMMENTS: WOUND LEFT LEG
DIZZINESS: 0
FEVER: 0
HEARTBURN: 0
CHILLS: 0
NAUSEA: 0
SHORTNESS OF BREATH: 0
HEADACHES: 0

## 2023-03-14 ASSESSMENT — PAIN SCALES - GENERAL: PAINLEVEL: NO PAIN

## 2023-03-14 NOTE — PROGRESS NOTES
Pt was contacted by phone regarding participation in IRB protocol #2018.314, [facility] Breast Biopsy study. Pt was agreeable.      The Informed Consent Form (ICF) was reviewed with pt. The discussion included:   - participation is voluntary  - pt can change her mind about participating  - pt was informed that participation in this study would not preclude her from participating in any other research if offered  - specimens may be used by [facility] researchers or community researchers  - specimens collected include only those discussed with the patient at the time of consent and are indicated on the ICF   - specimens may be used for DNA, RNA or protein studies investigating biomarkers for diagnostic, prognostic or treatment purposes  - breast biopsy will be collected from [facility] after their approval  - all specimens released to researchers will be stripped of identifiers  - no personal medical information will be released to any parties outside of this research study  - there will be no other physical risks outside of those involved in standard of care procedure      Permission was obtained by telephone and was provided adequate time to ask questions regarding the scope and purpose of the study.  The above statements were read by the person obtaining permission to the person granting permission and witnessed by Genny Kwon, who also confirmed the patient consent to the study. The witness information was documented on the verbal consent form as well.  This Verbal Informed Consent process was conducted prior to initiation of any study procedures     Wound care order routed to [facility] via [facility].

## 2023-03-14 NOTE — PROGRESS NOTES
Provider Encounter- Full Thickness wound    HISTORY OF PRESENT ILLNESS  Wound History:   START OF CARE IN CLINIC: 03/14/23   REFERRING PROVIDER: Didi Lehman   WOUND- Full Thickness Wound   LOCATION: Left posterior leg   HISTORY: Patient developed a wound in September 2022 to the left posterior leg.  Patient has thick fungal toenails and reports that he kicked his leg with his right foot and nails causing a scratch.  This led to the full-thickness wound to the left posterior lower extremity.  Patient reports that he was receiving home health care without resolution of the wound.  Patient was seen at [facility] and referred to [facility] wound care for further care of the left posterior full-thickness wound.    Pertinent Medical History: Hyperlipidemia, ABELARDO, obesity, essential hypertension, type 2 diabetes, pulmonary hypertension    TOBACCO USE: Denies a history of tobacco use or smokeless tobacco products    Patient's problem list, allergies, and current medications reviewed and updated in Epic    Interval History:  3/14/2023: Clinic visit with GURJIT Barrientos.  Patient reports overall he is feeling well denies any fever or chills.      REVIEW OF SYSTEMS:   Review of Systems   Constitutional:  Negative for chills and fever.   Respiratory:  Negative for cough and shortness of breath.    Cardiovascular:  Positive for leg swelling. Negative for chest pain and palpitations.   Gastrointestinal:  Negative for heartburn and nausea.   Skin:         Wound left leg   Neurological:  Negative for dizziness and headaches.     PHYSICAL EXAMINATION:   BP (!) 142/81   Pulse 72   Temp 36.6 °C (97.8 °F)   Resp 20   SpO2 98%     Physical Exam  Constitutional:       Appearance: He is obese.   Cardiovascular:      Comments: Patient has triphasic pulses heard via handheld Doppler to the DP and PT on the left foot  Pulmonary:      Effort: Pulmonary effort is normal. No respiratory distress.      Breath sounds: No  wheezing.   Musculoskeletal:         General: Swelling present.      Right lower leg: Edema present.      Left lower leg: Edema present.   Skin:     Comments: Patient has a full-thickness wound to the left posterior lower extremity.   Neurological:      Mental Status: He is alert and oriented to person, place, and time.       WOUND ASSESSMENT  Wound 03/14/23 Traumatic LLE Posterior (Active)   Wound Image    03/14/23 1400   Site Assessment Yellow;Red 03/14/23 1400   Periwound Assessment Dry;Edema 03/14/23 1400   Margins Unattached edges 03/14/23 1400   Drainage Amount RUBY 03/14/23 1400   Treatments Cleansed;Topical Lidocaine;Provider debridement;Site care 03/14/23 1400   Wound Cleansing Puracyn [place] 03/14/23 1400   Periwound Protectant Skin Protectant Wipes to Periwound;Barrier Paste 03/14/23 1400   Dressing Cleansing/Solutions Normal Saline 03/14/23 1400   Dressing Options Collagen Dressing;Hydrofiber;Unna Boot;Coban 03/14/23 1400   Dressing Changed New 03/14/23 1400   Dressing Change/Treatment Frequency Every 72 hrs, and As Needed 03/14/23 1400   Non-staged Wound Description Full thickness 03/14/23 1400   Wound Length (cm) 0.5 cm 03/14/23 1400   Wound Width (cm) 1.5 cm 03/14/23 1400   Wound Depth (cm) 0.2 cm 03/14/23 1400   Wound Surface Area (cm^2) 0.75 cm^2 03/14/23 1400   Wound Volume (cm^3) 0.15 cm^3 03/14/23 1400   Post-Procedure Length (cm) 1.3 cm 03/14/23 1400   Post-Procedure Width (cm) 1.5 cm 03/14/23 1400   Post-Procedure Depth (cm) 0.2 cm 03/14/23 1400   Post-Procedure Surface Area (cm^2) 1.95 cm^2 03/14/23 1400   Post-Procedure Volume (cm^3) 0.39 cm^3 03/14/23 1400   Tunneling (cm) 0 cm 03/14/23 1400   Undermining (cm) 0 cm 03/14/23 1400   Wound Odor None 03/14/23 1400   Pulses Left;DP;PT;Doppler 03/14/23 1400   Exposed Structures None 03/14/23 1400   Number of days: 0       PROCEDURE: Excisional debridement of the wound to the left posterior lower extremity  -2% viscous lidocaine applied topically to  wound bed for approximately 5 minutes prior to debridement  -Curette used to debride wound bed.  Excisional debridement was performed to remove devitalized tissue until healthy, bleeding tissue was visualized.   Entire surface of wound, 1.95 cm2 debrided.  Tissue debrided into the subcutaneous layer.    -Bleeding controlled with manual pressure.    -Wound care completed by wound RN, refer to flowsheet  -Patient tolerated the procedure well, without c/o pain or discomfort.       Pertinent Labs and Diagnostics:    Labs:     A1c:   Lab Results   Component Value Date/Time    HBA1C 6.5 (H) 09/15/2020 04:51 AM          LAST  WOUND CULTURE:  DATE :   Lab Results   Component Value Date/Time    CULTRSULT No growth after 5 days of incubation. 11/07/2022 06:23 PM         ASSESSMENT AND PLAN:   1. Open wound of left lower leg, subsequent encounter  -Excisional debridement of wound in clinic today, medically necessary to promote wound healing.  -Patient to return to clinic weekly for assessment and debridement as needed  -[facility] home health is to change the dressing once a week.  The patient is to return to the clinic once weekly for dressing change and debridement if needed  -Patient instructed to leave the compression wrap in place the entire week unless it becomes soiled or saturated.  If this occurs patient is to carefully unwrap the compression wrap and place a cover dressing until he can be seen at the clinic or by home health.   Wound care: Collagen, Hydrofiber, Unna boot Coban    2. Controlled type 2 diabetes mellitus without complication, without long-term current use of insulin ([facility])  -Patient reports that his blood sugar is 164 today.  Patient reports the highest his blood sugar has ever been is 200.  -Patient instructed that he should keep his blood sugar around 140 or below to help prevent infection and promote wound healing.      3. Severe obesity (BMI >= 40) ([facility])  Comments: Complicating factor to wound  healing and wound care    4. Fungal infection of nail  Comments: Patient reports that he kicked his left calf with his right toenail causing a scratch which has led to the wound to the left lower extremity.  -Patient referred to podiatry to [place] down the nails to bilateral feet    5. Edema of both lower extremities  Patient with significant edema to bilateral lower extremities which slows the diffusion of oxygen to the wound bed.  -Unna boot with Coban used for compression to the left lower extremity  -Suspect there is some component of venous hypertension contributing to the excessive edema to bilateral lower extremities.      PATIENT EDUCATION  - Importance of adequate nutrition for wound healing  -Advised to go to ER for any increased redness, swelling, drainage, or odor, or if patient develops fever, chills, nausea or vomiting.     My total time spent caring for the patient on the day of the encounter was 30 minutes.   This does not include time spent on separately billable procedures/tests.       Please note that this note may have been created using voice recognition software. I have worked with technical experts from [facility] to optimize the interface.  I have made every reasonable attempt to correct obvious errors, but there may be errors of grammar and possibly content that I did not discover before finalizing the note.    N

## 2023-03-14 NOTE — PATIENT INSTRUCTIONS
Avoid prolonged standing or sitting without elevating your legs.  - Multilayer compression wrap to left leg. Do not get wet and keep on for the week. Only remove if temperature or sensation changes.   If compression needs to be removed, un-wrap it do not cut it off.     Should you experience any significant changes in your wound(s), such as infection (redness, swelling, localized heat, increased pain, fever > 101 F, chills) or have any questions regarding your home care instructions, please contact the wound center at [phone]. If after hours, contact your primary care physician or go to the hospital emergency room.   Keep dressing clean, dry and covered while bathing. Only change dressing if it becomes over saturated, soiled or falls off.

## 2023-03-21 ENCOUNTER — OFFICE VISIT (OUTPATIENT)
Dept: WOUND CARE | Facility: MEDICAL CENTER | Age: 63
End: 2023-03-21
Attending: INTERNAL MEDICINE
Payer: MEDICARE

## 2023-03-21 VITALS
TEMPERATURE: 97.2 F | HEART RATE: 77 BPM | RESPIRATION RATE: 20 BRPM | SYSTOLIC BLOOD PRESSURE: 122 MMHG | DIASTOLIC BLOOD PRESSURE: 69 MMHG | OXYGEN SATURATION: 92 %

## 2023-03-21 DIAGNOSIS — I87.2 VENOUS INSUFFICIENCY OF BOTH LOWER EXTREMITIES: ICD-10-CM

## 2023-03-21 DIAGNOSIS — R60.0 EDEMA OF BOTH LOWER EXTREMITIES: ICD-10-CM

## 2023-03-21 DIAGNOSIS — S81.802D OPEN WOUND OF LEFT LOWER LEG, SUBSEQUENT ENCOUNTER: ICD-10-CM

## 2023-03-21 DIAGNOSIS — B35.1 FUNGAL INFECTION OF NAIL: ICD-10-CM

## 2023-03-21 DIAGNOSIS — E11.9 CONTROLLED TYPE 2 DIABETES MELLITUS WITHOUT COMPLICATION, WITHOUT LONG-TERM CURRENT USE OF INSULIN (HCC): ICD-10-CM

## 2023-03-21 DIAGNOSIS — E66.01 SEVERE OBESITY (BMI >= 40) (HCC): ICD-10-CM

## 2023-03-21 PROCEDURE — 11042 DBRDMT SUBQ TIS 1ST 20SQCM/<: CPT | Performed by: STUDENT IN AN ORGANIZED HEALTH CARE EDUCATION/TRAINING PROGRAM

## 2023-03-21 PROCEDURE — 11042 DBRDMT SUBQ TIS 1ST 20SQCM/<: CPT

## 2023-03-21 NOTE — PROGRESS NOTES
Provider Encounter- Full Thickness wound    HISTORY OF PRESENT ILLNESS  Wound History:   START OF CARE IN CLINIC: 03/14/23   REFERRING PROVIDER: Didi Lehman   WOUND- Full Thickness Wound   LOCATION: Left posterior leg   HISTORY: Patient developed a wound in September 2023 to the left posterior leg.  Patient has thick fungal toenails and reports that he kicked his leg with his right foot and nails causing a scratch.  This led to the full-thickness wound to the left posterior lower extremity.  Patient reports that he was receiving home health care without resolution of the wound.  Patient was seen at [facility] and referred to [facility] wound care for further care of the left posterior full-thickness wound.    Pertinent Medical History: Hyperlipidemia, ABELARDO, obesity, essential hypertension, type 2 diabetes, pulmonary hypertension    TOBACCO USE: Denies a history of tobacco use or smokeless tobacco products    Patient's problem list, allergies, and current medications reviewed and updated in Epic    Interval History:  3/14/2023: Clinic visit with GURJIT Barrientos.  Patient reports overall he is feeling well denies any fever or chills.    3/21/2023: Clinic visit with Kwaku Sanchez MD. Patient reports doing well, denies any fevers or chills. His wound is measuring about the same but is starting to fill in with increased granulation tissue. Patient was referred to podiatry at [facility] and reports appointment for foot and nail care on 4/24. He does not wear compression to lower extremities and was counseled on edema / venous insufficiency and importance of compression to help return venous flow to heart.      REVIEW OF SYSTEMS:   Unchanged from previous wound clinic assessment on 3/14/2023, except as noted in interval history above      PHYSICAL EXAMINATION:   /69   Pulse 77   Temp 36.2 °C (97.2 °F) (Temporal)   Resp 20   SpO2 92%     Physical Exam  Constitutional:       Appearance: He is obese.    Cardiovascular:      Comments: Patient has triphasic pulses heard via handheld Doppler to the DP and PT on the left foot  Pulmonary:      Effort: Pulmonary effort is normal. No respiratory distress.      Breath sounds: No wheezing.   Musculoskeletal:         General: Swelling present.      Right lower leg: Edema present.      Left lower leg: Edema present.   Skin:     Comments: Full-thickness wound to the left posterior lower extremity: Wound measures the same, but has increased granulation tissue forming at base of wound. No odor, purulence, periwound erythema.     Has hemosiderin staining and subtle varicose veins lower extremity consistent with chronic venous insuffiencey.   Neurological:      Mental Status: He is alert and oriented to person, place, and time.       WOUND ASSESSMENT  Wound 03/14/23 Traumatic LLE Posterior (Active)   Wound Image    03/21/23 1330   Site Assessment Red;Yellow 03/21/23 1330   Periwound Assessment Hemosiderin Staining;Edema 03/21/23 1330   Margins Unattached edges 03/21/23 1330   Drainage Amount Moderate 03/21/23 1330   Drainage Description Serosanguineous 03/21/23 1330   Treatments Cleansed;Topical Lidocaine;Provider debridement 03/21/23 1330   Wound Cleansing Puracyn Shipman 03/21/23 1330   Periwound Protectant Barrier Paste 03/21/23 1330   Dressing Cleansing/Solutions Normal Saline 03/21/23 1330   Dressing Options Collagen Dressing;Hydrofiber Silver;Unna Boot;Coban 03/21/23 1330   Dressing Changed Changed 03/21/23 1330   Dressing Change/Treatment Frequency Every 72 hrs, and As Needed 03/14/23 1400   Non-staged Wound Description Full thickness 03/21/23 1330   Wound Length (cm) 1.2 cm 03/21/23 1330   Wound Width (cm) 1.5 cm 03/21/23 1330   Wound Depth (cm) 0.2 cm 03/21/23 1330   Wound Surface Area (cm^2) 1.8 cm^2 03/21/23 1330   Wound Volume (cm^3) 0.36 cm^3 03/21/23 1330   Post-Procedure Length (cm) 1.3 cm 03/21/23 1330   Post-Procedure Width (cm) 1.5 cm 03/21/23 1330    Post-Procedure Depth (cm) 0.2 cm 03/21/23 1330   Post-Procedure Surface Area (cm^2) 1.95 cm^2 03/21/23 1330   Post-Procedure Volume (cm^3) 0.39 cm^3 03/21/23 1330   Wound Healing % -140 03/21/23 1330   Tunneling (cm) 0 cm 03/21/23 1330   Undermining (cm) 0 cm 03/21/23 1330   Wound Odor None 03/21/23 1330   Pulses DP;2+ 03/21/23 1330   Exposed Structures None 03/21/23 1330   Number of days: 7       PROCEDURE: Excisional debridement of the wound to the left posterior lower extremity  -2% viscous lidocaine applied topically to wound bed for approximately 5 minutes prior to debridement  -Curette used to debride wound bed.  Excisional debridement was performed to remove devitalized tissue until healthy, bleeding tissue was visualized.   Entire surface of wound, 1.95 cm2 debrided.  Tissue debrided into the subcutaneous layer.    -Bleeding controlled with manual pressure.    -Wound care completed by wound RN, refer to flowsheet  -Patient tolerated the procedure well, without c/o pain or discomfort.       Pertinent Labs and Diagnostics:    Labs:     A1c:   Lab Results   Component Value Date/Time    HBA1C 6.5 (H) 09/15/2020 04:51 AM          LAST  WOUND CULTURE:  DATE :   Lab Results   Component Value Date/Time    CULTRSULT No growth after 5 days of incubation. 11/07/2022 06:23 PM         ASSESSMENT AND PLAN:   1. Open wound of left lower leg, subsequent encounter    3/21/2023: Wound measuring the same but is starting to fill in with increased granulation tissue at base.  -Excisional debridement of wound in clinic today, medically necessary to promote wound healing.  -Patient to return to clinic weekly for assessment and debridement as needed  -[facility] is to change the dressing once a week.  The patient is to return to the clinic once weekly for dressing change and debridement if needed  -Patient instructed to leave the compression wrap in place the entire week unless it becomes soiled or saturated.  If this  occurs patient is to carefully unwrap the compression wrap and place a cover dressing until he can be seen at the clinic or by home health.   Wound care: Collagen, Hydrofiber silver, Unna boot, Coban    2. Controlled type 2 diabetes mellitus without complication, without long-term current use of insulin ([facility])  3/21/2023  -Patient reports that his blood sugar is 160 today.  -Patient instructed that he should keep his blood sugar around 140 or below to help prevent infection and promote wound healing.  - Do not have most recent A1c in our system, he follows with [facility].    3. Severe obesity (BMI >= 40) ([facility])  Comments: Complicating factor to wound healing and wound care    4. Fungal infection of nail  Comments: Patient reports that he kicked his left calf with his right toenail causing a scratch which has led to the wound to the left lower extremity.    3/21/2023  - Patient has appointment with podiatry on 4/24 though [facility]  - Discussed possible nail care if time in clinic allows next clinic appointment.    5. Edema of both lower extremities  6. Venous insufficiency   Patient with significant edema to bilateral lower extremities which slows the diffusion of oxygen to the wound bed.  -Unna boot with Coban used for compression to the left lower extremity  -Suspect there is some component of venous hypertension contributing to the excessive edema to bilateral lower extremities.  - Counseled on importance of lifelong compression  - Recommend weight loss to reduce venous hypertension  - When wound nearer completion, will discuss possibly ordering compression garment. He will consider various compression garments and will try to practice doning tube socks to right lower extremity with sock bhakta he has at home.      PATIENT EDUCATION  - Importance of adequate nutrition for wound healing  -Advised to go to ER for any increased redness, swelling, drainage, or odor, or if patient develops fever, chills, nausea or vomiting.      Please note that this note may have been created using voice recognition software. I have worked with technical experts from [facility] to optimize the interface.  I have made every reasonable attempt to correct obvious errors, but there may be errors of grammar and possibly content that I did not discover before finalizing the note.    N

## 2023-03-21 NOTE — PATIENT INSTRUCTIONS
-Keep dressings clean and dry. Change dressings if they become over saturated, soiled or fall off. Otherwise, your home health nurse will change your dressings between wound clinic visits.    -Avoid prolonged standing or sitting without elevating your legs.    -Remove your compression garments if you have severe pain, severe swelling, numbness, color change, or temperature change in your toes. If you need to remove your compression garments, do so by unrolling them. Do not cut the compression garments off, this is to prevent cutting yourself on accident.    -Should you experience any significant changes in your wound, such as signs of infection (increasing redness, swelling, localized heat, increased pain, fever > 101 F, chills) or have any questions regarding your home care instructions, please contact the wound center at [phone]. If after hours, contact your primary care physician or go to the hospital emergency room.     -If you are 5 or more minutes late for an appointment, we reserve the right to cancel and reschedule that appointment. Additionally, if you are habitually late or not showing (3 late cancellations and/or no shows), we reserve the right to cancel your remaining appointments and it will be your responsibility to obtain a new referral if services are still needed.

## 2023-03-27 ENCOUNTER — OFFICE VISIT (OUTPATIENT)
Dept: WOUND CARE | Facility: MEDICAL CENTER | Age: 63
End: 2023-03-27
Attending: INTERNAL MEDICINE
Payer: MEDICARE

## 2023-03-27 VITALS
SYSTOLIC BLOOD PRESSURE: 132 MMHG | DIASTOLIC BLOOD PRESSURE: 70 MMHG | OXYGEN SATURATION: 93 % | HEART RATE: 85 BPM | TEMPERATURE: 97.5 F | RESPIRATION RATE: 20 BRPM

## 2023-03-27 DIAGNOSIS — S81.802D OPEN WOUND OF LEFT LOWER LEG, SUBSEQUENT ENCOUNTER: ICD-10-CM

## 2023-03-27 DIAGNOSIS — R60.0 EDEMA OF BOTH LOWER EXTREMITIES: ICD-10-CM

## 2023-03-27 DIAGNOSIS — E66.01 SEVERE OBESITY (BMI >= 40) (HCC): ICD-10-CM

## 2023-03-27 DIAGNOSIS — I87.2 VENOUS INSUFFICIENCY OF BOTH LOWER EXTREMITIES: ICD-10-CM

## 2023-03-27 DIAGNOSIS — E11.9 CONTROLLED TYPE 2 DIABETES MELLITUS WITHOUT COMPLICATION, WITHOUT LONG-TERM CURRENT USE OF INSULIN (HCC): ICD-10-CM

## 2023-03-27 DIAGNOSIS — B35.1 FUNGAL INFECTION OF NAIL: ICD-10-CM

## 2023-03-27 PROCEDURE — 11042 DBRDMT SUBQ TIS 1ST 20SQCM/<: CPT

## 2023-03-27 PROCEDURE — 11042 DBRDMT SUBQ TIS 1ST 20SQCM/<: CPT | Performed by: STUDENT IN AN ORGANIZED HEALTH CARE EDUCATION/TRAINING PROGRAM

## 2023-03-27 NOTE — PROGRESS NOTES
Provider Encounter- Full Thickness wound    HISTORY OF PRESENT ILLNESS  Wound History:   START OF CARE IN CLINIC: 03/14/23   REFERRING PROVIDER: Didi Lehman   WOUND- Full Thickness Wound   LOCATION: Left posterior leg   HISTORY: Patient developed a wound in September 2023 to the left posterior leg.  Patient has thick fungal toenails and reports that he kicked his leg with his right foot and nails causing a scratch.  This led to the full-thickness wound to the left posterior lower extremity.  Patient reports that he was receiving home health care without resolution of the wound.  Patient was seen at [facility] and referred to [facility] wound care for further care of the left posterior full-thickness wound.    Pertinent Medical History: Hyperlipidemia, ABELARDO, obesity, essential hypertension, type 2 diabetes, pulmonary hypertension    TOBACCO USE: Denies a history of tobacco use or smokeless tobacco products    Patient's problem list, allergies, and current medications reviewed and updated in Epic    Interval History:  3/14/2023: Clinic visit with GURJIT Barrientos.  Patient reports overall he is feeling well denies any fever or chills.    3/21/2023: Clinic visit with Kwaku Sanchez MD. Patient reports doing well, denies any fevers or chills. His wound is measuring about the same but is starting to fill in with increased granulation tissue. Patient was referred to podiatry at [facility] and reports appointment for foot and nail care on 4/24. He does not wear compression to lower extremities and was counseled on edema / venous insufficiency and importance of compression to help return venous flow to heart.    3/27/2023: Clinic visit with Kwaku Sanchez MD. Patient reports doing well. Tolerating compression with decreased edema. He denies any complaints. Wound is slowly improving. Discussed again compression in future. He think he will be able to apply compression socks, given information and he will see about  obtaining from [facility].      REVIEW OF SYSTEMS:   Unchanged from previous wound clinic assessment on 3/21/2023, except as noted in interval history above      PHYSICAL EXAMINATION:   /70   Pulse 85   Temp 36.4 °C (97.5 °F) (Temporal)   Resp 20   SpO2 93%     Physical Exam  Constitutional:       Appearance: He is obese.   Cardiovascular:      Comments: Patient has triphasic pulses heard via handheld Doppler to the DP and PT on the left foot  Pulmonary:      Effort: Pulmonary effort is normal. No respiratory distress.      Breath sounds: No wheezing.   Musculoskeletal:         General: Swelling present.      Right lower leg: Edema present.      Left lower leg: Edema present.   Skin:     Comments: Full-thickness wound to the left posterior lower extremity: Wound measuring slightly smaller with decrease in depth, granulation tissue at bed with thin layer of slough. No odor, purulence, periwound erythema.     Has hemosiderin staining and subtle varicose veins lower extremity consistent with chronic venous insuffiencey.   Neurological:      Mental Status: He is alert and oriented to person, place, and time.       WOUND ASSESSMENT  Wound 03/14/23 Traumatic LLE Posterior (Active)   Wound Image    03/27/23 1400   Site Assessment Red;Yellow 03/27/23 1400   Periwound Assessment Hemosiderin Staining;Edema 03/27/23 1400   Margins Unattached edges 03/27/23 1400   Drainage Amount Moderate 03/27/23 1400   Drainage Description Serosanguineous 03/27/23 1400   Treatments Cleansed;Topical Lidocaine;Provider debridement 03/27/23 1400   Wound Cleansing Puracyn Ideal 03/27/23 1400   Periwound Protectant Skin Protectant Wipes to Periwound;Barrier Paste 03/27/23 1400   Dressing Cleansing/Solutions Normal Saline 03/27/23 1400   Dressing Options Collagen Dressing;Hydrofiber Silver;Viscopaste;Coban 03/27/23 1400   Dressing Changed Changed 03/27/23 1400   Dressing Change/Treatment Frequency Every 72 hrs, and As Needed 03/14/23 1400    Non-staged Wound Description Full thickness 03/27/23 1400   Wound Length (cm) 1.1 cm 03/27/23 1400   Wound Width (cm) 1.3 cm 03/27/23 1400   Wound Depth (cm) 0.2 cm 03/27/23 1400   Wound Surface Area (cm^2) 1.43 cm^2 03/27/23 1400   Wound Volume (cm^3) 0.286 cm^3 03/27/23 1400   Post-Procedure Length (cm) 1.2 cm 03/27/23 1400   Post-Procedure Width (cm) 1.3 cm 03/27/23 1400   Post-Procedure Depth (cm) 0.2 cm 03/27/23 1400   Post-Procedure Surface Area (cm^2) 1.56 cm^2 03/27/23 1400   Post-Procedure Volume (cm^3) 0.312 cm^3 03/27/23 1400   Wound Healing % -91 03/27/23 1400   Tunneling (cm) 0 cm 03/27/23 1400   Undermining (cm) 0 cm 03/27/23 1400   Wound Odor None 03/27/23 1400   Pulses DP;2+ 03/21/23 1330   Exposed Structures None 03/27/23 1400   Number of days: 13       PROCEDURE: Excisional debridement of the wound to the left posterior lower extremity  -2% viscous lidocaine applied topically to wound bed for approximately 5 minutes prior to debridement  -Curette used to debride wound bed.  Excisional debridement was performed to remove devitalized tissue until healthy, bleeding tissue was visualized.   Entire surface of wound, 1.56 cm2 debrided.  Tissue debrided into the subcutaneous layer.    -Bleeding controlled with manual pressure.    -Wound care completed by wound RN, refer to flowsheet  -Patient tolerated the procedure well, without c/o pain or discomfort.       Pertinent Labs and Diagnostics:    Labs:     A1c:   Lab Results   Component Value Date/Time    HBA1C 6.5 (H) 09/15/2020 04:51 AM          LAST  WOUND CULTURE:  DATE :   Lab Results   Component Value Date/Time    CULTRSULT No growth after 5 days of incubation. 11/07/2022 06:23 PM         ASSESSMENT AND PLAN:   1. Open wound of left lower leg, subsequent encounter    3/27/2023: Wound measuring slightly smaller and depth improving.  -Excisional debridement of wound in clinic today, medically necessary to promote wound healing.  -Patient to return to  clinic weekly for assessment and debridement as needed  -[facility] is to change the dressing once a week.  The patient is to return to the clinic once weekly for dressing change and debridement if needed  -Patient instructed to leave the compression wrap in place the entire week unless it becomes soiled or saturated.  If this occurs patient is to carefully unwrap the compression wrap and place a cover dressing until he can be seen at the clinic or by home health.   Wound care: Collagen, Hydrofiber silver, Unna boot, Coban    2. Controlled type 2 diabetes mellitus without complication, without long-term current use of insulin ([facility])  3/27/2023  -Patient reports that his blood sugar is 143 today  -Patient instructed that he should keep his blood sugar around 140 or below to help prevent infection and promote wound healing.  - Do not have most recent A1c in our system, he follows with [facility].    3. Severe obesity (BMI >= 40) ([facility])  Comments: Complicating factor to wound healing and wound care    4. Fungal infection of nail  Comments: Patient reports that he kicked his left calf with his right toenail causing a scratch which has led to the wound to the left lower extremity.    3/27/2023  - Patient has appointment with podiatry on 4/24 though [facility]  - Discussed possible nail care if time in clinic allows in future clinic appointment.    5. Edema of both lower extremities  6. Venous insufficiency   Patient with significant edema to bilateral lower extremities which slows the diffusion of oxygen to the wound bed.  -Unna boot with Coban used for compression to the left lower extremity  -Suspect there is some component of venous hypertension contributing to the excessive edema to bilateral lower extremities.  - Counseled on importance of lifelong compression  - Recommend weight loss to reduce venous hypertension  - Patient has considered compression options and feels that he will be able to manage compression socks.  Nursing to measure and give him information on obtaining and wearing socks, he will try to obtain from [facility] as they have DME available.      PATIENT EDUCATION  - Importance of adequate nutrition for wound healing  -Advised to go to ER for any increased redness, swelling, drainage, or odor, or if patient develops fever, chills, nausea or vomiting.     Please note that this note may have been created using voice recognition software. I have worked with technical experts from [facility] to optimize the interface.  I have made every reasonable attempt to correct obvious errors, but there may be errors of grammar and possibly content that I did not discover before finalizing the note.    N

## 2023-03-27 NOTE — PATIENT INSTRUCTIONS
-Keep your wound dressing clean, dry, and intact.    -Change your dressing if it becomes soiled, soaked, or falls off.    -Remove your compression wrap if you have severe pain, severe swelling, numbness, color change, or temperature change in your toes. If you need to remove your compression wrap, do so by unrolling it. Do not cut the compression wrap off to prevent cutting yourself on accident.    -Should you experience any significant changes in your wound(s), such as infection (redness, swelling, localized heat, increased pain, fever > 101 F, chills) or have any questions regarding your home care instructions, please contact the wound center at [phone]. If after hours, contact your primary care physician or go to the hospital emergency room.

## 2023-03-27 NOTE — PROGRESS NOTES
Pt measured for compression socks and information sheet with measurements and strength given to Pt.    Updated wound care orders sent via [employer] to [facility].

## 2023-04-03 ENCOUNTER — OFFICE VISIT (OUTPATIENT)
Dept: WOUND CARE | Facility: MEDICAL CENTER | Age: 63
End: 2023-04-03
Attending: INTERNAL MEDICINE
Payer: MEDICARE

## 2023-04-03 DIAGNOSIS — B35.1 FUNGAL INFECTION OF NAIL: ICD-10-CM

## 2023-04-03 DIAGNOSIS — I87.2 VENOUS INSUFFICIENCY OF BOTH LOWER EXTREMITIES: ICD-10-CM

## 2023-04-03 DIAGNOSIS — E11.9 CONTROLLED TYPE 2 DIABETES MELLITUS WITHOUT COMPLICATION, WITHOUT LONG-TERM CURRENT USE OF INSULIN (HCC): ICD-10-CM

## 2023-04-03 DIAGNOSIS — E66.01 SEVERE OBESITY (BMI >= 40) (HCC): ICD-10-CM

## 2023-04-03 DIAGNOSIS — S81.802D OPEN WOUND OF LEFT LOWER LEG, SUBSEQUENT ENCOUNTER: Primary | ICD-10-CM

## 2023-04-03 DIAGNOSIS — R60.0 EDEMA OF BOTH LOWER EXTREMITIES: ICD-10-CM

## 2023-04-03 PROCEDURE — 11042 DBRDMT SUBQ TIS 1ST 20SQCM/<: CPT

## 2023-04-03 PROCEDURE — 11042 DBRDMT SUBQ TIS 1ST 20SQCM/<: CPT | Performed by: NURSE PRACTITIONER

## 2023-04-03 NOTE — PROGRESS NOTES
Provider Encounter- Full Thickness wound    HISTORY OF PRESENT ILLNESS  Wound History:   START OF CARE IN CLINIC: 03/14/23   REFERRING PROVIDER: Didi Lehman   WOUND- Full Thickness Wound   LOCATION: Left posterior leg   HISTORY: Patient developed a wound in September 2023 to the left posterior leg.  Patient has thick fungal toenails and reports that he kicked his leg with his right foot and nails causing a scratch.  This led to the full-thickness wound to the left posterior lower extremity.  Patient reports that he was receiving home health care without resolution of the wound.  Patient was seen at [facility] and referred to [facility] wound care for further care of the left posterior full-thickness wound.    Pertinent Medical History: Hyperlipidemia, ABELARDO, obesity, essential hypertension, type 2 diabetes, pulmonary hypertension    TOBACCO USE: Denies a history of tobacco use or smokeless tobacco products    Patient's problem list, allergies, and current medications reviewed and updated in Epic    Interval History:  3/14/2023: Clinic visit with GURJIT Barrientos.  Patient reports overall he is feeling well denies any fever or chills.    3/21/2023: Clinic visit with Kwaku Sanchez MD. Patient reports doing well, denies any fevers or chills. His wound is measuring about the same but is starting to fill in with increased granulation tissue. Patient was referred to podiatry at [facility] and reports appointment for foot and nail care on 4/24. He does not wear compression to lower extremities and was counseled on edema / venous insufficiency and importance of compression to help return venous flow to heart.    3/27/2023: Clinic visit with Kwaku Sanchez MD. Patient reports doing well. Tolerating compression with decreased edema. He denies any complaints. Wound is slowly improving. Discussed again compression in future. He think he will be able to apply compression socks, given information and he will see about  obtaining from [facility].    4/3/2023: Clinic visit with GURJIT Barrientos.  Patient reports overall he is doing well denies any fever or chills.  Patient reports that Tubigrip applied over the Unna boot with Coban to the right lower extremity was too tight last clinical appointment.  Therefore we will use a larger Tubigrip.  Patient's wound is progressing with increased epithelial tissue at the periphery and granulation tissue throughout the wound bed.      REVIEW OF SYSTEMS:   Unchanged from previous wound clinic assessment on 3/27/2023, except as noted in interval history above      PHYSICAL EXAMINATION:   There were no vitals taken for this visit.    Physical Exam  Constitutional:       Appearance: He is obese.   Cardiovascular:      Comments: Patient has triphasic pulses heard via handheld Doppler to the DP and PT on the left foot  Pulmonary:      Effort: Pulmonary effort is normal. No respiratory distress.      Breath sounds: No wheezing.   Musculoskeletal:         General: Swelling present.      Right lower leg: Edema present.      Left lower leg: Edema present.   Skin:     Comments: Full-thickness wound to the left posterior lower extremity: Wound bed continues to progress with increased granulation tissue and epithelial tissue at the periphery.    Has hemosiderin staining and subtle varicose veins lower extremity consistent with chronic venous insuffiencey.   Neurological:      Mental Status: He is alert and oriented to person, place, and time.       WOUND ASSESSMENT  Wound 03/14/23 Traumatic LLE Posterior (Active)   Wound Image   04/03/23 1600   Site Assessment Red 04/03/23 1600   Periwound Assessment Hemosiderin Staining;Edema 04/03/23 1600   Margins Unattached edges 04/03/23 1600   Drainage Amount Moderate 04/03/23 1600   Drainage Description Serosanguineous 04/03/23 1600   Treatments Cleansed;Topical Lidocaine;Provider debridement 04/03/23 1600   Wound Cleansing Puracyn Perham 04/03/23 1600   Periwound  Protectant Skin Protectant Wipes to Periwound;Barrier Paste 04/03/23 1600   Dressing Cleansing/Solutions Normal Saline 04/03/23 1600   Dressing Options Collagen Dressing;Hydrofiber Silver;Viscopaste;Unna Boot;Coban 04/03/23 1600   Dressing Changed Changed 04/03/23 1600   Dressing Change/Treatment Frequency Every 72 hrs, and As Needed 03/14/23 1400   Non-staged Wound Description Full thickness 04/03/23 1600   Wound Length (cm) 1.1 cm 03/27/23 1400   Wound Width (cm) 1.3 cm 03/27/23 1400   Wound Depth (cm) 0.2 cm 03/27/23 1400   Wound Surface Area (cm^2) 1.43 cm^2 03/27/23 1400   Wound Volume (cm^3) 0.286 cm^3 03/27/23 1400   Post-Procedure Length (cm) 1 cm 04/03/23 1600   Post-Procedure Width (cm) 1.1 cm 04/03/23 1600   Post-Procedure Depth (cm) 0.2 cm 04/03/23 1600   Post-Procedure Surface Area (cm^2) 1.1 cm^2 04/03/23 1600   Post-Procedure Volume (cm^3) 0.22 cm^3 04/03/23 1600   Wound Healing % -91 03/27/23 1400   Tunneling (cm) 0 cm 04/03/23 1600   Undermining (cm) 0 cm 04/03/23 1600   Wound Odor None 04/03/23 1600   Pulses DP;2+ 03/21/23 1330   Exposed Structures None 04/03/23 1600   Number of days: 20       PROCEDURE: Excisional debridement of the left posterior lower extremity wound  -2% viscous lidocaine applied topically to wound bed for approximately 5 minutes prior to debridement  -Curette used to debride wound bed.  Excisional debridement was performed to remove devitalized tissue until healthy, bleeding tissue was visualized.   Entire surface of wound, 1.1 cm2 debrided.  Tissue debrided into the subcutaneous layer.    -Bleeding controlled with manual pressure.    -Wound care completed by wound RN, refer to flowsheet  -Patient tolerated the procedure well, without c/o pain or discomfort.      Pertinent Labs and Diagnostics:    Labs:     A1c:   Lab Results   Component Value Date/Time    HBA1C 6.5 (H) 09/15/2020 04:51 AM          LAST  WOUND CULTURE:  DATE :   Lab Results   Component Value Date/Time     CULTRSULT No growth after 5 days of incubation. 11/07/2022 06:23 PM         ASSESSMENT AND PLAN:   1. Open wound of left lower leg, subsequent encounter    04/03/23: Increased granulation tissue and epithelial tissue forming throughout the wound bed  -Excisional debridement performed to remove nonviable tissue from the wound bed  -Patient to return to clinic weekly for assessment and debridement as needed  -[facility] to continue to see the patient for dressing change and Unna boot with Coban compression wrap 1 time per week.  -Patient is to leave the Unna boot with Coban compression wrap in place the entire week unless becomes severely saturated or soiled   Wound care: Collagen, Hydrofiber silver, Unna boot, Coban    2. Controlled type 2 diabetes mellitus without complication, without long-term current use of insulin ([facility])  04/03/23:  -Patient reports that his sugar today was 152  -Patient educated that should keep his blood sugar below 140 to promote optimal wound healing and to help prevent infection  - Do not have most recent A1c in our system, he follows with [facility].    3. Severe obesity (BMI >= 40) ([facility])  Comments: Complicating factor to wound healing and wound care    4. Fungal infection of nail  Comments: Patient reports that he kicked his left calf with his right toenail causing a scratch which has led to the wound to the left lower extremity.    04/03/23:  - Patient has appointment with podiatry on 4/24 though S  -We do not have any nail trimming sterile boxes in clinic today therefore I could not trim his nails.  If available next clinical appointment will trim part of the nails to help prevent any further injury before he can be seen by his podiatrist.    5. Edema of both lower extremities  6. Venous insufficiency   Patient with significant edema to bilateral lower extremities which slows the diffusion of oxygen to the wound bed.  -Unna boot with Coban used for compression to the left lower  extremity  -Suspect there is some component of venous hypertension contributing to the excessive edema to bilateral lower extremities.  - Counseled on importance of lifelong compression  - Recommend weight loss to reduce venous hypertension  -Nursing measured his legs in clinic today for compression stockings.  Patient instructed that he should look on [employer] to purchase compression stockings with 20 mmHg compression.      PATIENT EDUCATION  - Importance of adequate nutrition for wound healing  -Advised to go to ER for any increased redness, swelling, drainage, or odor, or if patient develops fever, chills, nausea or vomiting.     Please note that this note may have been created using voice recognition software. I have worked with technical experts from [employer] to optimize the interface.  I have made every reasonable attempt to correct obvious errors, but there may be errors of grammar and possibly content that I did not discover before finalizing the note.    N

## 2023-04-03 NOTE — PATIENT INSTRUCTIONS
-Keep your wound dressing clean, dry, and intact.    -Change your dressing if it becomes soiled, soaked, or falls off.    -Remove your compression wrap if you have severe pain, severe swelling, numbness, color change, or temperature change in your toes. If you need to remove your compression wrap, do so by unrolling it. Do not cut the compression wrap off to prevent cutting yourself on accident.    -Should you experience any significant changes in your wound(s), such as infection (redness, swelling, localized heat, increased pain, fever > 101 F, chills) or have any questions regarding your home care instructions, please contact the wound center at [phone]. If after hours, contact your primary care physician or go to the hospital emergency room.

## 2023-04-03 NOTE — PROGRESS NOTES
Provider in with Pt before Pre-debridement picture was taken.  Pt legs measured and information given to order compression socks.  No changes to Home Health orders.

## 2023-04-10 ENCOUNTER — OFFICE VISIT (OUTPATIENT)
Dept: WOUND CARE | Facility: MEDICAL CENTER | Age: 63
End: 2023-04-10
Attending: INTERNAL MEDICINE
Payer: MEDICARE

## 2023-04-10 VITALS
RESPIRATION RATE: 20 BRPM | HEART RATE: 71 BPM | OXYGEN SATURATION: 94 % | SYSTOLIC BLOOD PRESSURE: 117 MMHG | DIASTOLIC BLOOD PRESSURE: 66 MMHG | TEMPERATURE: 97.5 F

## 2023-04-10 DIAGNOSIS — E11.9 CONTROLLED TYPE 2 DIABETES MELLITUS WITHOUT COMPLICATION, WITHOUT LONG-TERM CURRENT USE OF INSULIN (HCC): ICD-10-CM

## 2023-04-10 DIAGNOSIS — S81.802D OPEN WOUND OF LEFT LOWER LEG, SUBSEQUENT ENCOUNTER: Primary | ICD-10-CM

## 2023-04-10 DIAGNOSIS — R60.0 EDEMA OF BOTH LOWER EXTREMITIES: ICD-10-CM

## 2023-04-10 DIAGNOSIS — E66.01 SEVERE OBESITY (BMI >= 40) (HCC): ICD-10-CM

## 2023-04-10 DIAGNOSIS — B35.1 FUNGAL INFECTION OF NAIL: ICD-10-CM

## 2023-04-10 PROCEDURE — 99213 OFFICE O/P EST LOW 20 MIN: CPT | Performed by: NURSE PRACTITIONER

## 2023-04-10 PROCEDURE — 99213 OFFICE O/P EST LOW 20 MIN: CPT

## 2023-04-10 NOTE — PATIENT INSTRUCTIONS
-Keep your wound dressing clean, dry, and intact.    -Change your dressing if it becomes soiled, soaked, or falls off.    -Remove your compression wrap if you have severe pain, severe swelling, numbness, color change, or temperature change in your toes. If you need to remove your compression wrap, do so by unrolling it. Do not cut the compression wrap off to prevent cutting yourself on accident.    -Should you experience any significant changes in your wound(s), such as infection (redness, swelling, localized heat, increased pain, fever > 101 F, chills) or have any questions regarding your home care instructions, please contact the wound center at [phone]. If after hours, contact your primary care physician or go to the hospital emergency room.

## 2023-04-10 NOTE — PROGRESS NOTES
Provider Encounter- Full Thickness wound    HISTORY OF PRESENT ILLNESS  Wound History:   START OF CARE IN CLINIC: 03/14/23   REFERRING PROVIDER: Didi Lehman   WOUND- Full Thickness Wound   LOCATION: Left posterior leg   HISTORY: Patient developed a wound in September 2023 to the left posterior leg.  Patient has thick fungal toenails and reports that he kicked his leg with his right foot and nails causing a scratch.  This led to the full-thickness wound to the left posterior lower extremity.  Patient reports that he was receiving home health care without resolution of the wound.  Patient was seen at [facility] and referred to [facility] wound care for further care of the left posterior full-thickness wound.    Pertinent Medical History: Hyperlipidemia, ABELARDO, obesity, essential hypertension, type 2 diabetes, pulmonary hypertension    TOBACCO USE: Denies a history of tobacco use or smokeless tobacco products    Patient's problem list, allergies, and current medications reviewed and updated in Epic    Interval History:  3/14/2023: Clinic visit with GURJIT Barrientos.  Patient reports overall he is feeling well denies any fever or chills.    3/21/2023: Clinic visit with Kwaku Sanchez MD. Patient reports doing well, denies any fevers or chills. His wound is measuring about the same but is starting to fill in with increased granulation tissue. Patient was referred to podiatry at [facility] and reports appointment for foot and nail care on 4/24. He does not wear compression to lower extremities and was counseled on edema / venous insufficiency and importance of compression to help return venous flow to heart.    3/27/2023: Clinic visit with Kwaku Sanchez MD. Patient reports doing well. Tolerating compression with decreased edema. He denies any complaints. Wound is slowly improving. Discussed again compression in future. He think he will be able to apply compression socks, given information and he will see about  obtaining from [facility].    4/3/2023: Clinic visit with GURJIT Barrientos.  Patient reports overall he is doing well denies any fever or chills.  Patient reports that Tubigrip applied over the Unna boot with Coban to the right lower extremity was too tight last clinical appointment.  Therefore we will use a larger Tubigrip.  Patient's wound is progressing with increased epithelial tissue at the periphery and granulation tissue throughout the wound bed.    4/10/2023: Clinic visit with GURJIT Barrientos.  Patient reports overall he is doing well denies any fever or chills.  Patient now has [facility] who is seeing the patient once a week for wound care dressing changes and reapplication of Unna boot with Coban wraps.  Patient reports that he has ordered compression stockings off of [employer] and they should be to his home soon.      REVIEW OF SYSTEMS:   Unchanged from previous wound clinic assessment on 4/3/2023, except as noted in interval history above      PHYSICAL EXAMINATION:   /66   Pulse 71   Temp 36.4 °C (97.5 °F) (Temporal)   Resp 20   SpO2 94%     Physical Exam  Constitutional:       Appearance: He is obese.   Cardiovascular:      Comments: Patient has triphasic pulses heard via handheld Doppler to the DP and PT on the left foot  Pulmonary:      Effort: Pulmonary effort is normal. No respiratory distress.      Breath sounds: No wheezing.   Musculoskeletal:         General: Swelling present.      Right lower leg: Edema present.      Left lower leg: Edema present.   Skin:     Comments: Full-thickness wound to the left posterior lower extremity: Wound bed is more shallow.  There continues to be increased epithelial tissue at the periphery and granulation tissue throughout the wound bed.  No periwound erythema, edema or foul-smelling odor emanating from the wound bed.    He has hemosiderin staining and subtle varicose veins lower extremity consistent with chronic venous insuffiencey.    Neurological:      Mental Status: He is alert and oriented to person, place, and time.       WOUND ASSESSMENT  Wound 03/14/23 Traumatic LLE Posterior (Active)   Wound Image   04/10/23 1530   Site Assessment Red;[place] 04/10/23 1530   Periwound Assessment Hemosiderin Staining;Edema 04/10/23 1530   Margins Unattached edges;Attached edges 04/10/23 1530   Drainage Amount Moderate 04/10/23 1530   Drainage Description Serosanguineous 04/10/23 1530   Treatments Cleansed;Site care;Other (Comment) 04/10/23 1530   Wound Cleansing Puracyn Dale 04/10/23 1530   Periwound Protectant Skin Moisturizer;Barrier Paste 04/10/23 1530   Dressing Cleansing/Solutions Normal Saline 04/10/23 1530   Dressing Options Collagen Dressing;Hydrofiber Silver;Unna Boot;Coban 04/10/23 1530   Dressing Changed Changed 04/10/23 1530   Dressing Change/Treatment Frequency Every 72 hrs, and As Needed 04/10/23 1530   Non-staged Wound Description Full thickness 04/10/23 1530   Wound Length (cm) 1 cm 04/10/23 1530   Wound Width (cm) 1.3 cm 04/10/23 1530   Wound Depth (cm) 0.3 cm 04/10/23 1530   Wound Surface Area (cm^2) 1.3 cm^2 04/10/23 1530   Wound Volume (cm^3) 0.39 cm^3 04/10/23 1530   Post-Procedure Length (cm) 1 cm 04/03/23 1600   Post-Procedure Width (cm) 1.1 cm 04/03/23 1600   Post-Procedure Depth (cm) 0.2 cm 04/03/23 1600   Post-Procedure Surface Area (cm^2) 1.1 cm^2 04/03/23 1600   Post-Procedure Volume (cm^3) 0.22 cm^3 04/03/23 1600   Wound Healing % -160 04/10/23 1530   Tunneling (cm) 0 cm 04/10/23 1530   Undermining (cm) 0 cm 04/10/23 1530   Wound Odor None 04/10/23 1530   Pulses DP;2+ 03/21/23 1330   Exposed Structures None 04/10/23 1530   Number of days: 27       PROCEDURE:   -No excisional debridement required in clinic today.    Pertinent Labs and Diagnostics:    Labs:     A1c:   Lab Results   Component Value Date/Time    HBA1C 6.5 (H) 09/15/2020 04:51 AM          LAST  WOUND CULTURE:  DATE :   Lab Results   Component Value Date/Time     CULTRSULT No growth after 5 days of incubation. 11/07/2022 06:23 PM         ASSESSMENT AND PLAN:   1. Open wound of left lower leg, subsequent encounter    04/10/23: Increased epithelial tissue at the periphery with increased granulation tissue throughout the wound bed.  -No excisional debridement required in clinic today.  -Patient to return to clinic once a week for assessment and debridement as needed  -[facility] to continue to see the patient for dressing change and Unna boot with Coban compression wrap 1 time per week.  -Patient reports that the Unna boot with Coban became wet after showering.  Patient did not remove the Unna boot with Coban compression wrap however likely there is no breakdown of periwound tissue.   Wound care: Collagen, Hydrofiber silver, Unna boot, Coban    2. Controlled type 2 diabetes mellitus without complication, without long-term current use of insulin ([facility])  04/10/23:  -Patient reports that his blood sugar this morning was 149  -Patient educated that should keep his blood sugar below 140 to promote optimal wound healing and to help prevent infection  - Do not have most recent A1c in our system, he follows with S.    3. Severe obesity (BMI >= 40) ([facility])  Comments: Complicating factor to wound healing and wound care    4. Fungal infection of nail  Comments: Patient reports that he kicked his left calf with his right toenail causing a scratch which has led to the wound to the left lower extremity.    04/10/23:  - Patient has appointment with podiatry on 4/24 though [facility]  -We do not have any nail trimming sterile boxes in clinic today therefore I could not trim his nails.  If available next clinical appointment will trim part of the nails to help prevent any further injury before he can be seen by his podiatrist.    5. Edema of both lower extremities  6. Venous insufficiency   Patient with significant edema to bilateral lower extremities which slows the diffusion of oxygen to  the wound bed.  -Continue with Unna boot and Coban for compression to bilateral lower extremities.  -Suspect there is some component of venous hypertension contributing to the excessive edema to bilateral lower extremities.  -Patient has been compliant with recommendations and has ordered compression stockings with 20 mmHg compression from [employer].    >20 min spent face to face with patient, >50% of time spent counseling, coordinating care, reviewing records, discussing POC, educating patient      PATIENT EDUCATION  - Importance of adequate nutrition for wound healing  -Advised to go to ER for any increased redness, swelling, drainage, or odor, or if patient develops fever, chills, nausea or vomiting.     Please note that this note may have been created using voice recognition software. I have worked with technical experts from [employer] to optimize the interface.  I have made every reasonable attempt to correct obvious errors, but there may be errors of grammar and possibly content that I did not discover before finalizing the note.    N

## 2023-04-18 ENCOUNTER — NON-PROVIDER VISIT (OUTPATIENT)
Dept: WOUND CARE | Facility: MEDICAL CENTER | Age: 63
End: 2023-04-18
Attending: INTERNAL MEDICINE
Payer: MEDICARE

## 2023-04-25 ENCOUNTER — OFFICE VISIT (OUTPATIENT)
Dept: WOUND CARE | Facility: MEDICAL CENTER | Age: 63
End: 2023-04-25
Attending: INTERNAL MEDICINE
Payer: MEDICARE

## 2023-04-25 VITALS
DIASTOLIC BLOOD PRESSURE: 67 MMHG | HEART RATE: 79 BPM | SYSTOLIC BLOOD PRESSURE: 141 MMHG | RESPIRATION RATE: 20 BRPM | TEMPERATURE: 97.8 F

## 2023-04-25 DIAGNOSIS — E11.9 CONTROLLED TYPE 2 DIABETES MELLITUS WITHOUT COMPLICATION, WITHOUT LONG-TERM CURRENT USE OF INSULIN (HCC): ICD-10-CM

## 2023-04-25 DIAGNOSIS — R60.0 EDEMA OF BOTH LOWER EXTREMITIES: ICD-10-CM

## 2023-04-25 DIAGNOSIS — E66.01 SEVERE OBESITY (BMI >= 40) (HCC): ICD-10-CM

## 2023-04-25 DIAGNOSIS — S81.802D OPEN WOUND OF LEFT LOWER LEG, SUBSEQUENT ENCOUNTER: Primary | ICD-10-CM

## 2023-04-25 DIAGNOSIS — B35.1 FUNGAL INFECTION OF NAIL: ICD-10-CM

## 2023-04-25 DIAGNOSIS — I87.2 VENOUS INSUFFICIENCY OF BOTH LOWER EXTREMITIES: ICD-10-CM

## 2023-04-25 PROCEDURE — 11042 DBRDMT SUBQ TIS 1ST 20SQCM/<: CPT | Performed by: NURSE PRACTITIONER

## 2023-04-25 PROCEDURE — 11042 DBRDMT SUBQ TIS 1ST 20SQCM/<: CPT

## 2023-04-25 NOTE — PATIENT INSTRUCTIONS
Avoid prolonged standing or sitting without elevating your legs.    - Multilayer compression wrap to right leg. Do not get wet and keep on for the week. Only remove if temperature or sensation changes.   If compression needs to be removed, un-wrap it do not cut it off.     Should you experience any significant changes in your wound(s), such as infection (redness, swelling, localized heat, increased pain, fever > 101 F, chills) or have any questions regarding your home care instructions, please contact the wound center at [phone]. If after hours, contact your primary care physician or go to the hospital emergency room.   Keep dressing clean, dry and covered while bathing. Only change dressing if it becomes over saturated, soiled or falls off.

## 2023-04-25 NOTE — PROGRESS NOTES
Provider Encounter- Full Thickness wound    HISTORY OF PRESENT ILLNESS  Wound History:   START OF CARE IN CLINIC: 03/14/23   REFERRING PROVIDER: Didi Lehman   WOUND- Full Thickness Wound   LOCATION: Left posterior leg   HISTORY: Patient developed a wound in September 2023 to the left posterior leg.  Patient has thick fungal toenails and reports that he kicked his leg with his right foot and nails causing a scratch.  This led to the full-thickness wound to the left posterior lower extremity.  Patient reports that he was receiving home health care without resolution of the wound.  Patient was seen at [facility] and referred to [facility] wound care for further care of the left posterior full-thickness wound.    Pertinent Medical History: Hyperlipidemia, ABELARDO, obesity, essential hypertension, type 2 diabetes, pulmonary hypertension    TOBACCO USE: Denies a history of tobacco use or smokeless tobacco products    Patient's problem list, allergies, and current medications reviewed and updated in Epic    Interval History:  3/14/2023: Clinic visit with GURJIT Barrientos.  Patient reports overall he is feeling well denies any fever or chills.    3/21/2023: Clinic visit with Kwaku Sanchez MD. Patient reports doing well, denies any fevers or chills. His wound is measuring about the same but is starting to fill in with increased granulation tissue. Patient was referred to podiatry at [facility] and reports appointment for foot and nail care on 4/24. He does not wear compression to lower extremities and was counseled on edema / venous insufficiency and importance of compression to help return venous flow to heart.    3/27/2023: Clinic visit with Kwaku Sanchez MD. Patient reports doing well. Tolerating compression with decreased edema. He denies any complaints. Wound is slowly improving. Discussed again compression in future. He think he will be able to apply compression socks, given information and he will see about  obtaining from [facility].    4/3/2023: Clinic visit with GURJIT Barrientos.  Patient reports overall he is doing well denies any fever or chills.  Patient reports that Tubigrip applied over the Unna boot with Coban to the right lower extremity was too tight last clinical appointment.  Therefore we will use a larger Tubigrip.  Patient's wound is progressing with increased epithelial tissue at the periphery and granulation tissue throughout the wound bed.    4/10/2023: Clinic visit with GURJIT Barrientos.  Patient reports overall he is doing well denies any fever or chills.  Patient now has [facility] who is seeing the patient once a week for wound care dressing changes and reapplication of Unna boot with Coban wraps.  Patient reports that he has ordered compression stockings off of [employer] and they should be to his home soon.    4/25/2023: Clinic visit with GURJIT Barrientos.  Patient reports overall he is doing well denies any fever or chills.  Patient reports he has seen his podiatrist recently who has taken down his oncotic nails.  Patient reports that he has not had any applications with the compression.  Discussed increasing compression of the 2 layer compression wrap patient is in agreement.  Patient reports that ED and health services is going to supply him with zipper compression stockings with 30 to 40 mm compression.      REVIEW OF SYSTEMS:   Unchanged from previous wound clinic assessment on 4/10/2023, except as noted in interval history above      PHYSICAL EXAMINATION:   BP (!) 141/67   Pulse 79   Temp 36.6 °C (97.8 °F) (Temporal)   Resp 20     Physical Exam  Constitutional:       Appearance: He is obese.   Cardiovascular:      Rate and Rhythm: Normal rate.      Comments: Patient has triphasic pulses heard via handheld Doppler to the DP and PT on the left foot  Pulmonary:      Effort: Pulmonary effort is normal. No respiratory distress.      Breath sounds: No wheezing.    Musculoskeletal:         General: Swelling present.      Right lower leg: Edema present.      Left lower leg: Edema present.   Skin:     Comments: Full-thickness wound to the left posterior lower extremity: No significant change.  Wound bed is stable.  No periwound erythema, edema or foul-smelling odor emanating from the wound bed.   He has hemosiderin staining and subtle varicose veins lower extremity consistent with chronic venous insuffiencey.   Neurological:      Mental Status: He is alert and oriented to person, place, and time.       WOUND ASSESSMENT  Wound 03/14/23 Traumatic LLE Posterior (Active)   Wound Image    04/25/23 1430   Site Assessment Red;Yellow 04/25/23 1430   Periwound Assessment Hemosiderin Staining;Edema 04/25/23 1430   Margins Attached edges 04/25/23 1430   Drainage Amount Moderate 04/25/23 1430   Drainage Description Serosanguineous 04/25/23 1430   Treatments Cleansed;Topical Lidocaine;Provider debridement;Site care 04/25/23 1430   Wound Cleansing Puracyn [place] 04/25/23 1430   Periwound Protectant Skin Protectant Wipes to Periwound;Barrier Paste;Skin Moisturizer 04/25/23 1430   Dressing Cleansing/Solutions Normal Saline 04/25/23 1430   Dressing Options Collagen Dressing;Hydrofiber Silver;Soft Conforming Roll;Compression Wrap Two Layer 04/25/23 1430   Dressing Changed New 04/25/23 1430   Dressing Change/Treatment Frequency Every 72 hrs, and As Needed 04/25/23 1430   Non-staged Wound Description Full thickness 04/25/23 1430   Wound Length (cm) 0.8 cm 04/25/23 1430   Wound Width (cm) 1.3 cm 04/25/23 1430   Wound Depth (cm) 0.2 cm 04/25/23 1430   Wound Surface Area (cm^2) 1.04 cm^2 04/25/23 1430   Wound Volume (cm^3) 0.208 cm^3 04/25/23 1430   Post-Procedure Length (cm) 0.9 cm 04/25/23 1430   Post-Procedure Width (cm) 1.3 cm 04/25/23 1430   Post-Procedure Depth (cm) 0.2 cm 04/25/23 1430   Post-Procedure Surface Area (cm^2) 1.17 cm^2 04/25/23 1430   Post-Procedure Volume (cm^3) 0.234 cm^3  04/25/23 1430   Wound Healing % -39 04/25/23 1430   Tunneling (cm) 0 cm 04/25/23 1430   Undermining (cm) 0 cm 04/25/23 1430   Wound Odor None 04/25/23 1430   Pulses DP;2+ 03/21/23 1330   Exposed Structures None 04/25/23 1430   Number of days: 42         PROCEDURE: Excisional debridement of the posterior left lower extremity.  -2% viscous lidocaine applied topically to wound bed for approximately 5 minutes prior to debridement  -Curette used to debride wound bed.  Excisional debridement was performed to remove devitalized tissue until healthy, bleeding tissue was visualized.   Entire surface of wound, 1.17 cm2 debrided.  Tissue debrided into the subcutaneous layer.    -Bleeding controlled with manual pressure.    -Wound care completed by wound RN, refer to flowsheet  -Patient tolerated the procedure well, without c/o pain or discomfort.      Pertinent Labs and Diagnostics:    Labs:     A1c:   Lab Results   Component Value Date/Time    HBA1C 6.5 (H) 09/15/2020 04:51 AM          LAST  WOUND CULTURE:  DATE :   Lab Results   Component Value Date/Time    CULTRSULT No growth after 5 days of incubation. 11/07/2022 06:23 PM         ASSESSMENT AND PLAN:   1. Open wound of left lower leg, subsequent encounter    04/25/23: Wound bed is stable  -Excisional debridement performed remove thin layer of slough from the wound bed to stimulate granulation tissue formation.  -Patient is to return to the clinic once a week for assessment and debridement as needed.  -[facility] to continue to see the patient 1 time per week for 2 layer compression wrap change.  They are to place cotton soft roll under the 2 layer compression.  -Patient reports that [facility] is going to order zip up compression stockings.  I informed the patient that he should get compression stockings with 30 to 40 mmHg compression.   Wound care: Collagen, soft conforming roll, 2 layer compression wrap.    2. Controlled type 2 diabetes mellitus  without complication, without long-term current use of insulin ([facility])  04/25/23:  -Patient reports his blood sugar this morning was in the 160's  -Patient educated that he should keep his blood sugar below 140 to promote optimal wound healing and to help prevent infection  - Do not have most recent A1c in our system, he follows with [facility].    3. Severe obesity (BMI >= 40) ([facility])  Comments: Complicating factor to wound healing and wound care    4. Fungal infection of nail  Comments: Patient reports that he kicked his left calf with his right toenail causing a scratch which has led to the wound to the left lower extremity.    04/25/23:  -Patient reports that he met with his podiatrist and had his nails trimmed.  -To follow-up with podiatry monthly.    5. Edema of both lower extremities  6. Venous insufficiency   Patient with significant edema to bilateral lower extremities which slows the diffusion of oxygen to the wound bed.  -2 layer compression initiated due to lack of compression of the wound  -Suspect there is some component of venous hypertension contributing to the excessive edema to bilateral lower extremities.  -Patient reports that S is ordering him a zip up compression stockings with 30 to 40 mmHg compression          PATIENT EDUCATION  - Importance of adequate nutrition for wound healing  -Advised to go to ER for any increased redness, swelling, drainage, or odor, or if patient develops fever, chills, nausea or vomiting.     Please note that this note may have been created using voice recognition software. I have worked with technical experts from [facility] to optimize the interface.  I have made every reasonable attempt to correct obvious errors, but there may be errors of grammar and possibly content that I did not discover before finalizing the note.    N

## 2023-05-02 ENCOUNTER — OFFICE VISIT (OUTPATIENT)
Dept: WOUND CARE | Facility: MEDICAL CENTER | Age: 63
End: 2023-05-02
Attending: INTERNAL MEDICINE
Payer: MEDICARE

## 2023-05-02 VITALS
TEMPERATURE: 97.9 F | RESPIRATION RATE: 20 BRPM | OXYGEN SATURATION: 91 % | SYSTOLIC BLOOD PRESSURE: 143 MMHG | DIASTOLIC BLOOD PRESSURE: 76 MMHG | HEART RATE: 80 BPM

## 2023-05-02 DIAGNOSIS — B35.1 FUNGAL INFECTION OF NAIL: ICD-10-CM

## 2023-05-02 DIAGNOSIS — R60.0 EDEMA OF BOTH LOWER EXTREMITIES: ICD-10-CM

## 2023-05-02 DIAGNOSIS — S81.802D OPEN WOUND OF LEFT LOWER LEG, SUBSEQUENT ENCOUNTER: ICD-10-CM

## 2023-05-02 DIAGNOSIS — I87.2 VENOUS INSUFFICIENCY OF BOTH LOWER EXTREMITIES: ICD-10-CM

## 2023-05-02 DIAGNOSIS — E11.9 CONTROLLED TYPE 2 DIABETES MELLITUS WITHOUT COMPLICATION, WITHOUT LONG-TERM CURRENT USE OF INSULIN (HCC): ICD-10-CM

## 2023-05-02 DIAGNOSIS — E66.01 SEVERE OBESITY (BMI >= 40) (HCC): ICD-10-CM

## 2023-05-02 PROCEDURE — 11042 DBRDMT SUBQ TIS 1ST 20SQCM/<: CPT

## 2023-05-02 PROCEDURE — 11042 DBRDMT SUBQ TIS 1ST 20SQCM/<: CPT | Performed by: NURSE PRACTITIONER

## 2023-05-02 NOTE — PATIENT INSTRUCTIONS
-Keep your wound dressing clean, dry, and intact.    -Change your dressing if it becomes soiled, soaked, or falls off.    -Remove your compression wrap if you have severe pain, severe swelling, numbness, color change, or temperature change in your toes. If you need to remove your compression wrap, do so by unrolling it. Do not cut the compression wrap off to prevent cutting yourself on accident.    -Should you experience any significant changes in your wound(s), such as infection (redness, swelling, localized heat, increased pain, fever > 101 F, chills) or have any questions regarding your home care instructions, please contact the wound center at [phone]. If after hours, contact your primary care physician or go to the hospital emergency room.     Unable to consent due to medical condition

## 2023-05-02 NOTE — PROGRESS NOTES
Provider Encounter- Full Thickness wound    HISTORY OF PRESENT ILLNESS  Wound History:   START OF CARE IN CLINIC: 03/14/23   REFERRING PROVIDER: Didi Lehamn   WOUND- Full Thickness Wound   LOCATION: Left posterior leg   HISTORY: Patient developed a wound in September 2023 to the left posterior leg.  Patient has thick fungal toenails and reports that he kicked his leg with his right foot and nails causing a scratch.  This led to the full-thickness wound to the left posterior lower extremity.  Patient reports that he was receiving home health care without resolution of the wound.  Patient was seen at [facility] and referred to [facility] wound care for further care of the left posterior full-thickness wound.    Pertinent Medical History: Hyperlipidemia, ABELARDO, obesity, essential hypertension, type 2 diabetes, pulmonary hypertension    TOBACCO USE: Denies a history of tobacco use or smokeless tobacco products    Patient's problem list, allergies, and current medications reviewed and updated in Epic    Interval History:  3/14/2023: Clinic visit with GURJIT Barrientos.  Patient reports overall he is feeling well denies any fever or chills.    3/21/2023: Clinic visit with Kwaku Sanchez MD. Patient reports doing well, denies any fevers or chills. His wound is measuring about the same but is starting to fill in with increased granulation tissue. Patient was referred to podiatry at [facility] and reports appointment for foot and nail care on 4/24. He does not wear compression to lower extremities and was counseled on edema / venous insufficiency and importance of compression to help return venous flow to heart.    3/27/2023: Clinic visit with Kwaku Sanchez MD. Patient reports doing well. Tolerating compression with decreased edema. He denies any complaints. Wound is slowly improving. Discussed again compression in future. He think he will be able to apply compression socks, given information and he will see about  obtaining from [facility].    4/3/2023: Clinic visit with GURJIT Barrientos.  Patient reports overall he is doing well denies any fever or chills.  Patient reports that Tubigrip applied over the Unna boot with Coban to the right lower extremity was too tight last clinical appointment.  Therefore we will use a larger Tubigrip.  Patient's wound is progressing with increased epithelial tissue at the periphery and granulation tissue throughout the wound bed.    4/10/2023: Clinic visit with GURJIT Barrientos.  Patient reports overall he is doing well denies any fever or chills.  Patient now has [facility] who is seeing the patient once a week for wound care dressing changes and reapplication of Unna boot with Coban wraps.  Patient reports that he has ordered compression stockings off of [employer] and they should be to his home soon.    4/25/2023: Clinic visit with GURJIT Barrientos.  Patient reports overall he is doing well denies any fever or chills.  Patient reports he has seen his podiatrist recently who has taken down his oncotic nails.  Patient reports that he has not had any applications with the compression.  Discussed increasing compression of the 2 layer compression wrap patient is in agreement.  Patient reports that ED and health services is going to supply him with zipper compression stockings with 30 to 40 mm compression.      5/2/2023 : Clinic visit with GURJIT Ordaz, FNALLA-BC, CWBON, CFCN.   Patient states that overall he is feeling well.  Reports his blood sugar was in the 150s.  He did not receive zippered compression garment, ordered last visit.  We will follow-up.  His wound is slowly progressing.  He is tolerating compression without any difficulty.    REVIEW OF SYSTEMS:   Unchanged from previous wound clinic assessment on 4/25/2023, except as noted in interval history above      PHYSICAL EXAMINATION:   BP (!) 143/76   Pulse 80   Temp 36.6 °C (97.9 °F) (Temporal)   Resp 20    SpO2 91%     Physical Exam  Constitutional:       Appearance: He is obese.   Cardiovascular:      Rate and Rhythm: Normal rate.      Comments: Patient has triphasic pulses heard via handheld Doppler to the DP and PT on the left foot  Pulmonary:      Effort: Pulmonary effort is normal. No respiratory distress.      Breath sounds: No wheezing.   Musculoskeletal:         General: Swelling present.      Right lower leg: Edema present.      Left lower leg: Edema present.   Skin:     Comments: Full-thickness wound to the left posterior lower extremity: Wound area is decreased slightly, thick layer of slough to wound bed, minimal to moderate amount of serosanguineous drainage, no evidence of infection    Refer to wound flowsheet and photos    Hemosiderin staining, chronic edema, skin changes-consistent with CVI   Neurological:      Mental Status: He is alert and oriented to person, place, and time.       WOUND ASSESSMENT  Wound 03/14/23 Traumatic LLE Posterior (Active)   Wound Image    05/02/23 1500   Site Assessment Red;[place]ita 05/02/23 1500   Periwound Assessment Hemosiderin Staining;Edema 05/02/23 1500   Margins Attached edges 05/02/23 1500   Drainage Amount Moderate 05/02/23 1500   Drainage Description Serosanguineous 05/02/23 1500   Treatments Cleansed;Topical Lidocaine;Provider debridement 05/02/23 1500   Wound Cleansing Puracyn [place] 05/02/23 1500   Periwound Protectant Skin Protectant Wipes to Periwound;Barrier Paste 05/02/23 1500   Dressing Cleansing/Solutions Normal Saline 05/02/23 1500   Dressing Options Collagen Dressing;Hydrofiber Silver;Soft Conforming Roll;Compression Wrap Two Layer 05/02/23 1500   Dressing Changed New 05/02/23 1500   Dressing Change/Treatment Frequency Every 72 hrs, and As Needed 05/02/23 1500   Non-staged Wound Description Full thickness 05/02/23 1500   Wound Length (cm) 0.9 cm 05/02/23 1500   Wound Width (cm) 1 cm 05/02/23 1500   Wound Depth (cm) 0.2 cm 05/02/23 1500   Wound Surface Area  (cm^2) 0.9 cm^2 05/02/23 1500   Wound Volume (cm^3) 0.18 cm^3 05/02/23 1500   Post-Procedure Length (cm) 1 cm 05/02/23 1500   Post-Procedure Width (cm) 1.1 cm 05/02/23 1500   Post-Procedure Depth (cm) 0.2 cm 05/02/23 1500   Post-Procedure Surface Area (cm^2) 1.1 cm^2 05/02/23 1500   Post-Procedure Volume (cm^3) 0.22 cm^3 05/02/23 1500   Wound Healing % -20 05/02/23 1500   Tunneling (cm) 0 cm 05/02/23 1500   Undermining (cm) 0 cm 05/02/23 1500   Wound Odor None 05/02/23 1500   Pulses DP;2+ 03/21/23 1330   Exposed Structures None 05/02/23 1500   Number of days: 49         PROCEDURE: Excisional debridement of the posterior left lower extremity.  -2% viscous lidocaine applied topically to wound bed for approximately 5 minutes prior to debridement  -Curette used to debride wound bed.  Excisional debridement was performed to remove devitalized tissue until healthy, bleeding tissue was visualized.   Entire surface of wound, 1.1 cm² debrided.  Tissue debrided into the subcutaneous layer.    -Bleeding controlled with manual pressure.    -Wound care completed by wound RN, refer to flowsheet  -Patient tolerated the procedure well, without c/o pain or discomfort.      Pertinent Labs and Diagnostics:    Labs:     A1c:   Lab Results   Component Value Date/Time    HBA1C 6.5 (H) 09/15/2020 04:51 AM            LAST  WOUND CULTURE:  DATE :   Lab Results   Component Value Date/Time    CULTRSULT No growth after 5 days of incubation. 11/07/2022 06:23 PM         ASSESSMENT AND PLAN:   1. Open wound of left lower leg, subsequent encounter    5/2/2023: Wound area has decreased slightly, slough to wound bed.  -Excisional debridement performed in clinic today, medically necessary to promote wound healing.  -Patient to return to clinic weekly for assessment and debridement   -[facility] to continue to see the patient 1 time per week for 2 layer compression wrap change.  They are to place cotton soft roll under the 2 layer  compression.  -Patient has not yet received zippered compression garment.  We will follow-up   Wound care: Collagen, soft conforming roll, 2 layer compression wrap.    2. Controlled type 2 diabetes mellitus without complication, without long-term current use of insulin ([facility])  5/2/2023: Patient reports his blood sugars are consistently in the high 150s to low 160s  -Patient has been advised to keep blood sugars below 140 and noted optimize wound healing  - Do not have most recent A1c in our system, he follows with S.    3. Severe obesity (BMI >= 40) ([facility])  Comments: Complicating factor to wound healing and wound care    4. Fungal infection of nail  Comments: Patient reports that he kicked his left calf with his right toenail causing a scratch which has led to the wound to the left lower extremity.    5/2/2023:  -Patient reports that he met with his podiatrist and had his nails trimmed.  -To follow-up with podiatry monthly.    5. Edema of both lower extremities  6. Venous insufficiency   Patient with significant edema to bilateral lower extremities which slows the diffusion of oxygen to the wound bed.  -2 layer compression initiated due to lack of compression of the wound  -Suspect there is some component of venous hypertension contributing to the excessive edema to bilateral lower extremities.  -Patient reports that [facility] is ordering him a zip up compression stockings with 30 to 40 mmHg compression          PATIENT EDUCATION  - Importance of adequate nutrition for wound healing  -Advised to go to ER for any increased redness, swelling, drainage, or odor, or if patient develops fever, chills, nausea or vomiting.     Please note that this note may have been created using voice recognition software. I have worked with technical experts from [employer] to optimize the interface.  I have made every reasonable attempt to correct obvious errors, but there may be errors of grammar and possibly content that I did not  discover before finalizing the note.    N

## 2023-05-09 ENCOUNTER — OFFICE VISIT (OUTPATIENT)
Dept: WOUND CARE | Facility: MEDICAL CENTER | Age: 63
End: 2023-05-09
Attending: INTERNAL MEDICINE
Payer: MEDICARE

## 2023-05-09 VITALS
DIASTOLIC BLOOD PRESSURE: 56 MMHG | SYSTOLIC BLOOD PRESSURE: 116 MMHG | RESPIRATION RATE: 20 BRPM | OXYGEN SATURATION: 98 % | TEMPERATURE: 97.9 F

## 2023-05-09 DIAGNOSIS — E66.01 SEVERE OBESITY (BMI >= 40) (HCC): ICD-10-CM

## 2023-05-09 DIAGNOSIS — S81.802D OPEN WOUND OF LEFT LOWER LEG, SUBSEQUENT ENCOUNTER: ICD-10-CM

## 2023-05-09 DIAGNOSIS — R60.0 EDEMA OF BOTH LOWER EXTREMITIES: ICD-10-CM

## 2023-05-09 DIAGNOSIS — B35.1 FUNGAL INFECTION OF NAIL: ICD-10-CM

## 2023-05-09 DIAGNOSIS — I87.2 VENOUS INSUFFICIENCY OF BOTH LOWER EXTREMITIES: ICD-10-CM

## 2023-05-09 DIAGNOSIS — E11.9 CONTROLLED TYPE 2 DIABETES MELLITUS WITHOUT COMPLICATION, WITHOUT LONG-TERM CURRENT USE OF INSULIN (HCC): ICD-10-CM

## 2023-05-09 PROCEDURE — 11042 DBRDMT SUBQ TIS 1ST 20SQCM/<: CPT

## 2023-05-09 PROCEDURE — 11042 DBRDMT SUBQ TIS 1ST 20SQCM/<: CPT | Performed by: NURSE PRACTITIONER

## 2023-05-09 NOTE — PATIENT INSTRUCTIONS
-Keep your wound dressing clean, dry, and intact.    -Change your dressing if it becomes soiled, soaked, or falls off.    -Remove your compression wrap if you have severe pain, severe swelling, numbness, color change, or temperature change in your toes. If you need to remove your compression wrap, do so by unrolling it. Do not cut the compression wrap off to prevent cutting yourself on accident.    -Should you experience any significant changes in your wound(s), such as infection (redness, swelling, localized heat, increased pain, fever > 101 F, chills) or have any questions regarding your home care instructions, please contact the wound center at [phone]. If after hours, contact your primary care physician or go to the hospital emergency room.

## 2023-05-09 NOTE — PROGRESS NOTES
Provider Encounter- Full Thickness wound    HISTORY OF PRESENT ILLNESS  Wound History:   START OF CARE IN CLINIC: 03/14/23   REFERRING PROVIDER: Didi Lehman   WOUND- Full Thickness Wound   LOCATION: Left posterior leg   HISTORY: Patient developed a wound in September 2023 to the left posterior leg.  Patient has thick fungal toenails and reports that he kicked his leg with his right foot and nails causing a scratch.  This led to the full-thickness wound to the left posterior lower extremity.  Patient reports that he was receiving home health care without resolution of the wound.  Patient was seen at [facility] and referred to [facility] wound care for further care of the left posterior full-thickness wound.    Pertinent Medical History: Hyperlipidemia, ABELARDO, obesity, essential hypertension, type 2 diabetes, pulmonary hypertension    TOBACCO USE: Denies a history of tobacco use or smokeless tobacco products    Patient's problem list, allergies, and current medications reviewed and updated in Epic    Interval History:  3/14/2023: Clinic visit with GURJIT Barrientos.  Patient reports overall he is feeling well denies any fever or chills.    3/21/2023: Clinic visit with Kwaku Sanchez MD. Patient reports doing well, denies any fevers or chills. His wound is measuring about the same but is starting to fill in with increased granulation tissue. Patient was referred to podiatry at [facility] and reports appointment for foot and nail care on 4/24. He does not wear compression to lower extremities and was counseled on edema / venous insufficiency and importance of compression to help return venous flow to heart.    3/27/2023: Clinic visit with Kwaku Sanchez MD. Patient reports doing well. Tolerating compression with decreased edema. He denies any complaints. Wound is slowly improving. Discussed again compression in future. He think he will be able to apply compression socks, given information and he will see about  obtaining from [facility].    4/3/2023: Clinic visit with GURJIT Barrientos.  Patient reports overall he is doing well denies any fever or chills.  Patient reports that Tubigrip applied over the Unna boot with Coban to the right lower extremity was too tight last clinical appointment.  Therefore we will use a larger Tubigrip.  Patient's wound is progressing with increased epithelial tissue at the periphery and granulation tissue throughout the wound bed.    4/10/2023: Clinic visit with GURJIT Barrientos.  Patient reports overall he is doing well denies any fever or chills.  Patient now has [facility] who is seeing the patient once a week for wound care dressing changes and reapplication of Unna boot with Coban wraps.  Patient reports that he has ordered compression stockings off of [employer] and they should be to his home soon.    4/25/2023: Clinic visit with GURJIT Barrientos.  Patient reports overall he is doing well denies any fever or chills.  Patient reports he has seen his podiatrist recently who has taken down his oncotic nails.  Patient reports that he has not had any applications with the compression.  Discussed increasing compression of the 2 layer compression wrap patient is in agreement.  Patient reports that ED and health services is going to supply him with zipper compression stockings with 30 to 40 mm compression.      5/2/2023 : Clinic visit with GURJIT Ordaz, VAL, DIANA, NAY.   Patient states that overall he is feeling well.  Reports his blood sugar was in the 150s.  He did not receive zippered compression garment, ordered last visit.  We will follow-up.  His wound is slowly progressing.  He is tolerating compression without any difficulty.    5/9/2023 : Clinic visit with GURJIT Ordaz, VAL, DIANA, NAY.   Patient states he is feeling well overall.  His blood sugars continue to run in the 150s to 160s.  He just started on Ozempic about a week ago.  He has still not  received zippered compression stocking.  He states that [facility] is trying to figure out which department will pay for this.  He continues to tolerate compression wrap without any difficulty.    REVIEW OF SYSTEMS:   Unchanged from previous wound clinic assessment on 5/2/2023, except as noted in interval history above      PHYSICAL EXAMINATION:   /56   Temp 36.6 °C (97.9 °F) (Temporal)   Resp 20   SpO2 98%     Physical Exam  Constitutional:       Appearance: He is obese.   Cardiovascular:      Rate and Rhythm: Normal rate.      Comments: Patient has triphasic pulses heard via handheld Doppler to the DP and PT on the left foot  Pulmonary:      Effort: Pulmonary effort is normal. No respiratory distress.      Breath sounds: No wheezing.   Musculoskeletal:         General: Swelling present.      Right lower leg: Edema present.      Left lower leg: Edema present.   Skin:     Comments: Full-thickness wound to the left posterior lower extremity: Wound area has increased since last assessment, thick layer of slough to wound bed, moderate serosanguineous drainage, no evidence of infection    Refer to wound flowsheet and photos    Hemosiderin staining, chronic edema, skin changes-consistent with CVI   Neurological:      Mental Status: He is alert and oriented to person, place, and time.       WOUND ASSESSMENT  Wound 03/14/23 Traumatic LLE Posterior (Active)   Wound Image    05/09/23 1500   Site Assessment Red;Pink;Yellow 05/09/23 1500   Periwound Assessment Hemosiderin Staining;Edema 05/09/23 1500   Margins Attached edges 05/09/23 1500   Drainage Amount Moderate 05/09/23 1500   Drainage Description Serosanguineous 05/09/23 1500   Treatments Cleansed;Topical Lidocaine;Provider debridement;Site care 05/09/23 1500   Wound Cleansing Puracyn [place] 05/09/23 1500   Periwound Protectant Skin Moisturizer;Barrier Paste 05/09/23 1500   Dressing Cleansing/Solutions Normal Saline 05/09/23 1500   Dressing Options Collagen  Dressing;Hydrofiber Silver;Soft Conforming Roll;Compression Wrap Two Layer 05/09/23 1500   Dressing Changed Changed 05/09/23 1500   Dressing Change/Treatment Frequency Every 72 hrs, and As Needed 05/09/23 1500   Non-staged Wound Description Full thickness 05/09/23 1500   Wound Length (cm) 0.9 cm 05/09/23 1500   Wound Width (cm) 1.4 cm 05/09/23 1500   Wound Depth (cm) 0.1 cm 05/09/23 1500   Wound Surface Area (cm^2) 1.26 cm^2 05/09/23 1500   Wound Volume (cm^3) 0.126 cm^3 05/09/23 1500   Post-Procedure Length (cm) 0.9 cm 05/09/23 1500   Post-Procedure Width (cm) 1.4 cm 05/09/23 1500   Post-Procedure Depth (cm) 0.2 cm 05/09/23 1500   Post-Procedure Surface Area (cm^2) 1.26 cm^2 05/09/23 1500   Post-Procedure Volume (cm^3) 0.252 cm^3 05/09/23 1500   Wound Healing % 16 05/09/23 1500   Tunneling (cm) 0 cm 05/09/23 1500   Undermining (cm) 0 cm 05/09/23 1500   Wound Odor None 05/09/23 1500   Pulses DP;2+ 03/21/23 1330   Exposed Structures None 05/09/23 1500   Number of days: 56         PROCEDURE: Excisional debridement of the posterior left lower extremity.  -2% viscous lidocaine applied topically to wound bed for approximately 5 minutes prior to debridement  -Curette used to debride wound bed.  Excisional debridement was performed to remove devitalized tissue until healthy, bleeding tissue was visualized.   Entire surface of wound, 1.26 cm² debrided.  Tissue debrided into the subcutaneous layer.    -Bleeding controlled with manual pressure.    -Wound care completed by wound RN, refer to flowsheet  -Patient tolerated the procedure well, without c/o pain or discomfort.      Pertinent Labs and Diagnostics:    Labs:     A1c:   Lab Results   Component Value Date/Time    HBA1C 6.5 (H) 09/15/2020 04:51 AM            LAST  WOUND CULTURE:  DATE :   Lab Results   Component Value Date/Time    CULTRSULT No growth after 5 days of incubation. 11/07/2022 06:23 PM         ASSESSMENT AND PLAN:   1. Open wound of left lower leg, subsequent  encounter    5/9/2023: Wound area has increased slightly since last assessment.  Thick layer of slough to wound bed  -Excisional debridement performed in clinic today, medically necessary to promote wound healing.  -Patient to return to clinic weekly for assessment and debridement   -[facility] to continue to see the patient 1 time per week for 2 layer compression wrap change.  They are to place cotton soft roll under the 2 layer compression.  -Patient has not yet received zippered compression garment.  [facility] managing   Wound care: Collagen, soft conforming roll, 2 layer compression wrap.    2. Controlled type 2 diabetes mellitus without complication, without long-term current use of insulin ([facility])    5/9/2023: Patient reports his blood sugars are consistently in the high 150s to low 160s  -Patient has been advised to keep blood sugars below 140 and noted optimize wound healing  -Recently started on Ozempic, endocrinologist at [facility] managing    3. Severe obesity (BMI >= 40) ([facility])  Comments: Complicating factor to wound healing and wound care    5/9/2023: Benefits of weight loss for management of his diabetes, and for his overall health briefly discussed    4. Fungal infection of nail  Comments: Patient reports that he kicked his left calf with his right toenail causing a scratch which has led to the wound to the left lower extremity.    5/9/2023: Patient has been seen by podiatrist at [facility]  -He has a new prescription for shoes and inserts, provided by [facility]    5. Edema of both lower extremities  6. Venous insufficiency   Patient with significant edema to bilateral lower extremities which slows the diffusion of oxygen to the wound bed.  -2 layer compression initiated due to lack of compression of the wound  -Suspect there is some component of venous hypertension contributing to the excessive edema to bilateral lower extremities.  -Zippered pression garment previously ordered.  [facility] will provide, but unsure of  which department funds will come from.          PATIENT EDUCATION  - Importance of adequate nutrition for wound healing  -Advised to go to ER for any increased redness, swelling, drainage, or odor, or if patient develops fever, chills, nausea or vomiting.     Please note that this note may have been created using voice recognition software. I have worked with technical experts from [facility] to optimize the interface.  I have made every reasonable attempt to correct obvious errors, but there may be errors of grammar and possibly content that I did not discover before finalizing the note.    N

## 2023-05-16 ENCOUNTER — OFFICE VISIT (OUTPATIENT)
Dept: WOUND CARE | Facility: MEDICAL CENTER | Age: 63
End: 2023-05-16
Attending: INTERNAL MEDICINE
Payer: MEDICARE

## 2023-05-16 VITALS
HEART RATE: 79 BPM | SYSTOLIC BLOOD PRESSURE: 150 MMHG | TEMPERATURE: 98 F | RESPIRATION RATE: 20 BRPM | OXYGEN SATURATION: 90 % | DIASTOLIC BLOOD PRESSURE: 71 MMHG

## 2023-05-16 DIAGNOSIS — S81.802D OPEN WOUND OF LEFT LOWER LEG, SUBSEQUENT ENCOUNTER: Primary | ICD-10-CM

## 2023-05-16 DIAGNOSIS — B35.1 FUNGAL INFECTION OF NAIL: ICD-10-CM

## 2023-05-16 DIAGNOSIS — E66.01 SEVERE OBESITY (BMI >= 40) (HCC): ICD-10-CM

## 2023-05-16 DIAGNOSIS — I87.2 VENOUS INSUFFICIENCY OF BOTH LOWER EXTREMITIES: ICD-10-CM

## 2023-05-16 DIAGNOSIS — R60.0 EDEMA OF BOTH LOWER EXTREMITIES: ICD-10-CM

## 2023-05-16 DIAGNOSIS — E11.9 CONTROLLED TYPE 2 DIABETES MELLITUS WITHOUT COMPLICATION, WITHOUT LONG-TERM CURRENT USE OF INSULIN (HCC): ICD-10-CM

## 2023-05-16 PROCEDURE — 11042 DBRDMT SUBQ TIS 1ST 20SQCM/<: CPT

## 2023-05-16 PROCEDURE — 3078F DIAST BP <80 MM HG: CPT | Performed by: NURSE PRACTITIONER

## 2023-05-16 PROCEDURE — 3077F SYST BP >= 140 MM HG: CPT | Performed by: NURSE PRACTITIONER

## 2023-05-16 PROCEDURE — 11042 DBRDMT SUBQ TIS 1ST 20SQCM/<: CPT | Performed by: NURSE PRACTITIONER

## 2023-05-16 NOTE — PATIENT INSTRUCTIONS
-Keep your wound dressing clean, dry, and intact.    -Change your dressing if it becomes soiled, soaked, or falls off.    -Remove your compression wrap if you have severe pain, severe swelling, numbness, color change, or temperature change in your toes. If you need to remove your compression wrap, do so by unrolling it. Do not cut the compression wrap off to prevent cutting yourself on accident.    -Should you experience any significant changes in your wound(s), such as infection (redness, swelling, localized heat, increased pain, fever > 101 F, chills) or have any questions regarding your home care instructions, please contact the wound center at [phone]. If after hours, contact your primary care physician or go to the hospital emergency room.

## 2023-05-16 NOTE — PROGRESS NOTES
Provider Encounter- Full Thickness wound    HISTORY OF PRESENT ILLNESS  Wound History:   START OF CARE IN CLINIC: 03/14/23   REFERRING PROVIDER: Didi Lehman   WOUND- Full Thickness Wound   LOCATION: Left posterior leg   HISTORY: Patient developed a wound in September 2023 to the left posterior leg.  Patient has thick fungal toenails and reports that he kicked his leg with his right foot and nails causing a scratch.  This led to the full-thickness wound to the left posterior lower extremity.  Patient reports that he was receiving home health care without resolution of the wound.  Patient was seen at [facility] and referred to [facility] wound care for further care of the left posterior full-thickness wound.    Pertinent Medical History: Hyperlipidemia, ABELARDO, obesity, essential hypertension, type 2 diabetes, pulmonary hypertension    TOBACCO USE: Denies a history of tobacco use or smokeless tobacco products    Patient's problem list, allergies, and current medications reviewed and updated in Epic    Interval History:  3/14/2023: Clinic visit with GURJIT Barrientos.  Patient reports overall he is feeling well denies any fever or chills.    3/21/2023: Clinic visit with Kwaku Sanchez MD. Patient reports doing well, denies any fevers or chills. His wound is measuring about the same but is starting to fill in with increased granulation tissue. Patient was referred to podiatry at [facility] and reports appointment for foot and nail care on 4/24. He does not wear compression to lower extremities and was counseled on edema / venous insufficiency and importance of compression to help return venous flow to heart.    3/27/2023: Clinic visit with Kwaku Sanchez MD. Patient reports doing well. Tolerating compression with decreased edema. He denies any complaints. Wound is slowly improving. Discussed again compression in future. He think he will be able to apply compression socks, given information and he will see about  obtaining from [facility].    4/3/2023: Clinic visit with GURJIT Barrientos.  Patient reports overall he is doing well denies any fever or chills.  Patient reports that Tubigrip applied over the Unna boot with Coban to the right lower extremity was too tight last clinical appointment.  Therefore we will use a larger Tubigrip.  Patient's wound is progressing with increased epithelial tissue at the periphery and granulation tissue throughout the wound bed.    4/10/2023: Clinic visit with GURJIT Barrientos.  Patient reports overall he is doing well denies any fever or chills.  Patient now has [facility] who is seeing the patient once a week for wound care dressing changes and reapplication of Unna boot with Coban wraps.  Patient reports that he has ordered compression stockings off of [employer] and they should be to his home soon.    4/25/2023: Clinic visit with GURJIT Barrientos.  Patient reports overall he is doing well denies any fever or chills.  Patient reports he has seen his podiatrist recently who has taken down his oncotic nails.  Patient reports that he has not had any applications with the compression.  Discussed increasing compression of the 2 layer compression wrap patient is in agreement.  Patient reports that ED and health services is going to supply him with zipper compression stockings with 30 to 40 mm compression.      5/2/2023 : Clinic visit with GURJIT Ordaz, VAL, DIANA, NAY.   Patient states that overall he is feeling well.  Reports his blood sugar was in the 150s.  He did not receive zippered compression garment, ordered last visit.  We will follow-up.  His wound is slowly progressing.  He is tolerating compression without any difficulty.    5/9/2023 : Clinic visit with GURJIT Ordaz, VAL, DIANA, NAY.   Patient states he is feeling well overall.  His blood sugars continue to run in the 150s to 160s.  He just started on Ozempic about a week ago.  He has still not  received zippered compression stocking.  He states that [facility] is trying to figure out which department will pay for this.  He continues to tolerate compression wrap without any difficulty.    5/16/2023 : Clinic visit with GURJIT Ordaz, VAL, LALON, CFCN.   Patient states he is feeling well, blood sugars in the 160s.  He has not yet obtained zippered compression stocking from [facility], is not sure when this will be happening.  He states that he has an appointment to  a voucher for diabetic shoes with inserts, and will ask at that time about the compression garment.    REVIEW OF SYSTEMS:   Unchanged from previous wound clinic assessment on 5/9/2023, except as noted in interval history above      PHYSICAL EXAMINATION:   BP (!) 150/71   Pulse 79   Temp 36.7 °C (98 °F) (Temporal)   Resp 20   SpO2 90%     Physical Exam  Constitutional:       Appearance: He is obese.   Cardiovascular:      Rate and Rhythm: Normal rate.      Pulses: Normal pulses.      Comments: Pedal pulses are palpable  Pulmonary:      Effort: Pulmonary effort is normal. No respiratory distress.      Breath sounds: No wheezing.   Musculoskeletal:         General: Swelling present.      Right lower leg: Edema present.      Left lower leg: Edema present.   Skin:     Comments: Full-thickness wound to the left posterior lower extremity: Wound area has increased, thick layer of slough to wound bed, moderate serosanguineous drainage, no evidence of infection    Refer to wound flowsheet and photos    Hemosiderin staining, chronic edema, skin changes-consistent with CVI   Neurological:      Mental Status: He is alert and oriented to person, place, and time.         WOUND ASSESSMENT  Wound 03/14/23 Traumatic LLE Posterior (Active)   Wound Image    05/16/23 1430   Site Assessment Red;Pink;Yellow 05/16/23 1430   Periwound Assessment Hemosiderin Staining;Edema 05/16/23 1430   Margins Attached edges 05/16/23 1430   Drainage Amount Moderate 05/16/23 1430    Drainage Description Serosanguineous 05/16/23 1430   Treatments Cleansed;Topical Lidocaine;Provider debridement;Site care 05/16/23 1430   Wound Cleansing Puracyn [place] 05/16/23 1430   Periwound Protectant Barrier Paste;Skin Moisturizer 05/16/23 1430   Dressing Cleansing/Solutions Not Applicable 05/16/23 1430   Dressing Options Hydrofiber Silver;Soft Conforming Roll;Compression Wrap Two Layer 05/16/23 1430   Dressing Changed Changed 05/16/23 1430   Dressing Change/Treatment Frequency Every 72 hrs, and As Needed 05/09/23 1500   Non-staged Wound Description Full thickness 05/16/23 1430   Wound Length (cm) 1.5 cm 05/16/23 1430   Wound Width (cm) 1.6 cm 05/16/23 1430   Wound Depth (cm) 0.1 cm 05/16/23 1430   Wound Surface Area (cm^2) 2.4 cm^2 05/16/23 1430   Wound Volume (cm^3) 0.24 cm^3 05/16/23 1430   Post-Procedure Length (cm) 1.4 cm 05/16/23 1430   Post-Procedure Width (cm) 1.5 cm 05/16/23 1430   Post-Procedure Depth (cm) 0.2 cm 05/16/23 1430   Post-Procedure Surface Area (cm^2) 2.1 cm^2 05/16/23 1430   Post-Procedure Volume (cm^3) 0.42 cm^3 05/16/23 1430   Wound Healing % -60 05/16/23 1430   Tunneling (cm) 0 cm 05/16/23 1430   Undermining (cm) 0 cm 05/16/23 1430   Wound Odor None 05/16/23 1430   Pulses DP;2+ 03/21/23 1330   Right Foot Monofilament 10-point exam (Sensate) 8/10 05/09/23 1500   Left Foot Monofilament 10-point exam (Sensate) 9/10 05/09/23 1500   Exposed Structures None 05/16/23 1430   Number of days: 63         PROCEDURE: Excisional debridement of the posterior left lower extremity.  -2% viscous lidocaine applied topically to wound bed for approximately 5 minutes prior to debridement  -Curette used to debride wound bed.  Excisional debridement was performed to remove devitalized tissue until healthy, bleeding tissue was visualized.   Entire surface of wound, 2.1 cm² debrided.  Tissue debrided into the subcutaneous layer.    -Bleeding controlled with manual pressure.    -Wound care completed by wound  RN, refer to flowsheet  -Patient tolerated the procedure well, without c/o pain or discomfort.      Pertinent Labs and Diagnostics:    Labs:     A1c:   Lab Results   Component Value Date/Time    HBA1C 6.5 (H) 09/15/2020 04:51 AM            LAST  WOUND CULTURE:  DATE :   Lab Results   Component Value Date/Time    CULTRSULT No growth after 5 days of incubation. 11/07/2022 06:23 PM         ASSESSMENT AND PLAN:   1. Open wound of left lower leg, subsequent encounter    5/16/2023: Wound area has increased, slough to wound bed  -Excisional debridement performed in clinic today, medically necessary to promote wound healing.  -Patient to return to clinic weekly for assessment and debridement   -[facility] to continue to see the patient 1 time per week for 2 layer compression wrap change.  They are to place cotton soft roll under the 2 layer compression.  -Patient states he will be obtaining zippered compression garment from [facility], though not sure when   Wound care: Silver Hydrofiber, soft conforming roll, 2 layer compression wrap.    2. Controlled type 2 diabetes mellitus without complication, without long-term current use of insulin ([facility])    5/16/2023: Patient's blood sugar today was 162  -Patient has been advised to keep blood sugars below 140 and noted optimize wound healing  -Recently started on Ozempic, endocrinologist at [facility] managing    3. Severe obesity (BMI >= 40) ([facility])  Comments: Complicating factor to wound healing and wound care    Seen 2023: Benefits of weight loss for management of his diabetes, and for his overall health previously discussed    4. Fungal infection of nail  Comments: Patient reports that he kicked his left calf with his right toenail causing a scratch which has led to the wound to the left lower extremity.    5/16/2023: Patient has been seen by podiatrist at [facility]  -He has a new prescription for shoes and inserts, provided by [facility], states he will be picking up voucher later today    5.  Edema of both lower extremities  6. Venous insufficiency     5/16/2023: 2-3+ edema bilateral lower extremities, undoubtedly lymphedema component as well.  Is tolerating 2 layer compression wraps  - continue 2 layer compression wrap.  -Zippered compression garment to be obtained through [facility], see above          PATIENT EDUCATION  - Importance of adequate nutrition for wound healing  -Advised to go to ER for any increased redness, swelling, drainage, or odor, or if patient develops fever, chills, nausea or vomiting.     Please note that this note may have been created using voice recognition software. I have worked with technical experts from [employer] [facility] to optimize the interface.  I have made every reasonable attempt to correct obvious errors, but there may be errors of grammar and possibly content that I did not discover before finalizing the note.    N

## 2023-05-16 NOTE — PROGRESS NOTES
Home Health Wound Care orders entered into [facility] and routed to [facility] via [employer].

## 2023-05-23 ENCOUNTER — TELEPHONE (OUTPATIENT)
Dept: WOUND CARE | Facility: MEDICAL CENTER | Age: 63
End: 2023-05-23

## 2023-05-23 ENCOUNTER — OFFICE VISIT (OUTPATIENT)
Dept: WOUND CARE | Facility: MEDICAL CENTER | Age: 63
End: 2023-05-23
Attending: INTERNAL MEDICINE
Payer: MEDICARE

## 2023-05-23 VITALS
HEART RATE: 80 BPM | OXYGEN SATURATION: 90 % | SYSTOLIC BLOOD PRESSURE: 146 MMHG | TEMPERATURE: 99.1 F | RESPIRATION RATE: 20 BRPM | DIASTOLIC BLOOD PRESSURE: 63 MMHG

## 2023-05-23 DIAGNOSIS — E11.9 CONTROLLED TYPE 2 DIABETES MELLITUS WITHOUT COMPLICATION, WITHOUT LONG-TERM CURRENT USE OF INSULIN (HCC): ICD-10-CM

## 2023-05-23 DIAGNOSIS — E66.01 SEVERE OBESITY (BMI >= 40) (HCC): ICD-10-CM

## 2023-05-23 DIAGNOSIS — R60.0 EDEMA OF BOTH LOWER EXTREMITIES: ICD-10-CM

## 2023-05-23 DIAGNOSIS — S81.802D OPEN WOUND OF LEFT LOWER LEG, SUBSEQUENT ENCOUNTER: Primary | ICD-10-CM

## 2023-05-23 DIAGNOSIS — I87.2 VENOUS INSUFFICIENCY OF BOTH LOWER EXTREMITIES: ICD-10-CM

## 2023-05-23 PROCEDURE — 3077F SYST BP >= 140 MM HG: CPT | Performed by: NURSE PRACTITIONER

## 2023-05-23 PROCEDURE — 11042 DBRDMT SUBQ TIS 1ST 20SQCM/<: CPT | Performed by: NURSE PRACTITIONER

## 2023-05-23 PROCEDURE — 3078F DIAST BP <80 MM HG: CPT | Performed by: NURSE PRACTITIONER

## 2023-05-23 PROCEDURE — 11042 DBRDMT SUBQ TIS 1ST 20SQCM/<: CPT

## 2023-05-23 NOTE — PATIENT INSTRUCTIONS
-Keep your wound dressing clean, dry, and intact.    -Change your dressing if it becomes soiled, soaked, or falls off.    -Remove your compression wrap if you have severe pain, severe swelling, numbness, color change, or temperature change in your toes. If you need to remove your compression wrap, do so by unrolling it. Do not cut the compression wrap off to prevent cutting yourself on accident.    -Should you experience any significant changes in your wound(s), such as infection (redness, swelling, localized heat, increased pain, fever > 101 F, chills) or have any questions regarding your home care instructions, please contact the wound center at [phone]. If after hours, contact your primary care physician or go to the hospital emergency room.

## 2023-05-23 NOTE — PROGRESS NOTES
Provider Encounter- Full Thickness wound    HISTORY OF PRESENT ILLNESS  Wound History:   START OF CARE IN CLINIC: 03/14/23   REFERRING PROVIDER: Didi Lehman   WOUND- Full Thickness Wound   LOCATION: Left posterior leg   HISTORY: Patient developed a wound in September 2023 to the left posterior leg.  Patient has thick fungal toenails and reports that he kicked his leg with his right foot and nails causing a scratch.  This led to the full-thickness wound to the left posterior lower extremity.  Patient reports that he was receiving home health care without resolution of the wound.  Patient was seen at [facility] and referred to [facility] wound care for further care of the left posterior full-thickness wound.    Pertinent Medical History: Hyperlipidemia, ABELARDO, obesity, essential hypertension, type 2 diabetes, pulmonary hypertension    TOBACCO USE: Denies a history of tobacco use or smokeless tobacco products    Patient's problem list, allergies, and current medications reviewed and updated in Epic    Interval History:  3/14/2023: Clinic visit with GURJIT Barrientos.  Patient reports overall he is feeling well denies any fever or chills.    3/21/2023: Clinic visit with Kwaku Sanchez MD. Patient reports doing well, denies any fevers or chills. His wound is measuring about the same but is starting to fill in with increased granulation tissue. Patient was referred to podiatry at [facility] and reports appointment for foot and nail care on 4/24. He does not wear compression to lower extremities and was counseled on edema / venous insufficiency and importance of compression to help return venous flow to heart.    3/27/2023: Clinic visit with Kwaku Sanchez MD. Patient reports doing well. Tolerating compression with decreased edema. He denies any complaints. Wound is slowly improving. Discussed again compression in future. He think he will be able to apply compression socks, given information and he will see about  obtaining from [facility].    4/3/2023: Clinic visit with GURJIT Barrientos.  Patient reports overall he is doing well denies any fever or chills.  Patient reports that Tubigrip applied over the Unna boot with Coban to the right lower extremity was too tight last clinical appointment.  Therefore we will use a larger Tubigrip.  Patient's wound is progressing with increased epithelial tissue at the periphery and granulation tissue throughout the wound bed.    4/10/2023: Clinic visit with GURJIT Barrientos.  Patient reports overall he is doing well denies any fever or chills.  Patient now has [facility] who is seeing the patient once a week for wound care dressing changes and reapplication of Unna boot with Coban wraps.  Patient reports that he has ordered compression stockings off of [employer] and they should be to his home soon.    4/25/2023: Clinic visit with GURJIT Barrientos.  Patient reports overall he is doing well denies any fever or chills.  Patient reports he has seen his podiatrist recently who has taken down his oncotic nails.  Patient reports that he has not had any applications with the compression.  Discussed increasing compression of the 2 layer compression wrap patient is in agreement.  Patient reports that ED and health services is going to supply him with zipper compression stockings with 30 to 40 mm compression.      5/2/2023 : Clinic visit with GURJIT Ordaz, VAL, DIANA, NAY.   Patient states that overall he is feeling well.  Reports his blood sugar was in the 150s.  He did not receive zippered compression garment, ordered last visit.  We will follow-up.  His wound is slowly progressing.  He is tolerating compression without any difficulty.    5/9/2023 : Clinic visit with GURJIT Ordaz, VAL, DIANA, NAY.   Patient states he is feeling well overall.  His blood sugars continue to run in the 150s to 160s.  He just started on Ozempic about a week ago.  He has still not  received zippered compression stocking.  He states that [facility] is trying to figure out which department will pay for this.  He continues to tolerate compression wrap without any difficulty.    5/16/2023 : Clinic visit with GURJIT Ordaz, FNSULMA, CWBON, CFCN.   Patient states he is feeling well, blood sugars in the 160s.  He has not yet obtained zippered compression stocking from [facility], is not sure when this will be happening.  He states that he has an appointment to  a voucher for diabetic shoes with inserts, and will ask at that time about the compression garment.    5/23/2023: Clinic visit with GURJIT Barrientos.  Patient reports that he is doing okay denies fever or chills.  Patient is on home oxygen, again the patient does not have a transportable tank.  Short of breath on arrival.  Nasal cannula placed in clinic at 3 L patient saturating above 90%.    REVIEW OF SYSTEMS:   Unchanged from previous wound clinic assessment on 5/16/2023, except as noted in interval history above      PHYSICAL EXAMINATION:   BP (!) 146/63 (BP Location: Left arm, Patient Position: Sitting, BP Cuff Size: Adult)   Pulse 80   Temp 37.3 °C (99.1 °F) (Temporal)   Resp 20   SpO2 90%     Physical Exam  Cardiovascular:      Comments: Pedal pulses are palpable  Pulmonary:      Comments: Short of breath due to ambulation into clinic and lack of portable oxygen   SPO2 in 90s with NC at 3L  Musculoskeletal:         General: Swelling present.      Right lower leg: Edema present.      Left lower leg: Edema present.   Skin:     Comments: Full-thickness wound to the left posterior lower extremity: Wound area slightly smaller, no periwound erythema, edema or foul-smelling odor emanating from the wound bed    Refer to wound flowsheet and photos    Hemosiderin staining, chronic edema, skin changes-consistent with CVI   Neurological:      Mental Status: He is alert and oriented to person, place, and time.         WOUND ASSESSMENT  Wound  03/14/23 Traumatic LLE Posterior (Active)   Wound Image    05/23/23 1400   Site Assessment Red;Pink;Yellow 05/23/23 1400   Periwound Assessment Hemosiderin Staining;Edema 05/23/23 1400   Margins Attached edges 05/23/23 1400   Drainage Amount Moderate 05/23/23 1400   Drainage Description Serosanguineous 05/23/23 1400   Treatments Cleansed;Topical Lidocaine;Provider debridement 05/23/23 1400   Wound Cleansing Foam Cleanser/Washcloth 05/23/23 1400   Periwound Protectant Barrier Paste;Skin Moisturizer 05/23/23 1400   Dressing Cleansing/Solutions Normal Saline 05/23/23 1400   Dressing Options Collagen Dressing;Hydrofiber Silver;Soft Conforming Roll;Compression Wrap Two Layer 05/23/23 1400   Dressing Changed Changed 05/23/23 1400   Dressing Change/Treatment Frequency Every 72 hrs, and As Needed 05/23/23 1400   Non-staged Wound Description Full thickness 05/23/23 1400   Wound Length (cm) 1.4 cm 05/23/23 1400   Wound Width (cm) 1.5 cm 05/23/23 1400   Wound Depth (cm) 0.2 cm 05/23/23 1400   Wound Surface Area (cm^2) 2.1 cm^2 05/23/23 1400   Wound Volume (cm^3) 0.42 cm^3 05/23/23 1400   Post-Procedure Length (cm) 1.3 cm 05/23/23 1400   Post-Procedure Width (cm) 1.5 cm 05/23/23 1400   Post-Procedure Depth (cm) 0.3 cm 05/23/23 1400   Post-Procedure Surface Area (cm^2) 1.95 cm^2 05/23/23 1400   Post-Procedure Volume (cm^3) 0.585 cm^3 05/23/23 1400   Wound Healing % -180 05/23/23 1400   Tunneling (cm) 0 cm 05/23/23 1400   Undermining (cm) 0 cm 05/23/23 1400   Wound Odor None 05/23/23 1400   Pulses DP;2+ 03/21/23 1330   Right Foot Monofilament 10-point exam (Sensate) 8/10 05/09/23 1500   Left Foot Monofilament 10-point exam (Sensate) 9/10 05/09/23 1500   Exposed Structures None 05/23/23 1400   Number of days: 70         PROCEDURE: Excisional debridement of left lower extremity posterior wound  -2% viscous lidocaine applied topically to wound bed for approximately 5 minutes prior to debridement  -Curette used to debride wound bed.   Excisional debridement was performed to remove devitalized tissue until healthy, bleeding tissue was visualized.   Entire surface of wound, 1.95 cm2 debrided.  Tissue debrided into the subcutaneous layer.    -Bleeding controlled with manual pressure.    -Wound care completed by wound RN, refer to flowsheet  -Patient tolerated the procedure well, without c/o pain or discomfort.      Pertinent Labs and Diagnostics:    Labs:     A1c:   Lab Results   Component Value Date/Time    HBA1C 6.5 (H) 09/15/2020 04:51 AM            LAST  WOUND CULTURE:  DATE :   Lab Results   Component Value Date/Time    CULTRSULT No growth after 5 days of incubation. 11/07/2022 06:23 PM         ASSESSMENT AND PLAN:   1. Open wound of left lower leg, subsequent encounter    5/23/2023: Very small reduction in wound mentions  -Excisional debridement performed remove thin layer of slough.  -Request for authorization from [facility] for biologic placed today.   -She is to return to clinic in 1 week for assessment and debridement as needed  -[facility] to continue to see the patient 1 time per week for 2 layer compression wrap change.  They are to place cotton soft roll under the 2 layer compression.  -Reports that he still has not got his zipper compression garments from [facility].  Patient reports he is picking up a prescription from  medical for diabetic shoes from [facility].  He states he is also told by [facility] to check with [facility] medical while he is there for zip up compression stockings   Wound care: Silver Hydrofiber, soft conforming roll, 2 layer compression wrap.    2. Controlled type 2 diabetes mellitus without complication, without long-term current use of insulin ([facility])    5/23/2023: Patient reports that his blood sugar was 147 today  -Patient has been advised to keep blood sugars below 140 and noted optimize wound healing  -Recently started on Ozempic, endocrinologist at [facility] managing    3. Severe obesity (BMI >= 40)  ([facility])  Comments: Complicating factor to wound healing and wound care    Seen 2023: Benefits of weight loss for management of his diabetes, and for his overall health previously discussed    4. Edema of both lower extremities  5. Venous insufficiency     5/23/2023: Edema to the left lower extremity well managed with 2 layer compression.  Suspect lymphedema component.  -Continue with 2 layer compression wrap at this time.  -Patient is to go to [facility] and order compression stockings with zippers.          PATIENT EDUCATION  - Importance of adequate nutrition for wound healing  -Advised to go to ER for any increased redness, swelling, drainage, or odor, or if patient develops fever, chills, nausea or vomiting.     Please note that this note may have been created using voice recognition software. I have worked with technical experts from [email or web address] to optimize the interface.  I have made every reasonable attempt to correct obvious errors, but there may be errors of grammar and possibly content that I did not discover before finalizing the note.    N

## 2023-05-23 NOTE — PROGRESS NOTES
Updated Home Health Wound Care orders entered into [facility] and routed to [facility] via [employer].

## 2023-05-30 ENCOUNTER — OFFICE VISIT (OUTPATIENT)
Dept: WOUND CARE | Facility: MEDICAL CENTER | Age: 63
End: 2023-05-30
Attending: INTERNAL MEDICINE
Payer: MEDICARE

## 2023-05-30 VITALS
TEMPERATURE: 97 F | HEART RATE: 78 BPM | RESPIRATION RATE: 20 BRPM | SYSTOLIC BLOOD PRESSURE: 144 MMHG | DIASTOLIC BLOOD PRESSURE: 59 MMHG | OXYGEN SATURATION: 93 %

## 2023-05-30 DIAGNOSIS — E66.01 SEVERE OBESITY (BMI >= 40) (HCC): ICD-10-CM

## 2023-05-30 DIAGNOSIS — R60.0 EDEMA OF BOTH LOWER EXTREMITIES: ICD-10-CM

## 2023-05-30 DIAGNOSIS — I87.2 VENOUS INSUFFICIENCY OF BOTH LOWER EXTREMITIES: ICD-10-CM

## 2023-05-30 DIAGNOSIS — E11.9 CONTROLLED TYPE 2 DIABETES MELLITUS WITHOUT COMPLICATION, WITHOUT LONG-TERM CURRENT USE OF INSULIN (HCC): ICD-10-CM

## 2023-05-30 DIAGNOSIS — S81.802D OPEN WOUND OF LEFT LOWER LEG, SUBSEQUENT ENCOUNTER: ICD-10-CM

## 2023-05-30 PROCEDURE — 3077F SYST BP >= 140 MM HG: CPT | Performed by: STUDENT IN AN ORGANIZED HEALTH CARE EDUCATION/TRAINING PROGRAM

## 2023-05-30 PROCEDURE — 3078F DIAST BP <80 MM HG: CPT | Performed by: STUDENT IN AN ORGANIZED HEALTH CARE EDUCATION/TRAINING PROGRAM

## 2023-05-30 PROCEDURE — 11042 DBRDMT SUBQ TIS 1ST 20SQCM/<: CPT | Performed by: STUDENT IN AN ORGANIZED HEALTH CARE EDUCATION/TRAINING PROGRAM

## 2023-05-30 PROCEDURE — 11042 DBRDMT SUBQ TIS 1ST 20SQCM/<: CPT

## 2023-05-30 NOTE — PROGRESS NOTES
Provider Encounter- Full Thickness wound    HISTORY OF PRESENT ILLNESS  Wound History:   START OF CARE IN CLINIC: 03/14/23   REFERRING PROVIDER: Didi Lehman   WOUND- Full Thickness Wound   LOCATION: Left posterior leg   HISTORY: Patient developed a wound in September 2023 to the left posterior leg.  Patient has thick fungal toenails and reports that he kicked his leg with his right foot and nails causing a scratch.  This led to the full-thickness wound to the left posterior lower extremity.  Patient reports that he was receiving home health care without resolution of the wound.  Patient was seen at [facility] and referred to [facility] wound care for further care of the left posterior full-thickness wound.    Pertinent Medical History: Hyperlipidemia, ABELARDO, obesity, essential hypertension, type 2 diabetes, pulmonary hypertension    TOBACCO USE: Denies a history of tobacco use or smokeless tobacco products    Patient's problem list, allergies, and current medications reviewed and updated in Epic    Interval History:  3/14/2023: Clinic visit with GURJIT Barrientos.  Patient reports overall he is feeling well denies any fever or chills.    3/21/2023: Clinic visit with Kwaku Sanchez MD. Patient reports doing well, denies any fevers or chills. His wound is measuring about the same but is starting to fill in with increased granulation tissue. Patient was referred to podiatry at [facility] and reports appointment for foot and nail care on 4/24. He does not wear compression to lower extremities and was counseled on edema / venous insufficiency and importance of compression to help return venous flow to heart.    3/27/2023: Clinic visit with Kwaku Sanchez MD. Patient reports doing well. Tolerating compression with decreased edema. He denies any complaints. Wound is slowly improving. Discussed again compression in future. He think he will be able to apply compression socks, given information and he will see about  obtaining from [facility].    4/3/2023: Clinic visit with GURJIT Barrientos.  Patient reports overall he is doing well denies any fever or chills.  Patient reports that Tubigrip applied over the Unna boot with Coban to the right lower extremity was too tight last clinical appointment.  Therefore we will use a larger Tubigrip.  Patient's wound is progressing with increased epithelial tissue at the periphery and granulation tissue throughout the wound bed.    4/10/2023: Clinic visit with GURJIT Barrientos.  Patient reports overall he is doing well denies any fever or chills.  Patient now has [facility] who is seeing the patient once a week for wound care dressing changes and reapplication of Unna boot with Coban wraps.  Patient reports that he has ordered compression stockings off of [employer] and they should be to his home soon.    4/25/2023: Clinic visit with GURJIT Barrientos.  Patient reports overall he is doing well denies any fever or chills.  Patient reports he has seen his podiatrist recently who has taken down his oncotic nails.  Patient reports that he has not had any applications with the compression.  Discussed increasing compression of the 2 layer compression wrap patient is in agreement.  Patient reports that ED and health services is going to supply him with zipper compression stockings with 30 to 40 mm compression.      5/2/2023 : Clinic visit with GURJIT rOdaz, VAL, DIANA, NAY.   Patient states that overall he is feeling well.  Reports his blood sugar was in the 150s.  He did not receive zippered compression garment, ordered last visit.  We will follow-up.  His wound is slowly progressing.  He is tolerating compression without any difficulty.    5/9/2023 : Clinic visit with GURJIT Ordaz, VAL, DIANA, NAY.   Patient states he is feeling well overall.  His blood sugars continue to run in the 150s to 160s.  He just started on Ozempic about a week ago.  He has still not  received zippered compression stocking.  He states that [facility] is trying to figure out which department will pay for this.  He continues to tolerate compression wrap without any difficulty.    5/16/2023 : Clinic visit with GURJIT Ordaz, FNALLA-BC, CWBON, CFCN.   Patient states he is feeling well, blood sugars in the 160s.  He has not yet obtained zippered compression stocking from [facility], is not sure when this will be happening.  He states that he has an appointment to  a voucher for diabetic shoes with inserts, and will ask at that time about the compression garment.    5/23/2023: Clinic visit with GURJIT Barrientos.  Patient reports that he is doing okay denies fever or chills.  Patient is on home oxygen, again the patient does not have a transportable tank.  Short of breath on arrival.  Nasal cannula placed in clinic at 3 L patient saturating above 90%.    5/30/2023: Clinic visit with Kwaku Sanchez MD. Patient reports doing well. Denies any complaints. He reports that he continues to have difficulty obtaining compression socks from [facility]. Reports that he has been transferred between departments, but will continue to try. Wound largely unchanged. Biologic authorization is still pending. Patient continues to have edema despite 2 layer compression, he is agreeable to try 4 layer compression this week.    REVIEW OF SYSTEMS:   Unchanged from previous wound clinic assessment on 5/23/2023, except as noted in interval history above    PHYSICAL EXAMINATION:   BP (!) 144/59   Pulse 78   Temp 36.1 °C (97 °F) (Temporal)   Resp 20   SpO2 93%     Physical Exam  Cardiovascular:      Comments: Pedal pulses are palpable  Pulmonary:      Effort: Pulmonary effort is normal. No respiratory distress.   Musculoskeletal:         General: Swelling present.      Right lower leg: Edema present.      Left lower leg: Edema present.   Skin:     Comments: Full-thickness wound to the left posterior lower extremity: Wound area  largely unchanged, no periwound erythema, edema or foul-smelling odor emanating from the wound bed    Refer to wound flowsheet and photos    Hemosiderin staining, chronic edema, skin changes-consistent with CVI   Neurological:      Mental Status: He is alert and oriented to person, place, and time.         WOUND ASSESSMENT  Wound 03/14/23 Traumatic LLE Posterior (Active)   Wound Image    05/30/23 1500   Site Assessment Red;[place] 05/30/23 1500   Periwound Assessment Hemosiderin Staining;Edema 05/30/23 1500   Margins Attached edges 05/30/23 1500   Drainage Amount Moderate 05/30/23 1500   Drainage Description Serosanguineous 05/30/23 1500   Treatments Cleansed;Topical Lidocaine;Provider debridement;Site care 05/30/23 1500   Wound Cleansing Puracyn [place] 05/30/23 1500   Periwound Protectant Barrier Paste 05/30/23 1500   Dressing Cleansing/Solutions Not Applicable 05/30/23 1500   Dressing Options Hydrofiber Silver;Hydrofiber;Compression Wrap Four Layer 05/30/23 1500   Dressing Changed Changed 05/30/23 1500   Dressing Change/Treatment Frequency Every 72 hrs, and As Needed 05/23/23 1400   Non-staged Wound Description Full thickness 05/30/23 1500   Wound Length (cm) 1.5 cm 05/30/23 1500   Wound Width (cm) 1.5 cm 05/30/23 1500   Wound Depth (cm) 0.2 cm 05/30/23 1500   Wound Surface Area (cm^2) 2.25 cm^2 05/30/23 1500   Wound Volume (cm^3) 0.45 cm^3 05/30/23 1500   Post-Procedure Length (cm) 1.5 cm 05/30/23 1500   Post-Procedure Width (cm) 1.5 cm 05/30/23 1500   Post-Procedure Depth (cm) 0.2 cm 05/30/23 1500   Post-Procedure Surface Area (cm^2) 2.25 cm^2 05/30/23 1500   Post-Procedure Volume (cm^3) 0.45 cm^3 05/30/23 1500   Wound Healing % -200 05/30/23 1500   Tunneling (cm) 0 cm 05/30/23 1500   Undermining (cm) 0 cm 05/30/23 1500   Wound Odor None 05/30/23 1500   Pulses DP;2+ 03/21/23 1330   Right Foot Monofilament 10-point exam (Sensate) 8/10 05/09/23 1500   Left Foot Monofilament 10-point exam (Sensate) 9/10 05/09/23 1500    Exposed Structures None 05/30/23 1500   Number of days: 77         PROCEDURE: Excisional debridement of left lower extremity posterior wound  -2% viscous lidocaine applied topically to wound bed for approximately 5 minutes prior to debridement  -Curette used to debride wound bed.  Excisional debridement was performed to remove devitalized tissue until healthy, bleeding tissue was visualized.   Entire surface of wound, 2.25 cm2 debrided.  Tissue debrided into the subcutaneous layer.    -Bleeding controlled with manual pressure.    -Wound care completed by wound RN, refer to flowsheet  -Patient tolerated the procedure well, without c/o pain or discomfort.      Pertinent Labs and Diagnostics:    Labs:     A1c:   Lab Results   Component Value Date/Time    HBA1C 6.5 (H) 09/15/2020 04:51 AM            LAST  WOUND CULTURE:  DATE :   Lab Results   Component Value Date/Time    CULTRSULT No growth after 5 days of incubation. 11/07/2022 06:23 PM         ASSESSMENT AND PLAN:   1. Open wound of left lower leg, subsequent encounter    5/30/2023: Wound measuring slightly larger, largely unchanged.  -Excisional debridement performed remove thin layer of slough.  - Patient pending biologic authorization  - Patient continues to have edema despite 2 layer compression. He is agreeable to increase to 4 layer compression.  - He is to return to clinic in 1 week for assessment and debridement as needed  -Critical access hospital to continue to see the patient 1 time per week  -Reports that he still has not got his zipper compression garments from [facility].  Patient reports he is picking up a prescription from  medical for diabetic shoes from [facility].  He states he is also told by [facility] to check with [facility] medical while he is there for zip up compression stockings   Wound care: Silver Hydrofiber, 4 layer compression wrap    2. Controlled type 2 diabetes mellitus without complication, without long-term current use of insulin ([facility])    5/30/2023:  Patient reports that his blood sugar was 157 today  -Patient has been advised to keep blood sugars below 140 and noted optimize wound healing  -Recently started on Ozempic, endocrinologist at [facility] managing    3. Severe obesity (BMI >= 40) ([facility])  Comments: Complicating factor to wound healing and wound care  \Benefits of weight loss for management of his diabetes, and for his overall health previously discussed    4. Edema of both lower extremities  5. Venous insufficiency     5/30/2023:   Continues to have edema with 2 layer compression. Suspect lymphedema component.  - Patient agreeable to increase compression to 4 layers.  - Patient is to go to [facility] and order compression stockings with zippers.          PATIENT EDUCATION  - Importance of adequate nutrition for wound healing  -Advised to go to ER for any increased redness, swelling, drainage, or odor, or if patient develops fever, chills, nausea or vomiting.     Please note that this note may have been created using voice recognition software. I have worked with technical experts from [employer] to optimize the interface.  I have made every reasonable attempt to correct obvious errors, but there may be errors of grammar and possibly content that I did not discover before finalizing the note.    N

## 2023-05-30 NOTE — PROGRESS NOTES
Updated Home Health Wound Care orders entered into [facility] and routed to [facility] via [employer].

## 2023-05-30 NOTE — PATIENT INSTRUCTIONS
-Keep your wound dressing clean, dry, and intact.    -Change your dressing if it becomes soiled, soaked, or falls off.    -Should you experience any significant changes in your wound(s), such as infection (redness, swelling, localized heat, increased pain, fever > 101 F, chills) or have any questions regarding your home care instructions, please contact the wound center at [phone]. If after hours, contact your primary care physician or go to the hospital emergency room.

## 2023-06-06 ENCOUNTER — OFFICE VISIT (OUTPATIENT)
Dept: WOUND CARE | Facility: MEDICAL CENTER | Age: 63
End: 2023-06-06
Attending: INTERNAL MEDICINE
Payer: MEDICARE

## 2023-06-06 VITALS
DIASTOLIC BLOOD PRESSURE: 62 MMHG | RESPIRATION RATE: 20 BRPM | HEART RATE: 76 BPM | TEMPERATURE: 98.3 F | SYSTOLIC BLOOD PRESSURE: 144 MMHG | OXYGEN SATURATION: 92 %

## 2023-06-06 DIAGNOSIS — E11.9 CONTROLLED TYPE 2 DIABETES MELLITUS WITHOUT COMPLICATION, WITHOUT LONG-TERM CURRENT USE OF INSULIN (HCC): ICD-10-CM

## 2023-06-06 DIAGNOSIS — R60.0 EDEMA OF BOTH LOWER EXTREMITIES: ICD-10-CM

## 2023-06-06 DIAGNOSIS — E66.01 SEVERE OBESITY (BMI >= 40) (HCC): ICD-10-CM

## 2023-06-06 DIAGNOSIS — S81.802D OPEN WOUND OF LEFT LOWER LEG, SUBSEQUENT ENCOUNTER: ICD-10-CM

## 2023-06-06 DIAGNOSIS — I87.2 VENOUS INSUFFICIENCY OF BOTH LOWER EXTREMITIES: ICD-10-CM

## 2023-06-06 PROCEDURE — 11042 DBRDMT SUBQ TIS 1ST 20SQCM/<: CPT | Performed by: STUDENT IN AN ORGANIZED HEALTH CARE EDUCATION/TRAINING PROGRAM

## 2023-06-06 PROCEDURE — 11042 DBRDMT SUBQ TIS 1ST 20SQCM/<: CPT

## 2023-06-06 PROCEDURE — 3077F SYST BP >= 140 MM HG: CPT | Performed by: STUDENT IN AN ORGANIZED HEALTH CARE EDUCATION/TRAINING PROGRAM

## 2023-06-06 PROCEDURE — 3078F DIAST BP <80 MM HG: CPT | Performed by: STUDENT IN AN ORGANIZED HEALTH CARE EDUCATION/TRAINING PROGRAM

## 2023-06-06 NOTE — PROGRESS NOTES
Provider Encounter- Full Thickness wound    HISTORY OF PRESENT ILLNESS  Wound History:   START OF CARE IN CLINIC: 03/14/23   REFERRING PROVIDER: Didi Lehman   WOUND- Full Thickness Wound   LOCATION: Left posterior leg   HISTORY: Patient developed a wound in September 2023 to the left posterior leg.  Patient has thick fungal toenails and reports that he kicked his leg with his right foot and nails causing a scratch.  This led to the full-thickness wound to the left posterior lower extremity.  Patient reports that he was receiving home health care without resolution of the wound.  Patient was seen at [facility] and referred to [facility] wound care for further care of the left posterior full-thickness wound.    Pertinent Medical History: Hyperlipidemia, ABELARDO, obesity, essential hypertension, type 2 diabetes, pulmonary hypertension    TOBACCO USE: Denies a history of tobacco use or smokeless tobacco products    Patient's problem list, allergies, and current medications reviewed and updated in Epic    Interval History:  3/14/2023: Clinic visit with GURJIT Barrientos.  Patient reports overall he is feeling well denies any fever or chills.    3/21/2023: Clinic visit with Kwaku Sanchez MD. Patient reports doing well, denies any fevers or chills. His wound is measuring about the same but is starting to fill in with increased granulation tissue. Patient was referred to podiatry at [facility] and reports appointment for foot and nail care on 4/24. He does not wear compression to lower extremities and was counseled on edema / venous insufficiency and importance of compression to help return venous flow to heart.    3/27/2023: Clinic visit with Kwaku Sanchez MD. Patient reports doing well. Tolerating compression with decreased edema. He denies any complaints. Wound is slowly improving. Discussed again compression in future. He think he will be able to apply compression socks, given information and he will see about  obtaining from [facility].    4/3/2023: Clinic visit with GURJIT Barrientos.  Patient reports overall he is doing well denies any fever or chills.  Patient reports that Tubigrip applied over the Unna boot with Coban to the right lower extremity was too tight last clinical appointment.  Therefore we will use a larger Tubigrip.  Patient's wound is progressing with increased epithelial tissue at the periphery and granulation tissue throughout the wound bed.    4/10/2023: Clinic visit with GURJIT Barrientos.  Patient reports overall he is doing well denies any fever or chills.  Patient now has [facility] who is seeing the patient once a week for wound care dressing changes and reapplication of Unna boot with Coban wraps.  Patient reports that he has ordered compression stockings off of [employer] and they should be to his home soon.    4/25/2023: Clinic visit with GURJIT Barrientos.  Patient reports overall he is doing well denies any fever or chills.  Patient reports he has seen his podiatrist recently who has taken down his oncotic nails.  Patient reports that he has not had any applications with the compression.  Discussed increasing compression of the 2 layer compression wrap patient is in agreement.  Patient reports that ED and health services is going to supply him with zipper compression stockings with 30 to 40 mm compression.      5/2/2023 : Clinic visit with GURJIT Ordaz, VAL, DIANA, NAY.   Patient states that overall he is feeling well.  Reports his blood sugar was in the 150s.  He did not receive zippered compression garment, ordered last visit.  We will follow-up.  His wound is slowly progressing.  He is tolerating compression without any difficulty.    5/9/2023 : Clinic visit with GURJIT Ordaz, VAL, DIANA, NAY.   Patient states he is feeling well overall.  His blood sugars continue to run in the 150s to 160s.  He just started on Ozempic about a week ago.  He has still not  received zippered compression stocking.  He states that [facility] is trying to figure out which department will pay for this.  He continues to tolerate compression wrap without any difficulty.    5/16/2023 : Clinic visit with GURJIT Ordaz, FNSULMA, CWBON, CFCN.   Patient states he is feeling well, blood sugars in the 160s.  He has not yet obtained zippered compression stocking from [facility], is not sure when this will be happening.  He states that he has an appointment to  a voucher for diabetic shoes with inserts, and will ask at that time about the compression garment.    5/23/2023: Clinic visit with GURJIT Barrientos.  Patient reports that he is doing okay denies fever or chills.  Patient is on home oxygen, again the patient does not have a transportable tank.  Short of breath on arrival.  Nasal cannula placed in clinic at 3 L patient saturating above 90%.    5/30/2023: Clinic visit with Kwaku Sanchez MD. Patient reports doing well. Denies any complaints. He reports that he continues to have difficulty obtaining compression socks from [facility]. Reports that he has been transferred between departments, but will continue to try. Wound largely unchanged. Biologic authorization is still pending. Patient continues to have edema despite 2 layer compression, he is agreeable to try 4 layer compression this week.    6/6/2023: Clinic visit with Kwaku Sanchez MD. Patient reports feeling well. Denies any signs or symptoms of infection. He reports tolerating 4 layer wrap, though home health did not have and applied 2 layer wrap. Home health told him that they have ordered 4 layer compression. He is still working through [facility] to obtain compression socks. Biologic authorization is still pending.    REVIEW OF SYSTEMS:   Unchanged from previous wound clinic assessment on 5/30/2023, except as noted in interval history above    PHYSICAL EXAMINATION:   BP (!) 144/62   Pulse 76   Temp 36.8 °C (98.3 °F) (Temporal)   Resp 20    SpO2 92%     Physical Exam  Cardiovascular:      Comments: Pedal pulses are palpable  Pulmonary:      Effort: Pulmonary effort is normal. No respiratory distress.   Musculoskeletal:         General: Swelling present.      Right lower leg: Edema present.      Left lower leg: Edema present.   Skin:     Comments: Full-thickness wound to the left posterior lower extremity: Wound area largely unchanged, no periwound erythema, edema or foul-smelling odor emanating from the wound bed    Refer to wound flowsheet and photos    Hemosiderin staining, chronic edema, skin changes-consistent with CVI   Neurological:      Mental Status: He is alert and oriented to person, place, and time.         WOUND ASSESSMENT  Wound 03/14/23 Traumatic LLE Posterior (Active)   Wound Image    06/06/23 1500   Site Assessment Red;[place] 06/06/23 1500   Periwound Assessment Hemosiderin Staining;Edema 06/06/23 1500   Margins Attached edges 06/06/23 1500   Drainage Amount Moderate 06/06/23 1500   Drainage Description Serosanguineous 06/06/23 1500   Treatments Cleansed;Topical Lidocaine;Provider debridement 06/06/23 1500   Wound Cleansing Puracyn Rincon 06/06/23 1500   Periwound Protectant Skin Protectant Wipes to Periwound;Barrier Paste 06/06/23 1500   Dressing Cleansing/Solutions Not Applicable 06/06/23 1500   Dressing Options Hydrofiber Silver;Silicone Adhesive Foam;Compression Wrap Four Layer 06/06/23 1500   Dressing Changed New 06/06/23 1500   Dressing Change/Treatment Frequency Every 72 hrs, and As Needed 06/06/23 1500   Non-staged Wound Description Full thickness 06/06/23 1500   Wound Length (cm) 1.5 cm 06/06/23 1500   Wound Width (cm) 1.3 cm 06/06/23 1500   Wound Depth (cm) 0.2 cm 06/06/23 1500   Wound Surface Area (cm^2) 1.95 cm^2 06/06/23 1500   Wound Volume (cm^3) 0.39 cm^3 06/06/23 1500   Post-Procedure Length (cm) 1.5 cm 06/06/23 1500   Post-Procedure Width (cm) 1.4 cm 06/06/23 1500   Post-Procedure Depth (cm) 0.2 cm 06/06/23 1500    Post-Procedure Surface Area (cm^2) 2.1 cm^2 06/06/23 1500   Post-Procedure Volume (cm^3) 0.42 cm^3 06/06/23 1500   Wound Healing % -160 06/06/23 1500   Tunneling (cm) 0 cm 06/06/23 1500   Undermining (cm) 0 cm 06/06/23 1500   Wound Odor None 06/06/23 1500   Pulses DP;2+ 03/21/23 1330   Right Foot Monofilament 10-point exam (Sensate) 8/10 05/09/23 1500   Left Foot Monofilament 10-point exam (Sensate) 9/10 05/09/23 1500   Exposed Structures None 06/06/23 1500   Number of days: 84         PROCEDURE: Excisional debridement of left lower extremity posterior wound  -2% viscous lidocaine applied topically to wound bed for approximately 5 minutes prior to debridement  -Curette used to debride wound bed.  Excisional debridement was performed to remove devitalized tissue until healthy, bleeding tissue was visualized.   Entire surface of wound, 2.1 cm2 debrided.  Tissue debrided into the subcutaneous layer.    -Bleeding controlled with manual pressure.    -Wound care completed by wound RN, refer to flowsheet  -Patient tolerated the procedure well, without c/o pain or discomfort.      Pertinent Labs and Diagnostics:    Labs:     A1c:   Lab Results   Component Value Date/Time    HBA1C 6.5 (H) 09/15/2020 04:51 AM            LAST  WOUND CULTURE:  DATE :   Lab Results   Component Value Date/Time    CULTRSULT No growth after 5 days of incubation. 11/07/2022 06:23 PM         ASSESSMENT AND PLAN:   1. Open wound of left lower leg, subsequent encounter    6/6/2023: Wound measuring slightly smaller, largely unchanged  - Excisional debridement performed remove thin layer of slough.  - Patient pending biologic authorization  - Patient tolerated 4 layer compression, will continue.  - He is to return to clinic in 1 week for assessment and debridement as needed  -[facility] to continue to see the patient 1 time per week  -Reports that he still has not got his zipper compression garments from [facility].  Patient reports he is picking  up a prescription from [facility] for diabetic shoes from [facility].  He states he is also told by [facility] to check with [facility] while he is there for zip up compression stockings   Wound care: Silver Hydrofiber, 4 layer compression wrap    2. Controlled type 2 diabetes mellitus without complication, without long-term current use of insulin ([facility])    6/6/2023:  -Patient has been advised to keep blood sugars below 140 and noted optimize wound healing  -Recently started on Ozempic, endocrinologist at [facility] managing    3. Severe obesity (BMI >= 40) ([facility])  Comments: Complicating factor to wound healing and wound care  \Benefits of weight loss for management of his diabetes, and for his overall health previously discussed    4. Edema of both lower extremities  5. Venous insufficiency     6/6/2023:   Suspected component of lymphedema  - Patient tolerated 4 layer compression  - Patient is to go to [facility] and order compression stockings with zippers.      PATIENT EDUCATION  - Importance of adequate nutrition for wound healing  -Advised to go to ER for any increased redness, swelling, drainage, or odor, or if patient develops fever, chills, nausea or vomiting.     Please note that this note may have been created using voice recognition software. I have worked with technical experts from [employer] to optimize the interface.  I have made every reasonable attempt to correct obvious errors, but there may be errors of grammar and possibly content that I did not discover before finalizing the note.    N

## 2023-06-06 NOTE — PROGRESS NOTES
Updated wound care orders sent via [employer] to [facility] at [phone]

## 2023-06-06 NOTE — PATIENT INSTRUCTIONS
-Keep your wound dressing clean, dry, and intact.    -Change your dressing if it becomes soiled, soaked, or falls off.    -Remove your compression wrap if you have severe pain, severe swelling, numbness, color change, or temperature change in your toes. If you need to remove your compression wrap, do so by unrolling it. Do not cut the compression wrap off to prevent cutting yourself on accident.    -Should you experience any significant changes in your wound(s), such as infection (redness, swelling, localized heat, increased pain, fever > 101 F, chills) or have any questions regarding your home care instructions, please contact the wound center at [phone]. If after hours, contact your primary care physician or go to the hospital emergency room.

## 2023-06-13 ENCOUNTER — OFFICE VISIT (OUTPATIENT)
Dept: WOUND CARE | Facility: MEDICAL CENTER | Age: 63
End: 2023-06-13
Attending: INTERNAL MEDICINE
Payer: MEDICARE

## 2023-06-13 VITALS
RESPIRATION RATE: 20 BRPM | OXYGEN SATURATION: 95 % | TEMPERATURE: 98.5 F | SYSTOLIC BLOOD PRESSURE: 121 MMHG | DIASTOLIC BLOOD PRESSURE: 56 MMHG | HEART RATE: 77 BPM

## 2023-06-13 DIAGNOSIS — E66.01 SEVERE OBESITY (BMI >= 40) (HCC): ICD-10-CM

## 2023-06-13 DIAGNOSIS — R60.0 EDEMA OF BOTH LOWER EXTREMITIES: ICD-10-CM

## 2023-06-13 DIAGNOSIS — E11.9 CONTROLLED TYPE 2 DIABETES MELLITUS WITHOUT COMPLICATION, WITHOUT LONG-TERM CURRENT USE OF INSULIN (HCC): ICD-10-CM

## 2023-06-13 DIAGNOSIS — I87.2 VENOUS INSUFFICIENCY OF BOTH LOWER EXTREMITIES: ICD-10-CM

## 2023-06-13 DIAGNOSIS — S81.802D OPEN WOUND OF LEFT LOWER LEG, SUBSEQUENT ENCOUNTER: ICD-10-CM

## 2023-06-13 PROCEDURE — 15271 SKIN SUB GRAFT TRNK/ARM/LEG: CPT | Performed by: NURSE PRACTITIONER

## 2023-06-13 PROCEDURE — 15275 SKIN SUB GRAFT FACE/NK/HF/G: CPT

## 2023-06-13 PROCEDURE — 3074F SYST BP LT 130 MM HG: CPT | Performed by: NURSE PRACTITIONER

## 2023-06-13 PROCEDURE — 3078F DIAST BP <80 MM HG: CPT | Performed by: NURSE PRACTITIONER

## 2023-06-13 NOTE — PATIENT INSTRUCTIONS
-Keep your wound dressing clean, dry, and intact.    -Change your dressing if it becomes soiled, soaked, or falls off.    Provider applied epicord skin substitute today. Do not change primary dressing for one week. Home health may change the outer dressing, if soiled, saturated, or dislodged, with hydrofiber and 4 layer compression wrap and may replace adaptic and steri strips if they become dislodged.     -Remove your compression wrap if you have severe pain, severe swelling, numbness, color change, or temperature change in your toes. If you need to remove your compression wrap, do so by unrolling it. Do not cut the compression wrap off to prevent cutting yourself on accident.    -Should you experience any significant changes in your wound(s), such as infection (redness, swelling, localized heat, increased pain, fever > 101 F, chills) or have any questions regarding your home care instructions, please contact the wound center at [phone]. If after hours, contact your primary care physician or go to the hospital emergency room.

## 2023-06-14 NOTE — PROGRESS NOTES
Provider Encounter- Full Thickness wound    HISTORY OF PRESENT ILLNESS  Wound History:   START OF CARE IN CLINIC: 03/14/23   REFERRING PROVIDER: Didi Lehman   WOUND- Full Thickness Wound   LOCATION: Left posterior leg   HISTORY: Patient developed a wound in September 2023 to the left posterior leg.  Patient has thick fungal toenails and reports that he kicked his leg with his right foot and nails causing a scratch.  This led to the full-thickness wound to the left posterior lower extremity.  Patient reports that he was receiving home health care without resolution of the wound.  Patient was seen at [facility] and referred to [facility] wound care for further care of the left posterior full-thickness wound.    Pertinent Medical History: Hyperlipidemia, ABELARDO, obesity, essential hypertension, type 2 diabetes, pulmonary hypertension    TOBACCO USE: Denies a history of tobacco use or smokeless tobacco products    Patient's problem list, allergies, and current medications reviewed and updated in Epic    Interval History:  3/14/2023: Clinic visit with GURJIT Barrientos.  Patient reports overall he is feeling well denies any fever or chills.    3/21/2023: Clinic visit with Kwaku Sanchez MD. Patient reports doing well, denies any fevers or chills. His wound is measuring about the same but is starting to fill in with increased granulation tissue. Patient was referred to podiatry at [facility] and reports appointment for foot and nail care on 4/24. He does not wear compression to lower extremities and was counseled on edema / venous insufficiency and importance of compression to help return venous flow to heart.    3/27/2023: Clinic visit with Kwaku Sanchez MD. Patient reports doing well. Tolerating compression with decreased edema. He denies any complaints. Wound is slowly improving. Discussed again compression in future. He think he will be able to apply compression socks, given information and he will see about  obtaining from [facility].    4/3/2023: Clinic visit with GURJIT Barrientos.  Patient reports overall he is doing well denies any fever or chills.  Patient reports that Tubigrip applied over the Unna boot with Coban to the right lower extremity was too tight last clinical appointment.  Therefore we will use a larger Tubigrip.  Patient's wound is progressing with increased epithelial tissue at the periphery and granulation tissue throughout the wound bed.    4/10/2023: Clinic visit with GURJIT Barrientos.  Patient reports overall he is doing well denies any fever or chills.  Patient now has [facility] who is seeing the patient once a week for wound care dressing changes and reapplication of Unna boot with Coban wraps.  Patient reports that he has ordered compression stockings off of [employer] and they should be to his home soon.    4/25/2023: Clinic visit with GURJIT Barrientos.  Patient reports overall he is doing well denies any fever or chills.  Patient reports he has seen his podiatrist recently who has taken down his oncotic nails.  Patient reports that he has not had any applications with the compression.  Discussed increasing compression of the 2 layer compression wrap patient is in agreement.  Patient reports that ED and health services is going to supply him with zipper compression stockings with 30 to 40 mm compression.      5/2/2023 : Clinic visit with GURJIT Ordaz, VAL, DIANA, NAY.   Patient states that overall he is feeling well.  Reports his blood sugar was in the 150s.  He did not receive zippered compression garment, ordered last visit.  We will follow-up.  His wound is slowly progressing.  He is tolerating compression without any difficulty.    5/9/2023 : Clinic visit with GURJIT Ordaz, VAL, DIANA, NAY.   Patient states he is feeling well overall.  His blood sugars continue to run in the 150s to 160s.  He just started on Ozempic about a week ago.  He has still not  received zippered compression stocking.  He states that [facility] is trying to figure out which department will pay for this.  He continues to tolerate compression wrap without any difficulty.    5/16/2023 : Clinic visit with GURJIT Ordaz, VAL, DIANA, NAY.   Patient states he is feeling well, blood sugars in the 160s.  He has not yet obtained zippered compression stocking from [facility], is not sure when this will be happening.  He states that he has an appointment to  a voucher for diabetic shoes with inserts, and will ask at that time about the compression garment.    5/23/2023: Clinic visit with GURJIT Barrientos.  Patient reports that he is doing okay denies fever or chills.  Patient is on home oxygen, again the patient does not have a transportable tank.  Short of breath on arrival.  Nasal cannula placed in clinic at 3 L patient saturating above 90%.    5/30/2023: Clinic visit with Kwaku Sanchez MD. Patient reports doing well. Denies any complaints. He reports that he continues to have difficulty obtaining compression socks from [facility]. Reports that he has been transferred between departments, but will continue to try. Wound largely unchanged. Biologic authorization is still pending. Patient continues to have edema despite 2 layer compression, he is agreeable to try 4 layer compression this week.    6/6/2023: Clinic visit with Kwaku Sanchez MD. Patient reports feeling well. Denies any signs or symptoms of infection. He reports tolerating 4 layer wrap, though home health did not have and applied 2 layer wrap. Home health told him that they have ordered 4 layer compression. He is still working through [facility] to obtain compression socks. Biologic authorization is still pending.    6/13/2023 : Clinic visit with GURJIT Ordaz, VAL, DIANA, NAY.   Rashi states he is feeling well overall.  His blood sugar today was 153.  We have received authorization from his insurance provider to apply EpiFix or epi  cord to his wound.  Epi cord applied today, first application.  Authorization is only good through 6/30.    REVIEW OF SYSTEMS:   Unchanged from previous wound clinic assessment on 6/6/2023, except as noted in interval history above    PHYSICAL EXAMINATION:   /56 (BP Location: Left arm, Patient Position: Sitting)   Pulse 77   Temp 36.9 °C (98.5 °F) (Temporal)   Resp 20   SpO2 95%     Physical Exam  Cardiovascular:      Comments: Pedal pulses are palpable  Pulmonary:      Effort: Pulmonary effort is normal. No respiratory distress.   Musculoskeletal:         General: Swelling present.      Right lower leg: Edema present.      Left lower leg: Edema present.   Skin:     Comments: Full-thickness wound to the left posterior lower extremity: Wound area has decreased since last assessment, edges are rolled, slough to wound bed, minimal to moderate amount of serosanguineous drainage, no evidence of infection    Refer to wound flowsheet and photos    Hemosiderin staining, chronic edema, skin changes-consistent with CVI   Neurological:      Mental Status: He is alert and oriented to person, place, and time.         WOUND ASSESSMENT  Wound 03/14/23 Traumatic LLE Posterior (Active)   Wound Image    06/13/23 1530   Site Assessment Red;Yellow 06/13/23 1530   Periwound Assessment Hemosiderin Staining;Edema;Dry 06/13/23 1530   Margins Attached edges 06/13/23 1530   Drainage Amount Moderate 06/13/23 1530   Drainage Description Serosanguineous 06/13/23 1530   Treatments Cleansed;Topical Lidocaine;Provider debridement;Site care 06/13/23 1530   Wound Cleansing Puracyn [place] 06/13/23 1530   Periwound Protectant Skin Protectant Wipes to Periwound;Skin Moisturizer 06/13/23 1530   Dressing Cleansing/Solutions Normal Saline 06/13/23 1530   Dressing Options Biologic (Skin Substitute);Adaptic;Steri-Strip;Hydrofiber;Compression Wrap Four Layer 06/13/23 1530   Dressing Changed New 06/13/23 1530   Dressing Change/Treatment Frequency  Every 72 hrs, and As Needed 06/13/23 1530   Non-staged Wound Description Full thickness 06/13/23 1530   Wound Length (cm) 1.1 cm 06/13/23 1530   Wound Width (cm) 0.9 cm 06/13/23 1530   Wound Depth (cm) 0.3 cm 06/13/23 1530   Wound Surface Area (cm^2) 0.99 cm^2 06/13/23 1530   Wound Volume (cm^3) 0.297 cm^3 06/13/23 1530   Post-Procedure Length (cm) 1.2 cm 06/13/23 1530   Post-Procedure Width (cm) 1 cm 06/13/23 1530   Post-Procedure Depth (cm) 0.3 cm 06/13/23 1530   Post-Procedure Surface Area (cm^2) 1.2 cm^2 06/13/23 1530   Post-Procedure Volume (cm^3) 0.36 cm^3 06/13/23 1530   Wound Healing % -98 06/13/23 1530   Tunneling (cm) 0 cm 06/13/23 1530   Undermining (cm) 0 cm 06/13/23 1530   Wound Odor None 06/13/23 1530   Pulses DP;2+ 03/21/23 1330   Right Foot Monofilament 10-point exam (Sensate) 8/10 05/09/23 1500   Left Foot Monofilament 10-point exam (Sensate) 9/10 05/09/23 1500   Exposed Structures None 06/13/23 1530   Number of days: 91       Wound 06/13/23 Traumatic Pretibial Distal;Medial Left (Active)   Wound Image   06/13/23 1530   Site Assessment Red 06/13/23 1530   Periwound Assessment Edema 06/13/23 1530   Margins Attached edges 06/13/23 1530   Drainage Amount Scant 06/13/23 1530   Drainage Description Serosanguineous 06/13/23 1530   Treatments Cleansed;Site care 06/13/23 1530   Wound Cleansing Puracyn [place] 06/13/23 1530   Periwound Protectant Skin Moisturizer;Barrier Paste 06/13/23 1530   Dressing Cleansing/Solutions Not Applicable 06/13/23 1530   Dressing Options Hydrofiber;Compression Wrap Four Layer 06/13/23 1530   Dressing Changed New 06/13/23 1530   Dressing Change/Treatment Frequency Every 72 hrs, and As Needed 06/13/23 1530   Non-staged Wound Description Full thickness 06/13/23 1530   Wound Length (cm) 0.2 cm 06/13/23 1530   Wound Width (cm) 0.4 cm 06/13/23 1530   Wound Depth (cm) 0.1 cm 06/13/23 1530   Wound Surface Area (cm^2) 0.08 cm^2 06/13/23 1530   Wound Volume (cm^3) 0.008 cm^3 06/13/23 1530    Tunneling (cm) 0 cm 06/13/23 1530   Undermining (cm) 0 cm 06/13/23 1530   Wound Odor None 06/13/23 1530   Exposed Structures None 06/13/23 1530   Number of days: 0         PROCEDURE: Excisional debridement of left lower extremity posterior wound  -2% viscous lidocaine applied topically to wound bed for approximately 5 minutes prior to debridement  -Curette used to debride wound bed.  Excisional debridement was performed to remove devitalized tissue until healthy, bleeding tissue was visualized.   Entire surface of wound, 1.2 cm² debrided.  Tissue debrided into the subcutaneous layer.    -Bleeding controlled with manual pressure.    -Wound irrigated with normal saline  -A 1 x 2 cm piece of epi cord was hydrated with normal saline and applied to wound bed  -Wound care completed by wound RN, refer to flowsheet  -Patient tolerated the procedure well, without c/o pain or discomfort.      BIOLOGIC LOG  6/13/2023-first application  PRODUCT: EpiCord 1 x 2cm . reorder #[id number]; PRODUCT-[id number] EXPIRES: 2028-01-01      Pertinent Labs and Diagnostics:    Labs:     A1c:   Lab Results   Component Value Date/Time    HBA1C 6.5 (H) 09/15/2020 04:51 AM            LAST  WOUND CULTURE:  DATE :   Lab Results   Component Value Date/Time    CULTRSULT No growth after 5 days of incubation. 11/07/2022 06:23 PM         ASSESSMENT AND PLAN:   1. Open wound of left lower leg, subsequent encounter    6/13/2023: Wound area has decreased since last assessment.  Edges are rolled.  - Excisional debridement of wound bed and wound edges performed in clinic today, medically necessary to promote wound healing  -Biologic has been authorized by his insurance.  First application of epi cord in clinic today.  Authorization is only good through 6/30.  - Patient tolerated 4 layer compression, will continue.  - He is to return to clinic in 1 week for assessment, debridement, and biologic application  -[facility] to continue to see the  patient 1 time per week.  Instructions sent to leave biologic intact, change outer dressing and compression wrap only  -Reports that he still has not got his zipper compression garments from [facility].  [facility] from [facility].  He states he is also told by [facility] to [facility] is [facility] for zip up compression stockings   Wound care: Epicord biologic to accelerate granulation, Adaptic, Steri-Strips, foam cover dressing, 4-layer compression wrap    2. Controlled type 2 diabetes mellitus without complication, without long-term current use of insulin ([facility])    6/13/2023: Patient reports his blood sugar today was 153  -Patient has been advised to keep blood sugars below 140 and noted optimize wound healing  -Recently started on Ozempic, endocrinologist at [facility] managing    3. Severe obesity (BMI >= 40) ([facility])  Comments: Complicating factor to wound healing and wound care  Benefits of weight loss for management of his diabetes, and for his overall health previously discussed    4. Edema of both lower extremities  5. Venous insufficiency     6/13/2023:   Suspected component of lymphedema  - Patient tolerated 4 layer compression  - [facility] with zippers.      PATIENT EDUCATION  - Importance of adequate nutrition for wound healing  -Advised to go to ER for any increased redness, swelling, drainage, or odor, or if patient develops fever, chills, nausea or vomiting.     Please note that this note may have been created using voice recognition software. I have worked with technical experts from [facility] to optimize the interface.  I have made every reasonable attempt to correct obvious errors, but there may be errors of grammar and possibly content that I did not discover before finalizing the note.    N

## 2023-06-20 ENCOUNTER — OFFICE VISIT (OUTPATIENT)
Dept: WOUND CARE | Facility: MEDICAL CENTER | Age: 63
End: 2023-06-20
Attending: INTERNAL MEDICINE
Payer: MEDICARE

## 2023-06-20 VITALS
RESPIRATION RATE: 20 BRPM | SYSTOLIC BLOOD PRESSURE: 127 MMHG | DIASTOLIC BLOOD PRESSURE: 63 MMHG | OXYGEN SATURATION: 93 % | HEART RATE: 71 BPM | TEMPERATURE: 97.2 F

## 2023-06-20 DIAGNOSIS — S81.802D OPEN WOUND OF LEFT LOWER LEG, SUBSEQUENT ENCOUNTER: ICD-10-CM

## 2023-06-20 DIAGNOSIS — R60.0 EDEMA OF BOTH LOWER EXTREMITIES: ICD-10-CM

## 2023-06-20 DIAGNOSIS — E11.9 CONTROLLED TYPE 2 DIABETES MELLITUS WITHOUT COMPLICATION, WITHOUT LONG-TERM CURRENT USE OF INSULIN (HCC): ICD-10-CM

## 2023-06-20 DIAGNOSIS — I87.2 VENOUS INSUFFICIENCY OF BOTH LOWER EXTREMITIES: ICD-10-CM

## 2023-06-20 DIAGNOSIS — E66.01 SEVERE OBESITY (BMI >= 40) (HCC): ICD-10-CM

## 2023-06-20 PROCEDURE — 15271 SKIN SUB GRAFT TRNK/ARM/LEG: CPT | Performed by: NURSE PRACTITIONER

## 2023-06-20 PROCEDURE — 3074F SYST BP LT 130 MM HG: CPT | Performed by: NURSE PRACTITIONER

## 2023-06-20 PROCEDURE — 15271 SKIN SUB GRAFT TRNK/ARM/LEG: CPT

## 2023-06-20 PROCEDURE — 3078F DIAST BP <80 MM HG: CPT | Performed by: NURSE PRACTITIONER

## 2023-06-20 RX ORDER — SEMAGLUTIDE 0.68 MG/ML
0.25 INJECTION, SOLUTION SUBCUTANEOUS
COMMUNITY
Start: 2023-06-05

## 2023-06-20 NOTE — PATIENT INSTRUCTIONS
-Keep your wound dressing clean, dry, and intact.    -Remove your compression wrap if you have severe pain, severe swelling, numbness, color change, or temperature change in your toes. If you need to remove your compression wrap, do so by unrolling it. Do not cut the compression wrap off to prevent cutting yourself on accident.    -Should you experience any significant changes in your wound(s), such as infection (redness, swelling, localized heat, increased pain, fever > 101 F, chills) or have any questions regarding your home care instructions, please contact the wound center at [phone]. If after hours, contact your primary care physician or go to the hospital emergency room.

## 2023-06-20 NOTE — PROGRESS NOTES
Home wound care order routed to [facility] ([phone]) via [employer].

## 2023-06-20 NOTE — PROGRESS NOTES
Provider Encounter- Full Thickness wound    HISTORY OF PRESENT ILLNESS  Wound History:   START OF CARE IN CLINIC: 03/14/23   REFERRING PROVIDER: Didi Lehman   WOUND- Full Thickness Wound   LOCATION: Left posterior leg   HISTORY: Patient developed a wound in September 2023 to the left posterior leg.  Patient has thick fungal toenails and reports that he kicked his leg with his right foot and nails causing a scratch.  This led to the full-thickness wound to the left posterior lower extremity.  Patient reports that he was receiving home health care without resolution of the wound.  Patient was seen at [facility] and referred to [facility] wound care for further care of the left posterior full-thickness wound.    Pertinent Medical History: Hyperlipidemia, ABELARDO, obesity, essential hypertension, type 2 diabetes, pulmonary hypertension    TOBACCO USE: Denies a history of tobacco use or smokeless tobacco products    Patient's problem list, allergies, and current medications reviewed and updated in Epic    Interval History:  3/14/2023: Clinic visit with GURJIT Barrientos.  Patient reports overall he is feeling well denies any fever or chills.    3/21/2023: Clinic visit with Kwaku Sanchez MD. Patient reports doing well, denies any fevers or chills. His wound is measuring about the same but is starting to fill in with increased granulation tissue. Patient was referred to podiatry at [facility] and reports appointment for foot and nail care on 4/24. He does not wear compression to lower extremities and was counseled on edema / venous insufficiency and importance of compression to help return venous flow to heart.    3/27/2023: Clinic visit with Kwaku Sanchez MD. Patient reports doing well. Tolerating compression with decreased edema. He denies any complaints. Wound is slowly improving. Discussed again compression in future. He think he will be able to apply compression socks, given information and he will see about  obtaining from [facility].    4/3/2023: Clinic visit with GURJIT Barrientos.  Patient reports overall he is doing well denies any fever or chills.  Patient reports that Tubigrip applied over the Unna boot with Coban to the right lower extremity was too tight last clinical appointment.  Therefore we will use a larger Tubigrip.  Patient's wound is progressing with increased epithelial tissue at the periphery and granulation tissue throughout the wound bed.    4/10/2023: Clinic visit with GURJIT Barrientos.  Patient reports overall he is doing well denies any fever or chills.  Patient now has [facility] who is seeing the patient once a week for wound care dressing changes and reapplication of Unna boot with Coban wraps.  Patient reports that he has ordered compression stockings off of [employer] and they should be to his home soon.    4/25/2023: Clinic visit with GURJIT Barrientos.  Patient reports overall he is doing well denies any fever or chills.  Patient reports he has seen his podiatrist recently who has taken down his oncotic nails.  Patient reports that he has not had any applications with the compression.  Discussed increasing compression of the 2 layer compression wrap patient is in agreement.  Patient reports that ED and health services is going to supply him with zipper compression stockings with 30 to 40 mm compression.      5/2/2023 : Clinic visit with GURJIT Ordaz, VAL, DIANA, NAY.   Patient states that overall he is feeling well.  Reports his blood sugar was in the 150s.  He did not receive zippered compression garment, ordered last visit.  We will follow-up.  His wound is slowly progressing.  He is tolerating compression without any difficulty.    5/9/2023 : Clinic visit with GURJIT Ordaz, VAL, DIANA, NAY.   Patient states he is feeling well overall.  His blood sugars continue to run in the 150s to 160s.  He just started on Ozempic about a week ago.  He has still not  received zippered compression stocking.  He states that [facility] is trying to figure out which department will pay for this.  He continues to tolerate compression wrap without any difficulty.    5/16/2023 : Clinic visit with GURJIT Ordaz, VAL, DIANA, NAY.   Patient states he is feeling well, blood sugars in the 160s.  He has not yet obtained zippered compression stocking from [facility], is not sure when this will be happening.  He states that he has an appointment to  a voucher for diabetic shoes with inserts, and will ask at that time about the compression garment.    5/23/2023: Clinic visit with GURJIT Barrientos.  Patient reports that he is doing okay denies fever or chills.  Patient is on home oxygen, again the patient does not have a transportable tank.  Short of breath on arrival.  Nasal cannula placed in clinic at 3 L patient saturating above 90%.    5/30/2023: Clinic visit with Kwaku Sanchez MD. Patient reports doing well. Denies any complaints. He reports that he continues to have difficulty obtaining compression socks from [facility]. Reports that he has been transferred between departments, but will continue to try. Wound largely unchanged. Biologic authorization is still pending. Patient continues to have edema despite 2 layer compression, he is agreeable to try 4 layer compression this week.    6/6/2023: Clinic visit with Kwaku Sanchez MD. Patient reports feeling well. Denies any signs or symptoms of infection. He reports tolerating 4 layer wrap, though home health did not have and applied 2 layer wrap. Home health told him that they have ordered 4 layer compression. He is still working through [facility] to obtain compression socks. Biologic authorization is still pending.    6/13/2023 : Clinic visit with GURJIT Ordaz, VAL, DIANA, NAY.   Rashi states he is feeling well overall.  His blood sugar today was 153.  We have received authorization from his insurance provider to apply EpiFix or epi  "cord to his wound.  Epi cord applied today, first application.  Authorization is only good through 6/30.    6/20/2023 : Clinic visit with GURJIT Ordaz, NAOMIEP-BC, CWBON, CFCN.   Patient continues to feel well.  He reports that his blood sugar this morning was 144.  He is tolerating compression without any difficulty.  Wound area has decreased slightly.  He has not yet received zippered compression garments from [facility], states \"they are working on it\".    REVIEW OF SYSTEMS:   Unchanged from previous wound clinic assessment on 6/13/2023, except as noted in interval history above    PHYSICAL EXAMINATION:   /63   Pulse 71   Temp 36.2 °C (97.2 °F) (Temporal)   Resp 20   SpO2 93%     Physical Exam  Cardiovascular:      Comments: Pedal pulses are palpable  Pulmonary:      Effort: Pulmonary effort is normal. No respiratory distress.   Musculoskeletal:         General: Swelling present.      Right lower leg: Edema present.      Left lower leg: Edema present.   Skin:     Comments: Full-thickness wound to the left posterior lower extremity: Area of wound has decreased slightly since last assessment, thin layer of slough to wound bed, moderate serosanguineous drainage, no evidence of infection.    Refer to wound flowsheet and photos    Hemosiderin staining, chronic edema, skin changes-consistent with CVI   Neurological:      Mental Status: He is alert and oriented to person, place, and time.         WOUND ASSESSMENT  Wound 03/14/23 Traumatic LLE Posterior (Active)   Wound Image    06/20/23 1500   Site Assessment Red;[place] 06/20/23 1500   Periwound Assessment Hemosiderin Staining;Edema;Dry 06/20/23 1500   Margins Attached edges 06/20/23 1500   Drainage Amount Moderate 06/20/23 1500   Drainage Description Serosanguineous 06/20/23 1500   Treatments Cleansed;Topical Lidocaine;Provider debridement;Site care;Compression 06/20/23 1500   Wound Cleansing Normal Saline Irrigation 06/20/23 1500   Periwound Protectant Skin " Protectant Wipes to Periwound 06/20/23 1500   Dressing Cleansing/Solutions Normal Saline 06/20/23 1500   Dressing Options Biologic (Skin Substitute);Adaptic;Steri-Strip;Hydrofiber;Compression Wrap Four Layer 06/20/23 1500   Dressing Changed New 06/20/23 1500   Dressing Change/Treatment Frequency Every 72 hrs, and As Needed 06/20/23 1500   Non-staged Wound Description Full thickness 06/20/23 1500   Wound Length (cm) 0.9 cm 06/20/23 1500   Wound Width (cm) 0.7 cm 06/20/23 1500   Wound Depth (cm) 0.2 cm 06/20/23 1500   Wound Surface Area (cm^2) 0.63 cm^2 06/20/23 1500   Wound Volume (cm^3) 0.126 cm^3 06/20/23 1500   Post-Procedure Length (cm) 1 cm 06/20/23 1500   Post-Procedure Width (cm) 1 cm 06/20/23 1500   Post-Procedure Depth (cm) 0.3 cm 06/20/23 1500   Post-Procedure Surface Area (cm^2) 1 cm^2 06/20/23 1500   Post-Procedure Volume (cm^3) 0.3 cm^3 06/20/23 1500   Wound Healing % 16 06/20/23 1500   Tunneling (cm) 0 cm 06/20/23 1500   Undermining (cm) 0 cm 06/20/23 1500   Wound Odor None 06/20/23 1500   Pulses DP;2+ 03/21/23 1330   Right Foot Monofilament 10-point exam (Sensate) 8/10 05/09/23 1500   Left Foot Monofilament 10-point exam (Sensate) 9/10 05/09/23 1500   Exposed Structures None 06/20/23 1500   Number of days: 98         PROCEDURE: Excisional debridement of left lower extremity posterior wound  -2% viscous lidocaine applied topically to wound bed for approximately 5 minutes prior to debridement  -Curette used to debride wound bed.  Excisional debridement was performed to remove devitalized tissue until healthy, bleeding tissue was visualized.   Entire surface of wound, 1.0 cm² debrided.  Tissue debrided into the subcutaneous layer.    -Bleeding controlled with manual pressure.    -Wound irrigated with normal saline  -A 2 x 2 cm piece of EpiFix was cut to fit the wound, applied to the wound bed and hydrated with normal saline  -Wound care completed by wound RN, refer to flowsheet  -Patient tolerated the  procedure well, without c/o pain or discomfort.      BIOLOGIC LOG  6/13/2023-first application  PRODUCT: EpiCord 1 x 2cm . reorder #[id number]; PRODUCT-[id number] EXPIRES: 2028-01-01 6/20/2023-second application  PRODUCT: Epi fix 2 x 2 cm sheet.  Reorder #[id number] ; PRODUCT #[id number]; expires 2028-04-01    Pertinent Labs and Diagnostics:    Labs:     A1c:   Lab Results   Component Value Date/Time    HBA1C 6.5 (H) 09/15/2020 04:51 AM            LAST  WOUND CULTURE:  DATE :   Lab Results   Component Value Date/Time    CULTRSULT No growth after 5 days of incubation. 11/07/2022 06:23 PM         ASSESSMENT AND PLAN:   1. Open wound of left lower leg, subsequent encounter    6/20/2023: Area of wound has decreased slightly.  Quality of tissue and wound bed improving.  - Excisional debridement of wound bed and wound edges performed in clinic today, medically necessary to promote wound healing  -Second application of biologic.  EpiFix was utilized today.   14 mm disc not available today, had to use a 2 x 2 centimeter sheet and cut to fit.  - Patient  tolerating 4 layer compression, will continue.  - He is to return to clinic in 1 week for assessment, debridement, and biologic application if indicated  -[facility] to continue to see the patient 1 time per week.  Instructions sent to leave biologic intact, change outer dressing and compression wrap only  -Reports that he still has not got his zipper compression garments from [facility], states they are working on it     Wound care: EpiFix biologic to accelerate granulation, Adaptic, Steri-Strips, foam cover dressing, 4-layer compression wrap    2. Controlled type 2 diabetes mellitus without complication, without long-term current use of insulin ([facility])    6/20/2023: Patient reports his blood sugars to date was 144  -Patient has been advised to keep blood sugars below 140 and noted optimize wound healing  -Recently started on Ozempic, endocrinologist at [facility]  managing    3. Severe obesity (BMI >= 40) ([facility])  Comments: Complicating factor to wound healing and wound care  Benefits of weight loss for management of his diabetes, and for his overall health previously discussed    4. Edema of both lower extremities  5. Venous insufficiency     6/20/2023  Suspected component of lymphedema  - Patient tolerated 4 layer compression  -Compression garments through [facility], see above      PATIENT EDUCATION  - Importance of adequate nutrition for wound healing  -Advised to go to ER for any increased redness, swelling, drainage, or odor, or if patient develops fever, chills, nausea or vomiting.     Please note that this note may have been created using voice recognition software. I have worked with technical experts from [employer] to optimize the interface.  I have made every reasonable attempt to correct obvious errors, but there may be errors of grammar and possibly content that I did not discover before finalizing the note.    N

## 2023-06-27 ENCOUNTER — OFFICE VISIT (OUTPATIENT)
Dept: WOUND CARE | Facility: MEDICAL CENTER | Age: 63
End: 2023-06-27
Attending: INTERNAL MEDICINE
Payer: MEDICARE

## 2023-06-27 VITALS
OXYGEN SATURATION: 93 % | DIASTOLIC BLOOD PRESSURE: 55 MMHG | RESPIRATION RATE: 20 BRPM | SYSTOLIC BLOOD PRESSURE: 118 MMHG | HEART RATE: 78 BPM | TEMPERATURE: 97.5 F

## 2023-06-27 DIAGNOSIS — I87.2 VENOUS INSUFFICIENCY OF BOTH LOWER EXTREMITIES: ICD-10-CM

## 2023-06-27 DIAGNOSIS — R60.0 EDEMA OF BOTH LOWER EXTREMITIES: ICD-10-CM

## 2023-06-27 DIAGNOSIS — E66.01 SEVERE OBESITY (BMI >= 40) (HCC): ICD-10-CM

## 2023-06-27 DIAGNOSIS — S81.802D OPEN WOUND OF LEFT LOWER LEG, SUBSEQUENT ENCOUNTER: ICD-10-CM

## 2023-06-27 DIAGNOSIS — E11.9 CONTROLLED TYPE 2 DIABETES MELLITUS WITHOUT COMPLICATION, WITHOUT LONG-TERM CURRENT USE OF INSULIN (HCC): ICD-10-CM

## 2023-06-27 PROCEDURE — 3074F SYST BP LT 130 MM HG: CPT | Performed by: NURSE PRACTITIONER

## 2023-06-27 PROCEDURE — 15271 SKIN SUB GRAFT TRNK/ARM/LEG: CPT

## 2023-06-27 PROCEDURE — 15271 SKIN SUB GRAFT TRNK/ARM/LEG: CPT | Performed by: NURSE PRACTITIONER

## 2023-06-27 PROCEDURE — 3078F DIAST BP <80 MM HG: CPT | Performed by: NURSE PRACTITIONER

## 2023-06-27 NOTE — PROGRESS NOTES
"Per Rosa-   Please set her up for a visit next week and then I will fill for that amount of time and if she no shows again no further refills     Message text      Pt called back, scheduled for office visit next week. Pt states \"I need this med, I'm don't get high from it. I don't abuse it.\" Pt is at pharmacy currently. Adderall pended to last until office visit. Please advise. Thank you!    Next 5 appointments (look out 90 days)    Nov 24, 2021  1:30 PM  (Arrive by 1:15 PM)  SHORT with SD Clark CNP  [facility] ([facility] [place] ) [address]  [place] [address]  [phone]          " Home wound care order routed to [facility] ([phone]) via [employer].

## 2023-06-27 NOTE — PATIENT INSTRUCTIONS
-Keep your wound dressing clean, dry, and intact.    -Change your dressing if it becomes soiled, soaked, or falls off.    -Should you experience any significant changes in your wound(s), such as infection (redness, swelling, localized heat, increased pain, fever > 101 F, chills) or have any questions regarding your home care instructions, please contact the wound center at [phone]. If after hours, contact your primary care physician or go to the hospital emergency room.

## 2023-06-28 NOTE — PROGRESS NOTES
Provider Encounter- Full Thickness wound    HISTORY OF PRESENT ILLNESS  Wound History:   START OF CARE IN CLINIC: 03/14/23   REFERRING PROVIDER: Didi Lehman   WOUND- Full Thickness Wound   LOCATION: Left posterior leg   HISTORY: Patient developed a wound in September 2023 to the left posterior leg.  Patient has thick fungal toenails and reports that he kicked his leg with his right foot and nails causing a scratch.  This led to the full-thickness wound to the left posterior lower extremity.  Patient reports that he was receiving home health care without resolution of the wound.  Patient was seen at [facility] and referred to [facility] wound care for further care of the left posterior full-thickness wound.    Pertinent Medical History: Hyperlipidemia, ABELARDO, obesity, essential hypertension, type 2 diabetes, pulmonary hypertension    TOBACCO USE: Denies a history of tobacco use or smokeless tobacco products    Patient's problem list, allergies, and current medications reviewed and updated in Epic    Interval History:  3/14/2023: Clinic visit with GURJIT Barrientos.  Patient reports overall he is feeling well denies any fever or chills.    3/21/2023: Clinic visit with Kwaku Sanchez MD. Patient reports doing well, denies any fevers or chills. His wound is measuring about the same but is starting to fill in with increased granulation tissue. Patient was referred to podiatry at [facility] and reports appointment for foot and nail care on 4/24. He does not wear compression to lower extremities and was counseled on edema / venous insufficiency and importance of compression to help return venous flow to heart.    3/27/2023: Clinic visit with Kwaku Sanchez MD. Patient reports doing well. Tolerating compression with decreased edema. He denies any complaints. Wound is slowly improving. Discussed again compression in future. He think he will be able to apply compression socks, given information and he will see about  obtaining from [facility].    4/3/2023: Clinic visit with GURJIT Barrientos.  Patient reports overall he is doing well denies any fever or chills.  Patient reports that Tubigrip applied over the Unna boot with Coban to the right lower extremity was too tight last clinical appointment.  Therefore we will use a larger Tubigrip.  Patient's wound is progressing with increased epithelial tissue at the periphery and granulation tissue throughout the wound bed.    4/10/2023: Clinic visit with GURJIT Barrientos.  Patient reports overall he is doing well denies any fever or chills.  Patient now has [facility] who is seeing the patient once a week for wound care dressing changes and reapplication of Unna boot with Coban wraps.  Patient reports that he has ordered compression stockings off of [employer] and they should be to his home soon.    4/25/2023: Clinic visit with GURJIT Barrientos.  Patient reports overall he is doing well denies any fever or chills.  Patient reports he has seen his podiatrist recently who has taken down his oncotic nails.  Patient reports that he has not had any applications with the compression.  Discussed increasing compression of the 2 layer compression wrap patient is in agreement.  Patient reports that ED and health services is going to supply him with zipper compression stockings with 30 to 40 mm compression.      5/2/2023 : Clinic visit with GURJIT Ordaz, VAL, DIANA, NAY.   Patient states that overall he is feeling well.  Reports his blood sugar was in the 150s.  He did not receive zippered compression garment, ordered last visit.  We will follow-up.  His wound is slowly progressing.  He is tolerating compression without any difficulty.    5/9/2023 : Clinic visit with GURJIT Ordaz, VAL, DIANA, NAY.   Patient states he is feeling well overall.  His blood sugars continue to run in the 150s to 160s.  He just started on Ozempic about a week ago.  He has still not  received zippered compression stocking.  He states that [facility] is trying to figure out which department will pay for this.  He continues to tolerate compression wrap without any difficulty.    5/16/2023 : Clinic visit with GURJIT Ordaz, VAL, DIANA, NAY.   Patient states he is feeling well, blood sugars in the 160s.  He has not yet obtained zippered compression stocking from [facility], is not sure when this will be happening.  He states that he has an appointment to  a voucher for diabetic shoes with inserts, and will ask at that time about the compression garment.    5/23/2023: Clinic visit with GURJIT Barrientos.  Patient reports that he is doing okay denies fever or chills.  Patient is on home oxygen, again the patient does not have a transportable tank.  Short of breath on arrival.  Nasal cannula placed in clinic at 3 L patient saturating above 90%.    5/30/2023: Clinic visit with Kwaku Sanchez MD. Patient reports doing well. Denies any complaints. He reports that he continues to have difficulty obtaining compression socks from [facility]. Reports that he has been transferred between departments, but will continue to try. Wound largely unchanged. Biologic authorization is still pending. Patient continues to have edema despite 2 layer compression, he is agreeable to try 4 layer compression this week.    6/6/2023: Clinic visit with Kwaku Sanchez MD. Patient reports feeling well. Denies any signs or symptoms of infection. He reports tolerating 4 layer wrap, though home health did not have and applied 2 layer wrap. Home health told him that they have ordered 4 layer compression. He is still working through [facility] to obtain compression socks. Biologic authorization is still pending.    6/13/2023 : Clinic visit with GURJIT Ordaz, VAL, DIANA, NAY.   Rashi states he is feeling well overall.  His blood sugar today was 153.  We have received authorization from his insurance provider to apply EpiFix or epi  "cord to his wound.  Epi cord applied today, first application.  Authorization is only good through 6/30.    6/20/2023 : Clinic visit with GURJIT Ordaz, VAL, DIANA, NAY.   Patient continues to feel well.  He reports that his blood sugar this morning was 144.  He is tolerating compression without any difficulty.  Wound area has decreased slightly.  He has not yet received zippered compression garments from [facility], states \"they are working on it\".    6/27/2023 : Clinic visit with GURJIT Ordaz, VAL, DIANA, NAY.   Rashi states he is feeling well, reports his blood sugar today was 131.  He continues to tolerate compression.  Wound area about the same, depth has decreased slightly.  EpiFix was applied in clinic today.  However, this will be his last application, as authorization was only through 6/30.    REVIEW OF SYSTEMS:   Unchanged from previous wound clinic assessment on 6/20/2023, except as noted in interval history above    PHYSICAL EXAMINATION:   /55   Pulse 78   Temp 36.4 °C (97.5 °F) (Temporal)   Resp 20   SpO2 93%     Physical Exam  Cardiovascular:      Comments: Pedal pulses are palpable  Pulmonary:      Effort: Pulmonary effort is normal. No respiratory distress.   Musculoskeletal:         General: Swelling present.      Right lower leg: Edema present.      Left lower leg: Edema present.   Skin:     Comments: Full-thickness wound to the left posterior lower extremity: Area of wound has not changed significantly since last assessment, depth has decreased slightly, slough to wound bed, moderate serosanguineous drainage, no evidence of infection.    Refer to wound flowsheet and photos    Hemosiderin staining, chronic edema, skin changes-consistent with CVI   Neurological:      Mental Status: He is alert and oriented to person, place, and time.         WOUND ASSESSMENT  Wound 03/14/23 Traumatic LLE Posterior (Active)   Wound Image    06/27/23 1600   Site Assessment Red;[place] 06/27/23 1600 "   Periwound Assessment Hemosiderin Staining;Edema;Dry 06/27/23 1600   Margins Attached edges 06/27/23 1600   Drainage Amount Moderate 06/27/23 1600   Drainage Description Serosanguineous 06/27/23 1600   Treatments Cleansed;Topical Lidocaine;Provider debridement;Site care 06/27/23 1600   Wound Cleansing Puracyn Maskell 06/27/23 1600   Periwound Protectant Skin Protectant Wipes to Periwound;Skin Moisturizer 06/27/23 1600   Dressing Cleansing/Solutions Normal Saline 06/27/23 1600   Dressing Options Biologic (Skin Substitute);Adaptic;Steri-Strip;Hydrofiber;Compression Wrap Four Layer 06/27/23 1600   Dressing Changed Changed 06/27/23 1600   Dressing Change/Treatment Frequency Every 72 hrs, and As Needed 06/27/23 1600   Non-staged Wound Description Full thickness 06/27/23 1600   Wound Length (cm) 1 cm 06/27/23 1600   Wound Width (cm) 1 cm 06/27/23 1600   Wound Depth (cm) 0.1 cm 06/27/23 1600   Wound Surface Area (cm^2) 1 cm^2 06/27/23 1600   Wound Volume (cm^3) 0.1 cm^3 06/27/23 1600   Post-Procedure Length (cm) 1.1 cm 06/27/23 1600   Post-Procedure Width (cm) 1 cm 06/27/23 1600   Post-Procedure Depth (cm) 0.2 cm 06/27/23 1600   Post-Procedure Surface Area (cm^2) 1.1 cm^2 06/27/23 1600   Post-Procedure Volume (cm^3) 0.22 cm^3 06/27/23 1600   Wound Healing % 33 06/27/23 1600   Tunneling (cm) 0 cm 06/27/23 1600   Undermining (cm) 0 cm 06/27/23 1600   Wound Odor None 06/27/23 1600   Pulses DP;2+ 03/21/23 1330   Right Foot Monofilament 10-point exam (Sensate) 8/10 05/09/23 1500   Left Foot Monofilament 10-point exam (Sensate) 9/10 05/09/23 1500   Exposed Structures None 06/27/23 1600   Number of days: 105         PROCEDURE: Excisional debridement of left lower extremity posterior wound  -2% viscous lidocaine applied topically to wound bed for approximately 5 minutes prior to debridement  -Curette used to debride wound bed.  Excisional debridement was performed to remove devitalized tissue until healthy, bleeding tissue was  visualized.   Entire surface of wound, 1.0 cm² debrided.  Tissue debrided into the subcutaneous layer.    -Bleeding controlled with manual pressure.    -Wound irrigated with normal saline  -A 14 mm disc of EpiFix was applied to the wound bed and hydrated with normal saline  -Wound care completed by wound RN, refer to flowsheet  -Patient tolerated the procedure well, without c/o pain or discomfort.      BIOLOGIC LOG  6/13/2023-first application  PRODUCT: EpiCord 1 x 2cm . reorder #[id number]; PRODUCT-[id number] EXPIRES: 2028-01-01 6/20/2023-second application  PRODUCT: Epi fix 2 x 2 cm sheet.  Reorder #[id number] ; PRODUCT #[id number]; expires 2028-04-01 6/27/2023-third application-final application, authorized only through 6/30  PRODUCT: Epi fix 14 mm disc.  Reorder #[id number]; PRODUCT #[id number]; expires 2028-05-01      Pertinent Labs and Diagnostics:    Labs:     A1c:   Lab Results   Component Value Date/Time    HBA1C 6.5 (H) 09/15/2020 04:51 AM            LAST  WOUND CULTURE:  DATE :   Lab Results   Component Value Date/Time    CULTRSULT No growth after 5 days of incubation. 11/07/2022 06:23 PM         ASSESSMENT AND PLAN:   1. Open wound of left lower leg, subsequent encounter    6/27/2023: Wound area has not changed significantly, depth slightly decreased.  Slough to wound bed  - Excisional debridement of wound bed and wound edges performed in clinic today, medically necessary to promote wound healing  -Third and final application of biologic.  EpiFix was applied today, 14 mm disc utilized.  Use of EpiFix only authorized through 6/30  - Patient  tolerating 4 layer compression, will continue.  - He is to return to clinic in 1 week for assessment, debridement, and biologic application if indicated  -[facility] to continue to see the patient 1 time per week.  Instructions sent to leave biologic intact, change outer dressing and compression wrap only  -Reports that he still has not got  his zipper compression garments from [facility], states they are working on it     Wound care: EpiFix biologic to accelerate granulation, Adaptic, Steri-Strips, foam cover dressing, 4-layer compression wrap    2. Controlled type 2 diabetes mellitus without complication, without long-term current use of insulin ([facility])    6/27/2023: Patient reports his blood sugar this morning was 131  -Patient has been advised to keep blood sugars below 140 and noted optimize wound healing  -Recently started on Ozempic, endocrinologist at [facility] managing    3. Severe obesity (BMI >= 40) ([facility])  Comments: Complicating factor to wound healing and wound care  Benefits of weight loss for management of his diabetes, and for his overall health previously discussed    4. Edema of both lower extremities  5. Venous insufficiency     6/27/2023:  Suspected component of lymphedema  - Patient tolerated 4 layer compression  -[facility] has been working on obtaining zippered compression stockings      PATIENT EDUCATION  - Importance of adequate nutrition for wound healing  -Advised to go to ER for any increased redness, swelling, drainage, or odor, or if patient develops fever, chills, nausea or vomiting.     Please note that this note may have been created using voice recognition software. I have worked with technical experts from [employer] to optimize the interface.  I have made every reasonable attempt to correct obvious errors, but there may be errors of grammar and possibly content that I did not discover before finalizing the note.    N

## 2023-07-05 ENCOUNTER — OFFICE VISIT (OUTPATIENT)
Dept: WOUND CARE | Facility: MEDICAL CENTER | Age: 63
End: 2023-07-05
Attending: INTERNAL MEDICINE
Payer: MEDICARE

## 2023-07-05 VITALS
SYSTOLIC BLOOD PRESSURE: 137 MMHG | OXYGEN SATURATION: 97 % | HEART RATE: 82 BPM | TEMPERATURE: 98 F | RESPIRATION RATE: 20 BRPM | DIASTOLIC BLOOD PRESSURE: 68 MMHG

## 2023-07-05 DIAGNOSIS — I87.2 VENOUS INSUFFICIENCY OF BOTH LOWER EXTREMITIES: ICD-10-CM

## 2023-07-05 DIAGNOSIS — R60.0 EDEMA OF BOTH LOWER EXTREMITIES: ICD-10-CM

## 2023-07-05 DIAGNOSIS — E11.9 CONTROLLED TYPE 2 DIABETES MELLITUS WITHOUT COMPLICATION, WITHOUT LONG-TERM CURRENT USE OF INSULIN (HCC): ICD-10-CM

## 2023-07-05 DIAGNOSIS — S81.802D OPEN WOUND OF LEFT LOWER LEG, SUBSEQUENT ENCOUNTER: ICD-10-CM

## 2023-07-05 DIAGNOSIS — E66.01 SEVERE OBESITY (BMI >= 40) (HCC): ICD-10-CM

## 2023-07-05 PROCEDURE — 99213 OFFICE O/P EST LOW 20 MIN: CPT | Performed by: NURSE PRACTITIONER

## 2023-07-05 PROCEDURE — 99213 OFFICE O/P EST LOW 20 MIN: CPT

## 2023-07-05 PROCEDURE — 3078F DIAST BP <80 MM HG: CPT | Performed by: NURSE PRACTITIONER

## 2023-07-05 PROCEDURE — 3075F SYST BP GE 130 - 139MM HG: CPT | Performed by: NURSE PRACTITIONER

## 2023-07-05 NOTE — PROCEDURES
Reinforced education that if compression wrap feels too tight, to remove the wrap. Patient states that he was able to tolerate for three days and then had to remove the day prior to Home Health.     Home Health orders faxes to [facility].

## 2023-07-06 NOTE — PROGRESS NOTES
Provider Encounter- Full Thickness wound    HISTORY OF PRESENT ILLNESS  Wound History:   START OF CARE IN CLINIC: 03/14/23   REFERRING PROVIDER: Didi Lehman   WOUND- Full Thickness Wound   LOCATION: Left posterior leg   HISTORY: Patient developed a wound in September 2023 to the left posterior leg.  Patient has thick fungal toenails and reports that he kicked his leg with his right foot and nails causing a scratch.  This led to the full-thickness wound to the left posterior lower extremity.  Patient reports that he was receiving home health care without resolution of the wound.  Patient was seen at [facility] and referred to [facility] wound care for further care of the left posterior full-thickness wound.    Pertinent Medical History: Hyperlipidemia, ABELARDO, obesity, essential hypertension, type 2 diabetes, pulmonary hypertension    TOBACCO USE: Denies a history of tobacco use or smokeless tobacco products    Patient's problem list, allergies, and current medications reviewed and updated in Epic    Interval History:  3/14/2023: Clinic visit with GURJIT Barrientos.  Patient reports overall he is feeling well denies any fever or chills.    3/21/2023: Clinic visit with Kwaku Sanchez MD. Patient reports doing well, denies any fevers or chills. His wound is measuring about the same but is starting to fill in with increased granulation tissue. Patient was referred to podiatry at [facility] and reports appointment for foot and nail care on 4/24. He does not wear compression to lower extremities and was counseled on edema / venous insufficiency and importance of compression to help return venous flow to heart.    3/27/2023: Clinic visit with Kwaku Sanchez MD. Patient reports doing well. Tolerating compression with decreased edema. He denies any complaints. Wound is slowly improving. Discussed again compression in future. He think he will be able to apply compression socks, given information and he will see about  obtaining from [facility].    4/3/2023: Clinic visit with GURJIT Barrientos.  Patient reports overall he is doing well denies any fever or chills.  Patient reports that Tubigrip applied over the Unna boot with Coban to the right lower extremity was too tight last clinical appointment.  Therefore we will use a larger Tubigrip.  Patient's wound is progressing with increased epithelial tissue at the periphery and granulation tissue throughout the wound bed.    4/10/2023: Clinic visit with GURJIT Barrientos.  Patient reports overall he is doing well denies any fever or chills.  Patient now has [facility] who is seeing the patient once a week for wound care dressing changes and reapplication of Unna boot with Coban wraps.  Patient reports that he has ordered compression stockings off of [employer] and they should be to his home soon.    4/25/2023: Clinic visit with GURJIT Barrientos.  Patient reports overall he is doing well denies any fever or chills.  Patient reports he has seen his podiatrist recently who has taken down his oncotic nails.  Patient reports that he has not had any applications with the compression.  Discussed increasing compression of the 2 layer compression wrap patient is in agreement.  Patient reports that ED and health services is going to supply him with zipper compression stockings with 30 to 40 mm compression.      5/2/2023 : Clinic visit with GURJIT Ordaz, VAL, DIANA, NAY.   Patient states that overall he is feeling well.  Reports his blood sugar was in the 150s.  He did not receive zippered compression garment, ordered last visit.  We will follow-up.  His wound is slowly progressing.  He is tolerating compression without any difficulty.    5/9/2023 : Clinic visit with GURJIT Ordaz, VAL, DIANA, NAY.   Patient states he is feeling well overall.  His blood sugars continue to run in the 150s to 160s.  He just started on Ozempic about a week ago.  He has still not  received zippered compression stocking.  He states that [facility] is trying to figure out which department will pay for this.  He continues to tolerate compression wrap without any difficulty.    5/16/2023 : Clinic visit with GURJIT Ordaz, VAL, DIANA, NAY.   Patient states he is feeling well, blood sugars in the 160s.  He has not yet obtained zippered compression stocking from [facility], is not sure when this will be happening.  He states that he has an appointment to  a voucher for diabetic shoes with inserts, and will ask at that time about the compression garment.    5/23/2023: Clinic visit with GURJIT Barrientos.  Patient reports that he is doing okay denies fever or chills.  Patient is on home oxygen, again the patient does not have a transportable tank.  Short of breath on arrival.  Nasal cannula placed in clinic at 3 L patient saturating above 90%.    5/30/2023: Clinic visit with Kwaku Sanchze MD. Patient reports doing well. Denies any complaints. He reports that he continues to have difficulty obtaining compression socks from [facility]. Reports that he has been transferred between departments, but will continue to try. Wound largely unchanged. Biologic authorization is still pending. Patient continues to have edema despite 2 layer compression, he is agreeable to try 4 layer compression this week.    6/6/2023: Clinic visit with Kwaku Sanchez MD. Patient reports feeling well. Denies any signs or symptoms of infection. He reports tolerating 4 layer wrap, though home health did not have and applied 2 layer wrap. Home health told him that they have ordered 4 layer compression. He is still working through [facility] to obtain compression socks. Biologic authorization is still pending.    6/13/2023 : Clinic visit with GURJIT Ordaz, VAL, DIANA, NAY.   Rashi states he is feeling well overall.  His blood sugar today was 153.  We have received authorization from his insurance provider to apply EpiFix or epi  "cord to his wound.  Epi cord applied today, first application.  Authorization is only good through 6/30.    6/20/2023 : Clinic visit with GURJIT Ordaz, VAL, DIANA, NAY.   Patient continues to feel well.  He reports that his blood sugar this morning was 144.  He is tolerating compression without any difficulty.  Wound area has decreased slightly.  He has not yet received zippered compression garments from [facility], states \"they are working on it\".    6/27/2023 : Clinic visit with GURJIT Ordaz, VAL, DIANA, NAY.   Rashi states he is feeling well, reports his blood sugar today was 131.  He continues to tolerate compression.  Wound area about the same, depth has decreased slightly.  EpiFix was applied in clinic today.  However, this will be his last application, as authorization was only through 6/30.    7/6/2023: Clinic visit with GURJIT Barrientos. Wound bed is smaller by dimensions fortunately Rashi is out authorization for biologic.  Patient reports that home health did not have 4-layer compression.  2 layer compression placed by home health.  Patient reports that home health did not have 4-layer compression wraps.      REVIEW OF SYSTEMS:   Unchanged from previous wound clinic assessment on 6/27/2023, except as noted in interval history above    PHYSICAL EXAMINATION:   /68 (BP Location: Left arm, Patient Position: Sitting) Comment (BP Location): forearm  Pulse 82   Temp 36.7 °C (98 °F) (Temporal)   Resp 20   SpO2 97% Comment: 3L    Physical Exam  Constitutional:       Appearance: He is obese.   Cardiovascular:      Rate and Rhythm: Normal rate.      Comments: Pedal pulses are palpable  Pulmonary:      Effort: Pulmonary effort is normal. No respiratory distress.   Musculoskeletal:         General: Swelling present.      Right lower leg: Edema present.      Left lower leg: Edema present.   Skin:     Comments: Full-thickness wound to the left posterior lower extremity: Small decrease in " overall wound dimensions.  No periwound erythema, induration or purulent drainage.  Refer to wound flowsheet and photos    Hemosiderin staining, chronic edema, skin changes-consistent with CVI   Neurological:      Mental Status: He is alert and oriented to person, place, and time.         WOUND ASSESSMENT  Wound 03/14/23 Traumatic LLE Posterior (Active)   Wound Image    07/05/23 1415   Site Assessment Red;Pink 07/05/23 1415   Periwound Assessment Hemosiderin Staining;Edema;Dry 07/05/23 1415   Margins Attached edges 07/05/23 1415   Drainage Amount Moderate 07/05/23 1415   Drainage Description Serosanguineous 07/05/23 1415   Treatments Cleansed;Topical Lidocaine;Provider debridement 07/05/23 1415   Wound Cleansing [place] 07/05/23 1415   Periwound Protectant Skin Protectant Wipes to Periwound;Skin Moisturizer;Barrier Paste 07/05/23 1415   Dressing Cleansing/Solutions Normal Saline 07/05/23 1415   Dressing Options Collagen Dressing;Hydrofiber Silver;Compression Wrap Two Layer 07/05/23 1415   Dressing Changed Changed 07/05/23 1415   Dressing Change/Treatment Frequency Every 72 hrs, and As Needed 07/05/23 1415   Non-staged Wound Description Full thickness 07/05/23 1415   Wound Length (cm) 0.5 cm 07/05/23 1415   Wound Width (cm) 0.5 cm 07/05/23 1415   Wound Depth (cm) 0.1 cm 07/05/23 1415   Wound Surface Area (cm^2) 0.25 cm^2 07/05/23 1415   Wound Volume (cm^3) 0.025 cm^3 07/05/23 1415   Post-Procedure Length (cm) 0.8 cm 07/05/23 1415   Post-Procedure Width (cm) 1 cm 07/05/23 1415   Post-Procedure Depth (cm) 0.2 cm 07/05/23 1415   Post-Procedure Surface Area (cm^2) 0.8 cm^2 07/05/23 1415   Post-Procedure Volume (cm^3) 0.16 cm^3 07/05/23 1415   Wound Healing % 83 07/05/23 1415   Tunneling (cm) 0 cm 07/05/23 1415   Undermining (cm) 0 cm 07/05/23 1415   Wound Odor None 07/05/23 1415   Pulses Left;2+ 07/05/23 1415   Right Foot Monofilament 10-point exam (Sensate) 8/10 05/09/23 1500   Left Foot Monofilament 10-point exam  (Sensate) 9/10 05/09/23 1500   Exposed Structures None 07/05/23 1415   Number of days: 114         PROCEDURE:   No excisional debridement required in clinic today.    BIOLOGIC LOG  6/13/2023-first application  PRODUCT: EpiCord 1 x 2cm . reorder #[id number]; PRODUCT-[id number] EXPIRES: 2028-01-01 6/20/2023-second application  PRODUCT: Epi fix 2 x 2 cm sheet.  Reorder #[id number] ; PRODUCT #[id number]; expires 2028-04-01 6/27/2023-third application-final application, authorized only through 6/30  PRODUCT: Epi fix 14 mm disc.  Reorder #[id number]; PRODUCT #[id number]; expires 2028-05-01      Pertinent Labs and Diagnostics:    Labs:     A1c:   Lab Results   Component Value Date/Time    HBA1C 6.5 (H) 09/15/2020 04:51 AM            LAST  WOUND CULTURE:  DATE :   Lab Results   Component Value Date/Time    CULTRSULT No growth after 5 days of incubation. 11/07/2022 06:23 PM         ASSESSMENT AND PLAN:   1. Open wound of left lower leg, subsequent encounter    7/5/2023:Slight decrease in overall wound dimensions   -Debridement not necessary today in clinic   -Patient has no further applications available of biologic  -Continue with 4-layer compression at this time.  Unfortunately [facility] did not have 4-layer compression this last week and 2 layer compression was applied.  -Patient is to return to clinic in 1 week for assessment and debridement as needed  -[facility] to continue to see the patient 1 time per week.  Instructions have been faxed to [facility]  -Check with patient next clinical appointment if he has received his zipper compression garments from [facility].  Patient reported 2 weeks ago that [facility] was working on ordering him compression garments     Wound care: Collagen dressing, Hydrofiber silver, 2 layer compression wrap    2. Controlled type 2 diabetes mellitus without complication, without long-term current use of insulin ([facility])    7/5/2023:   -Patient's blood sugars  fairly well managed.  Patient is well aware that he should keep his blood sugar below 140 for optimal wound healing.  -Recently started on Ozempic, endocrinologist at [facility] managing    3. Severe obesity (BMI >= 40) ([facility])  Comments: Complicating factor to wound healing and wound care  Benefits of weight loss for management of his diabetes, and for his overall health previously discussed    4. Edema of both lower extremities  5. Venous insufficiency     7/5/2023:  Suspected component of lymphedema  -Patient is tolerating 4-layer compression continue at this time.  -[facility] has been working on obtaining zippered compression stockings    >20 min spent face to face with patient, >50% of time spent counseling, coordinating care, reviewing records, discussing POC, educating patient      PATIENT EDUCATION  - Importance of adequate nutrition for wound healing  -Advised to go to ER for any increased redness, swelling, drainage, or odor, or if patient develops fever, chills, nausea or vomiting.     Please note that this note may have been created using voice recognition software. I have worked with technical experts from [employer] to optimize the interface.  I have made every reasonable attempt to correct obvious errors, but there may be errors of grammar and possibly content that I did not discover before finalizing the note.    N

## 2023-07-11 ENCOUNTER — OFFICE VISIT (OUTPATIENT)
Dept: WOUND CARE | Facility: MEDICAL CENTER | Age: 63
End: 2023-07-11
Attending: INTERNAL MEDICINE
Payer: MEDICARE

## 2023-07-11 ENCOUNTER — HOSPITAL ENCOUNTER (OUTPATIENT)
Facility: MEDICAL CENTER | Age: 63
End: 2023-07-11
Attending: NURSE PRACTITIONER
Payer: MEDICARE

## 2023-07-11 VITALS
RESPIRATION RATE: 20 BRPM | TEMPERATURE: 98.4 F | SYSTOLIC BLOOD PRESSURE: 137 MMHG | DIASTOLIC BLOOD PRESSURE: 58 MMHG | OXYGEN SATURATION: 91 % | HEART RATE: 71 BPM

## 2023-07-11 DIAGNOSIS — I87.2 VENOUS INSUFFICIENCY OF BOTH LOWER EXTREMITIES: ICD-10-CM

## 2023-07-11 DIAGNOSIS — E11.9 CONTROLLED TYPE 2 DIABETES MELLITUS WITHOUT COMPLICATION, WITHOUT LONG-TERM CURRENT USE OF INSULIN (HCC): ICD-10-CM

## 2023-07-11 DIAGNOSIS — R60.0 EDEMA OF BOTH LOWER EXTREMITIES: ICD-10-CM

## 2023-07-11 DIAGNOSIS — S81.802D OPEN WOUND OF LEFT LOWER LEG, SUBSEQUENT ENCOUNTER: ICD-10-CM

## 2023-07-11 DIAGNOSIS — E66.01 SEVERE OBESITY (BMI >= 40) (HCC): ICD-10-CM

## 2023-07-11 LAB — PATHOLOGY CONSULT NOTE: NORMAL

## 2023-07-11 PROCEDURE — 88341 IMHCHEM/IMCYTCHM EA ADD ANTB: CPT | Mod: 91

## 2023-07-11 PROCEDURE — 99213 OFFICE O/P EST LOW 20 MIN: CPT

## 2023-07-11 PROCEDURE — 3075F SYST BP GE 130 - 139MM HG: CPT | Performed by: NURSE PRACTITIONER

## 2023-07-11 PROCEDURE — 99213 OFFICE O/P EST LOW 20 MIN: CPT | Mod: 25 | Performed by: NURSE PRACTITIONER

## 2023-07-11 PROCEDURE — 11104 PUNCH BX SKIN SINGLE LESION: CPT

## 2023-07-11 PROCEDURE — 3078F DIAST BP <80 MM HG: CPT | Performed by: NURSE PRACTITIONER

## 2023-07-11 PROCEDURE — 88342 IMHCHEM/IMCYTCHM 1ST ANTB: CPT

## 2023-07-11 PROCEDURE — 11104 PUNCH BX SKIN SINGLE LESION: CPT | Performed by: NURSE PRACTITIONER

## 2023-07-11 PROCEDURE — 88305 TISSUE EXAM BY PATHOLOGIST: CPT

## 2023-07-11 PROCEDURE — 88313 SPECIAL STAINS GROUP 2: CPT

## 2023-07-11 NOTE — PATIENT INSTRUCTIONS
-Keep your wound dressing clean, dry, and intact.    -Change your dressing if it becomes soiled, soaked, or falls off.    -Should you experience any significant changes in your wound(s), such as infection (redness, swelling, localized heat, increased pain, fever > 101 F, chills) or have any questions regarding your home care instructions, please contact the wound center at [phone]. If after hours, contact your primary care physician or go to the hospital emergency room.

## 2023-07-11 NOTE — WOUND TEAM
Patient arrived to clinic with 4 layer wrap partially removed by patient due to patient c/o it being too tight. Instructed patient to call HH to have them remove it if it is causing pain or numbness. Patient verbalized understanding to all instructions.     Punch biopsy obtained by provider, tissue placed in Formalin jar, and left in lab box for ,  requested.    Home health order updated and faxed to [facility]  via right fax.

## 2023-07-11 NOTE — PROGRESS NOTES
Provider Encounter- Full Thickness wound    HISTORY OF PRESENT ILLNESS  Wound History:   START OF CARE IN CLINIC: 03/14/23   REFERRING PROVIDER: Didi Lehman   WOUND- Full Thickness Wound   LOCATION: Left posterior leg   HISTORY: Patient developed a wound in September 2023 to the left posterior leg.  Patient has thick fungal toenails and reports that he kicked his leg with his right foot and nails causing a scratch.  This led to the full-thickness wound to the left posterior lower extremity.  Patient reports that he was receiving home health care without resolution of the wound.  Patient was seen at [facility] and referred to [facility] wound care for further care of the left posterior full-thickness wound.    Pertinent Medical History: Hyperlipidemia, ABELARDO, obesity, essential hypertension, type 2 diabetes, pulmonary hypertension    TOBACCO USE: Denies a history of tobacco use or smokeless tobacco products    Patient's problem list, allergies, and current medications reviewed and updated in Epic    Interval History:  3/14/2023: Clinic visit with GURJIT Barrientos.  Patient reports overall he is feeling well denies any fever or chills.    3/21/2023: Clinic visit with Kwaku Sanchez MD. Patient reports doing well, denies any fevers or chills. His wound is measuring about the same but is starting to fill in with increased granulation tissue. Patient was referred to podiatry at [facility] and reports appointment for foot and nail care on 4/24. He does not wear compression to lower extremities and was counseled on edema / venous insufficiency and importance of compression to help return venous flow to heart.    3/27/2023: Clinic visit with Kwaku Sanchez MD. Patient reports doing well. Tolerating compression with decreased edema. He denies any complaints. Wound is slowly improving. Discussed again compression in future. He think he will be able to apply compression socks, given information and he will see about  obtaining from [facility].    4/3/2023: Clinic visit with GURJIT Barrientos.  Patient reports overall he is doing well denies any fever or chills.  Patient reports that Tubigrip applied over the Unna boot with Coban to the right lower extremity was too tight last clinical appointment.  Therefore we will use a larger Tubigrip.  Patient's wound is progressing with increased epithelial tissue at the periphery and granulation tissue throughout the wound bed.    4/10/2023: Clinic visit with GURJIT Barrientos.  Patient reports overall he is doing well denies any fever or chills.  Patient now has [facility] who is seeing the patient once a week for wound care dressing changes and reapplication of Unna boot with Coban wraps.  Patient reports that he has ordered compression stockings off of [employer] and they should be to his home soon.    4/25/2023: Clinic visit with GURJIT Barrientos.  Patient reports overall he is doing well denies any fever or chills.  Patient reports he has seen his podiatrist recently who has taken down his oncotic nails.  Patient reports that he has not had any applications with the compression.  Discussed increasing compression of the 2 layer compression wrap patient is in agreement.  Patient reports that ED and health services is going to supply him with zipper compression stockings with 30 to 40 mm compression.      5/2/2023 : Clinic visit with GURJIT Ordaz, VAL, DIANA, NAY.   Patient states that overall he is feeling well.  Reports his blood sugar was in the 150s.  He did not receive zippered compression garment, ordered last visit.  We will follow-up.  His wound is slowly progressing.  He is tolerating compression without any difficulty.    5/9/2023 : Clinic visit with GURJIT Ordaz, VAL, DIANA, NAY.   Patient states he is feeling well overall.  His blood sugars continue to run in the 150s to 160s.  He just started on Ozempic about a week ago.  He has still not  received zippered compression stocking.  He states that [facility] is trying to figure out which department will pay for this.  He continues to tolerate compression wrap without any difficulty.    5/16/2023 : Clinic visit with GURJIT Ordaz, VAL, DIANA, NAY.   Patient states he is feeling well, blood sugars in the 160s.  He has not yet obtained zippered compression stocking from [facility], is not sure when this will be happening.  He states that he has an appointment to  a voucher for diabetic shoes with inserts, and will ask at that time about the compression garment.    5/23/2023: Clinic visit with GURJIT Barrientos.  Patient reports that he is doing okay denies fever or chills.  Patient is on home oxygen, again the patient does not have a transportable tank.  Short of breath on arrival.  Nasal cannula placed in clinic at 3 L patient saturating above 90%.    5/30/2023: Clinic visit with Kwaku Sanchez MD. Patient reports doing well. Denies any complaints. He reports that he continues to have difficulty obtaining compression socks from [facility]. Reports that he has been transferred between departments, but will continue to try. Wound largely unchanged. Biologic authorization is still pending. Patient continues to have edema despite 2 layer compression, he is agreeable to try 4 layer compression this week.    6/6/2023: Clinic visit with Kwaku Sanchez MD. Patient reports feeling well. Denies any signs or symptoms of infection. He reports tolerating 4 layer wrap, though home health did not have and applied 2 layer wrap. Home health told him that they have ordered 4 layer compression. He is still working through [facility] to obtain compression socks. Biologic authorization is still pending.    6/13/2023 : Clinic visit with GURJIT Ordaz, VAL, DIANA, NAY.   Rashi states he is feeling well overall.  His blood sugar today was 153.  We have received authorization from his insurance provider to apply EpiFix or epi  "cord to his wound.  Epi cord applied today, first application.  Authorization is only good through 6/30.    6/20/2023 : Clinic visit with GURJIT Ordaz, VAL, DIANA, NAY.   Patient continues to feel well.  He reports that his blood sugar this morning was 144.  He is tolerating compression without any difficulty.  Wound area has decreased slightly.  He has not yet received zippered compression garments from [facility], states \"they are working on it\".    6/27/2023 : Clinic visit with GURJIT Ordaz, VAL, DIANA, NAY.   Rashi states he is feeling well, reports his blood sugar today was 131.  He continues to tolerate compression.  Wound area about the same, depth has decreased slightly.  EpiFix was applied in clinic today.  However, this will be his last application, as authorization was only through 6/30.    7/6/2023: Clinic visit with GURJIT Barrientos. Wound bed is smaller by dimensions fortunately Rashi is out authorization for biologic.  Patient reports that home health did not have 4-layer compression.  2 layer compression placed by home health.  Patient reports that home health did not have 4-layer compression wraps.    7/11/2023 : Clinic visit with GURJIT Ordaz, VAL, DIANA, NAY.   Rashi continues to feel well overall.  His wound has not progressed significantly for several months.  He does present today with a raised ring around bottom half of wound.  I opted to perform a punch biopsy in clinic today to rule out possible malignancy or other etiology.   He also presented today with his pression wrap partially slid down his leg.  This has been an ongoing problem.  He informed that [facility] has denied coverage for zippered compression garment.  He states they are looking into other options      REVIEW OF SYSTEMS:   Unchanged from previous wound clinic assessment on 7/6/2023 except as noted in interval history above    PHYSICAL EXAMINATION:   /58 (BP Location: Right arm, Patient Position: " Sitting) Comment (BP Location): forearm  Pulse 71   Temp 36.9 °C (98.4 °F) (Temporal)   Resp 20   SpO2 91%     Physical Exam  Constitutional:       Appearance: He is obese.   Cardiovascular:      Rate and Rhythm: Normal rate.      Comments: Pedal pulses are palpable  Pulmonary:      Effort: Pulmonary effort is normal. No respiratory distress.   Musculoskeletal:         General: Swelling present.      Right lower leg: Edema present.      Left lower leg: Edema present.   Skin:     Comments: Full-thickness wound to the left posterior lower extremity: No significant change in wound area, no appreciable granulation, rolled wound edge from approximately 3-9 o'clock    Hemosiderin staining, chronic edema, skin changes-consistent with CVI   Neurological:      Mental Status: He is alert and oriented to person, place, and time.         WOUND ASSESSMENT  Wound 03/14/23 Traumatic LLE Posterior (Active)   Wound Image    07/11/23 1400   Site Assessment Red;[place] 07/11/23 1400   Periwound Assessment Hemosiderin Staining;Edema;Dry 07/11/23 1400   Margins Attached edges;Unattached edges 07/11/23 1400   Drainage Amount Large 07/11/23 1400   Drainage Description Sanguineous 07/11/23 1400   Treatments Cleansed;Topical Lidocaine;Provider debridement;Other (Comment);Silver nitrate;Site care 07/11/23 1400   Wound Cleansing Normal Saline Irrigation 07/11/23 1400   Periwound Protectant Skin Protectant Wipes to Periwound;Barrier Paste 07/11/23 1400   Dressing Cleansing/Solutions Normal Saline 07/11/23 1400   Dressing Options Collagen Dressing;Hydrofiber Silver Strip;Hydrofiber Silver;Silicone Adhesive Foam;Tubigrip 07/11/23 1400   Dressing Changed New 07/11/23 1400   Dressing Change/Treatment Frequency Every 72 hrs, and As Needed 07/11/23 1400   Non-staged Wound Description Full thickness 07/11/23 1400   Wound Length (cm) 0.9 cm 07/11/23 1400   Wound Width (cm) 0.6 cm 07/11/23 1400   Wound Depth (cm) 0.1 cm 07/11/23 1400   Wound Surface  Area (cm^2) 0.54 cm^2 07/11/23 1400   Wound Volume (cm^3) 0.054 cm^3 07/11/23 1400   Post-Procedure Length (cm) 1.7 cm 07/11/23 1400   Post-Procedure Width (cm) 0.7 cm 07/11/23 1400   Post-Procedure Depth (cm) 0.5 cm 07/11/23 1400   Post-Procedure Surface Area (cm^2) 1.19 cm^2 07/11/23 1400   Post-Procedure Volume (cm^3) 0.595 cm^3 07/11/23 1400   Wound Healing % 64 07/11/23 1400   Tunneling (cm) 0 cm 07/11/23 1400   Undermining (cm) 0 cm 07/11/23 1400   Wound Odor None 07/11/23 1400   Pulses Left;2+ 07/05/23 1415   Right Foot Monofilament 10-point exam (Sensate) 8/10 05/09/23 1500   Left Foot Monofilament 10-point exam (Sensate) 9/10 05/09/23 1500   Exposed Structures None 07/11/23 1400   Number of days: 119     DESCRIPTION OF PROCEDURE: Punch biopsy of chronic left posterior lower leg ulcer    The procedure, rationale for doing so, and the possible risks- including infection, pain and bleeding- have been discussed with the patient.  The patient  verbalized understanding and is agreeable with proceeding.  Consent signed    ANESTHESIA:  5 ml 2% lidocaine with epinephrine    PROCEDURE:   -A formal timeout was performed to confirm patient's correct name, correct site, correct procedure and correct laterality.  -Skin prepped with Chlorahexidine.    -Lidocaine injected subcutaneously into inferior periwound  -5 mm punch used to obtain specimen from inferior wound edge at approximately 4:00.  Specimen sent to pathology  -Bleeding controlled with manual pressure and silver nitrate.    -Wound care completed by wound RN, refer to flowsheet.    -Patient tolerated the procedure well, without c/o pain or discomfort.         BIOLOGIC LOG  6/13/2023-first application  PRODUCT: EpiCord 1 x 2cm . reorder #[id number]; PRODUCT-[id number] EXPIRES: 2028-01-01 6/20/2023-second application  PRODUCT: Epi fix 2 x 2 cm sheet.  Reorder #[id number] ; PRODUCT #[id number]; expires 2028-04-01 6/27/2023-third application-final  application, authorized only through 6/30  PRODUCT: Epi fix 14 mm disc.  Reorder #[id number]; PRODUCT #[id number]; expires 2028-05-01      Pertinent Labs and Diagnostics:    Labs:     A1c:   Lab Results   Component Value Date/Time    HBA1C 6.5 (H) 09/15/2020 04:51 AM            LAST  WOUND CULTURE:  DATE :   Lab Results   Component Value Date/Time    CULTRSULT No growth after 5 days of incubation. 11/07/2022 06:23 PM         ASSESSMENT AND PLAN:   1. Open wound of left lower leg, subsequent encounter    7/11/2023: No significant progress in this wound in over 4 months.  Failed biologic application  -Punch biopsy obtained in clinic today.  Sent to pathology  -Patient to return to clinic in 1 week for assessment and debridement.  -Double layer Tubigrip utilized today.  Patient states he is able to adjust these himself, and is able to apply and remove them on his own as well.  -Hold off to continue seeing him 1 time per week in between clinic visits     Wound care: Collagen dressing, Hydrofiber silver, double layer Tubigrip    2. Controlled type 2 diabetes mellitus without complication, without long-term current use of insulin ([facility])    7/11/2023: Patient reports his blood sugar today was 131  -Patient's blood sugars currently fairly well managed.  Patient is well aware that he should keep his blood sugar below 140 for optimal wound healing.  -Recently started on Ozempic, endocrinologist at [facility] managing    3. Severe obesity (BMI >= 40) ([facility])  Comments: Complicating factor to wound healing and wound care  Benefits of weight loss for management of his diabetes, and for his overall health previously discussed    4. Edema of both lower extremities  5. Venous insufficiency     7/11/2023:  Suspected component of lymphedema  -Patient has had problems with sliding of compression wraps.  Double layer Tubigrip applied today.  Patient is able to manipulate these himself  -[facility] has denied zippered compression stockings, but are  looking for other options.    My total time spent caring for the patient on the day of the encounter was 20 minutes.   This does not include time spent on separately billable procedures/tests.       PATIENT EDUCATION  - Importance of adequate nutrition for wound healing  -Advised to go to ER for any increased redness, swelling, drainage, or odor, or if patient develops fever, chills, nausea or vomiting.     Please note that this note may have been created using voice recognition software. I have worked with technical experts from [employer] to optimize the interface.  I have made every reasonable attempt to correct obvious errors, but there may be errors of grammar and possibly content that I did not discover before finalizing the note.    N

## 2023-07-18 ENCOUNTER — OFFICE VISIT (OUTPATIENT)
Dept: WOUND CARE | Facility: MEDICAL CENTER | Age: 63
End: 2023-07-18
Attending: INTERNAL MEDICINE
Payer: MEDICARE

## 2023-07-18 VITALS
HEART RATE: 72 BPM | DIASTOLIC BLOOD PRESSURE: 56 MMHG | RESPIRATION RATE: 18 BRPM | OXYGEN SATURATION: 93 % | SYSTOLIC BLOOD PRESSURE: 131 MMHG | TEMPERATURE: 97.9 F

## 2023-07-18 DIAGNOSIS — S81.802D OPEN WOUND OF LEFT LOWER LEG, SUBSEQUENT ENCOUNTER: ICD-10-CM

## 2023-07-18 DIAGNOSIS — R60.0 EDEMA OF BOTH LOWER EXTREMITIES: ICD-10-CM

## 2023-07-18 DIAGNOSIS — I87.2 VENOUS INSUFFICIENCY OF BOTH LOWER EXTREMITIES: ICD-10-CM

## 2023-07-18 DIAGNOSIS — E66.01 SEVERE OBESITY (BMI >= 40) (HCC): ICD-10-CM

## 2023-07-18 DIAGNOSIS — E11.9 CONTROLLED TYPE 2 DIABETES MELLITUS WITHOUT COMPLICATION, WITHOUT LONG-TERM CURRENT USE OF INSULIN (HCC): ICD-10-CM

## 2023-07-18 PROCEDURE — 11042 DBRDMT SUBQ TIS 1ST 20SQCM/<: CPT | Performed by: STUDENT IN AN ORGANIZED HEALTH CARE EDUCATION/TRAINING PROGRAM

## 2023-07-18 PROCEDURE — 11042 DBRDMT SUBQ TIS 1ST 20SQCM/<: CPT

## 2023-07-18 PROCEDURE — 3078F DIAST BP <80 MM HG: CPT | Performed by: STUDENT IN AN ORGANIZED HEALTH CARE EDUCATION/TRAINING PROGRAM

## 2023-07-18 PROCEDURE — 3075F SYST BP GE 130 - 139MM HG: CPT | Performed by: STUDENT IN AN ORGANIZED HEALTH CARE EDUCATION/TRAINING PROGRAM

## 2023-07-18 NOTE — PATIENT INSTRUCTIONS
-Keep your wound dressing clean, dry, and intact.    -Change your dressing if it becomes soiled, soaked, or falls off.    -Should you experience any significant changes in your wound(s), such as infection (redness, swelling, localized heat, increased pain, fever > 101 F, chills) or have any questions regarding your home care instructions, please contact the wound center at [phone]. If after hours, contact your primary care physician or go to the hospital emergency room.

## 2023-07-18 NOTE — PROGRESS NOTES
Provider Encounter- Full Thickness wound    HISTORY OF PRESENT ILLNESS  Wound History:   START OF CARE IN CLINIC: 03/14/23   REFERRING PROVIDER: Didi Lehman   WOUND- Full Thickness Wound   LOCATION: Left posterior leg   HISTORY: Patient developed a wound in September 2023 to the left posterior leg.  Patient has thick fungal toenails and reports that he kicked his leg with his right foot and nails causing a scratch.  This led to the full-thickness wound to the left posterior lower extremity.  Patient reports that he was receiving home health care without resolution of the wound.  Patient was seen at [facility] and referred to [facility] wound care for further care of the left posterior full-thickness wound.    Pertinent Medical History: Hyperlipidemia, ABELARDO, obesity, essential hypertension, type 2 diabetes, pulmonary hypertension    TOBACCO USE: Denies a history of tobacco use or smokeless tobacco products    Patient's problem list, allergies, and current medications reviewed and updated in Epic    Interval History:  3/14/2023: Clinic visit with GURJIT Barrientos.  Patient reports overall he is feeling well denies any fever or chills.    3/21/2023: Clinic visit with Kwaku Sanchez MD. Patient reports doing well, denies any fevers or chills. His wound is measuring about the same but is starting to fill in with increased granulation tissue. Patient was referred to podiatry at [facility] and reports appointment for foot and nail care on 4/24. He does not wear compression to lower extremities and was counseled on edema / venous insufficiency and importance of compression to help return venous flow to heart.    3/27/2023: Clinic visit with Kwaku Sanchez MD. Patient reports doing well. Tolerating compression with decreased edema. He denies any complaints. Wound is slowly improving. Discussed again compression in future. He think he will be able to apply compression socks, given information and he will see about  obtaining from [facility].    4/3/2023: Clinic visit with GURJIT Barrientos.  Patient reports overall he is doing well denies any fever or chills.  Patient reports that Tubigrip applied over the Unna boot with Coban to the right lower extremity was too tight last clinical appointment.  Therefore we will use a larger Tubigrip.  Patient's wound is progressing with increased epithelial tissue at the periphery and granulation tissue throughout the wound bed.    4/10/2023: Clinic visit with GURJIT Barrientos.  Patient reports overall he is doing well denies any fever or chills.  Patient now has [facility] who is seeing the patient once a week for wound care dressing changes and reapplication of Unna boot with Coban wraps.  Patient reports that he has ordered compression stockings off of [employer] and they should be to his home soon.    4/25/2023: Clinic visit with GURJIT Barrientos.  Patient reports overall he is doing well denies any fever or chills.  Patient reports he has seen his podiatrist recently who has taken down his oncotic nails.  Patient reports that he has not had any applications with the compression.  Discussed increasing compression of the 2 layer compression wrap patient is in agreement.  Patient reports that ED and health services is going to supply him with zipper compression stockings with 30 to 40 mm compression.      5/2/2023 : Clinic visit with GURJIT Ordaz, VAL, DIANA, NAY.   Patient states that overall he is feeling well.  Reports his blood sugar was in the 150s.  He did not receive zippered compression garment, ordered last visit.  We will follow-up.  His wound is slowly progressing.  He is tolerating compression without any difficulty.    5/9/2023 : Clinic visit with GURJIT Ordaz, VAL, DIANA, NAY.   Patient states he is feeling well overall.  His blood sugars continue to run in the 150s to 160s.  He just started on Ozempic about a week ago.  He has still not  received zippered compression stocking.  He states that [facility] is trying to figure out which department will pay for this.  He continues to tolerate compression wrap without any difficulty.    5/16/2023 : Clinic visit with GURJIT Ordaz, VAL, DIANA, NAY.   Patient states he is feeling well, blood sugars in the 160s.  He has not yet obtained zippered compression stocking from [facility], is not sure when this will be happening.  He states that he has an appointment to  a voucher for diabetic shoes with inserts, and will ask at that time about the compression garment.    5/23/2023: Clinic visit with GURJIT Barrientos.  Patient reports that he is doing okay denies fever or chills.  Patient is on home oxygen, again the patient does not have a transportable tank.  Short of breath on arrival.  Nasal cannula placed in clinic at 3 L patient saturating above 90%.    5/30/2023: Clinic visit with Kwaku Sanchez MD. Patient reports doing well. Denies any complaints. He reports that he continues to have difficulty obtaining compression socks from [facility]. Reports that he has been transferred between departments, but will continue to try. Wound largely unchanged. Biologic authorization is still pending. Patient continues to have edema despite 2 layer compression, he is agreeable to try 4 layer compression this week.    6/6/2023: Clinic visit with Kwaku Sanchez MD. Patient reports feeling well. Denies any signs or symptoms of infection. He reports tolerating 4 layer wrap, though home health did not have and applied 2 layer wrap. Home health told him that they have ordered 4 layer compression. He is still working through [facility] to obtain compression socks. Biologic authorization is still pending.    6/13/2023 : Clinic visit with GURJIT Ordaz, VAL, DIANA, NAY.   Rashi states he is feeling well overall.  His blood sugar today was 153.  We have received authorization from his insurance provider to apply EpiFix or epi  "cord to his wound.  Epi cord applied today, first application.  Authorization is only good through 6/30.    6/20/2023 : Clinic visit with GURJIT Ordaz, VAL, DIANA, NAY.   Patient continues to feel well.  He reports that his blood sugar this morning was 144.  He is tolerating compression without any difficulty.  Wound area has decreased slightly.  He has not yet received zippered compression garments from [facility], states \"they are working on it\".    6/27/2023 : Clinic visit with GURJIT Ordaz, VAL, DIANA, NAY.   Rashi states he is feeling well, reports his blood sugar today was 131.  He continues to tolerate compression.  Wound area about the same, depth has decreased slightly.  EpiFix was applied in clinic today.  However, this will be his last application, as authorization was only through 6/30.    7/6/2023: Clinic visit with GURJIT Barrientos. Wound bed is smaller by dimensions fortunately Rashi is out authorization for biologic.  Patient reports that home health did not have 4-layer compression.  2 layer compression placed by home health.  Patient reports that home health did not have 4-layer compression wraps.    7/11/2023 : Clinic visit with GURJIT Ordaz, VAL, DIANA, NAY.   Rashi continues to feel well overall.  His wound has not progressed significantly for several months.  He does present today with a raised ring around bottom half of wound.  I opted to perform a punch biopsy in clinic today to rule out possible malignancy or other etiology.   He also presented today with his pression wrap partially slid down his leg.  This has been an ongoing problem.  He informed that [facility] has denied coverage for zippered compression garment.  He states they are looking into other options    7/18/2023: Clinic visit with Kwaku Sanchez MD. Patient reports feeling in normal state of health. Denies any fevers or chills. Reviewed patient's biopsy results with him: granulation tissue and marked " stasis dermatitis without evidence of vasculitis or malignancy. Patient is tolerating tubigrips better and easier for him to pull back up when they fall down.      REVIEW OF SYSTEMS:   Unchanged from previous wound clinic assessment on 7/11/2023 except as noted in interval history above    PHYSICAL EXAMINATION:   /56   Pulse 72   Temp 36.6 °C (97.9 °F) (Temporal)   Resp 18   SpO2 93%     Physical Exam  Constitutional:       Appearance: He is obese.   Cardiovascular:      Rate and Rhythm: Normal rate.      Comments: Pedal pulses are palpable  Pulmonary:      Effort: Pulmonary effort is normal. No respiratory distress.   Musculoskeletal:         General: Swelling present.      Right lower leg: Edema present.      Left lower leg: Edema present.   Skin:     Comments: Full-thickness wound to the left posterior lower extremity: No significant change slightly larger and deeper following previous punch biopsy, no appreciable granulation, epibole    Hemosiderin staining, chronic edema, skin changes-consistent with CVI   Neurological:      Mental Status: He is alert and oriented to person, place, and time.         WOUND ASSESSMENT  Wound 03/14/23 Traumatic LLE Posterior (Active)   Wound Image    07/18/23 1500   Site Assessment Red;[place] 07/18/23 1500   Periwound Assessment Dry;Hemosiderin Staining;Edema 07/18/23 1500   Margins Attached edges 07/18/23 1500   Drainage Amount Large 07/18/23 1500   Drainage Description Serosanguineous 07/18/23 1500   Treatments Cleansed;Topical Lidocaine;Provider debridement 07/18/23 1500   Wound Cleansing Normal Saline Irrigation 07/18/23 1500   Periwound Protectant Skin Protectant Wipes to Periwound;Barrier Paste 07/18/23 1500   Dressing Changed New 07/18/23 1500   Dressing Cleansing/Solutions Normal Saline 07/18/23 1500   Dressing Options Collagen Dressing;Hydrofiber Silver;Silicone Adhesive Foam;Tubigrip 07/18/23 1500   Dressing Change/Treatment Frequency Every 72 hrs, and As Needed  07/18/23 1500   Non-staged Wound Description Full thickness 07/18/23 1500   Wound Length (cm) 1.5 cm 07/18/23 1500   Wound Width (cm) 0.8 cm 07/18/23 1500   Wound Depth (cm) 0.5 cm 07/18/23 1500   Wound Surface Area (cm^2) 1.2 cm^2 07/18/23 1500   Wound Volume (cm^3) 0.6 cm^3 07/18/23 1500   Post-Procedure Length (cm) 1.5 cm 07/18/23 1500   Post-Procedure Width (cm) 0.8 cm 07/18/23 1500   Post-Procedure Depth (cm) 0.5 cm 07/18/23 1500   Post-Procedure Surface Area (cm^2) 1.2 cm^2 07/18/23 1500   Post-Procedure Volume (cm^3) 0.6 cm^3 07/18/23 1500   Wound Healing % -300 07/18/23 1500   Tunneling (cm) 0 cm 07/18/23 1500   Undermining (cm) 0 cm 07/18/23 1500   Wound Odor None 07/18/23 1500   Pulses Left;2+ 07/05/23 1415   Right Foot Monofilament 10-point exam (Sensate) 8/10 05/09/23 1500   Left Foot Monofilament 10-point exam (Sensate) 9/10 05/09/23 1500   Exposed Structures None 07/18/23 1500   Number of days: 126       PROCEDURE: sharp debridement of posterior left lower extremity wound  -2% viscous lidocaine applied topically to wound bed for approximately 5 minutes prior to debridement  -Curette used to debride wound bed.  Excisional debridement was performed to remove devitalized tissue until healthy, bleeding tissue was visualized.   Entire surface of wound, 1.2 cm2 debrided.  Tissue debrided into the subcutaneous layer. -Bleeding controlled with manual pressure.    -Wound care completed by wound RN, refer to flowsheet  -Patient tolerated the procedure well, without c/o pain or discomfort.          BIOLOGIC LOG  6/13/2023-first application  PRODUCT: EpiCord 1 x 2cm . reorder #[id number]; PRODUCT-[id number] EXPIRES: 2028-01-01 6/20/2023-second application  PRODUCT: Epi fix 2 x 2 cm sheet.  Reorder #[id number] ; PRODUCT #[id number]; expires 2028-04-01 6/27/2023-third application-final application, authorized only through 6/30  PRODUCT: Epi fix 14 mm disc.  Reorder #[id number]; PRODUCT #[id number];  expires 2028-05-01      Pertinent Labs and Diagnostics:    Labs:     A1c:   Lab Results   Component Value Date/Time    HBA1C 6.5 (H) 09/15/2020 04:51 AM            LAST  WOUND CULTURE:  DATE :   Lab Results   Component Value Date/Time    CULTRSULT No growth after 5 days of incubation. 11/07/2022 06:23 PM         ASSESSMENT AND PLAN:   1. Open wound of left lower leg, subsequent encounter    7/18/2023: Wound measures about the same, slightly larger and deeper following punch biopsy last week.  - Excisional debridement was performed in clinic, medically necessary to promote wound healing.  -Punch biopsy obtained in clinic 7/11: focal granulation with background of marked stasis dermatitis. No vasculitis or malignancy.  -Patient to return to clinic in 1 week for assessment and debridement.  -Double layer Tubigrip utilized today.  Patient states he is able to adjust these himself, and is able to apply and remove them on his own as well.     Wound care: Collagen dressing, Hydrofiber silver, double layer Tubigrip    2. Controlled type 2 diabetes mellitus without complication, without long-term current use of insulin ([facility])    7/18/2023: Patient reports his blood sugar today was 130s  -Patient's blood sugars currently fairly well managed.  Patient is well aware that he should keep his blood sugar below 140 for optimal wound healing.  -Recently started on Ozempic, endocrinologist at [facility] managing    3. Severe obesity (BMI >= 40) ([facility])  Comments: Complicating factor to wound healing and wound care  Benefits of weight loss for management of his diabetes, and for his overall health previously discussed    4. Edema of both lower extremities  5. Venous insufficiency     7/18/2023:  Suspected component of lymphedema  -Patient has had problems with sliding of compression wraps.  Continue double layer Tubigrip as he is able to manipulate these himself and can pull back into correct position.  -[facility] has denied zippered compression  stockings, but are looking for other options.    PATIENT EDUCATION  - Importance of adequate nutrition for wound healing  -Advised to go to ER for any increased redness, swelling, drainage, or odor, or if patient develops fever, chills, nausea or vomiting.     Please note that this note may have been created using voice recognition software. I have worked with technical experts from [facility] to optimize the interface.  I have made every reasonable attempt to correct obvious errors, but there may be errors of grammar and possibly content that I did not discover before finalizing the note.    N

## 2023-07-25 ENCOUNTER — OFFICE VISIT (OUTPATIENT)
Dept: WOUND CARE | Facility: MEDICAL CENTER | Age: 63
End: 2023-07-25
Attending: INTERNAL MEDICINE
Payer: MEDICARE

## 2023-07-25 VITALS
OXYGEN SATURATION: 92 % | RESPIRATION RATE: 20 BRPM | TEMPERATURE: 97 F | HEART RATE: 77 BPM | SYSTOLIC BLOOD PRESSURE: 117 MMHG | DIASTOLIC BLOOD PRESSURE: 69 MMHG

## 2023-07-25 DIAGNOSIS — R60.0 EDEMA OF BOTH LOWER EXTREMITIES: ICD-10-CM

## 2023-07-25 DIAGNOSIS — E66.01 SEVERE OBESITY (BMI >= 40) (HCC): ICD-10-CM

## 2023-07-25 DIAGNOSIS — S81.802D OPEN WOUND OF LEFT LOWER LEG, SUBSEQUENT ENCOUNTER: ICD-10-CM

## 2023-07-25 DIAGNOSIS — I87.2 VENOUS INSUFFICIENCY OF BOTH LOWER EXTREMITIES: ICD-10-CM

## 2023-07-25 DIAGNOSIS — E11.9 CONTROLLED TYPE 2 DIABETES MELLITUS WITHOUT COMPLICATION, WITHOUT LONG-TERM CURRENT USE OF INSULIN (HCC): ICD-10-CM

## 2023-07-25 PROCEDURE — 11042 DBRDMT SUBQ TIS 1ST 20SQCM/<: CPT

## 2023-07-25 PROCEDURE — 11042 DBRDMT SUBQ TIS 1ST 20SQCM/<: CPT | Performed by: NURSE PRACTITIONER

## 2023-07-25 PROCEDURE — 3074F SYST BP LT 130 MM HG: CPT | Performed by: NURSE PRACTITIONER

## 2023-07-25 PROCEDURE — 3078F DIAST BP <80 MM HG: CPT | Performed by: NURSE PRACTITIONER

## 2023-07-25 NOTE — PATIENT INSTRUCTIONS
-Keep your wound dressing clean, dry, and intact.    -Change your dressing if it becomes soiled, soaked, or falls off.    -Should you experience any significant changes in your wound(s), such as infection (redness, swelling, localized heat, increased pain, fever > 101 F, chills) or have any questions regarding your home care instructions, please contact the wound center at [phone]. If after hours, contact your primary care physician or go to the hospital emergency room.

## 2023-07-25 NOTE — PROGRESS NOTES
Updated home health wound care orders entered into [facility] and routed via [employer] to [facility] at [phone].

## 2023-07-25 NOTE — PROGRESS NOTES
Provider Encounter- Full Thickness wound    HISTORY OF PRESENT ILLNESS  Wound History:   START OF CARE IN CLINIC: 03/14/23   REFERRING PROVIDER: Didi Lehman   WOUND- Full Thickness Wound   LOCATION: Left posterior leg   HISTORY: Patient developed a wound in September 2023 to the left posterior leg.  Patient has thick fungal toenails and reports that he kicked his leg with his right foot and nails causing a scratch.  This led to the full-thickness wound to the left posterior lower extremity.  Patient reports that he was receiving home health care without resolution of the wound.  Patient was seen at [facility] and referred to [facility] wound care for further care of the left posterior full-thickness wound.    Pertinent Medical History: Hyperlipidemia, ABELARDO, obesity, essential hypertension, type 2 diabetes, pulmonary hypertension    TOBACCO USE: Denies a history of tobacco use or smokeless tobacco products    Patient's problem list, allergies, and current medications reviewed and updated in Epic    Interval History:  3/14/2023: Clinic visit with GURJIT Barrientos.  Patient reports overall he is feeling well denies any fever or chills.    3/21/2023: Clinic visit with Kwaku Sanchez MD. Patient reports doing well, denies any fevers or chills. His wound is measuring about the same but is starting to fill in with increased granulation tissue. Patient was referred to podiatry at [facility] and reports appointment for foot and nail care on 4/24. He does not wear compression to lower extremities and was counseled on edema / venous insufficiency and importance of compression to help return venous flow to heart.    3/27/2023: Clinic visit with Kwaku Sanchez MD. Patient reports doing well. Tolerating compression with decreased edema. He denies any complaints. Wound is slowly improving. Discussed again compression in future. He think he will be able to apply compression socks, given information and he will see about  obtaining from [facility].    4/3/2023: Clinic visit with GURJIT Barrientos.  Patient reports overall he is doing well denies any fever or chills.  Patient reports that Tubigrip applied over the Unna boot with Coban to the right lower extremity was too tight last clinical appointment.  Therefore we will use a larger Tubigrip.  Patient's wound is progressing with increased epithelial tissue at the periphery and granulation tissue throughout the wound bed.    4/10/2023: Clinic visit with GURJIT Barrientos.  Patient reports overall he is doing well denies any fever or chills.  Patient now has [facility] who is seeing the patient once a week for wound care dressing changes and reapplication of Unna boot with Coban wraps.  Patient reports that he has ordered compression stockings off of [employer] and they should be to his home soon.    4/25/2023: Clinic visit with GURJIT Barrientos.  Patient reports overall he is doing well denies any fever or chills.  Patient reports he has seen his podiatrist recently who has taken down his oncotic nails.  Patient reports that he has not had any applications with the compression.  Discussed increasing compression of the 2 layer compression wrap patient is in agreement.  Patient reports that ED and health services is going to supply him with zipper compression stockings with 30 to 40 mm compression.      5/2/2023 : Clinic visit with GURJIT Ordaz, VAL, DIANA, NAY.   Patient states that overall he is feeling well.  Reports his blood sugar was in the 150s.  He did not receive zippered compression garment, ordered last visit.  We will follow-up.  His wound is slowly progressing.  He is tolerating compression without any difficulty.    5/9/2023 : Clinic visit with GURJIT Ordaz, VAL, DIANA, NAY.   Patient states he is feeling well overall.  His blood sugars continue to run in the 150s to 160s.  He just started on Ozempic about a week ago.  He has still not  received zippered compression stocking.  He states that [facility] is trying to figure out which department will pay for this.  He continues to tolerate compression wrap without any difficulty.    5/16/2023 : Clinic visit with GURJIT Ordaz, VAL, DIANA, NAY.   Patient states he is feeling well, blood sugars in the 160s.  He has not yet obtained zippered compression stocking from [facility], is not sure when this will be happening.  He states that he has an appointment to  a voucher for diabetic shoes with inserts, and will ask at that time about the compression garment.    5/23/2023: Clinic visit with GURJIT Barrientos.  Patient reports that he is doing okay denies fever or chills.  Patient is on home oxygen, again the patient does not have a transportable tank.  Short of breath on arrival.  Nasal cannula placed in clinic at 3 L patient saturating above 90%.    5/30/2023: Clinic visit with Kwaku Sanchez MD. Patient reports doing well. Denies any complaints. He reports that he continues to have difficulty obtaining compression socks from [facility]. Reports that he has been transferred between departments, but will continue to try. Wound largely unchanged. Biologic authorization is still pending. Patient continues to have edema despite 2 layer compression, he is agreeable to try 4 layer compression this week.    6/6/2023: Clinic visit with Kwaku Sanchez MD. Patient reports feeling well. Denies any signs or symptoms of infection. He reports tolerating 4 layer wrap, though home health did not have and applied 2 layer wrap. Home health told him that they have ordered 4 layer compression. He is still working through [facility] to obtain compression socks. Biologic authorization is still pending.    6/13/2023 : Clinic visit with GURJIT Ordaz, VAL, DIANA, NAY.   Rashi states he is feeling well overall.  His blood sugar today was 153.  We have received authorization from his insurance provider to apply EpiFix or epi  "cord to his wound.  Epi cord applied today, first application.  Authorization is only good through 6/30.    6/20/2023 : Clinic visit with GURJIT Ordaz, VAL, DIANA, NAY.   Patient continues to feel well.  He reports that his blood sugar this morning was 144.  He is tolerating compression without any difficulty.  Wound area has decreased slightly.  He has not yet received zippered compression garments from [facility], states \"they are working on it\".    6/27/2023 : Clinic visit with GURJIT Ordaz, VAL, DIANA, NAY.   Rashi states he is feeling well, reports his blood sugar today was 131.  He continues to tolerate compression.  Wound area about the same, depth has decreased slightly.  EpiFix was applied in clinic today.  However, this will be his last application, as authorization was only through 6/30.    7/6/2023: Clinic visit with GURJIT Barrientos. Wound bed is smaller by dimensions fortunately Rashi is out authorization for biologic.  Patient reports that home health did not have 4-layer compression.  2 layer compression placed by home health.  Patient reports that home health did not have 4-layer compression wraps.    7/11/2023 : Clinic visit with GURJIT Ordaz, VAL, DIANA, NAY.   Rashi continues to feel well overall.  His wound has not progressed significantly for several months.  He does present today with a raised ring around bottom half of wound.  I opted to perform a punch biopsy in clinic today to rule out possible malignancy or other etiology.   He also presented today with his pression wrap partially slid down his leg.  This has been an ongoing problem.  He informed that [facility] has denied coverage for zippered compression garment.  He states they are looking into other options    7/18/2023: Clinic visit with Kwaku Sanchez MD. Patient reports feeling in normal state of health. Denies any fevers or chills. Reviewed patient's biopsy results with him: granulation tissue and marked " stasis dermatitis without evidence of vasculitis or malignancy. Patient is tolerating tubigrips better and easier for him to pull back up when they fall down.    7/25/2023 : Clinic visit with GURJIT Ordaz, NAOMIEP-BC, LALON, CFCN.   Rashi continues to feel well, reports his blood sugar this morning was 139.  Wound area has decreased slightly.  He has still not received compression garments from [facility].      REVIEW OF SYSTEMS:   Unchanged from previous wound clinic assessment on 7/18/2023 except as noted in interval history above    PHYSICAL EXAMINATION:   /69   Pulse 77   Temp 36.1 °C (97 °F) (Temporal)   Resp 20   SpO2 92%     Physical Exam  Constitutional:       Appearance: He is obese.   Cardiovascular:      Rate and Rhythm: Normal rate.      Comments: Pedal pulses are palpable  Pulmonary:      Effort: Pulmonary effort is normal. No respiratory distress.   Musculoskeletal:         General: Swelling present.      Right lower leg: Edema present.      Left lower leg: Edema present.   Skin:     Comments: Full-thickness wound to the left posterior lower extremity: Wound area has decreased since last assessment, depth about the same.  Thick layer of slough to wound bed, moderate serosanguineous drainage, no evidence of infection.    Hemosiderin staining, chronic edema, skin changes-consistent with CVI   Neurological:      Mental Status: He is alert and oriented to person, place, and time.         WOUND ASSESSMENT  Wound 03/14/23 Traumatic LLE Posterior (Active)   Wound Image    07/25/23 1415   Site Assessment Pink;Red;Yellow 07/25/23 1415   Periwound Assessment Hemosiderin Staining;Edema 07/25/23 1415   Margins Attached edges 07/25/23 1415   Drainage Amount Moderate 07/25/23 1415   Drainage Description Serosanguineous 07/25/23 1415   Treatments Cleansed;Topical Lidocaine;Provider debridement;Site care 07/25/23 1415   Wound Cleansing Hypochlorus Acid 07/25/23 1415   Periwound Protectant Skin Protectant Wipes to  Periwound;Barrier Paste 07/25/23 1415   Dressing Changed Changed 07/25/23 1415   Dressing Cleansing/Solutions Normal Saline 07/25/23 1415   Dressing Options Collagen Dressing;Hydrofiber Silver;Silicone Adhesive Foam;Tubigrip 07/25/23 1415   Dressing Change/Treatment Frequency Every 72 hrs, and As Needed 07/18/23 1500   Non-staged Wound Description Full thickness 07/25/23 1415   Wound Length (cm) 1.4 cm 07/25/23 1415   Wound Width (cm) 0.8 cm 07/25/23 1415   Wound Depth (cm) 0.2 cm 07/25/23 1415   Wound Surface Area (cm^2) 1.12 cm^2 07/25/23 1415   Wound Volume (cm^3) 0.224 cm^3 07/25/23 1415   Post-Procedure Length (cm) 1.3 cm 07/25/23 1415   Post-Procedure Width (cm) 0.8 cm 07/25/23 1415   Post-Procedure Depth (cm) 0.2 cm 07/25/23 1415   Post-Procedure Surface Area (cm^2) 1.04 cm^2 07/25/23 1415   Post-Procedure Volume (cm^3) 0.208 cm^3 07/25/23 1415   Wound Healing % -49 07/25/23 1415   Tunneling (cm) 0 cm 07/25/23 1415   Undermining (cm) 0 cm 07/25/23 1415   Wound Odor None 07/25/23 1415   Pulses Left;2+ 07/05/23 1415   Right Foot Monofilament 10-point exam (Sensate) 8/10 05/09/23 1500   Left Foot Monofilament 10-point exam (Sensate) 9/10 05/09/23 1500   Exposed Structures None 07/25/23 1415   Number of days: 133       PROCEDURE: sharp debridement of posterior left lower extremity wound  -2% viscous lidocaine applied topically to wound bed for approximately 5 minutes prior to debridement  -Curette used to debride wound bed.  Excisional debridement was performed to remove devitalized tissue until healthy, bleeding tissue was visualized.   Entire surface of wound, 1.04 cm² debrided.  Tissue debrided into the subcutaneous layer. -Bleeding controlled with manual pressure.    -Wound care completed by wound RN, refer to flowsheet  -Patient tolerated the procedure well, without c/o pain or discomfort.          BIOLOGIC LOG  6/13/2023-first application  PRODUCT: EpiCord 1 x 2cm . reorder #[id number];  [id number] EXPIRES: 2028-01-01 6/20/2023-second application  PRODUCT: Epi fix 2 x 2 cm sheet.  Reorder #[id number] ; PRODUCT #[id number]; expires 2028-04-01 6/27/2023-third application-final application, authorized only through 6/30  PRODUCT: Epi fix 14 mm disc.  Reorder #[id number]; PRODUCT #[id number]; expires 2028-05-01      Pertinent Labs and Diagnostics:    Labs:     A1c:   Lab Results   Component Value Date/Time    HBA1C 6.5 (H) 09/15/2020 04:51 AM            LAST  WOUND CULTURE:  DATE :   Lab Results   Component Value Date/Time    CULTRSULT No growth after 5 days of incubation. 11/07/2022 06:23 PM         ASSESSMENT AND PLAN:   1. Open wound of left lower leg, subsequent encounter    7/25/2023: Wound area has increased slightly since last assessment.  Slough to wound bed  - Excisional debridement was performed in clinic, medically necessary to promote wound healing.  -Punch biopsy obtained in clinic 7/11: focal granulation with background of marked stasis dermatitis. No vasculitis or malignancy.  -Patient to return to clinic in 1 week for assessment and debridement.  -Double layer Tubigrip utilized today.  Patient states he is able to adjust these himself, and is able to apply and remove them on his own as well.     Wound care: Collagen dressing, Hydrofiber silver, double layer Tubigrip    2. Controlled type 2 diabetes mellitus without complication, without long-term current use of insulin ([facility])    7/25/2023: Patient reports his blood sugar today was 139.  -Patient's blood sugars currently fairly well managed.  Patient is well aware that he should keep his blood sugar below 140 for optimal wound healing.  -Recently started on Ozempic, endocrinologist at [facility] managing    3. Severe obesity (BMI >= 40) ([facility])  Comments: Complicating factor to wound healing and wound care  Benefits of weight loss for management of his diabetes, and for his overall health previously discussed    4. Edema of  both lower extremities  5. Venous insufficiency     7/25/2023: Evidence of bilateral lower extremity lymphedema  -Patient has had problems with sliding of compression wraps.  Continue double layer Tubigrip as he is able to manipulate these himself and can pull back into correct position.  -[facility] working on providing him with a compression garment.     PATIENT EDUCATION  - Importance of adequate nutrition for wound healing  -Advised to go to ER for any increased redness, swelling, drainage, or odor, or if patient develops fever, chills, nausea or vomiting.     Please note that this note may have been created using voice recognition software. I have worked with technical experts from [employer] to optimize the interface.  I have made every reasonable attempt to correct obvious errors, but there may be errors of grammar and possibly content that I did not discover before finalizing the note.    N

## 2023-08-01 ENCOUNTER — OFFICE VISIT (OUTPATIENT)
Dept: WOUND CARE | Facility: MEDICAL CENTER | Age: 63
End: 2023-08-01
Attending: INTERNAL MEDICINE
Payer: MEDICARE

## 2023-08-01 VITALS
OXYGEN SATURATION: 94 % | DIASTOLIC BLOOD PRESSURE: 68 MMHG | TEMPERATURE: 97.6 F | RESPIRATION RATE: 20 BRPM | SYSTOLIC BLOOD PRESSURE: 154 MMHG

## 2023-08-01 DIAGNOSIS — S81.802D OPEN WOUND OF LEFT LOWER LEG, SUBSEQUENT ENCOUNTER: Primary | ICD-10-CM

## 2023-08-01 DIAGNOSIS — I87.2 VENOUS INSUFFICIENCY OF BOTH LOWER EXTREMITIES: ICD-10-CM

## 2023-08-01 DIAGNOSIS — R60.0 EDEMA OF BOTH LOWER EXTREMITIES: ICD-10-CM

## 2023-08-01 DIAGNOSIS — E11.9 CONTROLLED TYPE 2 DIABETES MELLITUS WITHOUT COMPLICATION, WITHOUT LONG-TERM CURRENT USE OF INSULIN (HCC): ICD-10-CM

## 2023-08-01 DIAGNOSIS — E66.01 SEVERE OBESITY (BMI >= 40) (HCC): ICD-10-CM

## 2023-08-01 LAB — HBA1C MFR BLD: 6.5 % (ref ?–5.8)

## 2023-08-01 PROCEDURE — 3077F SYST BP >= 140 MM HG: CPT | Performed by: NURSE PRACTITIONER

## 2023-08-01 PROCEDURE — 3078F DIAST BP <80 MM HG: CPT | Performed by: NURSE PRACTITIONER

## 2023-08-01 PROCEDURE — 11042 DBRDMT SUBQ TIS 1ST 20SQCM/<: CPT

## 2023-08-01 PROCEDURE — 11042 DBRDMT SUBQ TIS 1ST 20SQCM/<: CPT | Performed by: NURSE PRACTITIONER

## 2023-08-01 NOTE — PATIENT INSTRUCTIONS
-Keep dressings clean and dry. Change dressings every 3-4 days, and if they become over saturated, soiled or fall off.     -Avoid prolonged standing or sitting without elevating your legs.    -Remove your compression garments if you have severe pain, severe swelling, numbness, color change, or temperature change in your toes. If you need to remove your compression garments, do so by unrolling them. Do not cut the compression garments off, this is to prevent cutting yourself on accident.    -Should you experience any significant changes in your wound(s), such as signs of infection (increasing redness, swelling, localized heat, increased pain, fever > 101 F, chills) or have any questions regarding your home care instructions, please contact the wound center at [phone]. If after hours, contact your primary care physician or go to the hospital emergency room.     -If you are 5 or more minutes late for an appointment, we reserve the right to cancel and reschedule that appointment. Additionally, if you are habitually late or not showing (3 late cancellations and/or no shows), we reserve the right to cancel your remaining appointments and it will be your responsibility to obtain a new referral if services are still needed.

## 2023-08-01 NOTE — PROGRESS NOTES
Home wound care orders routed to [facility] via [employer] to fax #[phone].

## 2023-08-01 NOTE — PROGRESS NOTES
Provider Encounter- Full Thickness wound    HISTORY OF PRESENT ILLNESS  Wound History:   START OF CARE IN CLINIC: 03/14/23   REFERRING PROVIDER: Didi Lehman   WOUND- Full Thickness Wound   LOCATION: Left posterior leg   HISTORY: Patient developed a wound in September 2023 to the left posterior leg.  Patient has thick fungal toenails and reports that he kicked his leg with his right foot and nails causing a scratch.  This led to the full-thickness wound to the left posterior lower extremity.  Patient reports that he was receiving home health care without resolution of the wound.  Patient was seen at [facility] and referred to [facility] wound care for further care of the left posterior full-thickness wound.    Pertinent Medical History: Hyperlipidemia, ABELARDO, obesity, essential hypertension, type 2 diabetes, pulmonary hypertension    TOBACCO USE: Denies a history of tobacco use or smokeless tobacco products    Patient's problem list, allergies, and current medications reviewed and updated in Epic    Interval History:  3/14/2023: Clinic visit with GURJIT Barrientos.  Patient reports overall he is feeling well denies any fever or chills.    3/21/2023: Clinic visit with Kwaku Sanchez MD. Patient reports doing well, denies any fevers or chills. His wound is measuring about the same but is starting to fill in with increased granulation tissue. Patient was referred to podiatry at [facility] and reports appointment for foot and nail care on 4/24. He does not wear compression to lower extremities and was counseled on edema / venous insufficiency and importance of compression to help return venous flow to heart.    3/27/2023: Clinic visit with Kwaku Sanchez MD. Patient reports doing well. Tolerating compression with decreased edema. He denies any complaints. Wound is slowly improving. Discussed again compression in future. He think he will be able to apply compression socks, given information and he will see about  obtaining from [facility].    4/3/2023: Clinic visit with GURJIT Barrientos.  Patient reports overall he is doing well denies any fever or chills.  Patient reports that Tubigrip applied over the Unna boot with Coban to the right lower extremity was too tight last clinical appointment.  Therefore we will use a larger Tubigrip.  Patient's wound is progressing with increased epithelial tissue at the periphery and granulation tissue throughout the wound bed.    4/10/2023: Clinic visit with GURJIT Barrientos.  Patient reports overall he is doing well denies any fever or chills.  Patient now has [facility] who is seeing the patient once a week for wound care dressing changes and reapplication of Unna boot with Coban wraps.  Patient reports that he has ordered compression stockings off of [employer] and they should be to his home soon.    4/25/2023: Clinic visit with GURJIT Barrientos.  Patient reports overall he is doing well denies any fever or chills.  Patient reports he has seen his podiatrist recently who has taken down his oncotic nails.  Patient reports that he has not had any applications with the compression.  Discussed increasing compression of the 2 layer compression wrap patient is in agreement.  Patient reports that ED and health services is going to supply him with zipper compression stockings with 30 to 40 mm compression.      5/2/2023 : Clinic visit with GURJIT Ordaz, VAL, DIANA, NAY.   Patient states that overall he is feeling well.  Reports his blood sugar was in the 150s.  He did not receive zippered compression garment, ordered last visit.  We will follow-up.  His wound is slowly progressing.  He is tolerating compression without any difficulty.    5/9/2023 : Clinic visit with GURJIT Ordaz, VAL, DIANA, NAY.   Patient states he is feeling well overall.  His blood sugars continue to run in the 150s to 160s.  He just started on Ozempic about a week ago.  He has still not  received zippered compression stocking.  He states that [facility] is trying to figure out which department will pay for this.  He continues to tolerate compression wrap without any difficulty.    5/16/2023 : Clinic visit with GURIJT Ordaz, VAL, DIANA, NAY.   Patient states he is feeling well, blood sugars in the 160s.  He has not yet obtained zippered compression stocking from [facility], is not sure when this will be happening.  He states that he has an appointment to  a voucher for diabetic shoes with inserts, and will ask at that time about the compression garment.    5/23/2023: Clinic visit with GURJIT Barrientos.  Patient reports that he is doing okay denies fever or chills.  Patient is on home oxygen, again the patient does not have a transportable tank.  Short of breath on arrival.  Nasal cannula placed in clinic at 3 L patient saturating above 90%.    5/30/2023: Clinic visit with Kwaku Sanchez MD. Patient reports doing well. Denies any complaints. He reports that he continues to have difficulty obtaining compression socks from [facility]. Reports that he has been transferred between departments, but will continue to try. Wound largely unchanged. Biologic authorization is still pending. Patient continues to have edema despite 2 layer compression, he is agreeable to try 4 layer compression this week.    6/6/2023: Clinic visit with Kwaku Sanchez MD. Patient reports feeling well. Denies any signs or symptoms of infection. He reports tolerating 4 layer wrap, though home health did not have and applied 2 layer wrap. Home health told him that they have ordered 4 layer compression. He is still working through [facility] to obtain compression socks. Biologic authorization is still pending.    6/13/2023 : Clinic visit with GURJIT Ordaz, VAL, DIANA, NAY.   Rashi states he is feeling well overall.  His blood sugar today was 153.  We have received authorization from his insurance provider to apply EpiFix or epi  "cord to his wound.  Epi cord applied today, first application.  Authorization is only good through 6/30.    6/20/2023 : Clinic visit with GURJIT Ordaz, VAL, DIANA, NAY.   Patient continues to feel well.  He reports that his blood sugar this morning was 144.  He is tolerating compression without any difficulty.  Wound area has decreased slightly.  He has not yet received zippered compression garments from [facility], states \"they are working on it\".    6/27/2023 : Clinic visit with GURJIT Ordaz, VAL, DIANA, NAY.   Rashi states he is feeling well, reports his blood sugar today was 131.  He continues to tolerate compression.  Wound area about the same, depth has decreased slightly.  EpiFix was applied in clinic today.  However, this will be his last application, as authorization was only through 6/30.    7/6/2023: Clinic visit with GURJIT Barrientos. Wound bed is smaller by dimensions fortunately Rashi is out authorization for biologic.  Patient reports that home health did not have 4-layer compression.  2 layer compression placed by home health.  Patient reports that home health did not have 4-layer compression wraps.    7/11/2023 : Clinic visit with GURJIT Ordaz, VAL, DIANA, NAY.   Rashi continues to feel well overall.  His wound has not progressed significantly for several months.  He does present today with a raised ring around bottom half of wound.  I opted to perform a punch biopsy in clinic today to rule out possible malignancy or other etiology.   He also presented today with his pression wrap partially slid down his leg.  This has been an ongoing problem.  He informed that [facility] has denied coverage for zippered compression garment.  He states they are looking into other options    7/18/2023: Clinic visit with Kwaku Sanchez MD. Patient reports feeling in normal state of health. Denies any fevers or chills. Reviewed patient's biopsy results with him: granulation tissue and marked " stasis dermatitis without evidence of vasculitis or malignancy. Patient is tolerating tubigrips better and easier for him to pull back up when they fall down.    7/25/2023 : Clinic visit with GURJIT Ordaz, AVL, DIANA, NAY.   Rashi continues to feel well, reports his blood sugar this morning was 139.  Wound area has decreased slightly.  He has still not received compression garments from [facility].    8/1/2023 : Clinic visit with GURJIT Ordaz, VAL, DIANA, NAY.   Rashi continues to do well overall.  He just had lab work done at [facility], was told his A1c was 6.5.  His wound is actually quite a bit smaller today.  He has still not received any sort of compression garment from [facility].      REVIEW OF SYSTEMS:   Unchanged from previous wound clinic assessment on 7/25/2023 except as noted in interval history above    PHYSICAL EXAMINATION:   BP (!) 154/68   Temp 36.4 °C (97.6 °F) (Temporal)   Resp 20   SpO2 94%     Physical Exam  Constitutional:       Appearance: He is obese.   Cardiovascular:      Rate and Rhythm: Normal rate.      Comments: Pedal pulses are palpable  Pulmonary:      Effort: Pulmonary effort is normal. No respiratory distress.   Musculoskeletal:         General: Swelling present.      Right lower leg: Edema present.      Left lower leg: Edema present.   Skin:     Comments: Full-thickness wound to the left posterior lower extremity: Wound area and depth have decreased since last assessment, thick layer of slough to wound bed, moderate serosanguineous drainage, no evidence of infection    Hemosiderin staining, chronic edema, skin changes-consistent with CVI   Neurological:      Mental Status: He is alert and oriented to person, place, and time.         WOUND ASSESSMENT  Wound 03/14/23 Traumatic LLE Posterior (Active)   Wound Image    08/01/23 1435   Site Assessment Red;Yellow;Tan 08/01/23 1435   Periwound Assessment Hemosiderin Staining;Edema 08/01/23 1435   Margins Attached edges 08/01/23 1435    Drainage Amount Moderate 08/01/23 1435   Drainage Description Serosanguineous 08/01/23 1435   Treatments Cleansed;Topical Lidocaine;Provider debridement 08/01/23 1435   Wound Cleansing Normal Saline Irrigation 08/01/23 1435   Periwound Protectant Skin Protectant Wipes to Periwound;Skin Moisturizer 08/01/23 1435   Dressing Changed Changed 07/25/23 1415   Dressing Cleansing/Solutions Normal Saline 08/01/23 1435   Dressing Options Collagen Dressing;Hydrofiber Silver;Silicone Adhesive Foam;Tubigrip 08/01/23 1435   Dressing Change/Treatment Frequency Every 72 hrs, and As Needed 07/18/23 1500   Non-staged Wound Description Full thickness 08/01/23 1435   Wound Length (cm) 0.5 cm 08/01/23 1435   Wound Width (cm) 0.5 cm 08/01/23 1435   Wound Depth (cm) 0.3 cm 08/01/23 1435   Wound Surface Area (cm^2) 0.25 cm^2 08/01/23 1435   Wound Volume (cm^3) 0.075 cm^3 08/01/23 1435   Post-Procedure Length (cm) 0.9 cm 08/01/23 1435   Post-Procedure Width (cm) 0.5 cm 08/01/23 1435   Post-Procedure Depth (cm) 0.3 cm 08/01/23 1435   Post-Procedure Surface Area (cm^2) 0.45 cm^2 08/01/23 1435   Post-Procedure Volume (cm^3) 0.135 cm^3 08/01/23 1435   Wound Healing % 50 08/01/23 1435   Tunneling (cm) 0 cm 08/01/23 1435   Undermining (cm) 0 cm 08/01/23 1435   Wound Odor None 08/01/23 1435   Pulses Left;2+ 07/05/23 1415   Right Foot Monofilament 10-point exam (Sensate) 8/10 05/09/23 1500   Left Foot Monofilament 10-point exam (Sensate) 9/10 05/09/23 1500   Exposed Structures None 08/01/23 1435   Number of days: 140       PROCEDURE: sharp debridement of posterior left lower extremity wound  -2% viscous lidocaine applied topically to wound bed for approximately 5 minutes prior to debridement  -Curette used to debride wound bed.  Excisional debridement was performed to remove devitalized tissue until healthy, bleeding tissue was visualized.   Entire surface of wound, 0.45 cm² debrided.  Tissue debrided into the subcutaneous layer. -Bleeding  controlled with manual pressure.    -Wound care completed by wound RN, refer to flowsheet  -Patient tolerated the procedure well, without c/o pain or discomfort.          BIOLOGIC LOG  6/13/2023-first application  PRODUCT: EpiCord 1 x 2cm . reorder #[id number]; PRODUCT-[id number] EXPIRES: 2028-01-01 6/20/2023-second application  PRODUCT: Epi fix 2 x 2 cm sheet.  Reorder #[id number] ; PRODUCT #[id number]; expires 2028-04-01 6/27/2023-third application-final application, authorized only through 6/30  PRODUCT: Epi fix 14 mm disc.  Reorder #[id number]; PRODUCT #[id number]; expires 2028-05-01      Pertinent Labs and Diagnostics:    Labs:     A1c:   Lab Results   Component Value Date/Time    HBA1C 6.5 (H) 09/15/2020 04:51 AM            LAST  WOUND CULTURE:  DATE :   Lab Results   Component Value Date/Time    CULTRSULT No growth after 5 days of incubation. 11/07/2022 06:23 PM         ASSESSMENT AND PLAN:   1. Open wound of left lower leg, subsequent encounter    8/1/2023: Wound has decreased in area and depth since last assessment.  Thick layer of slough to wound bed  - Excisional debridement was performed in clinic, medically necessary to promote wound healing.  -Punch biopsy obtained in clinic 7/11: focal granulation with background of marked stasis dermatitis. No vasculitis or malignancy.  -Patient to return to clinic in 1 week for assessment and debridement.  -Continue with double layer Tubigrip.  Patient has had problems with compression wraps which have tended to slide down because new wounds.     Wound care: Collagen dressing, Hydrofiber silver, double layer Tubigrip    2. Controlled type 2 diabetes mellitus without complication, without long-term current use of insulin ([facility])    8/1/2023: Patient is now on Ozempic, A1c is dropped significantly.  He also states he is losing weight since starting this medication  -Patient's blood sugars currently fairly well managed.  Patient is well aware that he should  keep his blood sugar below 140 for optimal wound healing.  -Recently started on Ozempic, endocrinologist at [facility] managing    3. Severe obesity (BMI >= 40) ([facility])  Comments: Complicating factor to wound healing and wound care  Benefits of weight loss for management of his diabetes, and for his overall health previously discussed    4. Edema of both lower extremities  5. Venous insufficiency     8/1/2023: Bilateral lower extremity edema.  -Patient has had problems with sliding of compression wraps.  Continue double layer Tubigrip as he is able to manipulate these himself and can pull back into correct position.  -S working on providing him with a compression garment.     PATIENT EDUCATION  - Importance of adequate nutrition for wound healing  -Advised to go to ER for any increased redness, swelling, drainage, or odor, or if patient develops fever, chills, nausea or vomiting.     Please note that this note may have been created using voice recognition software. I have worked with technical experts from [employer] to optimize the interface.  I have made every reasonable attempt to correct obvious errors, but there may be errors of grammar and possibly content that I did not discover before finalizing the note.    N

## 2023-08-08 ENCOUNTER — OFFICE VISIT (OUTPATIENT)
Dept: WOUND CARE | Facility: MEDICAL CENTER | Age: 63
End: 2023-08-08
Attending: INTERNAL MEDICINE
Payer: MEDICARE

## 2023-08-08 VITALS
SYSTOLIC BLOOD PRESSURE: 133 MMHG | DIASTOLIC BLOOD PRESSURE: 72 MMHG | OXYGEN SATURATION: 94 % | HEART RATE: 80 BPM | RESPIRATION RATE: 20 BRPM | TEMPERATURE: 97.7 F

## 2023-08-08 DIAGNOSIS — E66.01 SEVERE OBESITY (BMI >= 40) (HCC): ICD-10-CM

## 2023-08-08 DIAGNOSIS — R60.0 EDEMA OF BOTH LOWER EXTREMITIES: ICD-10-CM

## 2023-08-08 DIAGNOSIS — S81.802D OPEN WOUND OF LEFT LOWER LEG, SUBSEQUENT ENCOUNTER: ICD-10-CM

## 2023-08-08 DIAGNOSIS — I87.2 VENOUS INSUFFICIENCY OF BOTH LOWER EXTREMITIES: ICD-10-CM

## 2023-08-08 DIAGNOSIS — E11.9 CONTROLLED TYPE 2 DIABETES MELLITUS WITHOUT COMPLICATION, WITHOUT LONG-TERM CURRENT USE OF INSULIN (HCC): ICD-10-CM

## 2023-08-08 PROCEDURE — 3078F DIAST BP <80 MM HG: CPT | Performed by: STUDENT IN AN ORGANIZED HEALTH CARE EDUCATION/TRAINING PROGRAM

## 2023-08-08 PROCEDURE — 3075F SYST BP GE 130 - 139MM HG: CPT | Performed by: STUDENT IN AN ORGANIZED HEALTH CARE EDUCATION/TRAINING PROGRAM

## 2023-08-08 PROCEDURE — 11042 DBRDMT SUBQ TIS 1ST 20SQCM/<: CPT

## 2023-08-08 PROCEDURE — 11042 DBRDMT SUBQ TIS 1ST 20SQCM/<: CPT | Performed by: STUDENT IN AN ORGANIZED HEALTH CARE EDUCATION/TRAINING PROGRAM

## 2023-08-08 NOTE — PROGRESS NOTES
Provider Encounter- Full Thickness wound    HISTORY OF PRESENT ILLNESS  Wound History:   START OF CARE IN CLINIC: 03/14/23   REFERRING PROVIDER: Didi Lehman   WOUND- Full Thickness Wound   LOCATION: Left posterior leg   HISTORY: Patient developed a wound in September 2023 to the left posterior leg.  Patient has thick fungal toenails and reports that he kicked his leg with his right foot and nails causing a scratch.  This led to the full-thickness wound to the left posterior lower extremity.  Patient reports that he was receiving home health care without resolution of the wound.  Patient was seen at [facility] and referred to [facility] wound care for further care of the left posterior full-thickness wound.    Pertinent Medical History: Hyperlipidemia, ABELARDO, obesity, essential hypertension, type 2 diabetes, pulmonary hypertension    TOBACCO USE: Denies a history of tobacco use or smokeless tobacco products    Patient's problem list, allergies, and current medications reviewed and updated in Epic    Interval History:  3/14/2023: Clinic visit with GURJIT Barrientos.  Patient reports overall he is feeling well denies any fever or chills.    3/21/2023: Clinic visit with Kwaku Sanchez MD. Patient reports doing well, denies any fevers or chills. His wound is measuring about the same but is starting to fill in with increased granulation tissue. Patient was referred to podiatry at [facility] and reports appointment for foot and nail care on 4/24. He does not wear compression to lower extremities and was counseled on edema / venous insufficiency and importance of compression to help return venous flow to heart.    3/27/2023: Clinic visit with Kwaku Sanchez MD. Patient reports doing well. Tolerating compression with decreased edema. He denies any complaints. Wound is slowly improving. Discussed again compression in future. He think he will be able to apply compression socks, given information and he will see about  obtaining from [facility].    4/3/2023: Clinic visit with GURJIT Barrientos.  Patient reports overall he is doing well denies any fever or chills.  Patient reports that Tubigrip applied over the Unna boot with Coban to the right lower extremity was too tight last clinical appointment.  Therefore we will use a larger Tubigrip.  Patient's wound is progressing with increased epithelial tissue at the periphery and granulation tissue throughout the wound bed.    4/10/2023: Clinic visit with GURJIT Barrientos.  Patient reports overall he is doing well denies any fever or chills.  Patient now has [facility] who is seeing the patient once a week for wound care dressing changes and reapplication of Unna boot with Coban wraps.  Patient reports that he has ordered compression stockings off of [employer] and they should be to his home soon.    4/25/2023: Clinic visit with GURJIT Barrientos.  Patient reports overall he is doing well denies any fever or chills.  Patient reports he has seen his podiatrist recently who has taken down his oncotic nails.  Patient reports that he has not had any applications with the compression.  Discussed increasing compression of the 2 layer compression wrap patient is in agreement.  Patient reports that ED and health services is going to supply him with zipper compression stockings with 30 to 40 mm compression.      5/2/2023 : Clinic visit with GURJIT Ordaz, VAL, DIANA, NAY.   Patient states that overall he is feeling well.  Reports his blood sugar was in the 150s.  He did not receive zippered compression garment, ordered last visit.  We will follow-up.  His wound is slowly progressing.  He is tolerating compression without any difficulty.    5/9/2023 : Clinic visit with GURJIT Ordaz, VAL, DIANA, NAY.   Patient states he is feeling well overall.  His blood sugars continue to run in the 150s to 160s.  He just started on Ozempic about a week ago.  He has still not  received zippered compression stocking.  He states that [facility] is trying to figure out which department will pay for this.  He continues to tolerate compression wrap without any difficulty.    5/16/2023 : Clinic visit with GURJIT Ordaz, VAL, DIANA, NAY.   Patient states he is feeling well, blood sugars in the 160s.  He has not yet obtained zippered compression stocking from [facility], is not sure when this will be happening.  He states that he has an appointment to  a voucher for diabetic shoes with inserts, and will ask at that time about the compression garment.    5/23/2023: Clinic visit with GURJIT Barrientos.  Patient reports that he is doing okay denies fever or chills.  Patient is on home oxygen, again the patient does not have a transportable tank.  Short of breath on arrival.  Nasal cannula placed in clinic at 3 L patient saturating above 90%.    5/30/2023: Clinic visit with Kwaku Sanchez MD. Patient reports doing well. Denies any complaints. He reports that he continues to have difficulty obtaining compression socks from [facility]. Reports that he has been transferred between departments, but will continue to try. Wound largely unchanged. Biologic authorization is still pending. Patient continues to have edema despite 2 layer compression, he is agreeable to try 4 layer compression this week.    6/6/2023: Clinic visit with Kwaku Sanchez MD. Patient reports feeling well. Denies any signs or symptoms of infection. He reports tolerating 4 layer wrap, though home health did not have and applied 2 layer wrap. Home health told him that they have ordered 4 layer compression. He is still working through [facility] to obtain compression socks. Biologic authorization is still pending.    6/13/2023 : Clinic visit with GURJIT Ordaz, VAL, DIANA, NAY.   Rashi states he is feeling well overall.  His blood sugar today was 153.  We have received authorization from his insurance provider to apply EpiFix or epi  "cord to his wound.  Epi cord applied today, first application.  Authorization is only good through 6/30.    6/20/2023 : Clinic visit with GURJIT Ordaz, VAL, DIANA, NAY.   Patient continues to feel well.  He reports that his blood sugar this morning was 144.  He is tolerating compression without any difficulty.  Wound area has decreased slightly.  He has not yet received zippered compression garments from [facility], states \"they are working on it\".    6/27/2023 : Clinic visit with GURJIT Ordaz, VAL, DIANA, NAY.   Rashi states he is feeling well, reports his blood sugar today was 131.  He continues to tolerate compression.  Wound area about the same, depth has decreased slightly.  EpiFix was applied in clinic today.  However, this will be his last application, as authorization was only through 6/30.    7/6/2023: Clinic visit with GURJIT Barrientos. Wound bed is smaller by dimensions fortunately Rashi is out authorization for biologic.  Patient reports that home health did not have 4-layer compression.  2 layer compression placed by home health.  Patient reports that home health did not have 4-layer compression wraps.    7/11/2023 : Clinic visit with GURJIT Ordaz, VAL, DIANA, NAY.   Rashi continues to feel well overall.  His wound has not progressed significantly for several months.  He does present today with a raised ring around bottom half of wound.  I opted to perform a punch biopsy in clinic today to rule out possible malignancy or other etiology.   He also presented today with his pression wrap partially slid down his leg.  This has been an ongoing problem.  He informed that [facility] has denied coverage for zippered compression garment.  He states they are looking into other options    7/18/2023: Clinic visit with Kwaku Sanchez MD. Patient reports feeling in normal state of health. Denies any fevers or chills. Reviewed patient's biopsy results with him: granulation tissue and marked " stasis dermatitis without evidence of vasculitis or malignancy. Patient is tolerating tubigrips better and easier for him to pull back up when they fall down.    7/25/2023 : Clinic visit with GURJIT Ordaz, VAL, DIANA, NAY.   Rashi continues to feel well, reports his blood sugar this morning was 139.  Wound area has decreased slightly.  He has still not received compression garments from [facility].    8/1/2023 : Clinic visit with GURJIT Ordaz, VAL, DIANA, NAY.   Rashi continues to do well overall.  He just had lab work done at [facility], was told his A1c was 6.5.  His wound is actually quite a bit smaller today.  He has still not received any sort of compression garment from [facility].    8/8/2023: Clinic visit with Kwaku Sanchez MD. Patient reports doing well, denies any current complaints. He reports that he has been getting the run around with [facility] about who will be ordering compression for him. Measured patient and gave paper script for [email or web address] ReadyWrap Fusion Kit size medium, length long. He is to take to [facility] (not sure what department orders or PCP) to get filled. Patient wound slightly smaller.    REVIEW OF SYSTEMS:   Unchanged from previous wound clinic assessment on 8/1/2023 except as noted in interval history above    PHYSICAL EXAMINATION:   /72   Pulse 80   Temp 36.5 °C (97.7 °F) (Temporal)   Resp 20   SpO2 94%     Physical Exam  Constitutional:       Appearance: He is obese.   Cardiovascular:      Rate and Rhythm: Normal rate.      Comments: Pedal pulses are palpable  Pulmonary:      Effort: Pulmonary effort is normal. No respiratory distress.   Musculoskeletal:         General: Swelling present.      Right lower leg: Edema present.      Left lower leg: Edema present.   Skin:     Comments: Full-thickness wound to the left posterior lower extremity: Wound slightly smaller than last assessment, thick layer of slough to wound bed, moderate serosanguineous drainage, no evidence of  infection    Hemosiderin staining, chronic edema, skin changes-consistent with CVI   Neurological:      Mental Status: He is alert and oriented to person, place, and time.         WOUND ASSESSMENT  Wound 03/14/23 Traumatic LLE Posterior (Active)   Wound Image    08/08/23 1415   Site Assessment Yellow 08/08/23 1415   Periwound Assessment Hemosiderin Staining;Edema 08/08/23 1415   Margins Attached edges 08/08/23 1415   Drainage Amount Small 08/08/23 1415   Drainage Description Serosanguineous 08/08/23 1415   Treatments Cleansed;Topical Lidocaine;Provider debridement;Site care 08/08/23 1415   Wound Cleansing Hypochlorus Acid 08/08/23 1415   Periwound Protectant Barrier Paste 08/08/23 1415   Dressing Changed Changed 07/25/23 1415   Dressing Cleansing/Solutions Normal Saline 08/08/23 1415   Dressing Options Collagen Dressing;Hydrofiber Silver Strip;Silicone Adhesive Foam;Tubigrip 08/08/23 1415   Dressing Change/Treatment Frequency Every 72 hrs, and As Needed 08/08/23 1415   Non-staged Wound Description Full thickness 08/08/23 1415   Wound Length (cm) 0.7 cm 08/08/23 1415   Wound Width (cm) 0.6 cm 08/08/23 1415   Wound Depth (cm) 0.2 cm 08/08/23 1415   Wound Surface Area (cm^2) 0.42 cm^2 08/08/23 1415   Wound Volume (cm^3) 0.084 cm^3 08/08/23 1415   Post-Procedure Length (cm) 0.7 cm 08/08/23 1415   Post-Procedure Width (cm) 0.6 cm 08/08/23 1415   Post-Procedure Depth (cm) 0.4 cm 08/08/23 1415   Post-Procedure Surface Area (cm^2) 0.42 cm^2 08/08/23 1415   Post-Procedure Volume (cm^3) 0.168 cm^3 08/08/23 1415   Wound Healing % 44 08/08/23 1415   Tunneling (cm) 0 cm 08/01/23 1435   Undermining (cm) 0 cm 08/01/23 1435   Wound Odor None 08/08/23 1415   Pulses Left;2+ 07/05/23 1415   Right Foot Monofilament 10-point exam (Sensate) 8/10 05/09/23 1500   Left Foot Monofilament 10-point exam (Sensate) 9/10 05/09/23 1500   Exposed Structures None 08/08/23 1415   Number of days: 147       PROCEDURE: sharp debridement of posterior  left lower extremity wound  -2% viscous lidocaine applied topically to wound bed for approximately 5 minutes prior to debridement  -Curette used to debride wound bed.  Excisional debridement was performed to remove devitalized tissue until healthy, bleeding tissue was visualized.   Entire surface of wound, 0.42 cm² debrided.  Tissue debrided into the subcutaneous layer.   -Bleeding controlled with manual pressure.    -Wound care completed by wound RN, refer to flowsheet  -Patient tolerated the procedure well, without c/o pain or discomfort.          BIOLOGIC LOG  6/13/2023-first application  PRODUCT: EpiCord 1 x 2cm . reorder #[id number]; PRODUCT-[id number] EXPIRES: 2028-01-01 6/20/2023-second application  PRODUCT: Epi fix 2 x 2 cm sheet.  Reorder #[id number] ; PRODUCT #[id number]; expires 2028-04-01 6/27/2023-third application-final application, authorized only through 6/30  PRODUCT: Epi fix 14 mm disc.  Reorder #[id number]; PRODUCT #[id number]; expires 2028-05-01      Pertinent Labs and Diagnostics:    Labs:     A1c:   Lab Results   Component Value Date/Time    HBA1C 6.5 (A) 07/31/2023 12:00 AM            LAST  WOUND CULTURE:  DATE :   Lab Results   Component Value Date/Time    CULTRSULT No growth after 5 days of incubation. 11/07/2022 06:23 PM         ASSESSMENT AND PLAN:   1. Open wound of left lower leg, subsequent encounter    8/8/2023: Wound measuring slightly smaller, continues to have thick slough.  - Excisional debridement was performed in clinic, medically necessary to promote wound healing.  -Punch biopsy obtained in clinic 7/11: focal granulation with background of marked stasis dermatitis. No evidence of vasculitis or malignancy.  -Patient to return to clinic in 1 week for assessment and debridement.  -Continue with double layer Tubigrip.  Patient has had problems with compression wraps which have tended to slide down because new wounds.     Wound care: Collagen dressing, Hydrofiber  silver, double layer Tubigrip    2. Controlled type 2 diabetes mellitus without complication, without long-term current use of insulin ([facility])    8/8/2023: Patient is now on Ozempic, A1c is dropped significantly.  He also states he is losing weight since starting this medication  -Patient's blood sugars currently fairly well managed.  Patient is well aware that he should keep his blood sugar below 140 for optimal wound healing.  -Recently started on Ozempic, endocrinologist at [facility] managing    3. Severe obesity (BMI >= 40) ([facility])  Comments: Complicating factor to wound healing and wound care  Benefits of weight loss for management of his diabetes, and for his overall health previously discussed    4. Edema of both lower extremities  5. Venous insufficiency     8/8/2023: Bilateral lower extremity edema.  -Patient has had problems with sliding of compression wraps.  Continue double layer Tubigrip as he is able to manipulate these himself and can pull back into correct position. Edema appears well controlled  - Patient reports that [facility] was supposed to help him obtain a compression garment. He reports that he has been transferred between departments and has not got anywhere. We measured his leg circumference and determined that he will need Solaris ReadyWrap Fusion kit size medium with long length. I hope that providing him written prescription will help him obtain. He will take to [facility]    PATIENT EDUCATION  - Importance of adequate nutrition for wound healing  -Advised to go to ER for any increased redness, swelling, drainage, or odor, or if patient develops fever, chills, nausea or vomiting.     Please note that this note may have been created using voice recognition software. I have worked with technical experts from [employer] to optimize the interface.  I have made every reasonable attempt to correct obvious errors, but there may be errors of grammar and possibly content that I did not discover before finalizing the  note.    N

## 2023-08-08 NOTE — PATIENT INSTRUCTIONS
-Keep your wound dressing clean, dry, and intact.    -Change your dressing if it becomes soiled, soaked, or falls off.    -Should you experience any significant changes in your wound(s), such as infection (redness, swelling, localized heat, increased pain, fever > 101 F, chills) or have any questions regarding your home care instructions, please contact the wound center at [phone]. If after hours, contact your primary care physician or go to the hospital emergency room.

## 2023-08-08 NOTE — PROGRESS NOTES
RN measured left lower extremity, Pt was handed prescription from Dr. Sanchez for solarfrintit ready wrap fusion kit size medium-long (size based on measurements on solaris ready wrap size chart). Pt to present prescription to PCP with [facility] in hopes that they can use it to order pt's compression. Instructed pt to bring to clinic whenever he obtains it if he does. Copy of prescription uploaded to [employer].    1534: [facility]

## 2023-08-22 ENCOUNTER — OFFICE VISIT (OUTPATIENT)
Dept: WOUND CARE | Facility: MEDICAL CENTER | Age: 63
End: 2023-08-22
Attending: INTERNAL MEDICINE
Payer: MEDICARE

## 2023-08-22 VITALS
OXYGEN SATURATION: 94 % | TEMPERATURE: 97.7 F | HEART RATE: 73 BPM | SYSTOLIC BLOOD PRESSURE: 107 MMHG | DIASTOLIC BLOOD PRESSURE: 63 MMHG | RESPIRATION RATE: 20 BRPM

## 2023-08-22 DIAGNOSIS — E66.01 SEVERE OBESITY (BMI >= 40) (HCC): ICD-10-CM

## 2023-08-22 DIAGNOSIS — S81.802D OPEN WOUND OF LEFT LOWER LEG, SUBSEQUENT ENCOUNTER: ICD-10-CM

## 2023-08-22 PROCEDURE — 11042 DBRDMT SUBQ TIS 1ST 20SQCM/<: CPT

## 2023-08-22 PROCEDURE — 3074F SYST BP LT 130 MM HG: CPT | Performed by: NURSE PRACTITIONER

## 2023-08-22 PROCEDURE — 11042 DBRDMT SUBQ TIS 1ST 20SQCM/<: CPT | Performed by: NURSE PRACTITIONER

## 2023-08-22 PROCEDURE — 3078F DIAST BP <80 MM HG: CPT | Performed by: NURSE PRACTITIONER

## 2023-08-22 NOTE — PROGRESS NOTES
Provider Encounter- Full Thickness wound    HISTORY OF PRESENT ILLNESS  Wound History:   START OF CARE IN CLINIC: 03/14/23   REFERRING PROVIDER: Didi Lehman   WOUND- Full Thickness Wound   LOCATION: Left posterior leg   HISTORY: Patient developed a wound in September 2023 to the left posterior leg.  Patient has thick fungal toenails and reports that he kicked his leg with his right foot and nails causing a scratch.  This led to the full-thickness wound to the left posterior lower extremity.  Patient reports that he was receiving home health care without resolution of the wound.  Patient was seen at [facility] and referred to [facility] wound care for further care of the left posterior full-thickness wound.    Pertinent Medical History: Hyperlipidemia, ABELARDO, obesity, essential hypertension, type 2 diabetes, pulmonary hypertension    TOBACCO USE: Denies a history of tobacco use or smokeless tobacco products    Patient's problem list, allergies, and current medications reviewed and updated in Epic    Interval History:  3/14/2023: Clinic visit with GRUJIT Barrientos.  Patient reports overall he is feeling well denies any fever or chills.    3/21/2023: Clinic visit with Kwaku Sanchez MD. Patient reports doing well, denies any fevers or chills. His wound is measuring about the same but is starting to fill in with increased granulation tissue. Patient was referred to podiatry at [facility] and reports appointment for foot and nail care on 4/24. He does not wear compression to lower extremities and was counseled on edema / venous insufficiency and importance of compression to help return venous flow to heart.    3/27/2023: Clinic visit with Kwaku Sanchez MD. Patient reports doing well. Tolerating compression with decreased edema. He denies any complaints. Wound is slowly improving. Discussed again compression in future. He think he will be able to apply compression socks, given information and he will see about  obtaining from [facility].    4/3/2023: Clinic visit with GURJIT Barrientos.  Patient reports overall he is doing well denies any fever or chills.  Patient reports that Tubigrip applied over the Unna boot with Coban to the right lower extremity was too tight last clinical appointment.  Therefore we will use a larger Tubigrip.  Patient's wound is progressing with increased epithelial tissue at the periphery and granulation tissue throughout the wound bed.    4/10/2023: Clinic visit with GURJIT Barrientos.  Patient reports overall he is doing well denies any fever or chills.  Patient now has [facility] who is seeing the patient once a week for wound care dressing changes and reapplication of Unna boot with Coban wraps.  Patient reports that he has ordered compression stockings off of [employer] and they should be to his home soon.    4/25/2023: Clinic visit with GURJIT Barrientos.  Patient reports overall he is doing well denies any fever or chills.  Patient reports he has seen his podiatrist recently who has taken down his oncotic nails.  Patient reports that he has not had any applications with the compression.  Discussed increasing compression of the 2 layer compression wrap patient is in agreement.  Patient reports that ED and health services is going to supply him with zipper compression stockings with 30 to 40 mm compression.      5/2/2023 : Clinic visit with GURJIT Ordaz, VAL, DIANA, NAY.   Patient states that overall he is feeling well.  Reports his blood sugar was in the 150s.  He did not receive zippered compression garment, ordered last visit.  We will follow-up.  His wound is slowly progressing.  He is tolerating compression without any difficulty.    5/9/2023 : Clinic visit with GURJIT Ordaz, VAL, DIANA, NYA.   Patient states he is feeling well overall.  His blood sugars continue to run in the 150s to 160s.  He just started on Ozempic about a week ago.  He has still not  received zippered compression stocking.  He states that [facility] is trying to figure out which department will pay for this.  He continues to tolerate compression wrap without any difficulty.    5/16/2023 : Clinic visit with GURJIT Ordaz, VAL, DIANA, NAY.   Patient states he is feeling well, blood sugars in the 160s.  He has not yet obtained zippered compression stocking from [facility], is not sure when this will be happening.  He states that he has an appointment to  a voucher for diabetic shoes with inserts, and will ask at that time about the compression garment.    5/23/2023: Clinic visit with GURJIT Barrientos.  Patient reports that he is doing okay denies fever or chills.  Patient is on home oxygen, again the patient does not have a transportable tank.  Short of breath on arrival.  Nasal cannula placed in clinic at 3 L patient saturating above 90%.    5/30/2023: Clinic visit with Kwaku Sanchez MD. Patient reports doing well. Denies any complaints. He reports that he continues to have difficulty obtaining compression socks from [facility]. Reports that he has been transferred between departments, but will continue to try. Wound largely unchanged. Biologic authorization is still pending. Patient continues to have edema despite 2 layer compression, he is agreeable to try 4 layer compression this week.    6/6/2023: Clinic visit with Kwaku Sanchez MD. Patient reports feeling well. Denies any signs or symptoms of infection. He reports tolerating 4 layer wrap, though home health did not have and applied 2 layer wrap. Home health told him that they have ordered 4 layer compression. He is still working through [facility] to obtain compression socks. Biologic authorization is still pending.    6/13/2023 : Clinic visit with GURJIT Ordaz, VAL, DIANA, NAY.   Rashi states he is feeling well overall.  His blood sugar today was 153.  We have received authorization from his insurance provider to apply EpiFix or epi  "cord to his wound.  Epi cord applied today, first application.  Authorization is only good through 6/30.    6/20/2023 : Clinic visit with GURJIT Ordaz, VAL, DIANA, NAY.   Patient continues to feel well.  He reports that his blood sugar this morning was 144.  He is tolerating compression without any difficulty.  Wound area has decreased slightly.  He has not yet received zippered compression garments from [facility], states \"they are working on it\".    6/27/2023 : Clinic visit with GURJIT Ordaz, VAL, DIANA, NAY.   Rashi states he is feeling well, reports his blood sugar today was 131.  He continues to tolerate compression.  Wound area about the same, depth has decreased slightly.  EpiFix was applied in clinic today.  However, this will be his last application, as authorization was only through 6/30.    7/6/2023: Clinic visit with GURJIT Barrientos. Wound bed is smaller by dimensions fortunately Rashi is out authorization for biologic.  Patient reports that home health did not have 4-layer compression.  2 layer compression placed by home health.  Patient reports that home health did not have 4-layer compression wraps.    7/11/2023 : Clinic visit with GURJIT Ordaz, VAL, DIANA, NAY.   Rashi continues to feel well overall.  His wound has not progressed significantly for several months.  He does present today with a raised ring around bottom half of wound.  I opted to perform a punch biopsy in clinic today to rule out possible malignancy or other etiology.   He also presented today with his pression wrap partially slid down his leg.  This has been an ongoing problem.  He informed that [facility] has denied coverage for zippered compression garment.  He states they are looking into other options    7/18/2023: Clinic visit with Kwaku Sanchez MD. Patient reports feeling in normal state of health. Denies any fevers or chills. Reviewed patient's biopsy results with him: granulation tissue and marked " stasis dermatitis without evidence of vasculitis or malignancy. Patient is tolerating tubigrips better and easier for him to pull back up when they fall down.    7/25/2023 : Clinic visit with GURJIT Ordaz, VAL, DIANA, NAY.   Rashi continues to feel well, reports his blood sugar this morning was 139.  Wound area has decreased slightly.  He has still not received compression garments from [facility].    8/1/2023 : Clinic visit with GURJIT Ordaz, VAL, DIANA, NAY.   Rashi continues to do well overall.  He just had lab work done at [facility], was told his A1c was 6.5.  His wound is actually quite a bit smaller today.  He has still not received any sort of compression garment from [facility].    8/8/2023: Clinic visit with Kwaku Sanchez MD. Patient reports doing well, denies any current complaints. He reports that he has been getting the run around with [facility] about who will be ordering compression for him. Measured patient and gave paper script for [employer] ReadyWrap Fusion Kit size medium, length long. He is to take to [facility] (not sure what department orders or PCP) to get filled. Patient wound slightly smaller.    8/22/2023 : Clinic visit with GURJIT Ordaz, VAL, DIANA, NAY.   Rashi states he is feeling well, reports his blood sugar today was around 145.  He missed his appointment last week because he was not feeling well.  He states he has submitted prescription for zippered compression garment to [facility], does not know how long it will take.  His wound measures slightly larger today.    REVIEW OF SYSTEMS:   Unchanged from previous wound clinic assessment on 8/8/2023 except as noted in interval history above    PHYSICAL EXAMINATION:   /63 (BP Location: Left arm, Patient Position: Sitting)   Pulse 73   Temp 36.5 °C (97.7 °F) (Temporal)   Resp 20   SpO2 94%     Physical Exam  Constitutional:       Appearance: He is obese.   Cardiovascular:      Rate and Rhythm: Normal rate.      Comments: Pedal pulses are  palpable  Pulmonary:      Effort: Pulmonary effort is normal. No respiratory distress.   Musculoskeletal:         General: Swelling present.      Right lower leg: Edema present.      Left lower leg: Edema present.   Skin:     Comments: Full-thickness wound to the left posterior lower extremity: Wound area has increased slightly, thick layer of slough to wound bed, minimal to moderate serosanguineous drainage, no evidence of infection    Hemosiderin staining, chronic edema, skin changes-consistent with CVI   Neurological:      Mental Status: He is alert and oriented to person, place, and time.   Psychiatric:         Mood and Affect: Mood normal.         WOUND ASSESSMENT  Wound 03/14/23 Traumatic LLE Posterior (Active)   Wound Image    08/22/23 1415   Site Assessment Yellow;Red 08/22/23 1415   Periwound Assessment Hemosiderin Staining;Edema 08/22/23 1415   Margins Attached edges 08/22/23 1415   Drainage Amount Small 08/22/23 1415   Drainage Description Serosanguineous 08/22/23 1415   Treatments Cleansed;Topical Lidocaine;Provider debridement;Site care 08/22/23 1415   Wound Cleansing Normal Saline Irrigation 08/22/23 1415   Periwound Protectant Barrier Paste 08/22/23 1415   Dressing Changed Changed 08/22/23 1415   Dressing Cleansing/Solutions Normal Saline 08/22/23 1415   Dressing Options Collagen Dressing;Hydrofiber Silver;Silicone Adhesive Foam;Tubigrip 08/22/23 1415   Dressing Change/Treatment Frequency Every 72 hrs, and As Needed 08/22/23 1415   Non-staged Wound Description Full thickness 08/22/23 1415   Wound Length (cm) 0.8 cm 08/22/23 1415   Wound Width (cm) 0.5 cm 08/22/23 1415   Wound Depth (cm) 0.3 cm 08/22/23 1415   Wound Surface Area (cm^2) 0.4 cm^2 08/22/23 1415   Wound Volume (cm^3) 0.12 cm^3 08/22/23 1415   Post-Procedure Length (cm) 0.8 cm 08/22/23 1415   Post-Procedure Width (cm) 0.6 cm 08/22/23 1415   Post-Procedure Depth (cm) 0.4 cm 08/22/23 1415   Post-Procedure Surface Area (cm^2) 0.48 cm^2  08/22/23 1415   Post-Procedure Volume (cm^3) 0.192 cm^3 08/22/23 1415   Wound Healing % 20 08/22/23 1415   Tunneling (cm) 0 cm 08/22/23 1415   Undermining (cm) 0 cm 08/22/23 1415   Wound Odor None 08/22/23 1415   Pulses Left;2+ 07/05/23 1415   Right Foot Monofilament 10-point exam (Sensate) 8/10 05/09/23 1500   Left Foot Monofilament 10-point exam (Sensate) 9/10 05/09/23 1500   Exposed Structures None 08/22/23 1415   Number of days: 161       PROCEDURE: sharp debridement of posterior left lower extremity wound  -2% viscous lidocaine applied topically to wound bed for approximately 5 minutes prior to debridement  -Curette used to debride wound bed.  Excisional debridement was performed to remove devitalized tissue until healthy, bleeding tissue was visualized.   Entire surface of wound, 0.48 cm² debrided.  Tissue debrided into the subcutaneous layer.   -Bleeding controlled with manual pressure.    -Wound care completed by wound RN, refer to flowsheet  -Patient tolerated the procedure well, without c/o pain or discomfort.          BIOLOGIC LOG  6/13/2023-first application  PRODUCT: EpiCord 1 x 2cm . reorder #[id number]; PRODUCT-[id number] EXPIRES: 2028-01-01 6/20/2023-second application  PRODUCT: Epi fix 2 x 2 cm sheet.  Reorder #[id number] ; PRODUCT #[id number]; expires 2028-04-01 6/27/2023-third application-final application, authorized only through 6/30  PRODUCT: Epi fix 14 mm disc.  Reorder #[id number]; PRODUCT #[id number]; expires 2028-05-01      Pertinent Labs and Diagnostics:    Labs:     A1c:   Lab Results   Component Value Date/Time    HBA1C 6.5 (A) 07/31/2023 12:00 AM            LAST  WOUND CULTURE:  DATE :   Lab Results   Component Value Date/Time    CULTRSULT No growth after 5 days of incubation. 11/07/2022 06:23 PM         ASSESSMENT AND PLAN:   1. Open wound of left lower leg, subsequent encounter    8/22/2023: Wound measures slightly larger today  - Excisional debridement was performed in  clinic, medically necessary to promote wound healing.  -Punch biopsy obtained in clinic 7/11: focal granulation with background of marked stasis dermatitis. No evidence of vasculitis or malignancy.  -Patient to return to clinic weekly for assessment and debridement  -Continue with double layer Tubigrip.  Patient has had problems with compression wraps which have tended to slide down because new wounds.     Wound care: Collagen dressing, Hydrofiber silver, double layer Tubigrip    2. Controlled type 2 diabetes mellitus without complication, without long-term current use of insulin ([facility])    8/22/2023: Patient is now on Ozempic, A1c is dropped significantly.  He also states he is losing weight since starting this medication.  He reports his blood sugar today was around 145  -Patient has been advised to keep his blood sugars below 140 in order to optimize wound healing    3. Severe obesity (BMI >= 40) ([facility])  Comments: Complicating factor to wound healing and wound care  Benefits of weight loss for management of his diabetes, and for his overall health previously discussed    4. Edema of both lower extremities  5. Venous insufficiency     8/22/2023: Chronic bilateral lower extremity edema, complicated by severe obesity  -Patient has had problems with sliding of compression wraps.  Continue double layer Tubigrip as he is able to manipulate these himself and can pull back into correct position. Edema appears well controlled  - Patient has submitted Rx for zippered compression garments to [facility].    PATIENT EDUCATION  - Importance of adequate nutrition for wound healing  -Advised to go to ER for any increased redness, swelling, drainage, or odor, or if patient develops fever, chills, nausea or vomiting.     Please note that this note may have been created using voice recognition software. I have worked with technical experts from [employer] to optimize the interface.  I have made every reasonable attempt to correct obvious  errors, but there may be errors of grammar and possibly content that I did not discover before finalizing the note.    N

## 2023-08-29 ENCOUNTER — OFFICE VISIT (OUTPATIENT)
Dept: WOUND CARE | Facility: MEDICAL CENTER | Age: 63
End: 2023-08-29
Attending: INTERNAL MEDICINE
Payer: MEDICARE

## 2023-08-29 VITALS
HEART RATE: 74 BPM | RESPIRATION RATE: 20 BRPM | TEMPERATURE: 98.6 F | SYSTOLIC BLOOD PRESSURE: 127 MMHG | DIASTOLIC BLOOD PRESSURE: 63 MMHG | OXYGEN SATURATION: 95 %

## 2023-08-29 DIAGNOSIS — E11.9 CONTROLLED TYPE 2 DIABETES MELLITUS WITHOUT COMPLICATION, WITHOUT LONG-TERM CURRENT USE OF INSULIN (HCC): ICD-10-CM

## 2023-08-29 DIAGNOSIS — R60.0 EDEMA OF BOTH LOWER EXTREMITIES: ICD-10-CM

## 2023-08-29 DIAGNOSIS — E66.01 SEVERE OBESITY (BMI >= 40) (HCC): ICD-10-CM

## 2023-08-29 DIAGNOSIS — I87.2 VENOUS INSUFFICIENCY OF BOTH LOWER EXTREMITIES: ICD-10-CM

## 2023-08-29 DIAGNOSIS — S81.802D OPEN WOUND OF LEFT LOWER LEG, SUBSEQUENT ENCOUNTER: Primary | ICD-10-CM

## 2023-08-29 PROCEDURE — 3078F DIAST BP <80 MM HG: CPT | Performed by: STUDENT IN AN ORGANIZED HEALTH CARE EDUCATION/TRAINING PROGRAM

## 2023-08-29 PROCEDURE — 11042 DBRDMT SUBQ TIS 1ST 20SQCM/<: CPT

## 2023-08-29 PROCEDURE — 11042 DBRDMT SUBQ TIS 1ST 20SQCM/<: CPT | Performed by: STUDENT IN AN ORGANIZED HEALTH CARE EDUCATION/TRAINING PROGRAM

## 2023-08-29 PROCEDURE — 3074F SYST BP LT 130 MM HG: CPT | Performed by: STUDENT IN AN ORGANIZED HEALTH CARE EDUCATION/TRAINING PROGRAM

## 2023-08-29 NOTE — PATIENT INSTRUCTIONS
-Keep dressings clean and dry. Change dressings every 3-4 days, and if they become over saturated, soiled or fall off.     -If you need to change your dressings at home: Wash your wound with normal saline, wound cleanser, or unscented soap and water prior to applying your new dressings. Please avoid cleansing with hydrogen peroxide or rubbing alcohol.    -Avoid prolonged standing or sitting without elevating your legs.    -Remove your compression garments if you have severe pain, severe swelling, numbness, color change, or temperature change in your toes. If you need to remove your compression garments, do so by unrolling them. Do not cut the compression garments off, this is to prevent cutting yourself on accident.    -Should you experience any significant changes in your wound(s), such as signs of infection (increasing redness, swelling, localized heat, increased pain, fever > 101 F, chills) or have any questions regarding your home care instructions, please contact the wound center at [phone]. If after hours, contact your primary care physician or go to the hospital emergency room.     -If you are 5 or more minutes late for an appointment, we reserve the right to cancel and reschedule that appointment. Additionally, if you are habitually late or not showing (3 late cancellations and/or no shows), we reserve the right to cancel your remaining appointments and it will be your responsibility to obtain a new referral if services are still needed.

## 2023-08-29 NOTE — PROGRESS NOTES
Provider Encounter- Full Thickness wound    HISTORY OF PRESENT ILLNESS  Wound History:   START OF CARE IN CLINIC: 03/14/23   REFERRING PROVIDER: Didi Lehman   WOUND- Full Thickness Wound   LOCATION: Left posterior leg   HISTORY: Patient developed a wound in September 2023 to the left posterior leg.  Patient has thick fungal toenails and reports that he kicked his leg with his right foot and nails causing a scratch.  This led to the full-thickness wound to the left posterior lower extremity.  Patient reports that he was receiving home health care without resolution of the wound.  Patient was seen at [facility] and referred to [facility] wound care for further care of the left posterior full-thickness wound.    Pertinent Medical History: Hyperlipidemia, ABELARDO, obesity, essential hypertension, type 2 diabetes, pulmonary hypertension    TOBACCO USE: Denies a history of tobacco use or smokeless tobacco products    Patient's problem list, allergies, and current medications reviewed and updated in Epic    Interval History:  3/14/2023: Clinic visit with GURJIT Barrientos.  Patient reports overall he is feeling well denies any fever or chills.    3/21/2023: Clinic visit with Kwaku Sanchez MD. Patient reports doing well, denies any fevers or chills. His wound is measuring about the same but is starting to fill in with increased granulation tissue. Patient was referred to podiatry at [facility] and reports appointment for foot and nail care on 4/24. He does not wear compression to lower extremities and was counseled on edema / venous insufficiency and importance of compression to help return venous flow to heart.    3/27/2023: Clinic visit with Kwaku Sanchez MD. Patient reports doing well. Tolerating compression with decreased edema. He denies any complaints. Wound is slowly improving. Discussed again compression in future. He think he will be able to apply compression socks, given information and he will see about  obtaining from [facility].    4/3/2023: Clinic visit with GURJIT Barrientos.  Patient reports overall he is doing well denies any fever or chills.  Patient reports that Tubigrip applied over the Unna boot with Coban to the right lower extremity was too tight last clinical appointment.  Therefore we will use a larger Tubigrip.  Patient's wound is progressing with increased epithelial tissue at the periphery and granulation tissue throughout the wound bed.    4/10/2023: Clinic visit with GURJIT Barrientos.  Patient reports overall he is doing well denies any fever or chills.  Patient now has [facility] who is seeing the patient once a week for wound care dressing changes and reapplication of Unna boot with Coban wraps.  Patient reports that he has ordered compression stockings off of [employer] and they should be to his home soon.    4/25/2023: Clinic visit with GURJIT Barrientos.  Patient reports overall he is doing well denies any fever or chills.  Patient reports he has seen his podiatrist recently who has taken down his oncotic nails.  Patient reports that he has not had any applications with the compression.  Discussed increasing compression of the 2 layer compression wrap patient is in agreement.  Patient reports that ED and health services is going to supply him with zipper compression stockings with 30 to 40 mm compression.      5/2/2023 : Clinic visit with GURJIT Ordaz, VAL, DIANA, NAY.   Patient states that overall he is feeling well.  Reports his blood sugar was in the 150s.  He did not receive zippered compression garment, ordered last visit.  We will follow-up.  His wound is slowly progressing.  He is tolerating compression without any difficulty.    5/9/2023 : Clinic visit with GURJIT Ordaz, VAL, DIANA, NAY.   Patient states he is feeling well overall.  His blood sugars continue to run in the 150s to 160s.  He just started on Ozempic about a week ago.  He has still not  received zippered compression stocking.  He states that [facility] is trying to figure out which department will pay for this.  He continues to tolerate compression wrap without any difficulty.    5/16/2023 : Clinic visit with GURJIT Ordaz, VAL, DIANA, NAY.   Patient states he is feeling well, blood sugars in the 160s.  He has not yet obtained zippered compression stocking from [facility], is not sure when this will be happening.  He states that he has an appointment to  a voucher for diabetic shoes with inserts, and will ask at that time about the compression garment.    5/23/2023: Clinic visit with GURJIT Barrientos.  Patient reports that he is doing okay denies fever or chills.  Patient is on home oxygen, again the patient does not have a transportable tank.  Short of breath on arrival.  Nasal cannula placed in clinic at 3 L patient saturating above 90%.    5/30/2023: Clinic visit with Kwaku Sanchez MD. Patient reports doing well. Denies any complaints. He reports that he continues to have difficulty obtaining compression socks from [facility]. Reports that he has been transferred between departments, but will continue to try. Wound largely unchanged. Biologic authorization is still pending. Patient continues to have edema despite 2 layer compression, he is agreeable to try 4 layer compression this week.    6/6/2023: Clinic visit with Kwaku Sanchez MD. Patient reports feeling well. Denies any signs or symptoms of infection. He reports tolerating 4 layer wrap, though home health did not have and applied 2 layer wrap. Home health told him that they have ordered 4 layer compression. He is still working through [facility] to obtain compression socks. Biologic authorization is still pending.    6/13/2023 : Clinic visit with GURJIT Ordaz, VAL, DIANA, NAY.   Rashi states he is feeling well overall.  His blood sugar today was 153.  We have received authorization from his insurance provider to apply EpiFix or epi  "cord to his wound.  Epi cord applied today, first application.  Authorization is only good through 6/30.    6/20/2023 : Clinic visit with GURJIT Ordaz, VAL, DIANA, NAY.   Patient continues to feel well.  He reports that his blood sugar this morning was 144.  He is tolerating compression without any difficulty.  Wound area has decreased slightly.  He has not yet received zippered compression garments from [facility], states \"they are working on it\".    6/27/2023 : Clinic visit with GURJIT Ordaz, VAL, DIANA, NAY.   Rashi states he is feeling well, reports his blood sugar today was 131.  He continues to tolerate compression.  Wound area about the same, depth has decreased slightly.  EpiFix was applied in clinic today.  However, this will be his last application, as authorization was only through 6/30.    7/6/2023: Clinic visit with GURJIT Barrientos. Wound bed is smaller by dimensions fortunately Rashi is out authorization for biologic.  Patient reports that home health did not have 4-layer compression.  2 layer compression placed by home health.  Patient reports that home health did not have 4-layer compression wraps.    7/11/2023 : Clinic visit with GURJIT Ordaz, VAL, DIANA, NAY.   Rashi continues to feel well overall.  His wound has not progressed significantly for several months.  He does present today with a raised ring around bottom half of wound.  I opted to perform a punch biopsy in clinic today to rule out possible malignancy or other etiology.   He also presented today with his pression wrap partially slid down his leg.  This has been an ongoing problem.  He informed that [facility] has denied coverage for zippered compression garment.  He states they are looking into other options    7/18/2023: Clinic visit with Kwaku Sanchez MD. Patient reports feeling in normal state of health. Denies any fevers or chills. Reviewed patient's biopsy results with him: granulation tissue and marked " stasis dermatitis without evidence of vasculitis or malignancy. Patient is tolerating tubigrips better and easier for him to pull back up when they fall down.    7/25/2023 : Clinic visit with GURJIT Ordaz, VAL, DIANA, NAY.   Rashi continues to feel well, reports his blood sugar this morning was 139.  Wound area has decreased slightly.  He has still not received compression garments from [facility].    8/1/2023 : Clinic visit with GURJIT Ordaz, VAL, DIANA, NAY.   Rashi continues to do well overall.  He just had lab work done at [facility], was told his A1c was 6.5.  His wound is actually quite a bit smaller today.  He has still not received any sort of compression garment from [facility].    8/8/2023: Clinic visit with Kwaku Sanchez MD. Patient reports doing well, denies any current complaints. He reports that he has been getting the run around with [facility] about who will be ordering compression for him. Measured patient and gave paper script for [employer] ReadyWrap Fusion Kit size medium, length long. He is to take to [facility] (not sure what department orders or PCP) to get filled. Patient wound slightly smaller.    8/22/2023 : Clinic visit with GURJIT Ordaz, VAL, DIANA, NAY.   Rashi states he is feeling well, reports his blood sugar today was around 145.  He missed his appointment last week because he was not feeling well.  He states he has submitted prescription for zippered compression garment to [facility], does not know how long it will take.  His wound measures slightly larger today.    8/29/2023: Clinic visit with Kwaku Sanchez MD. Patient reports doing well. Denies any fevers or chills. His wound is measuring slightly smaller today. He has submitted prescription for compression garments to [facility]. He is unsure what the status is, encouraged him to follow up with [facility].    REVIEW OF SYSTEMS:   Unchanged from previous wound clinic assessment on 8/22/2023 except as noted in interval history above    PHYSICAL EXAMINATION:    /63   Pulse 74   Temp 37 °C (98.6 °F) (Temporal)   Resp 20   SpO2 95%     Physical Exam  Constitutional:       Appearance: He is obese.   Cardiovascular:      Rate and Rhythm: Normal rate.      Comments: Pedal pulses are palpable  Pulmonary:      Effort: Pulmonary effort is normal. No respiratory distress.   Musculoskeletal:         General: Swelling present.      Right lower leg: Edema present.      Left lower leg: Edema present.   Skin:     Comments: Full-thickness wound to the left posterior lower extremity: Wound measuring slightly smaller, thin layer of slough to wound bed, minimal to moderate serosanguineous drainage, no evidence of infection    Hemosiderin staining, chronic edema, skin changes-consistent with CVI   Neurological:      Mental Status: He is alert and oriented to person, place, and time.   Psychiatric:         Mood and Affect: Mood normal.         WOUND ASSESSMENT  Wound 03/14/23 Traumatic LLE Posterior (Active)   Wound Image    08/29/23 1432   Site Assessment Red;Yellow 08/29/23 1432   Periwound Assessment Hemosiderin Staining;Dry;Edema 08/29/23 1432   Margins Unattached edges 08/29/23 1432   Drainage Amount Small 08/29/23 1432   Drainage Description Serosanguineous 08/29/23 1432   Treatments Cleansed;Topical Lidocaine;Provider debridement 08/29/23 1432   Wound Cleansing Normal Saline Irrigation 08/29/23 1432   Periwound Protectant Skin Protectant Wipes to Periwound;Barrier Paste;Skin Moisturizer 08/29/23 1432   Dressing Changed Changed 08/22/23 1415   Dressing Cleansing/Solutions Normal Saline 08/29/23 1432   Dressing Options Collagen Dressing;Silicone Adhesive Foam;Tubigrip 08/29/23 1432   Dressing Change/Treatment Frequency Twice Weekly 08/29/23 1432   Wound Team Following Weekly 08/29/23 1432   Non-staged Wound Description Full thickness 08/29/23 1432   Wound Length (cm) 0.5 cm 08/29/23 1432   Wound Width (cm) 0.4 cm 08/29/23 1432   Wound Depth (cm) 0.3 cm 08/29/23 1432   Wound  Surface Area (cm^2) 0.2 cm^2 08/29/23 1432   Wound Volume (cm^3) 0.06 cm^3 08/29/23 1432   Post-Procedure Length (cm) 0.5 cm 08/29/23 1432   Post-Procedure Width (cm) 0.5 cm 08/29/23 1432   Post-Procedure Depth (cm) 0.3 cm 08/29/23 1432   Post-Procedure Surface Area (cm^2) 0.25 cm^2 08/29/23 1432   Post-Procedure Volume (cm^3) 0.075 cm^3 08/29/23 1432   Wound Healing % 60 08/29/23 1432   Tunneling (cm) 0 cm 08/29/23 1432   Undermining (cm) 0 cm 08/29/23 1432   Wound Odor None 08/29/23 1432   Pulses Left;2+ 07/05/23 1415   Right Foot Monofilament 10-point exam (Sensate) 8/10 05/09/23 1500   Left Foot Monofilament 10-point exam (Sensate) 9/10 05/09/23 1500   Exposed Structures None 08/29/23 1432   Number of days: 168       PROCEDURE: sharp debridement of posterior left lower extremity wound  -2% viscous lidocaine applied topically to wound bed for approximately 5 minutes prior to debridement  -Curette used to debride wound bed.  Excisional debridement was performed to remove devitalized tissue until healthy, bleeding tissue was visualized.   Entire surface of wound, 0.25 cm² debrided.  Tissue debrided into the subcutaneous layer.   -Bleeding controlled with manual pressure.    -Wound care completed by wound RN, refer to flowsheet  -Patient tolerated the procedure well, without c/o pain or discomfort.          BIOLOGIC LOG  6/13/2023-first application  PRODUCT: EpiCord 1 x 2cm . reorder #[id number]; PRODUCT-[id number] EXPIRES: 2028-01-01 6/20/2023-second application  PRODUCT: Epi fix 2 x 2 cm sheet.  Reorder #[id number] ; PRODUCT #[id number]; expires 2028-04-01 6/27/2023-third application-final application, authorized only through 6/30  PRODUCT: Epi fix 14 mm disc.  Reorder #[id number]; PRODUCT #[id number]; expires 2028-05-01      Pertinent Labs and Diagnostics:    Labs:     A1c:   Lab Results   Component Value Date/Time    HBA1C 6.5 (A) 07/31/2023 12:00 AM            LAST  WOUND CULTURE:  DATE :   Lab  Results   Component Value Date/Time    CULTRSULT No growth after 5 days of incubation. 11/07/2022 06:23 PM         ASSESSMENT AND PLAN:   1. Open wound of left lower leg, subsequent encounter    8/29/2023: Wound measures smaller today with less slough.  - Excisional debridement was performed in clinic, medically necessary to promote wound healing.  -Punch biopsy obtained in clinic 7/11: focal granulation with background of marked stasis dermatitis. No evidence of vasculitis or malignancy.  -Patient to return to clinic weekly for assessment and debridement  -Continue with double layer Tubigrip.  Patient has had problems with compression wraps which have tended to slide down because new wounds.     Wound care: Collagen dressing, silicone adhesive foam, double layer Tubigrip    2. Controlled type 2 diabetes mellitus without complication, without long-term current use of insulin ([facility])    8/29/2023: Patient is now on Ozempic, A1c is dropped significantly.  He also states he is losing weight since starting this medication.  He reports his blood sugar today was around 146  -Patient has been advised to keep his blood sugars below 140 in order to optimize wound healing    3. Severe obesity (BMI >= 40) ([facility])  Comments: Complicating factor to wound healing and wound care  Benefits of weight loss for management of his diabetes, and for his overall health previously discussed    4. Edema of both lower extremities  5. Venous insufficiency     8/29/2023: Chronic bilateral lower extremity edema, complicated by severe obesity  -Patient has had problems with sliding of compression wraps.  Continue double layer Tubigrip as he is able to manipulate these himself and can pull back into correct position. Edema appears well controlled  - Patient has submitted Rx for compression garments to [facility], has not heard anything more. Encouraged him to contact S.    PATIENT EDUCATION  - Importance of adequate nutrition for wound healing  -Advised  to go to ER for any increased redness, swelling, drainage, or odor, or if patient develops fever, chills, nausea or vomiting.     Please note that this note may have been created using voice recognition software. I have worked with technical experts from [facility] to optimize the interface.  I have made every reasonable attempt to correct obvious errors, but there may be errors of grammar and possibly content that I did not discover before finalizing the note.    N

## 2023-08-29 NOTE — PROGRESS NOTES
Home health orders routed to [facility] via [employer] to fax number [phone].

## 2023-09-08 ENCOUNTER — OFFICE VISIT (OUTPATIENT)
Dept: WOUND CARE | Facility: MEDICAL CENTER | Age: 63
End: 2023-09-08
Attending: INTERNAL MEDICINE
Payer: MEDICARE

## 2023-09-08 VITALS
DIASTOLIC BLOOD PRESSURE: 91 MMHG | OXYGEN SATURATION: 93 % | TEMPERATURE: 97.2 F | HEART RATE: 72 BPM | RESPIRATION RATE: 20 BRPM | SYSTOLIC BLOOD PRESSURE: 131 MMHG

## 2023-09-08 DIAGNOSIS — E11.9 CONTROLLED TYPE 2 DIABETES MELLITUS WITHOUT COMPLICATION, WITHOUT LONG-TERM CURRENT USE OF INSULIN (HCC): ICD-10-CM

## 2023-09-08 DIAGNOSIS — R60.0 EDEMA OF BOTH LOWER EXTREMITIES: ICD-10-CM

## 2023-09-08 DIAGNOSIS — S81.802D OPEN WOUND OF LEFT LOWER LEG, SUBSEQUENT ENCOUNTER: ICD-10-CM

## 2023-09-08 DIAGNOSIS — E66.01 SEVERE OBESITY (BMI >= 40) (HCC): ICD-10-CM

## 2023-09-08 DIAGNOSIS — I87.2 VENOUS INSUFFICIENCY OF BOTH LOWER EXTREMITIES: ICD-10-CM

## 2023-09-08 PROCEDURE — 11042 DBRDMT SUBQ TIS 1ST 20SQCM/<: CPT | Performed by: STUDENT IN AN ORGANIZED HEALTH CARE EDUCATION/TRAINING PROGRAM

## 2023-09-08 PROCEDURE — 11042 DBRDMT SUBQ TIS 1ST 20SQCM/<: CPT

## 2023-09-08 PROCEDURE — 3075F SYST BP GE 130 - 139MM HG: CPT | Performed by: STUDENT IN AN ORGANIZED HEALTH CARE EDUCATION/TRAINING PROGRAM

## 2023-09-08 PROCEDURE — 3080F DIAST BP >= 90 MM HG: CPT | Performed by: STUDENT IN AN ORGANIZED HEALTH CARE EDUCATION/TRAINING PROGRAM

## 2023-09-08 NOTE — PROGRESS NOTES
Provider Encounter- Full Thickness wound    HISTORY OF PRESENT ILLNESS  Wound History:   START OF CARE IN CLINIC: 03/14/23   REFERRING PROVIDER: Didi Lehman   WOUND- Full Thickness Wound   LOCATION: Left posterior leg   HISTORY: Patient developed a wound in September 2023 to the left posterior leg.  Patient has thick fungal toenails and reports that he kicked his leg with his right foot and nails causing a scratch.  This led to the full-thickness wound to the left posterior lower extremity.  Patient reports that he was receiving home health care without resolution of the wound.  Patient was seen at [facility] and referred to [facility] wound care for further care of the left posterior full-thickness wound.    Pertinent Medical History: Hyperlipidemia, ABELARDO, obesity, essential hypertension, type 2 diabetes, pulmonary hypertension    TOBACCO USE: Denies a history of tobacco use or smokeless tobacco products    Patient's problem list, allergies, and current medications reviewed and updated in Epic    Interval History:  3/14/2023: Clinic visit with GURJIT Barrientos.  Patient reports overall he is feeling well denies any fever or chills.    3/21/2023: Clinic visit with Kwaku Sanchez MD. Patient reports doing well, denies any fevers or chills. His wound is measuring about the same but is starting to fill in with increased granulation tissue. Patient was referred to podiatry at [facility] and reports appointment for foot and nail care on 4/24. He does not wear compression to lower extremities and was counseled on edema / venous insufficiency and importance of compression to help return venous flow to heart.    3/27/2023: Clinic visit with Kwaku Sanchez MD. Patient reports doing well. Tolerating compression with decreased edema. He denies any complaints. Wound is slowly improving. Discussed again compression in future. He think he will be able to apply compression socks, given information and he will see about  obtaining from [facility].    4/3/2023: Clinic visit with GURJIT Barrientos.  Patient reports overall he is doing well denies any fever or chills.  Patient reports that Tubigrip applied over the Unna boot with Coban to the right lower extremity was too tight last clinical appointment.  Therefore we will use a larger Tubigrip.  Patient's wound is progressing with increased epithelial tissue at the periphery and granulation tissue throughout the wound bed.    4/10/2023: Clinic visit with GURJIT Barrientos.  Patient reports overall he is doing well denies any fever or chills.  Patient now has [facility] who is seeing the patient once a week for wound care dressing changes and reapplication of Unna boot with Coban wraps.  Patient reports that he has ordered compression stockings off of [employer] and they should be to his home soon.    4/25/2023: Clinic visit with GURJIT Barrientos.  Patient reports overall he is doing well denies any fever or chills.  Patient reports he has seen his podiatrist recently who has taken down his oncotic nails.  Patient reports that he has not had any applications with the compression.  Discussed increasing compression of the 2 layer compression wrap patient is in agreement.  Patient reports that ED and health services is going to supply him with zipper compression stockings with 30 to 40 mm compression.      5/2/2023 : Clinic visit with GURJIT Ordaz, VAL, DIANA, NAY.   Patient states that overall he is feeling well.  Reports his blood sugar was in the 150s.  He did not receive zippered compression garment, ordered last visit.  We will follow-up.  His wound is slowly progressing.  He is tolerating compression without any difficulty.    5/9/2023 : Clinic visit with GURJIT Ordaz, VAL, DIANA, NAY.   Patient states he is feeling well overall.  His blood sugars continue to run in the 150s to 160s.  He just started on Ozempic about a week ago.  He has still not  received zippered compression stocking.  He states that [facility] is trying to figure out which department will pay for this.  He continues to tolerate compression wrap without any difficulty.    5/16/2023 : Clinic visit with GURJIT Ordaz, VAL, DIANA, NAY.   Patient states he is feeling well, blood sugars in the 160s.  He has not yet obtained zippered compression stocking from [facility], is not sure when this will be happening.  He states that he has an appointment to  a voucher for diabetic shoes with inserts, and will ask at that time about the compression garment.    5/23/2023: Clinic visit with GURJIT Barrientos.  Patient reports that he is doing okay denies fever or chills.  Patient is on home oxygen, again the patient does not have a transportable tank.  Short of breath on arrival.  Nasal cannula placed in clinic at 3 L patient saturating above 90%.    5/30/2023: Clinic visit with Kwaku Sanchez MD. Patient reports doing well. Denies any complaints. He reports that he continues to have difficulty obtaining compression socks from [facility]. Reports that he has been transferred between departments, but will continue to try. Wound largely unchanged. Biologic authorization is still pending. Patient continues to have edema despite 2 layer compression, he is agreeable to try 4 layer compression this week.    6/6/2023: Clinic visit with Kwaku Sanchez MD. Patient reports feeling well. Denies any signs or symptoms of infection. He reports tolerating 4 layer wrap, though home health did not have and applied 2 layer wrap. Home health told him that they have ordered 4 layer compression. He is still working through [facility] to obtain compression socks. Biologic authorization is still pending.    6/13/2023 : Clinic visit with GURJIT Ordaz, VAL, DIANA, NAY.   Rashi states he is feeling well overall.  His blood sugar today was 153.  We have received authorization from his insurance provider to apply EpiFix or epi  "cord to his wound.  Epi cord applied today, first application.  Authorization is only good through 6/30.    6/20/2023 : Clinic visit with GURJIT Ordaz, VAL, DIANA, NAY.   Patient continues to feel well.  He reports that his blood sugar this morning was 144.  He is tolerating compression without any difficulty.  Wound area has decreased slightly.  He has not yet received zippered compression garments from [facility], states \"they are working on it\".    6/27/2023 : Clinic visit with GURJIT Ordaz, VAL, DIANA, NAY.   Rashi states he is feeling well, reports his blood sugar today was 131.  He continues to tolerate compression.  Wound area about the same, depth has decreased slightly.  EpiFix was applied in clinic today.  However, this will be his last application, as authorization was only through 6/30.    7/6/2023: Clinic visit with GURJIT Barrientos. Wound bed is smaller by dimensions fortunately Rashi is out authorization for biologic.  Patient reports that home health did not have 4-layer compression.  2 layer compression placed by home health.  Patient reports that home health did not have 4-layer compression wraps.    7/11/2023 : Clinic visit with GURJIT Ordaz, VAL, DIANA, NAY.   Rashi continues to feel well overall.  His wound has not progressed significantly for several months.  He does present today with a raised ring around bottom half of wound.  I opted to perform a punch biopsy in clinic today to rule out possible malignancy or other etiology.   He also presented today with his pression wrap partially slid down his leg.  This has been an ongoing problem.  He informed that [facility] has denied coverage for zippered compression garment.  He states they are looking into other options    7/18/2023: Clinic visit with Kwaku Sanchez MD. Patient reports feeling in normal state of health. Denies any fevers or chills. Reviewed patient's biopsy results with him: granulation tissue and marked " stasis dermatitis without evidence of vasculitis or malignancy. Patient is tolerating tubigrips better and easier for him to pull back up when they fall down.    7/25/2023 : Clinic visit with GURJIT Ordaz, VAL, DIANA, NAY.   Rashi continues to feel well, reports his blood sugar this morning was 139.  Wound area has decreased slightly.  He has still not received compression garments from [facility].    8/1/2023 : Clinic visit with GURJIT Ordaz FNP-BC, DIANA, NAY.   Rashi continues to do well overall.  He just had lab work done at [facility], was told his A1c was 6.5.  His wound is actually quite a bit smaller today.  He has still not received any sort of compression garment from [facility].    8/8/2023: Clinic visit with Kwaku Sanchez MD. Patient reports doing well, denies any current complaints. He reports that he has been getting the run around with [facility] about who will be ordering compression for him. Measured patient and gave paper script for Solaris ReadyWrap Fusion Kit size medium, length long. He is to take to [facility] (not sure what department orders or PCP) to get filled. Patient wound slightly smaller.    8/22/2023 : Clinic visit with GURJIT Ordaz, VAL, DIANA, NAY.   Rashi states he is feeling well, reports his blood sugar today was around 145.  He missed his appointment last week because he was not feeling well.  He states he has submitted prescription for zippered compression garment to [facility], does not know how long it will take.  His wound measures slightly larger today.    8/29/2023: Clinic visit with Kwaku Sanchez MD. Patient reports doing well. Denies any fevers or chills. His wound is measuring slightly smaller today. He has submitted prescription for compression garments to [facility]. He is unsure what the status is, encouraged him to follow up with [facility].    9/8/2023: Clinic visit with Kwaku Sanchez MD. Patient denies any issues this week. His wound is measuring smaller today. He plans to go to [facility]  today to enquire about the status of compression garments previously ordered.    REVIEW OF SYSTEMS:   Unchanged from previous wound clinic assessment on 8/29/2023 except as noted in interval history above    PHYSICAL EXAMINATION:   BP (!) 131/91   Pulse 72   Temp 36.2 °C (97.2 °F) (Temporal)   Resp 20   SpO2 93%     Physical Exam  Constitutional:       Appearance: He is obese.   Cardiovascular:      Rate and Rhythm: Normal rate.      Comments: Pedal pulses are palpable  Pulmonary:      Effort: Pulmonary effort is normal. No respiratory distress.   Musculoskeletal:         General: Swelling present.      Right lower leg: Edema present.      Left lower leg: Edema present.   Skin:     Comments: Full-thickness wound to the left posterior lower extremity: Wound slowly improving, measuring slightly smaller, thin layer of slough to wound bed, minimal to moderate serosanguineous drainage, no evidence of infection    Hemosiderin staining, chronic edema, skin changes-consistent with CVI   Neurological:      Mental Status: He is alert and oriented to person, place, and time.   Psychiatric:         Mood and Affect: Mood normal.         WOUND ASSESSMENT  Wound 03/14/23 Traumatic LLE Posterior (Active)   Wound Image    09/08/23 1400   Site Assessment Yellow;Red 09/08/23 1400   Periwound Assessment Hemosiderin Staining;Edema 09/08/23 1400   Margins Unattached edges 09/08/23 1400   Drainage Amount Small 09/08/23 1400   Drainage Description Serosanguineous 09/08/23 1400   Treatments Cleansed;Topical Lidocaine;Provider debridement;Site care 09/08/23 1400   Wound Cleansing Hypochlorus Acid 09/08/23 1400   Periwound Protectant Skin Protectant Wipes to Periwound 09/08/23 1400   Dressing Changed Changed 08/22/23 1415   Dressing Cleansing/Solutions Normal Saline 09/08/23 1400   Dressing Options Collagen Dressing;Silicone Adhesive Foam;Tubigrip 09/08/23 1400   Dressing Change/Treatment Frequency Twice Weekly 09/08/23 1400   Wound Team  Following Weekly 09/08/23 1400   Non-staged Wound Description Full thickness 09/08/23 1400   Wound Length (cm) 0.3 cm 09/08/23 1400   Wound Width (cm) 0.3 cm 09/08/23 1400   Wound Depth (cm) 0.3 cm 09/08/23 1400   Wound Surface Area (cm^2) 0.09 cm^2 09/08/23 1400   Wound Volume (cm^3) 0.027 cm^3 09/08/23 1400   Post-Procedure Length (cm) 0.4 cm 09/08/23 1400   Post-Procedure Width (cm) 0.3 cm 09/08/23 1400   Post-Procedure Depth (cm) 0.3 cm 09/08/23 1400   Post-Procedure Surface Area (cm^2) 0.12 cm^2 09/08/23 1400   Post-Procedure Volume (cm^3) 0.036 cm^3 09/08/23 1400   Wound Healing % 82 09/08/23 1400   Tunneling (cm) 0 cm 09/08/23 1400   Undermining (cm) 0 cm 09/08/23 1400   Wound Odor None 09/08/23 1400   Pulses Left;2+ 07/05/23 1415   Right Foot Monofilament 10-point exam (Sensate) 8/10 05/09/23 1500   Left Foot Monofilament 10-point exam (Sensate) 9/10 05/09/23 1500   Exposed Structures None 09/08/23 1400   Number of days: 178       PROCEDURE: sharp debridement of posterior left lower extremity wound  -2% viscous lidocaine applied topically to wound bed for approximately 5 minutes prior to debridement  -Curette used to debride wound bed.  Excisional debridement was performed to remove devitalized tissue until healthy, bleeding tissue was visualized.   Entire surface of wound, 0.12 cm² debrided.  Tissue debrided into the subcutaneous layer.   -Bleeding controlled with manual pressure.    -Wound care completed by wound RN, refer to flowsheet  -Patient tolerated the procedure well, without c/o pain or discomfort.          BIOLOGIC LOG  6/13/2023-first application  PRODUCT: EpiCord 1 x 2cm . reorder #[id number]; PRODUCT-[id number] EXPIRES: 2028-01-01 6/20/2023-second application  PRODUCT: Epi fix 2 x 2 cm sheet.  Reorder #[id number] ; PRODUCT #[id number]; expires 2028-04-01 6/27/2023-third application-final application, authorized only through 6/30  PRODUCT: Epi fix 14 mm disc.  Reorder #[id number]; PRODUCT  #[id number]; expires 2028-05-01      Pertinent Labs and Diagnostics:    Labs:     A1c:   Lab Results   Component Value Date/Time    HBA1C 6.5 (A) 07/31/2023 12:00 AM            LAST  WOUND CULTURE:  DATE :   Lab Results   Component Value Date/Time    CULTRSULT No growth after 5 days of incubation. 11/07/2022 06:23 PM         ASSESSMENT AND PLAN:   1. Open wound of left lower leg, subsequent encounter    9/8/2023: Wound slowly improving, measuring smaller  - Excisional debridement was performed in clinic, medically necessary to promote wound healing.  -Punch biopsy obtained in clinic 7/11: focal granulation with background of marked stasis dermatitis. No evidence of vasculitis or malignancy.  -Patient to return to clinic weekly for assessment and debridement  -Continue with double layer Tubigrip.  Patient has had problems with compression wraps which have tended to slide down because new wounds.     Wound care: Collagen dressing, silicone adhesive foam, double layer Tubigrip    2. Controlled type 2 diabetes mellitus without complication, without long-term current use of insulin ([facility])    9/8/2023: Patient is now on Ozempic, A1c is dropped significantly.  He also states he is losing weight since starting this medication.  He reports his blood sugar today was around 128  -Patient has been advised to keep his blood sugars below 140 in order to optimize wound healing    3. Severe obesity (BMI >= 40) ([facility])  Comments: Complicating factor to wound healing and wound care  Benefits of weight loss for management of his diabetes, and for his overall health previously discussed    4. Edema of both lower extremities  5. Venous insufficiency     9/8/2023: Chronic bilateral lower extremity edema, complicated by severe obesity  -Patient has had problems with sliding of compression wraps.  Continue double layer Tubigrip as he is able to manipulate these himself and can pull back into correct position. Edema appears well  controlled  - Patient has submitted Rx for compression garments to [facility], has not heard anything more. Encouraged him to contact S.    PATIENT EDUCATION  - Importance of adequate nutrition for wound healing  -Advised to go to ER for any increased redness, swelling, drainage, or odor, or if patient develops fever, chills, nausea or vomiting.     Please note that this note may have been created using voice recognition software. I have worked with technical experts from [facility] to optimize the interface.  I have made every reasonable attempt to correct obvious errors, but there may be errors of grammar and possibly content that I did not discover before finalizing the note.    N

## 2023-09-08 NOTE — PATIENT INSTRUCTIONS
Avoid prolonged standing or sitting without elevating your legs.  - Apply tubigrip to your legs ending 2 fingers below back of knee without wrinkles.     Should you experience any significant changes in your wound(s), such as infection (redness, swelling, localized heat, increased pain, fever > 101 F, chills) or have any questions regarding your home care instructions, please contact the wound center at [phone]. If after hours, contact your primary care physician or go to the hospital emergency room.   Keep dressing clean, dry and covered while bathing. Only change dressing if it becomes over saturated, soiled or falls off.

## 2023-09-10 NOTE — ED NOTES
Pharmacy Medication Reconciliation      Medication reconciliation updated and complete per pt at bedside  Allergies have been verified  No oral ABX within the last 14 days  Pt home [facility]            
MD Toro aware of pt positive COVID test.   
Patient 85% on 6 liters NC. Placed on simple mask.   
Patient to and from ct on monitor with RN. +covid. Admitting team updated.   
Pt attempted to use urinal without assistance and when he moved to the end of the bed he pulled his left forearm IV out. Pressure dressing applied and bleeding controlled. IV infusion moved to right wrist. Pt assisted with urinal as he was unable to do it on his own. Pt cleaned of urine and given a fresh gown.  
Pt being taken upstairs at this time by 2 ICU RNs via aldair on cardiac monitor and NRB mask. Pt is awake and alert, talking to staff, in no apparent distress at time of transfer. Pt's paperwork and belongings sent upstairs with pt and RNs.  
Report given to NIKOLAY Perez in the [facility]  
No

## 2023-09-12 ENCOUNTER — OFFICE VISIT (OUTPATIENT)
Dept: WOUND CARE | Facility: MEDICAL CENTER | Age: 63
End: 2023-09-12
Attending: INTERNAL MEDICINE
Payer: MEDICARE

## 2023-09-12 VITALS
OXYGEN SATURATION: 95 % | TEMPERATURE: 97.2 F | DIASTOLIC BLOOD PRESSURE: 67 MMHG | RESPIRATION RATE: 20 BRPM | SYSTOLIC BLOOD PRESSURE: 132 MMHG | HEART RATE: 63 BPM

## 2023-09-12 DIAGNOSIS — E11.9 CONTROLLED TYPE 2 DIABETES MELLITUS WITHOUT COMPLICATION, WITHOUT LONG-TERM CURRENT USE OF INSULIN (HCC): ICD-10-CM

## 2023-09-12 DIAGNOSIS — E66.01 SEVERE OBESITY (BMI >= 40) (HCC): ICD-10-CM

## 2023-09-12 DIAGNOSIS — R60.0 EDEMA OF BOTH LOWER EXTREMITIES: ICD-10-CM

## 2023-09-12 DIAGNOSIS — I87.2 VENOUS INSUFFICIENCY OF BOTH LOWER EXTREMITIES: ICD-10-CM

## 2023-09-12 DIAGNOSIS — S81.802D OPEN WOUND OF LEFT LOWER LEG, SUBSEQUENT ENCOUNTER: Primary | ICD-10-CM

## 2023-09-12 PROCEDURE — 11042 DBRDMT SUBQ TIS 1ST 20SQCM/<: CPT

## 2023-09-12 PROCEDURE — 3075F SYST BP GE 130 - 139MM HG: CPT | Performed by: STUDENT IN AN ORGANIZED HEALTH CARE EDUCATION/TRAINING PROGRAM

## 2023-09-12 PROCEDURE — 11042 DBRDMT SUBQ TIS 1ST 20SQCM/<: CPT | Performed by: STUDENT IN AN ORGANIZED HEALTH CARE EDUCATION/TRAINING PROGRAM

## 2023-09-12 PROCEDURE — 3078F DIAST BP <80 MM HG: CPT | Performed by: STUDENT IN AN ORGANIZED HEALTH CARE EDUCATION/TRAINING PROGRAM

## 2023-09-12 NOTE — PATIENT INSTRUCTIONS
-Keep your wound dressing clean, dry, and intact.    -Change your dressing if it becomes soiled, soaked, or falls off.    -Remove your compression stocking if you have severe pain, severe swelling, numbness, color change, or temperature change in your toes. If you need to remove your compression wrap, do so by unrolling it. Do not cut the compression wrap off to prevent cutting yourself on accident.    -Should you experience any significant changes in your wound(s), such as infection (redness, swelling, localized heat, increased pain, fever > 101 F, chills) or have any questions regarding your home care instructions, please contact the wound center at [phone]. If after hours, contact your primary care physician or go to the hospital emergency room.

## 2023-09-12 NOTE — PROGRESS NOTES
Updated wound care order routed to [facility] via [employer].    Order sent to [facility] for Solaris Ready Wraps. Duration of need lifetime.   Calf measurements: 39cm  Ankle measurement: 27cm  Calf to ankle length: 39cm        Wound 03/14/23 Traumatic LLE Posterior (Active)   Wound Image    09/12/23 1500   Site Assessment Yellow;Red;[place] 09/12/23 1500   Periwound Assessment Hemosiderin Staining;Edema 09/12/23 1500   Margins Unattached edges 09/12/23 1500   Drainage Amount Small 09/12/23 1500   Drainage Description Serosanguineous 09/12/23 1500   Treatments Cleansed;Topical Lidocaine;Provider debridement;Site care 09/12/23 1500   Wound Cleansing Hypochlorus Acid 09/12/23 1500   Periwound Protectant Skin Protectant Wipes to Periwound 09/12/23 1500   Dressing Changed Changed 08/22/23 1415   Dressing Cleansing/Solutions Not Applicable 09/12/23 1500   Dressing Options Hydrofera Blue Ready;Silicone Adhesive Foam;Tubigrip 09/12/23 1500   Dressing Change/Treatment Frequency Twice Weekly 09/12/23 1500   Wound Team Following Weekly 09/12/23 1500   Non-staged Wound Description Full thickness 09/12/23 1500   Wound Length (cm) 0.4 cm 09/12/23 1500   Wound Width (cm) 0.4 cm 09/12/23 1500   Wound Depth (cm) 0.3 cm 09/12/23 1500   Wound Surface Area (cm^2) 0.16 cm^2 09/12/23 1500   Wound Volume (cm^3) 0.048 cm^3 09/12/23 1500   Post-Procedure Length (cm) 0.5 cm 09/12/23 1500   Post-Procedure Width (cm) 0.4 cm 09/12/23 1500   Post-Procedure Depth (cm) 0.3 cm 09/12/23 1500   Post-Procedure Surface Area (cm^2) 0.2 cm^2 09/12/23 1500   Post-Procedure Volume (cm^3) 0.06 cm^3 09/12/23 1500   Wound Healing % 68 09/12/23 1500   Tunneling (cm) 0 cm 09/12/23 1500   Undermining (cm) 0 cm 09/12/23 1500   Wound Odor None 09/12/23 1500   Pulses Left;2+ 07/05/23 1415   Right Foot Monofilament 10-point exam (Sensate) 8/10 05/09/23 1500   Left Foot Monofilament 10-point exam (Sensate) 9/10 05/09/23 1500   Exposed Structures None 09/12/23 1500

## 2023-09-12 NOTE — PROGRESS NOTES
Provider Encounter- Full Thickness wound    HISTORY OF PRESENT ILLNESS  Wound History:   START OF CARE IN CLINIC: 03/14/23   REFERRING PROVIDER: Didi Lehman   WOUND- Full Thickness Wound   LOCATION: Left posterior leg   HISTORY: Patient developed a wound in September 2023 to the left posterior leg.  Patient has thick fungal toenails and reports that he kicked his leg with his right foot and nails causing a scratch.  This led to the full-thickness wound to the left posterior lower extremity.  Patient reports that he was receiving home health care without resolution of the wound.  Patient was seen at [facility] and referred to [facility] wound care for further care of the left posterior full-thickness wound.    Pertinent Medical History: Hyperlipidemia, ABELARDO, obesity, essential hypertension, type 2 diabetes, pulmonary hypertension    TOBACCO USE: Denies a history of tobacco use or smokeless tobacco products    Patient's problem list, allergies, and current medications reviewed and updated in Epic    Interval History:  3/14/2023: Clinic visit with GURJIT Barrientos.  Patient reports overall he is feeling well denies any fever or chills.    3/21/2023: Clinic visit with Kwaku Sanchez MD. Patient reports doing well, denies any fevers or chills. His wound is measuring about the same but is starting to fill in with increased granulation tissue. Patient was referred to podiatry at [facility] and reports appointment for foot and nail care on 4/24. He does not wear compression to lower extremities and was counseled on edema / venous insufficiency and importance of compression to help return venous flow to heart.    3/27/2023: Clinic visit with Kwaku Sanchez MD. Patient reports doing well. Tolerating compression with decreased edema. He denies any complaints. Wound is slowly improving. Discussed again compression in future. He think he will be able to apply compression socks, given information and he will see about  obtaining from [facility].    4/3/2023: Clinic visit with GURJIT Barrientos.  Patient reports overall he is doing well denies any fever or chills.  Patient reports that Tubigrip applied over the Unna boot with Coban to the right lower extremity was too tight last clinical appointment.  Therefore we will use a larger Tubigrip.  Patient's wound is progressing with increased epithelial tissue at the periphery and granulation tissue throughout the wound bed.    4/10/2023: Clinic visit with GURJIT Barrientos.  Patient reports overall he is doing well denies any fever or chills.  Patient now has [facility] who is seeing the patient once a week for wound care dressing changes and reapplication of Unna boot with Coban wraps.  Patient reports that he has ordered compression stockings off of [employer] and they should be to his home soon.    4/25/2023: Clinic visit with GURJIT Barrientos.  Patient reports overall he is doing well denies any fever or chills.  Patient reports he has seen his podiatrist recently who has taken down his oncotic nails.  Patient reports that he has not had any applications with the compression.  Discussed increasing compression of the 2 layer compression wrap patient is in agreement.  Patient reports that ED and health services is going to supply him with zipper compression stockings with 30 to 40 mm compression.      5/2/2023 : Clinic visit with GURJIT Ordaz, VAL, DIANA, NAY.   Patient states that overall he is feeling well.  Reports his blood sugar was in the 150s.  He did not receive zippered compression garment, ordered last visit.  We will follow-up.  His wound is slowly progressing.  He is tolerating compression without any difficulty.    5/9/2023 : Clinic visit with GURJIT Ordaz, VAL, DIANA, NAY.   Patient states he is feeling well overall.  His blood sugars continue to run in the 150s to 160s.  He just started on Ozempic about a week ago.  He has still not  received zippered compression stocking.  He states that [facility] is trying to figure out which department will pay for this.  He continues to tolerate compression wrap without any difficulty.    5/16/2023 : Clinic visit with GURJIT Ordaz, VAL, DIANA, NAY.   Patient states he is feeling well, blood sugars in the 160s.  He has not yet obtained zippered compression stocking from [facility], is not sure when this will be happening.  He states that he has an appointment to  a voucher for diabetic shoes with inserts, and will ask at that time about the compression garment.    5/23/2023: Clinic visit with GURJIT Barrientos.  Patient reports that he is doing okay denies fever or chills.  Patient is on home oxygen, again the patient does not have a transportable tank.  Short of breath on arrival.  Nasal cannula placed in clinic at 3 L patient saturating above 90%.    5/30/2023: Clinic visit with Kwaku Sanchez MD. Patient reports doing well. Denies any complaints. He reports that he continues to have difficulty obtaining compression socks from [facility]. Reports that he has been transferred between departments, but will continue to try. Wound largely unchanged. Biologic authorization is still pending. Patient continues to have edema despite 2 layer compression, he is agreeable to try 4 layer compression this week.    6/6/2023: Clinic visit with Kwaku Sanchez MD. Patient reports feeling well. Denies any signs or symptoms of infection. He reports tolerating 4 layer wrap, though home health did not have and applied 2 layer wrap. Home health told him that they have ordered 4 layer compression. He is still working through [facility] to obtain compression socks. Biologic authorization is still pending.    6/13/2023 : Clinic visit with GURJIT Ordaz, VAL, DIANA, NAY.   Rashi states he is feeling well overall.  His blood sugar today was 153.  We have received authorization from his insurance provider to apply EpiFix or epi  "cord to his wound.  Epi cord applied today, first application.  Authorization is only good through 6/30.    6/20/2023 : Clinic visit with GURJIT Ordaz, VAL, DIANA, NAY.   Patient continues to feel well.  He reports that his blood sugar this morning was 144.  He is tolerating compression without any difficulty.  Wound area has decreased slightly.  He has not yet received zippered compression garments from [facility], states \"they are working on it\".    6/27/2023 : Clinic visit with GURJIT Ordaz, VAL, DIANA, NAY.   Rashi states he is feeling well, reports his blood sugar today was 131.  He continues to tolerate compression.  Wound area about the same, depth has decreased slightly.  EpiFix was applied in clinic today.  However, this will be his last application, as authorization was only through 6/30.    7/6/2023: Clinic visit with GURJIT Barrientos. Wound bed is smaller by dimensions fortunately Rashi is out authorization for biologic.  Patient reports that home health did not have 4-layer compression.  2 layer compression placed by home health.  Patient reports that home health did not have 4-layer compression wraps.    7/11/2023 : Clinic visit with GURJIT Ordaz, VAL, DIANA, NAY.   Rashi continues to feel well overall.  His wound has not progressed significantly for several months.  He does present today with a raised ring around bottom half of wound.  I opted to perform a punch biopsy in clinic today to rule out possible malignancy or other etiology.   He also presented today with his pression wrap partially slid down his leg.  This has been an ongoing problem.  He informed that [facility] has denied coverage for zippered compression garment.  He states they are looking into other options    7/18/2023: Clinic visit with Kwaku Sanchez MD. Patient reports feeling in normal state of health. Denies any fevers or chills. Reviewed patient's biopsy results with him: granulation tissue and marked " stasis dermatitis without evidence of vasculitis or malignancy. Patient is tolerating tubigrips better and easier for him to pull back up when they fall down.    7/25/2023 : Clinic visit with GURJIT Ordaz, VAL, DIANA, NAY.   Rashi continues to feel well, reports his blood sugar this morning was 139.  Wound area has decreased slightly.  He has still not received compression garments from [facility].    8/1/2023 : Clinic visit with GURJIT Ordaz FNP-BC, DIANA, NAY.   Rashi continues to do well overall.  He just had lab work done at [facility], was told his A1c was 6.5.  His wound is actually quite a bit smaller today.  He has still not received any sort of compression garment from [facility].    8/8/2023: Clinic visit with Kwaku Sanchez MD. Patient reports doing well, denies any current complaints. He reports that he has been getting the run around with [facility] about who will be ordering compression for him. Measured patient and gave paper script for Solaris ReadyWrap Fusion Kit size medium, length long. He is to take to [facility] (not sure what department orders or PCP) to get filled. Patient wound slightly smaller.    8/22/2023 : Clinic visit with GURJIT Ordaz, VAL, DIANA, NAY.   Rashi states he is feeling well, reports his blood sugar today was around 145.  He missed his appointment last week because he was not feeling well.  He states he has submitted prescription for zippered compression garment to [facility], does not know how long it will take.  His wound measures slightly larger today.    8/29/2023: Clinic visit with Kwaku Sanchez MD. Patient reports doing well. Denies any fevers or chills. His wound is measuring slightly smaller today. He has submitted prescription for compression garments to [facility]. He is unsure what the status is, encouraged him to follow up with [facility].    9/8/2023: Clinic visit with Kwaku Sanchez MD. Patient denies any issues this week. His wound is measuring smaller today. He plans to go to [facility]  today to enquire about the status of compression garments previously ordered.    9/12/2023: Clinic visit with Kwaku Sanchez MD. Patient reports that he heard from [employer] they are unable to assist or supply compression garments. Reports that he cannot afford this month. His wound is unchanged and appears to limited by lack of compression. He was unable to tolerate / manage multilayer compression wrap. He is unable to don compression socks. Will order Solaris ready wrap from [facility]. He was given information on [employer] to see if they can assist obtaining with funds.    REVIEW OF SYSTEMS:   Unchanged from previous wound clinic assessment on 9/8/2023 except as noted in interval history above    PHYSICAL EXAMINATION:   /67 (BP Location: Right arm, Patient Position: Sitting)   Pulse 63   Temp 36.2 °C (97.2 °F) (Temporal)   Resp 20   SpO2 95%     Physical Exam  Constitutional:       Appearance: He is obese.   Cardiovascular:      Rate and Rhythm: Normal rate.      Comments: Pedal pulses are palpable  Pulmonary:      Effort: Pulmonary effort is normal. No respiratory distress.   Musculoskeletal:         General: Swelling present.      Right lower leg: Edema present.      Left lower leg: Edema present.   Skin:     Comments: Full-thickness wound to the left posterior lower extremity: Wound unchanged, measures the same, thin layer of slough to wound bed, minimal to moderate serosanguineous drainage, no evidence of infection    Hemosiderin staining, chronic edema, skin changes-consistent with CVI   Neurological:      Mental Status: He is alert and oriented to person, place, and time.   Psychiatric:         Mood and Affect: Mood normal.         WOUND ASSESSMENT  Wound 03/14/23 Traumatic LLE Posterior (Active)   Wound Image    09/12/23 1500   Site Assessment Yellow;Red;[place] 09/12/23 1500   Periwound Assessment Hemosiderin Staining;Edema 09/12/23 1500   Margins Unattached edges 09/12/23 1500   Drainage Amount Small  09/12/23 1500   Drainage Description Serosanguineous 09/12/23 1500   Treatments Cleansed;Topical Lidocaine;Provider debridement;Site care 09/12/23 1500   Wound Cleansing Hypochlorus Acid 09/12/23 1500   Periwound Protectant Skin Protectant Wipes to Periwound 09/12/23 1500   Dressing Changed Changed 08/22/23 1415   Dressing Cleansing/Solutions Not Applicable 09/12/23 1500   Dressing Options Hydrofera Blue Ready;Silicone Adhesive Foam;Tubigrip 09/12/23 1500   Dressing Change/Treatment Frequency Twice Weekly 09/12/23 1500   Wound Team Following Weekly 09/12/23 1500   Non-staged Wound Description Full thickness 09/12/23 1500   Wound Length (cm) 0.4 cm 09/12/23 1500   Wound Width (cm) 0.4 cm 09/12/23 1500   Wound Depth (cm) 0.3 cm 09/12/23 1500   Wound Surface Area (cm^2) 0.16 cm^2 09/12/23 1500   Wound Volume (cm^3) 0.048 cm^3 09/12/23 1500   Post-Procedure Length (cm) 0.5 cm 09/12/23 1500   Post-Procedure Width (cm) 0.4 cm 09/12/23 1500   Post-Procedure Depth (cm) 0.3 cm 09/12/23 1500   Post-Procedure Surface Area (cm^2) 0.2 cm^2 09/12/23 1500   Post-Procedure Volume (cm^3) 0.06 cm^3 09/12/23 1500   Wound Healing % 68 09/12/23 1500   Tunneling (cm) 0 cm 09/12/23 1500   Undermining (cm) 0 cm 09/12/23 1500   Wound Odor None 09/12/23 1500   Pulses Left;2+ 07/05/23 1415   Right Foot Monofilament 10-point exam (Sensate) 8/10 05/09/23 1500   Left Foot Monofilament 10-point exam (Sensate) 9/10 05/09/23 1500   Exposed Structures None 09/12/23 1500   Number of days: 182       PROCEDURE: Sharp debridement of posterior left lower extremity wound  -2% viscous lidocaine applied topically to wound bed for approximately 5 minutes prior to debridement  -Curette used to debride wound bed.  Excisional debridement was performed to remove devitalized tissue until healthy, bleeding tissue was visualized.   Entire surface of wound, 0.2 cm² debrided.  Tissue debrided into the subcutaneous layer.   -Bleeding controlled with manual pressure.     -Wound care completed by wound RN, refer to flowsheet  -Patient tolerated the procedure well, without c/o pain or discomfort.          BIOLOGIC LOG  6/13/2023-first application  PRODUCT: EpiCord 1 x 2cm . reorder #[id number]; PRODUCT-[id number] EXPIRES: 2028-01-01 6/20/2023-second application  PRODUCT: Epi fix 2 x 2 cm sheet.  Reorder #[id number] ; PRODUCT #[id number]; expires 2028-04-01 6/27/2023-third application-final application, authorized only through 6/30  PRODUCT: Epi fix 14 mm disc.  Reorder #[id number]; PRODUCT #[id number]; expires 2028-05-01      Pertinent Labs and Diagnostics:    Labs:     A1c:   Lab Results   Component Value Date/Time    HBA1C 6.5 (A) 07/31/2023 12:00 AM            LAST  WOUND CULTURE:  DATE :   Lab Results   Component Value Date/Time    CULTRSULT No growth after 5 days of incubation. 11/07/2022 06:23 PM         ASSESSMENT AND PLAN:   1. Open wound of left lower leg, subsequent encounter    9/12/2023: Wound unchanged  - Excisional debridement was performed in clinic, medically necessary to promote wound healing.  -Punch biopsy obtained in clinic 7/11: focal granulation with background of marked stasis dermatitis. No evidence of vasculitis or malignancy.  -Patient to return to clinic weekly for assessment and debridement  -Continue with double layer Tubigrip.  Patient has had problems with compression wraps which have tended to slide down and caused new wounds.  - Patient's wound healing has been delayed due to inadequate compression.     Wound care: hydrofera blue ready, silicone foam, double layer Tubigrip    2. Controlled type 2 diabetes mellitus without complication, without long-term current use of insulin ([facility])    9/12/2023: Patient is now on Ozempic, A1c is dropped significantly.  He also states he is losing weight since starting this medication.   -Patient has been advised to keep his blood sugars below 140 in order to optimize wound healing    3. Severe obesity  (BMI >= 40) ([facility])  Comments: Complicating factor to wound healing and wound care  Benefits of weight loss for management of his diabetes, and for his overall health previously discussed    4. Edema of both lower extremities  5. Venous insufficiency     9/12/2023: Chronic bilateral lower extremity edema, complicated by severe obesity  -Patient has had problems with sliding of compression wraps.  Continue double layer Tubigrip as he is able to manipulate these himself and can pull back into correct position.  - Patient has submitted Rx for compression garments to [facility], they are unable to assist him with obtaining. He cannot manipulate or apply compression socks, will need Solaris ready wrap. Will order from [facility], he will receive pricing options. Given information that [employer] may assist with funds, gave their contact information.    PATIENT EDUCATION  - Importance of adequate nutrition for wound healing  -Advised to go to ER for any increased redness, swelling, drainage, or odor, or if patient develops fever, chills, nausea or vomiting.     Please note that this note may have been created using voice recognition software. I have worked with technical experts from [employer] to optimize the interface.  I have made every reasonable attempt to correct obvious errors, but there may be errors of grammar and possibly content that I did not discover before finalizing the note.    N

## 2023-09-14 NOTE — PROGRESS NOTES
9/14/23 - request for additional information received from [facility].  Updated order including ICD-10 code and MD note sent to [facility].

## 2023-09-15 ENCOUNTER — TELEPHONE (OUTPATIENT)
Dept: WOUND CARE | Facility: MEDICAL CENTER | Age: 63
End: 2023-09-15
Payer: MEDICARE

## 2023-09-15 NOTE — TELEPHONE ENCOUNTER
Received notification from [facility] that they need a physical address to ship the order to. Phone call to patient and received address of [address] [place] [phone]. Relayed this information to Brooke with [facility] and she stated she was submitting the order.

## 2023-09-19 ENCOUNTER — APPOINTMENT (OUTPATIENT)
Dept: WOUND CARE | Facility: MEDICAL CENTER | Age: 63
End: 2023-09-19
Attending: INTERNAL MEDICINE
Payer: MEDICARE

## 2023-09-26 ENCOUNTER — OFFICE VISIT (OUTPATIENT)
Dept: WOUND CARE | Facility: MEDICAL CENTER | Age: 63
End: 2023-09-26
Attending: INTERNAL MEDICINE
Payer: MEDICARE

## 2023-09-26 VITALS
RESPIRATION RATE: 18 BRPM | TEMPERATURE: 97.2 F | HEART RATE: 74 BPM | OXYGEN SATURATION: 96 % | DIASTOLIC BLOOD PRESSURE: 59 MMHG | SYSTOLIC BLOOD PRESSURE: 121 MMHG

## 2023-09-26 DIAGNOSIS — E66.01 SEVERE OBESITY (BMI >= 40) (HCC): ICD-10-CM

## 2023-09-26 DIAGNOSIS — R60.0 EDEMA OF BOTH LOWER EXTREMITIES: ICD-10-CM

## 2023-09-26 DIAGNOSIS — E11.9 CONTROLLED TYPE 2 DIABETES MELLITUS WITHOUT COMPLICATION, WITHOUT LONG-TERM CURRENT USE OF INSULIN (HCC): ICD-10-CM

## 2023-09-26 DIAGNOSIS — I87.2 VENOUS INSUFFICIENCY OF BOTH LOWER EXTREMITIES: ICD-10-CM

## 2023-09-26 DIAGNOSIS — S81.802D OPEN WOUND OF LEFT LOWER LEG, SUBSEQUENT ENCOUNTER: Primary | ICD-10-CM

## 2023-09-26 PROCEDURE — 3074F SYST BP LT 130 MM HG: CPT | Performed by: STUDENT IN AN ORGANIZED HEALTH CARE EDUCATION/TRAINING PROGRAM

## 2023-09-26 PROCEDURE — 3078F DIAST BP <80 MM HG: CPT | Performed by: STUDENT IN AN ORGANIZED HEALTH CARE EDUCATION/TRAINING PROGRAM

## 2023-09-26 PROCEDURE — 11042 DBRDMT SUBQ TIS 1ST 20SQCM/<: CPT | Performed by: STUDENT IN AN ORGANIZED HEALTH CARE EDUCATION/TRAINING PROGRAM

## 2023-09-26 PROCEDURE — 11042 DBRDMT SUBQ TIS 1ST 20SQCM/<: CPT

## 2023-09-26 NOTE — PROGRESS NOTES
Provider Encounter- Full Thickness wound    HISTORY OF PRESENT ILLNESS  Wound History:   START OF CARE IN CLINIC: 03/14/23   REFERRING PROVIDER: Didi Lehman   WOUND- Full Thickness Wound   LOCATION: Left posterior leg   HISTORY: Patient developed a wound in September 2023 to the left posterior leg.  Patient has thick fungal toenails and reports that he kicked his leg with his right foot and nails causing a scratch.  This led to the full-thickness wound to the left posterior lower extremity.  Patient reports that he was receiving home health care without resolution of the wound.  Patient was seen at [facility] and referred to [facility] wound care for further care of the left posterior full-thickness wound.    Pertinent Medical History: Hyperlipidemia, ABELARDO, obesity, essential hypertension, type 2 diabetes, pulmonary hypertension    TOBACCO USE: Denies a history of tobacco use or smokeless tobacco products    Patient's problem list, allergies, and current medications reviewed and updated in Epic    Interval History:  3/14/2023: Clinic visit with GURJIT Barrientos.  Patient reports overall he is feeling well denies any fever or chills.    3/21/2023: Clinic visit with Kwaku Sanchez MD. Patient reports doing well, denies any fevers or chills. His wound is measuring about the same but is starting to fill in with increased granulation tissue. Patient was referred to podiatry at [facility] and reports appointment for foot and nail care on 4/24. He does not wear compression to lower extremities and was counseled on edema / venous insufficiency and importance of compression to help return venous flow to heart.    3/27/2023: Clinic visit with Kwaku Sanchez MD. Patient reports doing well. Tolerating compression with decreased edema. He denies any complaints. Wound is slowly improving. Discussed again compression in future. He think he will be able to apply compression socks, given information and he will see about  obtaining from [facility].    4/3/2023: Clinic visit with GURJIT Barrientos.  Patient reports overall he is doing well denies any fever or chills.  Patient reports that Tubigrip applied over the Unna boot with Coban to the right lower extremity was too tight last clinical appointment.  Therefore we will use a larger Tubigrip.  Patient's wound is progressing with increased epithelial tissue at the periphery and granulation tissue throughout the wound bed.    4/10/2023: Clinic visit with GURJIT Barrientos.  Patient reports overall he is doing well denies any fever or chills.  Patient now has [facility] who is seeing the patient once a week for wound care dressing changes and reapplication of Unna boot with Coban wraps.  Patient reports that he has ordered compression stockings off of [employer] and they should be to his home soon.    4/25/2023: Clinic visit with GURJIT Barrientos.  Patient reports overall he is doing well denies any fever or chills.  Patient reports he has seen his podiatrist recently who has taken down his oncotic nails.  Patient reports that he has not had any applications with the compression.  Discussed increasing compression of the 2 layer compression wrap patient is in agreement.  Patient reports that ED and health services is going to supply him with zipper compression stockings with 30 to 40 mm compression.      5/2/2023 : Clinic visit with GURJIT Ordaz, VAL, DIANA, NAY.   Patient states that overall he is feeling well.  Reports his blood sugar was in the 150s.  He did not receive zippered compression garment, ordered last visit.  We will follow-up.  His wound is slowly progressing.  He is tolerating compression without any difficulty.    5/9/2023 : Clinic visit with GURJIT Ordaz, VAL, DIANA, NAY.   Patient states he is feeling well overall.  His blood sugars continue to run in the 150s to 160s.  He just started on Ozempic about a week ago.  He has still not  received zippered compression stocking.  He states that [facility] is trying to figure out which department will pay for this.  He continues to tolerate compression wrap without any difficulty.    5/16/2023 : Clinic visit with GURJIT Ordaz, VAL, DIANA, NAY.   Patient states he is feeling well, blood sugars in the 160s.  He has not yet obtained zippered compression stocking from [facility], is not sure when this will be happening.  He states that he has an appointment to  a voucher for diabetic shoes with inserts, and will ask at that time about the compression garment.    5/23/2023: Clinic visit with GURJIT Barrientos.  Patient reports that he is doing okay denies fever or chills.  Patient is on home oxygen, again the patient does not have a transportable tank.  Short of breath on arrival.  Nasal cannula placed in clinic at 3 L patient saturating above 90%.    5/30/2023: Clinic visit with Kwaku Sanchez MD. Patient reports doing well. Denies any complaints. He reports that he continues to have difficulty obtaining compression socks from [facility]. Reports that he has been transferred between departments, but will continue to try. Wound largely unchanged. Biologic authorization is still pending. Patient continues to have edema despite 2 layer compression, he is agreeable to try 4 layer compression this week.    6/6/2023: Clinic visit with Kwaku Sanchez MD. Patient reports feeling well. Denies any signs or symptoms of infection. He reports tolerating 4 layer wrap, though home health did not have and applied 2 layer wrap. Home health told him that they have ordered 4 layer compression. He is still working through [facility] to obtain compression socks. Biologic authorization is still pending.    6/13/2023 : Clinic visit with GURJIT Ordaz, VAL, DIANA, NAY.   Rashi states he is feeling well overall.  His blood sugar today was 153.  We have received authorization from his insurance provider to apply EpiFix or epi  "cord to his wound.  Epi cord applied today, first application.  Authorization is only good through 6/30.    6/20/2023 : Clinic visit with GURJIT Ordaz, VAL, DIANA, NAY.   Patient continues to feel well.  He reports that his blood sugar this morning was 144.  He is tolerating compression without any difficulty.  Wound area has decreased slightly.  He has not yet received zippered compression garments from [facility], states \"they are working on it\".    6/27/2023 : Clinic visit with GURJIT Ordaz, VAL, DIANA, NAY.   Rashi states he is feeling well, reports his blood sugar today was 131.  He continues to tolerate compression.  Wound area about the same, depth has decreased slightly.  EpiFix was applied in clinic today.  However, this will be his last application, as authorization was only through 6/30.    7/6/2023: Clinic visit with GURJIT Barrientos. Wound bed is smaller by dimensions fortunately Rashi is out authorization for biologic.  Patient reports that home health did not have 4-layer compression.  2 layer compression placed by home health.  Patient reports that home health did not have 4-layer compression wraps.    7/11/2023 : Clinic visit with GURJIT Ordaz, VAL, DIANA, NAY.   Rashi continues to feel well overall.  His wound has not progressed significantly for several months.  He does present today with a raised ring around bottom half of wound.  I opted to perform a punch biopsy in clinic today to rule out possible malignancy or other etiology.   He also presented today with his pression wrap partially slid down his leg.  This has been an ongoing problem.  He informed that [facility] has denied coverage for zippered compression garment.  He states they are looking into other options    7/18/2023: Clinic visit with Kwaku Sanchez MD. Patient reports feeling in normal state of health. Denies any fevers or chills. Reviewed patient's biopsy results with him: granulation tissue and marked " stasis dermatitis without evidence of vasculitis or malignancy. Patient is tolerating tubigrips better and easier for him to pull back up when they fall down.    7/25/2023 : Clinic visit with GURJIT Ordaz, VAL, DIANA, NAY.   Rashi continues to feel well, reports his blood sugar this morning was 139.  Wound area has decreased slightly.  He has still not received compression garments from [facility].    8/1/2023 : Clinic visit with GURJIT Ordaz FNP-BC, DIANA, NAY.   Rashi continues to do well overall.  He just had lab work done at [facility], was told his A1c was 6.5.  His wound is actually quite a bit smaller today.  He has still not received any sort of compression garment from [facility].    8/8/2023: Clinic visit with Kwaku Sanchez MD. Patient reports doing well, denies any current complaints. He reports that he has been getting the run around with [facility] about who will be ordering compression for him. Measured patient and gave paper script for Solaris ReadyWrap Fusion Kit size medium, length long. He is to take to [facility] (not sure what department orders or PCP) to get filled. Patient wound slightly smaller.    8/22/2023 : Clinic visit with GURJIT Ordaz, VAL, DIANA, NAY.   Rashi states he is feeling well, reports his blood sugar today was around 145.  He missed his appointment last week because he was not feeling well.  He states he has submitted prescription for zippered compression garment to [facility], does not know how long it will take.  His wound measures slightly larger today.    8/29/2023: Clinic visit with Kwaku Sanchez MD. Patient reports doing well. Denies any fevers or chills. His wound is measuring slightly smaller today. He has submitted prescription for compression garments to [facility]. He is unsure what the status is, encouraged him to follow up with [facility].    9/8/2023: Clinic visit with Kwaku Sanchez MD. Patient denies any issues this week. His wound is measuring smaller today. He plans to go to [facility]  today to enquire about the status of compression garments previously ordered.    9/12/2023: Clinic visit with Kwaku Sanchez MD. Patient reports that he heard from [employer] they are unable to assist or supply compression garments. Reports that he cannot afford this month. His wound is unchanged and appears to limited by lack of compression. He was unable to tolerate / manage multilayer compression wrap. He is unable to don compression socks. Will order Solaris ready wrap from [facility]. He was given information on [employer] to see if they can assist obtaining with funds.    9/26/2023: Clinic visit with Kwaku Sanchez MD. Patient reports doing well. Denies any complaints. Patient wound is smaller. He received Solaris Ready Wrap. Will be instructed on use today in clinic and will use for compression moving forward.    REVIEW OF SYSTEMS:   Unchanged from previous wound clinic assessment on 9/12/2023 except as noted in interval history above    PHYSICAL EXAMINATION:   /59 (BP Location: Left arm, Patient Position: Sitting)   Pulse 74   Temp 36.2 °C (97.2 °F) (Temporal)   Resp 18   SpO2 96%     Physical Exam  Constitutional:       Appearance: He is obese.   Cardiovascular:      Rate and Rhythm: Normal rate.      Comments: Pedal pulses are palpable  Pulmonary:      Effort: Pulmonary effort is normal. No respiratory distress.   Musculoskeletal:         General: Swelling present.      Right lower leg: Edema present.      Left lower leg: Edema present.   Skin:     Comments: Full-thickness wound to the left posterior lower extremity: Wound measuring smaller, thin layer of slough to wound bed, minimal to moderate serosanguineous drainage, no evidence of infection    Hemosiderin staining, chronic edema, skin changes-consistent with CVI   Neurological:      Mental Status: He is alert and oriented to person, place, and time.   Psychiatric:         Mood and Affect: Mood normal.         WOUND ASSESSMENT  Wound 03/14/23 Traumatic  LLE Posterior (Active)   Wound Image    09/26/23 1413   Site Assessment Pink;Yellow 09/26/23 1413   Periwound Assessment Hemosiderin Staining;Edema;Dry 09/26/23 1413   Margins Unattached edges 09/26/23 1413   Drainage Amount Small 09/26/23 1413   Drainage Description Serosanguineous 09/26/23 1413   Treatments Cleansed;Topical Lidocaine;Provider debridement;Compression 09/26/23 1413   Wound Cleansing Normal Saline Irrigation 09/26/23 1413   Periwound Protectant Skin Protectant Wipes to Periwound;Skin Moisturizer 09/26/23 1413   Dressing Changed Changed 08/22/23 1415   Dressing Cleansing/Solutions Not Applicable 09/26/23 1413   Dressing Options Hydrofera Blue Ready;Silicone Adhesive Foam;Tubigrip;Other (Comments) 09/26/23 1413   Dressing Change/Treatment Frequency Twice Weekly 09/26/23 1413   Wound Team Following Weekly 09/26/23 1413   Non-staged Wound Description Full thickness 09/26/23 1413   Wound Length (cm) 0.4 cm 09/12/23 1500   Wound Width (cm) 0.4 cm 09/12/23 1500   Wound Depth (cm) 0.3 cm 09/12/23 1500   Wound Surface Area (cm^2) 0.16 cm^2 09/12/23 1500   Wound Volume (cm^3) 0.048 cm^3 09/12/23 1500   Post-Procedure Length (cm) 0.3 cm 09/26/23 1413   Post-Procedure Width (cm) 0.2 cm 09/26/23 1413   Post-Procedure Depth (cm) 0.1 cm 09/26/23 1413   Post-Procedure Surface Area (cm^2) 0.06 cm^2 09/26/23 1413   Post-Procedure Volume (cm^3) 0.006 cm^3 09/26/23 1413   Wound Healing % 68 09/12/23 1500   Tunneling (cm) 0 cm 09/26/23 1413   Undermining (cm) 0 cm 09/26/23 1413   Wound Odor None 09/26/23 1413   Pulses Left;2+ 07/05/23 1415   Right Foot Monofilament 10-point exam (Sensate) 8/10 05/09/23 1500   Left Foot Monofilament 10-point exam (Sensate) 9/10 05/09/23 1500   Exposed Structures None 09/26/23 1413   Number of days: 196       PROCEDURE: Sharp debridement of posterior left lower extremity wound  -2% viscous lidocaine applied topically to wound bed for approximately 5 minutes prior to debridement  -Curette  used to debride wound bed.  Excisional debridement was performed to remove devitalized tissue until healthy, bleeding tissue was visualized.   Entire surface of wound, 0.06 cm² debrided.  Tissue debrided into the subcutaneous layer.   -Bleeding controlled with manual pressure.    -Wound care completed by wound RN, refer to flowsheet  -Patient tolerated the procedure well, without c/o pain or discomfort.          BIOLOGIC LOG  6/13/2023-first application  PRODUCT: EpiCord 1 x 2cm . reorder #[id number]; PRODUCT-[id number] EXPIRES: 2028-01-01 6/20/2023-second application  PRODUCT: Epi fix 2 x 2 cm sheet.  Reorder #[id number] ; PRODUCT #[id number]; expires 2028-04-01 6/27/2023-third application-final application, authorized only through 6/30  PRODUCT: Epi fix 14 mm disc.  Reorder #[id number]; PRODUCT #[id number]; expires 2028-05-01      Pertinent Labs and Diagnostics:    Labs:     A1c:   Lab Results   Component Value Date/Time    HBA1C 6.5 (A) 07/31/2023 12:00 AM            LAST  WOUND CULTURE:  DATE :   Lab Results   Component Value Date/Time    CULTRSULT No growth after 5 days of incubation. 11/07/2022 06:23 PM         ASSESSMENT AND PLAN:   1. Open wound of left lower leg, subsequent encounter    9/26/2023: Wound measuring smaller, thin crust and slough.  - Excisional debridement was performed in clinic, medically necessary to promote wound healing.  -Punch biopsy obtained in clinic 7/11: focal granulation with background of marked stasis dermatitis. No evidence of vasculitis or malignancy.  - Patient received solaris ready wrap, he will be instructed on use today in clinic and will use for compression.  -Patient to return to clinic weekly for assessment and debridement  - Patient's wound healing has been delayed due to inadequate compression.     Wound care: hydrofera blue ready, silicone foam, Solaris ready wrap    2. Controlled type 2 diabetes mellitus without complication, without long-term current  use of insulin ([facility])    9/26/2023: Patient is now on Ozempic, A1c is dropped significantly.  He also states he is losing weight since starting this medication.  - Reports glucose 127 today  -Patient has been advised to keep his blood sugars below 140 in order to optimize wound healing    3. Severe obesity (BMI >= 40) ([facility])  Comments: Complicating factor to wound healing and wound care  Benefits of weight loss for management of his diabetes, and for his overall health previously discussed    4. Edema of both lower extremities  5. Venous insufficiency     9/26/2023: Chronic bilateral lower extremity edema, complicated by severe obesity  -Patient has had problems with sliding of compression wraps, cannot tolerate them  - Patient received Solaris Ready Wrap from [facility] [employer]. He will be instructed on use today in clinic and we will use for compression. Counseled that compression garments should be replaced every 6 months to maintain their integrity.    PATIENT EDUCATION  - Importance of adequate nutrition for wound healing  -Advised to go to ER for any increased redness, swelling, drainage, or odor, or if patient develops fever, chills, nausea or vomiting.     Please note that this note may have been created using voice recognition software. I have worked with technical experts from [employer] to optimize the interface.  I have made every reasonable attempt to correct obvious errors, but there may be errors of grammar and possibly content that I did not discover before finalizing the note.    N

## 2023-09-26 NOTE — PROGRESS NOTES
Home wound care orders routed to [facility] via [employer] to fax number [phone].

## 2023-09-26 NOTE — PATIENT INSTRUCTIONS
-Keep dressings clean and dry. Change dressings every 3-4 days, and if they become over saturated, soiled or fall off.     -If you need to change your dressings at home: Wash your wound with normal saline, wound cleanser, or unscented soap and water prior to applying your new dressings. Please avoid cleansing with hydrogen peroxide or rubbing alcohol.    -Avoid prolonged standing or sitting without elevating your legs.    -Remove your compression garments if you have severe pain, severe swelling, numbness, color change, or temperature change in your toes. If you need to remove your compression garments, do so by unrolling them. Do not cut the compression garments off, this is to prevent cutting yourself on accident.    -Should you experience any significant changes in your wound(s), such as signs of infection (increasing redness, swelling, localized heat, increased pain, fever > 101 F, chills) or have any questions regarding your home care instructions, please contact the wound center at [phone]. If after hours, contact your primary care physician or go to the hospital emergency room.     -If you are 5 or more minutes late for an appointment, we reserve the right to cancel and reschedule that appointment. Additionally, if you are habitually late or not showing (3 late cancellations and/or no shows), we reserve the right to cancel your remaining appointments and it will be your responsibility to obtain a new referral if services are still needed.

## 2023-10-03 ENCOUNTER — OFFICE VISIT (OUTPATIENT)
Dept: WOUND CARE | Facility: MEDICAL CENTER | Age: 63
End: 2023-10-03
Attending: INTERNAL MEDICINE
Payer: MEDICARE

## 2023-10-03 VITALS
TEMPERATURE: 97.1 F | OXYGEN SATURATION: 95 % | HEART RATE: 66 BPM | RESPIRATION RATE: 20 BRPM | SYSTOLIC BLOOD PRESSURE: 113 MMHG | DIASTOLIC BLOOD PRESSURE: 62 MMHG

## 2023-10-03 DIAGNOSIS — E66.01 SEVERE OBESITY (BMI >= 40) (HCC): ICD-10-CM

## 2023-10-03 DIAGNOSIS — E11.9 CONTROLLED TYPE 2 DIABETES MELLITUS WITHOUT COMPLICATION, WITHOUT LONG-TERM CURRENT USE OF INSULIN (HCC): ICD-10-CM

## 2023-10-03 DIAGNOSIS — R60.0 EDEMA OF BOTH LOWER EXTREMITIES: ICD-10-CM

## 2023-10-03 DIAGNOSIS — S81.802D OPEN WOUND OF LEFT LOWER LEG, SUBSEQUENT ENCOUNTER: ICD-10-CM

## 2023-10-03 DIAGNOSIS — I87.2 VENOUS INSUFFICIENCY OF BOTH LOWER EXTREMITIES: ICD-10-CM

## 2023-10-03 PROCEDURE — 99213 OFFICE O/P EST LOW 20 MIN: CPT

## 2023-10-03 PROCEDURE — 11042 DBRDMT SUBQ TIS 1ST 20SQCM/<: CPT | Performed by: STUDENT IN AN ORGANIZED HEALTH CARE EDUCATION/TRAINING PROGRAM

## 2023-10-03 PROCEDURE — 3074F SYST BP LT 130 MM HG: CPT | Performed by: STUDENT IN AN ORGANIZED HEALTH CARE EDUCATION/TRAINING PROGRAM

## 2023-10-03 PROCEDURE — 11042 DBRDMT SUBQ TIS 1ST 20SQCM/<: CPT

## 2023-10-03 PROCEDURE — 3078F DIAST BP <80 MM HG: CPT | Performed by: STUDENT IN AN ORGANIZED HEALTH CARE EDUCATION/TRAINING PROGRAM

## 2023-10-03 NOTE — PROGRESS NOTES
Provider Encounter- Full Thickness wound    HISTORY OF PRESENT ILLNESS  Wound History:   START OF CARE IN CLINIC: 03/14/23   REFERRING PROVIDER: Didi Lehman   WOUND- Full Thickness Wound   LOCATION: Left posterior leg   HISTORY: Patient developed a wound in September 2023 to the left posterior leg.  Patient has thick fungal toenails and reports that he kicked his leg with his right foot and nails causing a scratch.  This led to the full-thickness wound to the left posterior lower extremity.  Patient reports that he was receiving home health care without resolution of the wound.  Patient was seen at [facility] and referred to [facility] care for further care of the left posterior full-thickness wound.    Pertinent Medical History: Hyperlipidemia, ABELARDO, obesity, essential hypertension, type 2 diabetes, pulmonary hypertension    TOBACCO USE: Denies a history of tobacco use or smokeless tobacco products    Patient's problem list, allergies, and current medications reviewed and updated in Epic    Interval History:  3/14/2023: Clinic visit with GURJIT Barrientos.  Patient reports overall he is feeling well denies any fever or chills.    3/21/2023: Clinic visit with Kwaku Sanchez MD. Patient reports doing well, denies any fevers or chills. His wound is measuring about the same but is starting to fill in with increased granulation tissue. Patient was referred to podiatry at [facility] and reports appointment for foot and nail care on 4/24. He does not wear compression to lower extremities and was counseled on edema / venous insufficiency and importance of compression to help return venous flow to heart.    3/27/2023: Clinic visit with Kwaku Sanchez MD. Patient reports doing well. Tolerating compression with decreased edema. He denies any complaints. Wound is slowly improving. Discussed again compression in future. He think he will be able to apply compression socks, given information and he will see about  obtaining from [facility].    4/3/2023: Clinic visit with GURJIT Barrientos.  Patient reports overall he is doing well denies any fever or chills.  Patient reports that Tubigrip applied over the Unna boot with Coban to the right lower extremity was too tight last clinical appointment.  Therefore we will use a larger Tubigrip.  Patient's wound is progressing with increased epithelial tissue at the periphery and granulation tissue throughout the wound bed.    4/10/2023: Clinic visit with GURJIT Barrientos.  Patient reports overall he is doing well denies any fever or chills.  Patient now has [facility] who is seeing the patient once a week for wound care dressing changes and reapplication of Unna boot with Coban wraps.  Patient reports that he has ordered compression stockings off of [employer] and they should be to his home soon.    4/25/2023: Clinic visit with GURJIT Barrientos.  Patient reports overall he is doing well denies any fever or chills.  Patient reports he has seen his podiatrist recently who has taken down his oncotic nails.  Patient reports that he has not had any applications with the compression.  Discussed increasing compression of the 2 layer compression wrap patient is in agreement.  Patient reports that ED and health services is going to supply him with zipper compression stockings with 30 to 40 mm compression.      5/2/2023 : Clinic visit with GURJIT Ordaz, VAL, DIANA, NAY.   Patient states that overall he is feeling well.  Reports his blood sugar was in the 150s.  He did not receive zippered compression garment, ordered last visit.  We will follow-up.  His wound is slowly progressing.  He is tolerating compression without any difficulty.    5/9/2023 : Clinic visit with GURJIT Ordaz, VAL, DIANA, NAY.   Patient states he is feeling well overall.  His blood sugars continue to run in the 150s to 160s.  He just started on Ozempic about a week ago.  He has still not  received zippered compression stocking.  He states that [facility] is trying to figure out which department will pay for this.  He continues to tolerate compression wrap without any difficulty.    5/16/2023 : Clinic visit with GURJIT Ordaz, VAL, DIANA, NAY.   Patient states he is feeling well, blood sugars in the 160s.  He has not yet obtained zippered compression stocking from [facility], is not sure when this will be happening.  He states that he has an appointment to  a voucher for diabetic shoes with inserts, and will ask at that time about the compression garment.    5/23/2023: Clinic visit with GURJIT Barrientos.  Patient reports that he is doing okay denies fever or chills.  Patient is on home oxygen, again the patient does not have a transportable tank.  Short of breath on arrival.  Nasal cannula placed in clinic at 3 L patient saturating above 90%.    5/30/2023: Clinic visit with Kwaku Sanchez MD. Patient reports doing well. Denies any complaints. He reports that he continues to have difficulty obtaining compression socks from [facility]. Reports that he has been transferred between departments, but will continue to try. Wound largely unchanged. Biologic authorization is still pending. Patient continues to have edema despite 2 layer compression, he is agreeable to try 4 layer compression this week.    6/6/2023: Clinic visit with Kwaku Sanchez MD. Patient reports feeling well. Denies any signs or symptoms of infection. He reports tolerating 4 layer wrap, though home health did not have and applied 2 layer wrap. Home health told him that they have ordered 4 layer compression. He is still working through [facility] to obtain compression socks. Biologic authorization is still pending.    6/13/2023 : Clinic visit with GURJIT Ordaz, VAL, DIANA, NAY.   Rashi states he is feeling well overall.  His blood sugar today was 153.  We have received authorization from his insurance provider to apply EpiFix or epi  "cord to his wound.  Epi cord applied today, first application.  Authorization is only good through 6/30.    6/20/2023 : Clinic visit with GURJIT Ordaz, VAL, DIANA, NAY.   Patient continues to feel well.  He reports that his blood sugar this morning was 144.  He is tolerating compression without any difficulty.  Wound area has decreased slightly.  He has not yet received zippered compression garments from [facility], states \"they are working on it\".    6/27/2023 : Clinic visit with GURJIT Ordaz, VAL, DIANA, NAY.   Rashi states he is feeling well, reports his blood sugar today was 131.  He continues to tolerate compression.  Wound area about the same, depth has decreased slightly.  EpiFix was applied in clinic today.  However, this will be his last application, as authorization was only through 6/30.    7/6/2023: Clinic visit with GURJIT Barrientos. Wound bed is smaller by dimensions fortunately Rashi is out authorization for biologic.  Patient reports that home health did not have 4-layer compression.  2 layer compression placed by home health.  Patient reports that home health did not have 4-layer compression wraps.    7/11/2023 : Clinic visit with GURJIT Ordaz, VAL, DIANA, NAY.   Rashi continues to feel well overall.  His wound has not progressed significantly for several months.  He does present today with a raised ring around bottom half of wound.  I opted to perform a punch biopsy in clinic today to rule out possible malignancy or other etiology.   He also presented today with his pression wrap partially slid down his leg.  This has been an ongoing problem.  He informed that [facility] has denied coverage for zippered compression garment.  He states they are looking into other options    7/18/2023: Clinic visit with Kwaku Sanchez MD. Patient reports feeling in normal state of health. Denies any fevers or chills. Reviewed patient's biopsy results with him: granulation tissue and marked " stasis dermatitis without evidence of vasculitis or malignancy. Patient is tolerating tubigrips better and easier for him to pull back up when they fall down.    7/25/2023 : Clinic visit with GURJIT Ordaz, VAL, DIANA, NAY.   Rashi continues to feel well, reports his blood sugar this morning was 139.  Wound area has decreased slightly.  He has still not received compression garments from [facility].    8/1/2023 : Clinic visit with GURJIT Ordaz FNP-BC, DIANA, NAY.   Rashi continues to do well overall.  He just had lab work done at [facility], was told his A1c was 6.5.  His wound is actually quite a bit smaller today.  He has still not received any sort of compression garment from [facility].    8/8/2023: Clinic visit with Kwaku Sanchez MD. Patient reports doing well, denies any current complaints. He reports that he has been getting the run around with [facility] about who will be ordering compression for him. Measured patient and gave paper script for Solaris ReadyWrap Fusion Kit size medium, length long. He is to take to [facility] (not sure what department orders or PCP) to get filled. Patient wound slightly smaller.    8/22/2023 : Clinic visit with GURJIT Ordaz, VAL, DIANA, NAY.   Rashi states he is feeling well, reports his blood sugar today was around 145.  He missed his appointment last week because he was not feeling well.  He states he has submitted prescription for zippered compression garment to [facility], does not know how long it will take.  His wound measures slightly larger today.    8/29/2023: Clinic visit with Kwaku Sanchez MD. Patient reports doing well. Denies any fevers or chills. His wound is measuring slightly smaller today. He has submitted prescription for compression garments to [facility]. He is unsure what the status is, encouraged him to follow up with [facility].    9/8/2023: Clinic visit with Kwaku Sanchez MD. Patient denies any issues this week. His wound is measuring smaller today. He plans to go to [facility]  today to enquire about the status of compression garments previously ordered.    9/12/2023: Clinic visit with Kwaku Sanchez MD. Patient reports that he heard from [employer] they are unable to assist or supply compression garments. Reports that he cannot afford this month. His wound is unchanged and appears to limited by lack of compression. He was unable to tolerate / manage multilayer compression wrap. He is unable to don compression socks. Will order Solaris ready wrap from [facility]. He was given information on [employer] to see if they can assist obtaining with funds.    9/26/2023: Clinic visit with Kwaku Sanchez MD. Patient reports doing well. Denies any complaints. Patient wound is smaller. He received Solaris Ready Wrap. Will be instructed on use today in clinic and will use for compression moving forward.    10/3/2023: Clinic visit with Kwaku Sanchez MD. Patient reports feeling well. He has been wearing Solaris ready wrap to left lower extremity. Reports no difficulty in managing. Patient wound has improved, pinpoint.    REVIEW OF SYSTEMS:   Unchanged from previous wound clinic assessment on 9/26/2023 except as noted in interval history above    PHYSICAL EXAMINATION:   /62 (BP Location: Right arm, Patient Position: Sitting, BP Cuff Size: Adult)   Pulse 66   Temp 36.2 °C (97.1 °F) (Temporal)   Resp 20   SpO2 95%     Physical Exam  Constitutional:       Appearance: He is obese.   Cardiovascular:      Rate and Rhythm: Normal rate.      Comments: Pedal pulses are palpable  Pulmonary:      Effort: Pulmonary effort is normal. No respiratory distress.   Musculoskeletal:         General: Swelling present.      Right lower leg: Edema present.      Left lower leg: Edema present.   Skin:     Comments: Full-thickness wound to the left posterior lower extremity: Wound near resolution, pinpoint, no drainage this week, no evidence of infection    Hemosiderin staining, chronic edema, skin changes-consistent with CVI    Neurological:      Mental Status: He is alert and oriented to person, place, and time.   Psychiatric:         Mood and Affect: Mood normal.         WOUND ASSESSMENT  Wound 03/14/23 Traumatic LLE Posterior (Active)   Wound Image    10/03/23 1100   Site Assessment Dry 10/03/23 1100   Periwound Assessment Hemosiderin Staining 10/03/23 1100   Margins Attached edges 10/03/23 1100   Drainage Amount RUBY 10/03/23 1100   Drainage Description Serosanguineous 09/26/23 1413   Treatments Cleansed;Topical Lidocaine;Site care 10/03/23 1100   Wound Cleansing Foam Cleanser/Washcloth 10/03/23 1100   Periwound Protectant Skin Protectant Wipes to Periwound 10/03/23 1100   Dressing Changed Changed 08/22/23 1415   Dressing Cleansing/Solutions Not Applicable 10/03/23 1100   Dressing Options Hydrofera Blue Ready;Silicone Adhesive Foam;Tubigrip;Other (Comments) 10/03/23 1100   Dressing Change/Treatment Frequency Twice Weekly 10/03/23 1100   Wound Team Following Weekly 10/03/23 1100   Non-staged Wound Description Full thickness 10/03/23 1100   Wound Length (cm) 0.4 cm 09/12/23 1500   Wound Width (cm) 0.4 cm 09/12/23 1500   Wound Depth (cm) 0.3 cm 09/12/23 1500   Wound Surface Area (cm^2) 0.16 cm^2 09/12/23 1500   Wound Volume (cm^3) 0.048 cm^3 09/12/23 1500   Post-Procedure Length (cm) 0.2 cm 10/03/23 1100   Post-Procedure Width (cm) 0.1 cm 10/03/23 1100   Post-Procedure Depth (cm) 0.4 cm 10/03/23 1100   Post-Procedure Surface Area (cm^2) 0.02 cm^2 10/03/23 1100   Post-Procedure Volume (cm^3) 0.008 cm^3 10/03/23 1100   Wound Healing % 68 09/12/23 1500   Tunneling (cm) 0 cm 10/03/23 1100   Undermining (cm) 0 cm 10/03/23 1100   Wound Odor None 10/03/23 1100   Pulses Left;2+ 07/05/23 1415   Right Foot Monofilament 10-point exam (Sensate) 8/10 05/09/23 1500   Left Foot Monofilament 10-point exam (Sensate) 9/10 05/09/23 1500   Exposed Structures None 10/03/23 1100   Number of days: 203       PROCEDURE: Sharp debridement of posterior left lower  extremity wound  -2% viscous lidocaine applied topically to wound bed for approximately 5 minutes prior to debridement  -Forceps and Curette used to debride wound bed.  Excisional debridement was performed to remove devitalized tissue until healthy, bleeding tissue was visualized.   Entire surface of wound, 0.02 cm² debrided.  Tissue debrided into the subcutaneous layer.   -Bleeding controlled with manual pressure.    -Wound care completed by wound RN, refer to flowsheet  -Patient tolerated the procedure well, without c/o pain or discomfort.          BIOLOGIC LOG  6/13/2023-first application  PRODUCT: EpiCord 1 x 2cm . reorder #[id number]; PRODUCT-[id number] EXPIRES: 2028-01-01 6/20/2023-second application  PRODUCT: Epi fix 2 x 2 cm sheet.  Reorder #[id number] ; PRODUCT #[id number]; expires 2028-04-01 6/27/2023-third application-final application, authorized only through 6/30  PRODUCT: Epi fix 14 mm disc.  Reorder #[id number]; PRODUCT #[id number]; expires 2028-05-01      Pertinent Labs and Diagnostics:    Labs:     A1c:   Lab Results   Component Value Date/Time    HBA1C 6.5 (A) 07/31/2023 12:00 AM            LAST  WOUND CULTURE:  DATE :   Lab Results   Component Value Date/Time    CULTRSULT No growth after 5 days of incubation. 11/07/2022 06:23 PM         ASSESSMENT AND PLAN:   1. Open wound of left lower leg, subsequent encounter    10/3/2023: Wound near resolution, pinpoint.  - Excisional debridement was performed in clinic, medically necessary to promote wound healing.  -Punch biopsy obtained in clinic 7/11: focal granulation with background of marked stasis dermatitis. No evidence of vasculitis or malignancy.  - Patient received solaris ready wrap, he has been wearing and managing well.  -Patient to return to clinic in 2 weeks, expect wound to be healed at that time.  - Patient's wound healing has been delayed due to inadequate compression.     Wound care: hydrofera blue ready, silicone foam,  Solaris ready wrap    2. Controlled type 2 diabetes mellitus without complication, without long-term current use of insulin ([facility])    10/3/2023: Patient is now on Ozempic, A1c is dropped significantly.  He also states he is losing weight since starting this medication.  -Patient has been advised to keep his blood sugars below 140 in order to optimize wound healing    3. Severe obesity (BMI >= 40) ([facility])  Comments: Complicating factor to wound healing and wound care  Benefits of weight loss for management of his diabetes, and for his overall health previously discussed    4. Edema of both lower extremities  5. Venous insufficiency     10/3/2023: Chronic bilateral lower extremity edema, complicated by severe obesity  -Patient has had problems with sliding of compression wraps, cannot tolerate them  - Patient received Solaris Ready Wrap from [employer]. He will be instructed on use today in clinic and we will use for compression. Counseled that compression garments should be replaced every 6 months to maintain their integrity.    PATIENT EDUCATION  - Importance of adequate nutrition for wound healing  -Advised to go to ER for any increased redness, swelling, drainage, or odor, or if patient develops fever, chills, nausea or vomiting.     Please note that this note may have been created using voice recognition software. I have worked with technical experts from [employer] to optimize the interface.  I have made every reasonable attempt to correct obvious errors, but there may be errors of grammar and possibly content that I did not discover before finalizing the note.    N

## 2023-10-03 NOTE — PATIENT INSTRUCTIONS
-Keep your wound dressing clean, dry, and intact.    -Change your dressing if it becomes soiled, soaked, or falls off.    -Remove your compression wrap if you have severe pain, severe swelling, numbness, color change, or temperature change in your toes. If you need to remove your compression wrap, do so by unrolling it. Do not cut the compression wrap off to prevent cutting yourself on accident.    -Should you experience any significant changes in your wound(s), such as infection (redness, swelling, localized heat, increased pain, fever > 101 F, chills) or have any questions regarding your home care instructions, please contact the wound center at [phone]. If after hours, contact your primary care physician or go to the hospital emergency room.

## 2023-10-17 ENCOUNTER — OFFICE VISIT (OUTPATIENT)
Dept: WOUND CARE | Facility: MEDICAL CENTER | Age: 63
End: 2023-10-17
Attending: INTERNAL MEDICINE
Payer: MEDICARE

## 2023-10-17 VITALS
DIASTOLIC BLOOD PRESSURE: 70 MMHG | SYSTOLIC BLOOD PRESSURE: 138 MMHG | HEART RATE: 65 BPM | RESPIRATION RATE: 20 BRPM | TEMPERATURE: 96.6 F | OXYGEN SATURATION: 92 %

## 2023-10-17 DIAGNOSIS — E11.9 CONTROLLED TYPE 2 DIABETES MELLITUS WITHOUT COMPLICATION, WITHOUT LONG-TERM CURRENT USE OF INSULIN (HCC): ICD-10-CM

## 2023-10-17 DIAGNOSIS — I87.2 VENOUS INSUFFICIENCY OF BOTH LOWER EXTREMITIES: ICD-10-CM

## 2023-10-17 DIAGNOSIS — S81.802D OPEN WOUND OF LEFT LOWER LEG, SUBSEQUENT ENCOUNTER: ICD-10-CM

## 2023-10-17 DIAGNOSIS — E66.01 SEVERE OBESITY (BMI >= 40) (HCC): ICD-10-CM

## 2023-10-17 DIAGNOSIS — R60.0 EDEMA OF BOTH LOWER EXTREMITIES: ICD-10-CM

## 2023-10-17 PROCEDURE — 99213 OFFICE O/P EST LOW 20 MIN: CPT

## 2023-10-17 PROCEDURE — 3075F SYST BP GE 130 - 139MM HG: CPT | Performed by: NURSE PRACTITIONER

## 2023-10-17 PROCEDURE — 3078F DIAST BP <80 MM HG: CPT | Performed by: NURSE PRACTITIONER

## 2023-10-17 PROCEDURE — 99212 OFFICE O/P EST SF 10 MIN: CPT

## 2023-10-17 PROCEDURE — 99213 OFFICE O/P EST LOW 20 MIN: CPT | Performed by: NURSE PRACTITIONER

## 2023-10-17 NOTE — WOUND TEAM
Advanced Wound Care   [facility]   [address]   [address]   [place] [address]   [phone] Fax: [phone]    Discharge Note      Referring Physician: Didi Lehman M.D.  Wound Etiology: traumatic  Wound location: LLE posterior  Date of Discharge: 10/1/23    Assessment:  Discharge patient at this time secondary to wound resolution.    Thank you for the referral and the opportunity to treat your patient.      Clinician Signature: _____________________________ Date:_______________

## 2023-10-17 NOTE — PATIENT INSTRUCTIONS
- Resolved wound be fragile for a few days, bathe and dry area gently, only ever regains a maximum of 80% of the tensile strength of the surrounding skin, remodeling of scar can continue for 6mo - a year. Contact PCP for a referral back to wound care if any problems with area opening and draining again.    -Should you experience any significant changes in your wound(s), such as infection (redness, swelling, localized heat, increased pain, fever > 101 F, chills) or have any questions regarding your home care instructions, please contact the wound center at [phone]. If after hours, contact your primary care physician or go to the hospital emergency room.

## 2023-10-18 NOTE — PROGRESS NOTES
Provider Encounter- Full Thickness wound    HISTORY OF PRESENT ILLNESS  Wound History:   START OF CARE IN CLINIC: 03/14/23   REFERRING PROVIDER: Didi Lehman   WOUND- Full Thickness Wound   LOCATION: Left posterior leg   HISTORY: Patient developed a wound in September 2023 to the left posterior leg.  Patient has thick fungal toenails and reports that he kicked his leg with his right foot and nails causing a scratch.  This led to the full-thickness wound to the left posterior lower extremity.  Patient reports that he was receiving home health care without resolution of the wound.  Patient was seen at [facility] and referred to [facility] wound care for further care of the left posterior full-thickness wound.    Pertinent Medical History: Hyperlipidemia, ABELARDO, obesity, essential hypertension, type 2 diabetes, pulmonary hypertension    TOBACCO USE: Denies a history of tobacco use or smokeless tobacco products    Patient's problem list, allergies, and current medications reviewed and updated in Epic    Interval History:  3/14/2023: Clinic visit with GURJIT Barrientos.  Patient reports overall he is feeling well denies any fever or chills.    3/21/2023: Clinic visit with Kwaku Sanchez MD. Patient reports doing well, denies any fevers or chills. His wound is measuring about the same but is starting to fill in with increased granulation tissue. Patient was referred to podiatry at [facility] and reports appointment for foot and nail care on 4/24. He does not wear compression to lower extremities and was counseled on edema / venous insufficiency and importance of compression to help return venous flow to heart.    3/27/2023: Clinic visit with Kwaku Sanchez MD. Patient reports doing well. Tolerating compression with decreased edema. He denies any complaints. Wound is slowly improving. Discussed again compression in future. He think he will be able to apply compression socks, given information and he will see about  obtaining from [facility].    4/3/2023: Clinic visit with GURJIT Barrientos.  Patient reports overall he is doing well denies any fever or chills.  Patient reports that Tubigrip applied over the Unna boot with Coban to the right lower extremity was too tight last clinical appointment.  Therefore we will use a larger Tubigrip.  Patient's wound is progressing with increased epithelial tissue at the periphery and granulation tissue throughout the wound bed.    4/10/2023: Clinic visit with GURJIT Barrientos.  Patient reports overall he is doing well denies any fever or chills.  Patient now has [facility] who is seeing the patient once a week for wound care dressing changes and reapplication of Unna boot with Coban wraps.  Patient reports that he has ordered compression stockings off of [employer] and they should be to his home soon.    4/25/2023: Clinic visit with GURJIT Barrientos.  Patient reports overall he is doing well denies any fever or chills.  Patient reports he has seen his podiatrist recently who has taken down his oncotic nails.  Patient reports that he has not had any applications with the compression.  Discussed increasing compression of the 2 layer compression wrap patient is in agreement.  Patient reports that ED and health services is going to supply him with zipper compression stockings with 30 to 40 mm compression.      5/2/2023 : Clinic visit with GURJIT Ordaz, VAL, DIANA, NAY.   Patient states that overall he is feeling well.  Reports his blood sugar was in the 150s.  He did not receive zippered compression garment, ordered last visit.  We will follow-up.  His wound is slowly progressing.  He is tolerating compression without any difficulty.    5/9/2023 : Clinic visit with GURJIT Ordaz, VAL, DIANA, NAY.   Patient states he is feeling well overall.  His blood sugars continue to run in the 150s to 160s.  He just started on Ozempic about a week ago.  He has still not  received zippered compression stocking.  He states that [facility] is trying to figure out which department will pay for this.  He continues to tolerate compression wrap without any difficulty.    5/16/2023 : Clinic visit with GURJIT Ordaz, VAL, DIANA, NAY.   Patient states he is feeling well, blood sugars in the 160s.  He has not yet obtained zippered compression stocking from [facility], is not sure when this will be happening.  He states that he has an appointment to  a voucher for diabetic shoes with inserts, and will ask at that time about the compression garment.    5/23/2023: Clinic visit with GURJIT Barrientos.  Patient reports that he is doing okay denies fever or chills.  Patient is on home oxygen, again the patient does not have a transportable tank.  Short of breath on arrival.  Nasal cannula placed in clinic at 3 L patient saturating above 90%.    5/30/2023: Clinic visit with Kwaku Sanchez MD. Patient reports doing well. Denies any complaints. He reports that he continues to have difficulty obtaining compression socks from [facility]. Reports that he has been transferred between departments, but will continue to try. Wound largely unchanged. Biologic authorization is still pending. Patient continues to have edema despite 2 layer compression, he is agreeable to try 4 layer compression this week.    6/6/2023: Clinic visit with Kwaku Sanchez MD. Patient reports feeling well. Denies any signs or symptoms of infection. He reports tolerating 4 layer wrap, though home health did not have and applied 2 layer wrap. Home health told him that they have ordered 4 layer compression. He is still working through [facility] to obtain compression socks. Biologic authorization is still pending.    6/13/2023 : Clinic visit with GURJIT Ordaz, VAL, DIANA, NAY.   Rashi states he is feeling well overall.  His blood sugar today was 153.  We have received authorization from his insurance provider to apply EpiFix or epi  "cord to his wound.  Epi cord applied today, first application.  Authorization is only good through 6/30.    6/20/2023 : Clinic visit with GURJIT Ordaz, VAL, DIANA, NAY.   Patient continues to feel well.  He reports that his blood sugar this morning was 144.  He is tolerating compression without any difficulty.  Wound area has decreased slightly.  He has not yet received zippered compression garments from [facility], states \"they are working on it\".    6/27/2023 : Clinic visit with GURJIT Ordaz, VAL, DIANA, NAY.   Rashi states he is feeling well, reports his blood sugar today was 131.  He continues to tolerate compression.  Wound area about the same, depth has decreased slightly.  EpiFix was applied in clinic today.  However, this will be his last application, as authorization was only through 6/30.    7/6/2023: Clinic visit with GURJIT Barrientos. Wound bed is smaller by dimensions fortunately Rashi is out authorization for biologic.  Patient reports that home health did not have 4-layer compression.  2 layer compression placed by home health.  Patient reports that home health did not have 4-layer compression wraps.    7/11/2023 : Clinic visit with GURJIT Ordaz, VAL, DIANA, NAY.   Rashi continues to feel well overall.  His wound has not progressed significantly for several months.  He does present today with a raised ring around bottom half of wound.  I opted to perform a punch biopsy in clinic today to rule out possible malignancy or other etiology.   He also presented today with his pression wrap partially slid down his leg.  This has been an ongoing problem.  He informed that [facility] has denied coverage for zippered compression garment.  He states they are looking into other options    7/18/2023: Clinic visit with Kwaku Sanchez MD. Patient reports feeling in normal state of health. Denies any fevers or chills. Reviewed patient's biopsy results with him: granulation tissue and marked " stasis dermatitis without evidence of vasculitis or malignancy. Patient is tolerating tubigrips better and easier for him to pull back up when they fall down.    7/25/2023 : Clinic visit with GURJIT Ordaz, VAL, DIANA, NAY.   Rashi continues to feel well, reports his blood sugar this morning was 139.  Wound area has decreased slightly.  He has still not received compression garments from [facility].    8/1/2023 : Clinic visit with GURJTI Ordaz FNP-BC, DIANA, NAY.   Rashi continues to do well overall.  He just had lab work done at [facility], was told his A1c was 6.5.  His wound is actually quite a bit smaller today.  He has still not received any sort of compression garment from [facility].    8/8/2023: Clinic visit with Kwaku Sanchez MD. Patient reports doing well, denies any current complaints. He reports that he has been getting the run around with [facility] about who will be ordering compression for him. Measured patient and gave paper script for Solaris ReadyWrap Fusion Kit size medium, length long. He is to take to [facility] (not sure what department orders or PCP) to get filled. Patient wound slightly smaller.    8/22/2023 : Clinic visit with GURJIT Ordaz, VAL, DIANA, NAY.   Rashi states he is feeling well, reports his blood sugar today was around 145.  He missed his appointment last week because he was not feeling well.  He states he has submitted prescription for zippered compression garment to [facility], does not know how long it will take.  His wound measures slightly larger today.    8/29/2023: Clinic visit with Kwaku Sanchez MD. Patient reports doing well. Denies any fevers or chills. His wound is measuring slightly smaller today. He has submitted prescription for compression garments to [facility]. He is unsure what the status is, encouraged him to follow up with [facility].    9/8/2023: Clinic visit with Kwaku Sanchez MD. Patient denies any issues this week. His wound is measuring smaller today. He plans to go to [facility]  today to enquire about the status of compression garments previously ordered.    9/12/2023: Clinic visit with Kwaku Sanchez MD. Patient reports that he heard from [employer] they are unable to assist or supply compression garments. Reports that he cannot afford this month. His wound is unchanged and appears to limited by lack of compression. He was unable to tolerate / manage multilayer compression wrap. He is unable to don compression socks. Will order Solaris ready wrap from [facility]. He was given information on [employer] to see if they can assist obtaining with funds.    9/26/2023: Clinic visit with Kwaku Sanchez MD. Patient reports doing well. Denies any complaints. Patient wound is smaller. He received Solaris Ready Wrap. Will be instructed on use today in clinic and will use for compression moving forward.    10/3/2023: Clinic visit with Kwaku Sanchez MD. Patient reports feeling well. He has been wearing Solaris ready wrap to left lower extremity. Reports no difficulty in managing. Patient wound has improved, pinpoint.    10/17/2023 : Clinic visit with GURJIT Ordaz, FNP-BC, CWOCN, CFCN.   Patient states he is feeling well, blood sugar today was in the 130s.  He is wearing his new Solaris wrap.  Wound remains as a pinpoint opening, with scant drainage.  He is comfortable with being discharged from the clinic.  He understands he is to return ASAP if his wound recurs, or if he develops new wounds.  We reviewed the importance of lifelong compression.    REVIEW OF SYSTEMS:   Unchanged from previous wound clinic assessment on 10/3/2023 except as noted in interval history above    PHYSICAL EXAMINATION:   /70   Pulse 65   Temp 35.9 °C (96.6 °F) (Temporal)   Resp 20   SpO2 92%     Physical Exam  Constitutional:       Appearance: He is obese.   Cardiovascular:      Rate and Rhythm: Normal rate.      Comments: Pedal pulses are palpable  Pulmonary:      Effort: Pulmonary effort is normal. No respiratory  distress.   Musculoskeletal:         General: Swelling present.      Right lower leg: Edema present.      Left lower leg: Edema present.   Skin:     Comments: Full-thickness wound to the left posterior lower extremity: Remains as a pinpoint opening, scant drainage over the past week, no evidence of infection    Hemosiderin staining, chronic edema, skin changes-consistent with CVI   Neurological:      Mental Status: He is alert and oriented to person, place, and time.   Psychiatric:         Mood and Affect: Mood normal.         WOUND ASSESSMENT           PROCEDURE:   -No debridement today  -Wound care completed by wound RN, refer to flowsheet  -Patient tolerated the procedure well, without c/o pain or discomfort.          BIOLOGIC LOG  6/13/2023-first application  PRODUCT: EpiCord 1 x 2cm . reorder #[id number]; PRODUCT-[id number] EXPIRES: 2028-01-01 6/20/2023-second application  PRODUCT: Epi fix 2 x 2 cm sheet.  Reorder #[id number] ; PRODUCT #[id number]; expires 2028-04-01 6/27/2023-third application-final application, authorized only through 6/30  PRODUCT: Epi fix 14 mm disc.  Reorder #[id number]; PRODUCT #[id number]; expires 2028-05-01      Pertinent Labs and Diagnostics:    Labs:     A1c:   Lab Results   Component Value Date/Time    HBA1C 6.5 (A) 07/31/2023 12:00 AM            LAST  WOUND CULTURE:  DATE :   Lab Results   Component Value Date/Time    CULTRSULT No growth after 5 days of incubation. 11/07/2022 06:23 PM         ASSESSMENT AND PLAN:   1. Open wound of left lower leg, subsequent encounter    10/17/2023: Wound presents as a pinpoint opening with scant drainage.  Patient is comfortable with being discharged from the clinic, is confident he can manage his wound from here on out.  -Discharge from [facility]  -Patient to return to clinic ASAP if wound recurs, or if he/she develops new wounds      Wound care: Open to air Solaris ready wrap    2. Controlled type 2 diabetes mellitus without complication,  without long-term current use of insulin ([facility])    10/17/2023: Patient's blood sugars have responded well to Ozempic.  He is consistently in the 130s, and states he is losing weight.  -Patient has been advised to keep his blood sugars below 140 in order to optimize wound healing    3. Severe obesity (BMI >= 40) ([facility])  Comments: Complicating factor to wound healing and wound care  Benefits of weight loss for management of his diabetes, and for his overall health previously discussed    4. Edema of both lower extremities  5. Venous insufficiency     10/17/2023: Chronic bilateral lower extremity edema, complicated by severe obesity  -Patient has had problems with sliding of compression wraps, cannot tolerate them  -Patient now has Solaris ready wrap.  Reminded that he will need compression for the rest of his life.  Advised to replace these wraps every 6 to 12 months.    PATIENT EDUCATION  - Importance of adequate nutrition for wound healing  -Advised to go to ER for any increased redness, swelling, drainage, or odor, or if patient develops fever, chills, nausea or vomiting.     My total time spent caring for the patient on the day of the encounter was 20 minutes.   This does not include time spent on separately billable procedures/tests.     Please note that this note may have been created using voice recognition software. I have worked with technical experts from [employer] to optimize the interface.  I have made every reasonable attempt to correct obvious errors, but there may be errors of grammar and possibly content that I did not discover before finalizing the note.    N

## 2024-09-30 NOTE — PATIENT INSTRUCTIONS
-Keep your wound dressing clean, dry, and intact.    -Change your dressing if it becomes soiled, soaked, or falls off.    -Remove your compression wrap if you have severe pain, severe swelling, numbness, color change, or temperature change in your toes. If you need to remove your compression wrap, do so by unrolling it. Do not cut the compression wrap off to prevent cutting yourself on accident.    -Should you experience any significant changes in your wound(s), such as infection (redness, swelling, localized heat, increased pain, fever > 101 F, chills) or have any questions regarding your home care instructions, please contact the wound center at [phone]. If after hours, contact your primary care physician or go to the hospital emergency room.    
no

## (undated) DEVICE — GOWN SURGEONS X-LARGE - DISP. (30/CA)

## (undated) DEVICE — KIT PROCEDURE DOUBLE ENDO ONLY (5/CA)

## (undated) DEVICE — CATHETER IV 20 GA X 1-1/4 ---SURG.& SDS ONLY--- (50EA/BX)

## (undated) DEVICE — FILM CASSETTE ENDO

## (undated) DEVICE — BITE BLOCK ADULT 60FR (100EA/CA)

## (undated) DEVICE — KIT CUSTOM PROCEDURE SINGLE FOR ENDO  (15/CA)

## (undated) DEVICE — NEPTUNE 4 PORT MANIFOLD - (20/PK)

## (undated) DEVICE — CANISTER SUCTION RIGID RED 1500CC (40EA/CA)

## (undated) DEVICE — BASIN EMESIS DISP. - (250/CA)

## (undated) DEVICE — MANIFOLD NEPTUNE 1 PORT (20/PK)

## (undated) DEVICE — TUBE CONNECTING SUCTION - CLEAR PLASTIC STERILE 72 IN (50EA/CA)

## (undated) DEVICE — SUCTION INSTRUMENT YANKAUER BULBOUS TIP W/O VENT (50EA/CA)

## (undated) DEVICE — CONTAINER, SPECIMEN, STERILE

## (undated) DEVICE — TUBE NG SALEM SUMP 14FR (50EA/BX)

## (undated) DEVICE — SPONGE GAUZE NON-STERILE 4X4 - (2000/CA 10PK/CA)